# Patient Record
Sex: FEMALE | Race: WHITE | Employment: OTHER | URBAN - METROPOLITAN AREA
[De-identification: names, ages, dates, MRNs, and addresses within clinical notes are randomized per-mention and may not be internally consistent; named-entity substitution may affect disease eponyms.]

---

## 2017-06-08 PROBLEM — I83.891 BLEEDING FROM VARICOSE VEINS OF RIGHT LOWER EXTREMITY: Status: ACTIVE | Noted: 2017-06-08

## 2017-06-08 PROBLEM — I83.892 VARICOSE VEINS OF LEFT LEG WITH EDEMA: Status: ACTIVE | Noted: 2017-06-08

## 2018-11-26 ENCOUNTER — HOSPITAL ENCOUNTER (OUTPATIENT)
Dept: CT IMAGING | Age: 73
Discharge: HOME | End: 2018-11-26
Attending: FAMILY MEDICINE
Payer: MEDICARE

## 2018-11-26 DIAGNOSIS — R10.9 STOMACH ACHE: ICD-10-CM

## 2018-11-26 LAB — CREAT BLD-MCNC: 0.9 MG/DL (ref 0.6–1.3)

## 2018-11-26 PROCEDURE — 74177 CT ABD & PELVIS W/CONTRAST: CPT

## 2018-11-26 PROCEDURE — 82565 ASSAY OF CREATININE: CPT

## 2018-11-26 PROCEDURE — 74011636320 HC RX REV CODE- 636/320: Performed by: FAMILY MEDICINE

## 2018-11-26 PROCEDURE — 74011250636 HC RX REV CODE- 250/636: Performed by: FAMILY MEDICINE

## 2018-11-26 RX ORDER — SODIUM CHLORIDE 9 MG/ML
50 INJECTION, SOLUTION INTRAVENOUS
Status: COMPLETED | OUTPATIENT
Start: 2018-11-26 | End: 2018-11-26

## 2018-11-26 RX ORDER — SODIUM CHLORIDE 0.9 % (FLUSH) 0.9 %
10 SYRINGE (ML) INJECTION
Status: COMPLETED | OUTPATIENT
Start: 2018-11-26 | End: 2018-11-26

## 2018-11-26 RX ADMIN — IOPAMIDOL 100 ML: 755 INJECTION, SOLUTION INTRAVENOUS at 09:50

## 2018-11-26 RX ADMIN — Medication 10 ML: at 09:50

## 2018-11-26 RX ADMIN — IOHEXOL 20 ML: 240 INJECTION, SOLUTION INTRATHECAL; INTRAVASCULAR; INTRAVENOUS; ORAL at 09:51

## 2018-11-26 RX ADMIN — SODIUM CHLORIDE 50 ML/HR: 900 INJECTION, SOLUTION INTRAVENOUS at 09:51

## 2019-01-10 ENCOUNTER — OFFICE VISIT (OUTPATIENT)
Dept: CARDIOLOGY CLINIC | Age: 74
End: 2019-01-10
Payer: MEDICARE

## 2019-01-10 VITALS
RESPIRATION RATE: 16 BRPM | HEART RATE: 51 BPM | DIASTOLIC BLOOD PRESSURE: 78 MMHG | BODY MASS INDEX: 26.36 KG/M2 | HEIGHT: 63 IN | SYSTOLIC BLOOD PRESSURE: 118 MMHG | WEIGHT: 148.8 LBS

## 2019-01-10 DIAGNOSIS — I38 VALVULAR HEART DISEASE: ICD-10-CM

## 2019-01-10 DIAGNOSIS — I48.91 ATRIAL FIBRILLATION, UNSPECIFIED TYPE (HCC): ICD-10-CM

## 2019-01-10 DIAGNOSIS — I10 ESSENTIAL HYPERTENSION: ICD-10-CM

## 2019-01-10 DIAGNOSIS — Z79.01 CHRONIC ANTICOAGULATION: ICD-10-CM

## 2019-01-10 DIAGNOSIS — I48.19 PERSISTENT ATRIAL FIBRILLATION (HCC): Primary | ICD-10-CM

## 2019-01-10 PROCEDURE — G8400 PT W/DXA NO RESULTS DOC: HCPCS | Performed by: INTERNAL MEDICINE

## 2019-01-10 PROCEDURE — 1123F ACP DISCUSS/DSCN MKR DOCD: CPT | Performed by: INTERNAL MEDICINE

## 2019-01-10 PROCEDURE — 93000 ELECTROCARDIOGRAM COMPLETE: CPT | Performed by: INTERNAL MEDICINE

## 2019-01-10 PROCEDURE — 99214 OFFICE O/P EST MOD 30 MIN: CPT | Performed by: INTERNAL MEDICINE

## 2019-01-10 PROCEDURE — 3017F COLORECTAL CA SCREEN DOC REV: CPT | Performed by: INTERNAL MEDICINE

## 2019-01-10 PROCEDURE — 1090F PRES/ABSN URINE INCON ASSESS: CPT | Performed by: INTERNAL MEDICINE

## 2019-01-10 PROCEDURE — G8598 ASA/ANTIPLAT THER USED: HCPCS | Performed by: INTERNAL MEDICINE

## 2019-01-10 PROCEDURE — G8427 DOCREV CUR MEDS BY ELIG CLIN: HCPCS | Performed by: INTERNAL MEDICINE

## 2019-01-10 PROCEDURE — 1101F PT FALLS ASSESS-DOCD LE1/YR: CPT | Performed by: INTERNAL MEDICINE

## 2019-01-10 PROCEDURE — G8484 FLU IMMUNIZE NO ADMIN: HCPCS | Performed by: INTERNAL MEDICINE

## 2019-01-10 PROCEDURE — G8419 CALC BMI OUT NRM PARAM NOF/U: HCPCS | Performed by: INTERNAL MEDICINE

## 2019-01-10 PROCEDURE — 1036F TOBACCO NON-USER: CPT | Performed by: INTERNAL MEDICINE

## 2019-01-10 PROCEDURE — 4040F PNEUMOC VAC/ADMIN/RCVD: CPT | Performed by: INTERNAL MEDICINE

## 2019-01-10 RX ORDER — BLOOD SUGAR DIAGNOSTIC
STRIP MISCELLANEOUS
Refills: 0 | COMMUNITY
Start: 2018-12-18

## 2019-01-10 RX ORDER — PNEUMOCOCCAL 13-VALENT CONJUGATE VACCINE 2.2; 2.2; 2.2; 2.2; 2.2; 4.4; 2.2; 2.2; 2.2; 2.2; 2.2; 2.2; 2.2 UG/.5ML; UG/.5ML; UG/.5ML; UG/.5ML; UG/.5ML; UG/.5ML; UG/.5ML; UG/.5ML; UG/.5ML; UG/.5ML; UG/.5ML; UG/.5ML; UG/.5ML
INJECTION, SUSPENSION INTRAMUSCULAR
Refills: 0 | COMMUNITY
Start: 2018-12-04 | End: 2019-09-13 | Stop reason: ALTCHOICE

## 2019-01-10 RX ORDER — FUROSEMIDE 20 MG/1
TABLET ORAL
Refills: 0 | COMMUNITY
Start: 2019-01-01

## 2019-09-13 ENCOUNTER — HOSPITAL ENCOUNTER (OUTPATIENT)
Dept: WOUND CARE | Age: 74
Discharge: HOME | End: 2019-09-13
Payer: MEDICARE

## 2019-09-13 VITALS
SYSTOLIC BLOOD PRESSURE: 150 MMHG | TEMPERATURE: 98.3 F | HEART RATE: 64 BPM | RESPIRATION RATE: 20 BRPM | BODY MASS INDEX: 24.98 KG/M2 | DIASTOLIC BLOOD PRESSURE: 80 MMHG | HEIGHT: 63 IN | WEIGHT: 141 LBS

## 2019-09-13 DIAGNOSIS — L97.812 NON-PRESSURE CHRONIC ULCER OF OTHER PART OF RIGHT LOWER LEG WITH FAT LAYER EXPOSED (HCC): ICD-10-CM

## 2019-09-13 DIAGNOSIS — L97.919 VENOUS ULCER OF RIGHT LEG (HCC): ICD-10-CM

## 2019-09-13 DIAGNOSIS — S80.11XA HEMATOMA OF LEG, RIGHT, INITIAL ENCOUNTER: ICD-10-CM

## 2019-09-13 DIAGNOSIS — I83.019 VENOUS ULCER OF RIGHT LEG (HCC): ICD-10-CM

## 2019-09-13 PROCEDURE — 99203 OFFICE O/P NEW LOW 30 MIN: CPT | Performed by: PODIATRIST

## 2019-09-13 PROCEDURE — 88304 TISSUE EXAM BY PATHOLOGIST: CPT

## 2019-09-13 PROCEDURE — 11042 DBRDMT SUBQ TIS 1ST 20SQCM/<: CPT

## 2019-09-13 PROCEDURE — 99213 OFFICE O/P EST LOW 20 MIN: CPT

## 2019-09-13 PROCEDURE — 11042 DBRDMT SUBQ TIS 1ST 20SQCM/<: CPT | Performed by: PODIATRIST

## 2019-09-13 RX ORDER — ASPIRIN 81 MG/1
81 TABLET ORAL DAILY
COMMUNITY

## 2019-09-13 RX ORDER — LIDOCAINE HYDROCHLORIDE 20 MG/ML
JELLY TOPICAL ONCE
Status: DISCONTINUED | OUTPATIENT
Start: 2019-09-13 | End: 2019-09-14 | Stop reason: HOSPADM

## 2019-09-13 NOTE — PROGRESS NOTES
[facility]  History and Physical/Consultation  Podiatry    Referring Physician : Sherita Andino MD  [address] [facility] RECORD NUMBER:  [id number]  AGE: 69 y.o. GENDER: female  : 1945  EPISODE DATE:  2019  Subjective:     Chief Complaint   Patient presents with    Wound Check     patient here for treatment of right lower leg ulcer         HISTORY of PRESENT ILLNESS HPI     Maximo Trivedi is a 69 y.o. female who presents today for wound/ulcer evaluation. History of Wound Context:  The patient has had a wound of venous origin which was first noted approximately 4 months ago after having vein injections. This has been treated by herself with padded gauze dressings. On their initial visit to the wound healing center, 19, the patient has noted that the wound has not been improving. The patient has not had wounds similar to this in the past.        Pt is currently not on abx.       Wound/Ulcer Pain Timing/Severity: mild  Quality of pain: dull, aching  Severity:  3 / 10   Modifying Factors: Pain is relieved/improved with rest  Associated Signs/Symptoms: edema and drainage    Ulcer Identification:  Ulcer Type: venous, traumatic and hematoma  Contributing Factors: venous stasis and anticoagulation therapy    Diabetic/Pressure/Non Pressure Ulcers onl y:  Ulcer: Non-Pressure ulcer, fat layer exposed    If patient has diabetic lower extremity wounds  Simon Classification of diabetic lower extremity wounds:    Grade Description   []  0 No open wound   []  1 Superficial ulcer involving the full skin thickness   []  2 Deep ulcer involves ligament, tendon, joint capsule, or fascia  No bone involvement or abscess presence   []  3 Deep Ulcer with abcess formation and/or osteomyelitis   []  4 Localized gangrene   []  5 Extensive gangrene of the foot     Wound: Injection site hematoma right leg        PAST MEDICAL HISTORY      Diagnosis Date    Aortic stenosis, mild 10/1/2015    Arrhythmia     Atrial fibrillation ([address]     Bleeding from varicose veins of right lower extremity 6/8/2017    Cerebrovascular disease 03-21-13    CVA    Diabetes mellitus ([address]     Hearing deficit     Heart disease     Hyperlipidemia     Hypertension     Migraine headache with aura     Mitral regurgitation 10/1/2015    mild    NCGS (non-celiac gluten sensitivity)     TIA (transient ischemic attack)     Unspecified cerebral artery occlusion with cerebral infarction     Varicose veins of left leg with edema 6/8/2017    War[facility]in[facility]induced coagulopathy ([facility] 9/29/2015     Past Surgical History:   Procedure Laterality Date    ABDOMEN SURGERY      APPENDECTOMY      BREAST SURGERY Right     biopsy-benign    COLONOSCOPY  2008    ECHOCARDIOGRAM COMPLETE 2D W DOPPLER W COLOR  8/30/2012         INNER EAR SURGERY Left     Multiple surgeries    OVARY SURGERY      SMALL INTESTINE SURGERY      VEIN SURGERY       Family History   Problem Relation Age of Onset    Heart Attack Mother     Cancer Father     Diabetes Father      Social History     Tobacco Use    Smoking status: Never Smoker    Smokeless tobacco: Never Used   Substance Use Topics    Alcohol use: No    Drug use: No     No Known Allergies  Current Outpatient Medications on File Prior to Encounter   Medication Sig Dispense Refill    aspirin 81 MG EC tablet Take 81 mg by mouth daily      potassium chloride (KLOR-CON) 10 MEQ extended release tablet take 1 tablet by mouth twice a day  0    furosemide (LASIX) 20 MG tablet take 1 tablet by mouth once daily  0    ACCU-CHEK JR PLUS strip   0    warfarin (COUMADIN) 1 MG tablet Take 1 mg by mouth daily Pt alternates 1 mg and 1 1/2 mg      CARTIA  MG extended release capsule Take 240 mg by mouth daily  0    calcium carbonate-vitamin D3 (CALCIUM 600/VITAMIN D) 600-400 MG-UNIT TABS Take by mouth      potassium chloride SA (KLOR-CON M10) 10 MEQ tablet Take 10 mEq by mouth 2 times daily.       pravastatin

## 2019-09-20 ENCOUNTER — HOSPITAL ENCOUNTER (OUTPATIENT)
Dept: WOUND CARE | Age: 74
Discharge: HOME | End: 2019-09-20
Payer: MEDICARE

## 2019-09-20 VITALS
RESPIRATION RATE: 18 BRPM | HEIGHT: 63 IN | SYSTOLIC BLOOD PRESSURE: 134 MMHG | TEMPERATURE: 97.9 F | DIASTOLIC BLOOD PRESSURE: 74 MMHG | HEART RATE: 64 BPM | WEIGHT: 141 LBS | BODY MASS INDEX: 24.98 KG/M2

## 2019-09-20 DIAGNOSIS — I83.019 VENOUS ULCER OF RIGHT LEG (HCC): ICD-10-CM

## 2019-09-20 DIAGNOSIS — L97.812 NON-PRESSURE CHRONIC ULCER OF OTHER PART OF RIGHT LOWER LEG WITH FAT LAYER EXPOSED (HCC): ICD-10-CM

## 2019-09-20 DIAGNOSIS — S80.11XA HEMATOMA OF LEG, RIGHT, INITIAL ENCOUNTER: ICD-10-CM

## 2019-09-20 DIAGNOSIS — L97.919 VENOUS ULCER OF RIGHT LEG (HCC): ICD-10-CM

## 2019-09-20 PROCEDURE — 11042 DBRDMT SUBQ TIS 1ST 20SQCM/<: CPT

## 2019-09-20 PROCEDURE — 11042 DBRDMT SUBQ TIS 1ST 20SQCM/<: CPT | Performed by: PODIATRIST

## 2019-09-20 RX ORDER — LIDOCAINE HYDROCHLORIDE 20 MG/ML
JELLY TOPICAL ONCE
Status: DISCONTINUED | OUTPATIENT
Start: 2019-09-20 | End: 2019-09-21 | Stop reason: HOSPADM

## 2019-09-20 NOTE — PROGRESS NOTES
Surgical History:   Procedure Laterality Date    ABDOMEN SURGERY      APPENDECTOMY      BREAST SURGERY Right     biopsy-benign    COLONOSCOPY  2008    ECHOCARDIOGRAM COMPLETE 2D W DOPPLER W COLOR  8/30/2012         INNER EAR SURGERY Left     Multiple surgeries    OVARY SURGERY      SMALL INTESTINE SURGERY      VEIN SURGERY       Family History   Problem Relation Age of Onset    Heart Attack Mother     Cancer Father     Diabetes Father      Social History     Tobacco Use    Smoking status: Never Smoker    Smokeless tobacco: Never Used   Substance Use Topics    Alcohol use: No    Drug use: No     No Known Allergies  Current Outpatient Medications on File Prior to Encounter   Medication Sig Dispense Refill    aspirin 81 MG EC tablet Take 81 mg by mouth daily      potassium chloride (KLOR-CON) 10 MEQ extended release tablet take 1 tablet by mouth twice a day  0    furosemide (LASIX) 20 MG tablet take 1 tablet by mouth once daily  0    warfarin (COUMADIN) 1 MG tablet Take 1 mg by mouth daily Pt alternates 1 mg and 1 1/2 mg      CARTIA  MG extended release capsule Take 240 mg by mouth daily  0    calcium carbonate-vitamin D3 (CALCIUM 600/VITAMIN D) 600-400 MG-UNIT TABS Take by mouth      pravastatin (PRAVACHOL) 40 MG tablet Take 20 mg by mouth daily.  niacin (SLO-NIACIN) 500 MG tablet Take 500 mg by mouth 2 times daily.  omeprazole (PRILOSEC) 20 MG capsule Take 20 mg by mouth 2 times daily.  therapeutic multivitamin-minerals (THERAGRAN-M) tablet Take 1 tablet by mouth daily.  Vitamin D (CHOLECALCIFEROL) 1000 UNITS CAPS capsule Take 600 Units by mouth daily       metFORMIN (GLUCOPHAGE) 500 MG tablet Take 500 mg by mouth daily (with breakfast).  Chromium-Cinnamon (CINNAMON PLUS CHROMIUM PO) Take 2 tablets by mouth Daily with supper.  metoprolol (LOPRESSOR) 100 MG tablet Take 50 mg by mouth 2 times daily.       digoxin (LANOXIN) 0.125 MG tablet Take 125 mcg by mouth

## 2019-09-27 ENCOUNTER — HOSPITAL ENCOUNTER (OUTPATIENT)
Dept: WOUND CARE | Age: 74
Discharge: HOME | End: 2019-09-27
Payer: MEDICARE

## 2019-09-27 VITALS
RESPIRATION RATE: 16 BRPM | SYSTOLIC BLOOD PRESSURE: 150 MMHG | WEIGHT: 141 LBS | HEART RATE: 60 BPM | HEIGHT: 63 IN | TEMPERATURE: 98.4 F | DIASTOLIC BLOOD PRESSURE: 72 MMHG | BODY MASS INDEX: 24.98 KG/M2

## 2019-09-27 DIAGNOSIS — L97.812 NON-PRESSURE CHRONIC ULCER OF OTHER PART OF RIGHT LOWER LEG WITH FAT LAYER EXPOSED (HCC): ICD-10-CM

## 2019-09-27 DIAGNOSIS — I83.019 VENOUS ULCER OF RIGHT LEG (HCC): ICD-10-CM

## 2019-09-27 DIAGNOSIS — S80.11XA HEMATOMA OF LEG, RIGHT, INITIAL ENCOUNTER: ICD-10-CM

## 2019-09-27 DIAGNOSIS — L97.919 VENOUS ULCER OF RIGHT LEG (HCC): ICD-10-CM

## 2019-09-27 PROCEDURE — 99213 OFFICE O/P EST LOW 20 MIN: CPT

## 2019-09-27 PROCEDURE — 99213 OFFICE O/P EST LOW 20 MIN: CPT | Performed by: PODIATRIST

## 2019-09-27 RX ORDER — LIDOCAINE HYDROCHLORIDE 20 MG/ML
JELLY TOPICAL ONCE
Status: DISCONTINUED | OUTPATIENT
Start: 2019-09-27 | End: 2019-09-28 | Stop reason: HOSPADM

## 2019-09-27 ASSESSMENT — PAIN SCALES - GENERAL: PAINLEVEL_OUTOF10: 0

## 2019-09-27 NOTE — PROGRESS NOTES
9/27/2019  2:23 PM   Wound Assessment Red;Black 9/27/2019  2:17 PM   Drainage Amount Large 9/27/2019  2:17 PM   Drainage Description Serosanguinous 9/27/2019  2:17 PM   Odor None 9/27/2019  2:17 PM   Abby-wound Assessment Intact 9/27/2019  2:17 PM   Number of days: 14          Procedure Note  Indications:  Based on my examination of this patient's wound(s)/ulcer(s) today, debridement is not required to promote healing and evaluate the wound base. - No debridement performed on today's visit. Vascular consultation will be performed for possible vein ligation or other treatments. Plan:   Treatment Note please see attached Discharge Instructions    Written patient dismissal instructions given to patient and signed by patient or POA. Discharge Instructions       Visit Discharge/Physician Orders     Discharge condition: Stable     Assessment of pain at discharge:none     Anesthetic used: 2% lidocaine     Discharge to: Home     Left via:Private automobile     Accompanied by: accompanied by spouse     ECF/HHA:      Dressing Orders:Cleanse ulcer right leg with normal saline apply adaptic alginate and dry dressing and coban 2 leave dressing in place x 1 week. Do not get wet     Treatment Orders:  Eat foods high in protein and vitamin c      Biopsy reviewed     [address] [place] [address],[address] followup visit ______1 week PK______________________  (Please note your next appointment above and if you are unable to keep, kindly give a 24 hour notice.  Thank you.)     Physician signature:__________________________        If you experience any of the following, please call the [facility] during business hours:     * Increase in Pain  * Temperature over 101  * Increase in drainage from your wound  * Drainage with a foul odor  * Bleeding  * Increase in swelling  * Need for compression bandage changes due to slippage, breakthrough drainage.     If you need medical attention outside of the business hours of the [facility] please

## 2019-10-04 ENCOUNTER — HOSPITAL ENCOUNTER (OUTPATIENT)
Dept: WOUND CARE | Age: 74
Discharge: HOME | End: 2019-10-04
Payer: MEDICARE

## 2019-10-04 VITALS
DIASTOLIC BLOOD PRESSURE: 70 MMHG | RESPIRATION RATE: 16 BRPM | HEART RATE: 56 BPM | TEMPERATURE: 98.2 F | SYSTOLIC BLOOD PRESSURE: 130 MMHG

## 2019-10-04 DIAGNOSIS — S80.11XA HEMATOMA OF LEG, RIGHT, INITIAL ENCOUNTER: ICD-10-CM

## 2019-10-04 DIAGNOSIS — I83.019 VENOUS ULCER OF RIGHT LEG (HCC): ICD-10-CM

## 2019-10-04 DIAGNOSIS — L97.919 VENOUS ULCER OF RIGHT LEG (HCC): ICD-10-CM

## 2019-10-04 DIAGNOSIS — L97.812 NON-PRESSURE CHRONIC ULCER OF OTHER PART OF RIGHT LOWER LEG WITH FAT LAYER EXPOSED (HCC): ICD-10-CM

## 2019-10-04 PROCEDURE — 99213 OFFICE O/P EST LOW 20 MIN: CPT

## 2019-10-04 PROCEDURE — 99213 OFFICE O/P EST LOW 20 MIN: CPT | Performed by: PODIATRIST

## 2019-10-04 RX ORDER — LIDOCAINE HYDROCHLORIDE 20 MG/ML
JELLY TOPICAL ONCE
Status: DISCONTINUED | OUTPATIENT
Start: 2019-10-04 | End: 2019-10-05 | Stop reason: HOSPADM

## 2019-10-04 ASSESSMENT — PAIN SCALES - GENERAL: PAINLEVEL_OUTOF10: 0

## 2019-10-09 ENCOUNTER — HOSPITAL ENCOUNTER (OUTPATIENT)
Dept: WOUND CARE | Age: 74
Discharge: HOME | End: 2019-10-09
Payer: MEDICARE

## 2019-10-09 VITALS
RESPIRATION RATE: 16 BRPM | BODY MASS INDEX: 24.98 KG/M2 | TEMPERATURE: 98.4 F | HEIGHT: 63 IN | HEART RATE: 64 BPM | DIASTOLIC BLOOD PRESSURE: 70 MMHG | SYSTOLIC BLOOD PRESSURE: 148 MMHG | WEIGHT: 141 LBS

## 2019-10-09 DIAGNOSIS — L97.812 NON-PRESSURE CHRONIC ULCER OF OTHER PART OF RIGHT LOWER LEG WITH FAT LAYER EXPOSED (HCC): Primary | ICD-10-CM

## 2019-10-09 DIAGNOSIS — I83.012 VENOUS STASIS ULCER OF RIGHT CALF WITH FAT LAYER EXPOSED WITH VARICOSE VEINS (HCC): Chronic | ICD-10-CM

## 2019-10-09 DIAGNOSIS — L97.212 VENOUS STASIS ULCER OF RIGHT CALF WITH FAT LAYER EXPOSED WITH VARICOSE VEINS (HCC): Chronic | ICD-10-CM

## 2019-10-09 PROCEDURE — 99213 OFFICE O/P EST LOW 20 MIN: CPT

## 2019-10-09 PROCEDURE — 99213 OFFICE O/P EST LOW 20 MIN: CPT | Performed by: SURGERY

## 2019-10-09 PROCEDURE — 11042 DBRDMT SUBQ TIS 1ST 20SQCM/<: CPT

## 2019-10-09 PROCEDURE — 11042 DBRDMT SUBQ TIS 1ST 20SQCM/<: CPT | Performed by: SURGERY

## 2019-10-09 ASSESSMENT — PAIN SCALES - GENERAL: PAINLEVEL_OUTOF10: 0

## 2019-10-18 ENCOUNTER — HOSPITAL ENCOUNTER (OUTPATIENT)
Dept: WOUND CARE | Age: 74
Discharge: HOME | End: 2019-10-18
Payer: MEDICARE

## 2019-10-18 VITALS
HEIGHT: 63 IN | TEMPERATURE: 98.3 F | WEIGHT: 141 LBS | DIASTOLIC BLOOD PRESSURE: 80 MMHG | BODY MASS INDEX: 24.98 KG/M2 | SYSTOLIC BLOOD PRESSURE: 140 MMHG | RESPIRATION RATE: 18 BRPM

## 2019-10-18 DIAGNOSIS — L97.212 VENOUS STASIS ULCER OF RIGHT CALF WITH FAT LAYER EXPOSED WITH VARICOSE VEINS (HCC): ICD-10-CM

## 2019-10-18 DIAGNOSIS — L97.812 NON-PRESSURE CHRONIC ULCER OF OTHER PART OF RIGHT LOWER LEG WITH FAT LAYER EXPOSED (HCC): ICD-10-CM

## 2019-10-18 DIAGNOSIS — I83.012 VENOUS STASIS ULCER OF RIGHT CALF WITH FAT LAYER EXPOSED WITH VARICOSE VEINS (HCC): ICD-10-CM

## 2019-10-18 DIAGNOSIS — S80.11XA HEMATOMA OF LEG, RIGHT, INITIAL ENCOUNTER: ICD-10-CM

## 2019-10-18 PROCEDURE — 99213 OFFICE O/P EST LOW 20 MIN: CPT | Performed by: PODIATRIST

## 2019-10-18 PROCEDURE — 99213 OFFICE O/P EST LOW 20 MIN: CPT

## 2019-10-18 RX ORDER — LIDOCAINE HYDROCHLORIDE 20 MG/ML
JELLY TOPICAL ONCE
Status: DISCONTINUED | OUTPATIENT
Start: 2019-10-18 | End: 2019-10-19 | Stop reason: HOSPADM

## 2019-10-18 ASSESSMENT — PAIN SCALES - GENERAL: PAINLEVEL_OUTOF10: 0

## 2019-10-25 ENCOUNTER — HOSPITAL ENCOUNTER (OUTPATIENT)
Dept: WOUND CARE | Age: 74
Discharge: HOME | End: 2019-10-25
Payer: MEDICARE

## 2019-10-25 VITALS
RESPIRATION RATE: 18 BRPM | SYSTOLIC BLOOD PRESSURE: 140 MMHG | TEMPERATURE: 98.1 F | DIASTOLIC BLOOD PRESSURE: 74 MMHG | BODY MASS INDEX: 24.98 KG/M2 | HEART RATE: 64 BPM | WEIGHT: 141 LBS | HEIGHT: 63 IN

## 2019-10-25 DIAGNOSIS — I83.012 VENOUS STASIS ULCER OF RIGHT CALF WITH FAT LAYER EXPOSED WITH VARICOSE VEINS (HCC): ICD-10-CM

## 2019-10-25 DIAGNOSIS — L97.812 NON-PRESSURE CHRONIC ULCER OF OTHER PART OF RIGHT LOWER LEG WITH FAT LAYER EXPOSED (HCC): ICD-10-CM

## 2019-10-25 DIAGNOSIS — I87.2 VENOUS INSUFFICIENCY (CHRONIC) (PERIPHERAL): Chronic | ICD-10-CM

## 2019-10-25 DIAGNOSIS — S80.11XA HEMATOMA OF LEG, RIGHT, INITIAL ENCOUNTER: ICD-10-CM

## 2019-10-25 DIAGNOSIS — L97.212 VENOUS STASIS ULCER OF RIGHT CALF WITH FAT LAYER EXPOSED WITH VARICOSE VEINS (HCC): ICD-10-CM

## 2019-10-25 PROCEDURE — 11042 DBRDMT SUBQ TIS 1ST 20SQCM/<: CPT | Performed by: PODIATRIST

## 2019-10-25 PROCEDURE — 11042 DBRDMT SUBQ TIS 1ST 20SQCM/<: CPT

## 2019-10-25 ASSESSMENT — PAIN SCALES - GENERAL: PAINLEVEL_OUTOF10: 0

## 2019-11-01 ENCOUNTER — HOSPITAL ENCOUNTER (OUTPATIENT)
Dept: WOUND CARE | Age: 74
Discharge: HOME | End: 2019-11-01
Payer: MEDICARE

## 2019-11-01 VITALS
DIASTOLIC BLOOD PRESSURE: 80 MMHG | HEART RATE: 72 BPM | TEMPERATURE: 98.1 F | SYSTOLIC BLOOD PRESSURE: 132 MMHG | RESPIRATION RATE: 18 BRPM

## 2019-11-01 DIAGNOSIS — I83.012 VENOUS STASIS ULCER OF RIGHT CALF WITH FAT LAYER EXPOSED WITH VARICOSE VEINS (HCC): ICD-10-CM

## 2019-11-01 DIAGNOSIS — S80.11XA HEMATOMA OF LEG, RIGHT, INITIAL ENCOUNTER: ICD-10-CM

## 2019-11-01 DIAGNOSIS — L97.212 VENOUS STASIS ULCER OF RIGHT CALF WITH FAT LAYER EXPOSED WITH VARICOSE VEINS (HCC): ICD-10-CM

## 2019-11-01 DIAGNOSIS — I87.2 VENOUS INSUFFICIENCY (CHRONIC) (PERIPHERAL): Primary | Chronic | ICD-10-CM

## 2019-11-01 DIAGNOSIS — L97.812 NON-PRESSURE CHRONIC ULCER OF OTHER PART OF RIGHT LOWER LEG WITH FAT LAYER EXPOSED (HCC): ICD-10-CM

## 2019-11-01 PROCEDURE — 11042 DBRDMT SUBQ TIS 1ST 20SQCM/<: CPT

## 2019-11-01 PROCEDURE — 11042 DBRDMT SUBQ TIS 1ST 20SQCM/<: CPT | Performed by: PODIATRIST

## 2019-11-08 ENCOUNTER — HOSPITAL ENCOUNTER (OUTPATIENT)
Dept: WOUND CARE | Age: 74
Discharge: HOME | End: 2019-11-08
Payer: MEDICARE

## 2019-11-08 VITALS
WEIGHT: 141 LBS | SYSTOLIC BLOOD PRESSURE: 130 MMHG | HEART RATE: 62 BPM | RESPIRATION RATE: 18 BRPM | TEMPERATURE: 98.1 F | BODY MASS INDEX: 24.98 KG/M2 | HEIGHT: 63 IN | DIASTOLIC BLOOD PRESSURE: 70 MMHG

## 2019-11-08 DIAGNOSIS — L97.812 NON-PRESSURE CHRONIC ULCER OF OTHER PART OF RIGHT LOWER LEG WITH FAT LAYER EXPOSED (HCC): Primary | Chronic | ICD-10-CM

## 2019-11-08 DIAGNOSIS — I83.012 VENOUS STASIS ULCER OF RIGHT CALF WITH FAT LAYER EXPOSED WITH VARICOSE VEINS (HCC): Chronic | ICD-10-CM

## 2019-11-08 DIAGNOSIS — I87.2 VENOUS INSUFFICIENCY (CHRONIC) (PERIPHERAL): Chronic | ICD-10-CM

## 2019-11-08 DIAGNOSIS — L97.212 VENOUS STASIS ULCER OF RIGHT CALF WITH FAT LAYER EXPOSED WITH VARICOSE VEINS (HCC): Chronic | ICD-10-CM

## 2019-11-08 PROCEDURE — 99212 OFFICE O/P EST SF 10 MIN: CPT

## 2019-11-08 PROCEDURE — 99213 OFFICE O/P EST LOW 20 MIN: CPT | Performed by: PODIATRIST

## 2019-11-08 RX ORDER — LIDOCAINE HYDROCHLORIDE 20 MG/ML
JELLY TOPICAL ONCE
Status: DISCONTINUED | OUTPATIENT
Start: 2019-11-08 | End: 2019-11-10 | Stop reason: HOSPADM

## 2019-12-10 ENCOUNTER — TELEPHONE (OUTPATIENT)
Dept: CARDIOLOGY CLINIC | Age: 74
End: 2019-12-10

## 2020-01-02 ENCOUNTER — OFFICE VISIT (OUTPATIENT)
Dept: CARDIOLOGY CLINIC | Age: 75
End: 2020-01-02
Payer: MEDICARE

## 2020-01-02 VITALS
SYSTOLIC BLOOD PRESSURE: 128 MMHG | HEIGHT: 63 IN | BODY MASS INDEX: 25.16 KG/M2 | RESPIRATION RATE: 12 BRPM | DIASTOLIC BLOOD PRESSURE: 72 MMHG | WEIGHT: 142 LBS | HEART RATE: 41 BPM

## 2020-01-02 PROCEDURE — G8484 FLU IMMUNIZE NO ADMIN: HCPCS | Performed by: INTERNAL MEDICINE

## 2020-01-02 PROCEDURE — 3017F COLORECTAL CA SCREEN DOC REV: CPT | Performed by: INTERNAL MEDICINE

## 2020-01-02 PROCEDURE — G8400 PT W/DXA NO RESULTS DOC: HCPCS | Performed by: INTERNAL MEDICINE

## 2020-01-02 PROCEDURE — G8427 DOCREV CUR MEDS BY ELIG CLIN: HCPCS | Performed by: INTERNAL MEDICINE

## 2020-01-02 PROCEDURE — 93000 ELECTROCARDIOGRAM COMPLETE: CPT | Performed by: INTERNAL MEDICINE

## 2020-01-02 PROCEDURE — 99214 OFFICE O/P EST MOD 30 MIN: CPT | Performed by: INTERNAL MEDICINE

## 2020-01-02 PROCEDURE — 4040F PNEUMOC VAC/ADMIN/RCVD: CPT | Performed by: INTERNAL MEDICINE

## 2020-01-02 PROCEDURE — 1123F ACP DISCUSS/DSCN MKR DOCD: CPT | Performed by: INTERNAL MEDICINE

## 2020-01-02 PROCEDURE — 1090F PRES/ABSN URINE INCON ASSESS: CPT | Performed by: INTERNAL MEDICINE

## 2020-01-02 PROCEDURE — G8417 CALC BMI ABV UP PARAM F/U: HCPCS | Performed by: INTERNAL MEDICINE

## 2020-01-02 PROCEDURE — 1036F TOBACCO NON-USER: CPT | Performed by: INTERNAL MEDICINE

## 2020-01-02 NOTE — PROGRESS NOTES
Ashley Conte, 1945    Date of Service: 1/2/2020    HPI:  She was previously followed by Dr. Юлия Trinh; she established care with me in 1/2019. She presents today for follow-up of atrial fibrillation and valvular heart disease. She is a 69 y.o. female with a history of chronic atrial fibrillation, hypertension, dyslipidemia, TIA's, and aortic stenosis. Her last TIA episode was in 09/2015 when anticoagulation was held for a procedure. She notes no new cardiac complaints. She denies any chest pain, shortness of breath, or syncope. She denies orthopnea, PND or lower extremity edema. Atrial fibrillation with SVR on EKG. Review of Systems:  Cardiac: As per HPI  General: No fever, chills  Pulmonary: As per HPI  HEENT: No visual disturbances, difficult swallowing  GI: No nausea, vomiting, abdominal pain  Musculoskeletal: LOPEZ x 4, no focal motor deficits  Skin: Intact, no rashes  Neuro/Psych: No headache or seizures    Past medical history:   1. Hearing impairment with ear surgery that temporarily improved her hearing. She wears bilateral hearing aids as of 03/2010.  2. Lower extremity venous stripping, 1981. 3. Hypertension. 4. Hyperlipidemia. Total cholesterol 162, triglycerides 86, HDL 42, LDL 96 - 09/2009. 5. Colonoscopy, 2008 with small polyps that were removed. Diverticular disease also noted. 6. EGD, 09/2009 for hem positive stools. Hiatal hernia noted. Biopsies taken and omeprazole prescribed. 7. AF post EGD, 09/2009. She was admitted to [facility] longterm. She declined cardioversion and has been in chronic AF since that time. 8. CBC, BMP, cardiac enzymes - 09/2009 normal.  9. Echo, 09/2009. Mild aortic stenosis, peak gradient 18 mmHg, LA enlarged at 4.1 cm. LV size and function normal.    10. Rate control and warfarin prescribed 09/2009. 11. Lifetime nonsmoker. No history of alcohol intake. 12. No family history of premature vascular disease.     13. Echo, 08/30/2012 with normal LV size and systolic function, JOHN, mild AS with peak gradient 15 mmHg, MERCEDEZ 1.6 cm². RVSP 37 mmHg. 14. Lexiscan MPS, 08/30/2012. Normal.  EF 70%. 15. [facility] admission, 03/22/2013 with speech difficulty. Cr 1.2, otherwise normal BMP. CBC normal.  TSH normal.  CT of the head with no acute pathology. MRI showed small lacunar infarct in the left posterior parietal lobe. INR on admission was 1.5. Discharged after symptoms resolved within 24 hours. Discharged with adequate anticoagulation. 16. Echo, 09/18/2014. Normal EF, normal LV size, E/E' 19, JOHN (LA 59 mm), mild to moderate AS (mean gradient 16 mmHg, MERCEDEZ 1.5 cm², VTI 0.52). RVSP 48 mmHg, dilated IVC with poor inspiratory collapse. 17. [facility] presentation, 09/30/2015 with transient confusion and right-sided visual field defects. Resolved prior to presentation. Her anticoagulation was interrupted for breast biopsy a few weeks prior. On presentation, INR was 2.2. 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 Brain perfusion scan revealed moderate area of ischemia in the left posterior parietal lobe. She was discharged home in stable condition. 18. Echo, 10/01/2015, Dr. Shi Fernando. Normal EF. Indeterminate diastolic function. Severe biatrial enlargement. Mild MR.  Mild-moderate AS. Mean gradient of 20 mmHg. Mild AR. Mild pulmonary HTN (41 mmHg).    Echocardiogram: 1/9/18 (Emir Bender)   Left ventricle is normal in size .   Mild left ventricular concentric hypertrophy noted.   No regional wall motion abnormalities seen.   Normal left ventricular ejection fraction.   The left atrium is moderately dilated.   Moderately enlarged right atrium size.   Mild thickening of the mitral valve leaflets.   Mild mitral regurgitation is present.   Moderate aortic stenosis is present.   Mild aortic regurgitation is noted.   Mild to moderate tricuspid regurgitation.   Pulmonary hypertension is mild .     /72   Pulse (!) 41   Resp 12   Ht 5' 3\" (1.6 m)   Wt 142 digoxin  - Discussed decreasing CCB or BB dose pending clinical course  - Continue anticoagulation  - Repeat echocardiogram at next office visit  - Marilyn Romero for colonoscopy from a cardiac standpoint (+prior TIA when anticoagulation was held)      Raman Dudley MD  [facility] ([facility]) Cardiology

## 2020-08-25 ENCOUNTER — OFFICE VISIT (OUTPATIENT)
Dept: CARDIOLOGY CLINIC | Age: 75
End: 2020-08-25
Payer: MEDICARE

## 2020-08-25 VITALS
HEIGHT: 63 IN | RESPIRATION RATE: 16 BRPM | HEART RATE: 72 BPM | DIASTOLIC BLOOD PRESSURE: 84 MMHG | WEIGHT: 144 LBS | SYSTOLIC BLOOD PRESSURE: 134 MMHG | BODY MASS INDEX: 25.52 KG/M2

## 2020-08-25 PROCEDURE — 3017F COLORECTAL CA SCREEN DOC REV: CPT | Performed by: INTERNAL MEDICINE

## 2020-08-25 PROCEDURE — G8400 PT W/DXA NO RESULTS DOC: HCPCS | Performed by: INTERNAL MEDICINE

## 2020-08-25 PROCEDURE — 1123F ACP DISCUSS/DSCN MKR DOCD: CPT | Performed by: INTERNAL MEDICINE

## 2020-08-25 PROCEDURE — 1036F TOBACCO NON-USER: CPT | Performed by: INTERNAL MEDICINE

## 2020-08-25 PROCEDURE — 99214 OFFICE O/P EST MOD 30 MIN: CPT | Performed by: INTERNAL MEDICINE

## 2020-08-25 PROCEDURE — 93000 ELECTROCARDIOGRAM COMPLETE: CPT | Performed by: INTERNAL MEDICINE

## 2020-08-25 PROCEDURE — G8427 DOCREV CUR MEDS BY ELIG CLIN: HCPCS | Performed by: INTERNAL MEDICINE

## 2020-08-25 PROCEDURE — G8417 CALC BMI ABV UP PARAM F/U: HCPCS | Performed by: INTERNAL MEDICINE

## 2020-08-25 PROCEDURE — 1090F PRES/ABSN URINE INCON ASSESS: CPT | Performed by: INTERNAL MEDICINE

## 2020-08-25 PROCEDURE — 4040F PNEUMOC VAC/ADMIN/RCVD: CPT | Performed by: INTERNAL MEDICINE

## 2020-08-25 NOTE — PROGRESS NOTES
Douglas Hernandez, 1945    Date of Service: 8/25/2020    HPI:  She was previously followed by Dr. Chary Cervantes; she established care with me in 1/2019. She presents today for follow-up of atrial fibrillation and valvular heart disease. She is a 70 y.o. female with a history of chronic atrial fibrillation, hypertension, dyslipidemia, TIA's, and aortic stenosis. Her last TIA episode was in 09/2015 when anticoagulation was held for a procedure. She notes no new cardiac complaints. She denies any chest pain, shortness of breath, or syncope. She denies orthopnea, PND or lower extremity edema. Atrial fibrillation with CVR on EKG. Review of Systems:  Cardiac: As per HPI  General: No fever, chills  Pulmonary: As per HPI  HEENT: No visual disturbances, difficult swallowing  GI: No nausea, vomiting, abdominal pain  Musculoskeletal: LOPEZ x 4, no focal motor deficits  Skin: Intact, no rashes  Neuro/Psych: No headache or seizures    Past medical history:   1. Hearing impairment with ear surgery that temporarily improved her hearing. She wears bilateral hearing aids as of 03/2010.  2. Lower extremity venous stripping, 1981. 3. Hypertension. 4. Hyperlipidemia. Total cholesterol 162, triglycerides 86, HDL 42, LDL 96 - 09/2009. 5. Colonoscopy, 2008 with small polyps that were removed. Diverticular disease also noted. 6. EGD, 09/2009 for hem positive stools. Hiatal hernia noted. Biopsies taken and omeprazole prescribed. 7. AF post EGD, 09/2009. She was admitted to [facility] sn[facility]. She declined cardioversion and has been in chronic AF since that time. 8. CBC, BMP, cardiac enzymes - 09/2009 normal.  9. Echo, 09/2009. Mild aortic stenosis, peak gradient 18 mmHg, LA enlarged at 4.1 cm. LV size and function normal.    10. Rate control and warfarin prescribed 09/2009. 11. Lifetime nonsmoker. No history of alcohol intake. 12. No family history of premature vascular disease.     13. Echo, 08/30/2012 with normal LV size and systolic function, JOHN, mild AS with peak gradient 15 mmHg, MERCEDEZ 1.6 cm². RVSP 37 mmHg. 14. Lexiscan MPS, 08/30/2012. Normal.  EF 70%. 15. [facility] admission, 03/22/2013 with speech difficulty. Cr 1.2, otherwise normal BMP. CBC normal.  TSH normal.  CT of the head with no acute pathology. MRI showed small lacunar infarct in the left posterior parietal lobe. INR on admission was 1.5. Discharged after symptoms resolved within 24 hours. Discharged with adequate anticoagulation. 16. Echo, 09/18/2014. Normal EF, normal LV size, E/E' 19, JOHN (LA 59 mm), mild to moderate AS (mean gradient 16 mmHg, MERCEDEZ 1.5 cm², VTI 0.52). RVSP 48 mmHg, dilated IVC with poor inspiratory collapse. 17. [facility] presentation, 09/30/2015 with transient confusion and right-sided visual field defects. Resolved prior to presentation. Her anticoagulation was interrupted for breast biopsy a few weeks prior. On presentation, INR was 2.2. 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 Brain perfusion scan revealed moderate area of ischemia in the left posterior parietal lobe. She was discharged home in stable condition. 18. Echo, 10/01/2015, Dr. Teresa Ashley. Normal EF. Indeterminate diastolic function. Severe biatrial enlargement. Mild MR.  Mild-moderate AS. Mean gradient of 20 mmHg. Mild AR. Mild pulmonary HTN (41 mmHg).    Echocardiogram: 1/9/18 (Angela Lies)   Left ventricle is normal in size .   Mild left ventricular concentric hypertrophy noted.   No regional wall motion abnormalities seen.   Normal left ventricular ejection fraction.   The left atrium is moderately dilated.   Moderately enlarged right atrium size.   Mild thickening of the mitral valve leaflets.   Mild mitral regurgitation is present.   Moderate aortic stenosis is present.   Mild aortic regurgitation is noted.   Mild to moderate tricuspid regurgitation.   Pulmonary hypertension is mild .     /84   Pulse 72   Resp 16   Ht 5' 3\" (1.6 m)   Wt 144 lb (65.3 kg)   BMI 25.51 kg/m²    Appearance: Awake, alert, no acute respiratory distress  Skin: Intact, no rash  Head: Normocephalic, atraumatic  Eyes: EOMI, no conjunctival erythema  ENMT: No pharyngeal erythema, MMM, no rhinorrhea  Neck: Supple, no carotid bruits  Lungs: Clear to auscultation bilaterally. No wheezes, rales, or rhonchi. Cardiac: IRRR, 3/6 DELL  Abdomen: Soft, nontender, +bowel sounds  Extremities: Moves all extremities x 4, no lower extremity edema  Neurologic: No focal motor deficits apparent, normal mood and affect, alert and oriented x 3    Current Outpatient Medications   Medication Sig Dispense Refill    aspirin 81 MG EC tablet Take 81 mg by mouth daily      potassium chloride (KLOR-CON) 10 MEQ extended release tablet take 1 tablet by mouth twice a day  0    furosemide (LASIX) 20 MG tablet take 1 tablet by mouth once daily  0    ACCU-CHEK JR PLUS strip   0    warfarin (COUMADIN) 1 MG tablet Take 1 mg by mouth daily Pt alternates 1 mg and 1 1/2 mg      CARTIA  MG extended release capsule Take 240 mg by mouth daily  0    calcium carbonate-vitamin D3 (CALCIUM 600/VITAMIN D) 600-400 MG-UNIT TABS Take by mouth      pravastatin (PRAVACHOL) 40 MG tablet Take 20 mg by mouth daily.  niacin (SLO-NIACIN) 500 MG tablet Take 500 mg by mouth 2 times daily.  omeprazole (PRILOSEC) 20 MG capsule Take 20 mg by mouth 2 times daily.  therapeutic multivitamin-minerals (THERAGRAN-M) tablet Take 1 tablet by mouth daily.  Vitamin D (CHOLECALCIFEROL) 1000 UNITS CAPS capsule Take 600 Units by mouth daily       metFORMIN (GLUCOPHAGE) 500 MG tablet Take 500 mg by mouth daily (with breakfast).  Chromium-Cinnamon (CINNAMON PLUS CHROMIUM PO) Take 2 tablets by mouth Daily with supper.  metoprolol (LOPRESSOR) 100 MG tablet Take 50 mg by mouth 2 times daily. No current facility-administered medications for this visit.         EKG: atrial fibrillation, rate 72, NSSTT changes    - Repeat echocardiogram ordered today  - Stopped digoxin at last office visit (HR 41 at that time) --> HR 72 today  - Discussed options of decreasing CCB or BB dose pending clinical course (continue current medications for now)  - Continue anticoagulation      Eladio Samaniego MD  [facility]) Cardiology

## 2020-09-24 ENCOUNTER — TELEPHONE (OUTPATIENT)
Dept: CARDIOLOGY | Age: 75
End: 2020-09-24

## 2020-09-25 ENCOUNTER — HOSPITAL ENCOUNTER (OUTPATIENT)
Dept: CARDIOLOGY | Age: 75
Discharge: HOME | End: 2020-09-25
Payer: MEDICARE

## 2020-09-25 LAB
LV EF: 60 %
LVEF MODALITY: NORMAL

## 2020-09-25 PROCEDURE — 93306 TTE W/DOPPLER COMPLETE: CPT | Performed by: PSYCHIATRY & NEUROLOGY

## 2020-09-30 ENCOUNTER — TELEPHONE (OUTPATIENT)
Dept: CARDIOLOGY CLINIC | Age: 75
End: 2020-09-30

## 2020-09-30 NOTE — TELEPHONE ENCOUNTER
----- Message from Rachana Acuna MD sent at 9/29/2020  4:04 PM EDT -----  Please let patient know that ventricular function is normal and severity of aortic stenosis is severe on most recent echocardiogram (previously reported as moderate aortic stenosis). Recommend establishing at valve clinic.  Thanks

## 2020-10-15 ENCOUNTER — HOSPITAL ENCOUNTER (OUTPATIENT)
Dept: NON INVASIVE DIAGNOSTICS | Age: 75
Discharge: HOME | End: 2020-10-15
Payer: MEDICARE

## 2020-10-15 VITALS
HEART RATE: 72 BPM | TEMPERATURE: 97.6 F | DIASTOLIC BLOOD PRESSURE: 66 MMHG | RESPIRATION RATE: 20 BRPM | HEIGHT: 63 IN | BODY MASS INDEX: 25.69 KG/M2 | WEIGHT: 145 LBS | SYSTOLIC BLOOD PRESSURE: 135 MMHG

## 2020-10-15 PROCEDURE — 99211 OFF/OP EST MAY X REQ PHY/QHP: CPT

## 2020-10-15 PROCEDURE — 99214 OFFICE O/P EST MOD 30 MIN: CPT | Performed by: INTERNAL MEDICINE

## 2020-10-15 PROCEDURE — 99203 OFFICE O/P NEW LOW 30 MIN: CPT | Performed by: THORACIC SURGERY (CARDIOTHORACIC VASCULAR SURGERY)

## 2020-10-15 NOTE — PROGRESS NOTES
[facility] [address] [facility]  Visit Note      Patient name: Brando Castrejon    Reason for consult:  AS    Primary Care Physician: Chepe Martinez MD    Date of service: 10/15/2020    Chief Complaint:  SOB    HPI:  69 yo female who has been followed for aortic stenosis for several years after her PCP hear a murmur. She was always told it was \"not severe\". She has a hx of TIA in the past as well. She does complain of SOB with exertion. Allergies: No Known Allergies    Home medications:    Current Outpatient Medications   Medication Sig Dispense Refill    aspirin 81 MG EC tablet Take 81 mg by mouth daily      potassium chloride (KLOR-CON) 10 MEQ extended release tablet take 1 tablet by mouth twice a day  0    furosemide (LASIX) 20 MG tablet take 1 tablet by mouth once daily  0    ACCU-CHEK JR PLUS strip   0    warfarin (COUMADIN) 1 MG tablet Take 1 mg by mouth daily Pt alternates 1 mg and 1 1/2 mg      CARTIA  MG extended release capsule Take 240 mg by mouth daily  0    calcium carbonate-vitamin D3 (CALCIUM 600/VITAMIN D) 600-400 MG-UNIT TABS Take by mouth      pravastatin (PRAVACHOL) 40 MG tablet Take 20 mg by mouth daily.  niacin (SLO-NIACIN) 500 MG tablet Take 500 mg by mouth 2 times daily.  omeprazole (PRILOSEC) 20 MG capsule Take 20 mg by mouth 2 times daily.  therapeutic multivitamin-minerals (THERAGRAN-M) tablet Take 1 tablet by mouth daily.  Vitamin D (CHOLECALCIFEROL) 1000 UNITS CAPS capsule Take 600 Units by mouth daily       metFORMIN (GLUCOPHAGE) 500 MG tablet Take 500 mg by mouth daily (with breakfast).  Chromium-Cinnamon (CINNAMON PLUS CHROMIUM PO) Take 2 tablets by mouth Daily with supper.  metoprolol (LOPRESSOR) 100 MG tablet Take 50 mg by mouth 2 times daily. No current facility-administered medications for this encounter.         Past Medical History:  Past Medical History:   Diagnosis Date    Aortic stenosis, mild 10/1/2015    Arrhythmia     Atrial fibrillation ([facility]     Bleeding from varicose veins of right lower extremity 6/8/2017    Cerebrovascular disease 03-21-13    CVA    Diabetes mellitus ([facility]     Hearing deficit     Heart disease     Hyperlipidemia     Hypertension     Migraine headache with aura     Mitral regurgitation 10/1/2015    mild    NCGS (non-celiac gluten sensitivity)     TIA (transient ischemic attack)     Unspecified cerebral artery occlusion with cerebral infarction     Varicose veins of left leg with edema 6/8/2017    Venous stasis ulcer of right calf with fat layer exposed with varicose veins ([facility] 9/13/2019    Warfarin-induced coagulopathy ([facility] 9/29/2015       Past Surgical History:  Past Surgical History:   Procedure Laterality Date    ABDOMEN SURGERY      APPENDECTOMY      BREAST SURGERY Right     biopsy-benign    COLONOSCOPY  2008    ECHOCARDIOGRAM COMPLETE 2D W DOPPLER W COLOR  8/30/2012         INNER EAR SURGERY Left     Multiple surgeries    OVARY SURGERY      SMALL INTESTINE SURGERY      VEIN SURGERY         Social History:  Social History     Socioeconomic History    Marital status:      Spouse name: Not on file    Number of children: Not on file    Years of education: Not on file    Highest education level: Not on file   Occupational History    Not on file   Social Needs    Financial resource strain: Not on file    Food insecurity     Worry: Not on file     Inability: Not on file    Transportation needs     Medical: Not on file     Non-medical: Not on file   Tobacco Use    Smoking status: Never Smoker    Smokeless tobacco: Never Used   Substance and Sexual Activity    Alcohol use: No    Drug use: No    Sexual activity: Never   Lifestyle    Physical activity     Days per week: Not on file     Minutes per session: Not on file    Stress: Not on file   Relationships    Social connections     Talks on phone: Not on file     Gets together: Not on file     Attends Judaism service: Not on file     Active member of club or organization: Not on file     Attends meetings of clubs or organizations: Not on file     Relationship status: Not on file    Intimate partner violence     Fear of current or ex partner: Not on file     Emotionally abused: Not on file     Physically abused: Not on file     Forced sexual activity: Not on file   Other Topics Concern    Not on file   Social History Narrative    Not on file       Family History:  Family History   Problem Relation Age of Onset    Heart Attack Mother     Cancer Father     Diabetes Father        Review of Systems:  Constitutional: Denies fevers, chills, or weight loss. HEENT: Denies visual changes or hearing loss. Heart: As per HPI. Lungs: Denies shortness of breath, cough, or wheezing. Gastrointestinal: Denies nausea, vomiting, constipation, or diarrhea. Genitourinary: dysuria or hematuria. Psychiatric: Patient denies anxiety or depression. Neurologic: Patient denies weakness of the extremities, dizziness, or headaches. All other ROS checked and found to be negative. Objective:  Vitals /66 (Site: Left Upper Arm, Position: Sitting)   Pulse 72   Temp 97.6 °F (36.4 °C) (Temporal)   Resp 20   Ht 5' 3\" (1.6 m)   Wt 145 lb (65.8 kg)   BMI 25.69 kg/m²   General Appearance: Pleasant 70y.o. year old female who appears stated age. Communicates well, no acute distress. HEENT: Head is normocephalic, atraumatic. EOMs intact, PERRL. Trachea midline. Lungs: Normal respiratory rate and normal effort. She is not in respiratory distress. Breath sounds clear to auscultation. No wheezes. Heart: Normal rate. Regular rhythm. S1 normal and S2 normal. Positive for murmur. Chest: Symmetric chest wall expansion. Extremities: Normal range of motion. Neurological: Patient is alert and oriented to person, place and time. Patient has normal reflexes. Skin: Warm and dry.    Abdomen: Abdomen is soft and non-distended. Bowel sounds are normal. There is no abdominal tenderness tenderness. There is no guarding. There is no mass. Pulses: Distal pulses are intact. Skin: Warm and dry without lesions. TTE:     Summary   Normal left ventricular systolic function. Ejection fraction is visually estimated at > 60%. Normal right ventricular size and function (TAPSE 2.0 cm). Indeterminate diastolic function. Severely dilated left atrium by volume index. Severely dilated right atrium. Moderate mitral regurgitation. Mild-moderate aortic regurgitation. Low gradient severe aortic stenosis (AV peak velocity 3.2 m/s, AV mean   gradient 21 mmHg, AV area 0.8 cm2, peak velocity dimensionless index 0.23,   VTI dimensionless index 0.25, stroke volume index 35 mL/m2). Mild tricuspid regurgitation. PASP is estimated at 36 mmHg. Assessment:   69 yo female with severe symptomatic aortic stenosis, moderate MR, moderate AI.       Plan:    C, TAVR CT  Will discuss in heart team meeting for TAVR vs surgery after continued risk stratification         Electronically signed by Aurelio Dumont DO on 10/15/2020 at 2:39 PM

## 2020-10-15 NOTE — PROGRESS NOTES
[facility]  Visit Note      Patient name: Savage Fernandez    Reason for consult: AS    Referring Physician: Dr. Karly Leong    Primary Care Physician: Pratibha Borges MD    Date of service: 10/15/2020    Chief Complaint: Dyspnea on exertion    HPI:   This is a pleasant 61-year-old  female who is accompanied by her . She reports slowly progressing dyspnea on exertion over the past year and mild lower extremity edema with no orthopnea or PND. She has not had any syncope or presyncope or chest pain. She is not in palpitations. She is compliant with her medications. A focused history review includes:  1. Aortic stenosis  2. Hypertension  3. Atrial fibrillation  4. TIAs due to atrial fibrillation    Allergies: No Known Allergies    Home medications:    Current Outpatient Medications   Medication Sig Dispense Refill    aspirin 81 MG EC tablet Take 81 mg by mouth daily      potassium chloride (KLOR-CON) 10 MEQ extended release tablet take 1 tablet by mouth twice a day  0    furosemide (LASIX) 20 MG tablet take 1 tablet by mouth once daily  0    ACCU-CHEK JR PLUS strip   0    warfarin (COUMADIN) 1 MG tablet Take 1 mg by mouth daily Pt alternates 1 mg and 1 1/2 mg      CARTIA  MG extended release capsule Take 240 mg by mouth daily  0    calcium carbonate-vitamin D3 (CALCIUM 600/VITAMIN D) 600-400 MG-UNIT TABS Take by mouth      pravastatin (PRAVACHOL) 40 MG tablet Take 20 mg by mouth daily.  niacin (SLO-NIACIN) 500 MG tablet Take 500 mg by mouth 2 times daily.  omeprazole (PRILOSEC) 20 MG capsule Take 20 mg by mouth 2 times daily.  therapeutic multivitamin-minerals (THERAGRAN-M) tablet Take 1 tablet by mouth daily.  Vitamin D (CHOLECALCIFEROL) 1000 UNITS CAPS capsule Take 600 Units by mouth daily       metFORMIN (GLUCOPHAGE) 500 MG tablet Take 500 mg by mouth daily (with breakfast).       Chromium-Cinnamon (CINNAMON PLUS CHROMIUM PO)  Alcohol use: No    Drug use: No    Sexual activity: Never   Lifestyle    Physical activity     Days per week: Not on file     Minutes per session: Not on file    Stress: Not on file   Relationships    Social connections     Talks on phone: Not on file     Gets together: Not on file     Attends Protestant service: Not on file     Active member of club or organization: Not on file     Attends meetings of clubs or organizations: Not on file     Relationship status: Not on file    Intimate partner violence     Fear of current or ex partner: Not on file     Emotionally abused: Not on file     Physically abused: Not on file     Forced sexual activity: Not on file   Other Topics Concern    Not on file   Social History Narrative    Not on file       Family History:  Family History   Problem Relation Age of Onset    Heart Attack Mother     Cancer Father     Diabetes Father        Review of Systems:  Constitutional: Denies fevers, chills, or weight loss. HEENT: Denies visual changes or hearing loss. Heart: As per HPI. Lungs: Denies shortness of breath, cough, or wheezing. Gastrointestinal: Denies nausea, vomiting, constipation, or diarrhea. Genitourinary: dysuria or hematuria. Psychiatric: Patient denies anxiety or depression. Neurologic: Patient denies weakness of the extremities, dizziness, or headaches. All other ROS checked and found to be negative. Objective:  Vitals /66 (Site: Left Upper Arm, Position: Sitting)   Pulse 72   Temp 97.6 °F (36.4 °C) (Temporal)   Resp 20   Ht 5' 3\" (1.6 m)   Wt 145 lb (65.8 kg)   BMI 25.69 kg/m²   General Appearance: Pleasant 70y.o. year old female who appears stated age. Communicates well, no acute distress. HEENT: Head is normocephalic, atraumatic. EOMs intact, PERRL. Trachea midline. Lungs: Normal respiratory rate and normal effort. She is not in respiratory distress. Breath sounds clear to auscultation. No wheezes. Heart: Normal rate. Regular rhythm. S1 normal.  3/6 systolic ejection murmur over the upper right sternal border that is mid peaking with only mildly diminished S2. Chest: Symmetric chest wall expansion. Extremities: Normal range of motion. Trace pitting edema. Neurological: Patient is alert and oriented to person, place and time. Patient has normal reflexes. Skin: Warm and dry. Abdomen: Abdomen is soft and non-distended. Bowel sounds are normal. There is no abdominal tenderness tenderness. There is no guarding. There is no mass. Pulses: Distal pulses are intact. Skin: Warm and dry without lesions. Lab Results   Component Value Date     09/30/2015    K 4.1 09/30/2015     09/30/2015    CO2 25 09/30/2015    BUN 11 09/30/2015    CREATININE 0.7 09/30/2015    GLUCOSE 113 (H) 09/30/2015    CALCIUM 9.0 09/30/2015    PROT 6.7 09/30/2015    LABALBU 3.9 09/30/2015    BILITOT 0.8 09/30/2015    ALKPHOS 81 09/30/2015    AST 21 09/30/2015    ALT 14 09/30/2015    LABGLOM >60 09/30/2015    GFRAA >60 09/30/2015       Lab Results   Component Value Date    WBC 9.3 09/30/2015    HGB 13.9 09/30/2015    HCT 42.3 09/30/2015    MCV 94.1 09/30/2015     09/30/2015       Lab Results   Component Value Date     (H) 03/21/2013     Personally reviewed the TTE dated 9/25/2020:  Normal LV size and systolic function  Normal RV size and static function  The aortic valve appears severely restricted and potentially bicuspid. mild to moderate AR. Peak velocity 3.4, mean gradient 26, DI 0.24, MERCEDEZ 0.9 with an LVOT dimension of 2.0  Mild to moderate MR  Probably moderate TR that is underestimated  Severe biatrial enlargement    I personally reviewed the EKG dated 8/25/2020:  Atrial fibrillation with controlled ventricular rate. Nonspecific ST-T abnormality. Normal conduction    Assessment:   82-year-old  female with symptomatic aortic stenosis.     Plan:   · Right and left heart cath with coronary angiogram and aortic valve assessment  · TAVR protocol CTAs. · Based on the above we will offer her TAVR versus SAVR based on the presence or absence of significant CAD and annular and access anatomy. Seen and discussed in association with Dr. Jolanta Prabhakar MD of cardiothoracic surgery.     Flor Weinstein MD  Interventional Cardiology/Structural Heart Disease  Cell: [phone]   Electronically signed by Flor Weinstein MD on 10/15/2020 at 2:53 PM

## 2020-10-20 ENCOUNTER — TELEPHONE (OUTPATIENT)
Dept: CARDIOLOGY CLINIC | Age: 75
End: 2020-10-20

## 2020-10-20 NOTE — TELEPHONE ENCOUNTER
Patient called requesting records be forwarded to [facility] for second opinion. She is not yet sure who she is going to see. Advised patient that her paper chart would be available through [facility] but that I would request the films be forwarded electronically for her.

## 2020-11-09 ENCOUNTER — TRANSCRIBE ORDER (OUTPATIENT)
Dept: SCHEDULING | Age: 75
End: 2020-11-09

## 2020-11-09 ENCOUNTER — HOSPITAL ENCOUNTER (OUTPATIENT)
Dept: CT IMAGING | Age: 75
Discharge: HOME | DRG: 392 | End: 2020-11-09
Attending: NURSE PRACTITIONER
Payer: MEDICARE

## 2020-11-09 ENCOUNTER — HOSPITAL ENCOUNTER (INPATIENT)
Age: 75
LOS: 3 days | Discharge: HOME | DRG: 392 | End: 2020-11-12
Attending: STUDENT IN AN ORGANIZED HEALTH CARE EDUCATION/TRAINING PROGRAM | Admitting: SURGERY
Payer: MEDICARE

## 2020-11-09 DIAGNOSIS — R10.31 ABDOMINAL PAIN, RIGHT LOWER QUADRANT: Primary | ICD-10-CM

## 2020-11-09 DIAGNOSIS — K57.20 DIVERTICULITIS OF LARGE INTESTINE WITH ABSCESS WITHOUT BLEEDING: Primary | ICD-10-CM

## 2020-11-09 DIAGNOSIS — R10.31 ABDOMINAL PAIN, RIGHT LOWER QUADRANT: ICD-10-CM

## 2020-11-09 PROBLEM — K57.30 DIVERTICULAR DISEASE OF LARGE INTESTINE WITH COMPLICATION: Status: ACTIVE | Noted: 2020-11-09

## 2020-11-09 LAB
ALBUMIN SERPL-MCNC: 3.6 G/DL (ref 3.5–5)
ALBUMIN/GLOB SERPL: 0.9 {RATIO} (ref 1.1–2.2)
ALP SERPL-CCNC: 76 U/L (ref 45–117)
ALT SERPL-CCNC: 18 U/L (ref 12–78)
ANION GAP SERPL CALC-SCNC: 5 MMOL/L (ref 5–15)
APPEARANCE UR: CLEAR
APTT PPP: 30.4 SEC (ref 22.1–32)
AST SERPL-CCNC: 14 U/L (ref 15–37)
BACTERIA URNS QL MICRO: NEGATIVE /HPF
BASOPHILS # BLD: 0 K/UL (ref 0–0.1)
BASOPHILS NFR BLD: 0 % (ref 0–1)
BILIRUB SERPL-MCNC: 0.8 MG/DL (ref 0.2–1)
BILIRUB UR QL: NEGATIVE
BUN SERPL-MCNC: 12 MG/DL (ref 6–20)
BUN/CREAT SERPL: 12 (ref 12–20)
CALCIUM SERPL-MCNC: 8.8 MG/DL (ref 8.5–10.1)
CHLORIDE SERPL-SCNC: 102 MMOL/L (ref 97–108)
CO2 SERPL-SCNC: 28 MMOL/L (ref 21–32)
COLOR UR: ABNORMAL
CREAT BLD-MCNC: 0.9 MG/DL (ref 0.6–1.3)
CREAT SERPL-MCNC: 1.04 MG/DL (ref 0.55–1.02)
DIFFERENTIAL METHOD BLD: ABNORMAL
EOSINOPHIL # BLD: 0.1 K/UL (ref 0–0.4)
EOSINOPHIL NFR BLD: 1 % (ref 0–7)
EPITH CASTS URNS QL MICRO: ABNORMAL /LPF
ERYTHROCYTE [DISTWIDTH] IN BLOOD BY AUTOMATED COUNT: 14.2 % (ref 11.5–14.5)
GLOBULIN SER CALC-MCNC: 3.8 G/DL (ref 2–4)
GLUCOSE SERPL-MCNC: 185 MG/DL (ref 65–100)
GLUCOSE UR STRIP.AUTO-MCNC: NEGATIVE MG/DL
HCT VFR BLD AUTO: 40.6 % (ref 35–47)
HGB BLD-MCNC: 13.3 G/DL (ref 11.5–16)
HGB UR QL STRIP: ABNORMAL
HYALINE CASTS URNS QL MICRO: ABNORMAL /LPF (ref 0–5)
IMM GRANULOCYTES # BLD AUTO: 0 K/UL (ref 0–0.04)
IMM GRANULOCYTES NFR BLD AUTO: 0 % (ref 0–0.5)
INR PPP: 1.1 (ref 0.9–1.1)
KETONES UR QL STRIP.AUTO: NEGATIVE MG/DL
LEUKOCYTE ESTERASE UR QL STRIP.AUTO: ABNORMAL
LIPASE SERPL-CCNC: 347 U/L (ref 73–393)
LYMPHOCYTES # BLD: 1.4 K/UL (ref 0.8–3.5)
LYMPHOCYTES NFR BLD: 15 % (ref 12–49)
MCH RBC QN AUTO: 28.6 PG (ref 26–34)
MCHC RBC AUTO-ENTMCNC: 32.8 G/DL (ref 30–36.5)
MCV RBC AUTO: 87.3 FL (ref 80–99)
MONOCYTES # BLD: 0.7 K/UL (ref 0–1)
MONOCYTES NFR BLD: 7 % (ref 5–13)
NEUTS SEG # BLD: 7.2 K/UL (ref 1.8–8)
NEUTS SEG NFR BLD: 77 % (ref 32–75)
NITRITE UR QL STRIP.AUTO: NEGATIVE
NRBC # BLD: 0 K/UL (ref 0–0.01)
NRBC BLD-RTO: 0 PER 100 WBC
PH UR STRIP: 6 [PH] (ref 5–8)
PLATELET # BLD AUTO: 218 K/UL (ref 150–400)
PMV BLD AUTO: 10.1 FL (ref 8.9–12.9)
POTASSIUM SERPL-SCNC: 3.6 MMOL/L (ref 3.5–5.1)
PROT SERPL-MCNC: 7.4 G/DL (ref 6.4–8.2)
PROT UR STRIP-MCNC: NEGATIVE MG/DL
PROTHROMBIN TIME: 11.4 SEC (ref 9–11.1)
RBC # BLD AUTO: 4.65 M/UL (ref 3.8–5.2)
RBC #/AREA URNS HPF: ABNORMAL /HPF (ref 0–5)
SODIUM SERPL-SCNC: 135 MMOL/L (ref 136–145)
SP GR UR REFRACTOMETRY: 1 (ref 1–1.03)
THERAPEUTIC RANGE,PTTT: NORMAL SECS (ref 58–77)
UA: UC IF INDICATED,UAUC: ABNORMAL
UROBILINOGEN UR QL STRIP.AUTO: 1 EU/DL (ref 0.2–1)
WBC # BLD AUTO: 9.3 K/UL (ref 3.6–11)
WBC URNS QL MICRO: ABNORMAL /HPF (ref 0–4)

## 2020-11-09 PROCEDURE — 85025 COMPLETE CBC W/AUTO DIFF WBC: CPT

## 2020-11-09 PROCEDURE — 80053 COMPREHEN METABOLIC PANEL: CPT

## 2020-11-09 PROCEDURE — 85610 PROTHROMBIN TIME: CPT

## 2020-11-09 PROCEDURE — 74177 CT ABD & PELVIS W/CONTRAST: CPT

## 2020-11-09 PROCEDURE — 85730 THROMBOPLASTIN TIME PARTIAL: CPT

## 2020-11-09 PROCEDURE — 65270000029 HC RM PRIVATE

## 2020-11-09 PROCEDURE — 99283 EMERGENCY DEPT VISIT LOW MDM: CPT

## 2020-11-09 PROCEDURE — 36415 COLL VENOUS BLD VENIPUNCTURE: CPT

## 2020-11-09 PROCEDURE — 74011000636 HC RX REV CODE- 636

## 2020-11-09 PROCEDURE — 74011000258 HC RX REV CODE- 258: Performed by: SURGERY

## 2020-11-09 PROCEDURE — 82565 ASSAY OF CREATININE: CPT

## 2020-11-09 PROCEDURE — 81001 URINALYSIS AUTO W/SCOPE: CPT

## 2020-11-09 PROCEDURE — 83690 ASSAY OF LIPASE: CPT

## 2020-11-09 PROCEDURE — 74011250636 HC RX REV CODE- 250/636: Performed by: SURGERY

## 2020-11-09 RX ORDER — SODIUM CHLORIDE 0.9 % (FLUSH) 0.9 %
10 SYRINGE (ML) INJECTION
Status: COMPLETED | OUTPATIENT
Start: 2020-11-09 | End: 2020-11-09

## 2020-11-09 RX ORDER — LORAZEPAM 2 MG/ML
1 INJECTION INTRAMUSCULAR
Status: DISCONTINUED | OUTPATIENT
Start: 2020-11-09 | End: 2020-11-12 | Stop reason: HOSPADM

## 2020-11-09 RX ORDER — CHOLECALCIFEROL TAB 125 MCG (5000 UNIT) 125 MCG
5000 TAB ORAL DAILY
COMMUNITY

## 2020-11-09 RX ORDER — ACETAMINOPHEN 325 MG/1
650 TABLET ORAL
Status: DISCONTINUED | OUTPATIENT
Start: 2020-11-09 | End: 2020-11-12 | Stop reason: HOSPADM

## 2020-11-09 RX ORDER — NALOXONE HYDROCHLORIDE 0.4 MG/ML
0.4 INJECTION, SOLUTION INTRAMUSCULAR; INTRAVENOUS; SUBCUTANEOUS AS NEEDED
Status: DISCONTINUED | OUTPATIENT
Start: 2020-11-09 | End: 2020-11-12 | Stop reason: HOSPADM

## 2020-11-09 RX ORDER — DEXTROSE, SODIUM CHLORIDE, AND POTASSIUM CHLORIDE 5; .45; .15 G/100ML; G/100ML; G/100ML
125 INJECTION INTRAVENOUS CONTINUOUS
Status: DISCONTINUED | OUTPATIENT
Start: 2020-11-09 | End: 2020-11-12

## 2020-11-09 RX ORDER — ONDANSETRON 2 MG/ML
4 INJECTION INTRAMUSCULAR; INTRAVENOUS
Status: DISCONTINUED | OUTPATIENT
Start: 2020-11-09 | End: 2020-11-12 | Stop reason: HOSPADM

## 2020-11-09 RX ORDER — DIPHENHYDRAMINE HYDROCHLORIDE 50 MG/ML
25 INJECTION, SOLUTION INTRAMUSCULAR; INTRAVENOUS
Status: DISCONTINUED | OUTPATIENT
Start: 2020-11-09 | End: 2020-11-12 | Stop reason: HOSPADM

## 2020-11-09 RX ORDER — HYDROMORPHONE HYDROCHLORIDE 1 MG/ML
0.5 INJECTION, SOLUTION INTRAMUSCULAR; INTRAVENOUS; SUBCUTANEOUS
Status: DISCONTINUED | OUTPATIENT
Start: 2020-11-09 | End: 2020-11-12 | Stop reason: HOSPADM

## 2020-11-09 RX ORDER — KETOTIFEN FUMARATE 0.35 MG/ML
1 SOLUTION/ DROPS OPHTHALMIC EVERY 12 HOURS
Status: DISCONTINUED | OUTPATIENT
Start: 2020-11-10 | End: 2020-11-12 | Stop reason: HOSPADM

## 2020-11-09 RX ORDER — SODIUM CHLORIDE 0.9 % (FLUSH) 0.9 %
5-40 SYRINGE (ML) INJECTION AS NEEDED
Status: DISCONTINUED | OUTPATIENT
Start: 2020-11-09 | End: 2020-11-12 | Stop reason: HOSPADM

## 2020-11-09 RX ORDER — SODIUM CHLORIDE 0.9 % (FLUSH) 0.9 %
5-40 SYRINGE (ML) INJECTION EVERY 8 HOURS
Status: DISCONTINUED | OUTPATIENT
Start: 2020-11-09 | End: 2020-11-12 | Stop reason: HOSPADM

## 2020-11-09 RX ADMIN — Medication 10 ML: at 16:39

## 2020-11-09 RX ADMIN — DEXTROSE MONOHYDRATE, SODIUM CHLORIDE, AND POTASSIUM CHLORIDE 125 ML/HR: 50; 4.5; 1.49 INJECTION, SOLUTION INTRAVENOUS at 23:50

## 2020-11-09 RX ADMIN — IOHEXOL 50 ML: 240 INJECTION, SOLUTION INTRATHECAL; INTRAVASCULAR; INTRAVENOUS; ORAL at 16:39

## 2020-11-09 RX ADMIN — PIPERACILLIN AND TAZOBACTAM 3.38 G: 3; .375 INJECTION, POWDER, LYOPHILIZED, FOR SOLUTION INTRAVENOUS at 23:22

## 2020-11-09 RX ADMIN — Medication 10 ML: at 23:51

## 2020-11-09 RX ADMIN — IOPAMIDOL 100 ML: 755 INJECTION, SOLUTION INTRAVENOUS at 16:39

## 2020-11-10 LAB
ANION GAP SERPL CALC-SCNC: 5 MMOL/L (ref 5–15)
BUN SERPL-MCNC: 9 MG/DL (ref 6–20)
BUN/CREAT SERPL: 10 (ref 12–20)
CALCIUM SERPL-MCNC: 9.2 MG/DL (ref 8.5–10.1)
CHLORIDE SERPL-SCNC: 106 MMOL/L (ref 97–108)
CO2 SERPL-SCNC: 26 MMOL/L (ref 21–32)
CREAT SERPL-MCNC: 0.93 MG/DL (ref 0.55–1.02)
ERYTHROCYTE [DISTWIDTH] IN BLOOD BY AUTOMATED COUNT: 14.4 % (ref 11.5–14.5)
GLUCOSE SERPL-MCNC: 119 MG/DL (ref 65–100)
HCT VFR BLD AUTO: 39.2 % (ref 35–47)
HGB BLD-MCNC: 12.9 G/DL (ref 11.5–16)
MCH RBC QN AUTO: 29 PG (ref 26–34)
MCHC RBC AUTO-ENTMCNC: 32.9 G/DL (ref 30–36.5)
MCV RBC AUTO: 88.1 FL (ref 80–99)
NRBC # BLD: 0 K/UL (ref 0–0.01)
NRBC BLD-RTO: 0 PER 100 WBC
PLATELET # BLD AUTO: 202 K/UL (ref 150–400)
PMV BLD AUTO: 10.3 FL (ref 8.9–12.9)
POTASSIUM SERPL-SCNC: 4.4 MMOL/L (ref 3.5–5.1)
RBC # BLD AUTO: 4.45 M/UL (ref 3.8–5.2)
SODIUM SERPL-SCNC: 137 MMOL/L (ref 136–145)
WBC # BLD AUTO: 7.6 K/UL (ref 3.6–11)

## 2020-11-10 PROCEDURE — 80048 BASIC METABOLIC PNL TOTAL CA: CPT

## 2020-11-10 PROCEDURE — 85027 COMPLETE CBC AUTOMATED: CPT

## 2020-11-10 PROCEDURE — 65270000029 HC RM PRIVATE

## 2020-11-10 PROCEDURE — 74011000250 HC RX REV CODE- 250: Performed by: SURGERY

## 2020-11-10 PROCEDURE — 74011000258 HC RX REV CODE- 258: Performed by: SURGERY

## 2020-11-10 PROCEDURE — 36415 COLL VENOUS BLD VENIPUNCTURE: CPT

## 2020-11-10 PROCEDURE — 74011250636 HC RX REV CODE- 250/636: Performed by: SURGERY

## 2020-11-10 RX ADMIN — Medication 10 ML: at 13:28

## 2020-11-10 RX ADMIN — PIPERACILLIN AND TAZOBACTAM 3.38 G: 3; .375 INJECTION, POWDER, LYOPHILIZED, FOR SOLUTION INTRAVENOUS at 20:26

## 2020-11-10 RX ADMIN — PIPERACILLIN AND TAZOBACTAM 3.38 G: 3; .375 INJECTION, POWDER, LYOPHILIZED, FOR SOLUTION INTRAVENOUS at 12:15

## 2020-11-10 RX ADMIN — DEXTROSE MONOHYDRATE, SODIUM CHLORIDE, AND POTASSIUM CHLORIDE 125 ML/HR: 50; 4.5; 1.49 INJECTION, SOLUTION INTRAVENOUS at 12:15

## 2020-11-10 RX ADMIN — KETOTIFEN FUMARATE 1 DROP: 0.35 SOLUTION/ DROPS OPHTHALMIC at 09:11

## 2020-11-10 RX ADMIN — Medication 10 ML: at 21:40

## 2020-11-10 RX ADMIN — PIPERACILLIN AND TAZOBACTAM 3.38 G: 3; .375 INJECTION, POWDER, LYOPHILIZED, FOR SOLUTION INTRAVENOUS at 03:55

## 2020-11-10 NOTE — PROGRESS NOTES
Reason for Admission:   Diverticular disease of large intestine                   RUR Score:  6                   Plan for utilizing home health:   Not anticipated       PCP: First and Last name:  Eloisa Butler MD   Name of Practice:    Are you a current patient: Yes/No: Yes   Approximate date of last visit: 11/9/20   Can you participate in a virtual visit with your PCP:                     Current Advanced Directive/Advance Care Plan:                          Transition of Care Plan:  CM met with pt to complete assessment. Prior to admission, pt was independent with ADL/IADL to include driving. Patient denies past HH/Rehab and DME use. Patients support system includes, , son, friends and neighbors. No needs or concerns identified at this time. CM will continue to follow. PCP-Dr William Ngo on [address] in [address] [facility]  PCP Verified by CM: Harika Hamilton MD)  Last Visit to PCP: 11/09/20  Mode of Transport at Discharge:  Other (see comment)()  Transition of Care Consult (CM Consult): Discharge Planning  Discharge Durable Medical Equipment: No(no DME use)  Physical Therapy Consult: No  Occupational Therapy Consult: No  Speech Therapy Consult: No  Current Support Network: Lives with Spouse, Family Lives Nearby(Lives in a one story home with 3 ANDRE)  Confirm Follow Up Transport: Self  Discharge Location  Discharge Placement: (Anticipate home w/family)      Diego Gonzales  Ext [phone]    EMILIANO Plan:     *Home w/family   * to transport at d/c

## 2020-11-10 NOTE — H&P
Surgery History and Physcial    Subjective:      Sachin Clancy is a 70 y.o. female who presents for evaluation of abdominal pain. The pain is located in the RLQ, suprapubic with pelvic pressure. Pain is described as aching, pressure and cramping and measures 2/10 in intensity. Onset of pain was 2 days ago when her pain 8/10. Aggravating factors include sitting position. Alleviating factors include acetaminophen. Associated symptoms include anorexia and fever. Last BM was two days ago. Last BM was on Sunday. Last colonoscopy was 10 years with findings of diverticulosis. Was due to have another one in the next few weeks. Patient Active Problem List    Diagnosis Date Noted    Diverticular disease of large intestine with complication 95/51/7238     No past medical history on file. No past surgical history on file. Social History     Tobacco Use    Smoking status: Not on file   Substance Use Topics    Alcohol use: Not on file      No family history on file. Prior to Admission medications    Medication Sig Start Date End Date Taking? Authorizing Provider   cholecalciferol (VITAMIN D3) (5000 Units/125 mcg) tab tablet Take 5,000 Units by mouth daily. Yes Other, MD Ashley   tetrahydrozoline/polyethyl gly (EYE DROPS OP) Apply  to eye. Pt uses at night for glaucoma. Unsure of medication name   Yes Jazmín, MD Ashley   calcium carbonate/vitamin D3 (CALCIUM CHEW PO) Take  by mouth. Yes Other, MD Ashley     No Known Allergies      Review of Systems   Constitutional: Positive for appetite change and fever. Negative for activity change and chills. Gastrointestinal: Positive for abdominal pain and constipation. Negative for abdominal distention, diarrhea, nausea and vomiting. Genitourinary: Negative for difficulty urinating, dysuria, frequency and urgency.         Objective:     Visit Vitals  BP (!) 149/70   Pulse 82   Temp 97.2 °F (36.2 °C)   Resp 18   Ht 5' 1\" (1.549 m)   Wt 60.5 kg (133 lb 6.1 oz)   SpO2 100%   BMI 25.20 kg/m²       Physical Exam  Vitals signs reviewed. Constitutional:       General: She is not in acute distress. Cardiovascular:      Rate and Rhythm: Normal rate and regular rhythm. Pulmonary:      Effort: Pulmonary effort is normal.      Breath sounds: Normal breath sounds. Abdominal:      General: Abdomen is flat. Bowel sounds are normal. There is no distension. Palpations: Abdomen is soft. Tenderness: There is abdominal tenderness in the right lower quadrant, suprapubic area and left lower quadrant. There is no guarding. Hernia: No hernia is present. Comments: Pressure more than pain with deep LLQ palpation   Neurological:      Mental Status: She is alert. Imaging:  images and reports reviewed    Lab Review:    Recent Results (from the past 24 hour(s))   CREATININE, POC    Collection Time: 11/09/20  4:34 PM   Result Value Ref Range    Creatinine (POC) 0.9 0.6 - 1.3 mg/dL    GFRAA, POC >60 >60 ml/min/1.73m2    GFRNA, POC >60 >60 ml/min/1.73m2   CBC WITH AUTOMATED DIFF    Collection Time: 11/09/20  8:13 PM   Result Value Ref Range    WBC 9.3 3.6 - 11.0 K/uL    RBC 4.65 3.80 - 5.20 M/uL    HGB 13.3 11.5 - 16.0 g/dL    HCT 40.6 35.0 - 47.0 %    MCV 87.3 80.0 - 99.0 FL    MCH 28.6 26.0 - 34.0 PG    MCHC 32.8 30.0 - 36.5 g/dL    RDW 14.2 11.5 - 14.5 %    PLATELET 432 716 - 450 K/uL    MPV 10.1 8.9 - 12.9 FL    NRBC 0.0 0  WBC    ABSOLUTE NRBC 0.00 0.00 - 0.01 K/uL    NEUTROPHILS 77 (H) 32 - 75 %    LYMPHOCYTES 15 12 - 49 %    MONOCYTES 7 5 - 13 %    EOSINOPHILS 1 0 - 7 %    BASOPHILS 0 0 - 1 %    IMMATURE GRANULOCYTES 0 0.0 - 0.5 %    ABS. NEUTROPHILS 7.2 1.8 - 8.0 K/UL    ABS. LYMPHOCYTES 1.4 0.8 - 3.5 K/UL    ABS. MONOCYTES 0.7 0.0 - 1.0 K/UL    ABS. EOSINOPHILS 0.1 0.0 - 0.4 K/UL    ABS. BASOPHILS 0.0 0.0 - 0.1 K/UL    ABS. IMM.  GRANS. 0.0 0.00 - 0.04 K/UL    DF AUTOMATED     METABOLIC PANEL, COMPREHENSIVE    Collection Time: 11/09/20  8:13 PM   Result Value Ref Range    Sodium 135 (L) 136 - 145 mmol/L    Potassium 3.6 3.5 - 5.1 mmol/L    Chloride 102 97 - 108 mmol/L    CO2 28 21 - 32 mmol/L    Anion gap 5 5 - 15 mmol/L    Glucose 185 (H) 65 - 100 mg/dL    BUN 12 6 - 20 MG/DL    Creatinine 1.04 (H) 0.55 - 1.02 MG/DL    BUN/Creatinine ratio 12 12 - 20      GFR est AA >60 >60 ml/min/1.73m2    GFR est non-AA 52 (L) >60 ml/min/1.73m2    Calcium 8.8 8.5 - 10.1 MG/DL    Bilirubin, total 0.8 0.2 - 1.0 MG/DL    ALT (SGPT) 18 12 - 78 U/L    AST (SGOT) 14 (L) 15 - 37 U/L    Alk.  phosphatase 76 45 - 117 U/L    Protein, total 7.4 6.4 - 8.2 g/dL    Albumin 3.6 3.5 - 5.0 g/dL    Globulin 3.8 2.0 - 4.0 g/dL    A-G Ratio 0.9 (L) 1.1 - 2.2     LIPASE    Collection Time: 11/09/20  8:13 PM   Result Value Ref Range    Lipase 347 73 - 393 U/L   PROTHROMBIN TIME + INR    Collection Time: 11/09/20  8:13 PM   Result Value Ref Range    INR 1.1 0.9 - 1.1      Prothrombin time 11.4 (H) 9.0 - 11.1 sec   PTT    Collection Time: 11/09/20  8:13 PM   Result Value Ref Range    aPTT 30.4 22.1 - 32.0 sec    aPTT, therapeutic range     58.0 - 77.0 SECS   URINALYSIS W/ REFLEX CULTURE    Collection Time: 11/09/20 10:05 PM    Specimen: Urine   Result Value Ref Range    Color YELLOW/STRAW      Appearance CLEAR CLEAR      Specific gravity 1.005 1.003 - 1.030      pH (UA) 6.0 5.0 - 8.0      Protein Negative NEG mg/dL    Glucose Negative NEG mg/dL    Ketone Negative NEG mg/dL    Bilirubin Negative NEG      Blood TRACE (A) NEG      Urobilinogen 1.0 0.2 - 1.0 EU/dL    Nitrites Negative NEG      Leukocyte Esterase TRACE (A) NEG      UA:UC IF INDICATED CULTURE NOT INDICATED BY UA RESULT CNI      WBC 5-10 0 - 4 /hpf    RBC 5-10 0 - 5 /hpf    Epithelial cells FEW FEW /lpf    Bacteria Negative NEG /hpf    Hyaline cast 0-2 0 - 5 /lpf   METABOLIC PANEL, BASIC    Collection Time: 11/10/20  4:02 AM   Result Value Ref Range    Sodium 137 136 - 145 mmol/L    Potassium 4.4 3.5 - 5.1 mmol/L    Chloride 106 97 - 108 mmol/L CO2 26 21 - 32 mmol/L    Anion gap 5 5 - 15 mmol/L    Glucose 119 (H) 65 - 100 mg/dL    BUN 9 6 - 20 MG/DL    Creatinine 0.93 0.55 - 1.02 MG/DL    BUN/Creatinine ratio 10 (L) 12 - 20      GFR est AA >60 >60 ml/min/1.73m2    GFR est non-AA 59 (L) >60 ml/min/1.73m2    Calcium 9.2 8.5 - 10.1 MG/DL   CBC W/O DIFF    Collection Time: 11/10/20  4:02 AM   Result Value Ref Range    WBC 7.6 3.6 - 11.0 K/uL    RBC 4.45 3.80 - 5.20 M/uL    HGB 12.9 11.5 - 16.0 g/dL    HCT 39.2 35.0 - 47.0 %    MCV 88.1 80.0 - 99.0 FL    MCH 29.0 26.0 - 34.0 PG    MCHC 32.9 30.0 - 36.5 g/dL    RDW 14.4 11.5 - 14.5 %    PLATELET 600 908 - 807 K/uL    MPV 10.3 8.9 - 12.9 FL    NRBC 0.0 0  WBC    ABSOLUTE NRBC 0.00 0.00 - 0.01 K/uL         Assessment:     Abdominal pain, suspect diverticulitis. Plan:     I recommend proceeding with Conservative therapy:  Observation, Intravenous antibiotics and Bowel rest. Zosyn ordered. Explained pathology and treatment of diverticulitis to the patient. Plan for a few days of IV abx and, if clinically improving and WBC trending down, will advance diet as tolerated. Plan will be for home on PO abx if progresses as expected. Kathryn Webber NP            Patient seen and examined separately from the NP. Agree with above exam, assessment and plan. Patient has a history of recurrent episodes of diverticulitis. Most recent episode started 2 days ago. She has LLQ and suprapubic pain. She is AF and has stable vital signs. She has mild LLQ tenderness on exam.   Her CT shows acute diverticulitis with a very small abscess. Too small to drain percutaneously.   Recommend IV antibiotics and bowel rest.

## 2020-11-10 NOTE — ED PROVIDER NOTES
EMERGENCY DEPARTMENT HISTORY AND PHYSICAL EXAM      Date: 11/9/2020  Patient Name: Nadia George    History of Presenting Illness     Chief Complaint   Patient presents with    Referral / Consult     Pt had Ct scan done today- revealed abscess in colon- was referred here by PCP. History Provided By: Patient    HPI: Nadia George, 70 y.o. female with medical history significant for diverticulosis who presents via private vehicle to the ED with cc of acute on chronic moderate aching lower abdominal pain worsening X 2 days. Patient was evaluated by PCP and had CT of abdomen pelvis today which showed sigmoid diverticulitis with associated diverticular abscess. Patient endorses she did have subjective fever of 100F today. States that her symptoms were worse yesterday but seem to have some somewhat subsided today. Taking Tylenol with minimal relief of symptoms. Denies chest pain, shortness of breath, diarrhea, melena, hematochezia, dysuria, frequency, urgency, back pain, lightheadedness, dizziness. No other medications or modifying factors. Patient is not currently on any anticoagulation. She does endorse that she has follow-up scheduled with [facility] in 1 week. Last colonoscopy was 10 years ago. PCP: Farzana Woodward., MD    There are no other complaints, changes, or physical findings at this time.     Current Facility-Administered Medications on File Prior to Encounter   Medication Dose Route Frequency Provider Last Rate Last Dose    [COMPLETED] sodium chloride (NS) flush 10 mL  10 mL IntraVENous RAD ONCE Other, MD Ashley   10 mL at 11/09/20 1639    [COMPLETED] iopamidoL (ISOVUE-370) 76 % injection 100 mL  100 mL IntraVENous RAD ONCE Other, MD Ashley   100 mL at 11/09/20 1639    [COMPLETED] iohexoL (OMNIPAQUE) 240 mg iodine/mL solution 50 mL  50 mL Oral RAD ONCE Other, MD Ashley   50 mL at 11/09/20 1639     Current Outpatient Medications on File Prior to Encounter Medication Sig Dispense Refill    cholecalciferol (VITAMIN D3) (5000 Units/125 mcg) tab tablet Take 5,000 Units by mouth daily.  tetrahydrozoline/polyethyl gly (EYE DROPS OP) Apply  to eye. Pt uses at night for glaucoma. Unsure of medication name      calcium carbonate/vitamin D3 (CALCIUM CHEW PO) Take  by mouth. Past History     Past Medical History:  No past medical history on file. Past Surgical History:  No past surgical history on file. Family History:  No family history on file. Social History:  Social History     Tobacco Use    Smoking status: Not on file   Substance Use Topics    Alcohol use: Not on file    Drug use: Not on file     Allergies:  No Known Allergies  Review of Systems   Review of Systems   Constitutional: Positive for fever. Negative for activity change, appetite change, chills, fatigue and unexpected weight change. HENT: Negative. Negative for congestion, postnasal drip, rhinorrhea, sore throat, trouble swallowing and voice change. Eyes: Negative for photophobia, pain and visual disturbance. Respiratory: Negative for cough and shortness of breath. Cardiovascular: Negative for chest pain and palpitations. Gastrointestinal: Positive for abdominal pain and constipation. Negative for abdominal distention, anal bleeding, blood in stool, diarrhea, nausea, rectal pain and vomiting. Genitourinary: Negative for decreased urine volume, difficulty urinating, dysuria, flank pain, frequency, pelvic pain and urgency. Musculoskeletal: Negative. Negative for arthralgias, back pain and myalgias. Skin: Negative. Negative for rash. Neurological: Negative for light-headedness and headaches. Psychiatric/Behavioral: Negative. Negative for confusion. The patient is not nervous/anxious. Physical Exam   Physical Exam  Vitals signs and nursing note reviewed. Constitutional:       General: She is not in acute distress. Appearance: Normal appearance.  She is well-developed. She is not ill-appearing, toxic-appearing or diaphoretic. Comments: Well-appearing female lying semisupine in no apparent distress speaking in clear complete sentences. HENT:      Head: Normocephalic and atraumatic. Mouth/Throat:      Mouth: Mucous membranes are moist.   Eyes:      Conjunctiva/sclera: Conjunctivae normal.      Pupils: Pupils are equal, round, and reactive to light. Neck:      Musculoskeletal: Normal range of motion. Cardiovascular:      Rate and Rhythm: Normal rate and regular rhythm. Pulses: Normal pulses. Heart sounds: Normal heart sounds. Pulmonary:      Effort: Pulmonary effort is normal. No respiratory distress. Breath sounds: Normal breath sounds. No wheezing or rales. Abdominal:      General: Bowel sounds are normal. There is no distension. Palpations: Abdomen is soft. Abdomen is not rigid. There is no mass. Tenderness: There is abdominal tenderness (minimal TTP.) in the right lower quadrant, suprapubic area and left lower quadrant. There is no right CVA tenderness, left CVA tenderness, guarding or rebound. Negative signs include Dill's sign and McBurney's sign. Musculoskeletal: Normal range of motion. Skin:     General: Skin is warm and dry. Neurological:      Mental Status: She is alert and oriented to person, place, and time. Psychiatric:         Behavior: Behavior normal.         Thought Content:  Thought content normal.         Judgment: Judgment normal.       Diagnostic Study Results   Labs -     Recent Results (from the past 12 hour(s))   CREATININE, POC    Collection Time: 11/09/20  4:34 PM   Result Value Ref Range    Creatinine (POC) 0.9 0.6 - 1.3 mg/dL    GFRAA, POC >60 >60 ml/min/1.73m2    GFRNA, POC >60 >60 ml/min/1.73m2   CBC WITH AUTOMATED DIFF    Collection Time: 11/09/20  8:13 PM   Result Value Ref Range    WBC 9.3 3.6 - 11.0 K/uL    RBC 4.65 3.80 - 5.20 M/uL    HGB 13.3 11.5 - 16.0 g/dL    HCT 40.6 35.0 - 47.0 %    MCV 87.3 80.0 - 99.0 FL    MCH 28.6 26.0 - 34.0 PG    MCHC 32.8 30.0 - 36.5 g/dL    RDW 14.2 11.5 - 14.5 %    PLATELET 869 191 - 495 K/uL    MPV 10.1 8.9 - 12.9 FL    NRBC 0.0 0  WBC    ABSOLUTE NRBC 0.00 0.00 - 0.01 K/uL    NEUTROPHILS 77 (H) 32 - 75 %    LYMPHOCYTES 15 12 - 49 %    MONOCYTES 7 5 - 13 %    EOSINOPHILS 1 0 - 7 %    BASOPHILS 0 0 - 1 %    IMMATURE GRANULOCYTES 0 0.0 - 0.5 %    ABS. NEUTROPHILS 7.2 1.8 - 8.0 K/UL    ABS. LYMPHOCYTES 1.4 0.8 - 3.5 K/UL    ABS. MONOCYTES 0.7 0.0 - 1.0 K/UL    ABS. EOSINOPHILS 0.1 0.0 - 0.4 K/UL    ABS. BASOPHILS 0.0 0.0 - 0.1 K/UL    ABS. IMM. GRANS. 0.0 0.00 - 0.04 K/UL    DF AUTOMATED     METABOLIC PANEL, COMPREHENSIVE    Collection Time: 11/09/20  8:13 PM   Result Value Ref Range    Sodium 135 (L) 136 - 145 mmol/L    Potassium 3.6 3.5 - 5.1 mmol/L    Chloride 102 97 - 108 mmol/L    CO2 28 21 - 32 mmol/L    Anion gap 5 5 - 15 mmol/L    Glucose 185 (H) 65 - 100 mg/dL    BUN 12 6 - 20 MG/DL    Creatinine 1.04 (H) 0.55 - 1.02 MG/DL    BUN/Creatinine ratio 12 12 - 20      GFR est AA >60 >60 ml/min/1.73m2    GFR est non-AA 52 (L) >60 ml/min/1.73m2    Calcium 8.8 8.5 - 10.1 MG/DL    Bilirubin, total 0.8 0.2 - 1.0 MG/DL    ALT (SGPT) 18 12 - 78 U/L    AST (SGOT) 14 (L) 15 - 37 U/L    Alk.  phosphatase 76 45 - 117 U/L    Protein, total 7.4 6.4 - 8.2 g/dL    Albumin 3.6 3.5 - 5.0 g/dL    Globulin 3.8 2.0 - 4.0 g/dL    A-G Ratio 0.9 (L) 1.1 - 2.2     LIPASE    Collection Time: 11/09/20  8:13 PM   Result Value Ref Range    Lipase 347 73 - 393 U/L   PROTHROMBIN TIME + INR    Collection Time: 11/09/20  8:13 PM   Result Value Ref Range    INR 1.1 0.9 - 1.1      Prothrombin time 11.4 (H) 9.0 - 11.1 sec   PTT    Collection Time: 11/09/20  8:13 PM   Result Value Ref Range    aPTT 30.4 22.1 - 32.0 sec    aPTT, therapeutic range     58.0 - 77.0 SECS       Radiologic Studies -   No orders to display     Ct Abd Pelv W Cont    Result Date: 11/9/2020  IMPRESSION: Acute sigmoid colon diverticulitis, with probable small associated diverticular abscess. No pneumoperitoneum. The findings were called to Glenda Cohn N.P. on 11/9/2020 at 5:45 PM by the CT staff. [address] [facility],[address]   I am the first provider for this patient. I reviewed the vital signs, available nursing notes, past medical history, past surgical history, family history and social history. Vital Signs-Reviewed the patient's vital signs. Patient Vitals for the past 24 hrs:   Temp Pulse Resp BP SpO2   11/09/20 2141 98.8 °F (37.1 °C) 97 19 (!) 141/62 98 %   11/09/20 1920 99.1 °F (37.3 °C) (!) 104 18 (!) 157/81 98 %     Pulse Oximetry Analysis - 98% on RA and normal    Records Reviewed: Nursing Notes, Old Medical Records, Previous Radiology Studies and Previous Laboratory Studies    Provider Notes (Medical Decision Making):   59-year-old female presents with patient presents with acute on chronic abdominal pain X 2 days. Patient was evaluated by PCP and had CT of her abdomen today which showed acute sigmoid colon diverticulitis with probable small associated diverticular abscess. Patient is currently hemodynamically stable and not in any acute distress. Will obtain labs and consult general surgery. Differential includes diverticular abscess, diverticulitis, diverticulosis, Gastroenteritis, SBO, appendicitis, colitis, IBD, mesenteric ischemia, AAA or descending dissection, ACS, ureteral stone. ED Course:   Initial assessment performed. The patients presenting problems have been discussed, and they are in agreement with the care plan formulated and outlined with them. I have encouraged them to ask questions as they arise throughout their visit. ED Course as of Nov 09 2159   [facility] Nov 09, 2020 2148 9:48 PM    I spoke with Dr. Karli Maldonado, Consult for General Surgery. Discussed available diagnostic tests and clinical findings. He is in agreement with care plans as outlined.  He endorses he will admit the pt. Drew Aguillon    []      ED Course User Index  [SM] Zach Carey PA-C       Progress Note:   Updated pt on all returned results and findings. Discussed the importance of proper follow up as referred below along with return precautions. Pt in agreement with the care plan and expresses agreement with and understanding of all items discussed. Disposition:  9:56 PM  Patient is being admitted to the hospital by Dr. Yeimy Rodriguez. The results of their tests and reasons for their admission have been discussed with them and/or available family. They convey agreement and understanding for the need to be admitted and for their admission diagnosis. Consultation has been made with the inpatient physician specialist for hospitalization. PLAN:  1. Current Discharge Medication List        2. Follow-up Information    None       Return to ED if worse     Diagnosis     Clinical Impression:   1. Diverticulitis of large intestine with abscess without bleeding            Please note that this dictation was completed with Dragon, computer voice recognition software. Quite often unanticipated grammatical, syntax, homophones, and other interpretive errors are inadvertently transcribed by the computer software. Please disregard these errors. Additionally, please excuse any errors that have escaped final proofreading.

## 2020-11-10 NOTE — PROGRESS NOTES
Problem: Pain  Goal: *Control of Pain  Outcome: Progressing Towards Goal     Problem: General Infection Care Plan (Adult and Pediatric)  Goal: Improvement in signs and symptoms of infection  Outcome: Progressing Towards Goal  Goal: *Optimize nutritional status  Outcome: Progressing Towards Goal

## 2020-11-10 NOTE — ED NOTES
Pt reports having intermittent abdominal pain for several months that varies in location with lower abdominal pressure. She saw herPCP this morning and was scheduled for CT scan of abdomen that showed abscess in the sigmoid colon. Pt has history of diverticulitis and has appt with GI next week. Pt denies nausea/vomiting/diarrhea but endorses a decreased appetite. Patient reports a fever this morning but is not febrile upon arrival to ED. Pt reports having a \"gas pain\" that was terrible yesterday but tolerable today.

## 2020-11-10 NOTE — PROGRESS NOTES
General Surgery End of Shift Nursing Note    Bedside shift change report given to Nahun Jolley (oncoming nurse) by Giuliana (offgoing nurse). Report included the following information SBAR, Kardex, MAR and Recent Results. Shift worked:   [id number]   Summary of shift:  Pt arrived to unit from ED around 2300. Has tolerable abd discomfort. NPO with ice chips. Pt up ad maame, steady gait. Issues for physician to address:   none     Number times ambulated in hallway past shift: 0    Number of times OOB to chair past shift: 0    Pain Management:  Current medication: see MAR  Patient states pain is manageable on current pain medication: YES    GI:    Current diet:  DIET NPO Except Meds, With Ice Chips    Tolerating current diet: YES  Passing flatus: Yes  Last Bowel Movement: several days ago    Respiratory:    Incentive Spirometer at bedside: YES  Patient instructed on use: no    Patient Safety:    Falls Score: 0  Bed Alarm On? No  Sitter?  No    Leidy Rivas RN

## 2020-11-11 LAB
ANION GAP SERPL CALC-SCNC: 5 MMOL/L (ref 5–15)
BUN SERPL-MCNC: 8 MG/DL (ref 6–20)
BUN/CREAT SERPL: 9 (ref 12–20)
CALCIUM SERPL-MCNC: 9.3 MG/DL (ref 8.5–10.1)
CHLORIDE SERPL-SCNC: 108 MMOL/L (ref 97–108)
CO2 SERPL-SCNC: 25 MMOL/L (ref 21–32)
CREAT SERPL-MCNC: 0.92 MG/DL (ref 0.55–1.02)
ERYTHROCYTE [DISTWIDTH] IN BLOOD BY AUTOMATED COUNT: 14.2 % (ref 11.5–14.5)
GLUCOSE SERPL-MCNC: 112 MG/DL (ref 65–100)
HCT VFR BLD AUTO: 42 % (ref 35–47)
HGB BLD-MCNC: 13.4 G/DL (ref 11.5–16)
MCH RBC QN AUTO: 28.3 PG (ref 26–34)
MCHC RBC AUTO-ENTMCNC: 31.9 G/DL (ref 30–36.5)
MCV RBC AUTO: 88.8 FL (ref 80–99)
NRBC # BLD: 0 K/UL (ref 0–0.01)
NRBC BLD-RTO: 0 PER 100 WBC
PLATELET # BLD AUTO: 220 K/UL (ref 150–400)
PMV BLD AUTO: 10 FL (ref 8.9–12.9)
POTASSIUM SERPL-SCNC: 3.9 MMOL/L (ref 3.5–5.1)
RBC # BLD AUTO: 4.73 M/UL (ref 3.8–5.2)
SODIUM SERPL-SCNC: 138 MMOL/L (ref 136–145)
WBC # BLD AUTO: 6.3 K/UL (ref 3.6–11)

## 2020-11-11 PROCEDURE — 74011250636 HC RX REV CODE- 250/636: Performed by: SURGERY

## 2020-11-11 PROCEDURE — 36415 COLL VENOUS BLD VENIPUNCTURE: CPT

## 2020-11-11 PROCEDURE — 85027 COMPLETE CBC AUTOMATED: CPT

## 2020-11-11 PROCEDURE — 74011000258 HC RX REV CODE- 258: Performed by: SURGERY

## 2020-11-11 PROCEDURE — 80048 BASIC METABOLIC PNL TOTAL CA: CPT

## 2020-11-11 PROCEDURE — 65270000029 HC RM PRIVATE

## 2020-11-11 RX ORDER — HYDRALAZINE HYDROCHLORIDE 20 MG/ML
10 INJECTION INTRAMUSCULAR; INTRAVENOUS
Status: DISCONTINUED | OUTPATIENT
Start: 2020-11-11 | End: 2020-11-12 | Stop reason: HOSPADM

## 2020-11-11 RX ADMIN — Medication 10 ML: at 16:50

## 2020-11-11 RX ADMIN — KETOTIFEN FUMARATE 1 DROP: 0.35 SOLUTION/ DROPS OPHTHALMIC at 09:42

## 2020-11-11 RX ADMIN — PIPERACILLIN AND TAZOBACTAM 3.38 G: 3; .375 INJECTION, POWDER, LYOPHILIZED, FOR SOLUTION INTRAVENOUS at 04:11

## 2020-11-11 RX ADMIN — PIPERACILLIN AND TAZOBACTAM 3.38 G: 3; .375 INJECTION, POWDER, LYOPHILIZED, FOR SOLUTION INTRAVENOUS at 21:27

## 2020-11-11 RX ADMIN — Medication 10 ML: at 21:29

## 2020-11-11 RX ADMIN — PIPERACILLIN AND TAZOBACTAM 3.38 G: 3; .375 INJECTION, POWDER, LYOPHILIZED, FOR SOLUTION INTRAVENOUS at 12:38

## 2020-11-11 RX ADMIN — DEXTROSE MONOHYDRATE, SODIUM CHLORIDE, AND POTASSIUM CHLORIDE 125 ML/HR: 50; 4.5; 1.49 INJECTION, SOLUTION INTRAVENOUS at 21:17

## 2020-11-11 RX ADMIN — HYDRALAZINE HYDROCHLORIDE 10 MG: 20 INJECTION INTRAMUSCULAR; INTRAVENOUS at 21:28

## 2020-11-11 RX ADMIN — Medication 10 ML: at 06:33

## 2020-11-11 RX ADMIN — DEXTROSE MONOHYDRATE, SODIUM CHLORIDE, AND POTASSIUM CHLORIDE 125 ML/HR: 50; 4.5; 1.49 INJECTION, SOLUTION INTRAVENOUS at 04:10

## 2020-11-11 NOTE — PROGRESS NOTES
Report given to JIGAR Giordano (oncoming nurse) by Sharif Welsh RN (offgoing nurse). Report included SBAR, Kardex, and MAR. Pt head to toe assessment and reassessment was performed. Pt rested in bed most of the day and voided approximately 8 times as stated by patient.

## 2020-11-11 NOTE — PROGRESS NOTES
General Surgery End of Shift Nursing Note    Bedside shift change report given to Dakota Pope (oncoming nurse) by Giuliana (offgoing nurse). Report included the following information SBAR, Kardex, Intake/Output and Recent Results. Shift worked:   [id number]   Summary of shift:   No changes overnight. Pt has rested without any complaints. Issues for physician to address:   none     Number times ambulated in hallway past shift: 0  Number of times OOB to chair past shift: 0    GI:    Current diet:  DIET NPO Except Meds, With Ice Chips    Tolerating current diet: YES  Passing flatus: YES  Last Bowel Movement: several days ago    Respiratory:    Incentive Spirometer at bedside: YES  Patient instructed on use: YES    Patient Safety:    Falls Score: 0  Bed Alarm On? No  Sitter?  No    Jess Hinojosa RN

## 2020-11-11 NOTE — PROGRESS NOTES
Surgery NP Progress Note    Gerold Cabot  [id number]  female  70 y.o.  1945    Admitted for Active Problems:    Diverticular disease of large intestine with complication (54/3/9191)      Pt seen with Dr. [facility]      Assessment:   Problem resolving. Plan/Recommendations/Medical Decision Making:     - Mobilize with nursing and OOB to chair for meals  - Advance diet  - Pain management- Continue current pain control methods. Discharge Planning    Plan for patient to discharge to Home- No Needs    Anticipated discharge date 20    Discharge plan discussed with:  Patient and Primary Care Team Provider    Subjective:     Patient has no new complaints. Pain control adequate. Patient reports PO intake adequate. No nausea/vomiting. Positive flatus. Positive stool output. Voiding status: Patient is voiding in adequate amounts. Objective:     Blood pressure (!) 152/78, pulse 88, temperature 97.9 °F (36.6 °C), resp. rate 18, height 5' 1\" (1.549 m), weight 60.5 kg (133 lb 6.1 oz), SpO2 95 %.     Temp (24hrs), Av.9 °F (36.6 °C), Min:97.6 °F (36.4 °C), Max:98.1 °F (36.7 °C)      Recent Results (from the past 48 hour(s))   CREATININE, POC    Collection Time: 20  4:34 PM   Result Value Ref Range    Creatinine (POC) 0.9 0.6 - 1.3 mg/dL    GFRAA, POC >60 >60 ml/min/1.73m2    GFRNA, POC >60 >60 ml/min/1.73m2   CBC WITH AUTOMATED DIFF    Collection Time: 20  8:13 PM   Result Value Ref Range    WBC 9.3 3.6 - 11.0 K/uL    RBC 4.65 3.80 - 5.20 M/uL    HGB 13.3 11.5 - 16.0 g/dL    HCT 40.6 35.0 - 47.0 %    MCV 87.3 80.0 - 99.0 FL    MCH 28.6 26.0 - 34.0 PG    MCHC 32.8 30.0 - 36.5 g/dL    RDW 14.2 11.5 - 14.5 %    PLATELET 891 688 - 728 K/uL    MPV 10.1 8.9 - 12.9 FL    NRBC 0.0 0  WBC    ABSOLUTE NRBC 0.00 0.00 - 0.01 K/uL    NEUTROPHILS 77 (H) 32 - 75 %    LYMPHOCYTES 15 12 - 49 %    MONOCYTES 7 5 - 13 %    EOSINOPHILS 1 0 - 7 %    BASOPHILS 0 0 - 1 %    IMMATURE GRANULOCYTES 0 0.0 - 0.5 %    ABS. NEUTROPHILS 7.2 1.8 - 8.0 K/UL    ABS. LYMPHOCYTES 1.4 0.8 - 3.5 K/UL    ABS. MONOCYTES 0.7 0.0 - 1.0 K/UL    ABS. EOSINOPHILS 0.1 0.0 - 0.4 K/UL    ABS. BASOPHILS 0.0 0.0 - 0.1 K/UL    ABS. IMM. GRANS. 0.0 0.00 - 0.04 K/UL    DF AUTOMATED     METABOLIC PANEL, COMPREHENSIVE    Collection Time: 11/09/20  8:13 PM   Result Value Ref Range    Sodium 135 (L) 136 - 145 mmol/L    Potassium 3.6 3.5 - 5.1 mmol/L    Chloride 102 97 - 108 mmol/L    CO2 28 21 - 32 mmol/L    Anion gap 5 5 - 15 mmol/L    Glucose 185 (H) 65 - 100 mg/dL    BUN 12 6 - 20 MG/DL    Creatinine 1.04 (H) 0.55 - 1.02 MG/DL    BUN/Creatinine ratio 12 12 - 20      GFR est AA >60 >60 ml/min/1.73m2    GFR est non-AA 52 (L) >60 ml/min/1.73m2    Calcium 8.8 8.5 - 10.1 MG/DL    Bilirubin, total 0.8 0.2 - 1.0 MG/DL    ALT (SGPT) 18 12 - 78 U/L    AST (SGOT) 14 (L) 15 - 37 U/L    Alk.  phosphatase 76 45 - 117 U/L    Protein, total 7.4 6.4 - 8.2 g/dL    Albumin 3.6 3.5 - 5.0 g/dL    Globulin 3.8 2.0 - 4.0 g/dL    A-G Ratio 0.9 (L) 1.1 - 2.2     LIPASE    Collection Time: 11/09/20  8:13 PM   Result Value Ref Range    Lipase 347 73 - 393 U/L   PROTHROMBIN TIME + INR    Collection Time: 11/09/20  8:13 PM   Result Value Ref Range    INR 1.1 0.9 - 1.1      Prothrombin time 11.4 (H) 9.0 - 11.1 sec   PTT    Collection Time: 11/09/20  8:13 PM   Result Value Ref Range    aPTT 30.4 22.1 - 32.0 sec    aPTT, therapeutic range     58.0 - 77.0 SECS   URINALYSIS W/ REFLEX CULTURE    Collection Time: 11/09/20 10:05 PM    Specimen: Urine   Result Value Ref Range    Color YELLOW/STRAW      Appearance CLEAR CLEAR      Specific gravity 1.005 1.003 - 1.030      pH (UA) 6.0 5.0 - 8.0      Protein Negative NEG mg/dL    Glucose Negative NEG mg/dL    Ketone Negative NEG mg/dL    Bilirubin Negative NEG      Blood TRACE (A) NEG      Urobilinogen 1.0 0.2 - 1.0 EU/dL    Nitrites Negative NEG      Leukocyte Esterase TRACE (A) NEG      UA:UC IF INDICATED CULTURE NOT INDICATED BY UA RESULT CNI      WBC 5-10 0 - 4 /hpf    RBC 5-10 0 - 5 /hpf    Epithelial cells FEW FEW /lpf    Bacteria Negative NEG /hpf    Hyaline cast 0-2 0 - 5 /lpf   METABOLIC PANEL, BASIC    Collection Time: 11/10/20  4:02 AM   Result Value Ref Range    Sodium 137 136 - 145 mmol/L    Potassium 4.4 3.5 - 5.1 mmol/L    Chloride 106 97 - 108 mmol/L    CO2 26 21 - 32 mmol/L    Anion gap 5 5 - 15 mmol/L    Glucose 119 (H) 65 - 100 mg/dL    BUN 9 6 - 20 MG/DL    Creatinine 0.93 0.55 - 1.02 MG/DL    BUN/Creatinine ratio 10 (L) 12 - 20      GFR est AA >60 >60 ml/min/1.73m2    GFR est non-AA 59 (L) >60 ml/min/1.73m2    Calcium 9.2 8.5 - 10.1 MG/DL   CBC W/O DIFF    Collection Time: 11/10/20  4:02 AM   Result Value Ref Range    WBC 7.6 3.6 - 11.0 K/uL    RBC 4.45 3.80 - 5.20 M/uL    HGB 12.9 11.5 - 16.0 g/dL    HCT 39.2 35.0 - 47.0 %    MCV 88.1 80.0 - 99.0 FL    MCH 29.0 26.0 - 34.0 PG    MCHC 32.9 30.0 - 36.5 g/dL    RDW 14.4 11.5 - 14.5 %    PLATELET 443 859 - 301 K/uL    MPV 10.3 8.9 - 12.9 FL    NRBC 0.0 0  WBC    ABSOLUTE NRBC 0.00 0.00 - 2.14 K/uL   METABOLIC PANEL, BASIC    Collection Time: 11/11/20  4:16 AM   Result Value Ref Range    Sodium 138 136 - 145 mmol/L    Potassium 3.9 3.5 - 5.1 mmol/L    Chloride 108 97 - 108 mmol/L    CO2 25 21 - 32 mmol/L    Anion gap 5 5 - 15 mmol/L    Glucose 112 (H) 65 - 100 mg/dL    BUN 8 6 - 20 MG/DL    Creatinine 0.92 0.55 - 1.02 MG/DL    BUN/Creatinine ratio 9 (L) 12 - 20      GFR est AA >60 >60 ml/min/1.73m2    GFR est non-AA 60 (L) >60 ml/min/1.73m2    Calcium 9.3 8.5 - 10.1 MG/DL   CBC W/O DIFF    Collection Time: 11/11/20  4:16 AM   Result Value Ref Range    WBC 6.3 3.6 - 11.0 K/uL    RBC 4.73 3.80 - 5.20 M/uL    HGB 13.4 11.5 - 16.0 g/dL    HCT 42.0 35.0 - 47.0 %    MCV 88.8 80.0 - 99.0 FL    MCH 28.3 26.0 - 34.0 PG    MCHC 31.9 30.0 - 36.5 g/dL    RDW 14.2 11.5 - 14.5 %    PLATELET 002 445 - 464 K/uL    MPV 10.0 8.9 - 12.9 FL    NRBC 0.0 0  WBC    ABSOLUTE NRBC 0.00 0.00 - 0.01 K/uL       Pt resting in chair. NAD   SCDs for mechanical DVT proph while in bed    Body mass index is 25.2 kg/m². Reference: BMI greater than 30 is classified as obesity and greater than 40 is classified as morbid obesity.      Last 3 Recorded Weights in this Encounter    11/09/20 1920   Weight: 60.5 kg (133 lb 6.1 oz)         Kathryn Webber, [place]   MSN, APRN, FNP-C, CWOCN-AP    11/11/20

## 2020-11-12 VITALS
OXYGEN SATURATION: 98 % | DIASTOLIC BLOOD PRESSURE: 64 MMHG | WEIGHT: 133.38 LBS | BODY MASS INDEX: 25.18 KG/M2 | RESPIRATION RATE: 16 BRPM | TEMPERATURE: 98.2 F | HEART RATE: 83 BPM | SYSTOLIC BLOOD PRESSURE: 143 MMHG | HEIGHT: 61 IN

## 2020-11-12 LAB
ANION GAP SERPL CALC-SCNC: 6 MMOL/L (ref 5–15)
BUN SERPL-MCNC: 6 MG/DL (ref 6–20)
BUN/CREAT SERPL: 6 (ref 12–20)
CALCIUM SERPL-MCNC: 9.3 MG/DL (ref 8.5–10.1)
CHLORIDE SERPL-SCNC: 108 MMOL/L (ref 97–108)
CO2 SERPL-SCNC: 24 MMOL/L (ref 21–32)
CREAT SERPL-MCNC: 0.98 MG/DL (ref 0.55–1.02)
ERYTHROCYTE [DISTWIDTH] IN BLOOD BY AUTOMATED COUNT: 14 % (ref 11.5–14.5)
GLUCOSE SERPL-MCNC: 122 MG/DL (ref 65–100)
HCT VFR BLD AUTO: 41.1 % (ref 35–47)
HGB BLD-MCNC: 13.6 G/DL (ref 11.5–16)
MCH RBC QN AUTO: 28.8 PG (ref 26–34)
MCHC RBC AUTO-ENTMCNC: 33.1 G/DL (ref 30–36.5)
MCV RBC AUTO: 86.9 FL (ref 80–99)
NRBC # BLD: 0 K/UL (ref 0–0.01)
NRBC BLD-RTO: 0 PER 100 WBC
PLATELET # BLD AUTO: 235 K/UL (ref 150–400)
PMV BLD AUTO: 10.4 FL (ref 8.9–12.9)
POTASSIUM SERPL-SCNC: 3.8 MMOL/L (ref 3.5–5.1)
RBC # BLD AUTO: 4.73 M/UL (ref 3.8–5.2)
SODIUM SERPL-SCNC: 138 MMOL/L (ref 136–145)
WBC # BLD AUTO: 6.2 K/UL (ref 3.6–11)

## 2020-11-12 PROCEDURE — 85027 COMPLETE CBC AUTOMATED: CPT

## 2020-11-12 PROCEDURE — 36415 COLL VENOUS BLD VENIPUNCTURE: CPT

## 2020-11-12 PROCEDURE — 74011000258 HC RX REV CODE- 258: Performed by: SURGERY

## 2020-11-12 PROCEDURE — 74011250637 HC RX REV CODE- 250/637: Performed by: SURGERY

## 2020-11-12 PROCEDURE — 74011250636 HC RX REV CODE- 250/636: Performed by: SURGERY

## 2020-11-12 PROCEDURE — 80048 BASIC METABOLIC PNL TOTAL CA: CPT

## 2020-11-12 RX ORDER — CIPROFLOXACIN 500 MG/1
500 TABLET ORAL 2 TIMES DAILY
Qty: 20 TAB | Refills: 0 | Status: SHIPPED | OUTPATIENT
Start: 2020-11-12 | End: 2020-11-22

## 2020-11-12 RX ORDER — METRONIDAZOLE 500 MG/1
500 TABLET ORAL 3 TIMES DAILY
Qty: 30 TAB | Refills: 0 | Status: SHIPPED | OUTPATIENT
Start: 2020-11-12 | End: 2020-11-22

## 2020-11-12 RX ADMIN — Medication 10 ML: at 04:16

## 2020-11-12 RX ADMIN — PIPERACILLIN AND TAZOBACTAM 3.38 G: 3; .375 INJECTION, POWDER, LYOPHILIZED, FOR SOLUTION INTRAVENOUS at 04:16

## 2020-11-12 RX ADMIN — ACETAMINOPHEN 650 MG: 325 TABLET ORAL at 06:29

## 2020-11-12 RX ADMIN — KETOTIFEN FUMARATE 1 DROP: 0.35 SOLUTION/ DROPS OPHTHALMIC at 10:43

## 2020-11-12 NOTE — PROGRESS NOTES
PIV removed. I have reviewed discharge instructions with the patient. The patient verbalized understanding. Allowed time for any questions to be asked.  Patient taken down by a volunteer, Patient left with all of her belongings

## 2020-11-12 NOTE — DISCHARGE INSTRUCTIONS
Patient Education        Diverticulitis: Care Instructions  Overview     Diverticulitis occurs when pouches form in the wall of the colon and become inflamed or infected. It can be very painful. Doctors aren't sure what causes diverticulitis. There is no proof that foods such as nuts, seeds, or berries cause it or make it worse. A low-fiber diet may cause the colon to work harder to push stool forward. Pouches may form because of this extra work. It may be hard to think about healthy eating while you're in pain. But as you recover, you might think about how you can use healthy eating for overall better health. Healthy eating may help you avoid future attacks. Follow-up care is a key part of your treatment and safety. Be sure to make and go to all appointments, and call your doctor if you are having problems. It's also a good idea to know your test results and keep a list of the medicines you take. How can you care for yourself at home? · Drink plenty of fluids, enough so that your urine is light yellow or clear like water. If you have kidney, heart, or liver disease and have to limit fluids, talk with your doctor before you increase the amount of fluids you drink. · Stay with liquids or a bland diet (plain rice, bananas, dry toast or crackers, applesauce) until you are feeling better. Then you can return to regular foods and slowly increase the amount of fiber in your diet. · Use a heating pad set on low on your belly to relieve mild cramps and pain. · Get extra rest until you are feeling better. · Be safe with medicines. Read and follow all instructions on the label. ? If the doctor gave you a prescription medicine for pain, take it as prescribed. ? If you are not taking a prescription pain medicine, ask your doctor if you can take an over-the-counter medicine. · If your doctor prescribed antibiotics, take them as directed. Do not stop taking them just because you feel better.  You need to take the full course of antibiotics. · Do not use laxatives or enemas unless your doctor tells you to use them. When should you call for help? Call your doctor now or seek immediate medical care if:    · You have a fever.     · You are vomiting.     · You have new or worse belly pain.     · You cannot pass stools or gas. Watch closely for changes in your health, and be sure to contact your doctor if you have any problems. Where can you learn more? Go to http://www.gray.com/  Enter H901 in the search box to learn more about \"Diverticulitis: Care Instructions. \"  Current as of: April 15, 2020               Content Version: 12.6  © 5188-2551 [employer]. Care instructions adapted under license by [employer] (which disclaims liability or warranty for this information). If you have questions about a medical condition or this instruction, always ask your healthcare professional. [address] any warranty or liability for your use of this information.

## 2020-11-12 NOTE — PROGRESS NOTES
General Surgery End of Shift Nursing Note    Bedside shift change report given to Luciano Cheng RN (oncoming nurse) by Rakan Foreman RN (offgoing nurse). Report included the following information SBAR, Kardex, Intake/Output and Recent Results. Shift worked:   7am-7pm   Summary of shift:   Pt rested quietly in chair for most of the day. Pt had 2 small bowel movements this AM. Voiding independently. No pain reported. Tolerated diet well. Issues for physician to address:   none     Number times ambulated in hallway past shift: 0  Number of times OOB to chair past shift: 3    GI:    Current diet:  DIET CLEAR LIQUID    Tolerating current diet: YES  Passing flatus: YES  Last Bowel Movement: 11/11/20    Respiratory:    Incentive Spirometer at bedside: YES  Patient instructed on use: YES    Patient Safety:    Falls Score: 0  Bed Alarm On? No  Sitter?  No    [place]

## 2020-11-12 NOTE — DISCHARGE SUMMARY
Discharge Summary    Patient ID:  Trino Betancourt  [id number]  female  70 y.o.  1945    Admit date: 11/9/2020    Discharge date: 11/12/2020    Admitting Physician: Edel Angulo MD     Consulting Physician(s):   Treatment Team: Attending Provider: Feliz Reagan MD; Care Manager: Savanna Flores; Utilization Review: Sherald Apley, RN; Staff Nurse: Silver Best; Primary Nurse: Annmarie Olea                         HPI:  Pt is a 70 y.o. female who presents at this time with diverticulitis requiring acute care monitoring and/or treatment. Problem List:   Problem List as of 11/12/2020 Never Reviewed          Codes Class Noted - Resolved    Diverticular disease of large intestine with complication ENZ-09-QX: K90.57  ICD-9-CM: 562.10  11/9/2020 - Present               Hospital Course:  Patient was managed conservatively with bowel rest and IV antibiotics and improved as expected. On the day of discharge, patient was able to tolerate a diet. WBC was normal.     Activity: Patient mobilized with nursing and was found to be safe and steady with ambulation. Discharged to: Home    Condition on Discharge: Stable     Discharge instructions:    - Take medications as prescribed  - Diet Low Fiber  - Discharge activity:    - Activity as tolerated    - Ambulate several times a day   - Do not drive if taking opioid pain medications    Allergies:  No Known Allergies           -DISCHARGE MEDICATION LIST     Current Discharge Medication List      START taking these medications    Details   ciprofloxacin HCl (CIPRO) 500 mg tablet Take 1 Tab by mouth two (2) times a day for 10 days. Qty: 20 Tab, Refills: 0      metroNIDAZOLE (FLAGYL) 500 mg tablet Take 1 Tab by mouth three (3) times daily for 10 days. Qty: 30 Tab, Refills: 0         CONTINUE these medications which have NOT CHANGED    Details   cholecalciferol (VITAMIN D3) (5000 Units/125 mcg) tab tablet Take 5,000 Units by mouth daily. tetrahydrozoline/polyethyl gly (EYE DROPS OP) Apply  to eye. Pt uses at night for glaucoma. Unsure of medication name      calcium carbonate/vitamin D3 (CALCIUM CHEW PO) Take  by mouth.          per medical continuation form      -Follow up in office not required- f/u with PCP and keep GI appointment as scheduled for routine colonoscopy. Signed:  Curtis Correa.  Taj Estrada  MSN, APRN, FNP-C, [facility]  Surgical Nurse Practitioner    11/12/2020  10:59 AM

## 2021-02-07 ENCOUNTER — HOSPITAL ENCOUNTER (OUTPATIENT)
Dept: PREADMISSION TESTING | Age: 76
Discharge: HOME | End: 2021-02-07
Payer: MEDICARE

## 2021-02-07 LAB — SARS-COV-2, COV2: NORMAL

## 2021-02-07 PROCEDURE — U0003 INFECTIOUS AGENT DETECTION BY NUCLEIC ACID (DNA OR RNA); SEVERE ACUTE RESPIRATORY SYNDROME CORONAVIRUS 2 (SARS-COV-2) (CORONAVIRUS DISEASE [COVID-19]), AMPLIFIED PROBE TECHNIQUE, MAKING USE OF HIGH THROUGHPUT TECHNOLOGIES AS DESCRIBED BY CMS-2020-01-R: HCPCS

## 2021-02-08 LAB — SARS-COV-2, COV2NT: NOT DETECTED

## 2021-02-09 RX ORDER — ASCORBIC ACID 500 MG
1000 TABLET ORAL DAILY
COMMUNITY

## 2021-02-09 RX ORDER — LATANOPROST 50 UG/ML
1 SOLUTION/ DROPS OPHTHALMIC
COMMUNITY

## 2021-02-11 ENCOUNTER — ANESTHESIA EVENT (OUTPATIENT)
Dept: ENDOSCOPY | Age: 76
End: 2021-02-11
Payer: MEDICARE

## 2021-02-11 ENCOUNTER — HOSPITAL ENCOUNTER (OUTPATIENT)
Age: 76
Setting detail: OUTPATIENT SURGERY
Discharge: HOME | End: 2021-02-11
Attending: SPECIALIST | Admitting: SPECIALIST
Payer: MEDICARE

## 2021-02-11 ENCOUNTER — ANESTHESIA (OUTPATIENT)
Dept: ENDOSCOPY | Age: 76
End: 2021-02-11
Payer: MEDICARE

## 2021-02-11 VITALS
HEART RATE: 81 BPM | OXYGEN SATURATION: 95 % | SYSTOLIC BLOOD PRESSURE: 114 MMHG | TEMPERATURE: 98 F | BODY MASS INDEX: 23.96 KG/M2 | HEIGHT: 62 IN | DIASTOLIC BLOOD PRESSURE: 63 MMHG | RESPIRATION RATE: 15 BRPM | WEIGHT: 130.2 LBS

## 2021-02-11 PROCEDURE — 74011000250 HC RX REV CODE- 250: Performed by: ANESTHESIOLOGY

## 2021-02-11 PROCEDURE — 74011250636 HC RX REV CODE- 250/636: Performed by: SPECIALIST

## 2021-02-11 PROCEDURE — 76040000019: Performed by: SPECIALIST

## 2021-02-11 PROCEDURE — 2709999900 HC NON-CHARGEABLE SUPPLY: Performed by: SPECIALIST

## 2021-02-11 PROCEDURE — 74011250637 HC RX REV CODE- 250/637: Performed by: SPECIALIST

## 2021-02-11 PROCEDURE — 76060000031 HC ANESTHESIA FIRST 0.5 HR: Performed by: SPECIALIST

## 2021-02-11 PROCEDURE — 74011250636 HC RX REV CODE- 250/636: Performed by: ANESTHESIOLOGY

## 2021-02-11 RX ORDER — DEXTROMETHORPHAN/PSEUDOEPHED 2.5-7.5/.8
1.2 DROPS ORAL
Status: DISCONTINUED | OUTPATIENT
Start: 2021-02-11 | End: 2021-02-11 | Stop reason: HOSPADM

## 2021-02-11 RX ORDER — FLUMAZENIL 0.1 MG/ML
0.2 INJECTION INTRAVENOUS
Status: DISCONTINUED | OUTPATIENT
Start: 2021-02-11 | End: 2021-02-11 | Stop reason: HOSPADM

## 2021-02-11 RX ORDER — SODIUM CHLORIDE 0.9 % (FLUSH) 0.9 %
5-40 SYRINGE (ML) INJECTION AS NEEDED
Status: DISCONTINUED | OUTPATIENT
Start: 2021-02-11 | End: 2021-02-11 | Stop reason: HOSPADM

## 2021-02-11 RX ORDER — LIDOCAINE HYDROCHLORIDE 20 MG/ML
INJECTION, SOLUTION EPIDURAL; INFILTRATION; INTRACAUDAL; PERINEURAL AS NEEDED
Status: DISCONTINUED | OUTPATIENT
Start: 2021-02-11 | End: 2021-02-11 | Stop reason: HOSPADM

## 2021-02-11 RX ORDER — NALOXONE HYDROCHLORIDE 0.4 MG/ML
0.4 INJECTION, SOLUTION INTRAMUSCULAR; INTRAVENOUS; SUBCUTANEOUS
Status: DISCONTINUED | OUTPATIENT
Start: 2021-02-11 | End: 2021-02-11 | Stop reason: HOSPADM

## 2021-02-11 RX ORDER — SODIUM CHLORIDE 0.9 % (FLUSH) 0.9 %
5-40 SYRINGE (ML) INJECTION EVERY 8 HOURS
Status: DISCONTINUED | OUTPATIENT
Start: 2021-02-11 | End: 2021-02-11 | Stop reason: HOSPADM

## 2021-02-11 RX ORDER — EPINEPHRINE 0.1 MG/ML
1 INJECTION INTRACARDIAC; INTRAVENOUS
Status: DISCONTINUED | OUTPATIENT
Start: 2021-02-11 | End: 2021-02-11 | Stop reason: HOSPADM

## 2021-02-11 RX ORDER — MIDAZOLAM HYDROCHLORIDE 1 MG/ML
.25-5 INJECTION, SOLUTION INTRAMUSCULAR; INTRAVENOUS
Status: DISCONTINUED | OUTPATIENT
Start: 2021-02-11 | End: 2021-02-11 | Stop reason: HOSPADM

## 2021-02-11 RX ORDER — ATROPINE SULFATE 0.1 MG/ML
0.5 INJECTION INTRAVENOUS
Status: DISCONTINUED | OUTPATIENT
Start: 2021-02-11 | End: 2021-02-11 | Stop reason: HOSPADM

## 2021-02-11 RX ORDER — SODIUM CHLORIDE 9 MG/ML
50 INJECTION, SOLUTION INTRAVENOUS CONTINUOUS
Status: DISCONTINUED | OUTPATIENT
Start: 2021-02-11 | End: 2021-02-11 | Stop reason: HOSPADM

## 2021-02-11 RX ORDER — PROPOFOL 10 MG/ML
INJECTION, EMULSION INTRAVENOUS AS NEEDED
Status: DISCONTINUED | OUTPATIENT
Start: 2021-02-11 | End: 2021-02-11 | Stop reason: HOSPADM

## 2021-02-11 RX ADMIN — LIDOCAINE HYDROCHLORIDE 40 MG: 20 INJECTION, SOLUTION EPIDURAL; INFILTRATION; INTRACAUDAL; PERINEURAL at 07:33

## 2021-02-11 RX ADMIN — SODIUM CHLORIDE 50 ML/HR: 9 INJECTION, SOLUTION INTRAVENOUS at 07:18

## 2021-02-11 RX ADMIN — Medication 80 MG: at 07:45

## 2021-02-11 RX ADMIN — PROPOFOL 280 MG: 10 INJECTION, EMULSION INTRAVENOUS at 07:54

## 2021-02-11 NOTE — H&P
Pre-endoscopy H and P    The patient was seen and examined in the endoscopy suite. The airway was assessed and docuemented. The problem list, past medical history, and medications were reviewed. The history is:  Patient is new to our practice, worked in for [address] due to concerns about pain after episode of diverticulitis. She reports she is doing better today. No pain with BM today. She takes a totty for her colon: hot water, apple juice, and a table spoon of vinegar \"with the mother\" and that seems to help when she takes it day. Bm have been normal since she has been home. Color is normal. When she has diverticulitis it gets really dark. She was admitted in November and seen by Dr. Chey Rudd and NP Galina Pond. Given IV abx and then d/rishabh on cipro and flagyl. From phone note by myself on 2.3:Spoke with pt, she reports she feels abdominal pain off and on. Worse with a BM and pressure. Pain is similar to what it was before she went to the hospital. It is located on her right sided. This is same location as pain prior to admission. No further f/c since admission. BM are normal for her. Sometimes what she eats may bother her. It hurt with BM yesterday but today it is not. 11/4/20 CT A/P - sigmoid diverticulosis with small diverticular abscess    She reports it has been getting worse as she gets older, struggling with it for the last 8 years. Maybe has had about one episode of diverticulitis a year. Last colonoscopy was done 10 years ago by Dr. Karen Esteves as well as a sigmoidoscopy done a year later. No polyps, diverticulosis and internal hemorrhoids. Parents both had issues with BM, diarrhea with eating ruffage. Mother had a surgery and had a proton of her colon removed, thought there was cancer but it was benign. No family hx of colon polyps. No tobacco use, no EtOH use.  No AC use, no issues with anesthesia in the past.      Patient Active Problem List   Diagnosis Code    Diverticular disease of large intestine with complication N11.45     Social History     Socioeconomic History    Marital status:      Spouse name: Not on file    Number of children: Not on file    Years of education: Not on file    Highest education level: Not on file   Occupational History    Not on file   Social Needs    Financial resource strain: Not on file    Food insecurity     Worry: Not on file     Inability: Not on file    Transportation needs     Medical: Not on file     Non-medical: Not on file   Tobacco Use    Smoking status: Never Smoker    Smokeless tobacco: Never Used   Substance and Sexual Activity    Alcohol use: Not Currently    Drug use: Never    Sexual activity: Not on file   Lifestyle    Physical activity     Days per week: Not on file     Minutes per session: Not on file    Stress: Not on file   Relationships    Social connections     Talks on phone: Not on file     Gets together: Not on file     Attends Taoism service: Not on file     Active member of club or organization: Not on file     Attends meetings of clubs or organizations: Not on file     Relationship status: Not on file    Intimate partner violence     Fear of current or ex partner: Not on file     Emotionally abused: Not on file     Physically abused: Not on file     Forced sexual activity: Not on file   Other Topics Concern    Not on file   Social History Narrative    Not on file     Past Medical History:   Diagnosis Date    Adverse effect of anesthesia     shaking episodes when coming out of anesthesia    Glaucoma     Hypertension     Ill-defined condition     abcess in colon     The patient has a family history of na    Prior to Admission Medications   Prescriptions Last Dose Informant Patient Reported? Taking? OTHER 2/10/2021 at 0900  Yes Yes   Si mg.  Patient reported starting unknown blood pressure medication 2 days ago    ascorbic acid, vitamin C, (Vitamin C) 500 mg tablet 2021  Yes Yes   Sig: Take 1,000 mg by mouth daily. calcium carbonate/vitamin D3 (CALCIUM CHEW PO) 2/9/2021  Yes Yes   Sig: Take 1 Tab by mouth daily. cholecalciferol (VITAMIN D3) (5000 Units/125 mcg) tab tablet 2/9/2021  Yes Yes   Sig: Take 5,000 Units by mouth daily. latanoprost (XALATAN) 0.005 % ophthalmic solution 2/9/2021  Yes Yes   Sig: Administer 1 Drop to both eyes nightly. multivit-minerals/folic acid (ADULT MULTIVITAMIN GUMMIES PO) 2/9/2021  Yes Yes   Sig: Take 2 Tabs by mouth daily. 2 gummies daily      Facility-Administered Medications: None           The review of systems is:  negative for shortness of breath or chest pain      The heart, lungs, and mental status were satisfactory for the administration of anesthesia sedation and for the procedure. I discussed with the patient the objectives, risks, consequences and alternatives to the procedure. The patient was counseled at length about the risks of ag Covid-19 during their perioperative period and any recovery window from their procedure. The patient was made aware that ag Covid-19  may worsen their prognosis for recovering from their procedure and lend to a higher morbidity and/or mortality risk. All material risks, benefits, and reasonable alternatives including postponing the procedure were discussed. The patient does  wish to proceed with the procedure at this time.         Lefty Song MD  2/11/2021  7:15 AM

## 2021-02-11 NOTE — PROCEDURES
Colonoscopy    Indications: recent acute diverticulitis    Pre-operative Diagnosis: see above    Assistant(s):  see chart JIGAR Rice RN    Medications:  See anesthesia form    Post-operative Diagnosis:  diverticulosis, Erythema of fold            Procedure Details   Prior to the procedure its objectives, risks, consequences and alternatives were discussed with the patient who then elected to proceed. All questions were answered. Digital Rectal Exam:  was normal     The Olympus videocolonoscope was inserted in the rectum and advanced to the cecum. Advancement was difficult due to tortuosity and thick folds. The cecum was identified by typical landmarks. The colonoscope was slowly and carefully withdrawn as the mucosa was inspected. There were diverticula throughout the colon. There were complex diverticula with sacculation in the left colon. There was some erythema of the folds in the sigmoid colon, common in this setting. No other abnormalities were noted. Retroflexion in the rectum was negative. Photos to document the ileocecal valve, appendiceal orifice and retroflexion exam were obtained. The preparation was adequate      Estimated Blood Loss:  none    Specimens:  none    Findings:  Diverticula  Erythema of the folds in the area of diverticula, not worrisome    Complications:  none    Implants:  none    Repeat colonoscopy is recommended in:  Na (pcp to consider in ten years).                Omid Dang MD  7:55 AM  2/11/2021

## 2021-02-11 NOTE — ANESTHESIA PREPROCEDURE EVALUATION
Relevant Problems   No relevant active problems       Anesthetic History   No history of anesthetic complications            Review of Systems / Medical History  Patient summary reviewed, nursing notes reviewed and pertinent labs reviewed    Pulmonary  Within defined limits                 Neuro/Psych   Within defined limits           Cardiovascular    Hypertension              Exercise tolerance: >4 METS  Comments: Denies hrt probs or chest pain   GI/Hepatic/Renal               Comments: Diverticular disease of large intestine with hx of abscess, no surgery needed Endo/Other  Within defined limits           Other Findings   Comments: Glaucoma          Physical Exam    Airway  Mallampati: II  TM Distance: 4 - 6 cm  Neck ROM: normal range of motion   Mouth opening: Normal     Cardiovascular  Regular rate and rhythm,  S1 and S2 normal,  no murmur, click, rub, or gallop             Dental      Comments: Some decay, none loose   Pulmonary  Breath sounds clear to auscultation               Abdominal  GI exam deferred       Other Findings            Anesthetic Plan    ASA: 2  Anesthesia type: total IV anesthesia          Induction: Intravenous  Anesthetic plan and risks discussed with: Patient

## 2021-02-11 NOTE — DISCHARGE INSTRUCTIONS
Oracio Beverly  [id number]  1945              Procedure  Discharge Instructions:      Discomfort:  Redness at IV site- apply warm compress to area; if redness or soreness persist- contact your physician  There may be a slight amount of blood passed from the rectum  Gaseous discomfort- walking, belching will help relieve any discomfort  You may not operate a vehicle for 12 hours  You may not engage in an occupation involving machinery or appliances for rest of today  You may not drink alcoholic beverages for at least 12 hours  Avoid making any critical decisions for at least 24 hour  DIET:   You may resume your normal diet today. You should not overeat or \"feast\" today as your abdomen may become distended or uncomfortable. MEDICATIONS:   I reconciled this list from the list you gave us when you came today for the procedure. Please clarify with me, your primary care physician and the nurse who is discharging you if we have any discrepancies. Aspirin and or non-steroidal medication (Ibuprofen, Motrin, naproxen, etc.) is ok in limited quantities. ACTIVITY:  You may resume your normal daily activities it is recommended that you spend the remainder of the day resting -  avoid any strenuous activity. CALL M.D. ANY SIGN OF:  Increasing pain, nausea, vomiting  Abdominal distension (swelling)  New increased bleeding (oral or rectal)  Fever (chills)  Pain in chest area  Bloody discharge from nose or mouth  Shortness of breath          Follow-up Instructions:   No biopsies  Telephone #  [phone]  Follow up visit as needed or as previously scheduled.     (Ms Gayatri Rodriguez, Dr Kalia Pisano)  I spoke to [address] at [address] [place] [address]  Aurelio Rawls MD  7:58 AM  2/11/2021

## 2021-02-11 NOTE — ANESTHESIA POSTPROCEDURE EVALUATION
Procedure(s):  COLONOSCOPY.    total IV anesthesia    Anesthesia Post Evaluation        Patient location during evaluation: PACU  Note status: Adequate. Level of consciousness: responsive to verbal stimuli and sleepy but conscious  Pain management: satisfactory to patient  Airway patency: patent  Anesthetic complications: no  Cardiovascular status: acceptable  Respiratory status: acceptable  Hydration status: acceptable  Comments: +Post-Anesthesia Evaluation and Assessment    Patient: Krissy Terry MRN: [id number]  SSN: [id number]   YOB: 1945  Age: 70 y.o. Sex: female      Cardiovascular Function/Vital Signs    /70   Pulse 82   Temp 36.8 °C (98.2 °F)   Resp 17   Ht 5' 2\" (1.575 m)   Wt 59.1 kg (130 lb 3.2 oz)   SpO2 96%   Breastfeeding No   BMI 23.81 kg/m²     Patient is status post Procedure(s):  COLONOSCOPY. Nausea/Vomiting: Controlled. Postoperative hydration reviewed and adequate. Pain:  Pain Scale 1: Numeric (0 - 10) (02/11/21 0759)  Pain Intensity 1: 0 (02/11/21 0708)   Managed. Neurological Status: At baseline. Mental Status and Level of Consciousness: Arousable. Pulmonary Status:   O2 Device: Room air (02/11/21 0759)   Adequate oxygenation and airway patent. Complications related to anesthesia: None    Post-anesthesia assessment completed. No concerns. Signed By: Sampson Cat MD    2/11/2021  Post anesthesia nausea and vomiting:  controlled      INITIAL Post-op Vital signs: No vitals data found for the desired time range.

## 2021-02-11 NOTE — PROGRESS NOTES
Abraham Finn  1945  [id number]    Situation:  Verbal report received from: Wen Prasad RN  Procedure: Procedure(s):  COLONOSCOPY    Background:    Preoperative diagnosis: COLON SCREENING  Postoperative diagnosis: diverticulosis, Erythema of fold    :  Dr. Chula Flores  Assistant(s): Endoscopy RN-1: Rosanna Ley; José Calabrese RN    Specimens:   ID Type Source Tests Collected by Time Destination   1 :  Tera Pérez MD 2/11/2021 8183 Pathology     H. Pylori  no    Assessment:  Intra-procedure medications   Anesthesia gave intra-procedure sedation and medications, see anesthesia flow sheet yes    Intravenous fluids: NS@ KVO     Vital signs stable Yes    Abdominal assessment: round and soft Yes    Recommendation:  Discharge patient per MD order Yes  Return to floor no  Family or Friend Miguel Wallace  Permission to share finding with family or friend yes

## 2021-03-02 ENCOUNTER — TRANSCRIBE ORDER (OUTPATIENT)
Dept: SCHEDULING | Age: 76
End: 2021-03-02

## 2021-03-02 DIAGNOSIS — K40.91 UNILATERAL RECURRENT INGUINAL HERNIA WITHOUT OBSTRUCTION OR GANGRENE: ICD-10-CM

## 2021-03-02 DIAGNOSIS — Z78.0 POST-MENOPAUSAL: Primary | ICD-10-CM

## 2021-03-09 ENCOUNTER — HOSPITAL ENCOUNTER (OUTPATIENT)
Dept: CT IMAGING | Age: 76
Discharge: HOME | End: 2021-03-09
Attending: NURSE PRACTITIONER
Payer: MEDICARE

## 2021-03-09 DIAGNOSIS — K40.91 UNILATERAL RECURRENT INGUINAL HERNIA WITHOUT OBSTRUCTION OR GANGRENE: ICD-10-CM

## 2021-03-09 LAB — CREAT BLD-MCNC: 1 MG/DL (ref 0.6–1.3)

## 2021-03-09 PROCEDURE — 82565 ASSAY OF CREATININE: CPT

## 2021-03-09 PROCEDURE — 74011000636 HC RX REV CODE- 636

## 2021-03-09 PROCEDURE — 74011000250 HC RX REV CODE- 250

## 2021-03-09 PROCEDURE — 74177 CT ABD & PELVIS W/CONTRAST: CPT

## 2021-03-09 RX ORDER — BARIUM SULFATE 20 MG/ML
900 SUSPENSION ORAL
Status: COMPLETED | OUTPATIENT
Start: 2021-03-09 | End: 2021-03-09

## 2021-03-09 RX ADMIN — BARIUM SULFATE 900 ML: 21 SUSPENSION ORAL at 13:24

## 2021-03-09 RX ADMIN — IOPAMIDOL 100 ML: 755 INJECTION, SOLUTION INTRAVENOUS at 13:24

## 2021-03-16 ENCOUNTER — HOSPITAL ENCOUNTER (OUTPATIENT)
Dept: BONE DENSITY | Age: 76
Discharge: HOME | End: 2021-03-16
Attending: NURSE PRACTITIONER

## 2021-03-16 DIAGNOSIS — Z78.0 POST-MENOPAUSAL: ICD-10-CM

## 2021-04-05 ENCOUNTER — HOSPITAL ENCOUNTER (OUTPATIENT)
Dept: BONE DENSITY | Age: 76
Discharge: HOME | End: 2021-04-05
Attending: NURSE PRACTITIONER
Payer: MEDICARE

## 2021-04-05 PROCEDURE — 77080 DXA BONE DENSITY AXIAL: CPT

## 2021-04-13 ENCOUNTER — TELEPHONE (OUTPATIENT)
Dept: CARDIOLOGY | Age: 76
End: 2021-04-13

## 2022-03-19 PROBLEM — K57.30 DIVERTICULAR DISEASE OF LARGE INTESTINE WITH COMPLICATION: Status: ACTIVE | Noted: 2020-11-09

## 2022-04-01 ENCOUNTER — TRANSCRIBE ORDER (OUTPATIENT)
Dept: SCHEDULING | Age: 77
End: 2022-04-01

## 2022-04-01 DIAGNOSIS — R10.84 GENERALIZED ABDOMINAL PAIN: Primary | ICD-10-CM

## 2022-04-20 ENCOUNTER — HOSPITAL ENCOUNTER (OUTPATIENT)
Dept: CT IMAGING | Age: 77
Discharge: HOME | End: 2022-04-20
Payer: MEDICARE

## 2022-04-20 DIAGNOSIS — R10.84 GENERALIZED ABDOMINAL PAIN: ICD-10-CM

## 2022-04-20 LAB — CREAT BLD-MCNC: 0.9 MG/DL (ref 0.6–1.3)

## 2022-04-20 PROCEDURE — 82565 ASSAY OF CREATININE: CPT

## 2022-04-20 PROCEDURE — 74177 CT ABD & PELVIS W/CONTRAST: CPT

## 2022-04-20 PROCEDURE — 74011000250 HC RX REV CODE- 250

## 2022-04-20 PROCEDURE — 74011000636 HC RX REV CODE- 636

## 2022-04-20 RX ORDER — BARIUM SULFATE 20 MG/ML
900 SUSPENSION ORAL
Status: COMPLETED | OUTPATIENT
Start: 2022-04-20 | End: 2022-04-20

## 2022-04-20 RX ADMIN — IOPAMIDOL 100 ML: 755 INJECTION, SOLUTION INTRAVENOUS at 12:47

## 2022-04-20 RX ADMIN — BARIUM SULFATE 900 ML: 21 SUSPENSION ORAL at 12:47

## 2022-05-11 ENCOUNTER — PROCEDURE VISIT (OUTPATIENT)
Dept: AUDIOLOGY | Age: 77
End: 2022-05-11
Payer: MEDICARE

## 2022-05-11 DIAGNOSIS — H91.23 SUDDEN HEARING LOSS OF BOTH EARS: Primary | ICD-10-CM

## 2022-05-11 DIAGNOSIS — H91.8X9 ASYMMETRICAL HEARING LOSS: ICD-10-CM

## 2022-05-11 PROCEDURE — 92567 TYMPANOMETRY: CPT | Performed by: AUDIOLOGIST

## 2022-05-11 PROCEDURE — 92557 COMPREHENSIVE HEARING TEST: CPT | Performed by: AUDIOLOGIST

## 2022-05-11 NOTE — PROGRESS NOTES
This patient was referred for audiometric/tympanometric testing by Dr ACUÑA due to sudden  hearing loss with right worse than left. Said she went to bed on a Thursday then woke up on a Friday and hearing was gone. Reported a hospitalization and inner ear infection. Audiometry using pure tone air and bone conduction revealed moderately severe to profound hearing loss in the left ear - type could not be determined due to air bone gaps but inability to mask. Right ear revealed a profound thought to be sensorineural hearing loss. Bone conduction revealed responses at the limits of the audiometer but they were vibro tactile responses. Reliability was good. Speech reception thresholds were in good agreement with the pure tone averages, in the left ear. Speech discrimination scores were excellent, in the left ear. No response at the limits of the audiometer for right ear speech. Tympanometry revealed normal middle ear peak pressure and compliance, in the right ear. Left ear revealed negative pressure ( -373 daPa). The results were reviewed with the patient and sent to physician. Spoke with patient and  reviewed results and stated it would be helpful if they could get her previous audiogram to have a comparison but they stated it has been years so that may not be possible. Told them I would send report to Dr ACUÑA today to review.      Aggie Sky/[facility]-A  [place] Lic # [id number]

## 2022-05-13 ENCOUNTER — FOLLOWUP TELEPHONE ENCOUNTER (OUTPATIENT)
Dept: AUDIOLOGY | Age: 77
End: 2022-05-13

## 2022-05-13 NOTE — TELEPHONE ENCOUNTER
Patient called and Dr. Cueva Meter office never received fax. Called office and they do not have results. Therefore, re-faxed audiogram and report to his office.      Electronically signed by Karel Melgar on 5/13/2022 at 1:31 PM

## 2022-05-27 ENCOUNTER — PROCEDURE VISIT (OUTPATIENT)
Dept: AUDIOLOGY | Age: 77
End: 2022-05-27
Payer: MEDICARE

## 2022-05-27 DIAGNOSIS — H91.23 SUDDEN HEARING LOSS OF BOTH EARS: ICD-10-CM

## 2022-05-27 DIAGNOSIS — H91.8X9 ASYMMETRICAL HEARING LOSS: Primary | ICD-10-CM

## 2022-05-27 PROCEDURE — 92552 PURE TONE AUDIOMETRY AIR: CPT | Performed by: AUDIOLOGIST

## 2022-05-27 NOTE — PROGRESS NOTES
Patient here for recheck of puretones before treatment next week. Audiogram revealed mild to severe hearing loss in left ear. Right ear revealed severe to profound hearing loss. 250 Hz improved in the right ear.        Aggie Monge/[facility]-A  [place] Lic # [id number]

## 2022-06-14 ENCOUNTER — TELEPHONE (OUTPATIENT)
Dept: ENT CLINIC | Age: 77
End: 2022-06-14

## 2022-06-14 NOTE — TELEPHONE ENCOUNTER
Please advise for scheduling with Dr. Chan Bound    Electronically signed by Akil Reddy MA on 6/14/22 at 2:09 PM EDT

## 2022-06-14 NOTE — TELEPHONE ENCOUNTER
Pt called in and left a message requesting an appointment with Dr. Mary Lou Chun. She has seen [facility] but not Dr. Kevin Veras. She was scheduled to see Dr. ACUÑA but it is not clear whether she did or not.

## 2022-07-02 PROCEDURE — 99283 EMERGENCY DEPT VISIT LOW MDM: CPT

## 2022-07-03 ENCOUNTER — HOSPITAL ENCOUNTER (EMERGENCY)
Age: 77
Discharge: HOME | End: 2022-07-03
Attending: EMERGENCY MEDICINE
Payer: MEDICARE

## 2022-07-03 ENCOUNTER — APPOINTMENT (OUTPATIENT)
Dept: GENERAL RADIOLOGY | Age: 77
End: 2022-07-03
Attending: EMERGENCY MEDICINE
Payer: MEDICARE

## 2022-07-03 VITALS
RESPIRATION RATE: 16 BRPM | HEART RATE: 72 BPM | BODY MASS INDEX: 24.42 KG/M2 | OXYGEN SATURATION: 100 % | SYSTOLIC BLOOD PRESSURE: 170 MMHG | DIASTOLIC BLOOD PRESSURE: 84 MMHG | HEIGHT: 62 IN | WEIGHT: 132.72 LBS | TEMPERATURE: 98.3 F

## 2022-07-03 DIAGNOSIS — S61.211A LACERATION OF LEFT INDEX FINGER WITHOUT FOREIGN BODY WITHOUT DAMAGE TO NAIL, INITIAL ENCOUNTER: Primary | ICD-10-CM

## 2022-07-03 DIAGNOSIS — R03.0 ELEVATED BLOOD PRESSURE READING: ICD-10-CM

## 2022-07-03 PROCEDURE — 73140 X-RAY EXAM OF FINGER(S): CPT

## 2022-07-03 PROCEDURE — 74011000250 HC RX REV CODE- 250: Performed by: EMERGENCY MEDICINE

## 2022-07-03 RX ADMIN — BACITRACIN ZINC, POLYMYXIN B SULFATE, NEOMYCIN SULFATE 1 PACKET: 400; 5000; 3.5 OINTMENT TOPICAL at 01:02

## 2022-07-03 NOTE — ED PROVIDER NOTES
EMERGENCY DEPARTMENT HISTORY AND PHYSICAL EXAM      Date: 7/3/2022  Patient Name: Mellissa Stout    History of Presenting Illness     Chief Complaint   Patient presents with    Finger Pain     Patient arrives after getting her finger caught in an umbrella, she took a chunk out of her left index finger. History Provided By: Patient    HPI: Mellissa Stout, 78 y.o. female with PMHx significant for hypertension, glaucoma presents to the ED with chief complaint of laceration to left index finger. Patient was trying to put her umbrella away and  her left index finger was pinched. Last tetanus was about 5 years ago. Patient is declining tetanus shot at this time. She is right-handed. Injury occurred just prior to arrival.. PCP: Adal Baker MD    No current facility-administered medications on file prior to encounter. Current Outpatient Medications on File Prior to Encounter   Medication Sig Dispense Refill    OTHER 5 mg. Patient reported starting unknown blood pressure medication 2 days ago       latanoprost (XALATAN) 0.005 % ophthalmic solution Administer 1 Drop to both eyes nightly.  ascorbic acid, vitamin C, (Vitamin C) 500 mg tablet Take 1,000 mg by mouth daily.  multivit-minerals/folic acid (ADULT MULTIVITAMIN GUMMIES PO) Take 2 Tabs by mouth daily. 2 gummies daily      cholecalciferol (VITAMIN D3) (5000 Units/125 mcg) tab tablet Take 5,000 Units by mouth daily.  calcium carbonate/vitamin D3 (CALCIUM CHEW PO) Take 1 Tab by mouth daily.          Past History     Past Medical History:  Past Medical History:   Diagnosis Date    Adverse effect of anesthesia     shaking episodes when coming out of anesthesia    Glaucoma     Hypertension     Ill-defined condition     abcess in colon       Past Surgical History:  Past Surgical History:   Procedure Laterality Date    COLONOSCOPY N/A 2/11/2021    COLONOSCOPY performed by Morse Najjar, MD at [facility] ENDOSCOPY    COLONOSCOPY,DIAGNOSTIC  2/11/2021         HX HYSTERECTOMY  1990       Family History:  Family History   Problem Relation Age of Onset    Breast Cancer Mother     Lupus Sister     Breast Cancer Maternal Grandmother     Breast Cancer Other        Social History:  Social History     Tobacco Use    Smoking status: Never Smoker    Smokeless tobacco: Never Used   Vaping Use    Vaping Use: Never used   Substance Use Topics    Alcohol use: Not Currently    Drug use: Never       Allergies:  No Known Allergies      Review of Systems   Review of Systems   Constitutional: Negative for chills and fever. HENT: Negative. Respiratory: Negative for shortness of breath. Cardiovascular: Negative for chest pain. Gastrointestinal: Negative for abdominal pain. Genitourinary: Negative for flank pain. Musculoskeletal: Negative for back pain, myalgias and neck pain. Skin: Positive for wound. Neurological: Negative. Psychiatric/Behavioral: The patient is nervous/anxious. All other systems reviewed and are negative. Physical Exam   General appearance - well nourished, well appearing, and in no distress    Chest - clear to auscultation  Heart - normal rate and regular rhythm    Skin - normal coloration and turgor, no rashes, 0.5 cm avulsion type laceration of distal lateral index finger (there is a of tissue avulsed from the pad of left index finger), no nail involvement  Neurological - alert, oriented x3, normal speech, no focal findings or movement disorder noted    Diagnostic Study Results     Labs -   No results found for this or any previous visit (from the past 12 hour(s)). Radiologic Studies -   XR 2ND FINGER LT MIN 2 V   Final Result   No fracture or foreign body. CT Results  (Last 48 hours)    None        CXR Results  (Last 48 hours)    None            Medical Decision Making   I am the first provider for this patient.     I reviewed the vital signs, available nursing notes, past medical history, past surgical history, family history and social history. Vital Signs-Reviewed the patient's vital signs. Patient Vitals for the past 12 hrs:   Temp Pulse Resp BP SpO2   07/03/22 0008 98.3 °F (36.8 °C) 72 16 (!) 192/94 100 %           Records Reviewed: Nursing Notes and Old Medical Records    Provider Notes (Medical Decision Making):   Patient presents with avulsion type laceration of distal left index finger. X-ray obtained to rule out fracture. This is not a laceration that is suturable as there is tissue missing. .  Will apply Neosporin and bandage. Blood pressure elevated on arrival.  On recheck it was 170/84. Patient reports she does not have hypertension, but has whitecoat syndrome. Instructed to follow-up with PCP  Discharge Medication List as of 7/3/2022  1:04 AM          ED Course:   Initial assessment performed. The patients presenting problems have been discussed, and they are in agreement with the care plan formulated and outlined with them. I have encouraged them to ask questions as they arise throughout their visit. Disposition:  Discharge home    PLAN:  1. Discharge Medication List as of 7/3/2022  1:04 AM        2. Follow-up Information     Follow up With Specialties Details Why Contact Info    Adal Baker MD Family Medicine Go to  As needed [address] [facility] [place] [address] [id number] [phone]          Return to ED if worse     Diagnosis     Clinical Impression:   1. Laceration of left index finger without foreign body without damage to nail, initial encounter    2.  Elevated blood pressure reading

## 2022-07-26 NOTE — PROGRESS NOTES
NEW PATIENT VISIT  Chief Complaint   Patient presents with    New Patient     NP CI consult     History of Present Illness:     Jose Rafael Bailey is a 78 y.o. female presenting with asymmetric sensorineural hearing loss with recent worsening on the right; patient of Dr. ACUÑA; history of prior stroke? She reports that she had hearing aids in both ears and then in April 2022 developed a sudden right hearing loss and is unable to get any benefit from her right ear. She has been concerned about losing the hearing in her left ear. She struggles with communication and states she is louder at home. She is unable to go to places that are noisy and  is concerned her hearing loss is affecting her cognitive ability. She is interested in hearing options for her right ear        TOBACCO  Social History     Tobacco Use   Smoking Status Never   Smokeless Tobacco Never        ALCOHOL   Social History     Substance and Sexual Activity   Alcohol Use No        DRUGHX   Social History     Substance and Sexual Activity   Drug Use No        CURRENT OUTPATIENT MEDICATIONS:   Outpatient Medications Marked as Taking for the 7/28/22 encounter (Office Visit) with Shreya Bledsoe MD   Medication Sig Dispense Refill    dilTIAZem (DILACOR XR) 240 MG extended release capsule Take 240 mg by mouth in the morning. aspirin 81 MG EC tablet Take 81 mg by mouth daily      potassium chloride (KLOR-CON) 10 MEQ extended release tablet take 1 tablet by mouth twice a day  0    furosemide (LASIX) 20 MG tablet take 1 tablet by mouth once daily  0    ACCU-CHEK JR PLUS strip   0    warfarin (COUMADIN) 1 MG tablet Take 1 mg by mouth daily Pt alternates 1 mg and 1 1/2 mg      calcium carbonate-vitamin D3 (CALTRATE) 600-400 MG-UNIT TABS per tab Take by mouth      pravastatin (PRAVACHOL) 40 MG tablet Take 20 mg by mouth daily. niacin (SLO-NIACIN) 500 MG tablet Take 500 mg by mouth 2 times daily.       omeprazole (PRILOSEC) 20 MG capsule Take 20 mg by mouth 2 times daily. therapeutic multivitamin-minerals (THERAGRAN-M) tablet Take 1 tablet by mouth daily. Vitamin D (CHOLECALCIFEROL) 1000 UNITS CAPS capsule Take 600 Units by mouth daily       metFORMIN (GLUCOPHAGE) 500 MG tablet Take 500 mg by mouth daily (with breakfast). Chromium-Cinnamon (CINNAMON PLUS CHROMIUM PO) Take 2 tablets by mouth Daily with supper. metoprolol (LOPRESSOR) 100 MG tablet Take 50 mg by mouth 2 times daily. ALLERGIES:   Allergies   Allergen Reactions    Gluten Other (See Comments)       Timing Age of Onset worsened 04/2022   Duration Increasing in Severity Yes   Days of work missed in last year n/a      Modifying Factors Seasonal variation No        Associated Symptoms Mouth breathing No    Communication concerns Yes    Halitosis No     Family History Family members with similar conditions No   Family history of bleeding concerns No   Family history of anesthia concerns No        Review of Symptoms:  I have reviewed the patient's medical history in detail; there are no changes to the history as noted in the electronic medical record. BP (!) 146/73   Pulse 73   Ht 5' 3\" (1.6 m)   Wt 153 lb (69.4 kg)   BMI 27.10 kg/m²     Physical Exam    Allergies Allergies   Allergen Reactions    Gluten Other (See Comments)      Constitutional Retractions/cyanosis {No     Head and Face   Lesions or masses No  facies symmetrical Yes   Eyes Ocular motion with gaze alignment Yes   Ears Inspection: Scars, lesions or masses No   Otoscopy  Left hearing aid; EAC patent bilaterally without occlusion External ears normal. Canals clear.  TM's normal.      Nasal Inspection    No external Scars, lesions or masses    Pyriform apertures widely patent    Nasal musosa healthy   Septum Midline, no Septal Perforation, no septal hematoma   Turbinates Intact, healthy   Oral Cavity Lips no lesions    Teeth healthy without cavities    Gums no lesions    Oral mucosa healthy    Hard and Soft Palate no lesions    Uvula single fid     Tongue no lesions    Tonsils normal size Symmetric without exudate   Neck . Neck supple without tenderness or crepitus   Lymph  Cranial Nerve exam No palpable adenopathy  Grossly intact. CN VII symmetrical   Respiration Symmetric without Increased work of breathing    Cardiovacular No Cyanosis    Skin healthy   Diagnostics    Test ordered Orders Placed This Encounter   Procedures    KRISSY Isaac, Audiology, [place]      Review of existing tests Lab Results   Component Value Date/Time    WBC 9.3 09/30/2015 06:40 AM    HGB 13.9 09/30/2015 06:40 AM    HCT 42.3 09/30/2015 06:40 AM     09/30/2015 06:40 AM    MCV 94.1 09/30/2015 06:40 AM    MCH 30.9 09/30/2015 06:40 AM    MCHC 32.8 09/30/2015 06:40 AM    RDW 13.9 09/30/2015 06:40 AM    NEUTOPHILPCT 72 09/30/2015 06:40 AM    LYMPHOPCT 21 09/30/2015 06:40 AM    MONOPCT 6 09/30/2015 06:40 AM    EOSRELPCT 1 09/30/2015 06:40 AM    BASOPCT 0 09/30/2015 06:40 AM    NEUTROABS 6.70 09/30/2015 06:40 AM    LYMPHSABS 1.93 09/30/2015 06:40 AM    EOSABS 0.06 09/30/2015 06:40 AM        Old records  Reviewed   Discussion with other providers    Done     On this date 7/28/2022 I have spent 10 minutes reviewing previous notes, test results and 50 min face to face with the patient discussing the diagnosis and importance of compliance with the treatment plan as well as documenting on the day of the visit. A/P    Lesley Molina is a 78 y.o. female with Bilateral sensorineural hearing loss, R>>L with communication problems confirmed by Audiogram, who will benefit from Hearing loss evaluation.  The rest of the exam was significant difficulty communicating    Plan  Patient will benefit from Fine cut CT scan of the temporal bones without contrast and MRI with IAC protocol (moving forward with MRI for nor while she thinks about cochlear implants)  Some information provided and spent a long time chatting about the cochlear implant process  Follow up after MRI  We will obtain old records    Yomi Frye MD 7/26/22 1:16 PM EDT

## 2022-07-28 ENCOUNTER — OFFICE VISIT (OUTPATIENT)
Dept: ENT CLINIC | Age: 77
End: 2022-07-28
Payer: MEDICARE

## 2022-07-28 ENCOUNTER — PROCEDURE VISIT (OUTPATIENT)
Dept: AUDIOLOGY | Age: 77
End: 2022-07-28
Payer: MEDICARE

## 2022-07-28 VITALS
SYSTOLIC BLOOD PRESSURE: 146 MMHG | HEART RATE: 73 BPM | BODY MASS INDEX: 27.11 KG/M2 | DIASTOLIC BLOOD PRESSURE: 73 MMHG | HEIGHT: 63 IN | WEIGHT: 153 LBS

## 2022-07-28 DIAGNOSIS — H91.23 SUDDEN HEARING LOSS OF BOTH EARS: ICD-10-CM

## 2022-07-28 DIAGNOSIS — H90.3 ASNHL (ASYMMETRICAL SENSORINEURAL HEARING LOSS): Primary | ICD-10-CM

## 2022-07-28 DIAGNOSIS — H91.8X9 ASYMMETRICAL HEARING LOSS: Primary | ICD-10-CM

## 2022-07-28 DIAGNOSIS — H91.91 PROFOUND HEARING LOSS OF RIGHT EAR: ICD-10-CM

## 2022-07-28 PROCEDURE — 99205 OFFICE O/P NEW HI 60 MIN: CPT | Performed by: OTOLARYNGOLOGY

## 2022-07-28 PROCEDURE — G8400 PT W/DXA NO RESULTS DOC: HCPCS | Performed by: OTOLARYNGOLOGY

## 2022-07-28 PROCEDURE — 92626 EVAL AUD FUNCJ 1ST HOUR: CPT | Performed by: AUDIOLOGIST

## 2022-07-28 PROCEDURE — G8427 DOCREV CUR MEDS BY ELIG CLIN: HCPCS | Performed by: OTOLARYNGOLOGY

## 2022-07-28 PROCEDURE — 1036F TOBACCO NON-USER: CPT | Performed by: OTOLARYNGOLOGY

## 2022-07-28 PROCEDURE — G8417 CALC BMI ABV UP PARAM F/U: HCPCS | Performed by: OTOLARYNGOLOGY

## 2022-07-28 PROCEDURE — 3017F COLORECTAL CA SCREEN DOC REV: CPT | Performed by: OTOLARYNGOLOGY

## 2022-07-28 PROCEDURE — 1090F PRES/ABSN URINE INCON ASSESS: CPT | Performed by: OTOLARYNGOLOGY

## 2022-07-28 PROCEDURE — 1123F ACP DISCUSS/DSCN MKR DOCD: CPT | Performed by: OTOLARYNGOLOGY

## 2022-07-28 RX ORDER — DILTIAZEM HYDROCHLORIDE 240 MG/1
240 CAPSULE, EXTENDED RELEASE ORAL DAILY
COMMUNITY

## 2022-07-28 NOTE — PROGRESS NOTES
Patient was referred by Dr. Ariana Walker for audiometric testing, due to possible CI candidacy. She reported bilateral hearing loss. Patient  reported a history of hearing aid use in the left ear. Previous audiometric testing revealed a profound sensorineural hearing loss in the right ear and moderately-severe to profound sensorineural hearing loss in the left ear. Clinic owned [facility] hearing aids were programmed to using the NAL-NL2 fitting rationale. Hearing aids were verified and found to be appropriate for patient's hearing loss. Aided testing was completed at 60 . Results    RIGHT    CNC Words 0%   AzBio Quiet 0%     LEFT    CNC Words 38%   AzBio Quiet 32%     BILATERAL    CNC Words 50%   AzBio Quiet 51%       The results were reviewed with the patient and Dr. Ariana Walker. Electronically signed by Aggie Sutton on 7/28/2022 at 1:34 PM      Note: At least 35 minutes spent face to face with patient collecting case history, performing tests, and reviewing results. SHITAL Styles.   Audiology Intern (4th Year)     Aggie Sutton/CCC-A  [place] Lic # [id number]

## 2022-08-04 ENCOUNTER — TELEPHONE (OUTPATIENT)
Dept: ENT CLINIC | Age: 77
End: 2022-08-04

## 2022-08-04 NOTE — TELEPHONE ENCOUNTER
called and states that [facility] had reached out that you had requested a new MRI,  states that he was able to have disc in hand of MRI (multiple that were performed at [facility]) and is concerned about doing another until you see that scans they have. States he was unaware that she would need another scan. Please advise if new scan is needed.

## 2022-08-09 NOTE — TELEPHONE ENCOUNTER
Called and left a detailed voicemail in regards to obtaining imaging the scan that patient obtained before will not show what provider needs to further evaluate diagnosis.

## 2022-08-29 NOTE — PROGRESS NOTES
CC:   Chief Complaint   Patient presents with    Follow-up     Returned with disc mri and ct of brain from [facility] Geo is a 78 y.o. female presenting with asymmetric sensorineural hearing loss with recent worsening on the right; she brought her prior imaging which I reviewed and will be loaded in the system; patient of Dr. Jenifer Dsouza; history of prior stroke? She is bringing her discs of prior images to see if any are of the ears  She reports that she had hearing aids in both ears and then in April 2022 developed a sudden right hearing loss and is unable to get any benefit from her right ear. She has been concerned about losing the hearing in her left ear. She struggles with communication and states she is louder at home. She is unable to go to places that are noisy and  is concerned her hearing loss is affecting her cognitive ability.  She is interested in hearing options for her right ear            PAST MEDICAL HISTORY:   Past Medical History:   Diagnosis Date    Aortic stenosis, mild 10/1/2015    Arrhythmia     Atrial fibrillation (HCC)     Bleeding from varicose veins of right lower extremity 6/8/2017    Cerebrovascular disease 03-21-13    CVA    Diabetes mellitus ([address]     Hearing deficit     Heart disease     Hyperlipidemia     Hypertension     Migraine headache with aura     Mitral regurgitation 10/1/2015    mild    NCGS (non-celiac gluten sensitivity)     TIA (transient ischemic attack)     Unspecified cerebral artery occlusion with cerebral infarction     Varicose veins of left leg with edema 6/8/2017    Venous stasis ulcer of right calf with fat layer exposed with varicose veins ([address] 9/13/2019    Warfarin-induced coagulopathy ([address] 9/29/2015        PAST SURGICAL HISTORY:   Past Surgical History:   Procedure Laterality Date    ABDOMEN SURGERY      APPENDECTOMY      BREAST SURGERY Right     biopsy-benign    COLONOSCOPY  2008    ECHOCARDIOGRAM COMPLETE 2D W DOPPLER W COLOR 8/30/2012         INNER EAR SURGERY Left     Multiple surgeries    OVARY SURGERY      SMALL INTESTINE SURGERY      VEIN SURGERY          SOCIAL HISTORY:   Social History     Socioeconomic History    Marital status:      Spouse name: Not on file    Number of children: Not on file    Years of education: Not on file    Highest education level: Not on file   Occupational History    Not on file   Tobacco Use    Smoking status: Never    Smokeless tobacco: Never   Vaping Use    Vaping Use: Never used   Substance and Sexual Activity    Alcohol use: No    Drug use: No    Sexual activity: Never   Other Topics Concern    Not on file   Social History Narrative    Not on file     Social Determinants of Health     Financial Resource Strain: Not on file   Food Insecurity: Not on file   Transportation Needs: Not on file   Physical Activity: Not on file   Stress: Not on file   Social Connections: Not on file   Intimate Partner Violence: Not on file   Housing Stability: Not on file        TOBACCO  Social History     Tobacco Use   Smoking Status Never   Smokeless Tobacco Never        ALCOHOL   Social History     Substance and Sexual Activity   Alcohol Use No        [address]   Social History     Substance and Sexual Activity   Drug Use No         CURRENT OUTPATIENT MEDICATIONS:   No outpatient medications have been marked as taking for the 9/1/22 encounter (Office Visit) with Yuri Trujillo MD.        ALLERGIES:   Allergies   Allergen Reactions    Gluten Other (See Comments)       ROS: I have reviewed the patient's medical history in detail; there are no changes to the history as noted in the electronic medical record. Exam: Ht 5' 3\" (1.6 m)   Wt 153 lb (69.4 kg)   BMI 27.10 kg/m²   Jennifer Bonner is a 78 y.o. female  appears well, in no apparent distress. Alert and oriented times three, pleasant and cooperative. Vital signs are as documented in vital signs section.   Breathing comfortably, without stertor or stridor  Ear exam: left hearing aid; External ears normal. Canals clear. TM's normal.  No nasal lesions, no erythema  No oral lesions. Tonsils are noted to be normal size, there is no trismus  There is no palpable adenopathy or tenderness    Lab Results   Component Value Date/Time    WBC 9.3 09/30/2015 06:40 AM    HGB 13.9 09/30/2015 06:40 AM    HCT 42.3 09/30/2015 06:40 AM     09/30/2015 06:40 AM    MCV 94.1 09/30/2015 06:40 AM    MCH 30.9 09/30/2015 06:40 AM    MCHC 32.8 09/30/2015 06:40 AM    RDW 13.9 09/30/2015 06:40 AM    NEUTOPHILPCT 72 09/30/2015 06:40 AM    LYMPHOPCT 21 09/30/2015 06:40 AM    MONOPCT 6 09/30/2015 06:40 AM    EOSRELPCT 1 09/30/2015 06:40 AM    BASOPCT 0 09/30/2015 06:40 AM    NEUTROABS 6.70 09/30/2015 06:40 AM    LYMPHSABS 1.93 09/30/2015 06:40 AM    EOSABS 0.06 09/30/2015 06:40 AM       On this date 9/1/2022 I have spent 10 minutes reviewing previous notes, test results and 50 min face to face with the patient discussing the diagnosis and importance of compliance with the treatment plan as well as documenting on the day of the visit.     A/P:    Josey Guardado is a 78 y.o. female presenting with asymmetric sensorineural hearing loss with recent worsening on the right  Plan for right CI  Speech therapy evaluation and pneumovax pending  Device selection done (cochlear)  Like speech therapy at [address] [place] [address] and [facility] reviewed  Consent and registry forms given, and to be collected at next visit    Tyron Mejía MD 8/29/22 4:43 PM EDT

## 2022-09-01 ENCOUNTER — NURSE ONLY (OUTPATIENT)
Dept: PRIMARY CARE CLINIC | Age: 77
End: 2022-09-01
Payer: MEDICARE

## 2022-09-01 ENCOUNTER — OFFICE VISIT (OUTPATIENT)
Dept: ENT CLINIC | Age: 77
End: 2022-09-01
Payer: MEDICARE

## 2022-09-01 ENCOUNTER — PROCEDURE VISIT (OUTPATIENT)
Dept: AUDIOLOGY | Age: 77
End: 2022-09-01

## 2022-09-01 VITALS — HEIGHT: 63 IN | BODY MASS INDEX: 27.11 KG/M2 | WEIGHT: 153 LBS

## 2022-09-01 DIAGNOSIS — Z23 NEED FOR PNEUMOCOCCAL VACCINATION: Primary | ICD-10-CM

## 2022-09-01 DIAGNOSIS — H91.91 PROFOUND HEARING LOSS OF RIGHT EAR: ICD-10-CM

## 2022-09-01 DIAGNOSIS — H91.8X9 ASYMMETRICAL HEARING LOSS: Primary | ICD-10-CM

## 2022-09-01 DIAGNOSIS — H90.3 ASNHL (ASYMMETRICAL SENSORINEURAL HEARING LOSS): Primary | ICD-10-CM

## 2022-09-01 PROCEDURE — 1123F ACP DISCUSS/DSCN MKR DOCD: CPT | Performed by: OTOLARYNGOLOGY

## 2022-09-01 PROCEDURE — 90732 PPSV23 VACC 2 YRS+ SUBQ/IM: CPT | Performed by: OTOLARYNGOLOGY

## 2022-09-01 PROCEDURE — G8427 DOCREV CUR MEDS BY ELIG CLIN: HCPCS | Performed by: OTOLARYNGOLOGY

## 2022-09-01 PROCEDURE — 1036F TOBACCO NON-USER: CPT | Performed by: OTOLARYNGOLOGY

## 2022-09-01 PROCEDURE — 99024 POSTOP FOLLOW-UP VISIT: CPT | Performed by: AUDIOLOGIST

## 2022-09-01 PROCEDURE — 99215 OFFICE O/P EST HI 40 MIN: CPT | Performed by: OTOLARYNGOLOGY

## 2022-09-01 PROCEDURE — G0009 ADMIN PNEUMOCOCCAL VACCINE: HCPCS | Performed by: OTOLARYNGOLOGY

## 2022-09-01 PROCEDURE — G8417 CALC BMI ABV UP PARAM F/U: HCPCS | Performed by: OTOLARYNGOLOGY

## 2022-09-01 PROCEDURE — 3017F COLORECTAL CA SCREEN DOC REV: CPT | Performed by: OTOLARYNGOLOGY

## 2022-09-01 PROCEDURE — 1090F PRES/ABSN URINE INCON ASSESS: CPT | Performed by: OTOLARYNGOLOGY

## 2022-09-01 PROCEDURE — G8400 PT W/DXA NO RESULTS DOC: HCPCS | Performed by: OTOLARYNGOLOGY

## 2022-09-01 NOTE — PROGRESS NOTES
Patient was here for device selection. She chose Cochlear, one Nucleus and one Kanso in gray. Will also order mini juan josé, Kanso waterproof, extra battery. Gave patient and patient's  rep information.        Aggie Troncoso [facility]  [address] [facility] [id number]  Electronically signed by Aggie Troncoso on 9/2/2022 at 2:44 PM

## 2022-09-01 NOTE — PATIENT INSTRUCTIONS
implanted ear. The long-term effects of electrical stimulation like that produced by the implant are not known. Those arising from cochlear implant/mastoid surgery:    Numbness and stiffness surrounding the surgical site which may persist for several months. In rare instances these effects may be permanent. Possible neck pain or stiffness resulting from positioning of the head during surgery may be present for several weeks following surgery. There is a possibility that facial paralysis may occur as a result of surgery. Should this occur, the eye on the side of the surgery would not close, and the mouth would pull toward the side opposite of the surgery. This is a very rare occurrence, and in most instances, such paralysis resolves on its own within several weeks. Rarely, further treatment is required. Taste disturbance and mouth dryness care common for a few weeks following surgery. In approximately 5% of patients the taste disturbance may be prolonged or permanent. In rare instances, perilymphatic fluid from the inner ear may leak into the middle ear. This may require surgical repair and if left untreated, may lead to meningitis. Dizziness and some unsteadiness may be experienced after surgery. This is very rarely permanent. Ear infection, with drainage, swelling and pain, may persist following surgery, or on rare occasions, may develop following surgery due to poor healing of the ear tissue. Were this to be the case, additional surgery might be necessary to control the infection. Tinnitus (head noise) may result or if present prior to surgery may be more pronounced. A cerebrospinal fluid leak (leak of fluid surrounding the brain) is a very rare complication. Additional surgery may be necessary to stop the leak. Intracranial (brain) complications such as meningitis or brain abscess, even paralysis, were common in cases of chronic otitis media prior to the antibiotic era.   Fortunately, these now are extremely rare complications. Those arising from surgery in general:    Blood or fluid may collect at this site of the surgery and may require drainage. There is a risk of unforeseen chest, heart, or brain complications from anesthesia. You may discuss these with the anesthesiologist.  Death as a result of this procedure is possible. Those involving post-operative limitations imposed by the device:    Current cochlear implants are all MRI compatible. Older cochlear implant patients receiving a cochlear implant should not undergo Magnetic Resonance Imaging (MRI), or be in the room where an MRI scanner is located. Treatment with electroconvulsive therapy should not be performed. A significant blow to the head can damage the internal device. Activities which present significant risks of falls (i.e. roller bladding, bicycling, and contact sports), should be entered into with adequate precaution, including the use of protective headgear. Stimulation of the facial nerve may occur when certain electrode channels are activated depending on the location of the electrode in relation to nerve fibers. It is generally possible to eliminate this through programming, which could result in reduced performance. To maintain the best quality of sound, it will be necessary to return to the Implant Center to have the device programmed on occasion. We recommend once every three months for young children and every six months to yearly for adults. The audiologist does not make-up your map. He or she only records your responses. Your involvement is critical.  Appropriate fit to the magnet is important. Increasing the magnetic pull by screwing the magnet in closer to the head can put pressure on the implant site that can lead to skin breakdown. If tenderness or pain is noted under the /headpiece, contact the Implant audiologist at once. The speech processor is a man-made computerized device.   It can breakdown just like any thing else. It does have a warranty, but just like your car or television set, once the warranty expires, repair or replacement costs will be the responsibility of the patient. Millions of dollars have been spent on research and development by manufacturers of cochlear implants, and these companies are in business out of the goodness of their hearts. Activities in which static electricity may be generated (i.e. sliding down plastic slides, playing in plastic ball pits, and contact with computer monitors or dry cold weather) have on rare occasions caused damage to the internal portion of certain devices. Any activity that alerts users of medical devices to the presence of electromagnetic devices should be avoided. Internal cochlear implant components do not generally set off metal detectors. Airport security will not damage the internal device. However, it is best to remove the external device and pass it around the security checkpoint. There is a chance that the program in your processor could be corrupted. Carry your CI Identification Card provided to you at the time of your surgery. As a cochlear implant recipient, you should also have a MedicAlert bracelet or necklace identifying yourself as an individual with a cochlear implant in the event you cannot speak for yourself. All multi-channel implant systems are designed so that there is minimal risk to the patient. The internal device has no power source. Removing the external device will immediately result in the shutdown of the internal device activity. If there is any doubt about the device function, remove the external receive/headpiece from the head and contact the CI Team.    Vaccinations    A small percentage of deaf individuals have abnormal inner ears which predispose them to bacterial meningitis with or without a cochlear implant.   Bacterial meningitis is also one of the leading causes of deafness in children and those children are at increased risk of vidal the disease again. Due to these reasons, and others, cochlear implant users (particularly children) are at increased risk for vidal bacterial meningitis. The likelihood of a cochlear implant user vidal bacterial meningitis is still extremely low, far less than 1%, and this risk may be substantially reduced through vaccination. You should talk to your pediatrician, primary care physician, or other health professional about vaccination with Prenar ® (7-valent pneumococcal conjugate vaccine), Pneumovovax ® 23 or Pnulmune ® 23 (23-valent pneumococcal polysaccharide vaccine), and Haemophilus Influenzae Type B (hib) vaccine. In [place] [address] already recommends vaccination against bacterial meningitis as part of the routine vaccination programs for all children. Most cochlear implant surgeons now specifically require candidates be vaccinated against bacterial meningitis prior to implantation. Benefits from the Cochlear Implant    The cochlear implant system, when used in conjunction with speech reading, should                                                            improve your communication abilities. With the cochlear implant you should be able to hear environmental sounds which you were unable to hear with your hearing aid. Many, but not all, implant users can understand great deal of speech with no visual cues, and many, but not all, implant users can understand speech over the telephone   Given the appropriate follow-up children can develop normal speech and language using a cochlear implant. Many factors, including parental involvement, school placement, intelligence, age at implantation, previous experience with sound, and other things we may not understand can affect the child's ability to use the input from the cochlear implant.     REMEMBER:  1.  COCHLEAR IMPLANTS DO NOT RESTORE NORMAL HEARING  2. IT MAY TAKE SEVERAL MONTHS OR LONGER TO ACHIEVE MAXIMUM BENEFITS FROM THE COCHLEAR IMPLAN  3. COCHLEAR IMPLANTS CAN BREAK DOWN  4. YOUR EXPERIENCE WILL NOT BE IDENTICAL TO THAT OF ANY OTHER IMPLANT USER  5.NEITHER THE PHYSICIAN NOR THE IMPLANT AUDIOLOGIST CAN ANTICIPATE   EVERYTHING THAT YOU MIGHT EXPERIENCE  6. COCHLEAR IMPLANTS DO NOT RESTORE HEARING       Alternatives  You may continue the use of power hearing aids. You may use a vibro-tactile device which will alert you to sound using vibrations. You may attend speech reading classes and learn to more effectively communicate visually. You may choose to learn sign language and make use of sign language interpreters. All parents or guardians of deaf children should be aware of Deaf culture. Information about the Deaf Community is best obtained from culturally deaf persons. The Cochlear Implant Team will assist any interested persons in obtaining information on the deaf community. 10. I consent to the presence of medical students, residents, and other health care providers or student health care providers and observers in the operating room in accordance with the usual practices of the facility. I also agree that still pictures and video tapes may be made for diagnostic or educational purposes unless I state otherwise. 11. I understand and agree that any tissues or parts removed from me may be tested for Infectious diseases, retained, preserved, photographed, and used for scientific or teaching purposes, or may be disposed of by the Hospital in accordance with its usual practice. 12. For Patients with Do Not Resuscitate Orders: I understand my treating physician or anesthesiologist or other qualified practitioner will discuss with me the risks and benefits of continuing a Do Not Resuscitate order during this procedure. 13. I have read this Consent and understand it.  I have received all the information I desire concerning the procedure(s), and all of my questions have been answered to my satisfaction.     ____________________________________________________________________________  Signature of Physician                        Date/Time     Signature of Patient                          Date/Time    ____________________________________________________________________________  Witness                                    Date/Time    Signature of Legally Authorized Representative Date/Time                ____________________________________________________________________________   Reason Patient unable to consentRelationship to Patient (If Patient unable to consent)

## 2022-09-14 DIAGNOSIS — H91.91 PROFOUND HEARING LOSS OF RIGHT EAR: Primary | ICD-10-CM

## 2022-09-14 NOTE — PROGRESS NOTES
CC:   Chief Complaint   Patient presents with    Follow-up     Speech eval and paper work      Adelaida Schwab is a 78 y.o. female presenting with asymmetric sensorineural hearing loss with recent worsening on the right; she brought her prior imaging which I reviewed and will be loaded in the system; patient of Dr. ACUÑA; history of prior stroke? She is bringing her discs of prior images to see if any are of the ears  She reports that she had hearing aids in both ears and then in April 2022 developed a sudden right hearing loss and is unable to get any benefit from her right ear. She has been concerned about losing the hearing in her left ear. She struggles with communication and states she is louder at home. She is unable to go to places that are noisy and  is concerned her hearing loss is affecting her cognitive ability.  She is interested in hearing options for her right ear               PAST MEDICAL HISTORY:   Past Medical History:   Diagnosis Date    Aortic stenosis, mild 10/1/2015    Arrhythmia     Atrial fibrillation (HCC)     Bleeding from varicose veins of right lower extremity 6/8/2017    Cerebrovascular disease 03-21-13    CVA    Diabetes mellitus ([address]     Hearing deficit     Heart disease     Hyperlipidemia     Hypertension     Migraine headache with aura     Mitral regurgitation 10/1/2015    mild    NCGS (non-celiac gluten sensitivity)     TIA (transient ischemic attack)     Unspecified cerebral artery occlusion with cerebral infarction     Varicose veins of left leg with edema 6/8/2017    Venous stasis ulcer of right calf with fat layer exposed with varicose veins ([address] 9/13/2019    Warfarin-induced coagulopathy ([address] 9/29/2015        PAST SURGICAL HISTORY:   Past Surgical History:   Procedure Laterality Date    ABDOMEN SURGERY      APPENDECTOMY      BREAST SURGERY Right     biopsy-benign    COLONOSCOPY  2008    ECHOCARDIOGRAM COMPLETE 2D W DOPPLER W COLOR  8/30/2012         INNER EAR SURGERY (CALTRATE) 600-400 MG-UNIT TABS per tab Take by mouth      pravastatin (PRAVACHOL) 40 MG tablet Take 20 mg by mouth daily. niacin (SLO-NIACIN) 500 MG tablet Take 500 mg by mouth 2 times daily. omeprazole (PRILOSEC) 20 MG capsule Take 20 mg by mouth 2 times daily. therapeutic multivitamin-minerals (THERAGRAN-M) tablet Take 1 tablet by mouth daily. Vitamin D (CHOLECALCIFEROL) 1000 UNITS CAPS capsule Take 600 Units by mouth daily       metFORMIN (GLUCOPHAGE) 500 MG tablet Take 500 mg by mouth daily (with breakfast). Chromium-Cinnamon (CINNAMON PLUS CHROMIUM PO) Take 2 tablets by mouth Daily with supper. metoprolol (LOPRESSOR) 100 MG tablet Take 50 mg by mouth 2 times daily. ALLERGIES:   Allergies   Allergen Reactions    Gluten Other (See Comments)       ROS: I have reviewed the patient's medical history in detail; there are no changes to the history as noted in the electronic medical record. Exam: Ht 5' 3\" (1.6 m)   Wt 153 lb (69.4 kg)   BMI 27.10 kg/m²   Justice Canseco is a 78 y.o. female  appears well, in no apparent distress. Alert and oriented times three, pleasant and cooperative. Vital signs are as documented in vital signs section.   Breathing comfortably, without stertor or stridor  Ear exam: left hearing aid, TM clear bilaterally    Lab Results   Component Value Date/Time    WBC 9.3 09/30/2015 06:40 AM    HGB 13.9 09/30/2015 06:40 AM    HCT 42.3 09/30/2015 06:40 AM     09/30/2015 06:40 AM    MCV 94.1 09/30/2015 06:40 AM    MCH 30.9 09/30/2015 06:40 AM    MCHC 32.8 09/30/2015 06:40 AM    RDW 13.9 09/30/2015 06:40 AM    NEUTOPHILPCT 72 09/30/2015 06:40 AM    LYMPHOPCT 21 09/30/2015 06:40 AM    MONOPCT 6 09/30/2015 06:40 AM    EOSRELPCT 1 09/30/2015 06:40 AM    BASOPCT 0 09/30/2015 06:40 AM    NEUTROABS 6.70 09/30/2015 06:40 AM    LYMPHSABS 1.93 09/30/2015 06:40 AM    EOSABS 0.06 09/30/2015 06:40 AM       On this date 9/15/2022 I have spent 10 minutes reviewing previous notes, test results and 30 min face to face with the patient discussing the diagnosis and importance of compliance with the treatment plan as well as documenting on the day of the visit.     A/P:     Analisa Orlando is a 78 y.o. female presenting with asymmetric sensorineural hearing loss with recent worsening on the right  Plan for right CI  Speech therapy evaluation today  Pneumovax was previously given  Device selection done (cochlear)  Like speech therapy at [facility] and benefits reviewed, will need medical clearance and guidance on medications  Consent and registry forms given, reviewed and collected    Bonny Nash MD 9/14/22 1:37 PM EDT

## 2022-09-15 ENCOUNTER — OFFICE VISIT (OUTPATIENT)
Dept: ENT CLINIC | Age: 77
End: 2022-09-15
Payer: MEDICARE

## 2022-09-15 ENCOUNTER — EVALUATION (OUTPATIENT)
Dept: SPEECH THERAPY | Age: 77
End: 2022-09-15
Payer: MEDICARE

## 2022-09-15 VITALS — WEIGHT: 153 LBS | HEIGHT: 63 IN | BODY MASS INDEX: 27.11 KG/M2

## 2022-09-15 DIAGNOSIS — H90.3 ASNHL (ASYMMETRICAL SENSORINEURAL HEARING LOSS): ICD-10-CM

## 2022-09-15 DIAGNOSIS — H91.91 PROFOUND HEARING LOSS OF RIGHT EAR: ICD-10-CM

## 2022-09-15 DIAGNOSIS — R47.89 OTHER SPEECH DISTURBANCE: Primary | ICD-10-CM

## 2022-09-15 DIAGNOSIS — H91.91 PROFOUND HEARING LOSS OF RIGHT EAR: Primary | ICD-10-CM

## 2022-09-15 PROCEDURE — G8400 PT W/DXA NO RESULTS DOC: HCPCS | Performed by: OTOLARYNGOLOGY

## 2022-09-15 PROCEDURE — 99215 OFFICE O/P EST HI 40 MIN: CPT | Performed by: OTOLARYNGOLOGY

## 2022-09-15 PROCEDURE — 3017F COLORECTAL CA SCREEN DOC REV: CPT | Performed by: OTOLARYNGOLOGY

## 2022-09-15 PROCEDURE — 1090F PRES/ABSN URINE INCON ASSESS: CPT | Performed by: OTOLARYNGOLOGY

## 2022-09-15 PROCEDURE — 92523 SPEECH SOUND LANG COMPREHEN: CPT | Performed by: SPEECH-LANGUAGE PATHOLOGIST

## 2022-09-15 PROCEDURE — G8427 DOCREV CUR MEDS BY ELIG CLIN: HCPCS | Performed by: OTOLARYNGOLOGY

## 2022-09-15 PROCEDURE — G8417 CALC BMI ABV UP PARAM F/U: HCPCS | Performed by: OTOLARYNGOLOGY

## 2022-09-15 PROCEDURE — 1036F TOBACCO NON-USER: CPT | Performed by: OTOLARYNGOLOGY

## 2022-09-15 PROCEDURE — 1123F ACP DISCUSS/DSCN MKR DOCD: CPT | Performed by: OTOLARYNGOLOGY

## 2022-09-15 NOTE — PROGRESS NOTES
consonant differ only in voicing (minimal pairs) achieving greater than 90% accuracy. 7. Follow instructions presented auditorily only containing two critical elements achieving greater than 90% accuracy. 8. Identify the correct phrase or sentence from a set of 4-8 achieving greater than 90% accuracy. 9. Read aloud directions changing one word- have patient determine the word that was changed in a common phrase achieving greater than 90% accuracy. 10.  Minimal pairs in sentences- have the patient discriminate between the two words achieving greater than 90% accuracy. 11. Identify words correctly in a set of 5-8 with the same syllable structure/number achieving greater than 90% accuracy. 12.  Correctly imitate a 3-5 word phrase presented auditorily achieving greater than 90% accuracy. 13. Identify the topic of a short paragraph read aloud achieving greater than 90% accuracy. Patient stated goals: Agreed with above,     Rehabilitation Potential/Prognosis: good      PPVT-4 (Peabody Picture Vocabulary Test, fourth edition)    Ages 2-6 through 90+ years. This test was used for an alternate purpose other than understanding vocabulary. Percentages are based on speech perception of the vocabulary terms with a focus of lip reading + hearing vs. just hearing. Results indicate how much the patient is relying on lip reading to understand speech. Hearing Aids/Devices if worn are kept on during this evaluation to assess how well patient is perceiving speech with an aid. This assessment is to show the need for a cochlear implant device as aids are not beneficial enough for adequate perception of speech and language.      % With Lip Reading % Without Lip Reading   Did not look up 25/25   Did not look up  100%         Minimal Pairs Assessment    The Minimal Pairs Assessment assesses the ability to identify words that are differing by 1 sound, which could be in any position of the word (initial or final). In a field of 2 picture cards, it is to be determined which word is being verbalized. Percentages are based on speech perception of the words with a focus of lip reading + hearing vs. just hearing. Results indicate how much the patient is relying on lip reading to understand speech. Hearing Aids/Devices if worn are kept on during this evaluation to assess how well patient is perceiving speech with an aid. This assessment is to show the need for a cochlear implant device as aids are not beneficial enough for adequate perception of speech and language. % With Lip Reading % Without Lip Reading   Did not utilize  42/46    91%         EDUCATION:   The Speech Language Pathologist (SLP) completed education regarding results of evaluation and that intervention is warranted at this time. Learner: Patient  Education: Reviewed results and recommendations of this evaluation  Evaluation of Education:  Jadyn Lobato understanding    This plan may be re-evaluated and revised as warranted. Treatment goals discussed with Patient   The Patient understand(s) the diagnosis, prognosis and plan of care     Evaluation Time includes thorough review of current medical information, gathering information on past medical history/social history and prior level of function, completion of standardized testing/informal observation of tasks, assessment of data and education on plan of care and goals. CPT Codes    Evaluation: 83218 Evaluation of Speech Sound Language Comprehension     61 Minutes       The admitting diagnosis and active problem list, as listed below have been reviewed prior to initiation of this evaluation.         ACTIVE PROBLEM LIST:   Patient Active Problem List   Diagnosis    Hypertension    Cerebral infarction ([address]    DJD (degenerative joint disease)    Atrial fibrillation (HCC)    TIA (transient ischemic attack)    Warfarin-induced coagulopathy (HCC)    Cerebral ischemia    Migraine headache with aura Cerebral infarction [facility])    Aortic stenosis, mild    Mitral regurgitation    Pulmonary hypertension (HCC)    Varicose veins of left leg with edema    Bleeding from varicose veins of right lower extremity    Hematoma of leg, right, initial encounter    Venous insufficiency (chronic) (peripheral)      Dr. Aron Ayers     If you have any questions or concerns, please don't hesitate to call. Thank you for your referral.    By Co-signing above, the therapists plan is approved by the physician/provider. All patients under [employer] must be signed by physician/provider.

## 2022-09-21 ENCOUNTER — TELEPHONE (OUTPATIENT)
Dept: ENT CLINIC | Age: 77
End: 2022-09-21

## 2022-09-21 NOTE — TELEPHONE ENCOUNTER
Prior Authorization Form:      DEMOGRAPHICS:                     Patient Name:  Anthony Byrd  Patient :  1945            Insurance:  Payor: Mykel Da Silva / Plan: Bean Hines / Product Type: *No Product type* /   Insurance ID Number:    Payer/Plan Subscr  Sex Relation Sub. Ins. ID Effective Group Num   1.  [facility] 1945 Female Self [id number] 1/1/15 [id number]                                   [address] [address]         DIAGNOSIS & PROCEDURE:                       Procedure/Operation:Right Cochlear implant           CPT Code: 47535,    Diagnosis:  Bilateral profound sensorineural hearing loss     ICD10 Code: H91.91    Location:  [facility]    Surgeon: Dr Aleja Maddox INFORMATION:                          Date:  10-26-22  Time: N/a              Anesthesia:  General                                                       Status:  in patient    Special Comments:  include all chart notes       Electronically signed by Marina Nettles LPN on  at 84:23 AM

## 2022-09-27 ENCOUNTER — TELEPHONE (OUTPATIENT)
Dept: ENT CLINIC | Age: 77
End: 2022-09-27

## 2022-09-27 NOTE — TELEPHONE ENCOUNTER
Pt called advised Pt she needs to call physician for medical clearance advised Pt she needs to get medical clearance from her family physician and find out from her PCP when to discontinue her warfarin prior to surgery . Pt states she is calling them now and will call us back

## 2022-10-03 ENCOUNTER — TELEPHONE (OUTPATIENT)
Dept: ENT CLINIC | Age: 77
End: 2022-10-03

## 2022-10-03 NOTE — TELEPHONE ENCOUNTER
Dr Ferro Fitting called states he would like to discuss pt up coming surgery and coumadin would like return call Electronically signed by Kimberley Herrera LPN on 05/1/6193 at 9:58 PM

## 2022-10-03 NOTE — TELEPHONE ENCOUNTER
Dr Alisha Fox called and Left voicemail I  returned call Brigid Jacobsen states Pt can not stop taking Warfarin . Brigid Jacobsen states she will be faxing medical clearance today for Pt to be able to have surgery Electronically signed by Diamantina Kocher, LPN on 96/9/3894 at 21:37 AM

## 2022-10-03 NOTE — TELEPHONE ENCOUNTER
Emile barrow spoke with Artem Mireles and prior authorization is pending provided additional clinical information to [facility] wait for approval in 24 hours.

## 2022-10-03 NOTE — TELEPHONE ENCOUNTER
Pre-Admission Testing called informing the office that Pt daughter is going to drop off cardiac clearance today at the [address] [place] [address]. They are also concerned that Pt has not stopped coumadin in time for Sx and are contacting PCP to verify. Pt claims she cannot stop coumadin due to a Hx of stroke. They would like the cardiac clearance faxed over along with a phone call as soon as it arrives.   [address] [phone]  Fax [phone]

## 2022-10-04 ENCOUNTER — TELEPHONE (OUTPATIENT)
Dept: ENT CLINIC | Age: 77
End: 2022-10-04

## 2022-10-04 NOTE — TELEPHONE ENCOUNTER
Callled and advised patient surgery is cancelled 10/5/22 will call once correspond with audiology and rep.

## 2022-10-04 NOTE — TELEPHONE ENCOUNTER
Josefina Busby from pre-admission testing called again notifying the office that the cardiac clearance from [facility] and medical clearance from Dr. Michael Hinson have been received. Dr. Michael Hinosn noted on the clearance that Pt could not come off the coumadin due to stroke risk.  Josefina Busby is requesting a call back for further direction about Sx. [phone]

## 2022-10-10 ENCOUNTER — TELEPHONE (OUTPATIENT)
Dept: ENT CLINIC | Age: 77
End: 2022-10-10

## 2022-10-10 NOTE — TELEPHONE ENCOUNTER
LVM for return call to schedule and discuss upcoming surgery Electronically signed by Shakir Galvin LPN on 60/04/0359 at 2:54 PM

## 2022-10-11 ENCOUNTER — PREP FOR PROCEDURE (OUTPATIENT)
Dept: ENT CLINIC | Age: 77
End: 2022-10-11

## 2022-10-11 PROBLEM — H91.90 PROFOUND HEARING LOSS: Status: ACTIVE | Noted: 2022-10-11

## 2022-10-17 ENCOUNTER — TELEPHONE (OUTPATIENT)
Dept: ENT CLINIC | Age: 77
End: 2022-10-17

## 2022-10-17 NOTE — TELEPHONE ENCOUNTER
Called and spoke with Sarbjit Lazo at [facility] patient will be admitted by hospitalist Friday 10/21/2022 between 7am and 9am Access center will let hospitalist know to admit patient and watch over patient for duration of stay before and after patient has procedure Wednesday 10/26/2022. Hospitalist will report to provider to give updates and if there are any questions. Access Center will reach out to patient to let patient know what time to come to hospital. Patient was called by ENT to let patient know Friday she will be admitted to hospital for surgery on Wednesday 10/26/22.

## 2022-10-20 ENCOUNTER — TELEPHONE (OUTPATIENT)
Dept: ENT CLINIC | Age: 77
End: 2022-10-20

## 2022-10-21 ENCOUNTER — TELEPHONE (OUTPATIENT)
Dept: ENT CLINIC | Age: 77
End: 2022-10-21

## 2022-10-21 ENCOUNTER — HOSPITAL ENCOUNTER (INPATIENT)
Age: 77
LOS: 12 days | Discharge: HOSPICE | DRG: 141 | End: 2022-11-02
Attending: INTERNAL MEDICINE | Admitting: INTERNAL MEDICINE
Payer: MEDICARE

## 2022-10-21 PROBLEM — H91.93 BILATERAL DEAFNESS: Status: ACTIVE | Noted: 2022-10-21

## 2022-10-21 PROBLEM — H91.90 HEARING LOSS: Status: ACTIVE | Noted: 2022-10-21

## 2022-10-21 PROBLEM — I35.0 SEVERE AORTIC STENOSIS: Status: ACTIVE | Noted: 2020-09-25

## 2022-10-21 PROBLEM — I35.0 MODERATE AORTIC STENOSIS BY PRIOR ECHOCARDIOGRAM: Status: ACTIVE | Noted: 2018-01-01

## 2022-10-21 LAB
ANION GAP SERPL CALCULATED.3IONS-SCNC: 12 MMOL/L (ref 7–16)
APTT: 39.9 SEC (ref 24.5–35.1)
BASOPHILS ABSOLUTE: 0.01 E9/L (ref 0–0.2)
BASOPHILS RELATIVE PERCENT: 0.2 % (ref 0–2)
BUN BLDV-MCNC: 15 MG/DL (ref 6–23)
CALCIUM SERPL-MCNC: 9.7 MG/DL (ref 8.6–10.2)
CHLORIDE BLD-SCNC: 105 MMOL/L (ref 98–107)
CO2: 24 MMOL/L (ref 22–29)
CREAT SERPL-MCNC: 0.8 MG/DL (ref 0.5–1)
EOSINOPHILS ABSOLUTE: 0.17 E9/L (ref 0.05–0.5)
EOSINOPHILS RELATIVE PERCENT: 2.7 % (ref 0–6)
GFR SERPL CREATININE-BSD FRML MDRD: >60 ML/MIN/1.73
GLUCOSE BLD-MCNC: 81 MG/DL (ref 74–99)
HCT VFR BLD CALC: 36.2 % (ref 34–48)
HEMOGLOBIN: 11.1 G/DL (ref 11.5–15.5)
IMMATURE GRANULOCYTES #: 0.06 E9/L
IMMATURE GRANULOCYTES %: 0.9 % (ref 0–5)
INR BLD: 2
LYMPHOCYTES ABSOLUTE: 1.62 E9/L (ref 1.5–4)
LYMPHOCYTES RELATIVE PERCENT: 25.4 % (ref 20–42)
MAGNESIUM: 1.8 MG/DL (ref 1.6–2.6)
MCH RBC QN AUTO: 26.9 PG (ref 26–35)
MCHC RBC AUTO-ENTMCNC: 30.7 % (ref 32–34.5)
MCV RBC AUTO: 87.7 FL (ref 80–99.9)
METER GLUCOSE: 113 MG/DL (ref 74–99)
METER GLUCOSE: 89 MG/DL (ref 74–99)
MONOCYTES ABSOLUTE: 0.35 E9/L (ref 0.1–0.95)
MONOCYTES RELATIVE PERCENT: 5.5 % (ref 2–12)
NEUTROPHILS ABSOLUTE: 4.16 E9/L (ref 1.8–7.3)
NEUTROPHILS RELATIVE PERCENT: 65.3 % (ref 43–80)
PDW BLD-RTO: 16.2 FL (ref 11.5–15)
PHOSPHORUS: 3.3 MG/DL (ref 2.5–4.5)
PLATELET # BLD: 337 E9/L (ref 130–450)
PMV BLD AUTO: 9.1 FL (ref 7–12)
POTASSIUM REFLEX MAGNESIUM: 3.9 MMOL/L (ref 3.5–5)
PROTHROMBIN TIME: 22.4 SEC (ref 9.3–12.4)
RBC # BLD: 4.13 E12/L (ref 3.5–5.5)
SODIUM BLD-SCNC: 141 MMOL/L (ref 132–146)
WBC # BLD: 6.4 E9/L (ref 4.5–11.5)

## 2022-10-21 PROCEDURE — 6370000000 HC RX 637 (ALT 250 FOR IP): Performed by: INTERNAL MEDICINE

## 2022-10-21 PROCEDURE — 36415 COLL VENOUS BLD VENIPUNCTURE: CPT

## 2022-10-21 PROCEDURE — 85610 PROTHROMBIN TIME: CPT

## 2022-10-21 PROCEDURE — 80048 BASIC METABOLIC PNL TOTAL CA: CPT

## 2022-10-21 PROCEDURE — 83735 ASSAY OF MAGNESIUM: CPT

## 2022-10-21 PROCEDURE — 85025 COMPLETE CBC W/AUTO DIFF WBC: CPT

## 2022-10-21 PROCEDURE — 6360000002 HC RX W HCPCS: Performed by: INTERNAL MEDICINE

## 2022-10-21 PROCEDURE — 1200000000 HC SEMI PRIVATE

## 2022-10-21 PROCEDURE — 82962 GLUCOSE BLOOD TEST: CPT

## 2022-10-21 PROCEDURE — 85730 THROMBOPLASTIN TIME PARTIAL: CPT

## 2022-10-21 PROCEDURE — 84100 ASSAY OF PHOSPHORUS: CPT

## 2022-10-21 RX ORDER — PANTOPRAZOLE SODIUM 40 MG/1
40 TABLET, DELAYED RELEASE ORAL
Status: DISCONTINUED | OUTPATIENT
Start: 2022-10-21 | End: 2022-11-02 | Stop reason: HOSPADM

## 2022-10-21 RX ORDER — ACETAMINOPHEN 325 MG/1
650 TABLET ORAL EVERY 4 HOURS PRN
Status: DISCONTINUED | OUTPATIENT
Start: 2022-10-21 | End: 2022-11-02 | Stop reason: HOSPADM

## 2022-10-21 RX ORDER — POTASSIUM CHLORIDE 750 MG/1
10 TABLET, EXTENDED RELEASE ORAL 2 TIMES DAILY WITH MEALS
Status: DISCONTINUED | OUTPATIENT
Start: 2022-10-21 | End: 2022-11-02 | Stop reason: HOSPADM

## 2022-10-21 RX ORDER — FUROSEMIDE 20 MG/1
20 TABLET ORAL DAILY
Status: DISCONTINUED | OUTPATIENT
Start: 2022-10-22 | End: 2022-11-02 | Stop reason: HOSPADM

## 2022-10-21 RX ORDER — HEPARIN SODIUM 1000 [USP'U]/ML
4000 INJECTION, SOLUTION INTRAVENOUS; SUBCUTANEOUS PRN
Status: DISCONTINUED | OUTPATIENT
Start: 2022-10-21 | End: 2022-11-02 | Stop reason: HOSPADM

## 2022-10-21 RX ORDER — ASPIRIN 81 MG/1
81 TABLET ORAL DAILY
Status: DISCONTINUED | OUTPATIENT
Start: 2022-10-22 | End: 2022-11-02 | Stop reason: HOSPADM

## 2022-10-21 RX ORDER — HEPARIN SODIUM 1000 [USP'U]/ML
4000 INJECTION, SOLUTION INTRAVENOUS; SUBCUTANEOUS ONCE
Status: COMPLETED | OUTPATIENT
Start: 2022-10-21 | End: 2022-10-21

## 2022-10-21 RX ORDER — POTASSIUM CHLORIDE 7.45 MG/ML
10 INJECTION INTRAVENOUS PRN
Status: DISCONTINUED | OUTPATIENT
Start: 2022-10-21 | End: 2022-11-02 | Stop reason: HOSPADM

## 2022-10-21 RX ORDER — M-VIT,TX,IRON,MINS/CALC/FOLIC 27MG-0.4MG
1 TABLET ORAL DAILY
Status: DISCONTINUED | OUTPATIENT
Start: 2022-10-21 | End: 2022-10-22 | Stop reason: RX

## 2022-10-21 RX ORDER — PRAVASTATIN SODIUM 20 MG
20 TABLET ORAL DAILY
Status: DISCONTINUED | OUTPATIENT
Start: 2022-10-21 | End: 2022-11-02 | Stop reason: HOSPADM

## 2022-10-21 RX ORDER — LANOLIN ALCOHOL/MO/W.PET/CERES
500 CREAM (GRAM) TOPICAL 2 TIMES DAILY
Status: DISCONTINUED | OUTPATIENT
Start: 2022-10-21 | End: 2022-11-02 | Stop reason: HOSPADM

## 2022-10-21 RX ORDER — METOPROLOL TARTRATE 50 MG/1
50 TABLET, FILM COATED ORAL 2 TIMES DAILY
Status: DISCONTINUED | OUTPATIENT
Start: 2022-10-21 | End: 2022-11-02 | Stop reason: HOSPADM

## 2022-10-21 RX ORDER — HEPARIN SODIUM 1000 [USP'U]/ML
2000 INJECTION, SOLUTION INTRAVENOUS; SUBCUTANEOUS PRN
Status: DISCONTINUED | OUTPATIENT
Start: 2022-10-21 | End: 2022-11-02 | Stop reason: HOSPADM

## 2022-10-21 RX ORDER — DEXTROSE MONOHYDRATE 100 MG/ML
INJECTION, SOLUTION INTRAVENOUS CONTINUOUS PRN
Status: DISCONTINUED | OUTPATIENT
Start: 2022-10-21 | End: 2022-11-02 | Stop reason: HOSPADM

## 2022-10-21 RX ORDER — HEPARIN SODIUM 10000 [USP'U]/100ML
5-30 INJECTION, SOLUTION INTRAVENOUS CONTINUOUS
Status: DISCONTINUED | OUTPATIENT
Start: 2022-10-21 | End: 2022-11-02 | Stop reason: HOSPADM

## 2022-10-21 RX ORDER — SODIUM CHLORIDE 9 MG/ML
INJECTION, SOLUTION INTRAVENOUS CONTINUOUS
Status: DISCONTINUED | OUTPATIENT
Start: 2022-10-21 | End: 2022-10-22

## 2022-10-21 RX ORDER — DILTIAZEM HYDROCHLORIDE 240 MG/1
240 CAPSULE, COATED, EXTENDED RELEASE ORAL DAILY
Status: DISCONTINUED | OUTPATIENT
Start: 2022-10-22 | End: 2022-11-02 | Stop reason: HOSPADM

## 2022-10-21 RX ORDER — POTASSIUM CHLORIDE 20 MEQ/1
40 TABLET, EXTENDED RELEASE ORAL PRN
Status: DISCONTINUED | OUTPATIENT
Start: 2022-10-21 | End: 2022-11-02 | Stop reason: HOSPADM

## 2022-10-21 RX ADMIN — METOPROLOL TARTRATE 50 MG: 50 TABLET, FILM COATED ORAL at 20:15

## 2022-10-21 RX ADMIN — PANTOPRAZOLE SODIUM 40 MG: 40 TABLET, DELAYED RELEASE ORAL at 18:59

## 2022-10-21 RX ADMIN — HEPARIN SODIUM 12 UNITS/KG/HR: 10000 INJECTION, SOLUTION INTRAVENOUS at 19:06

## 2022-10-21 RX ADMIN — HEPARIN SODIUM 4000 UNITS: 1000 INJECTION, SOLUTION INTRAVENOUS; SUBCUTANEOUS at 18:58

## 2022-10-21 RX ADMIN — PRAVASTATIN SODIUM 20 MG: 20 TABLET ORAL at 23:47

## 2022-10-21 RX ADMIN — Medication 500 MG: at 20:15

## 2022-10-21 RX ADMIN — POTASSIUM CHLORIDE 10 MEQ: 750 TABLET, EXTENDED RELEASE ORAL at 18:59

## 2022-10-21 NOTE — PROGRESS NOTES
Spoke with patient's  and gave instructions to go to [address] [place], [place] [address]by registration for admission

## 2022-10-21 NOTE — CONSULTS
OTOLARYNGOLOGY  CONSULT NOTE  10/21/2022    Physician Consulted: Dr. Kylah Hannon  Reason for Consult: Hearing loss  Referring Physician: Dr. Chanda Moorey January is a 78 y.o. female who ENT was consulted for evaluation of hearing loss. She is scheduled for cochlear implantation with Dr. Kylah Hannon on 10/26/22 and she presents to the hospital today for admission to medicine services for heparin bridge prior to OR. Please see our previous H&P below:     10/3/22: Spencer Baxter is a 78 y.o. female presenting with asymmetric sensorineural hearing loss with recent worsening on the right; she brought her prior imaging which I reviewed and will be loaded in the system; patient of Dr. Alonso Palomares; history of prior stroke? She is bringing her discs of prior images to see if any are of the ears  She reports that she had hearing aids in both ears and then in April 2022 developed a sudden right hearing loss and is unable to get any benefit from her right ear. She has been concerned about losing the hearing in her left ear. She struggles with communication and states she is louder at home. She is unable to go to places that are noisy and  is concerned her hearing loss is affecting her cognitive ability. She is interested in hearing options for her right ear    Review of Systems   HENT:  Positive for hearing loss. All other systems reviewed and are negative. Past Medical History:   Diagnosis Date    Aortic stenosis, mild 10/01/2015    follows  Dr Luis Lopez at [facility] last visit 9/30/22    Arrhythmia     Atrial fibrillation ([address]     Bleeding from varicose veins of right lower extremity 06/08/2017    Cerebrovascular accident (CVA) due to embolic occlusion of left middle cerebral artery ([address] 10/2015    Confirmed by neurology    Cerebrovascular accident (CVA) due to embolic occlusion of left middle cerebral artery ([address] 2013    Left parietal confirmed by neurology    Cerebrovascular disease 03/21/2013    CVA. no weakness/deficits    Diabetes mellitus ([facility]     Hearing deficit     wears hearing aide left ear only    Heart disease     Hyperlipidemia     Hypertension     Migraine headache with aura     Mitral regurgitation 10/01/2015    mild    Moderate aortic stenosis by prior echocardiogram 2018    NCGS (non-celiac gluten sensitivity)     Severe aortic stenosis 09/25/2020    By 2D echo    TIA (transient ischemic attack)     Unspecified cerebral artery occlusion with cerebral infarction     Varicose veins of left leg with edema 06/08/2017    Venous stasis ulcer of right calf with fat layer exposed with varicose veins ([facility] 09/13/2019    Warfarin-induced coagulopathy ([facility] 09/29/2015       Past Surgical History:   Procedure Laterality Date    ABDOMEN SURGERY      APPENDECTOMY      BREAST SURGERY Right     biopsy-benign    COLONOSCOPY  01/01/2008    ECHOCARDIOGRAM COMPLETE 2D W DOPPLER W COLOR  08/30/2012 9/30/22 at [facility]    INNER EAR SURGERY Left     Multiple surgeries    OVARY SURGERY      SMALL INTESTINE SURGERY      VEIN SURGERY         Medications Prior to Admission:    Prior to Admission medications    Medication Sig Start Date End Date Taking? Authorizing Provider   dilTIAZem (DILACOR XR) 240 MG extended release capsule Take 240 mg by mouth in the morning.     Historical Provider, MD   aspirin 81 MG EC tablet Take 81 mg by mouth daily    Historical Provider, MD   potassium chloride (KLOR-CON) 10 MEQ extended release tablet take 1 tablet by mouth twice a day 6/10/19   Historical Provider, MD   furosemide (LASIX) 20 MG tablet take 1 tablet by mouth once daily 1/1/19   Historical Provider, MD   ACCU-CHEK JR PLUS strip  12/18/18   Historical Provider, MD   warfarin (COUMADIN) 1 MG tablet Take 1 mg by mouth daily Pt alternates 1 mg and 1 1/2 mg    Historical Provider, MD   calcium carbonate-vitamin D3 (CALTRATE) 600-400 MG-UNIT TABS per tab Take by mouth    Historical Provider, MD   pravastatin (PRAVACHOL) 40 MG tablet Take 20 mg by mouth daily. Historical Provider, MD   niacin (SLO-NIACIN) 500 MG tablet Take 500 mg by mouth 2 times daily. Historical Provider, MD   omeprazole (PRILOSEC) 20 MG capsule Take 20 mg by mouth 2 times daily. Historical Provider, MD   therapeutic multivitamin-minerals (THERAGRAN-M) tablet Take 1 tablet by mouth daily. Historical Provider, MD   Vitamin D (CHOLECALCIFEROL) 1000 UNITS CAPS capsule Take 600 Units by mouth daily     Historical Provider, MD   metFORMIN (GLUCOPHAGE) 500 MG tablet Take 500 mg by mouth daily (with breakfast). Historical Provider, MD   Chromium-Cinnamon (CINNAMON PLUS CHROMIUM PO) Take 2 tablets by mouth Daily with supper. Historical Provider, MD   metoprolol (LOPRESSOR) 100 MG tablet Take 50 mg by mouth 2 times daily. Historical Provider, MD       Allergies   Allergen Reactions    Gluten Other (See Comments)       Family History   Problem Relation Age of Onset    Heart Attack Mother     Cancer Father     Diabetes Father        Social History     Tobacco Use    Smoking status: Never    Smokeless tobacco: Never   Vaping Use    Vaping Use: Never used   Substance Use Topics    Alcohol use: No    Drug use: No           PHYSICAL EXAM:    Vitals:    10/21/22 1630   BP: 135/85   Pulse: 85   Resp: 16   Temp: 98 °F (36.7 °C)   SpO2: 97%       General Appearance:  Laying in bed, awake, alert, no apparent distress  Head/face:  NC/AT  Eyes: PERRL, EOMI  ENT: Bilateral external ears WNL, Nares patent, Septum midline,   Neck:Supple, no adenopathy  Lungs:  Non-labored, good respiratory effort, no stridor  Heart:  RR  Neuro: Facial nerve symmetric and intact. House Brackmann 1/6, bilaterally. LABS:  CBC  Recent Labs     10/21/22  1810   WBC 6.4   HGB 11.1*   HCT 36.2          RADIOLOGY  No results found.       ASSESSMENT:  78 y.o. female with asymmetric sensorineural hearing loss    PLAN:  OR 10/26 for right cochlear implantation  Continue medical management per primary team    Patient seen and examined by resident. Will discuss plan with attending.      Electronically signed by Antony Gunn DO on 10/21/22 at 7:13 PM EDT

## 2022-10-21 NOTE — TELEPHONE ENCOUNTER
10- spoke to bed and room to make sure on bed availability they said pt has a bed but may depend on if bed gets filled over night

## 2022-10-21 NOTE — PROGRESS NOTES
Call placed to Dr. Alejandrina Barkley answering service - await response.     Electronically signed by Ronald Benton RN on 10/21/2022 at 5:05 PM

## 2022-10-22 PROBLEM — I35.0 MODERATE AORTIC STENOSIS BY PRIOR ECHOCARDIOGRAM: Status: RESOLVED | Noted: 2018-01-01 | Resolved: 2022-10-22

## 2022-10-22 PROBLEM — E11.9 DIABETES MELLITUS (HCC): Status: ACTIVE | Noted: 2022-10-22

## 2022-10-22 PROBLEM — Z01.818 PRE-OP EVALUATION: Status: ACTIVE | Noted: 2022-10-22

## 2022-10-22 LAB
ALBUMIN SERPL-MCNC: 3.6 G/DL (ref 3.5–5.2)
ALP BLD-CCNC: 183 U/L (ref 35–104)
ALT SERPL-CCNC: 18 U/L (ref 0–32)
ANION GAP SERPL CALCULATED.3IONS-SCNC: 9 MMOL/L (ref 7–16)
APTT: 67.6 SEC (ref 24.5–35.1)
APTT: 72.9 SEC (ref 24.5–35.1)
AST SERPL-CCNC: 16 U/L (ref 0–31)
BASOPHILS ABSOLUTE: 0.02 E9/L (ref 0–0.2)
BASOPHILS RELATIVE PERCENT: 0.3 % (ref 0–2)
BILIRUB SERPL-MCNC: 0.7 MG/DL (ref 0–1.2)
BILIRUBIN DIRECT: 0.3 MG/DL (ref 0–0.3)
BILIRUBIN, INDIRECT: 0.4 MG/DL (ref 0–1)
BUN BLDV-MCNC: 13 MG/DL (ref 6–23)
C-REACTIVE PROTEIN: 0.3 MG/DL (ref 0–0.4)
CALCIUM IONIZED: 1.27 MMOL/L (ref 1.15–1.33)
CALCIUM SERPL-MCNC: 9.2 MG/DL (ref 8.6–10.2)
CHLORIDE BLD-SCNC: 104 MMOL/L (ref 98–107)
CHOLESTEROL, TOTAL: 133 MG/DL (ref 0–199)
CO2: 25 MMOL/L (ref 22–29)
CREAT SERPL-MCNC: 0.7 MG/DL (ref 0.5–1)
EOSINOPHILS ABSOLUTE: 0.15 E9/L (ref 0.05–0.5)
EOSINOPHILS RELATIVE PERCENT: 2.2 % (ref 0–6)
FERRITIN: 15 NG/ML
FOLATE: 15.8 NG/ML (ref 4.8–24.2)
GFR SERPL CREATININE-BSD FRML MDRD: >60 ML/MIN/1.73
GLUCOSE BLD-MCNC: 122 MG/DL (ref 74–99)
HBA1C MFR BLD: 6.1 % (ref 4–5.6)
HCT VFR BLD CALC: 33.3 % (ref 34–48)
HDLC SERPL-MCNC: 42 MG/DL
HEMOGLOBIN: 10.1 G/DL (ref 11.5–15.5)
IMMATURE GRANULOCYTES #: 0.06 E9/L
IMMATURE GRANULOCYTES %: 0.9 % (ref 0–5)
INR BLD: 1.9
IRON SATURATION: 8 % (ref 15–50)
IRON: 28 MCG/DL (ref 37–145)
LDL CHOLESTEROL CALCULATED: 77 MG/DL (ref 0–99)
LYMPHOCYTES ABSOLUTE: 2.01 E9/L (ref 1.5–4)
LYMPHOCYTES RELATIVE PERCENT: 29 % (ref 20–42)
MAGNESIUM: 2 MG/DL (ref 1.6–2.6)
MCH RBC QN AUTO: 27.1 PG (ref 26–35)
MCHC RBC AUTO-ENTMCNC: 30.3 % (ref 32–34.5)
MCV RBC AUTO: 89.3 FL (ref 80–99.9)
METER GLUCOSE: 108 MG/DL (ref 74–99)
METER GLUCOSE: 108 MG/DL (ref 74–99)
METER GLUCOSE: 109 MG/DL (ref 74–99)
METER GLUCOSE: 164 MG/DL (ref 74–99)
MONOCYTES ABSOLUTE: 0.48 E9/L (ref 0.1–0.95)
MONOCYTES RELATIVE PERCENT: 6.9 % (ref 2–12)
NEUTROPHILS ABSOLUTE: 4.2 E9/L (ref 1.8–7.3)
NEUTROPHILS RELATIVE PERCENT: 60.7 % (ref 43–80)
PDW BLD-RTO: 16.2 FL (ref 11.5–15)
PHOSPHORUS: 3 MG/DL (ref 2.5–4.5)
PLATELET # BLD: 293 E9/L (ref 130–450)
PMV BLD AUTO: 9 FL (ref 7–12)
POTASSIUM SERPL-SCNC: 4 MMOL/L (ref 3.5–5)
PRO-BNP: 1470 PG/ML (ref 0–450)
PROCALCITONIN: 0.06 NG/ML (ref 0–0.08)
PROTHROMBIN TIME: 21 SEC (ref 9.3–12.4)
RBC # BLD: 3.73 E12/L (ref 3.5–5.5)
SEDIMENTATION RATE, ERYTHROCYTE: 16 MM/HR (ref 0–20)
SODIUM BLD-SCNC: 138 MMOL/L (ref 132–146)
T4 FREE: 1.31 NG/DL (ref 0.93–1.7)
TOTAL IRON BINDING CAPACITY: 354 MCG/DL (ref 250–450)
TOTAL PROTEIN: 6.5 G/DL (ref 6.4–8.3)
TRIGL SERPL-MCNC: 69 MG/DL (ref 0–149)
TSH SERPL DL<=0.05 MIU/L-ACNC: 1.49 UIU/ML (ref 0.27–4.2)
URIC ACID, SERUM: 5.7 MG/DL (ref 2.4–5.7)
VITAMIN B-12: 376 PG/ML (ref 211–946)
VLDLC SERPL CALC-MCNC: 14 MG/DL
WBC # BLD: 6.9 E9/L (ref 4.5–11.5)

## 2022-10-22 PROCEDURE — 82746 ASSAY OF FOLIC ACID SERUM: CPT

## 2022-10-22 PROCEDURE — 84439 ASSAY OF FREE THYROXINE: CPT

## 2022-10-22 PROCEDURE — 99223 1ST HOSP IP/OBS HIGH 75: CPT | Performed by: INTERNAL MEDICINE

## 2022-10-22 PROCEDURE — 93005 ELECTROCARDIOGRAM TRACING: CPT | Performed by: NURSE PRACTITIONER

## 2022-10-22 PROCEDURE — 85025 COMPLETE CBC W/AUTO DIFF WBC: CPT

## 2022-10-22 PROCEDURE — 85730 THROMBOPLASTIN TIME PARTIAL: CPT

## 2022-10-22 PROCEDURE — 86140 C-REACTIVE PROTEIN: CPT

## 2022-10-22 PROCEDURE — 1200000000 HC SEMI PRIVATE

## 2022-10-22 PROCEDURE — 85610 PROTHROMBIN TIME: CPT

## 2022-10-22 PROCEDURE — 80061 LIPID PANEL: CPT

## 2022-10-22 PROCEDURE — 83036 HEMOGLOBIN GLYCOSYLATED A1C: CPT

## 2022-10-22 PROCEDURE — 82330 ASSAY OF CALCIUM: CPT

## 2022-10-22 PROCEDURE — 6370000000 HC RX 637 (ALT 250 FOR IP): Performed by: INTERNAL MEDICINE

## 2022-10-22 PROCEDURE — 83550 IRON BINDING TEST: CPT

## 2022-10-22 PROCEDURE — 80048 BASIC METABOLIC PNL TOTAL CA: CPT

## 2022-10-22 PROCEDURE — 85651 RBC SED RATE NONAUTOMATED: CPT

## 2022-10-22 PROCEDURE — 80076 HEPATIC FUNCTION PANEL: CPT

## 2022-10-22 PROCEDURE — 82962 GLUCOSE BLOOD TEST: CPT

## 2022-10-22 PROCEDURE — 84145 PROCALCITONIN (PCT): CPT

## 2022-10-22 PROCEDURE — 84100 ASSAY OF PHOSPHORUS: CPT

## 2022-10-22 PROCEDURE — 83735 ASSAY OF MAGNESIUM: CPT

## 2022-10-22 PROCEDURE — 84443 ASSAY THYROID STIM HORMONE: CPT

## 2022-10-22 PROCEDURE — 83880 ASSAY OF NATRIURETIC PEPTIDE: CPT

## 2022-10-22 PROCEDURE — 82607 VITAMIN B-12: CPT

## 2022-10-22 PROCEDURE — 36415 COLL VENOUS BLD VENIPUNCTURE: CPT

## 2022-10-22 PROCEDURE — 84550 ASSAY OF BLOOD/URIC ACID: CPT

## 2022-10-22 PROCEDURE — 83540 ASSAY OF IRON: CPT

## 2022-10-22 PROCEDURE — 82728 ASSAY OF FERRITIN: CPT

## 2022-10-22 RX ORDER — MULTIVITAMIN WITH IRON
1 TABLET ORAL DAILY
Status: DISCONTINUED | OUTPATIENT
Start: 2022-10-22 | End: 2022-11-02 | Stop reason: HOSPADM

## 2022-10-22 RX ADMIN — PANTOPRAZOLE SODIUM 40 MG: 40 TABLET, DELAYED RELEASE ORAL at 15:45

## 2022-10-22 RX ADMIN — Medication 500 MG: at 07:47

## 2022-10-22 RX ADMIN — METOPROLOL TARTRATE 50 MG: 50 TABLET, FILM COATED ORAL at 07:47

## 2022-10-22 RX ADMIN — Medication 500 MG: at 20:51

## 2022-10-22 RX ADMIN — PANTOPRAZOLE SODIUM 40 MG: 40 TABLET, DELAYED RELEASE ORAL at 06:44

## 2022-10-22 RX ADMIN — METOPROLOL TARTRATE 50 MG: 50 TABLET, FILM COATED ORAL at 20:51

## 2022-10-22 RX ADMIN — DILTIAZEM HYDROCHLORIDE 240 MG: 240 CAPSULE, COATED, EXTENDED RELEASE ORAL at 07:47

## 2022-10-22 RX ADMIN — ASPIRIN 81 MG: 81 TABLET, COATED ORAL at 07:47

## 2022-10-22 RX ADMIN — PRAVASTATIN SODIUM 20 MG: 20 TABLET ORAL at 15:45

## 2022-10-22 RX ADMIN — FUROSEMIDE 20 MG: 20 TABLET ORAL at 07:47

## 2022-10-22 RX ADMIN — POTASSIUM CHLORIDE 10 MEQ: 750 TABLET, EXTENDED RELEASE ORAL at 07:47

## 2022-10-22 RX ADMIN — POTASSIUM CHLORIDE 10 MEQ: 750 TABLET, EXTENDED RELEASE ORAL at 15:45

## 2022-10-22 NOTE — PLAN OF CARE
Problem: Discharge Planning  Goal: Discharge to home or other facility with appropriate resources  10/22/2022 0511 by Gi Abbott RN  Outcome: Progressing  10/21/2022 1941 by Rufina Bacon RN  Outcome: Progressing     Problem: ABCDS Injury Assessment  Goal: Absence of physical injury  10/22/2022 0511 by Gi Abbott RN  Outcome: Progressing  10/21/2022 1941 by Rufina Bacon RN  Outcome: Progressing     Problem: Safety - Adult  Goal: Free from fall injury  10/22/2022 0511 by Gi Abbott RN  Outcome: Progressing  10/21/2022 1941 by Rufina Bacon RN  Outcome: Progressing

## 2022-10-22 NOTE — CONSULTS
Inpatient Cardiology Consultation      Reason for Consult: Cardiac risk stratification prior to cochlear implantation with admission for heparin  bridge    Consulting Physician: Dr. Lindle Simmonds    Requesting Physician: Dr. Herring Mealing    Date of Consultation: 10/22/2022    HISTORY OF PRESENT ILLNESS:     This 68-year-old female is known to [address] [place] [address] and is followed by Dr. Darryle Sauers.  She was last evaluated at our office on August 27, 2020. She has a history of atrial fibrillation and valvular heart disease. She was in atrial fibrillation. Due to bradycardia digoxin had been discontinued in the past and at that office visit her heart rate was 72. She is on chronic anticoagulation. She is also followed by the [facility] cardiology services. She was last evaluated on September 30, 2022. She has moderately to severe aortic stenosis but is asymptomatic. She has undergone TAVR work-up but does not require a TAVR yet. She has have hearing loss and a right cochlear implantation is scheduled. Surgery is scheduled for October 26. She has a history of a CVA that occurred when she was off Coumadin, see notes below. Per primary care, she was admitted for heparin bridging. Heparin was started last evening. Coumadin has been discontinued. INR was 2.0 on admission and is now 1.9. She has had no bleeding problems. Blood Pressure on admission was 135/85 but is currently 185/75. She has been afebrile with no hypoxia on room air. Chest x-ray was not done. EKG is pending    Potassium was 3.9 with a BUN of 15 and a creatinine of 0.8, magnesium 1.8, BNP 1470, TSH 1.49, WBC 6.4, H&H 11.1 and 36.2, INR at on admission 2. Past medical history:   Hearing impairment with ear surgery that temporarily improved her hearing. She wears bilateral hearing aids as of 03/2010.,  Now deaf in the right ear and scheduled for a cochlear implantation October 2022  Lower extremity venous stripping, 1981. Hypertension. Hyperlipidemia. Total cholesterol 162, triglycerides 86, HDL 42, LDL 96 - 09/2009. Colonoscopy, 2008 with small polyps that were removed. Diverticular disease also noted. EGD, 09/2009 for hem positive stools. Hiatal hernia noted. Biopsies taken and omeprazole prescribed. AF post EGD, 09/2009. She was admitted to [facility]. She declined cardioversion and has been in chronic AF since that time. Echo, 09/2009. Mild aortic stenosis, peak gradient 18 mmHg, LA enlarged at 4.1 cm. LV size and function normal.    Rate control and warfarin prescribed 09/2009. Lifetime nonsmoker. No history of alcohol intake. No family history of premature vascular disease. Echo, 08/30/2012 with normal LV size and systolic function, JOHN, mild AS with peak gradient 15 mmHg, MERCEDEZ 1.6 cm². RVSP 37 mmHg. Lexiscan MPS, 08/30/2012. Normal.  EF 70%. [facility] admission, 03/22/2013 with speech difficulty. Cr 1.2, otherwise normal BMP. CBC normal.  TSH normal.  CT of the head with no acute pathology. MRI showed small lacunar infarct in the left posterior parietal lobe. INR on admission was 1.5. Discharged after symptoms resolved within 24 hours. Discharged with adequate anticoagulation. Echo, 09/18/2014. Normal EF, normal LV size, E/E' 19, JOHN (LA 59 mm), mild to moderate AS (mean gradient 16 mmHg, MERCEDEZ 1.5 cm², VTI 0.52). RVSP 48 mmHg, dilated IVC with poor inspiratory collapse. [facility] presentation, 09/30/2015 with transient confusion and right-sided visual field defects. Resolved prior to presentation. Her anticoagulation was interrupted for breast biopsy a few weeks prior. On presentation, INR was 2.2. 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 Brain perfusion scan revealed moderate area of ischemia in the left posterior parietal lobe. She was discharged home in stable condition. Echo, 10/01/2015, Dr. Chelo Eddy. Normal EF. Indeterminate diastolic function. Severe biatrial enlargement. Mild MR. Mild-moderate AS. Mean gradient of 20 mmHg. Mild AR. Mild pulmonary HTN (41 mmHg). ReCurrent CVA, first diagnosed in 2013 and later with an MRI September 2015 showing likely acute/subacute infarct on the left. She had been off oral anticoagulation at this time. But INR was 2     2D echocardiogram at the [facility] clinic September 30, 2022    CONCLUSIONS:   - Exam indication: AS   - The left ventricle is small. There is concentric left ventricular hypertrophy. Left ventricular systolic function is normal. EF = 69 ± 5% (2D biplane)   - The right ventricle is normal in size. Right ventricular systolic function is   normal.   - The left atrial cavity is severely dilated. - The right atrial cavity is severely dilated. - The visualized aorta is borderline dilated with a maximal dimension of 3.9 cm.   - Tricuspid aortic valve. There is severe aortic valve stenosis. AV area is 0.74   cm² (0.43 cm²/m²) by continuity, VTI. The peak gradient is 48 mmHg, the mean   gradient is 29 mmHg and the dimensionless valve index is 0.24. Prior peak/mean   gradients of 39/23 mmHg. Today's gradients were averaged d/t afib. - Exam was compared with the prior [facility] echocardiographic exam performed on 3/29/22. There has been an increase in transaortic gradients on today's exam, otherwise   similar findings. COVID-19 infection December 2020    Medications Prior to admit:  Prior to Admission medications    Medication Sig Start Date End Date Taking? Authorizing Provider   dilTIAZem (DILACOR XR) 240 MG extended release capsule Take 240 mg by mouth in the morning.     Historical Provider, MD   aspirin 81 MG EC tablet Take 81 mg by mouth daily    Historical Provider, MD   potassium chloride (KLOR-CON) 10 MEQ extended release tablet take 1 tablet by mouth twice a day 6/10/19   Historical Provider, MD   furosemide (LASIX) 20 MG tablet take 1 tablet by mouth once daily 1/1/19   Historical Provider, MD   ACCU-CHEK JR PLUS strip 12/18/18   Historical Provider, MD   warfarin (COUMADIN) 1 MG tablet Take 1 mg by mouth daily Pt alternates 1 mg and 1 1/2 mg    Historical Provider, MD   calcium carbonate-vitamin D3 (CALTRATE) 600-400 MG-UNIT TABS per tab Take by mouth    Historical Provider, MD   pravastatin (PRAVACHOL) 40 MG tablet Take 20 mg by mouth daily. Historical Provider, MD   niacin (SLO-NIACIN) 500 MG tablet Take 500 mg by mouth 2 times daily. Historical Provider, MD   omeprazole (PRILOSEC) 20 MG capsule Take 20 mg by mouth 2 times daily. Historical Provider, MD   therapeutic multivitamin-minerals (THERAGRAN-M) tablet Take 1 tablet by mouth daily. Historical Provider, MD   Vitamin D (CHOLECALCIFEROL) 1000 UNITS CAPS capsule Take 600 Units by mouth daily     Historical Provider, MD   metFORMIN (GLUCOPHAGE) 500 MG tablet Take 500 mg by mouth daily (with breakfast). Historical Provider, MD   Chromium-Cinnamon (CINNAMON PLUS CHROMIUM PO) Take 2 tablets by mouth Daily with supper. Historical Provider, MD   metoprolol (LOPRESSOR) 100 MG tablet Take 50 mg by mouth 2 times daily.     Historical Provider, MD       Current Medications:    Current Facility-Administered Medications: aspirin EC tablet 81 mg, 81 mg, Oral, Daily  dilTIAZem (CARDIZEM CD) extended release capsule 240 mg, 240 mg, Oral, Daily  metoprolol tartrate (LOPRESSOR) tablet 50 mg, 50 mg, Oral, BID  niacin extended release capsule 500 mg, 500 mg, Oral, BID  pravastatin (PRAVACHOL) tablet 20 mg, 20 mg, Oral, Daily  therapeutic multivitamin-minerals 1 tablet, 1 tablet, Oral, Daily  pantoprazole (PROTONIX) tablet 40 mg, 40 mg, Oral, BID AC  furosemide (LASIX) tablet 20 mg, 20 mg, Oral, Daily  potassium chloride (KLOR-CON M) extended release tablet 10 mEq, 10 mEq, Oral, BID WC  perflutren lipid microspheres (DEFINITY) injection 1.65 mg, 1.5 mL, IntraVENous, ONCE PRN  0.9 % sodium chloride infusion, , IntraVENous, Continuous  glucose chewable tablet 16 g, 4 tablet, Oral, PRN  dextrose bolus 10% 125 mL, 125 mL, IntraVENous, PRN **OR** dextrose bolus 10% 250 mL, 250 mL, IntraVENous, PRN  glucagon (rDNA) injection 1 mg, 1 mg, IntraMUSCular, PRN  dextrose 10 % infusion, , IntraVENous, Continuous PRN  potassium chloride (KLOR-CON M) extended release tablet 40 mEq, 40 mEq, Oral, PRN **OR** potassium bicarb-citric acid (EFFER-K) effervescent tablet 40 mEq, 40 mEq, Oral, PRN **OR** potassium chloride 10 mEq/100 mL IVPB (Peripheral Line), 10 mEq, IntraVENous, PRN  acetaminophen (TYLENOL) tablet 650 mg, 650 mg, Oral, Q4H PRN  heparin (porcine) injection 4,000 Units, 4,000 Units, IntraVENous, PRN  heparin (porcine) injection 2,000 Units, 2,000 Units, IntraVENous, PRN  heparin 25,000 units in dextrose 5% 250 mL (premix) infusion, 5-30 Units/kg/hr, IntraVENous, Continuous    Allergies:  Gluten    Social History: Non-smoker nondrinker and is         Family History:   Family History   Problem Relation Age of Onset    Heart Attack Mother     Cancer Father     Diabetes Father        REVIEW OF SYSTEMS:     Constitutional: Denies fatigue, fevers, chills or night sweats  Eyes: Denies visual changes or drainage  ENT: Denies headaches or hearing loss. No mouth sores or sore throat. No epistaxis   Cardiovascular: Denies chest pain, pressure or palpitations. No lower extremity swelling. Respiratory: Denies DUNCAN, cough, orthopnea or PND. No hemoptysis   Gastrointestinal: Denies hematemesis or anorexia. No hematochezia or melena    Genitourinary: Denies urgency, dysuria or hematuria. Musculoskeletal: Denies gait disturbance, weakness or joint complaints  Integumentary: Denies rash, hives or pruritis   Neurological: Denies dizziness, headaches or seizures. No numbness or tingling  Psychiatric: Denies anxiety or depression. Endocrine: Denies temperature intolerance. No recent weight change. .  Hematologic/Lymphatic: Denies abnormal bruising or bleeding.  No swollen lymph nodes    PHYSICAL EXAM: BP (!) 185/75   Pulse 70   Temp 98.1 °F (36.7 °C) (Oral)   Resp 14   Ht 5' 3\" (1.6 m)   Wt 148 lb 13 oz (67.5 kg)   SpO2 97%   BMI 26.36 kg/m²   CONST:  Well developed, well nourished who appears of stated age. Awake, alert and cooperative. No apparent distress. HEENT:   Head- Normocephalic, atraumatic   Eyes- Conjunctivae pink, anicteric  Throat- Oral mucosa pink and moist  Neck-  No stridor, trachea midline, no jugular venous distention. No carotid bruit. CHEST: Chest symmetrical and non-tender to palpation. No accessory muscle use or intercostal retractions  RESPIRATORY: Lung sounds - clear throughout fields   CARDIOVASCULAR:     Heart Inspection- shows no noted pulsations  Heart Palpation- no heaves or thrills; PMI is non-displaced   Heart Ausculation-irregularly irregular rate and rhythm, 3/6 systolic murmur. No s3, s4 or rub   PV: No lower extremity edema. No varicosities. Pedal pulses palpable, no clubbing or cyanosis   ABDOMEN: Soft, non-tender to light palpation. Bowel sounds present. No palpable masses no organomegaly; no abdominal bruit  MS: Good muscle strength and tone. No atrophy or abnormal movements. : Deferred  SKIN: Warm and dry no statis dermatitis or ulcers   NEURO / PSYCH: Oriented to person, place and time. Speech clear and appropriate. Follows all commands.  Pleasant affect     DATA:    ECG / Tele strips: Atrial fibrillation with controlled ventricular response  Diagnostic:    No intake or output data in the 24 hours ending 10/22/22 0811    Labs:   CBC:   Recent Labs     10/21/22  1810 10/22/22  0119   WBC 6.4 6.9   HGB 11.1* 10.1*   HCT 36.2 33.3*    293     BMP:   Recent Labs     10/21/22  1810 10/22/22  0119    138   K 3.9 4.0   CO2 24 25   BUN 15 13   CREATININE 0.8 0.7   LABGLOM >60 >60   CALCIUM 9.7 9.2     Mag:   Recent Labs     10/21/22  1810 10/22/22  0119   MG 1.8 2.0     Phos:   Recent Labs     10/21/22  1810 10/22/22  0119   PHOS 3.3 3.0     TFT:   Lab Results   Component Value Date    TSH 1.490 10/22/2022    T4FREE 1.31 10/22/2022      HgA1c:   Lab Results   Component Value Date/Time    LABA1C 6.1 10/22/2022 01:19 AM     No results found for: EAG  proBNP:   Lab Results   Component Value Date/Time    PROBNP 1,470 10/22/2022 01:19 AM     PT/INR:   Recent Labs     10/21/22  1810 10/22/22  0119   PROTIME 22.4* 21.0*   INR 2.0 1.9     APTT:  Recent Labs     10/22/22  0119 10/22/22  0650   APTT 72.9* 67.6*     TROPONIN:No results found for: TROPHS  CK:  Lab Results   Component Value Date/Time    CKTOTAL 21 03/21/2013 11:45 PM     FASTING LIPID PANEL:  Lab Results   Component Value Date/Time    CHOL 133 10/22/2022 01:19 AM    HDL 42 10/22/2022 01:19 AM    LDLCALC 77 10/22/2022 01:19 AM    TRIG 69 10/22/2022 01:19 AM     LIVER PROFILE:  Recent Labs     10/22/22  0119   AST 16   ALT 18   LABALBU 3.6       Electronically signed by KASSIE Mg CNP on 10/22/2022 at 8:11 AM

## 2022-10-22 NOTE — H&P
History and Physical      CHIEF COMPLAINT: Bilateral hearing loss, heparin bridge, chronic A. fib, history of multiple episodes of embolic CVA    History of Present Illness: 33-year-old female patient of Dr. Tiffanie Kelly am asked admit and follow. History obtained from patient as well as extensive review of electronic record. Patient has been following with ENT, Dr. Amberly Wilson, for hearing loss. Patient's  was afraid patient might be misdiagnosed as dementia because of her profound hearing loss. She failed with hearing aids, April 2022 developed sudden worsening of right hearing loss. Not concerned about left hearing loss. I spoke with the ENT doctor yesterday regarding all the above. Patient has had previous episodes of stopping her warfarin with resulting embolic emboli at least x2 (2013, 2015). She is now admitted for a heparin bridge with surgery planned 10/26/2022. --She is known to have atrial fibrillation since 9/2009; it began after an EGD procedure. She declined cardioversion has been in chronic A. fib since that time. She follows locally with Dr. Kirt Oconnor.  --2013 first episode of embolic CVA confirmed by neurologist, right cerebral infarct. September 2015 again had confusion and speech difficulties MRI confirmed acute/subacute infarct left parietal.  As mentioned above she had been off anticoagulation for a brief period of time prior to surgical procedures with resulting embolic CVA. INR during those episodes was 2.0.  --She also follows with cardiology at [facility], most recently seen 9/30/2022. Patient follows with cardiologist, Dr. Carolina Jackson. At that visit 2D echo was repeated\" still moderate unchanged aortic stenosis severity. \"I am very happy to say my expectation of disease progression or inaccurate. Still requires follow-up. -- 2D echo done 9/25/2020 with following results--     Summary   Normal left ventricular systolic function.    Ejection fraction is visually estimated at > 60%. Normal right ventricular size and function (TAPSE 2.0 cm). Indeterminate diastolic function. Severely dilated left atrium by volume index. Severely dilated right atrium. Moderate mitral regurgitation. Mild-moderate aortic regurgitation. Low gradient severe aortic stenosis (AV peak velocity 3.2 m/s, AV mean   gradient 21 mmHg, AV area 0.8 cm2, peak velocity dimensionless index 0.23,   VTI dimensionless index 0.25, stroke volume index 35 mL/m2). Mild tricuspid regurgitation. PASP is estimated at 36 mmHg. -- 2D echo done 10/2015 revealed mild to moderate aortic stenosis as well as mild pulmonary hypertension. --She had been followed by wound care clinic but was released 11/8/2019, venous insufficiency ulcer of right medial lower extremity. --Patient is a lifelong non-smoker  --Patient does have hypertension and takes metformin for her diabetes which has been held because of pending surgery. --On admission random glucose was 81. INR 2.0; today INR 1.9 off warfarin. --CRP today 0.3 proBNP 1470. LDL is 77. Alkaline phosphatase elevated 183. A1c 6.1. Hemoglobin 10.1 WBC 6.9. Past Medical History:   Diagnosis Date    Aortic stenosis, mild 10/01/2015    follows  Dr Conchis Merchant at [facility] last visit 9/30/22    Arrhythmia     Atrial fibrillation ([address]     Bleeding from varicose veins of right lower extremity 06/08/2017    Cerebrovascular accident (CVA) due to embolic occlusion of left middle cerebral artery ([address] 10/2015    Confirmed by neurology    Cerebrovascular accident (CVA) due to embolic occlusion of left middle cerebral artery ([address] 2013    Left parietal confirmed by neurology    Cerebrovascular disease 03/21/2013    CVA. no weakness/deficits    Diabetes mellitus ([address]     Hearing deficit     wears hearing aide left ear only    Heart disease     Hyperlipidemia     Hypertension     Migraine headache with aura     Mitral regurgitation 10/01/2015    mild    Moderate aortic stenosis by prior echocardiogram 2018    NCGS (non-celiac gluten sensitivity)     Severe aortic stenosis 09/25/2020    By 2D echo    TIA (transient ischemic attack)     Unspecified cerebral artery occlusion with cerebral infarction     Varicose veins of left leg with edema 06/08/2017    Venous stasis ulcer of right calf with fat layer exposed with varicose veins ([facility] 09/13/2019    Warfarin-induced coagulopathy ([facility] 09/29/2015         Past Surgical History:   Procedure Laterality Date    ABDOMEN SURGERY      APPENDECTOMY      BREAST SURGERY Right     biopsy-benign    COLONOSCOPY  01/01/2008    ECHOCARDIOGRAM COMPLETE 2D W DOPPLER W COLOR  08/30/2012 9/30/22 at [facility]    INNER EAR SURGERY Left     Multiple surgeries    OVARY SURGERY      SMALL INTESTINE SURGERY      VEIN SURGERY         Medications Prior to Admission:    Medications Prior to Admission: dilTIAZem (DILACOR XR) 240 MG extended release capsule, Take 240 mg by mouth in the morning. aspirin 81 MG EC tablet, Take 81 mg by mouth daily  potassium chloride (KLOR-CON) 10 MEQ extended release tablet, take 1 tablet by mouth twice a day  furosemide (LASIX) 20 MG tablet, take 1 tablet by mouth once daily  ACCU-CHEK JR PLUS strip,   warfarin (COUMADIN) 1 MG tablet, Take 1 mg by mouth daily Pt alternates 1 mg and 1 1/2 mg  calcium carbonate-vitamin D3 (CALTRATE) 600-400 MG-UNIT TABS per tab, Take by mouth  pravastatin (PRAVACHOL) 40 MG tablet, Take 20 mg by mouth daily. niacin (SLO-NIACIN) 500 MG tablet, Take 500 mg by mouth 2 times daily. omeprazole (PRILOSEC) 20 MG capsule, Take 20 mg by mouth 2 times daily. therapeutic multivitamin-minerals (THERAGRAN-M) tablet, Take 1 tablet by mouth daily. Vitamin D (CHOLECALCIFEROL) 1000 UNITS CAPS capsule, Take 600 Units by mouth daily   metFORMIN (GLUCOPHAGE) 500 MG tablet, Take 500 mg by mouth daily (with breakfast).   Chromium-Cinnamon (CINNAMON PLUS CHROMIUM PO), Take 2 tablets by mouth Daily with supper. metoprolol (LOPRESSOR) 100 MG tablet, Take 50 mg by mouth 2 times daily. Allergies:    Gluten    Social History:    reports that she has never smoked. She has never used smokeless tobacco. She reports that she does not drink alcohol and does not use drugs. Family History:   family history includes Cancer in her father; Diabetes in her father; Heart Attack in her mother. REVIEW OF SYSTEMS:  As above in the HPI, otherwise negative    PHYSICAL EXAM:    VS: BP (!) 185/75   Pulse 70   Temp 98.1 °F (36.7 °C) (Oral)   Resp 14   Ht 5' 3\" (1.6 m)   Wt 148 lb 13 oz (67.5 kg)   SpO2 97%   BMI 26.36 kg/m²     General appearance: Alert, Awake, Oriented times 3, no distress; significantly hard of hearing  Skin: Warm and dry ; no rashes  Head: Normocephalic. No masses, lesions or tenderness noted  Eyes: Conjunctivae pink, sclera white. PERRL,EOM-I  Ears: External ears normal  Nose/Sinuses: Nares normal. Septum midline. Mucosa normal. No drainage  Oropharynx: Oropharynx clear with no exudate seen  Neck: Supple. No jugular venous distension, lymphadenopathy or thyromegaly Trachea midline  Lungs: Clear to auscultation bilaterally. No rhonchi, crackles or wheezes  Heart: Irregularly irregular at 72/min; grade 1/6 to 2/6 systolic murmur second right intercostal space  Abdomen: Soft, non-tender. BS normal. No masses, organomegaly; no rebound or guarding  Extremities: Trace edema, moderate varicosities both extremities  Musculoskeletal: Muscular strength appears intact. Neuro:  No focal motor defects ; II-XII grossly intact .  LOPEZ equally  Breast: deferred  Rectal: deferred  Genitalia:  deferred    LABS:  CBC:   Lab Results   Component Value Date/Time    WBC 6.9 10/22/2022 01:19 AM    RBC 3.73 10/22/2022 01:19 AM    HGB 10.1 10/22/2022 01:19 AM    HCT 33.3 10/22/2022 01:19 AM    MCV 89.3 10/22/2022 01:19 AM    MCH 27.1 10/22/2022 01:19 AM    MCHC 30.3 10/22/2022 01:19 AM    RDW 16.2 10/22/2022 01:19 AM    PLT 293 10/22/2022 01:19 AM    MPV 9.0 10/22/2022 01:19 AM     CBC with Differential:    Lab Results   Component Value Date/Time    WBC 6.9 10/22/2022 01:19 AM    RBC 3.73 10/22/2022 01:19 AM    HGB 10.1 10/22/2022 01:19 AM    HCT 33.3 10/22/2022 01:19 AM     10/22/2022 01:19 AM    MCV 89.3 10/22/2022 01:19 AM    MCH 27.1 10/22/2022 01:19 AM    MCHC 30.3 10/22/2022 01:19 AM    RDW 16.2 10/22/2022 01:19 AM    SEGSPCT 76 02/27/2013 12:00 PM    LYMPHOPCT 29.0 10/22/2022 01:19 AM    MONOPCT 6.9 10/22/2022 01:19 AM    BASOPCT 0.3 10/22/2022 01:19 AM    MONOSABS 0.48 10/22/2022 01:19 AM    LYMPHSABS 2.01 10/22/2022 01:19 AM    EOSABS 0.15 10/22/2022 01:19 AM    BASOSABS 0.02 10/22/2022 01:19 AM     Hemoglobin/Hematocrit:    Lab Results   Component Value Date/Time    HGB 10.1 10/22/2022 01:19 AM    HCT 33.3 10/22/2022 01:19 AM     CMP:    Lab Results   Component Value Date/Time     10/22/2022 01:19 AM    K 4.0 10/22/2022 01:19 AM    K 3.9 10/21/2022 06:10 PM     10/22/2022 01:19 AM    CO2 25 10/22/2022 01:19 AM    BUN 13 10/22/2022 01:19 AM    CREATININE 0.7 10/22/2022 01:19 AM    GFRAA >60 09/30/2015 06:40 AM    LABGLOM >60 10/22/2022 01:19 AM    GLUCOSE 122 10/22/2022 01:19 AM    PROT 6.5 10/22/2022 01:19 AM    LABALBU 3.6 10/22/2022 01:19 AM    CALCIUM 9.2 10/22/2022 01:19 AM    BILITOT 0.7 10/22/2022 01:19 AM    ALKPHOS 183 10/22/2022 01:19 AM    AST 16 10/22/2022 01:19 AM    ALT 18 10/22/2022 01:19 AM     BMP:    Lab Results   Component Value Date/Time     10/22/2022 01:19 AM    K 4.0 10/22/2022 01:19 AM    K 3.9 10/21/2022 06:10 PM     10/22/2022 01:19 AM    CO2 25 10/22/2022 01:19 AM    BUN 13 10/22/2022 01:19 AM    LABALBU 3.6 10/22/2022 01:19 AM    CREATININE 0.7 10/22/2022 01:19 AM    CALCIUM 9.2 10/22/2022 01:19 AM    GFRAA >60 09/30/2015 06:40 AM    LABGLOM >60 10/22/2022 01:19 AM    GLUCOSE 122 10/22/2022 01:19 AM     Hepatic Function Panel:    Lab Results   Component Value Date/Time ALKPHOS 183 10/22/2022 01:19 AM    ALT 18 10/22/2022 01:19 AM    AST 16 10/22/2022 01:19 AM    PROT 6.5 10/22/2022 01:19 AM    BILITOT 0.7 10/22/2022 01:19 AM    BILIDIR 0.3 10/22/2022 01:19 AM    IBILI 0.4 10/22/2022 01:19 AM    LABALBU 3.6 10/22/2022 01:19 AM     Ionized Calcium:  No results found for: IONCA  Magnesium:    Lab Results   Component Value Date/Time    MG 2.0 10/22/2022 01:19 AM     Phosphorus:    Lab Results   Component Value Date/Time    PHOS 3.0 10/22/2022 01:19 AM     LDH:  No results found for: LDH  Uric Acid:    Lab Results   Component Value Date/Time    LABURIC 5.7 10/22/2022 01:19 AM     PT/INR:    Lab Results   Component Value Date/Time    PROTIME 21.0 10/22/2022 01:19 AM    INR 1.9 10/22/2022 01:19 AM     Warfarin PT/INR:  No components found for: PTPATWAR, PTINRWAR  PTT:    Lab Results   Component Value Date/Time    APTT 67.6 10/22/2022 06:50 AM   [APTT}  Troponin:    Lab Results   Component Value Date/Time    TROPONINI <0.01 09/29/2015 03:25 PM     Last 3 Troponin:    Lab Results   Component Value Date/Time    TROPONINI <0.01 09/29/2015 03:25 PM    TROPONINI 0.05 03/21/2013 11:45 PM     U/A:    Lab Results   Component Value Date/Time    COLORU Yellow 09/29/2015 03:40 PM    PROTEINU Negative 09/29/2015 03:40 PM    PHUR 5.5 09/29/2015 03:40 PM    [address] NONE 09/29/2015 03:40 PM    RBCUA 1-3 09/29/2015 03:40 PM    BACTERIA NONE 09/29/2015 03:40 PM    CLARITYU Clear 09/29/2015 03:40 PM    SPECGRAV 1.010 09/29/2015 03:40 PM    LEUKOCYTESUR Negative 09/29/2015 03:40 PM    UROBILINOGEN 0.2 09/29/2015 03:40 PM    BILIRUBINUR Negative 09/29/2015 03:40 PM    BLOODU TRACE 09/29/2015 03:40 PM    GLUCOSEU Negative 09/29/2015 03:40 PM     HgBA1c:    Lab Results   Component Value Date/Time    LABA1C 6.1 10/22/2022 01:19 AM     FLP:    Lab Results   Component Value Date/Time    TRIG 69 10/22/2022 01:19 AM    HDL 42 10/22/2022 01:19 AM    LDLCALC 77 10/22/2022 01:19 AM    LABVLDL 14 10/22/2022 01:19 AM TSH:    Lab Results   Component Value Date/Time    TSH 1.490 10/22/2022 01:19 AM     VITAMIN B12: No components found for: B12  FOLATE:  No results found for: FOLATE  IRON:  No results found for: IRON  Iron Saturation:  No components found for: PERCENTFE  TIBC:  No results found for: TIBC  FERRITIN:  No results found for: FERRITIN    RADIOLOGY:  No orders to display       ASSESSMENT:      Active Hospital Problems    Diagnosis     Diabetes mellitus ([address] [E11.9]      Priority: Medium    Bilateral deafness [H91.93]      Priority: Medium    Hearing loss [H91.90]      Priority: Medium    Severe aortic stenosis [I35.0]      Priority: Medium    Cerebrovascular accident (CVA) due to embolic occlusion of left middle cerebral artery ([address] [I63.412]      Priority: Medium    Mitral regurgitation [I34.0]     Pulmonary hypertension ([address] [I27.20]     Atrial fibrillation (HCC) [I48.91]     Hypertension [I10]     DJD (degenerative joint disease) [M19.90]        PLAN:  Medications discussed with patient  GI prophylaxis  DVT prophylaxis  Consultants notes reviewed--ENT and cardiology  Warfarin has been discontinued  Metformin has been discontinued  Low-dose sliding scale insulin  Aspirin on hold  Heparin bridge  Diltiazem CD2 140 mg daily  Furosemide 20 mg daily  Metoprolol tartrate 50 mg twice daily  Niacin 500 mg twice daily  Pravastatin 20 mg daily  2D echo  Surgery planned for 10/26/2022, cochlear implant  Monitor labs          Please note that over 50 minutes was spent in evaluating the patient, review of records and results, discussion with staff/family, etc.    Memory Sales, DO    12:59 PM  10/22/2022    Voice recognition software use for dictation

## 2022-10-22 NOTE — PLAN OF CARE
Problem: Discharge Planning  Goal: Discharge to home or other facility with appropriate resources  10/22/2022 1826 by Britney Saavedra RN  Outcome: Progressing  10/22/2022 0511 by Gean Riddle RN  Outcome: Progressing     Problem: ABCDS Injury Assessment  Goal: Absence of physical injury  10/22/2022 1826 by Britney Saavedra RN  Outcome: Progressing  10/22/2022 0511 by Gena Riddle RN  Outcome: Progressing     Problem: Safety - Adult  Goal: Free from fall injury  10/22/2022 1826 by Britney Saavedra RN  Outcome: Progressing  10/22/2022 0511 by Gena Riddle RN  Outcome: Progressing

## 2022-10-22 NOTE — PROGRESS NOTES
Spoke with Dr. Ángel Thibodeaux. Due to recent ECHO done on 9/30/2022 at [facility] another one is not necessary.

## 2022-10-23 ENCOUNTER — APPOINTMENT (OUTPATIENT)
Dept: GENERAL RADIOLOGY | Age: 77
DRG: 141 | End: 2022-10-23
Attending: INTERNAL MEDICINE
Payer: MEDICARE

## 2022-10-23 PROBLEM — D50.9 IRON DEFICIENCY ANEMIA: Status: ACTIVE | Noted: 2022-10-23

## 2022-10-23 PROBLEM — E61.1 IRON DEFICIENCY: Status: ACTIVE | Noted: 2022-10-23

## 2022-10-23 LAB
ALBUMIN SERPL-MCNC: 3.8 G/DL (ref 3.5–5.2)
ALP BLD-CCNC: 168 U/L (ref 35–104)
ALT SERPL-CCNC: 16 U/L (ref 0–32)
ANION GAP SERPL CALCULATED.3IONS-SCNC: 10 MMOL/L (ref 7–16)
APTT: 53.7 SEC (ref 24.5–35.1)
AST SERPL-CCNC: 16 U/L (ref 0–31)
BASOPHILS ABSOLUTE: 0.02 E9/L (ref 0–0.2)
BASOPHILS RELATIVE PERCENT: 0.3 % (ref 0–2)
BILIRUB SERPL-MCNC: 0.9 MG/DL (ref 0–1.2)
BILIRUBIN DIRECT: 0.4 MG/DL (ref 0–0.3)
BILIRUBIN, INDIRECT: 0.5 MG/DL (ref 0–1)
BUN BLDV-MCNC: 13 MG/DL (ref 6–23)
C-REACTIVE PROTEIN: 0.3 MG/DL (ref 0–0.4)
CALCIUM SERPL-MCNC: 9.4 MG/DL (ref 8.6–10.2)
CHLORIDE BLD-SCNC: 105 MMOL/L (ref 98–107)
CO2: 25 MMOL/L (ref 22–29)
CREAT SERPL-MCNC: 0.8 MG/DL (ref 0.5–1)
EOSINOPHILS ABSOLUTE: 0.17 E9/L (ref 0.05–0.5)
EOSINOPHILS RELATIVE PERCENT: 2.5 % (ref 0–6)
GFR SERPL CREATININE-BSD FRML MDRD: >60 ML/MIN/1.73
GLUCOSE BLD-MCNC: 138 MG/DL (ref 74–99)
HCT VFR BLD CALC: 34.2 % (ref 34–48)
HEMOGLOBIN: 10.3 G/DL (ref 11.5–15.5)
IMMATURE GRANULOCYTES #: 0.04 E9/L
IMMATURE GRANULOCYTES %: 0.6 % (ref 0–5)
INR BLD: 1.6
LYMPHOCYTES ABSOLUTE: 2.06 E9/L (ref 1.5–4)
LYMPHOCYTES RELATIVE PERCENT: 30.2 % (ref 20–42)
MAGNESIUM: 2 MG/DL (ref 1.6–2.6)
MCH RBC QN AUTO: 26.8 PG (ref 26–35)
MCHC RBC AUTO-ENTMCNC: 30.1 % (ref 32–34.5)
MCV RBC AUTO: 88.8 FL (ref 80–99.9)
METER GLUCOSE: 122 MG/DL (ref 74–99)
MONOCYTES ABSOLUTE: 0.41 E9/L (ref 0.1–0.95)
MONOCYTES RELATIVE PERCENT: 6 % (ref 2–12)
NEUTROPHILS ABSOLUTE: 4.12 E9/L (ref 1.8–7.3)
NEUTROPHILS RELATIVE PERCENT: 60.4 % (ref 43–80)
PDW BLD-RTO: 16 FL (ref 11.5–15)
PHOSPHORUS: 3.2 MG/DL (ref 2.5–4.5)
PLATELET # BLD: 309 E9/L (ref 130–450)
PMV BLD AUTO: 9 FL (ref 7–12)
POTASSIUM SERPL-SCNC: 4.2 MMOL/L (ref 3.5–5)
PROTHROMBIN TIME: 17.1 SEC (ref 9.3–12.4)
RBC # BLD: 3.85 E12/L (ref 3.5–5.5)
SEDIMENTATION RATE, ERYTHROCYTE: 16 MM/HR (ref 0–20)
SODIUM BLD-SCNC: 140 MMOL/L (ref 132–146)
TOTAL PROTEIN: 6.4 G/DL (ref 6.4–8.3)
WBC # BLD: 6.8 E9/L (ref 4.5–11.5)

## 2022-10-23 PROCEDURE — 80048 BASIC METABOLIC PNL TOTAL CA: CPT

## 2022-10-23 PROCEDURE — 6360000002 HC RX W HCPCS: Performed by: INTERNAL MEDICINE

## 2022-10-23 PROCEDURE — 71046 X-RAY EXAM CHEST 2 VIEWS: CPT

## 2022-10-23 PROCEDURE — 85610 PROTHROMBIN TIME: CPT

## 2022-10-23 PROCEDURE — 85025 COMPLETE CBC W/AUTO DIFF WBC: CPT

## 2022-10-23 PROCEDURE — 84100 ASSAY OF PHOSPHORUS: CPT

## 2022-10-23 PROCEDURE — 36415 COLL VENOUS BLD VENIPUNCTURE: CPT

## 2022-10-23 PROCEDURE — 83735 ASSAY OF MAGNESIUM: CPT

## 2022-10-23 PROCEDURE — 82270 OCCULT BLOOD FECES: CPT

## 2022-10-23 PROCEDURE — 85730 THROMBOPLASTIN TIME PARTIAL: CPT

## 2022-10-23 PROCEDURE — 82962 GLUCOSE BLOOD TEST: CPT

## 2022-10-23 PROCEDURE — 6370000000 HC RX 637 (ALT 250 FOR IP): Performed by: INTERNAL MEDICINE

## 2022-10-23 PROCEDURE — 86140 C-REACTIVE PROTEIN: CPT

## 2022-10-23 PROCEDURE — 1200000000 HC SEMI PRIVATE

## 2022-10-23 PROCEDURE — 80076 HEPATIC FUNCTION PANEL: CPT

## 2022-10-23 PROCEDURE — 85651 RBC SED RATE NONAUTOMATED: CPT

## 2022-10-23 RX ORDER — FERROUS SULFATE 325(65) MG
325 TABLET ORAL 2 TIMES DAILY WITH MEALS
Status: DISCONTINUED | OUTPATIENT
Start: 2022-10-23 | End: 2022-11-02 | Stop reason: HOSPADM

## 2022-10-23 RX ADMIN — FUROSEMIDE 20 MG: 20 TABLET ORAL at 08:03

## 2022-10-23 RX ADMIN — PANTOPRAZOLE SODIUM 40 MG: 40 TABLET, DELAYED RELEASE ORAL at 16:56

## 2022-10-23 RX ADMIN — MULTIVITAMIN TABLET 1 TABLET: TABLET at 08:03

## 2022-10-23 RX ADMIN — Medication 500 MG: at 21:02

## 2022-10-23 RX ADMIN — Medication 500 MG: at 08:03

## 2022-10-23 RX ADMIN — POTASSIUM CHLORIDE 10 MEQ: 750 TABLET, EXTENDED RELEASE ORAL at 16:56

## 2022-10-23 RX ADMIN — HEPARIN SODIUM 12 UNITS/KG/HR: 10000 INJECTION, SOLUTION INTRAVENOUS at 02:39

## 2022-10-23 RX ADMIN — FERROUS SULFATE TAB 325 MG (65 MG ELEMENTAL FE) 325 MG: 325 (65 FE) TAB at 16:56

## 2022-10-23 RX ADMIN — POTASSIUM CHLORIDE 10 MEQ: 750 TABLET, EXTENDED RELEASE ORAL at 08:03

## 2022-10-23 RX ADMIN — METOPROLOL TARTRATE 50 MG: 50 TABLET, FILM COATED ORAL at 21:02

## 2022-10-23 RX ADMIN — METOPROLOL TARTRATE 50 MG: 50 TABLET, FILM COATED ORAL at 08:03

## 2022-10-23 RX ADMIN — PANTOPRAZOLE SODIUM 40 MG: 40 TABLET, DELAYED RELEASE ORAL at 06:36

## 2022-10-23 RX ADMIN — DILTIAZEM HYDROCHLORIDE 240 MG: 240 CAPSULE, COATED, EXTENDED RELEASE ORAL at 08:03

## 2022-10-23 RX ADMIN — PRAVASTATIN SODIUM 20 MG: 20 TABLET ORAL at 16:56

## 2022-10-23 ASSESSMENT — PAIN SCALES - GENERAL: PAINLEVEL_OUTOF10: 0

## 2022-10-23 NOTE — PLAN OF CARE
Problem: ABCDS Injury Assessment  Goal: Absence of physical injury  10/22/2022 2251 by Itz Aguirre RN  Outcome: Progressing  10/22/2022 1826 by Prudence Valdivia RN  Outcome: Progressing     Problem: Safety - Adult  Goal: Free from fall injury  10/22/2022 2251 by Itz Aguirre RN  Outcome: Progressing  10/22/2022 1826 by Prudence Valdivia RN  Outcome: Progressing

## 2022-10-23 NOTE — PROGRESS NOTES
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 female for whom pharmacy has been asked to manage warfarin therapy. Weight:   Wt Readings from Last 1 Encounters:   10/23/22 146 lb 12 oz (66.6 kg)       TSH:    Lab Results   Component Value Date/Time    TSH 1.490 10/22/2022 01:19 AM       Hepatic Function Panel:                            Lab Results   Component Value Date/Time    ALKPHOS 168 10/23/2022 03:12 AM    ALT 16 10/23/2022 03:12 AM    AST 16 10/23/2022 03:12 AM    PROT 6.4 10/23/2022 03:12 AM    BILITOT 0.9 10/23/2022 03:12 AM    BILIDIR 0.4 10/23/2022 03:12 AM    IBILI 0.5 10/23/2022 03:12 AM    LABALBU 3.8 10/23/2022 03:12 AM       Current warfarin drug-drug interactions include: No significant interactions    Recent Labs     10/21/22  1810 10/22/22  0119 10/23/22  0312   HGB 11.1* 10.1* 10.3*    293 309     Date Warfarin Dose INR Heparin or LMWH Comment   10/23 Hold 1.6 Heparin gtt                                  Assessment:  Patient is a 78 y.o. female on warfarin for Atrial Fibrillation. Patient's home warfarin dosing regimen is warfarin 1.5mg daily. Goal INR 2 - 3  INR 1.6 today  Going to OR 10/26 for right cochlear implantation warfarin on hold, currently on heparin drip.     Plan:  Continue heparin drip, hold warfarin  Will resume warfarin post-op  Daily PT/INR until the INR is stable within the therapeutic range  Pharmacist will follow and monitor/adjust dosing as necessary    Thank you for this consult,    Bhargav Keen [facility] 10/23/2022 12:08 PM

## 2022-10-23 NOTE — PROGRESS NOTES
Spoke to Dr Cassy Betancur about changing the pts glucose checks, currently they are 4 times a day, pt glucose checks are changed to daily.

## 2022-10-23 NOTE — PROGRESS NOTES
PROGRESS  NOTE --                                                          INTERNAL  MEDICINE                                                                              I  PERSONALLY SAW , EXAMINED, AND CARED [facility], 10/23/2022     LABS, XRAY ,CHART, AND MEDICATIONS  REVIEWED BY ME       Chief complaint: Bilateral hearing loss, heparin bridge, chronic A. fib, history of multiple episodes of embolic CVA      [id number]: Wilda Lilly is alert awake and cooperative; oriented ×3. Denies any chest pain dyspnea nausea emesis. Tolerating diet. No abdominal pain. Sitting quietly working on her sewing project. No complaints voiced. Denies any prior history of iron deficiency nor iron deficiency anemia. I advised her regarding current iron status as well as anemia status. Afebrile last 24 hours. Blood pressure 140/66. SPO2 95 on room air. Intake and output -255 cc. Fasting glucose 138 potassium 4.2 magnesium 2.0. CRP 0.3. Alkaline phosphatase elevated 168. Direct bilirubin 0.4 slightly elevated. Indirect 0.5. A1c 6.6. Hemoglobin 10.3 WBC 6.8. Platelet count 602. INR 1.6, currently off warfarin. ESR 16. E17 folic acid normal.  TIBC slightly elevated 354 iron and iron saturation both low with ferritin of 15. Cardiology consult from yesterday appreciated. Low gradient severe aortic stenosis nonsymptomatic by 2D echo. Patient's cardiac risk index is low. No further cardiac testing prior to surgery. Patient has been evaluated for possible TAVR procedure, by [facility] cardiology \"down the road\". Continue metoprolol 50 mg by mouth twice daily and diltiazem 240 mg daily. Avoid vasodilators such as nitroglycerin hydralazine due to severe aortic stenosis. Avoid hypotension and surgery.   Continue IV heparin hold 6 hours prior to surgery, restart 24 hours after procedure if no concern for bleeding and bridged to warfarin.         Objective:     PHYSICAL EXAM:    VS: BP (!) 140/66   Pulse 64   Temp 98.1 °F (36.7 °C) (Oral)   Resp 16   Ht 5' 3\" (1.6 m)   Wt 146 lb 12 oz (66.6 kg)   SpO2 95%   BMI 26.00 kg/m²     Labs:   CBC:   Lab Results   Component Value Date/Time    WBC 6.8 10/23/2022 03:12 AM    RBC 3.85 10/23/2022 03:12 AM    HGB 10.3 10/23/2022 03:12 AM    HCT 34.2 10/23/2022 03:12 AM    MCV 88.8 10/23/2022 03:12 AM    MCH 26.8 10/23/2022 03:12 AM    MCHC 30.1 10/23/2022 03:12 AM    RDW 16.0 10/23/2022 03:12 AM     10/23/2022 03:12 AM    MPV 9.0 10/23/2022 03:12 AM     CBC with Differential:    Lab Results   Component Value Date/Time    WBC 6.8 10/23/2022 03:12 AM    RBC 3.85 10/23/2022 03:12 AM    HGB 10.3 10/23/2022 03:12 AM    HCT 34.2 10/23/2022 03:12 AM     10/23/2022 03:12 AM    MCV 88.8 10/23/2022 03:12 AM    MCH 26.8 10/23/2022 03:12 AM    MCHC 30.1 10/23/2022 03:12 AM    RDW 16.0 10/23/2022 03:12 AM    SEGSPCT 76 02/27/2013 12:00 PM    LYMPHOPCT 30.2 10/23/2022 03:12 AM    MONOPCT 6.0 10/23/2022 03:12 AM    BASOPCT 0.3 10/23/2022 03:12 AM    MONOSABS 0.41 10/23/2022 03:12 AM    LYMPHSABS 2.06 10/23/2022 03:12 AM    EOSABS 0.17 10/23/2022 03:12 AM    BASOSABS 0.02 10/23/2022 03:12 AM     Hemoglobin/Hematocrit:    Lab Results   Component Value Date/Time    HGB 10.3 10/23/2022 03:12 AM    HCT 34.2 10/23/2022 03:12 AM     CMP:    Lab Results   Component Value Date/Time     10/23/2022 03:12 AM    K 4.2 10/23/2022 03:12 AM    K 3.9 10/21/2022 06:10 PM     10/23/2022 03:12 AM    CO2 25 10/23/2022 03:12 AM    BUN 13 10/23/2022 03:12 AM    CREATININE 0.8 10/23/2022 03:12 AM    GFRAA >60 09/30/2015 06:40 AM    LABGLOM >60 10/23/2022 03:12 AM    GLUCOSE 138 10/23/2022 03:12 AM    PROT 6.4 10/23/2022 03:12 AM    LABALBU 3.8 10/23/2022 03:12 AM    CALCIUM 9.4 10/23/2022 03:12 AM    BILITOT 0.9 10/23/2022 03:12 AM    ALKPHOS 168 10/23/2022 03:12 AM    AST 16 10/23/2022 03:12 AM ALT 16 10/23/2022 03:12 AM     BMP:    Lab Results   Component Value Date/Time     10/23/2022 03:12 AM    K 4.2 10/23/2022 03:12 AM    K 3.9 10/21/2022 06:10 PM     10/23/2022 03:12 AM    CO2 25 10/23/2022 03:12 AM    BUN 13 10/23/2022 03:12 AM    LABALBU 3.8 10/23/2022 03:12 AM    CREATININE 0.8 10/23/2022 03:12 AM    CALCIUM 9.4 10/23/2022 03:12 AM    GFRAA >60 09/30/2015 06:40 AM    LABGLOM >60 10/23/2022 03:12 AM    GLUCOSE 138 10/23/2022 03:12 AM     Hepatic Function Panel:    Lab Results   Component Value Date/Time    ALKPHOS 168 10/23/2022 03:12 AM    ALT 16 10/23/2022 03:12 AM    AST 16 10/23/2022 03:12 AM    PROT 6.4 10/23/2022 03:12 AM    BILITOT 0.9 10/23/2022 03:12 AM    BILIDIR 0.4 10/23/2022 03:12 AM    IBILI 0.5 10/23/2022 03:12 AM    LABALBU 3.8 10/23/2022 03:12 AM     Ionized Calcium:  No results found for: IONCA  Magnesium:    Lab Results   Component Value Date/Time    MG 2.0 10/23/2022 03:12 AM     Phosphorus:    Lab Results   Component Value Date/Time    PHOS 3.2 10/23/2022 03:12 AM     LDH:  No results found for: LDH  Uric Acid:    Lab Results   Component Value Date/Time    LABURIC 5.7 10/22/2022 01:19 AM     PT/INR:    Lab Results   Component Value Date/Time    PROTIME 17.1 10/23/2022 05:00 AM    INR 1.6 10/23/2022 05:00 AM     Warfarin PT/INR:  No components found for: PTPATWAR, PTINRWAR  PTT:    Lab Results   Component Value Date/Time    APTT 53.7 10/23/2022 03:12 AM   [APTT}  Troponin:    Lab Results   Component Value Date/Time    TROPONINI <0.01 09/29/2015 03:25 PM     Last 3 Troponin:    Lab Results   Component Value Date/Time    TROPONINI <0.01 09/29/2015 03:25 PM    TROPONINI 0.05 03/21/2013 11:45 PM     U/A:    Lab Results   Component Value Date/Time    COLORU Yellow 09/29/2015 03:40 PM    PROTEINU Negative 09/29/2015 03:40 PM    PHUR 5.5 09/29/2015 03:40 PM    WBCUA NONE 09/29/2015 03:40 PM    RBCUA 1-3 09/29/2015 03:40 PM    BACTERIA NONE 09/29/2015 03:40 PM    CLARITYU Clear 09/29/2015 03:40 PM    SPECGRAV 1.010 09/29/2015 03:40 PM    LEUKOCYTESUR Negative 09/29/2015 03:40 PM    UROBILINOGEN 0.2 09/29/2015 03:40 PM    BILIRUBINUR Negative 09/29/2015 03:40 PM    BLOODU TRACE 09/29/2015 03:40 PM    GLUCOSEU Negative 09/29/2015 03:40 PM     HgBA1c:    Lab Results   Component Value Date/Time    LABA1C 6.1 10/22/2022 01:19 AM     FLP:    Lab Results   Component Value Date/Time    TRIG 69 10/22/2022 01:19 AM    HDL 42 10/22/2022 01:19 AM    LDLCALC 77 10/22/2022 01:19 AM    LABVLDL 14 10/22/2022 01:19 AM     TSH:    Lab Results   Component Value Date/Time    TSH 1.490 10/22/2022 01:19 AM     VITAMIN B12: No components found for: B12  FOLATE:    Lab Results   Component Value Date/Time    FOLATE 15.8 10/22/2022 01:19 AM     IRON:    Lab Results   Component Value Date/Time    IRON 28 10/22/2022 01:19 AM     Iron Saturation:  No components found for: PERCENTFE  TIBC:    Lab Results   Component Value Date/Time    TIBC 354 10/22/2022 01:19 AM     FERRITIN:    Lab Results   Component Value Date/Time    FERRITIN 15 10/22/2022 01:19 AM        General appearance: Alert, Awake, Oriented times 3, no distress; significantly hard of hearing  Skin: Warm and dry ; no rashes  Head: Normocephalic. No masses, lesions or tenderness noted  Eyes: Conjunctivae pink, sclera white. PERRL,EOM-I  Ears: External ears normal  Nose/Sinuses: Nares normal. Septum midline. Mucosa normal. No drainage  Oropharynx: Oropharynx clear with no exudate seen  Neck: Supple. No jugular venous distension, lymphadenopathy or thyromegaly Trachea midline  Lungs: Clear to auscultation bilaterally. No rhonchi, crackles or wheezes  Heart: Irregularly irregular at 60/min; grade 1/6 to 2/6 systolic murmur second right intercostal space  Abdomen: Soft, non-tender.  BS normal. No masses, organomegaly; no rebound or guarding  Extremities: Trace edema bilaterally; moderate varicosities both extremities  Musculoskeletal: Muscular strength appears intact. Neuro:  No focal motor defects ; II-XII grossly intact . 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 (HCC)    Severe aortic stenosis    Hearing loss    Diabetes mellitus ([facility] [address]    Pre-op evaluation    Hypertension    DJD (degenerative joint disease)    Atrial fibrillation (HCC)    Mitral regurgitation    Pulmonary hypertension ([facility] [address]  Resolved Problems: Moderate aortic stenosis by prior echocardiogram      PLAN:  SEE ORDERS      RE  CHANGES AND FINDINGS   Medications reviewed with patient  GI prophylaxis  DVT prophylaxis  Consultants notes reviewed    Stool for occult blood  Add ferrous sulfate 325 mg twice daily  Preop PA lateral chest x-ray--I advised patient regarding same  Continue IV heparin drip, hold 6 hours prior to surgery restart 24 hours after procedure if no concern for bleeding and bridged to warfarin; remain off warfarin at this time. I advised patient regarding same  Avoid hypotension, vasodilators such as nitroglycerin and hydralazine due to severe aortic stenosis  Continue metoprolol tartrate 50 mg twice daily continue diltiazem 240 mg CD once daily  Hold aspirin restart after procedure  Continue pravastatin  Since glucose numbers have been so good, change glucose checks to each a.m. Furosemide 20 mg daily  Niacin 500 mg twice daily  Protonix 40 mg 2 times daily  Potassium chloride 10 mEq 2 times daily  Pravastatin 20 mg daily  Pharmacy will consult regarding initiation of warfarin after surgery, patient had been on 1.5 mg warfarin daily prior to prehospitalization    Discussed with patient and nursing.       Lin German DO     11:21 AM     10/23/2022    TIME > 35 MINUTES    >  50 %  OF  TIME  DISCUSSION               ------------  INFORMATION  -----------      DIET:ADULT DIET; Regular; 5 carb choices (75 gm/meal); Low Fat/Low Chol/High Fiber/EDIE; Gluten Free        Allergies   Allergen Reactions    Gluten Other (See Comments)         MEDICATION SIDE EFFECTS:none       SCHEDULED MEDS:                                 Scheduled Meds:   multivitamin  1 tablet Oral Daily    [Held by provider] aspirin  81 mg Oral Daily    dilTIAZem  240 mg Oral Daily    metoprolol  50 mg Oral BID    niacin  500 mg Oral BID    pravastatin  20 mg Oral Daily    pantoprazole  40 mg Oral BID AC    furosemide  20 mg Oral Daily    potassium chloride  10 mEq Oral BID WC       Continuous Infusions:   dextrose      heparin (PORCINE) Infusion 12 Units/kg/hr (10/23/22 0804)         Data:       Intake/Output Summary (Last 24 hours) at 10/23/2022 1121  Last data filed at 10/23/2022 0939  Gross per 24 hour   Intake 565.54 ml   Output 1000 ml   Net -434.46 ml       Wt Readings from Last 3 Encounters:   10/23/22 146 lb 12 oz (66.6 kg)   09/15/22 153 lb (69.4 kg)   09/01/22 153 lb (69.4 kg)       Labs: Additional    GLUCOSE:No results for input(s): POCGLU in the last 72 hours.     BNP:  Lab Results   Component Value Date     (H) 03/21/2013       CRP:   Recent Labs     10/22/22  0119 10/23/22  0312   CRP 0.3 0.3       ESR:  Recent Labs     10/22/22  0119 10/23/22  0312   SEDRATE 16 16       RADIOLOGY: REVIEWED AVAILABLE REPORT  No orders to display             Dereje Walker DO    11:21 AM     10/23/2022      Voice recognition software used for dictation

## 2022-10-23 NOTE — PLAN OF CARE
Problem: Discharge Planning  Goal: Discharge to home or other facility with appropriate resources  Outcome: Progressing     Problem: ABCDS Injury Assessment  Goal: Absence of physical injury  Outcome: Progressing     Problem: Safety - Adult  Goal: Free from fall injury  Outcome: Progressing     Problem: Chronic Conditions and Co-morbidities  Goal: Patient's chronic conditions and co-morbidity symptoms are monitored and maintained or improved  Outcome: Progressing

## 2022-10-24 LAB
ALBUMIN SERPL-MCNC: 3.8 G/DL (ref 3.5–5.2)
ALP BLD-CCNC: 158 U/L (ref 35–104)
ALT SERPL-CCNC: 14 U/L (ref 0–32)
ANION GAP SERPL CALCULATED.3IONS-SCNC: 9 MMOL/L (ref 7–16)
APTT: 54.9 SEC (ref 24.5–35.1)
AST SERPL-CCNC: 14 U/L (ref 0–31)
BASOPHILS ABSOLUTE: 0.02 E9/L (ref 0–0.2)
BASOPHILS RELATIVE PERCENT: 0.3 % (ref 0–2)
BILIRUB SERPL-MCNC: 0.8 MG/DL (ref 0–1.2)
BILIRUBIN DIRECT: 0.3 MG/DL (ref 0–0.3)
BILIRUBIN, INDIRECT: 0.5 MG/DL (ref 0–1)
BUN BLDV-MCNC: 17 MG/DL (ref 6–23)
CALCIUM SERPL-MCNC: 9.6 MG/DL (ref 8.6–10.2)
CHLORIDE BLD-SCNC: 105 MMOL/L (ref 98–107)
CO2: 25 MMOL/L (ref 22–29)
CREAT SERPL-MCNC: 0.9 MG/DL (ref 0.5–1)
EKG ATRIAL RATE: 258 BPM
EKG Q-T INTERVAL: 416 MS
EKG QRS DURATION: 90 MS
EKG QTC CALCULATION (BAZETT): 432 MS
EKG R AXIS: 40 DEGREES
EKG T AXIS: 5 DEGREES
EKG VENTRICULAR RATE: 65 BPM
EOSINOPHILS ABSOLUTE: 0.16 E9/L (ref 0.05–0.5)
EOSINOPHILS RELATIVE PERCENT: 2.2 % (ref 0–6)
GFR SERPL CREATININE-BSD FRML MDRD: >60 ML/MIN/1.73
GLUCOSE BLD-MCNC: 144 MG/DL (ref 74–99)
HCT VFR BLD CALC: 35 % (ref 34–48)
HEMOGLOBIN: 10.6 G/DL (ref 11.5–15.5)
IMMATURE GRANULOCYTES #: 0.04 E9/L
IMMATURE GRANULOCYTES %: 0.5 % (ref 0–5)
LYMPHOCYTES ABSOLUTE: 2.06 E9/L (ref 1.5–4)
LYMPHOCYTES RELATIVE PERCENT: 27.9 % (ref 20–42)
MAGNESIUM: 2 MG/DL (ref 1.6–2.6)
MCH RBC QN AUTO: 26.9 PG (ref 26–35)
MCHC RBC AUTO-ENTMCNC: 30.3 % (ref 32–34.5)
MCV RBC AUTO: 88.8 FL (ref 80–99.9)
METER GLUCOSE: 132 MG/DL (ref 74–99)
MONOCYTES ABSOLUTE: 0.43 E9/L (ref 0.1–0.95)
MONOCYTES RELATIVE PERCENT: 5.8 % (ref 2–12)
NEUTROPHILS ABSOLUTE: 4.67 E9/L (ref 1.8–7.3)
NEUTROPHILS RELATIVE PERCENT: 63.3 % (ref 43–80)
OCCULT BLOOD SCREENING: NORMAL
PDW BLD-RTO: 15.8 FL (ref 11.5–15)
PHOSPHORUS: 3.4 MG/DL (ref 2.5–4.5)
PLATELET # BLD: 315 E9/L (ref 130–450)
PMV BLD AUTO: 8.8 FL (ref 7–12)
POTASSIUM SERPL-SCNC: 4.5 MMOL/L (ref 3.5–5)
RBC # BLD: 3.94 E12/L (ref 3.5–5.5)
SODIUM BLD-SCNC: 139 MMOL/L (ref 132–146)
TOTAL PROTEIN: 6.4 G/DL (ref 6.4–8.3)
WBC # BLD: 7.4 E9/L (ref 4.5–11.5)

## 2022-10-24 PROCEDURE — 99233 SBSQ HOSP IP/OBS HIGH 50: CPT | Performed by: INTERNAL MEDICINE

## 2022-10-24 PROCEDURE — 80076 HEPATIC FUNCTION PANEL: CPT

## 2022-10-24 PROCEDURE — 80048 BASIC METABOLIC PNL TOTAL CA: CPT

## 2022-10-24 PROCEDURE — 84100 ASSAY OF PHOSPHORUS: CPT

## 2022-10-24 PROCEDURE — 82962 GLUCOSE BLOOD TEST: CPT

## 2022-10-24 PROCEDURE — 83735 ASSAY OF MAGNESIUM: CPT

## 2022-10-24 PROCEDURE — 85730 THROMBOPLASTIN TIME PARTIAL: CPT

## 2022-10-24 PROCEDURE — 85025 COMPLETE CBC W/AUTO DIFF WBC: CPT

## 2022-10-24 PROCEDURE — 6360000002 HC RX W HCPCS: Performed by: INTERNAL MEDICINE

## 2022-10-24 PROCEDURE — 97165 OT EVAL LOW COMPLEX 30 MIN: CPT

## 2022-10-24 PROCEDURE — 36415 COLL VENOUS BLD VENIPUNCTURE: CPT

## 2022-10-24 PROCEDURE — 1200000000 HC SEMI PRIVATE

## 2022-10-24 PROCEDURE — 6370000000 HC RX 637 (ALT 250 FOR IP): Performed by: INTERNAL MEDICINE

## 2022-10-24 RX ADMIN — PRAVASTATIN SODIUM 20 MG: 20 TABLET ORAL at 17:56

## 2022-10-24 RX ADMIN — FERROUS SULFATE TAB 325 MG (65 MG ELEMENTAL FE) 325 MG: 325 (65 FE) TAB at 08:42

## 2022-10-24 RX ADMIN — METOPROLOL TARTRATE 50 MG: 50 TABLET, FILM COATED ORAL at 20:40

## 2022-10-24 RX ADMIN — HEPARIN SODIUM 12.07 UNITS/KG/HR: 10000 INJECTION, SOLUTION INTRAVENOUS at 09:52

## 2022-10-24 RX ADMIN — Medication 500 MG: at 08:41

## 2022-10-24 RX ADMIN — POTASSIUM CHLORIDE 10 MEQ: 750 TABLET, EXTENDED RELEASE ORAL at 08:42

## 2022-10-24 RX ADMIN — MULTIVITAMIN TABLET 1 TABLET: TABLET at 08:42

## 2022-10-24 RX ADMIN — PANTOPRAZOLE SODIUM 40 MG: 40 TABLET, DELAYED RELEASE ORAL at 17:56

## 2022-10-24 RX ADMIN — POTASSIUM CHLORIDE 10 MEQ: 750 TABLET, EXTENDED RELEASE ORAL at 17:55

## 2022-10-24 RX ADMIN — FERROUS SULFATE TAB 325 MG (65 MG ELEMENTAL FE) 325 MG: 325 (65 FE) TAB at 17:56

## 2022-10-24 RX ADMIN — FUROSEMIDE 20 MG: 20 TABLET ORAL at 08:42

## 2022-10-24 RX ADMIN — METOPROLOL TARTRATE 50 MG: 50 TABLET, FILM COATED ORAL at 08:42

## 2022-10-24 RX ADMIN — DILTIAZEM HYDROCHLORIDE 240 MG: 240 CAPSULE, COATED, EXTENDED RELEASE ORAL at 08:42

## 2022-10-24 RX ADMIN — Medication 500 MG: at 20:40

## 2022-10-24 RX ADMIN — PANTOPRAZOLE SODIUM 40 MG: 40 TABLET, DELAYED RELEASE ORAL at 05:53

## 2022-10-24 ASSESSMENT — PAIN SCALES - GENERAL
PAINLEVEL_OUTOF10: 0
PAINLEVEL_OUTOF10: 0

## 2022-10-24 NOTE — PLAN OF CARE
Problem: Discharge Planning  Goal: Discharge to home or other facility with appropriate resources  10/23/2022 2249 by Glen Bagley RN  Outcome: Progressing  10/23/2022 1606 by Jorge Lafleur RN  Outcome: Progressing

## 2022-10-24 NOTE — PROGRESS NOTES
PROGRESS  NOTE --                                                          INTERNAL  MEDICINE                                                                              I  PERSONALLY SAW , EXAMINED, AND CARED [facility], 10/24/2022     LABS, XRAY ,CHART, AND MEDICATIONS  REVIEWED BY ME       Chief complaint: Bilateral hearing loss, heparin bridge, chronic A. fib, history of multiple episodes of embolic CVA      54/50/[id number]: Reina Yoder is alert awake and cooperative; oriented ×3. Denies any chest pain dyspnea nausea emesis. Tolerating diet. No abdominal pain. Sitting quietly working on her sewing project. No complaints voiced. Denies any prior history of iron deficiency nor iron deficiency anemia. I advised her regarding current iron status as well as anemia status. Afebrile last 24 hours. Blood pressure 140/66. SPO2 95 on room air. Intake and output -255 cc. Fasting glucose 138 potassium 4.2 magnesium 2.0. CRP 0.3. Alkaline phosphatase elevated 168. Direct bilirubin 0.4 slightly elevated. Indirect 0.5. A1c 6.6. Hemoglobin 10.3 WBC 6.8. Platelet count 738. INR 1.6, currently off warfarin. ESR 16. G23 folic acid normal.  TIBC slightly elevated 354 iron and iron saturation both low with ferritin of 15. Cardiology consult from yesterday appreciated. Low gradient severe aortic stenosis nonsymptomatic by 2D echo. Patient's cardiac risk index is low. No further cardiac testing prior to surgery. Patient has been evaluated for possible TAVR procedure, by [facility] cardiology \"down the road\". Continue metoprolol 50 mg by mouth twice daily and diltiazem 240 mg daily. Avoid vasodilators such as nitroglycerin hydralazine due to severe aortic stenosis. Avoid hypotension and surgery.   Continue IV heparin hold 6 hours prior to surgery, restart 24 hours after procedure if no concern for bleeding and bridged to warfarin. 10/24/2022-patient sitting up in bed; no chest pain no dyspnea. She has been up walking the halls frequently without difficulty. Appetite remains good. Afebrile last 24 hours. Blood pressure 144/85. SPO2 96 on room air. Intake and output -705 cc. Fasting glucose 144. Potassium 4.5. Hemoglobin 10.6 WBC 7.4. Stool for occult blood negative. Pharmacy consult yesterday appreciated, they will follow postop regarding initiation of warfarin. Cardiology note from today appreciated. Continue heparin drip continue to hold warfarin. Occupational therapy AMPAC score from today 23/24. Pharmacy note from today reviewed, continue heparin drip warfarin on hold. Patient had preop chest x-ray done yesterday with following result--      FINDINGS:   The heart has upper borderline size. The CTR: 17.8/31.7 cm. There is mild   to moderate ectasia of the thoracic aorta. Lungs are normally expanded. No infiltrates, consolidations or pleural   effusions are seen. Discrete plate atelectasis are seen in the costophrenic sulcus bilaterally. There is no perihilar vascular congestion. Degenerative changes relate with spondylosis seen in the mid lower thoracic   spine. Impression:       No acute cardiopulmonary process.             Objective:     PHYSICAL EXAM:    VS: BP (!) 144/85   Pulse 70   Temp 97.5 °F (36.4 °C) (Oral)   Resp 16   Ht 5' 3\" (1.6 m)   Wt 149 lb 11.1 oz (67.9 kg)   SpO2 96%   BMI 26.52 kg/m²     Labs:   CBC:   Lab Results   Component Value Date/Time    WBC 7.4 10/24/2022 03:00 AM    RBC 3.94 10/24/2022 03:00 AM    HGB 10.6 10/24/2022 03:00 AM    HCT 35.0 10/24/2022 03:00 AM    MCV 88.8 10/24/2022 03:00 AM    MCH 26.9 10/24/2022 03:00 AM    MCHC 30.3 10/24/2022 03:00 AM    RDW 15.8 10/24/2022 03:00 AM     10/24/2022 03:00 AM    MPV 8.8 10/24/2022 03:00 AM     CBC with Differential:    Lab Results   Component Value Date/Time    WBC 7.4 10/24/2022 03:00 AM    RBC 3.94 10/24/2022 03:00 AM    HGB 10.6 10/24/2022 03:00 AM    HCT 35.0 10/24/2022 03:00 AM     10/24/2022 03:00 AM    MCV 88.8 10/24/2022 03:00 AM    MCH 26.9 10/24/2022 03:00 AM    MCHC 30.3 10/24/2022 03:00 AM    RDW 15.8 10/24/2022 03:00 AM    SEGSPCT 76 02/27/2013 12:00 PM    LYMPHOPCT 27.9 10/24/2022 03:00 AM    MONOPCT 5.8 10/24/2022 03:00 AM    BASOPCT 0.3 10/24/2022 03:00 AM    MONOSABS 0.43 10/24/2022 03:00 AM    LYMPHSABS 2.06 10/24/2022 03:00 AM    EOSABS 0.16 10/24/2022 03:00 AM    BASOSABS 0.02 10/24/2022 03:00 AM     Hemoglobin/Hematocrit:    Lab Results   Component Value Date/Time    HGB 10.6 10/24/2022 03:00 AM    HCT 35.0 10/24/2022 03:00 AM     CMP:    Lab Results   Component Value Date/Time     10/24/2022 03:00 AM    K 4.5 10/24/2022 03:00 AM    K 3.9 10/21/2022 06:10 PM     10/24/2022 03:00 AM    CO2 25 10/24/2022 03:00 AM    BUN 17 10/24/2022 03:00 AM    CREATININE 0.9 10/24/2022 03:00 AM    GFRAA >60 09/30/2015 06:40 AM    LABGLOM >60 10/24/2022 03:00 AM    GLUCOSE 144 10/24/2022 03:00 AM    PROT 6.4 10/24/2022 03:00 AM    LABALBU 3.8 10/24/2022 03:00 AM    CALCIUM 9.6 10/24/2022 03:00 AM    BILITOT 0.8 10/24/2022 03:00 AM    ALKPHOS 158 10/24/2022 03:00 AM    AST 14 10/24/2022 03:00 AM    ALT 14 10/24/2022 03:00 AM     BMP:    Lab Results   Component Value Date/Time     10/24/2022 03:00 AM    K 4.5 10/24/2022 03:00 AM    K 3.9 10/21/2022 06:10 PM     10/24/2022 03:00 AM    CO2 25 10/24/2022 03:00 AM    BUN 17 10/24/2022 03:00 AM    LABALBU 3.8 10/24/2022 03:00 AM    CREATININE 0.9 10/24/2022 03:00 AM    CALCIUM 9.6 10/24/2022 03:00 AM    GFRAA >60 09/30/2015 06:40 AM    LABGLOM >60 10/24/2022 03:00 AM    GLUCOSE 144 10/24/2022 03:00 AM     Hepatic Function Panel:    Lab Results   Component Value Date/Time    ALKPHOS 158 10/24/2022 03:00 AM    ALT 14 10/24/2022 03:00 AM    AST 14 10/24/2022 03:00 AM    PROT 6.4 10/24/2022 03:00 AM    BILITOT 0.8 10/24/2022 03:00 AM    BILIDIR 0.3 10/24/2022 03:00 AM    IBILI 0.5 10/24/2022 03:00 AM    LABALBU 3.8 10/24/2022 03:00 AM     Ionized Calcium:  No results found for: IONCA  Magnesium:    Lab Results   Component Value Date/Time    MG 2.0 10/24/2022 03:00 AM     Phosphorus:    Lab Results   Component Value Date/Time    PHOS 3.4 10/24/2022 03:00 AM     LDH:  No results found for: LDH  Uric Acid:    Lab Results   Component Value Date/Time    LABURIC 5.7 10/22/2022 01:19 AM     PT/INR:    Lab Results   Component Value Date/Time    PROTIME 17.1 10/23/2022 05:00 AM    INR 1.6 10/23/2022 05:00 AM     Warfarin PT/INR:  No components found for: PTPATWAR, PTINRWAR  PTT:    Lab Results   Component Value Date/Time    APTT 54.9 10/24/2022 05:05 AM   [APTT}  Troponin:    Lab Results   Component Value Date/Time    TROPONINI <0.01 09/29/2015 03:25 PM     Last 3 Troponin:    Lab Results   Component Value Date/Time    TROPONINI <0.01 09/29/2015 03:25 PM    TROPONINI 0.05 03/21/2013 11:45 PM     U/A:    Lab Results   Component Value Date/Time    COLORU Yellow 09/29/2015 03:40 PM    PROTEINU Negative 09/29/2015 03:40 PM    PHUR 5.5 09/29/2015 03:40 PM    [address] NONE 09/29/2015 03:40 PM    RBCUA 1-3 09/29/2015 03:40 PM    BACTERIA NONE 09/29/2015 03:40 PM    CLARITYU Clear 09/29/2015 03:40 PM    SPECGRAV 1.010 09/29/2015 03:40 PM    LEUKOCYTESUR Negative 09/29/2015 03:40 PM    UROBILINOGEN 0.2 09/29/2015 03:40 PM    BILIRUBINUR Negative 09/29/2015 03:40 PM    BLOODU TRACE 09/29/2015 03:40 PM    GLUCOSEU Negative 09/29/2015 03:40 PM     HgBA1c:    Lab Results   Component Value Date/Time    LABA1C 6.1 10/22/2022 01:19 AM     FLP:    Lab Results   Component Value Date/Time    TRIG 69 10/22/2022 01:19 AM    HDL 42 10/22/2022 01:19 AM    LDLCALC 77 10/22/2022 01:19 AM    LABVLDL 14 10/22/2022 01:19 AM     TSH:    Lab Results   Component Value Date/Time    TSH 1.490 10/22/2022 01:19 AM     VITAMIN B12: No components found for: B12  FOLATE:    Lab Results Component Value Date/Time    FOLATE 15.8 10/22/2022 01:19 AM     IRON:    Lab Results   Component Value Date/Time    IRON 28 10/22/2022 01:19 AM     Iron Saturation:  No components found for: PERCENTFE  TIBC:    Lab Results   Component Value Date/Time    TIBC 354 10/22/2022 01:19 AM     FERRITIN:    Lab Results   Component Value Date/Time    FERRITIN 15 10/22/2022 01:19 AM        General appearance: Alert, Awake, Oriented times 3, no distress; significantly hard of hearing  Skin: Warm and dry ; no rashes  Head: Normocephalic. No masses, lesions or tenderness noted  Eyes: Conjunctivae pink, sclera white. PERRL,EOM-I  Ears: External ears normal  Nose/Sinuses: Nares normal. Septum midline. Mucosa normal. No drainage  Oropharynx: Oropharynx clear with no exudate seen  Neck: Supple. No jugular venous distension, lymphadenopathy or thyromegaly Trachea midline  Lungs: Clear to auscultation bilaterally. No rhonchi, crackles or wheezes  Heart: Irregularly irregular at 70/min; grade 1/6 to 2/6 systolic murmur second right intercostal space  Abdomen: Soft, non-tender. BS normal. No masses, organomegaly; no rebound or guarding  Extremities: Trace edema bilaterally; moderate varicosities both extremities  Musculoskeletal: Muscular strength appears intact. Neuro:  No focal motor defects ; II-XII grossly intact . 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 (HCC)    Severe aortic stenosis    Hearing loss    Diabetes mellitus (HCC)    Pre-op evaluation    Iron deficiency anemia    Hypertension    DJD (degenerative joint disease)    Atrial fibrillation (HCC)    Mitral regurgitation    Pulmonary hypertension ([address]  Resolved Problems:     Moderate aortic stenosis by prior echocardiogram      PLAN:  SEE ORDERS      RE  CHANGES AND FINDINGS Medications reviewed with patient  GI prophylaxis  DVT prophylaxis  Consultants notes reviewed    Stool for occult blood  Add ferrous sulfate 325 mg twice daily  Preop PA lateral chest x-ray--I advised patient regarding same  Continue IV heparin drip, hold 6 hours prior to surgery restart 24 hours after procedure if no concern for bleeding and bridged to warfarin; remain off warfarin at this time. I advised patient regarding same  Avoid hypotension, vasodilators such as nitroglycerin and hydralazine due to severe aortic stenosis  Continue metoprolol tartrate 50 mg twice daily continue diltiazem 240 mg CD once daily  Hold aspirin restart after procedure  Continue pravastatin  Since glucose numbers have been so good, change glucose checks to each a.m. Furosemide 20 mg daily  Niacin 500 mg twice daily  Protonix 40 mg 2 times daily  Potassium chloride 10 mEq 2 times daily  Pravastatin 20 mg daily  Pharmacy will consult regarding initiation of warfarin after surgery, patient had been on 1.5 mg warfarin daily prior to prehospitalization  10/24/2022  Heparin drip continues  Warfarin and aspirin on hold  Diltiazem 240 mg daily  Ferrous sulfate 325 mg twice daily  Furosemide 20 mg daily  Metoprolol tartrate 50 mg twice daily  Potassium chloride 10 mEq twice daily  Protonix 40 mg twice daily      Discussed with patient and nursing. Emelie Olszewski Mihok, DO     1:37 PM     10/24/2022    TIME > 35 MINUTES    >  50 %  OF  TIME  DISCUSSION               ------------  INFORMATION  -----------      DIET:ADULT DIET; Regular; 5 carb choices (75 gm/meal);  Low Fat/Low Chol/High Fiber/EDIE; Gluten Free        Allergies   Allergen Reactions    Gluten Other (See Comments)         MEDICATION SIDE EFFECTS:none       SCHEDULED MEDS:                                 Scheduled Meds:   ferrous sulfate  325 mg Oral BID WC    multivitamin  1 tablet Oral Daily    [Held by provider] aspirin  81 mg Oral Daily    dilTIAZem  240 mg Oral Daily metoprolol  50 mg Oral BID    niacin  500 mg Oral BID    pravastatin  20 mg Oral Daily    pantoprazole  40 mg Oral BID AC    furosemide  20 mg Oral Daily    potassium chloride  10 mEq Oral BID WC       Continuous Infusions:   dextrose      heparin (PORCINE) Infusion 12.072 Units/kg/hr (10/24/22 0952)         Data:       Intake/Output Summary (Last 24 hours) at 10/24/2022 1337  Last data filed at 10/24/2022 0843  Gross per 24 hour   Intake 240 ml   Output 1050 ml   Net -810 ml       Wt Readings from Last 3 Encounters:   10/24/22 149 lb 11.1 oz (67.9 kg)   09/15/22 153 lb (69.4 kg)   09/01/22 153 lb (69.4 kg)       Labs: Additional    GLUCOSE:No results for input(s): POCGLU in the last 72 hours. BNP:  Lab Results   Component Value Date     (H) 03/21/2013       CRP:   Recent Labs     10/22/22  0119 10/23/22  0312   CRP 0.3 0.3       ESR:  Recent Labs     10/22/22  0119 10/23/22  0312   SEDRATE 16 16       RADIOLOGY: REVIEWED AVAILABLE REPORT  XR CHEST (2 VW)   Final Result   No acute cardiopulmonary process.                    Tricia Mansfield DO    1:37 PM     10/24/2022      Voice recognition software used for dictation

## 2022-10-24 NOTE — PROGRESS NOTES
OCCUPATIONAL THERAPY INITIAL EVALUATION    [facility] Damian Mar[facility], [place]       TDJF:                                                  Patient Name: Jessika Worley  MRN: [id number]  : 1945  Room: [address] [place] [address]    Evaluating OT: Bean Cannon, MOT, OTR/L [id number]  Referring Provider:Shakir German DO  Specific Provider Orders: OT eval and treat 10/21  Recommended Adaptive Equipment: none     Diagnosis: hearing loss   Surgery: scheduled cochlear implant 10/26   Pertinent Medical History: CVA, DM, HTN, HLD, a-fib, anemia, TIA   Precautions:  Fall Risk, [place]    Assessment of current deficits   [x] Functional mobility  [x]ADLs  [x] Strength               [x]Cognition   [x] Functional transfers   [x] IADLs         [x] Safety Awareness   [x]Endurance   [] Fine Coordination              [x] Balance      [] Vision/perception   [x]Sensation    []Gross Motor Coordination  [] ROM  [] Delirium                   [] Motor Control     OT PLAN OF CARE   OT POC based on physician orders, patient diagnosis and results of clinical assessment    Home Living: Pt lives with  in a 1 story home; bed/bath on main level   Bathroom setup: walk in shower   Equipment owned: BSC, cane, luz  Prior Level of Function: IND with ADLs/IADLs; using no AD for functional mobility     Pain Level: 0/10  Cognition: A&O: 4/4; Follows multi step directions    Memory:  good    Sequencing:  good    Problem solving:  good    Judgement/safety:  good     Functional Assessment:  AM-PAC Daily Activity Raw Score: 23/24   Initial Eval Status  Date: 10/24/22   Feeding IND (pt able to open packages and self feed)    Grooming IND (standing at sink)    UB Dressing IND    LB Dressing IND (pt crossed BLEs to doff/jose B socks)   Bathing SUP (simulated)   Toileting IND   Bed Mobility  Log roll: IND  Supine to sit: IND   Sit to supine: IND    Functional Transfers Sit to stand:IND   Stand to sit:IND  Commode: IND   Functional Mobility IND (using no AD, to/from bathroom)   Balance Sitting: IND  Standing: IND   Activity Tolerance good   Visual/  Perceptual Glasses: yes          UE ROM: RUE:  WFL  LUE:  WFL  Strength: RUE: grossly 4/5 LUE: grossly 4/5   Strength: B WFL  Fine Motor Coordination:  WFL     Hearing: fair  Sensation:  No c/o numbness/tingling   Tone:  WFL  Edema: none noted                            Comments:Cleared by RN to see pt. Upon arrival, patient supine in bed and agreeable to OT session. Pt completes ADLs/functional mobility/transfers Mod I, demo'ing good balance/safety awareness. At end of session, patient supine in bed with call light and phone within reach, all lines and tubes intact. Pt demonstrates no OT needs at this time. Eval Complexity: Low    Patient  instructed on diagnosis, prognosis/goals and plan of care. Demonstrated good understanding. [] Malnutrition indicators have been identified and nursing has been notified to ensure a dietitian consult is ordered. Evaluation time includes thorough review of current medical information, gathering information on past medical & social history & PLOF, completion of standardized testing, informal observation of tasks, consultation with other medical professions/disciplines, assessment of data & development of POC/goals.      Time In: 0915       Time Out: 8077     Total treatment time: 0       Treatment Charges: Mins Units   OT Eval Low 96116 X    OT Eval Medium 43785     OT Eval High [id number]     OT Re-Eval [id number]     Ther Ex  [id number]       Manual Therapy 58634       Thera Activities 43020       ADL/Home Mgt 85158     Neuro Re-ed 77798       Group Therapy        Orthotic manage/training  83661       Non-Billable Time           Jennifer Wilder OTR/L [id number]

## 2022-10-24 NOTE — PROGRESS NOTES
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 Weight:   Wt Readings from Last 1 Encounters:   10/24/22 149 lb 11.1 oz (67.9 kg)       TSH:    Lab Results   Component Value Date/Time    TSH 1.490 10/22/2022 01:19 AM       Hepatic Function Panel:                            Lab Results   Component Value Date/Time    ALKPHOS 158 10/24/2022 03:00 AM    ALT 14 10/24/2022 03:00 AM    AST 14 10/24/2022 03:00 AM    PROT 6.4 10/24/2022 03:00 AM    BILITOT 0.8 10/24/2022 03:00 AM    BILIDIR 0.3 10/24/2022 03:00 AM    IBILI 0.5 10/24/2022 03:00 AM    LABALBU 3.8 10/24/2022 03:00 AM       Current warfarin drug-drug interactions include: No significant interactions    Recent Labs     10/22/22  0119 10/23/22  0312 10/24/22  0300   HGB 10.1* 10.3* 10.6*    309 315       Date Warfarin Dose INR Heparin or LMWH Comment   10/23 Hold 1.6 Heparin gtt    10/24 HOLD -- Heparin gtt                           Assessment:  Patient is a 78 y.o. female on warfarin for Atrial Fibrillation. Patient's home warfarin dosing regimen is warfarin 1.5mg daily. Goal INR 2 - 3  INR 1.6 yesterday  Going to OR 10/26 for right cochlear implantation warfarin on hold, currently on heparin drip.     Plan:  Continue heparin drip, hold warfarin  Will resume warfarin post-op  Daily PT/INR until the INR is stable within the therapeutic range  Pharmacist will follow and monitor/adjust dosing as necessary    Thank you for this consult,    Daniel Rodriguez, [address] [facility] 10/24/2022 1:15 PM

## 2022-10-24 NOTE — PROGRESS NOTES
[address], 1945    Date of Service: 10/24/2022    Chief Complaint: Follow-up for preoperative cardiac evaluation, aortic stenosis, atrial fibrillation    HPI:  No new overnight cardiac complaints. She denies chest pain, shortness of breath, or syncope. She denies orthopnea, PND or lower extremity edema. Atrial fibrillation with CVR on EKG. Cochlear implantation scheduled for 10/26/22. Currently on a heparin drip. She was previously followed by Dr. Shai Castro; she established care with me in 1/2019. She presents today for follow-up of atrial fibrillation and valvular heart disease. She is a 78 y.o. female with a history of chronic atrial fibrillation, hypertension, dyslipidemia, TIA's, and aortic stenosis. Her last TIA episode was in 09/2015 when anticoagulation was held for a procedure. Review of Systems:  Cardiac: As per HPI  General: No fever, chills  Pulmonary: As per HPI  HEENT: No visual disturbances, difficult swallowing, +hearing impairment  GI: No nausea, vomiting  : No dysuria, hematuria  Endocrine: No thyroid disease, +DM  Musculoskeletal: LOPEZ x 4, no focal motor deficits  Skin: Intact, no rashes  Neuro: No headache, seizures  Psych: Currently with no depression, anxiety    Past medical history:   Hearing impairment with ear surgery that temporarily improved her hearing. She wears bilateral hearing aids as of 03/2010. Lower extremity venous stripping, 1981. Hypertension. Hyperlipidemia. Total cholesterol 162, triglycerides 86, HDL 42, LDL 96 - 09/2009. Colonoscopy, 2008 with small polyps that were removed. Diverticular disease also noted. EGD, 09/2009 for hem positive stools. Hiatal hernia noted. Biopsies taken and omeprazole prescribed. AF post EGD, 09/2009. She was admitted to [facility]. She declined cardioversion and has been in chronic AF since that time. CBC, BMP, cardiac enzymes - 09/2009 normal.  Echo, 09/2009.   Mild aortic stenosis, peak gradient 18 mmHg, LA enlarged at 4.1 cm. LV size and function normal.    Rate control and warfarin prescribed 09/2009. Lifetime nonsmoker. No history of alcohol intake. No family history of premature vascular disease. Echo, 08/30/2012 with normal LV size and systolic function, JOHN, mild AS with peak gradient 15 mmHg, MERCEDEZ 1.6 cm². RVSP 37 mmHg. Lexiscan MPS, 08/30/2012. Normal.  EF 70%. [facility] admission, 03/22/2013 with speech difficulty. Cr 1.2, otherwise normal BMP. CBC normal.  TSH normal.  CT of the head with no acute pathology. MRI showed small lacunar infarct in the left posterior parietal lobe. INR on admission was 1.5. Discharged after symptoms resolved within 24 hours. Discharged with adequate anticoagulation. Echo, 09/18/2014. Normal EF, normal LV size, E/E' 19, JOHN (LA 59 mm), mild to moderate AS (mean gradient 16 mmHg, MERCEDEZ 1.5 cm², VTI 0.52). RVSP 48 mmHg, dilated IVC with poor inspiratory collapse. [facility] presentation, 09/30/2015 with transient confusion and right-sided visual field defects. Resolved prior to presentation. Her anticoagulation was interrupted for breast biopsy a few weeks prior. On presentation, INR was 2.2. 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 Brain perfusion scan revealed moderate area of ischemia in the left posterior parietal lobe. She was discharged home in stable condition. Echo, 10/01/2015, Dr. Benita Francis. Normal EF. Indeterminate diastolic function. Severe biatrial enlargement. Mild MR.  Mild-moderate AS. Mean gradient of 20 mmHg. Mild AR. Mild pulmonary HTN (41 mmHg).      BP (!) 144/85   Pulse 70   Temp 97.5 °F (36.4 °C) (Oral)   Resp 16   Ht 5' 3\" (1.6 m)   Wt 149 lb 11.1 oz (67.9 kg)   SpO2 96%   BMI 26.52 kg/m²    Appearance: Awake, alert, no acute respiratory distress  Skin: Intact, no rash  Head: Normocephalic, atraumatic  Eyes: EOMI, no conjunctival erythema  ENMT: No pharyngeal erythema, MMM, no rhinorrhea  Neck: Supple, no carotid bruits  Lungs: Clear to auscultation bilaterally. No wheezes, rales, or rhonchi.   Cardiac: IRRR, 3/6 DELL  Abdomen: Soft, nontender, +bowel sounds  Extremities: Moves all extremities x 4, no lower extremity edema  Neurologic: No focal motor deficits apparent, normal mood and affect, alert and oriented x 3    Current Facility-Administered Medications   Medication Dose Route Frequency Provider Last Rate Last Admin    ferrous sulfate (IRON 325) tablet 325 mg  325 mg Oral BID  Shakir Yadavhok, DO   325 mg at 10/24/22 0842    multivitamin 1 tablet  1 tablet Oral Daily Selene Keen Mihok, DO   1 tablet at 10/24/22 5714    [Held by provider] aspirin EC tablet 81 mg  81 mg Oral Daily Shakir A Mihok, DO   81 mg at 10/22/22 0747    dilTIAZem (CARDIZEM CD) extended release capsule 240 mg  240 mg Oral Daily Shakir A Mihok, DO   240 mg at 10/24/22 0842    metoprolol tartrate (LOPRESSOR) tablet 50 mg  50 mg Oral BID Selene Keen Mihok, DO   50 mg at 10/24/22 3414    niacin extended release capsule 500 mg  500 mg Oral BID Selene Keen Mihok, DO   500 mg at 10/24/22 0841    pravastatin (PRAVACHOL) tablet 20 mg  20 mg Oral Daily Shakir A Mihok, DO   20 mg at 10/23/22 1656    pantoprazole (PROTONIX) tablet 40 mg  40 mg Oral BID  Shakir A Mihok, DO   40 mg at 10/24/22 0553    furosemide (LASIX) tablet 20 mg  20 mg Oral Daily Shakir A Mihok, DO   20 mg at 10/24/22 0842    potassium chloride (KLOR-CON M) extended release tablet 10 mEq  10 mEq Oral BID  Shakir A Mihok, DO   10 mEq at 10/24/22 4520    perflutren lipid microspheres (DEFINITY) injection 1.65 mg  1.5 mL IntraVENous ONCE PRN Lamoni Jakes A Mihok, DO        glucose chewable tablet 16 g  4 tablet Oral PRN Mark Jakes A Mihok, DO        dextrose bolus 10% 125 mL  125 mL IntraVENous PRN Selene Keen Mihok, DO        Or    dextrose bolus 10% 250 mL  250 mL IntraVENous PRN Shakir A Mihok, DO        glucagon (rDNA) injection 1 mg  1 mg IntraMUSCular PRN Selene German DO dextrose 10 % infusion   IntraVENous Continuous PRN Tan Lapping Mihok, DO        potassium chloride (KLOR-CON M) extended release tablet 40 mEq  40 mEq Oral PRN Tan Lapping Mihok, DO        Or    potassium bicarb-citric acid (EFFER-K) effervescent tablet 40 mEq  40 mEq Oral PRN Tan Lapping Mihok, DO        Or    potassium chloride 10 mEq/100 mL IVPB (Peripheral Line)  10 mEq IntraVENous PRN Tan Lapping Mihok, DO        acetaminophen (TYLENOL) tablet 650 mg  650 mg Oral Q4H PRN Shakir A Mihok, DO        heparin (porcine) injection 4,000 Units  4,000 Units IntraVENous PRN Shakir A Mihok, DO        heparin (porcine) injection 2,000 Units  2,000 Units IntraVENous PRN Shakir A Mihok, DO        heparin 25,000 units in dextrose 5% 250 mL (premix) infusion  5-30 Units/kg/hr IntraVENous Continuous Shakir A Mihok, DO 8.1 mL/hr at 10/24/22 0952 12.072 Units/kg/hr at 10/24/22 0952     Lab Results   Component Value Date    WBC 7.4 10/24/2022    HGB 10.6 (L) 10/24/2022    HCT 35.0 10/24/2022    MCV 88.8 10/24/2022     10/24/2022     Lab Results   Component Value Date    CREATININE 0.9 10/24/2022    BUN 17 10/24/2022     10/24/2022    K 4.5 10/24/2022     10/24/2022    CO2 25 10/24/2022     Lab Results   Component Value Date    TSH 1.490 10/22/2022     Lab Results   Component Value Date    DIGOXIN 1.4 03/23/2013     Lab Results   Component Value Date    ALT 14 10/24/2022    AST 14 10/24/2022    ALKPHOS 158 (H) 10/24/2022    BILITOT 0.8 10/24/2022     Lab Results   Component Value Date    INR 1.6 10/23/2022    INR 1.9 10/22/2022    INR 2.0 10/21/2022    PROTIME 17.1 (H) 10/23/2022    PROTIME 21.0 (H) 10/22/2022    PROTIME 22.4 (H) 10/21/2022       Telemetry reviewed (10/24/2022): atrial fibrillation, rate 70's    Echo, 10/01/2015, Dr. Akilah Price. Normal EF. Indeterminate diastolic function. Severe biatrial enlargement. Mild MR.  Mild-moderate AS. Mean gradient of 20 mmHg. Mild AR.   Mild pulmonary HTN (41 mmHg).    Echocardiogram: 1/9/18 ([place])   Left ventricle is normal in size . Mild left ventricular concentric hypertrophy noted. No regional wall motion abnormalities seen. Normal left ventricular ejection fraction. The left atrium is moderately dilated. Moderately enlarged right atrium size. Mild thickening of the mitral valve leaflets. Mild mitral regurgitation is present. Moderate aortic stenosis is present. Mild aortic regurgitation is noted. Mild to moderate tricuspid regurgitation. Pulmonary hypertension is mild . TTE-9/25/2020:   Normal left ventricular systolic function. Ejection fraction is visually estimated at > 60%. Normal right ventricular size and function (TAPSE 2.0 cm). Indeterminate diastolic function. Severely dilated left atrium by volume index. Severely dilated right atrium. Moderate mitral regurgitation. Mild-moderate aortic regurgitation. Low gradient severe aortic stenosis (AV peak velocity 3.2 m/s, AV mean gradient 21 mmHg, AV area 0.8 cm2, peak velocity dimensionless index 0.23, VTI dimensionless index 0.25, stroke volume index 35 mL/m2). Mild tricuspid regurgitation. PASP is estimated at 36 mmHg. TTE-9/30/2022:  - Exam indication: AS   - The left ventricle is small. There is concentric left ventricular hypertrophy. Left ventricular systolic function is normal. EF = 69 ± 5% (2D biplane)   - The right ventricle is normal in size. Right ventricular systolic function is   normal.   - The left atrial cavity is severely dilated. - The right atrial cavity is severely dilated. - The visualized aorta is borderline dilated with a maximal dimension of 3.9 cm.   - Tricuspid aortic valve. There is severe aortic valve stenosis. AV area is 0.74   cm² (0.43 cm²/m²) by continuity, VTI. The peak gradient is 48 mmHg, the mean   gradient is 29 mmHg and the dimensionless valve index is 0.24. Prior peak/mean   gradients of 39/23 mmHg.  Today's gradients were averaged d/t afib. - There is mild mitral annular calcification observed anterior and posterior. There   is mild (1+) mitral valve regurgitation. There is mild thickening.   - Exam was compared with the prior  echocardiographic exam performed on 3/29/22. There has been an increase in transaortic gradients on today's exam, otherwise   similar findings. Assessment:  Preoperative cardiac evaluation (asymmetric sensorineural hearing loss: right cochlear implantation scheduled for 10/26/22)  Permanent atrial fibrillation with controlled rate  Elevated [id number] score -- on warfarin for anticoagulation (INR 2.0 --> 1.9 --> 1.6)  VHD (including severe aortic stenosis) -- follows at [facility]  Hypertension -- most recent 's-140's  Hyperlipidemia -- on statin  Type 2 diabetes -- Hgb A1c 6.1  Severe bi-atrial enlargement  Prior TIA's (including TIA x 2 when anticoagulation was held for procedures in the past)  Anemia -- most recent Hgb 10.6     Plan:   Continue heparin drip / coumadin is currently on hold / plan is for bridging anticoagulation post-operatively / most recent INR 1.6  Monitor hemodynamics closely  Continue current rate controlling agents  Records from [facility] reviewed today (including 9/2022 echocardiogram)  Telemetry reviewed (rate controlled AF)  Monitor labs  Care per ENT    Greater than 35 minutes was spent counseling the patient, reviewing the rationale for the above recommendations and reviewing the patient's current medication list, problem list and results of all previously ordered testing.     Nathan Patel MD, MD  [facility]) Cardiology

## 2022-10-24 NOTE — CARE COORDINATION
Social Work / Discharge Planning : Patient admitted with hearing Loss. Patient  is scheduled for cochlear implantation with Dr. Kirit Dutta on 10/26/22 . Patient independent from HOME with spouse. Plan at discharge to return HOME. Patient has PCP and insurance. No hx of HHC/SNF. Currently no needs for SW. SW to follow.  Electronically signed by REILLY Barth on 10/24/22 at 11:07 AM EDT

## 2022-10-24 NOTE — PROGRESS NOTES
Physical Therapy  Facility/Department: [facility] MED SURG/TELE    Name: Isamar Schmid  : 1945  MRN: [id number]  Date of Service: 10/24/2022    PT consult was received and eval was attempted. After reviewing chart (OT notes state PT is Independent with functional mobility) and speaking with pt she has no skilled PT needs at this time and will be discharged from our service. Blandon Damien Rea., P.T.   License Number: [id number]

## 2022-10-25 LAB
ALBUMIN SERPL-MCNC: 3.7 G/DL (ref 3.5–5.2)
ALP BLD-CCNC: 142 U/L (ref 35–104)
ALT SERPL-CCNC: 13 U/L (ref 0–32)
ANION GAP SERPL CALCULATED.3IONS-SCNC: 11 MMOL/L (ref 7–16)
APTT: 49 SEC (ref 24.5–35.1)
APTT: 67.8 SEC (ref 24.5–35.1)
APTT: 72.6 SEC (ref 24.5–35.1)
AST SERPL-CCNC: 17 U/L (ref 0–31)
BASOPHILS ABSOLUTE: 0.02 E9/L (ref 0–0.2)
BASOPHILS RELATIVE PERCENT: 0.3 % (ref 0–2)
BILIRUB SERPL-MCNC: 0.7 MG/DL (ref 0–1.2)
BILIRUBIN DIRECT: 0.3 MG/DL (ref 0–0.3)
BILIRUBIN, INDIRECT: 0.4 MG/DL (ref 0–1)
BUN BLDV-MCNC: 14 MG/DL (ref 6–23)
CALCIUM SERPL-MCNC: 9.7 MG/DL (ref 8.6–10.2)
CHLORIDE BLD-SCNC: 104 MMOL/L (ref 98–107)
CO2: 25 MMOL/L (ref 22–29)
CREAT SERPL-MCNC: 0.8 MG/DL (ref 0.5–1)
EOSINOPHILS ABSOLUTE: 0.13 E9/L (ref 0.05–0.5)
EOSINOPHILS RELATIVE PERCENT: 2.1 % (ref 0–6)
GFR SERPL CREATININE-BSD FRML MDRD: >60 ML/MIN/1.73
GLUCOSE BLD-MCNC: 137 MG/DL (ref 74–99)
HCT VFR BLD CALC: 35.7 % (ref 34–48)
HEMOGLOBIN: 10.7 G/DL (ref 11.5–15.5)
IMMATURE GRANULOCYTES #: 0.03 E9/L
IMMATURE GRANULOCYTES %: 0.5 % (ref 0–5)
LYMPHOCYTES ABSOLUTE: 1.94 E9/L (ref 1.5–4)
LYMPHOCYTES RELATIVE PERCENT: 31.5 % (ref 20–42)
MAGNESIUM: 1.9 MG/DL (ref 1.6–2.6)
MCH RBC QN AUTO: 26.6 PG (ref 26–35)
MCHC RBC AUTO-ENTMCNC: 30 % (ref 32–34.5)
MCV RBC AUTO: 88.8 FL (ref 80–99.9)
METER GLUCOSE: 121 MG/DL (ref 74–99)
MONOCYTES ABSOLUTE: 0.43 E9/L (ref 0.1–0.95)
MONOCYTES RELATIVE PERCENT: 7 % (ref 2–12)
NEUTROPHILS ABSOLUTE: 3.61 E9/L (ref 1.8–7.3)
NEUTROPHILS RELATIVE PERCENT: 58.6 % (ref 43–80)
PDW BLD-RTO: 15.9 FL (ref 11.5–15)
PHOSPHORUS: 3.6 MG/DL (ref 2.5–4.5)
PLATELET # BLD: 306 E9/L (ref 130–450)
PMV BLD AUTO: 9 FL (ref 7–12)
POTASSIUM SERPL-SCNC: 4.1 MMOL/L (ref 3.5–5)
RBC # BLD: 4.02 E12/L (ref 3.5–5.5)
SODIUM BLD-SCNC: 140 MMOL/L (ref 132–146)
TOTAL PROTEIN: 6.4 G/DL (ref 6.4–8.3)
WBC # BLD: 6.2 E9/L (ref 4.5–11.5)

## 2022-10-25 PROCEDURE — 85730 THROMBOPLASTIN TIME PARTIAL: CPT

## 2022-10-25 PROCEDURE — 83735 ASSAY OF MAGNESIUM: CPT

## 2022-10-25 PROCEDURE — 82962 GLUCOSE BLOOD TEST: CPT

## 2022-10-25 PROCEDURE — 6370000000 HC RX 637 (ALT 250 FOR IP): Performed by: INTERNAL MEDICINE

## 2022-10-25 PROCEDURE — 80076 HEPATIC FUNCTION PANEL: CPT

## 2022-10-25 PROCEDURE — 85025 COMPLETE CBC W/AUTO DIFF WBC: CPT

## 2022-10-25 PROCEDURE — 6360000002 HC RX W HCPCS: Performed by: INTERNAL MEDICINE

## 2022-10-25 PROCEDURE — 80048 BASIC METABOLIC PNL TOTAL CA: CPT

## 2022-10-25 PROCEDURE — 99233 SBSQ HOSP IP/OBS HIGH 50: CPT | Performed by: INTERNAL MEDICINE

## 2022-10-25 PROCEDURE — 1200000000 HC SEMI PRIVATE

## 2022-10-25 PROCEDURE — 36415 COLL VENOUS BLD VENIPUNCTURE: CPT

## 2022-10-25 PROCEDURE — 84100 ASSAY OF PHOSPHORUS: CPT

## 2022-10-25 RX ADMIN — HEPARIN SODIUM 2000 UNITS: 1000 INJECTION INTRAVENOUS; SUBCUTANEOUS at 07:19

## 2022-10-25 RX ADMIN — DILTIAZEM HYDROCHLORIDE 240 MG: 240 CAPSULE, COATED, EXTENDED RELEASE ORAL at 08:37

## 2022-10-25 RX ADMIN — FERROUS SULFATE TAB 325 MG (65 MG ELEMENTAL FE) 325 MG: 325 (65 FE) TAB at 16:24

## 2022-10-25 RX ADMIN — POTASSIUM CHLORIDE 10 MEQ: 750 TABLET, EXTENDED RELEASE ORAL at 16:24

## 2022-10-25 RX ADMIN — Medication 500 MG: at 08:37

## 2022-10-25 RX ADMIN — METOPROLOL TARTRATE 50 MG: 50 TABLET, FILM COATED ORAL at 20:00

## 2022-10-25 RX ADMIN — MULTIVITAMIN TABLET 1 TABLET: TABLET at 08:37

## 2022-10-25 RX ADMIN — PANTOPRAZOLE SODIUM 40 MG: 40 TABLET, DELAYED RELEASE ORAL at 05:43

## 2022-10-25 RX ADMIN — PANTOPRAZOLE SODIUM 40 MG: 40 TABLET, DELAYED RELEASE ORAL at 16:24

## 2022-10-25 RX ADMIN — PRAVASTATIN SODIUM 20 MG: 20 TABLET ORAL at 16:24

## 2022-10-25 RX ADMIN — Medication 500 MG: at 20:00

## 2022-10-25 RX ADMIN — METOPROLOL TARTRATE 50 MG: 50 TABLET, FILM COATED ORAL at 08:37

## 2022-10-25 RX ADMIN — FERROUS SULFATE TAB 325 MG (65 MG ELEMENTAL FE) 325 MG: 325 (65 FE) TAB at 08:37

## 2022-10-25 RX ADMIN — POTASSIUM CHLORIDE 10 MEQ: 750 TABLET, EXTENDED RELEASE ORAL at 08:37

## 2022-10-25 RX ADMIN — FUROSEMIDE 20 MG: 20 TABLET ORAL at 08:37

## 2022-10-25 RX ADMIN — HEPARIN SODIUM 14.07 UNITS/KG/HR: 10000 INJECTION, SOLUTION INTRAVENOUS at 19:11

## 2022-10-25 ASSESSMENT — PAIN SCALES - GENERAL
PAINLEVEL_OUTOF10: 0

## 2022-10-25 NOTE — PLAN OF CARE
Problem: Discharge Planning  Goal: Discharge to home or other facility with appropriate resources  Outcome: Progressing  Flowsheets (Taken 10/25/2022 0900)  Discharge to home or other facility with appropriate resources: Identify barriers to discharge with patient and caregiver     Problem: ABCDS Injury Assessment  Goal: Absence of physical injury  Outcome: Progressing     Problem: Safety - Adult  Goal: Free from fall injury  Outcome: Progressing  Flowsheets (Taken 10/25/2022 1101)  Free From Fall Injury: Instruct family/caregiver on patient safety     Problem: Chronic Conditions and Co-morbidities  Goal: Patient's chronic conditions and co-morbidity symptoms are monitored and maintained or improved  Outcome: Progressing  Flowsheets (Taken 10/25/2022 0900)  Care Plan - Patient's Chronic Conditions and Co-Morbidity Symptoms are Monitored and Maintained or Improved: Monitor and assess patient's chronic conditions and comorbid symptoms for stability, deterioration, or improvement

## 2022-10-25 NOTE — PLAN OF CARE
Problem: Discharge Planning  Goal: Discharge to home or other facility with appropriate resources  Recent Flowsheet Documentation  Taken 10/24/2022 2030 by Flavia Johnson  Discharge to home or other facility with appropriate resources: Identify barriers to discharge with patient and caregiver  10/24/2022 1310 by Sabrina Marr RN  Outcome: Progressing     Problem: ABCDS Injury Assessment  Goal: Absence of physical injury  10/24/2022 1310 by Sabrina Marr RN  Outcome: Progressing     Problem: Safety - Adult  Goal: Free from fall injury  10/24/2022 2252 by Flavia Johnson  Outcome: Progressing  10/24/2022 1310 by Sabrina Marr RN  Outcome: Progressing     Problem: Chronic Conditions and Co-morbidities  Goal: Patient's chronic conditions and co-morbidity symptoms are monitored and maintained or improved  Recent Flowsheet Documentation  Taken 10/24/2022 2030 by [address] [place] [address] - Patient's Chronic Conditions and Co-Morbidity Symptoms are Monitored and Maintained or Improved: Monitor and assess patient's chronic conditions and comorbid symptoms for stability, deterioration, or improvement     Problem: Pain  Goal: Verbalizes/displays adequate comfort level or baseline comfort level  Recent Flowsheet Documentation  Taken 10/24/2022 2030 by Flavia Johnson  Verbalizes/displays adequate comfort level or baseline comfort level: Encourage patient to monitor pain and request assistance

## 2022-10-25 NOTE — CARE COORDINATION
For cochlear implant in am. Cardiac/stroke hx; on coumadin; iv heparin pre-op PCP Dr.Richard Beauchamp. Plan for now is home, no needs. Will follow. Suzi Hester.

## 2022-10-25 NOTE — PROGRESS NOTES
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 female for whom pharmacy has been asked to manage warfarin therapy. Weight:   Wt Readings from Last 1 Encounters:   10/25/22 151 lb 14.4 oz (68.9 kg)       TSH:    Lab Results   Component Value Date/Time    TSH 1.490 10/22/2022 01:19 AM       Hepatic Function Panel:                            Lab Results   Component Value Date/Time    ALKPHOS 142 10/25/2022 05:45 AM    ALT 13 10/25/2022 05:45 AM    AST 17 10/25/2022 05:45 AM    PROT 6.4 10/25/2022 05:45 AM    BILITOT 0.7 10/25/2022 05:45 AM    BILIDIR 0.3 10/25/2022 05:45 AM    IBILI 0.4 10/25/2022 05:45 AM    LABALBU 3.7 10/25/2022 05:45 AM       Current warfarin drug-drug interactions include: No significant interactions    Recent Labs     10/23/22  0312 10/24/22  0300 10/25/22  0545   HGB 10.3* 10.6* 10.7*    315 306       Date Warfarin Dose INR Heparin or LMWH Comment   10/23 Hold 1.6 Heparin gtt    10/24 HOLD -- Heparin gtt    10/25 HOLD -- Heparin gtt                    Assessment:  Patient is a 78 y.o. female on warfarin for Atrial Fibrillation. Patient's home warfarin dosing regimen is warfarin 1.5mg daily. Goal INR 2 - 3  INR 1.6 on 10/23  Going to OR 10/26 for right cochlear implantation warfarin on hold, currently on heparin drip.     Plan:  Continue heparin drip, hold warfarin  Will resume warfarin post-op  Daily PT/INR until the INR is stable within the therapeutic range  Pharmacist will follow and monitor/adjust dosing as necessary    Thank you for this consult,    Yunier Rodarte [facility] 10/25/2022 1:17 PM

## 2022-10-25 NOTE — PROGRESS NOTES
PROGRESS  NOTE --                                                          INTERNAL  MEDICINE                                                                              I  PERSONALLY SAW , EXAMINED, AND CARED [facility], 10/25/2022     LABS, XRAY ,CHART, AND MEDICATIONS  REVIEWED BY ME       Chief complaint: Bilateral hearing loss, heparin bridge, chronic A. fib, history of multiple episodes of embolic CVA      [id number]: Maddie Weeks is alert awake and cooperative; oriented ×3. Denies any chest pain dyspnea nausea emesis. Tolerating diet. No abdominal pain. Sitting quietly working on her sewing project. No complaints voiced. Denies any prior history of iron deficiency nor iron deficiency anemia. I advised her regarding current iron status as well as anemia status. Afebrile last 24 hours. Blood pressure 140/66. SPO2 95 on room air. Intake and output -255 cc. Fasting glucose 138 potassium 4.2 magnesium 2.0. CRP 0.3. Alkaline phosphatase elevated 168. Direct bilirubin 0.4 slightly elevated. Indirect 0.5. A1c 6.6. Hemoglobin 10.3 WBC 6.8. Platelet count 355. INR 1.6, currently off warfarin. ESR 16. G27 folic acid normal.  TIBC slightly elevated 354 iron and iron saturation both low with ferritin of 15. Cardiology consult from yesterday appreciated. Low gradient severe aortic stenosis nonsymptomatic by 2D echo. Patient's cardiac risk index is low. No further cardiac testing prior to surgery. Patient has been evaluated for possible TAVR procedure, by [facility] cardiology \"down the road\". Continue metoprolol 50 mg by mouth twice daily and diltiazem 240 mg daily. Avoid vasodilators such as nitroglycerin hydralazine due to severe aortic stenosis. Avoid hypotension and surgery.   Continue IV heparin hold 6 hours prior to surgery, restart 24 hours after procedure if no concern for bleeding and bridged to warfarin. 10/24/2022-patient sitting up in bed; no chest pain no dyspnea. She has been up walking the halls frequently without difficulty. Appetite remains good. Afebrile last 24 hours. Blood pressure 144/85. SPO2 96 on room air. Intake and output -705 cc. Fasting glucose 144. Potassium 4.5. Hemoglobin 10.6 WBC 7.4. Stool for occult blood negative. Pharmacy consult yesterday appreciated, they will follow postop regarding initiation of warfarin. Cardiology note from today appreciated. Continue heparin drip continue to hold warfarin. Occupational therapy AMPAC score from today 23/24. Pharmacy note from today reviewed, continue heparin drip warfarin on hold. Patient had preop chest x-ray done yesterday with following result--      FINDINGS:   The heart has upper borderline size. The CTR: 17.8/31.7 cm. There is mild   to moderate ectasia of the thoracic aorta. Lungs are normally expanded. No infiltrates, consolidations or pleural   effusions are seen. Discrete plate atelectasis are seen in the costophrenic sulcus bilaterally. There is no perihilar vascular congestion. Degenerative changes relate with spondylosis seen in the mid lower thoracic   spine. Impression:       No acute cardiopulmonary process. 10/25/2022-patient sitting up in bed; just returned from bathroom and washing. No chest pain no dyspnea appetite good feels fine. I discussed with her upcoming surgery tomorrow afternoon and cessation of heparin tomorrow morning. Afebrile last 24 hours. Blood pressure 132/74. SPO2 96 on room air. Intake and output -1405 cc. Glucose 137 fasting. Hemoglobin 10.7 WBC 6.2. Cardiology note from today unchanged, continue heparin drip warfarin on hold. INR 1.6. Pharmacy note from today unchanged.       Objective:     PHYSICAL EXAM:    VS: /74   Pulse 81   Temp 98 °F (36.7 °C) (Oral)   Resp 16   Ht 5' 3\" (1.6 m)   Wt 151 lb 14.4 oz (68.9 kg) SpO2 96%   BMI 26.91 kg/m²     Labs:   CBC:   Lab Results   Component Value Date/Time    WBC 6.2 10/25/2022 05:45 AM    RBC 4.02 10/25/2022 05:45 AM    HGB 10.7 10/25/2022 05:45 AM    HCT 35.7 10/25/2022 05:45 AM    MCV 88.8 10/25/2022 05:45 AM    MCH 26.6 10/25/2022 05:45 AM    MCHC 30.0 10/25/2022 05:45 AM    RDW 15.9 10/25/2022 05:45 AM     10/25/2022 05:45 AM    MPV 9.0 10/25/2022 05:45 AM     CBC with Differential:    Lab Results   Component Value Date/Time    WBC 6.2 10/25/2022 05:45 AM    RBC 4.02 10/25/2022 05:45 AM    HGB 10.7 10/25/2022 05:45 AM    HCT 35.7 10/25/2022 05:45 AM     10/25/2022 05:45 AM    MCV 88.8 10/25/2022 05:45 AM    MCH 26.6 10/25/2022 05:45 AM    MCHC 30.0 10/25/2022 05:45 AM    RDW 15.9 10/25/2022 05:45 AM    SEGSPCT 76 02/27/2013 12:00 PM    LYMPHOPCT 31.5 10/25/2022 05:45 AM    MONOPCT 7.0 10/25/2022 05:45 AM    BASOPCT 0.3 10/25/2022 05:45 AM    MONOSABS 0.43 10/25/2022 05:45 AM    LYMPHSABS 1.94 10/25/2022 05:45 AM    EOSABS 0.13 10/25/2022 05:45 AM    BASOSABS 0.02 10/25/2022 05:45 AM     Hemoglobin/Hematocrit:    Lab Results   Component Value Date/Time    HGB 10.7 10/25/2022 05:45 AM    HCT 35.7 10/25/2022 05:45 AM     CMP:    Lab Results   Component Value Date/Time     10/25/2022 05:45 AM    K 4.1 10/25/2022 05:45 AM    K 3.9 10/21/2022 06:10 PM     10/25/2022 05:45 AM    CO2 25 10/25/2022 05:45 AM    BUN 14 10/25/2022 05:45 AM    CREATININE 0.8 10/25/2022 05:45 AM    GFRAA >60 09/30/2015 06:40 AM    LABGLOM >60 10/25/2022 05:45 AM    GLUCOSE 137 10/25/2022 05:45 AM    PROT 6.4 10/25/2022 05:45 AM    LABALBU 3.7 10/25/2022 05:45 AM    CALCIUM 9.7 10/25/2022 05:45 AM    BILITOT 0.7 10/25/2022 05:45 AM    ALKPHOS 142 10/25/2022 05:45 AM    AST 17 10/25/2022 05:45 AM    ALT 13 10/25/2022 05:45 AM     BMP:    Lab Results   Component Value Date/Time     10/25/2022 05:45 AM    K 4.1 10/25/2022 05:45 AM    K 3.9 10/21/2022 06:10 PM     10/25/2022 05:45 AM    CO2 25 10/25/2022 05:45 AM    BUN 14 10/25/2022 05:45 AM    LABALBU 3.7 10/25/2022 05:45 AM    CREATININE 0.8 10/25/2022 05:45 AM    CALCIUM 9.7 10/25/2022 05:45 AM    GFRAA >60 09/30/2015 06:40 AM    LABGLOM >60 10/25/2022 05:45 AM    GLUCOSE 137 10/25/2022 05:45 AM     Hepatic Function Panel:    Lab Results   Component Value Date/Time    ALKPHOS 142 10/25/2022 05:45 AM    ALT 13 10/25/2022 05:45 AM    AST 17 10/25/2022 05:45 AM    PROT 6.4 10/25/2022 05:45 AM    BILITOT 0.7 10/25/2022 05:45 AM    BILIDIR 0.3 10/25/2022 05:45 AM    IBILI 0.4 10/25/2022 05:45 AM    LABALBU 3.7 10/25/2022 05:45 AM     Ionized Calcium:  No results found for: IONCA  Magnesium:    Lab Results   Component Value Date/Time    MG 1.9 10/25/2022 05:45 AM     Phosphorus:    Lab Results   Component Value Date/Time    PHOS 3.6 10/25/2022 05:45 AM     LDH:  No results found for: LDH  Uric Acid:    Lab Results   Component Value Date/Time    LABURIC 5.7 10/22/2022 01:19 AM     PT/INR:    Lab Results   Component Value Date/Time    PROTIME 17.1 10/23/2022 05:00 AM    INR 1.6 10/23/2022 05:00 AM     Warfarin PT/INR:  No components found for: PTPATWAR, PTINRWAR  PTT:    Lab Results   Component Value Date/Time    APTT 49.0 10/25/2022 05:45 AM   [APTT}  Troponin:    Lab Results   Component Value Date/Time    TROPONINI <0.01 09/29/2015 03:25 PM     Last 3 Troponin:    Lab Results   Component Value Date/Time    TROPONINI <0.01 09/29/2015 03:25 PM    TROPONINI 0.05 03/21/2013 11:45 PM     U/A:    Lab Results   Component Value Date/Time    COLORU Yellow 09/29/2015 03:40 PM    PROTEINU Negative 09/29/2015 03:40 PM    PHUR 5.5 09/29/2015 03:40 PM    [address] NONE 09/29/2015 03:40 PM    RBCUA 1-3 09/29/2015 03:40 PM    BACTERIA NONE 09/29/2015 03:40 PM    CLARITYU Clear 09/29/2015 03:40 PM    SPECGRAV 1.010 09/29/2015 03:40 PM    LEUKOCYTESUR Negative 09/29/2015 03:40 PM    UROBILINOGEN 0.2 09/29/2015 03:40 PM    BILIRUBINUR Negative 09/29/2015 03:40 PM    BLOODU TRACE 09/29/2015 03:40 PM    GLUCOSEU Negative 09/29/2015 03:40 PM     HgBA1c:    Lab Results   Component Value Date/Time    LABA1C 6.1 10/22/2022 01:19 AM     FLP:    Lab Results   Component Value Date/Time    TRIG 69 10/22/2022 01:19 AM    HDL 42 10/22/2022 01:19 AM    LDLCALC 77 10/22/2022 01:19 AM    LABVLDL 14 10/22/2022 01:19 AM     TSH:    Lab Results   Component Value Date/Time    TSH 1.490 10/22/2022 01:19 AM     VITAMIN B12: No components found for: B12  FOLATE:    Lab Results   Component Value Date/Time    FOLATE 15.8 10/22/2022 01:19 AM     IRON:    Lab Results   Component Value Date/Time    IRON 28 10/22/2022 01:19 AM     Iron Saturation:  No components found for: PERCENTFE  TIBC:    Lab Results   Component Value Date/Time    TIBC 354 10/22/2022 01:19 AM     FERRITIN:    Lab Results   Component Value Date/Time    FERRITIN 15 10/22/2022 01:19 AM        General appearance: Alert, Awake, Oriented times 3, no distress; significantly hard of hearing  Skin: Warm and dry ; no rashes  Head: Normocephalic. No masses, lesions or tenderness noted  Eyes: Conjunctivae pink, sclera white. PERRL,EOM-I  Ears: External ears normal  Nose/Sinuses: Nares normal. Septum midline. Mucosa normal. No drainage  Oropharynx: Oropharynx clear with no exudate seen  Neck: Supple. No jugular venous distension, lymphadenopathy or thyromegaly Trachea midline  Lungs: Clear to auscultation bilaterally. No rhonchi, crackles or wheezes  Heart: Irregularly irregular at 80/min; grade 1/6 to 2/6 systolic murmur second right intercostal space  Abdomen: Soft, non-tender. BS normal. No masses, organomegaly; no rebound or guarding  Extremities: Trace edema bilaterally; moderate varicosities both extremities  Musculoskeletal: Muscular strength appears intact. Neuro:  No focal motor defects ; II-XII grossly intact .  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 (HCC)    Severe aortic stenosis    Hearing loss    Diabetes mellitus ([address]    Pre-op evaluation    Iron deficiency anemia    Hypertension    DJD (degenerative joint disease)    Atrial fibrillation (HCC)    Mitral regurgitation    Pulmonary hypertension ([address]  Resolved Problems: Moderate aortic stenosis by prior echocardiogram      PLAN:  SEE ORDERS      RE  CHANGES AND FINDINGS   Medications reviewed with patient  GI prophylaxis  DVT prophylaxis  Consultants notes reviewed    Stool for occult blood  Add ferrous sulfate 325 mg twice daily  Preop PA lateral chest x-ray--I advised patient regarding same  Continue IV heparin drip, hold 6 hours prior to surgery restart 24 hours after procedure if no concern for bleeding and bridged to warfarin; remain off warfarin at this time. I advised patient regarding same  Avoid hypotension, vasodilators such as nitroglycerin and hydralazine due to severe aortic stenosis  Continue metoprolol tartrate 50 mg twice daily continue diltiazem 240 mg CD once daily  Hold aspirin restart after procedure  Continue pravastatin  Since glucose numbers have been so good, change glucose checks to each a.m.   Furosemide 20 mg daily  Niacin 500 mg twice daily  Protonix 40 mg 2 times daily  Potassium chloride 10 mEq 2 times daily  Pravastatin 20 mg daily  Pharmacy will consult regarding initiation of warfarin after surgery, patient had been on 1.5 mg warfarin daily prior to prehospitalization  10/24/2022  Heparin drip continues  Warfarin and aspirin on hold  Diltiazem 240 mg daily  Ferrous sulfate 325 mg twice daily  Furosemide 20 mg daily  Metoprolol tartrate 50 mg twice daily  Potassium chloride 10 mEq twice daily  Protonix 40 mg twice daily  10/24/2022  Heparin drip continues  Heparin will stop 6 hours prior to surgical procedure  Heparin will restart 24 hours after surgery if okay with ENT and no bleeding  Warfarin will be started by pharmacy after surgery  Ferrous sulfate 325 mg twice daily  Furosemide 20 mg daily  Potassium chloride 10 mEq twice daily by mouth      Discussed with patient and nursing. Carlin German DO     12:56 PM     10/25/2022    TIME > 35 MINUTES    >  50 %  OF  TIME  DISCUSSION               ------------  INFORMATION  -----------      DIET:ADULT DIET; Regular; 5 carb choices (75 gm/meal); Low Fat/Low Chol/High Fiber/EDIE; Gluten Free  Diet NPO Exceptions are: Sips of Water with Meds        Allergies   Allergen Reactions    Gluten Other (See Comments)         MEDICATION SIDE EFFECTS:none       SCHEDULED MEDS:                                 Scheduled Meds:   ferrous sulfate  325 mg Oral BID WC    multivitamin  1 tablet Oral Daily    [Held by provider] aspirin  81 mg Oral Daily    dilTIAZem  240 mg Oral Daily    metoprolol  50 mg Oral BID    niacin  500 mg Oral BID    pravastatin  20 mg Oral Daily    pantoprazole  40 mg Oral BID AC    furosemide  20 mg Oral Daily    potassium chloride  10 mEq Oral BID WC       Continuous Infusions:   dextrose      heparin (PORCINE) Infusion 14.07 Units/kg/hr (10/25/22 0707)         Data:       Intake/Output Summary (Last 24 hours) at 10/25/2022 1256  Last data filed at 10/25/2022 0836  Gross per 24 hour   Intake 600 ml   Output 1300 ml   Net -700 ml       Wt Readings from Last 3 Encounters:   10/25/22 151 lb 14.4 oz (68.9 kg)   09/15/22 153 lb (69.4 kg)   09/01/22 153 lb (69.4 kg)       Labs: Additional    GLUCOSE:No results for input(s): POCGLU in the last 72 hours. BNP:  Lab Results   Component Value Date     (H) 03/21/2013       CRP:   Recent Labs     10/23/22  0312   CRP 0.3       ESR:  Recent Labs     10/23/22  0312   SEDRATE 16       RADIOLOGY: REVIEWED AVAILABLE REPORT  XR CHEST (2 VW)   Final Result   No acute cardiopulmonary process.                    Susanne Mishra DO    12:56 PM 10/25/2022      Voice recognition software used for dictation

## 2022-10-25 NOTE — PROGRESS NOTES
Spoke with Dr. Rodrigo Linton with ENT regarding heparin drip - will contact Dr. Jolie Huggins on plan to hold/stop drip for procedure. Page sent to Dr. Jolie Huggins - plan to hold 6 hours prior to scheduled procedure. Dr. Rodrigo Linton updated of such.      Electronically signed by Yessi Carrasquillo RN on 10/25/2022 at 9:31 AM

## 2022-10-25 NOTE — PROGRESS NOTES
[address], 1945    Date of Service: 10/25/2022    Chief Complaint: Follow-up for preoperative cardiac evaluation, aortic stenosis, atrial fibrillation    HPI:  No new overnight cardiac complaints. She denies chest pain, shortness of breath, or syncope. She denies orthopnea, PND or lower extremity edema. Atrial fibrillation with CVR on EKG. Cochlear implantation scheduled for 10/26/22. Currently on a heparin drip. She was previously followed by Dr. Rayna Roger; she established care with me in 1/2019. She presents today for follow-up of atrial fibrillation and valvular heart disease. She is a 78 y.o. female with a history of chronic atrial fibrillation, hypertension, dyslipidemia, TIA's, and aortic stenosis. Her last TIA episode was in 09/2015 when anticoagulation was held for a procedure. Review of Systems:  Cardiac: As per HPI  General: No fever, chills  Pulmonary: As per HPI  HEENT: No visual disturbances, difficult swallowing, +hearing impairment  GI: No nausea, vomiting  : No dysuria, hematuria  Endocrine: No thyroid disease, +DM  Musculoskeletal: LOPEZ x 4, no focal motor deficits  Skin: Intact, no rashes  Neuro: No headache, seizures  Psych: Currently with no depression, anxiety    Past medical history:   Hearing impairment with ear surgery that temporarily improved her hearing. She wears bilateral hearing aids as of 03/2010. Lower extremity venous stripping, 1981. Hypertension. Hyperlipidemia. Total cholesterol 162, triglycerides 86, HDL 42, LDL 96 - 09/2009. Colonoscopy, 2008 with small polyps that were removed. Diverticular disease also noted. EGD, 09/2009 for hem positive stools. Hiatal hernia noted. Biopsies taken and omeprazole prescribed. AF post EGD, 09/2009. She was admitted to [facility] & longterm. She declined cardioversion and has been in chronic AF since that time. CBC, BMP, cardiac enzymes - 09/2009 normal.  Echo, 09/2009.   Mild aortic stenosis, peak gradient 18 mmHg, LA enlarged at 4.1 cm. LV size and function normal.    Rate control and warfarin prescribed 09/2009. Lifetime nonsmoker. No history of alcohol intake. No family history of premature vascular disease. Echo, 08/30/2012 with normal LV size and systolic function, JOHN, mild AS with peak gradient 15 mmHg, MERCEDEZ 1.6 cm². RVSP 37 mmHg. Lexiscan MPS, 08/30/2012. Normal.  EF 70%. [facility] admission, 03/22/2013 with speech difficulty. Cr 1.2, otherwise normal BMP. CBC normal.  TSH normal.  CT of the head with no acute pathology. MRI showed small lacunar infarct in the left posterior parietal lobe. INR on admission was 1.5. Discharged after symptoms resolved within 24 hours. Discharged with adequate anticoagulation. Echo, 09/18/2014. Normal EF, normal LV size, E/E' 19, JOHN (LA 59 mm), mild to moderate AS (mean gradient 16 mmHg, MERCEDEZ 1.5 cm², VTI 0.52). RVSP 48 mmHg, dilated IVC with poor inspiratory collapse. [facility] presentation, 09/30/2015 with transient confusion and right-sided visual field defects. Resolved prior to presentation. Her anticoagulation was interrupted for breast biopsy a few weeks prior. On presentation, INR was 2.2. 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 Brain perfusion scan revealed moderate area of ischemia in the left posterior parietal lobe. She was discharged home in stable condition. Echo, 10/01/2015, Dr. Madyson Winn. Normal EF. Indeterminate diastolic function. Severe biatrial enlargement. Mild MR.  Mild-moderate AS. Mean gradient of 20 mmHg. Mild AR. Mild pulmonary HTN (41 mmHg).      /66   Pulse 75   Temp 97.6 °F (36.4 °C) (Oral)   Resp 16   Ht 5' 3\" (1.6 m)   Wt 151 lb 14.4 oz (68.9 kg)   SpO2 96%   BMI 26.91 kg/m²    Appearance: Awake, alert, no acute respiratory distress  Skin: Intact, no rash  Head: Normocephalic, atraumatic  Eyes: EOMI, no conjunctival erythema  ENMT: No pharyngeal erythema, MMM, no rhinorrhea  Neck: Supple, no carotid bruits  Lungs: Clear to auscultation bilaterally. No wheezes, rales, or rhonchi.   Cardiac: IRRR, 3/6 DELL  Abdomen: Soft, nontender, +bowel sounds  Extremities: Moves all extremities x 4, no lower extremity edema  Neurologic: No focal motor deficits apparent, normal mood and affect, alert and oriented x 3    Current Facility-Administered Medications   Medication Dose Route Frequency Provider Last Rate Last Admin    ferrous sulfate (IRON 325) tablet 325 mg  325 mg Oral BID  Shakircristobal German, DO   325 mg at 10/24/22 1756    multivitamin 1 tablet  1 tablet Oral Daily Divya German, DO   1 tablet at 10/24/22 0252    [Held by provider] aspirin EC tablet 81 mg  81 mg Oral Daily Shakir HERBERT Maxi, DO   81 mg at 10/22/22 0747    dilTIAZem (CARDIZEM CD) extended release capsule 240 mg  240 mg Oral Daily Shakir A Maxi, DO   240 mg at 10/24/22 0842    metoprolol tartrate (LOPRESSOR) tablet 50 mg  50 mg Oral BID Meadeparis German, DO   50 mg at 10/24/22 2040    niacin extended release capsule 500 mg  500 mg Oral BID [facility] Nida German, DO   500 mg at 10/24/22 2040    pravastatin (PRAVACHOL) tablet 20 mg  20 mg Oral Daily Shakir Yadavedgar, DO   20 mg at 10/24/22 1756    pantoprazole (PROTONIX) tablet 40 mg  40 mg Oral BID  Shakir KEON German, DO   40 mg at 10/25/22 0543    furosemide (LASIX) tablet 20 mg  20 mg Oral Daily Shakircristobal Yadavedgar, DO   20 mg at 10/24/22 0842    potassium chloride (KLOR-CON M) extended release tablet 10 mEq  10 mEq Oral BID  Shakir HERBERT Maxi, DO   10 mEq at 10/24/22 1755    perflutren lipid microspheres (DEFINITY) injection 1.65 mg  1.5 mL IntraVENous ONCE PRN [facility] A Mihok, DO        glucose chewable tablet 16 g  4 tablet Oral PRN [facility] A Mihok, DO        dextrose bolus 10% 125 mL  125 mL IntraVENous PRN Divya German DO        Or    dextrose bolus 10% 250 mL  250 mL IntraVENous PRN Shakir German DO        glucagon (rDNA) injection 1 mg  1 mg IntraMUSCular PRN Divya German DO dextrose 10 % infusion   IntraVENous Continuous PRN [facility] Mihok, DO        potassium chloride (KLOR-CON M) extended release tablet 40 mEq  40 mEq [facility]N [facility], [facility]        Or    potassium bicarb-citric acid (EFFER-K) effervescent tablet 40 mEq  40 mEq Oral PRN [facility] Mihok, DO        Or    potassium chloride 10 mEq/100 mL IVPB (Peripheral Line)  10 mEq IntraVENous PRN [facility] Mihok, DO        acetaminophen (TYLENOL) tablet 650 mg  650 mg Oral Q4H PRN Shakir A Mihok, DO        heparin (porcine) injection 4,000 Units  4,000 Units IntraVENous PRN Shakri A Mihok, DO        heparin (porcine) injection 2,000 Units  2,000 Units IntraVENous PRN [facility] [place], DO   2,000 Units at 10/25/22 0719    heparin 25,000 units in dextrose 5% 250 mL (premix) infusion  5-30 Units/kg/hr IntraVENous Continuous Shakir A Carlak, DO 9.4 mL/hr at 10/25/22 0707 14.07 Units/kg/hr at 10/25/22 0707     Lab Results   Component Value Date    WBC 6.2 10/25/2022    HGB 10.7 (L) 10/25/2022    HCT 35.7 10/25/2022    MCV 88.8 10/25/2022     10/25/2022     Lab Results   Component Value Date    CREATININE 0.8 10/25/2022    BUN 14 10/25/2022     10/25/2022    K 4.1 10/25/2022     10/25/2022    CO2 25 10/25/2022     Lab Results   Component Value Date    TSH 1.490 10/22/2022     Lab Results   Component Value Date    DIGOXIN 1.4 03/23/2013     Lab Results   Component Value Date    ALT 13 10/25/2022    AST 17 10/25/2022    ALKPHOS 142 (H) 10/25/2022    BILITOT 0.7 10/25/2022     Lab Results   Component Value Date    INR 1.6 10/23/2022    INR 1.9 10/22/2022    INR 2.0 10/21/2022    PROTIME 17.1 (H) 10/23/2022    PROTIME 21.0 (H) 10/22/2022    PROTIME 22.4 (H) 10/21/2022       Telemetry reviewed (10/25/2022): atrial fibrillation, rate 70's    Echo, 10/01/2015, Dr. Leann Flores. Normal EF. Indeterminate diastolic function. Severe biatrial enlargement. Mild MR.  Mild-moderate AS. Mean gradient of 20 mmHg. Mild AR.   Mild pulmonary HTN (41 mmHg). Echocardiogram: 1/9/18 ([place])   Left ventricle is normal in size . Mild left ventricular concentric hypertrophy noted. No regional wall motion abnormalities seen. Normal left ventricular ejection fraction. The left atrium is moderately dilated. Moderately enlarged right atrium size. Mild thickening of the mitral valve leaflets. Mild mitral regurgitation is present. Moderate aortic stenosis is present. Mild aortic regurgitation is noted. Mild to moderate tricuspid regurgitation. Pulmonary hypertension is mild . TTE-9/25/2020:   Normal left ventricular systolic function. Ejection fraction is visually estimated at > 60%. Normal right ventricular size and function (TAPSE 2.0 cm). Indeterminate diastolic function. Severely dilated left atrium by volume index. Severely dilated right atrium. Moderate mitral regurgitation. Mild-moderate aortic regurgitation. Low gradient severe aortic stenosis (AV peak velocity 3.2 m/s, AV mean gradient 21 mmHg, AV area 0.8 cm2, peak velocity dimensionless index 0.23, VTI dimensionless index 0.25, stroke volume index 35 mL/m2). Mild tricuspid regurgitation. PASP is estimated at 36 mmHg. TTE-9/30/2022:  - Exam indication: AS   - The left ventricle is small. There is concentric left ventricular hypertrophy. Left ventricular systolic function is normal. EF = 69 ± 5% (2D biplane)   - The right ventricle is normal in size. Right ventricular systolic function is   normal.   - The left atrial cavity is severely dilated. - The right atrial cavity is severely dilated. - The visualized aorta is borderline dilated with a maximal dimension of 3.9 cm.   - Tricuspid aortic valve. There is severe aortic valve stenosis. AV area is 0.74   cm² (0.43 cm²/m²) by continuity, VTI. The peak gradient is 48 mmHg, the mean   gradient is 29 mmHg and the dimensionless valve index is 0.24. Prior peak/mean   gradients of 39/23 mmHg.  Today's gradients were averaged d/t afib. - There is mild mitral annular calcification observed anterior and posterior. There   is mild (1+) mitral valve regurgitation. There is mild thickening.   - Exam was compared with the prior  echocardiographic exam performed on 3/29/22. There has been an increase in transaortic gradients on today's exam, otherwise   similar findings.           Assessment:  Preoperative cardiac evaluation (asymmetric sensorineural hearing loss: right cochlear implantation scheduled for 10/26/22)  Permanent atrial fibrillation with controlled rate  Elevated [id number] score -- on warfarin for anticoagulation (INR 2.0 --> 1.9 --> 1.6)  VHD (including severe aortic stenosis) -- follows at [facility]  Hypertension -- most recent 's-140's  Hyperlipidemia -- on statin  Type 2 diabetes -- Hgb A1c 6.1  Severe bi-atrial enlargement  Prior TIA's (including TIA x 2 when anticoagulation was held for procedures in the past)  Anemia -- most recent Hgb 10.6 --> 10.7     Plan:   Continue heparin drip / coumadin is currently on hold / plan is for bridging anticoagulation post-operatively / most recent INR 1.6  Monitor hemodynamics closely  Continue current rate controlling agents  Records from [facility] reviewed (including 9/2022 echocardiogram)  Telemetry reviewed (rate controlled AF)  Monitor labs  Care per ENT    Jyothi Marinelli MD, MD  [facility]) Cardiology

## 2022-10-26 ENCOUNTER — ANESTHESIA (OUTPATIENT)
Dept: OPERATING ROOM | Age: 77
DRG: 141 | End: 2022-10-26
Payer: MEDICARE

## 2022-10-26 ENCOUNTER — ANESTHESIA EVENT (OUTPATIENT)
Dept: OPERATING ROOM | Age: 77
DRG: 141 | End: 2022-10-26
Payer: MEDICARE

## 2022-10-26 LAB
ALBUMIN SERPL-MCNC: 3.8 G/DL (ref 3.5–5.2)
ALP BLD-CCNC: 131 U/L (ref 35–104)
ALT SERPL-CCNC: 12 U/L (ref 0–32)
ANION GAP SERPL CALCULATED.3IONS-SCNC: 6 MMOL/L (ref 7–16)
APTT: 73.9 SEC (ref 24.5–35.1)
AST SERPL-CCNC: 15 U/L (ref 0–31)
BASOPHILS ABSOLUTE: 0.02 E9/L (ref 0–0.2)
BASOPHILS RELATIVE PERCENT: 0.3 % (ref 0–2)
BILIRUB SERPL-MCNC: 0.6 MG/DL (ref 0–1.2)
BILIRUBIN DIRECT: 0.3 MG/DL (ref 0–0.3)
BILIRUBIN, INDIRECT: 0.3 MG/DL (ref 0–1)
BUN BLDV-MCNC: 17 MG/DL (ref 6–23)
CALCIUM SERPL-MCNC: 9.6 MG/DL (ref 8.6–10.2)
CHLORIDE BLD-SCNC: 104 MMOL/L (ref 98–107)
CO2: 27 MMOL/L (ref 22–29)
CREAT SERPL-MCNC: 0.9 MG/DL (ref 0.5–1)
EOSINOPHILS ABSOLUTE: 0.14 E9/L (ref 0.05–0.5)
EOSINOPHILS RELATIVE PERCENT: 2.1 % (ref 0–6)
GFR SERPL CREATININE-BSD FRML MDRD: >60 ML/MIN/1.73
GLUCOSE BLD-MCNC: 153 MG/DL (ref 74–99)
HCT VFR BLD CALC: 35.7 % (ref 34–48)
HEMOGLOBIN: 11 G/DL (ref 11.5–15.5)
IMMATURE GRANULOCYTES #: 0.02 E9/L
IMMATURE GRANULOCYTES %: 0.3 % (ref 0–5)
LYMPHOCYTES ABSOLUTE: 2.09 E9/L (ref 1.5–4)
LYMPHOCYTES RELATIVE PERCENT: 31.4 % (ref 20–42)
MAGNESIUM: 1.9 MG/DL (ref 1.6–2.6)
MCH RBC QN AUTO: 27.3 PG (ref 26–35)
MCHC RBC AUTO-ENTMCNC: 30.8 % (ref 32–34.5)
MCV RBC AUTO: 88.6 FL (ref 80–99.9)
METER GLUCOSE: 125 MG/DL (ref 74–99)
MONOCYTES ABSOLUTE: 0.5 E9/L (ref 0.1–0.95)
MONOCYTES RELATIVE PERCENT: 7.5 % (ref 2–12)
NEUTROPHILS ABSOLUTE: 3.88 E9/L (ref 1.8–7.3)
NEUTROPHILS RELATIVE PERCENT: 58.4 % (ref 43–80)
PDW BLD-RTO: 16 FL (ref 11.5–15)
PHOSPHORUS: 3.8 MG/DL (ref 2.5–4.5)
PLATELET # BLD: 285 E9/L (ref 130–450)
PMV BLD AUTO: 8.8 FL (ref 7–12)
POTASSIUM SERPL-SCNC: 4.2 MMOL/L (ref 3.5–5)
RBC # BLD: 4.03 E12/L (ref 3.5–5.5)
SODIUM BLD-SCNC: 137 MMOL/L (ref 132–146)
TOTAL PROTEIN: 6.2 G/DL (ref 6.4–8.3)
WBC # BLD: 6.7 E9/L (ref 4.5–11.5)

## 2022-10-26 PROCEDURE — 20922 REMOVAL OF FASCIA FOR GRAFT: CPT | Performed by: OTOLARYNGOLOGY

## 2022-10-26 PROCEDURE — 2720000010 HC SURG SUPPLY STERILE: Performed by: OTOLARYNGOLOGY

## 2022-10-26 PROCEDURE — 84100 ASSAY OF PHOSPHORUS: CPT

## 2022-10-26 PROCEDURE — 1200000000 HC SEMI PRIVATE

## 2022-10-26 PROCEDURE — 80048 BASIC METABOLIC PNL TOTAL CA: CPT

## 2022-10-26 PROCEDURE — 6360000002 HC RX W HCPCS

## 2022-10-26 PROCEDURE — 2580000003 HC RX 258: Performed by: ANESTHESIOLOGY

## 2022-10-26 PROCEDURE — 3600000014 HC SURGERY LEVEL 4 ADDTL 15MIN: Performed by: OTOLARYNGOLOGY

## 2022-10-26 PROCEDURE — 85730 THROMBOPLASTIN TIME PARTIAL: CPT

## 2022-10-26 PROCEDURE — 6370000000 HC RX 637 (ALT 250 FOR IP): Performed by: INTERNAL MEDICINE

## 2022-10-26 PROCEDURE — 0JB10ZZ EXCISION OF FACE SUBCUTANEOUS TISSUE AND FASCIA, OPEN APPROACH: ICD-10-PCS | Performed by: OTOLARYNGOLOGY

## 2022-10-26 PROCEDURE — 3700000000 HC ANESTHESIA ATTENDED CARE: Performed by: OTOLARYNGOLOGY

## 2022-10-26 PROCEDURE — 7100000000 HC PACU RECOVERY - FIRST 15 MIN: Performed by: OTOLARYNGOLOGY

## 2022-10-26 PROCEDURE — 36415 COLL VENOUS BLD VENIPUNCTURE: CPT

## 2022-10-26 PROCEDURE — 92651 AEP HEARING STATUS DETER I&R: CPT | Performed by: AUDIOLOGIST

## 2022-10-26 PROCEDURE — 6360000002 HC RX W HCPCS: Performed by: STUDENT IN AN ORGANIZED HEALTH CARE EDUCATION/TRAINING PROGRAM

## 2022-10-26 PROCEDURE — 6360000002 HC RX W HCPCS: Performed by: NURSE ANESTHETIST, CERTIFIED REGISTERED

## 2022-10-26 PROCEDURE — 85025 COMPLETE CBC W/AUTO DIFF WBC: CPT

## 2022-10-26 PROCEDURE — 83735 ASSAY OF MAGNESIUM: CPT

## 2022-10-26 PROCEDURE — 2580000003 HC RX 258: Performed by: STUDENT IN AN ORGANIZED HEALTH CARE EDUCATION/TRAINING PROGRAM

## 2022-10-26 PROCEDURE — 3700000001 HC ADD 15 MINUTES (ANESTHESIA): Performed by: OTOLARYNGOLOGY

## 2022-10-26 PROCEDURE — 6370000000 HC RX 637 (ALT 250 FOR IP): Performed by: STUDENT IN AN ORGANIZED HEALTH CARE EDUCATION/TRAINING PROGRAM

## 2022-10-26 PROCEDURE — 69930 IMPLANT COCHLEAR DEVICE: CPT | Performed by: OTOLARYNGOLOGY

## 2022-10-26 PROCEDURE — 99232 SBSQ HOSP IP/OBS MODERATE 35: CPT | Performed by: INTERNAL MEDICINE

## 2022-10-26 PROCEDURE — 7100000001 HC PACU RECOVERY - ADDTL 15 MIN: Performed by: OTOLARYNGOLOGY

## 2022-10-26 PROCEDURE — 09HD05Z INSERTION OF SINGLE CHANNEL COCHLEAR PROSTHESIS INTO RIGHT INNER EAR, OPEN APPROACH: ICD-10-PCS | Performed by: OTOLARYNGOLOGY

## 2022-10-26 PROCEDURE — 2500000003 HC RX 250 WO HCPCS: Performed by: OTOLARYNGOLOGY

## 2022-10-26 PROCEDURE — 2500000003 HC RX 250 WO HCPCS

## 2022-10-26 PROCEDURE — 3600000004 HC SURGERY LEVEL 4 BASE: Performed by: OTOLARYNGOLOGY

## 2022-10-26 PROCEDURE — 2709999900 HC NON-CHARGEABLE SUPPLY: Performed by: OTOLARYNGOLOGY

## 2022-10-26 PROCEDURE — 6370000000 HC RX 637 (ALT 250 FOR IP): Performed by: OTOLARYNGOLOGY

## 2022-10-26 PROCEDURE — 6360000002 HC RX W HCPCS: Performed by: OTOLARYNGOLOGY

## 2022-10-26 PROCEDURE — 80076 HEPATIC FUNCTION PANEL: CPT

## 2022-10-26 PROCEDURE — L8614 COCHLEAR DEVICE: HCPCS | Performed by: OTOLARYNGOLOGY

## 2022-10-26 PROCEDURE — 82962 GLUCOSE BLOOD TEST: CPT

## 2022-10-26 DEVICE — IMPLANTABLE DEVICE
Type: IMPLANTABLE DEVICE | Site: COCHLEA | Status: FUNCTIONAL
Brand: COCHLEAR™ NUCLEUS® CI624 COCHLEAR IMPLANT WITH SLIM 20 ELECTRODE

## 2022-10-26 RX ORDER — MEPERIDINE HYDROCHLORIDE 25 MG/ML
12.5 INJECTION INTRAMUSCULAR; INTRAVENOUS; SUBCUTANEOUS EVERY 5 MIN PRN
Status: DISCONTINUED | OUTPATIENT
Start: 2022-10-26 | End: 2022-10-27

## 2022-10-26 RX ORDER — PHENYLEPHRINE HYDROCHLORIDE 10 MG/ML
INJECTION INTRAVENOUS PRN
Status: DISCONTINUED | OUTPATIENT
Start: 2022-10-26 | End: 2022-10-26 | Stop reason: SDUPTHER

## 2022-10-26 RX ORDER — SODIUM CHLORIDE 0.9 % (FLUSH) 0.9 %
5-40 SYRINGE (ML) INJECTION EVERY 12 HOURS SCHEDULED
Status: DISCONTINUED | OUTPATIENT
Start: 2022-10-26 | End: 2022-10-26 | Stop reason: HOSPADM

## 2022-10-26 RX ORDER — DEXAMETHASONE SODIUM PHOSPHATE 4 MG/ML
INJECTION, SOLUTION INTRA-ARTICULAR; INTRALESIONAL; INTRAMUSCULAR; INTRAVENOUS; SOFT TISSUE PRN
Status: DISCONTINUED | OUTPATIENT
Start: 2022-10-26 | End: 2022-10-26 | Stop reason: SDUPTHER

## 2022-10-26 RX ORDER — MORPHINE SULFATE 2 MG/ML
2 INJECTION, SOLUTION INTRAMUSCULAR; INTRAVENOUS EVERY 5 MIN PRN
Status: DISCONTINUED | OUTPATIENT
Start: 2022-10-26 | End: 2022-10-27

## 2022-10-26 RX ORDER — WARFARIN SODIUM 3 MG/1
1.5 TABLET ORAL
Status: DISPENSED | OUTPATIENT
Start: 2022-10-26 | End: 2022-10-27

## 2022-10-26 RX ORDER — MORPHINE SULFATE 2 MG/ML
1 INJECTION, SOLUTION INTRAMUSCULAR; INTRAVENOUS EVERY 5 MIN PRN
Status: DISCONTINUED | OUTPATIENT
Start: 2022-10-26 | End: 2022-10-27

## 2022-10-26 RX ORDER — PROPOFOL 10 MG/ML
INJECTION, EMULSION INTRAVENOUS CONTINUOUS PRN
Status: DISCONTINUED | OUTPATIENT
Start: 2022-10-26 | End: 2022-10-26 | Stop reason: SDUPTHER

## 2022-10-26 RX ORDER — AZITHROMYCIN 250 MG/1
250 TABLET, FILM COATED ORAL DAILY
Status: COMPLETED | OUTPATIENT
Start: 2022-10-26 | End: 2022-10-30

## 2022-10-26 RX ORDER — ONDANSETRON 2 MG/ML
INJECTION INTRAMUSCULAR; INTRAVENOUS PRN
Status: DISCONTINUED | OUTPATIENT
Start: 2022-10-26 | End: 2022-10-26 | Stop reason: SDUPTHER

## 2022-10-26 RX ORDER — LIDOCAINE HYDROCHLORIDE AND EPINEPHRINE 10; 10 MG/ML; UG/ML
INJECTION, SOLUTION INFILTRATION; PERINEURAL PRN
Status: DISCONTINUED | OUTPATIENT
Start: 2022-10-26 | End: 2022-10-26 | Stop reason: ALTCHOICE

## 2022-10-26 RX ORDER — EPINEPHRINE 1 MG/ML
INJECTION, SOLUTION, CONCENTRATE INTRAVENOUS PRN
Status: DISCONTINUED | OUTPATIENT
Start: 2022-10-26 | End: 2022-10-26 | Stop reason: ALTCHOICE

## 2022-10-26 RX ORDER — SODIUM CHLORIDE 9 MG/ML
INJECTION, SOLUTION INTRAVENOUS PRN
Status: DISCONTINUED | OUTPATIENT
Start: 2022-10-26 | End: 2022-10-26 | Stop reason: HOSPADM

## 2022-10-26 RX ORDER — PHENYLEPHRINE HCL IN 0.9% NACL 1 MG/10 ML
SYRINGE (ML) INTRAVENOUS PRN
Status: DISCONTINUED | OUTPATIENT
Start: 2022-10-26 | End: 2022-10-26 | Stop reason: SDUPTHER

## 2022-10-26 RX ORDER — SODIUM CHLORIDE 0.9 % (FLUSH) 0.9 %
5-40 SYRINGE (ML) INJECTION EVERY 12 HOURS SCHEDULED
Status: DISCONTINUED | OUTPATIENT
Start: 2022-10-26 | End: 2022-11-02 | Stop reason: HOSPADM

## 2022-10-26 RX ORDER — PROPOFOL 10 MG/ML
INJECTION, EMULSION INTRAVENOUS PRN
Status: DISCONTINUED | OUTPATIENT
Start: 2022-10-26 | End: 2022-10-26 | Stop reason: SDUPTHER

## 2022-10-26 RX ORDER — SODIUM CHLORIDE 9 MG/ML
INJECTION, SOLUTION INTRAVENOUS PRN
Status: DISCONTINUED | OUTPATIENT
Start: 2022-10-26 | End: 2022-11-02 | Stop reason: HOSPADM

## 2022-10-26 RX ORDER — IBUPROFEN 600 MG/1
600 TABLET ORAL EVERY 6 HOURS PRN
Status: DISCONTINUED | OUTPATIENT
Start: 2022-10-26 | End: 2022-11-02 | Stop reason: HOSPADM

## 2022-10-26 RX ORDER — FENTANYL CITRATE 50 UG/ML
INJECTION, SOLUTION INTRAMUSCULAR; INTRAVENOUS PRN
Status: DISCONTINUED | OUTPATIENT
Start: 2022-10-26 | End: 2022-10-26 | Stop reason: SDUPTHER

## 2022-10-26 RX ORDER — SODIUM CHLORIDE 0.9 % (FLUSH) 0.9 %
5-40 SYRINGE (ML) INJECTION PRN
Status: DISCONTINUED | OUTPATIENT
Start: 2022-10-26 | End: 2022-10-26 | Stop reason: HOSPADM

## 2022-10-26 RX ORDER — SODIUM CHLORIDE, SODIUM LACTATE, POTASSIUM CHLORIDE, CALCIUM CHLORIDE 600; 310; 30; 20 MG/100ML; MG/100ML; MG/100ML; MG/100ML
INJECTION, SOLUTION INTRAVENOUS CONTINUOUS
Status: DISCONTINUED | OUTPATIENT
Start: 2022-10-26 | End: 2022-10-26

## 2022-10-26 RX ORDER — LIDOCAINE HYDROCHLORIDE 20 MG/ML
INJECTION, SOLUTION EPIDURAL; INFILTRATION; INTRACAUDAL; PERINEURAL PRN
Status: DISCONTINUED | OUTPATIENT
Start: 2022-10-26 | End: 2022-10-26 | Stop reason: SDUPTHER

## 2022-10-26 RX ORDER — SUCCINYLCHOLINE/SOD CL,ISO/PF 200MG/10ML
SYRINGE (ML) INTRAVENOUS PRN
Status: DISCONTINUED | OUTPATIENT
Start: 2022-10-26 | End: 2022-10-26 | Stop reason: SDUPTHER

## 2022-10-26 RX ORDER — SODIUM CHLORIDE 0.9 % (FLUSH) 0.9 %
5-40 SYRINGE (ML) INJECTION PRN
Status: DISCONTINUED | OUTPATIENT
Start: 2022-10-26 | End: 2022-11-02 | Stop reason: HOSPADM

## 2022-10-26 RX ORDER — BACITRACIN ZINC 500 [USP'U]/G
OINTMENT TOPICAL PRN
Status: DISCONTINUED | OUTPATIENT
Start: 2022-10-26 | End: 2022-10-26 | Stop reason: ALTCHOICE

## 2022-10-26 RX ADMIN — FERROUS SULFATE TAB 325 MG (65 MG ELEMENTAL FE) 325 MG: 325 (65 FE) TAB at 08:41

## 2022-10-26 RX ADMIN — WATER 2000 MG: 1 INJECTION INTRAMUSCULAR; INTRAVENOUS; SUBCUTANEOUS at 14:18

## 2022-10-26 RX ADMIN — LIDOCAINE HYDROCHLORIDE 80 MG: 20 INJECTION, SOLUTION EPIDURAL; INFILTRATION; INTRACAUDAL; PERINEURAL at 14:05

## 2022-10-26 RX ADMIN — SODIUM CHLORIDE, PRESERVATIVE FREE 10 ML: 5 INJECTION INTRAVENOUS at 08:44

## 2022-10-26 RX ADMIN — ONDANSETRON 4 MG: 2 INJECTION INTRAMUSCULAR; INTRAVENOUS at 15:53

## 2022-10-26 RX ADMIN — PRAVASTATIN SODIUM 20 MG: 20 TABLET ORAL at 18:25

## 2022-10-26 RX ADMIN — Medication 10 ML: at 20:24

## 2022-10-26 RX ADMIN — DEXAMETHASONE SODIUM PHOSPHATE 8 MG: 4 INJECTION, SOLUTION INTRAMUSCULAR; INTRAVENOUS at 14:18

## 2022-10-26 RX ADMIN — PHENYLEPHRINE HYDROCHLORIDE 200 MCG: 10 INJECTION INTRAVENOUS at 14:54

## 2022-10-26 RX ADMIN — Medication 500 MG: at 20:24

## 2022-10-26 RX ADMIN — FUROSEMIDE 20 MG: 20 TABLET ORAL at 18:50

## 2022-10-26 RX ADMIN — FERROUS SULFATE TAB 325 MG (65 MG ELEMENTAL FE) 325 MG: 325 (65 FE) TAB at 18:25

## 2022-10-26 RX ADMIN — MULTIVITAMIN TABLET 1 TABLET: TABLET at 08:41

## 2022-10-26 RX ADMIN — Medication 500 MG: at 08:42

## 2022-10-26 RX ADMIN — AZITHROMYCIN 250 MG: 250 TABLET, FILM COATED ORAL at 18:42

## 2022-10-26 RX ADMIN — FENTANYL CITRATE 100 MCG: 50 INJECTION, SOLUTION INTRAMUSCULAR; INTRAVENOUS at 14:05

## 2022-10-26 RX ADMIN — POTASSIUM CHLORIDE 10 MEQ: 750 TABLET, EXTENDED RELEASE ORAL at 08:42

## 2022-10-26 RX ADMIN — METOPROLOL TARTRATE 50 MG: 50 TABLET, FILM COATED ORAL at 08:42

## 2022-10-26 RX ADMIN — Medication 200 MCG: at 14:05

## 2022-10-26 RX ADMIN — PROPOFOL 110 MG: 10 INJECTION, EMULSION INTRAVENOUS at 14:05

## 2022-10-26 RX ADMIN — SODIUM CHLORIDE, POTASSIUM CHLORIDE, SODIUM LACTATE AND CALCIUM CHLORIDE: 600; 310; 30; 20 INJECTION, SOLUTION INTRAVENOUS at 07:49

## 2022-10-26 RX ADMIN — FENTANYL CITRATE 100 MCG: 50 INJECTION, SOLUTION INTRAMUSCULAR; INTRAVENOUS at 15:14

## 2022-10-26 RX ADMIN — POTASSIUM CHLORIDE 10 MEQ: 750 TABLET, EXTENDED RELEASE ORAL at 18:25

## 2022-10-26 RX ADMIN — PROPOFOL 150 MCG/KG/MIN: 10 INJECTION, EMULSION INTRAVENOUS at 14:19

## 2022-10-26 RX ADMIN — SODIUM CHLORIDE: 9 INJECTION, SOLUTION INTRAVENOUS at 13:53

## 2022-10-26 RX ADMIN — METOPROLOL TARTRATE 50 MG: 50 TABLET, FILM COATED ORAL at 20:23

## 2022-10-26 RX ADMIN — PANTOPRAZOLE SODIUM 40 MG: 40 TABLET, DELAYED RELEASE ORAL at 18:24

## 2022-10-26 RX ADMIN — DILTIAZEM HYDROCHLORIDE 240 MG: 240 CAPSULE, COATED, EXTENDED RELEASE ORAL at 08:41

## 2022-10-26 RX ADMIN — Medication 140 MG: at 14:05

## 2022-10-26 ASSESSMENT — PAIN DESCRIPTION - LOCATION: LOCATION: EAR

## 2022-10-26 ASSESSMENT — PAIN SCALES - GENERAL
PAINLEVEL_OUTOF10: 0

## 2022-10-26 ASSESSMENT — PAIN DESCRIPTION - ORIENTATION: ORIENTATION: RIGHT

## 2022-10-26 ASSESSMENT — LIFESTYLE VARIABLES: SMOKING_STATUS: 0

## 2022-10-26 NOTE — PROGRESS NOTES
Per Dr. Rubin Wiggins, if he is making the decision regarding coumadin, would hold tonight. Spoke with Dr. Francesco Alves regarding coumadin, will hold tonight.

## 2022-10-26 NOTE — PROGRESS NOTES
Patient was here for cochlear implantation on the right, with  array (YF:[id number]). No extracochlear electrodes. Impedance values were within normal limits across the electrode array. NRT was completed and responses were present on all electrodes. Results discussed with Dr. Kylah Hannon. The initial activation is scheduled for 11/10.     Aggie Ervin CCC-A  [id number] [facility] [id number]  Electronically signed by Aggie Ervin on 10/26/2022 at 2:45 PM

## 2022-10-26 NOTE — ANESTHESIA POSTPROCEDURE EVALUATION
Department of Anesthesiology  Postprocedure Note    Patient: Jessika Worley  MRN: [id number]  YOB: 1945  Date of evaluation: 10/26/2022      Procedure Summary     Date: 10/26/22 Room / Location: [place] [address] / [address] [place] [address]    Anesthesia Start: 1353 Anesthesia Stop: 5484    Procedure: RIGHT COCHLEAR IMPLANT INSERTION WITH NERVE INTEGRITY MONITOR (Right: Ear) Diagnosis:       Profound hearing loss      (Profound hearing loss [H91.90])    Surgeons: Greg Martínez MD Responsible Provider: Lyn Beltrán MD    Anesthesia Type: General ASA Status: 3          Anesthesia Type: General    Denis Phase I:      Denis Phase II:        Anesthesia Post Evaluation    Patient location during evaluation: PACU  Patient participation: complete - patient cannot participate  Level of consciousness: awake and alert  Airway patency: patent  Nausea & Vomiting: no nausea and no vomiting  Complications: no  Cardiovascular status: blood pressure returned to baseline and hemodynamically stable  Respiratory status: acceptable  Hydration status: euvolemic

## 2022-10-26 NOTE — OP NOTE
preserved. The incudostapedial joint was noted, and the basal turn of the cochlea was identified, as was the round window. The overhang of the round window was taken down and the round window could be seen in its entirety. A round window opening was created with the Thapa needle. Healon was used to fill the round window site and prevent entry of bone dust and blood within the cochlea. The cochlear device () was verified and loaded onto the field. The Device was secured in the well under the periosteum in a tight pocket a 3.0 monocryl suture. The electrode array was placed into the round window site, without resistance, achieving a complete insertion. The round window site was packed with the temporalis facial graft. The incision was irrigated, and then closed in layers. The periosteum was closed with 3.0 monocryl. 4.0 monocryl was used for deep dermal, and 5.0 fast absorbing gut was used for skin. The incision was cleaned. Intraoperative SmartNav was used with good impedences and nerve responses. A mastoid dressing was applied. The patient was handed to anesthesia for wake up without complication.     Complications: none    EBL: minimal    Findings: Complete insertion of electrodes, preserved chorda tympani and facial nerves    Device used:     Follow up: 2 weeks

## 2022-10-26 NOTE — PROGRESS NOTES
PROGRESS  NOTE --                                                          INTERNAL  MEDICINE                                                                              I  PERSONALLY SAW , EXAMINED, AND CARED [facility], 10/26/2022     LABS, XRAY ,CHART, AND MEDICATIONS  REVIEWED BY ME       Chief complaint: Bilateral hearing loss, heparin bridge, chronic A. fib, history of multiple episodes of embolic CVA      13/01/6508-[id number]: Conner January is alert awake and cooperative; oriented ×3. Denies any chest pain dyspnea nausea emesis. Tolerating diet. No abdominal pain. Sitting quietly working on her sewing project. No complaints voiced. Denies any prior history of iron deficiency nor iron deficiency anemia. I advised her regarding current iron status as well as anemia status. Afebrile last 24 hours. Blood pressure 140/66. SPO2 95 on room air. Intake and output -255 cc. Fasting glucose 138 potassium 4.2 magnesium 2.0. CRP 0.3. Alkaline phosphatase elevated 168. Direct bilirubin 0.4 slightly elevated. Indirect 0.5. A1c 6.6. Hemoglobin 10.3 WBC 6.8. Platelet count 322. INR 1.6, currently off warfarin. ESR 16. I72 folic acid normal.  TIBC slightly elevated 354 iron and iron saturation both low with ferritin of 15. Cardiology consult from yesterday appreciated. Low gradient severe aortic stenosis nonsymptomatic by 2D echo. Patient's cardiac risk index is low. No further cardiac testing prior to surgery. Patient has been evaluated for possible TAVR procedure, by [facility] cardiology \"down the road\". Continue metoprolol 50 mg by mouth twice daily and diltiazem 240 mg daily. Avoid vasodilators such as nitroglycerin hydralazine due to severe aortic stenosis. Avoid hypotension and surgery.   Continue IV heparin hold 6 hours prior to surgery, restart 24 hours after procedure if no concern for bleeding and bridged to warfarin. 10/24/2022-patient sitting up in bed; no chest pain no dyspnea. She has been up walking the halls frequently without difficulty. Appetite remains good. Afebrile last 24 hours. Blood pressure 144/85. SPO2 96 on room air. Intake and output -705 cc. Fasting glucose 144. Potassium 4.5. Hemoglobin 10.6 WBC 7.4. Stool for occult blood negative. Pharmacy consult yesterday appreciated, they will follow postop regarding initiation of warfarin. Cardiology note from today appreciated. Continue heparin drip continue to hold warfarin. Occupational therapy AMPAC score from today 23/24. Pharmacy note from today reviewed, continue heparin drip warfarin on hold. Patient had preop chest x-ray done yesterday with following result--      FINDINGS:   The heart has upper borderline size. The CTR: 17.8/31.7 cm. There is mild   to moderate ectasia of the thoracic aorta. Lungs are normally expanded. No infiltrates, consolidations or pleural   effusions are seen. Discrete plate atelectasis are seen in the costophrenic sulcus bilaterally. There is no perihilar vascular congestion. Degenerative changes relate with spondylosis seen in the mid lower thoracic   spine. Impression:       No acute cardiopulmonary process. 10/25/2022-patient sitting up in bed; just returned from bathroom and washing. No chest pain no dyspnea appetite good feels fine. I discussed with her upcoming surgery tomorrow afternoon and cessation of heparin tomorrow morning. Afebrile last 24 hours. Blood pressure 132/74. SPO2 96 on room air. Intake and output -1405 cc. Glucose 137 fasting. Hemoglobin 10.7 WBC 6.2. Cardiology note from today unchanged, continue heparin drip warfarin on hold. INR 1.6. Pharmacy note from today unchanged. 10/26/2022-no reported chest pain or dyspnea, patient just leaving for surgery. Appetite good. Afebrile last 24 hours. Blood pressure 128/85.   SPO2 96 on room air. Intake and output +54 cc. Fasting glucose 153 protein 6.2. Hemoglobin 11.0 WBC 6.7. Cardiology note from today appreciated. Heparin drip on hold due to surgery today.     Objective:     PHYSICAL EXAM:    VS: /85   Pulse 75   Temp 98.6 °F (37 °C) (Oral)   Resp 16   Ht 5' 3\" (1.6 m)   Wt 154 lb 5.2 oz (70 kg)   SpO2 96%   BMI 27.34 kg/m²     Labs:   CBC:   Lab Results   Component Value Date/Time    WBC 6.7 10/26/2022 05:31 AM    RBC 4.03 10/26/2022 05:31 AM    HGB 11.0 10/26/2022 05:31 AM    HCT 35.7 10/26/2022 05:31 AM    MCV 88.6 10/26/2022 05:31 AM    MCH 27.3 10/26/2022 05:31 AM    MCHC 30.8 10/26/2022 05:31 AM    RDW 16.0 10/26/2022 05:31 AM     10/26/2022 05:31 AM    MPV 8.8 10/26/2022 05:31 AM     CBC with Differential:    Lab Results   Component Value Date/Time    WBC 6.7 10/26/2022 05:31 AM    RBC 4.03 10/26/2022 05:31 AM    HGB 11.0 10/26/2022 05:31 AM    HCT 35.7 10/26/2022 05:31 AM     10/26/2022 05:31 AM    MCV 88.6 10/26/2022 05:31 AM    MCH 27.3 10/26/2022 05:31 AM    MCHC 30.8 10/26/2022 05:31 AM    RDW 16.0 10/26/2022 05:31 AM    SEGSPCT 76 02/27/2013 12:00 PM    LYMPHOPCT 31.4 10/26/2022 05:31 AM    MONOPCT 7.5 10/26/2022 05:31 AM    BASOPCT 0.3 10/26/2022 05:31 AM    MONOSABS 0.50 10/26/2022 05:31 AM    LYMPHSABS 2.09 10/26/2022 05:31 AM    EOSABS 0.14 10/26/2022 05:31 AM    BASOSABS 0.02 10/26/2022 05:31 AM     Hemoglobin/Hematocrit:    Lab Results   Component Value Date/Time    HGB 11.0 10/26/2022 05:31 AM    HCT 35.7 10/26/2022 05:31 AM     CMP:    Lab Results   Component Value Date/Time     10/26/2022 05:31 AM    K 4.2 10/26/2022 05:31 AM    K 3.9 10/21/2022 06:10 PM     10/26/2022 05:31 AM    CO2 27 10/26/2022 05:31 AM    BUN 17 10/26/2022 05:31 AM    CREATININE 0.9 10/26/2022 05:31 AM    GFRAA >60 09/30/2015 06:40 AM    LABGLOM >60 10/26/2022 05:31 AM    GLUCOSE 153 10/26/2022 05:31 AM    PROT 6.2 10/26/2022 05:31 AM    LABALBU 3.8 10/26/2022 05:31 AM    CALCIUM 9.6 10/26/2022 05:31 AM    BILITOT 0.6 10/26/2022 05:31 AM    ALKPHOS 131 10/26/2022 05:31 AM    AST 15 10/26/2022 05:31 AM    ALT 12 10/26/2022 05:31 AM     BMP:    Lab Results   Component Value Date/Time     10/26/2022 05:31 AM    K 4.2 10/26/2022 05:31 AM    K 3.9 10/21/2022 06:10 PM     10/26/2022 05:31 AM    CO2 27 10/26/2022 05:31 AM    BUN 17 10/26/2022 05:31 AM    LABALBU 3.8 10/26/2022 05:31 AM    CREATININE 0.9 10/26/2022 05:31 AM    CALCIUM 9.6 10/26/2022 05:31 AM    GFRAA >60 09/30/2015 06:40 AM    LABGLOM >60 10/26/2022 05:31 AM    GLUCOSE 153 10/26/2022 05:31 AM     Hepatic Function Panel:    Lab Results   Component Value Date/Time    ALKPHOS 131 10/26/2022 05:31 AM    ALT 12 10/26/2022 05:31 AM    AST 15 10/26/2022 05:31 AM    PROT 6.2 10/26/2022 05:31 AM    BILITOT 0.6 10/26/2022 05:31 AM    BILIDIR 0.3 10/26/2022 05:31 AM    IBILI 0.3 10/26/2022 05:31 AM    LABALBU 3.8 10/26/2022 05:31 AM     Ionized Calcium:  No results found for: IONCA  Magnesium:    Lab Results   Component Value Date/Time    MG 1.9 10/26/2022 05:31 AM     Phosphorus:    Lab Results   Component Value Date/Time    PHOS 3.8 10/26/2022 05:31 AM     LDH:  No results found for: LDH  Uric Acid:    Lab Results   Component Value Date/Time    LABURIC 5.7 10/22/2022 01:19 AM     PT/INR:    Lab Results   Component Value Date/Time    PROTIME 17.1 10/23/2022 05:00 AM    INR 1.6 10/23/2022 05:00 AM     Warfarin PT/INR:  No components found for: PTPATWAR, PTINRWAR  PTT:    Lab Results   Component Value Date/Time    APTT 73.9 10/26/2022 05:31 AM   [APTT}  Troponin:    Lab Results   Component Value Date/Time    TROPONINI <0.01 09/29/2015 03:25 PM     Last 3 Troponin:    Lab Results   Component Value Date/Time    TROPONINI <0.01 09/29/2015 03:25 PM    TROPONINI 0.05 03/21/2013 11:45 PM     U/A:    Lab Results   Component Value Date/Time    COLORU Yellow 09/29/2015 03:40 PM    PROTEINU Negative 09/29/2015 03:40 PM    PHUR 5.5 09/29/2015 03:40 PM    [address] Sharron Jimenez NONE 09/29/2015 03:40 PM    RBCUA 1-3 09/29/2015 03:40 PM    BACTERIA NONE 09/29/2015 03:40 PM    CLARITYU Clear 09/29/2015 03:40 PM    SPECGRAV 1.010 09/29/2015 03:40 PM    LEUKOCYTESUR Negative 09/29/2015 03:40 PM    UROBILINOGEN 0.2 09/29/2015 03:40 PM    BILIRUBINUR Negative 09/29/2015 03:40 PM    BLOODU TRACE 09/29/2015 03:40 PM    GLUCOSEU Negative 09/29/2015 03:40 PM     HgBA1c:    Lab Results   Component Value Date/Time    LABA1C 6.1 10/22/2022 01:19 AM     FLP:    Lab Results   Component Value Date/Time    TRIG 69 10/22/2022 01:19 AM    HDL 42 10/22/2022 01:19 AM    LDLCALC 77 10/22/2022 01:19 AM    LABVLDL 14 10/22/2022 01:19 AM     TSH:    Lab Results   Component Value Date/Time    TSH 1.490 10/22/2022 01:19 AM     VITAMIN B12: No components found for: B12  FOLATE:    Lab Results   Component Value Date/Time    FOLATE 15.8 10/22/2022 01:19 AM     IRON:    Lab Results   Component Value Date/Time    IRON 28 10/22/2022 01:19 AM     Iron Saturation:  No components found for: PERCENTFE  TIBC:    Lab Results   Component Value Date/Time    TIBC 354 10/22/2022 01:19 AM     FERRITIN:    Lab Results   Component Value Date/Time    FERRITIN 15 10/22/2022 01:19 AM        General appearance: Alert, Awake, Oriented times 3, no distress; significantly hard of hearing  Skin: Warm and dry ; no rashes  Head: Normocephalic. No masses, lesions or tenderness noted  Eyes: Conjunctivae pink, sclera white. PERRL,EOM-I  Ears: External ears normal  Nose/Sinuses: Nares normal. Septum midline. Mucosa normal. No drainage  Oropharynx: Oropharynx clear with no exudate seen  Neck: Supple. No jugular venous distension, lymphadenopathy or thyromegaly Trachea midline  Lungs: Clear to auscultation bilaterally. No rhonchi, crackles or wheezes  Heart: Irregularly irregular at 80/min; grade 1/6 to 2/6 systolic murmur second right intercostal space  Abdomen: Soft, non-tender.  BS normal. No masses, organomegaly; no rebound or guarding  Extremities: Trace edema bilaterally; moderate varicosities both extremities  Musculoskeletal: Muscular strength appears intact. Neuro:  No focal motor defects ; II-XII grossly intact . LOPEZMARJORIE vang    TELEMETRY: REVIEWED--Telemetry: Atrial fibrillation    ASSESSMENT:   Principal Problem:    Bilateral deafness  Active Problems:    Cerebrovascular accident (CVA) due to embolic occlusion of left middle cerebral artery (HCC)    Severe aortic stenosis    Hearing loss    Diabetes mellitus (HCC)    Pre-op evaluation    Iron deficiency anemia    Hypertension    DJD (degenerative joint disease)    Atrial fibrillation (HCC)    Mitral regurgitation    Pulmonary hypertension (HCC)    Profound hearing loss  Resolved Problems: Moderate aortic stenosis by prior echocardiogram      PLAN:  SEE ORDERS      RE  CHANGES AND FINDINGS   Medications reviewed with patient  GI prophylaxis  DVT prophylaxis  Consultants notes reviewed    Stool for occult blood  Add ferrous sulfate 325 mg twice daily  Preop PA lateral chest x-ray--I advised patient regarding same  Continue IV heparin drip, hold 6 hours prior to surgery restart 24 hours after procedure if no concern for bleeding and bridged to warfarin; remain off warfarin at this time. I advised patient regarding same  Avoid hypotension, vasodilators such as nitroglycerin and hydralazine due to severe aortic stenosis  Continue metoprolol tartrate 50 mg twice daily continue diltiazem 240 mg CD once daily  Hold aspirin restart after procedure  Continue pravastatin  Since glucose numbers have been so good, change glucose checks to each a.m.   Furosemide 20 mg daily  Niacin 500 mg twice daily  Protonix 40 mg 2 times daily  Potassium chloride 10 mEq 2 times daily  Pravastatin 20 mg daily  Pharmacy will consult regarding initiation of warfarin after surgery, patient had been on 1.5 mg warfarin daily prior to prehospitalization  10/24/2022  Heparin drip continues  Warfarin and aspirin on hold  Diltiazem 240 mg daily  Ferrous sulfate 325 mg twice daily  Furosemide 20 mg daily  Metoprolol tartrate 50 mg twice daily  Potassium chloride 10 mEq twice daily  Protonix 40 mg twice daily  10/25/2022  Heparin drip continues  Heparin will stop 6 hours prior to surgical procedure  Heparin will restart 24 hours after surgery if okay with ENT and no bleeding  Warfarin will be started by pharmacy after surgery  Ferrous sulfate 325 mg twice daily  Furosemide 20 mg daily  Potassium chloride 10 mEq twice daily by mouth  10/26/2022  Diltiazem  mg daily  Aspirin Heparin on hold  Metroprolol tartrate 50 mg twice daily  Furosemide 20 mg daily  Potassium chloride 10 mEq by mouth twice daily  Ferrous sulfate 3 and 25 mg twice daily  Patient for surgery today    Discussed with patient and nursing.       Reufgio German DO     10:55 AM     10/26/2022    TIME > 35 MINUTES    >  50 %  OF  TIME  DISCUSSION               ------------  INFORMATION  -----------      DIET:Diet NPO Exceptions are: Sips of Water with Meds        Allergies   Allergen Reactions    Gluten Other (See Comments)         MEDICATION SIDE EFFECTS:none       SCHEDULED MEDS:                                 Scheduled Meds:   sodium chloride flush  5-40 mL IntraVENous 2 times per day    ceFAZolin (ANCEF) IVPB  2,000 mg IntraVENous On Call to OR    ferrous sulfate  325 mg Oral BID WC    multivitamin  1 tablet Oral Daily    [Held by provider] aspirin  81 mg Oral Daily    dilTIAZem  240 mg Oral Daily    metoprolol  50 mg Oral BID    niacin  500 mg Oral BID    pravastatin  20 mg Oral Daily    pantoprazole  40 mg Oral BID AC    furosemide  20 mg Oral Daily    potassium chloride  10 mEq Oral BID WC       Continuous Infusions:   lactated ringers 125 mL/hr at 10/26/22 0749    sodium chloride      dextrose      heparin (PORCINE) Infusion Stopped (10/26/22 1885)         Data: Intake/Output Summary (Last 24 hours) at 10/26/2022 1055  Last data filed at 10/26/2022 0844  Gross per 24 hour   Intake 1458.78 ml   Output --   Net 1458.78 ml       Wt Readings from Last 3 Encounters:   10/25/22 154 lb 5.2 oz (70 kg)   09/15/22 153 lb (69.4 kg)   09/01/22 153 lb (69.4 kg)       Labs: Additional    GLUCOSE:No results for input(s): POCGLU in the last 72 hours. BNP:  Lab Results   Component Value Date     (H) 03/21/2013       CRP:   No results for input(s): CRP in the last 72 hours. ESR:  No results for input(s): SEDRATE in the last 72 hours. RADIOLOGY: REVIEWED AVAILABLE REPORT  XR CHEST (2 VW)   Final Result   No acute cardiopulmonary process.                    [address],     10:55 AM     10/26/2022      Voice recognition software used for dictation

## 2022-10-26 NOTE — PROGRESS NOTES
[address], 1945    Date of Service: 10/26/2022    Chief Complaint: Follow-up for preoperative cardiac evaluation, aortic stenosis, atrial fibrillation    HPI:  No new overnight cardiac complaints. She denies chest pain, shortness of breath, or syncope. She denies orthopnea, PND or lower extremity edema. Atrial fibrillation with CVR on EKG. Cochlear implantation scheduled for 10/26/22. She remains on a heparin drip. She was previously followed by Dr. Shai Castro; she established care with me in 1/2019. She presents today for follow-up of atrial fibrillation and valvular heart disease. She is a 78 y.o. female with a history of chronic atrial fibrillation, hypertension, dyslipidemia, TIA's, and aortic stenosis. Her last TIA episode was in 09/2015 when anticoagulation was held for a procedure. Review of Systems:  Cardiac: As per HPI  General: No fever, chills  Pulmonary: As per HPI  HEENT: No visual disturbances, difficult swallowing, +hearing impairment  GI: No nausea, vomiting  : No dysuria, hematuria  Endocrine: No thyroid disease, +DM  Musculoskeletal: LOPEZ x 4, no focal motor deficits  Skin: Intact, no rashes  Neuro: No headache, seizures  Psych: Currently with no depression, anxiety    Past medical history:   Hearing impairment with ear surgery that temporarily improved her hearing. She wears bilateral hearing aids as of 03/2010. Lower extremity venous stripping, 1981. Hypertension. Hyperlipidemia. Total cholesterol 162, triglycerides 86, HDL 42, LDL 96 - 09/2009. Colonoscopy, 2008 with small polyps that were removed. Diverticular disease also noted. EGD, 09/2009 for hem positive stools. Hiatal hernia noted. Biopsies taken and omeprazole prescribed. AF post EGD, 09/2009. She was admitted to [facility]. She declined cardioversion and has been in chronic AF since that time. CBC, BMP, cardiac enzymes - 09/2009 normal.  Echo, 09/2009.   Mild aortic stenosis, peak gradient 18 mmHg, LA enlarged at 4.1 cm. LV size and function normal.    Rate control and warfarin prescribed 09/2009. Lifetime nonsmoker. No history of alcohol intake. No family history of premature vascular disease. Echo, 08/30/2012 with normal LV size and systolic function, JOHN, mild AS with peak gradient 15 mmHg, MERCEDEZ 1.6 cm². RVSP 37 mmHg. Lexiscan MPS, 08/30/2012. Normal.  EF 70%. [facility] admission, 03/22/2013 with speech difficulty. Cr 1.2, otherwise normal BMP. CBC normal.  TSH normal.  CT of the head with no acute pathology. MRI showed small lacunar infarct in the left posterior parietal lobe. INR on admission was 1.5. Discharged after symptoms resolved within 24 hours. Discharged with adequate anticoagulation. Echo, 09/18/2014. Normal EF, normal LV size, E/E' 19, JOHN (LA 59 mm), mild to moderate AS (mean gradient 16 mmHg, MERCEDEZ 1.5 cm², VTI 0.52). RVSP 48 mmHg, dilated IVC with poor inspiratory collapse. [facility] presentation, 09/30/2015 with transient confusion and right-sided visual field defects. Resolved prior to presentation. Her anticoagulation was interrupted for breast biopsy a few weeks prior. On presentation, INR was 2.2. 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 Brain perfusion scan revealed moderate area of ischemia in the left posterior parietal lobe. She was discharged home in stable condition. Echo, 10/01/2015, Dr. Phi Rodriguez. Normal EF. Indeterminate diastolic function. Severe biatrial enlargement. Mild MR.  Mild-moderate AS. Mean gradient of 20 mmHg. Mild AR. Mild pulmonary HTN (41 mmHg).      /85   Pulse 75   Temp 98.6 °F (37 °C) (Oral)   Resp 16   Ht 5' 3\" (1.6 m)   Wt 154 lb 5.2 oz (70 kg)   SpO2 96%   BMI 27.34 kg/m²    Appearance: Awake, alert, no acute respiratory distress  Skin: Intact, no rash  Head: Normocephalic, atraumatic  Eyes: EOMI, no conjunctival erythema  ENMT: No pharyngeal erythema, MMM, no rhinorrhea  Neck: Supple, no carotid bruits  Lungs: Clear to auscultation bilaterally. No wheezes, rales, or rhonchi.   Cardiac: IRRR, 3/6 DELL  Abdomen: Soft, nontender, +bowel sounds  Extremities: Moves all extremities x 4, no lower extremity edema  Neurologic: No focal motor deficits apparent, normal mood and affect, alert and oriented x 3    Current Facility-Administered Medications   Medication Dose Route Frequency Provider Last Rate Last Admin    lactated ringers infusion   IntraVENous Continuous Ashley Joanna Galena,  mL/hr at 10/26/22 0749 New Bag at 10/26/22 0749    sodium chloride flush 0.9 % injection 5-40 mL  5-40 mL IntraVENous 2 times per day Gearold Frednanette Galena, DO   10 mL at 10/26/22 0844    sodium chloride flush 0.9 % injection 5-40 mL  5-40 mL IntraVENous PRN Teresadhruv Marshall Galena, DO        0.9 % sodium chloride infusion   IntraVENous PRN Ashley Ibarrananette Galena, DO        ceFAZolin (ANCEF) 2,000 mg in sterile water 20 mL IV syringe  2,000 mg IntraVENous On Call to Lamar 53, DO        ferrous sulfate (IRON 325) tablet 325 mg  325 mg Oral BID  Shakir German, DO   325 mg at 10/26/22 0841    multivitamin 1 tablet  1 tablet Oral Daily Vance German, DO   1 tablet at 10/26/22 0841    [Held by provider] aspirin EC tablet 81 mg  81 mg Oral Daily Shakir German, DO   81 mg at 10/22/22 0747    dilTIAZem (CARDIZEM CD) extended release capsule 240 mg  240 mg Oral Daily Vance German, DO   240 mg at 10/26/22 0841    metoprolol tartrate (LOPRESSOR) tablet 50 mg  50 mg Oral BID Vance German, DO   50 mg at 10/26/22 7338    niacin extended release capsule 500 mg  500 mg Oral BID Vance German, DO   500 mg at 10/26/22 0842    pravastatin (PRAVACHOL) tablet 20 mg  20 mg Oral Daily Shakir German, DO   20 mg at 10/25/22 1624    pantoprazole (PROTONIX) tablet 40 mg  40 mg Oral BID  Shakir German, DO   40 mg at 10/25/22 1624    furosemide (LASIX) tablet 20 mg  20 mg Oral Daily Shakir German DO   20 mg at 10/25/22 0837    potassium chloride (KLOR-CON M) extended release tablet 10 mEq  10 mEq Oral BID WC Rolin [place] Mihok, DO   10 mEq at 10/26/22 4909    perflutren lipid microspheres (DEFINITY) injection 1.65 mg  1.5 mL IntraVENous ONCE PRN Rolin [place] Mihok, DO        glucose chewable tablet 16 g  4 tablet Oral PRN Rolin [place] Mihok, DO        dextrose bolus 10% 125 mL  125 mL IntraVENous PRN Rolin [place] Mihok, DO        Or    dextrose bolus 10% 250 mL  250 mL IntraVENous PRN Deronda Sis A Mihok, DO        glucagon (rDNA) injection 1 mg  1 mg IntraMUSCular PRN Deronda Sis A Mihok, DO        dextrose 10 % infusion   IntraVENous Continuous PRN Shakir A Mihok, DO        potassium chloride (KLOR-CON M) extended release tablet 40 mEq  40 mEq Oral PRN Rolin [place] Mihok, DO        Or    potassium bicarb-citric acid (EFFER-K) effervescent tablet 40 mEq  40 mEq Oral PRN Rolin [place] Mihok, DO        Or    potassium chloride 10 mEq/100 mL IVPB (Peripheral Line)  10 mEq IntraVENous PRN R[place] Mihok, DO        acetaminophen (TYLENOL) tablet 650 mg  650 mg Oral Q4H PRN Shakir A Mihok, DO        heparin (porcine) injection 4,000 Units  4,000 Units IntraVENous PRN Shakir A Mihok, DO        heparin (porcine) injection 2,000 Units  2,000 Units IntraVENous PRN Rolin [place] Mihok, DO   2,000 Units at 10/25/22 0719    heparin 25,000 units in dextrose 5% 250 mL (premix) infusion  5-30 Units/kg/hr IntraVENous Continuous [place]olin [place] Mihok, DO   Stopped at 10/26/22 0616     Lab Results   Component Value Date    WBC 6.7 10/26/2022    HGB 11.0 (L) 10/26/2022    HCT 35.7 10/26/2022    MCV 88.6 10/26/2022     10/26/2022     Lab Results   Component Value Date    CREATININE 0.9 10/26/2022    BUN 17 10/26/2022     10/26/2022    K 4.2 10/26/2022     10/26/2022    CO2 27 10/26/2022     Lab Results   Component Value Date    TSH 1.490 10/22/2022     Lab Results   Component Value Date    DIGOXIN 1.4 03/23/2013     Lab Results   Component Value Date    ALT 12 10/26/2022    AST 15 10/26/2022 ALKPHOS 131 (H) 10/26/2022    BILITOT 0.6 10/26/2022     Lab Results   Component Value Date    INR 1.6 10/23/2022    INR 1.9 10/22/2022    INR 2.0 10/21/2022    PROTIME 17.1 (H) 10/23/2022    PROTIME 21.0 (H) 10/22/2022    PROTIME 22.4 (H) 10/21/2022       Telemetry reviewed (10/26/2022): atrial fibrillation, rate 70's    Echo, 10/01/2015, Dr. Megan Velasquez. Normal EF. Indeterminate diastolic function. Severe biatrial enlargement. Mild MR.  Mild-moderate AS. Mean gradient of 20 mmHg. Mild AR. Mild pulmonary HTN (41 mmHg). Ech[facility]ogram: 1/9/18 ([facility])   Left ventricle is normal in size . Mild left ventricular concentric hypertrophy noted. No regional wall motion abnormalities seen. Normal left ventricular ejection fraction. The left atrium is moderately dilated. Moderately enlarged right atrium size. Mild thickening of the mitral valve leaflets. Mild mitral regurgitation is present. Moderate aortic stenosis is present. Mild aortic regurgitation is noted. Mild to moderate tricuspid regurgitation. Pulmonary hypertension is mild . TTE-9/25/2020:   Normal left ventricular systolic function. Ejection fraction is visually estimated at > 60%. Normal right ventricular size and function (TAPSE 2.0 cm). Indeterminate diastolic function. Severely dilated left atrium by volume index. Severely dilated right atrium. Moderate mitral regurgitation. Mild-moderate aortic regurgitation. Low gradient severe aortic stenosis (AV peak velocity 3.2 m/s, AV mean gradient 21 mmHg, AV area 0.8 cm2, peak velocity dimensionless index 0.23, VTI dimensionless index 0.25, stroke volume index 35 mL/m2). Mild tricuspid regurgitation. PASP is estimated at 36 mmHg. TTE-9/30/2022:  - Exam indication: AS   - The left ventricle is small. There is concentric left ventricular hypertrophy. Left ventricular systolic function is normal. EF = 69 ± 5% (2D biplane)   - The right ventricle is normal in size.

## 2022-10-26 NOTE — ANESTHESIA POSTPROCEDURE EVALUATION
Department of Anesthesiology  Postprocedure Note    Patient: Jessika Worley  MRN: [id number]  YOB: 1945  Date of evaluation: 10/27/2022      Procedure Summary     Date: 10/26/22 Room / Location: [facility] OR 07 / [facility]    Anesthesia Start: 3296 Anesthesia Stop:     Procedure: RIGHT COCHLEAR IMPLANT INSERTION WITH NERVE INTEGRITY MONITOR (Right: Ear) Diagnosis:       Profound hearing loss      (Profound hearing loss [H91.90])    Surgeons: Greg Martínez MD Responsible Provider: Lyn Beltrán MD    Anesthesia Type: general ASA Status: 3          Anesthesia Type: General    Denis Phase I: Denis Score: 8    Denis Phase II:        Anesthesia Post Evaluation    Patient location during evaluation: PACU  Patient participation: complete - patient participated  Level of consciousness: awake  Pain score: 3  Airway patency: patent  Nausea & Vomiting: no nausea and no vomiting  Complications: no  Cardiovascular status: blood pressure returned to baseline  Respiratory status: acceptable  Hydration status: euvolemic

## 2022-10-26 NOTE — CARE COORDINATION
Iv heparin gtt pre-op for cochlear implant;today. plan is home , so far no needs. will follow. Maria Esther Munoz.

## 2022-10-26 NOTE — PROGRESS NOTES
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 female for whom pharmacy has been asked to manage warfarin therapy. Weight:   Wt Readings from Last 1 Encounters:   10/25/22 154 lb 5.2 oz (70 kg)       TSH:    Lab Results   Component Value Date/Time    TSH 1.490 10/22/2022 01:19 AM       Hepatic Function Panel:                            Lab Results   Component Value Date/Time    ALKPHOS 131 10/26/2022 05:31 AM    ALT 12 10/26/2022 05:31 AM    AST 15 10/26/2022 05:31 AM    PROT 6.2 10/26/2022 05:31 AM    BILITOT 0.6 10/26/2022 05:31 AM    BILIDIR 0.3 10/26/2022 05:31 AM    IBILI 0.3 10/26/2022 05:31 AM    LABALBU 3.8 10/26/2022 05:31 AM       Current warfarin drug-drug interactions include: No significant interactions    Recent Labs     10/24/22  0300 10/25/22  0545 10/26/22  0531   HGB 10.6* 10.7* 11.0*    306 285       Date Warfarin Dose INR Heparin or LMWH Comment   10/23 Hold 1.6 Heparin gtt    10/24 HOLD -- Heparin gtt    10/25 HOLD -- Heparin gtt    10/26 1.5mg -- Heparin gtt             Assessment:  Patient is a 78 y.o. female on warfarin for Atrial Fibrillation. Patient's home warfarin dosing regimen is warfarin 1.5mg daily. Goal INR 2 - 3  INR 1.6 on 10/23  Going to OR 10/26 for right cochlear implantation warfarin on hold, currently on heparin drip.     Plan:  Continue heparin drip post-op with bridge warfarin  Warfarin 1.5mg tonight post-op if ok with surgery  Will resume warfarin post-op  Daily PT/INR until the INR is stable within the therapeutic range  Pharmacist will follow and monitor/adjust dosing as necessary    Thank you for this consult,    Asmita Perry, [address] [facility] 10/26/2022 2:19 PM

## 2022-10-26 NOTE — PLAN OF CARE
Problem: Discharge Planning  Goal: Discharge to home or other facility with appropriate resources  10/26/2022 0204 by Kailash Duran  Outcome: Progressing  Flowsheets (Taken 10/25/2022 1945)  Discharge to home or other facility with appropriate resources: Identify barriers to discharge with patient and caregiver  10/25/2022 1853 by Hernan Sands RN  Outcome: Progressing  Flowsheets (Taken 10/25/2022 0900)  Discharge to home or other facility with appropriate resources: Identify barriers to discharge with patient and caregiver     Problem: ABCDS Injury Assessment  Goal: Absence of physical injury  10/25/2022 1853 by Hernan Sands RN  Outcome: Progressing     Problem: Safety - Adult  Goal: Free from fall injury  10/25/2022 1853 by Hernan Sands RN  Outcome: Progressing  Flowsheets (Taken 10/25/2022 1101)  Free From Fall Injury: Instruct family/caregiver on patient safety     Problem: Chronic Conditions and Co-morbidities  Goal: Patient's chronic conditions and co-morbidity symptoms are monitored and maintained or improved  Recent Flowsheet Documentation  Taken 10/25/2022 1945 by [address] [place] [address] - Patient's Chronic Conditions and Co-Morbidity Symptoms are Monitored and Maintained or Improved: Monitor and assess patient's chronic conditions and comorbid symptoms for stability, deterioration, or improvement  10/25/2022 1853 by Hernan Sands RN  Outcome: Progressing  Flowsheets (Taken 10/25/2022 0900)  Care Plan - Patient's Chronic Conditions and Co-Morbidity Symptoms are Monitored and Maintained or Improved: Monitor and assess patient's chronic conditions and comorbid symptoms for stability, deterioration, or improvement     Problem: Pain  Goal: Verbalizes/displays adequate comfort level or baseline comfort level  Recent Flowsheet Documentation  Taken 10/25/2022 1945 by Kailash Duran  Verbalizes/displays adequate comfort level or baseline comfort level: Encourage patient to monitor pain and request assistance  10/25/2022 1853 by Dea Joseph RN  Outcome: Progressing  Flowsheets (Taken 10/25/2022 0830)  Verbalizes/displays adequate comfort level or baseline comfort level: Encourage patient to monitor pain and request assistance

## 2022-10-26 NOTE — ANESTHESIA PRE PROCEDURE
Department of Anesthesiology  Preprocedure Note       Name:  Estephanie Ny   Age:  78 y.o.  :  1945                                          MRN:  [id number]         Date:  10/26/2022      Surgeon: Josefa Gordon):  Kiran Carrington MD    Procedure: Procedure(s):  RIGHT COCHLEAR IMPLANT INSERTION WITH NERVE INTEGRITY MONITOR    Medications prior to admission:   Prior to Admission medications    Medication Sig Start Date End Date Taking? Authorizing Provider   dilTIAZem (DILACOR XR) 240 MG extended release capsule Take 240 mg by mouth in the morning. Historical Provider, MD   aspirin 81 MG EC tablet Take 81 mg by mouth daily    Historical Provider, MD   potassium chloride (KLOR-CON) 10 MEQ extended release tablet take 1 tablet by mouth twice a day 6/10/19   Historical Provider, MD   furosemide (LASIX) 20 MG tablet take 1 tablet by mouth once daily 19   Historical Provider, MD   ACCU-CHEK JR PLUS strip  18   Historical Provider, MD   warfarin (COUMADIN) 1 MG tablet Take 1 mg by mouth daily Pt alternates 1 mg and 1 1/2 mg    Historical Provider, MD   calcium carbonate-vitamin D3 (CALTRATE) 600-400 MG-UNIT TABS per tab Take by mouth    Historical Provider, MD   pravastatin (PRAVACHOL) 40 MG tablet Take 20 mg by mouth daily. Historical Provider, MD   niacin (SLO-NIACIN) 500 MG tablet Take 500 mg by mouth 2 times daily. Historical Provider, MD   omeprazole (PRILOSEC) 20 MG capsule Take 20 mg by mouth 2 times daily. Historical Provider, MD   therapeutic multivitamin-minerals (THERAGRAN-M) tablet Take 1 tablet by mouth daily. Historical Provider, MD   Vitamin D (CHOLECALCIFEROL) 1000 UNITS CAPS capsule Take 600 Units by mouth daily     Historical Provider, MD   metFORMIN (GLUCOPHAGE) 500 MG tablet Take 500 mg by mouth daily (with breakfast). Historical Provider, MD   Chromium-Cinnamon (CINNAMON PLUS CHROMIUM PO) Take 2 tablets by mouth Daily with supper.     Historical Provider, MD metoprolol (LOPRESSOR) 100 MG tablet Take 50 mg by mouth 2 times daily.     Historical Provider, MD       Current medications:    Current Facility-Administered Medications   Medication Dose Route Frequency Provider Last Rate Last Admin    lactated ringers infusion   IntraVENous Continuous Melvia Most [place],  mL/hr at 10/26/22 0749 New Bag at 10/26/22 0749    sodium chloride flush 0.9 % injection 5-40 mL  5-40 mL IntraVENous 2 times per day Mel[facility], DO   10 mL at 10/26/22 6941    sodium chloride flush 0.9 % injection 5-40 mL  5-40 mL IntraVENous PRN Melvia Most [place], DO        0.9 % sodium chloride infusion   IntraVENous PRN Melvia Most [place], DO        ceFAZolin (ANCEF) 2,000 mg in sterile water 20 mL IV syringe  2,000 mg IntraVENous On Call to [facility], DO        ferrous sulfate (IRON 325) tablet 325 mg  325 mg Oral BID [facility], DO   325 mg at 10/26/22 0841    multivitamin 1 tablet  1 tablet Oral Daily [facility], DO   1 tablet at 10/26/22 0841    [Held by provider] aspirin EC tablet 81 mg  81 mg Oral Daily [facility], DO   81 mg at 10/22/22 0747    dilTIAZem (CARDIZEM CD) extended release capsule 240 mg  240 mg Oral Daily [facility], DO   240 mg at 10/26/22 0841    metoprolol tartrate (LOPRESSOR) tablet 50 mg  50 mg Oral BID [facility], DO   50 mg at 10/26/22 9257    niacin extended release capsule 500 mg  500 mg Oral BID [facility], DO   500 mg at 10/26/22 4095    pravastatin (PRAVACHOL) tablet 20 mg  20 mg Oral Daily [facility], DO   20 mg at 10/25/22 1624    pantoprazole (PROTONIX) tablet 40 mg  40 mg Oral BID [facility], DO   40 mg at 10/25/22 1624    furosemide (LASIX) tablet 20 mg  20 mg Oral Daily [facility], DO   20 mg at 10/25/22 0837    potassium chloride (KLOR-CON M) extended release tablet 10 mEq  10 mEq Oral BID ISAIAH German, DO   10 mEq at 10/26/22 0842    perflutren lipid microspheres (DEFINITY) injection 1.65 mg  1.5 mL IntraVENous ONCE PRN Ruffus Schlichter Mihok, DO        glucose chewable tablet 16 g  4 tablet Oral PRN Ruffus Schlichter Mihok, DO        dextrose bolus 10% 125 mL  125 mL IntraVENous PRN Ruffus Schlichter Mihok, DO        Or    dextrose bolus 10% 250 mL  250 mL IntraVENous PRN Ruffus Schlichter Mihok, DO        glucagon (rDNA) injection 1 mg  1 mg IntraMUSCular PRN Ruffus Schlichter Mihok, DO        dextrose 10 % infusion   IntraVENous Continuous PRN Ruffus Schlichter Mihok, DO        potassium chloride (KLOR-CON M) extended release tablet 40 mEq  40 mEq Oral PRN Ruffus Schlichter Mihok, DO        Or    potassium bicarb-citric acid (EFFER-K) effervescent tablet 40 mEq  40 mEq Oral PRN Ruffus Schlichter Mihok, DO        Or    potassium chloride 10 mEq/100 mL IVPB (Peripheral Line)  10 mEq IntraVENous PRN Ruffus Schlichter Mihok, DO        acetaminophen (TYLENOL) tablet 650 mg  650 mg Oral Q4H PRN Ruffus Schlichter Mihok, DO        heparin (porcine) injection 4,000 Units  4,000 Units IntraVENous PRN Ruffus Schlichter Mihok, DO        heparin (porcine) injection 2,000 Units  2,000 Units IntraVENous PRN Ruffus Schlichter Mihok, DO   2,000 Units at 10/25/22 0719    heparin 25,000 units in dextrose 5% 250 mL (premix) infusion  5-30 Units/kg/hr IntraVENous Continuous Ruffus Schlichter Mihok, DO   Stopped at 10/26/22 4372       Allergies: Allergies   Allergen Reactions    Gluten Other (See Comments)       Problem List:    Patient Active Problem List   Diagnosis Code    Hypertension I10    Cerebral infarction ([address] I63.9    DJD (degenerative joint disease) M19.90    Atrial fibrillation ([address] I48.91    TIA (transient ischemic attack) G45.9    Warfarin-induced coagulopathy (HCC) D68.32, T45.515A    Cerebral ischemia I67.82    Migraine headache with aura G43. 109    Cerebral infarction ([address] I63.9    Aortic stenosis, mild I35.0    Mitral regurgitation I34.0    Pulmonary hypertension (HCC) I27.20    Varicose veins of left leg with edema I83.892    Bleeding from varicose veins of right lower extremity S08.447    Hematoma of leg, right, initial encounter S80.11XA    Venous insufficiency (chronic) (peripheral) I87.2    Profound hearing loss H91.90    Cerebrovascular accident (CVA) due to embolic occlusion of left middle cerebral artery (HCC) I63.412    Severe aortic stenosis I35.0    Bilateral deafness H91.93    Hearing loss H91.90    Diabetes mellitus ([address] E11.9    Pre-op evaluation Z01.818    Iron deficiency anemia D50.9       Past Medical History:        Diagnosis Date    Aortic stenosis, mild 10/01/2015    follows  Dr Gerry Lazcano [facility] last visit 9/30/22    Arrhythmia     Atrial fibrillation ([address]     Bleeding from varicose veins of right lower extremity 06/08/2017    Cerebrovascular accident (CVA) due to embolic occlusion of left middle cerebral artery ([address] 10/2015    Confirmed by neurology    Cerebrovascular accident (CVA) due to embolic occlusion of left middle cerebral artery ([address] 2013    Left parietal confirmed by neurology    Cerebrovascular disease 03/21/2013    CVA. no weakness/deficits    Diabetes mellitus ([address]     Hearing deficit     wears hearing aide left ear only    Heart disease     Hyperlipidemia     Hypertension     Iron deficiency anemia 10/23/2022    Migraine headache with aura     Mitral regurgitation 10/01/2015    mild    Moderate aortic stenosis by prior echocardiogram 2018    NCGS (non-celiac gluten sensitivity)     Severe aortic stenosis 09/25/2020    By 2D echo    TIA (transient ischemic attack)     Unspecified cerebral artery occlusion with cerebral infarction     Varicose veins of left leg with edema 06/08/2017    Venous stasis ulcer of right calf with fat layer exposed with varicose veins ([address] 09/13/2019    Warfarin-induced coagulopathy ([address] 09/29/2015       Past Surgical History:        Procedure Laterality Date    ABDOMEN SURGERY      APPENDECTOMY      BREAST SURGERY Right     biopsy-benign    COLONOSCOPY  01/01/2008    ECHOCARDIOGRAM COMPLETE 2D W DOPPLER W COLOR  08/30/2012 9/30/22 at [facility]    INNER EAR SURGERY Left     Multiple surgeries    OVARY SURGERY      SMALL INTESTINE SURGERY      VEIN SURGERY         Social History:    Social History     Tobacco Use    Smoking status: Never    Smokeless tobacco: Never   Substance Use Topics    Alcohol use: No                                Counseling given: Not Answered      Vital Signs (Current):   Vitals:    10/25/22 1845 10/25/22 1945 10/26/22 0715 10/26/22 0842   BP:  133/64 128/85 128/85   Pulse:  78 75 75   Resp:  16 16    Temp:  36.8 °C (98.2 °F) 37 °C (98.6 °F)    TempSrc:  Oral Oral    SpO2:  95% 96%    Weight: 154 lb 5.2 oz (70 kg)      Height:                                                  BP Readings from Last 3 Encounters:   10/26/22 128/85   07/28/22 (!) 146/73   10/15/20 135/66       NPO Status: Time of last liquid consumption: 0800                        Time of last solid consumption: 1700                        Date of last liquid consumption: 10/26/22                        Date of last solid food consumption: 10/25/22    BMI:   Wt Readings from Last 3 Encounters:   10/25/22 154 lb 5.2 oz (70 kg)   09/15/22 153 lb (69.4 kg)   09/01/22 153 lb (69.4 kg)     Body mass index is 27.34 kg/m².     CBC:   Lab Results   Component Value Date/Time    WBC 6.7 10/26/2022 05:31 AM    RBC 4.03 10/26/2022 05:31 AM    HGB 11.0 10/26/2022 05:31 AM    HCT 35.7 10/26/2022 05:31 AM    MCV 88.6 10/26/2022 05:31 AM    RDW 16.0 10/26/2022 05:31 AM     10/26/2022 05:31 AM       CMP:   Lab Results   Component Value Date/Time     10/26/2022 05:31 AM    K 4.2 10/26/2022 05:31 AM    K 3.9 10/21/2022 06:10 PM     10/26/2022 05:31 AM    CO2 27 10/26/2022 05:31 AM    BUN 17 10/26/2022 05:31 AM    CREATININE 0.9 10/26/2022 05:31 AM    GFRAA >60 09/30/2015 06:40 AM    LABGLOM >60 10/26/2022 05:31 AM    GLUCOSE 153 10/26/2022 05:31 AM    PROT 6.2 10/26/2022 05:31 AM    CALCIUM 9.6 10/26/2022 05:31 AM    BILITOT 0.6 10/26/2022 05:31 AM    ALKPHOS 131 10/26/2022 05:31 AM    AST 15 10/26/2022 05:31 AM    ALT 12 10/26/2022 05:31 AM       POC Tests: No results for input(s): POCGLU, POCNA, POCK, POCCL, POCBUN, POCHEMO, POCHCT in the last 72 hours. Coags:   Lab Results   Component Value Date/Time    PROTIME 17.1 10/23/2022 05:00 AM    INR 1.6 10/23/2022 05:00 AM    APTT 73.9 10/26/2022 05:31 AM       HCG (If Applicable): No results found for: PREGTESTUR, PREGSERUM, HCG, HCGQUANT     ABGs: No results found for: PHART, PO2ART, QBS3SVL, ZMP7XYF, BEART, X9GEGYML     Type & Screen (If Applicable):  No results found for: LABABO, LABRH    Drug/Infectious Status (If Applicable):  No results found for: HIV, HEPCAB    COVID-19 Screening (If Applicable): No results found for: COVID19        Anesthesia Evaluation  Patient summary reviewed and Nursing notes reviewed no history of anesthetic complications:   Airway: Mallampati: I  TM distance: >3 FB   Neck ROM: full  Mouth opening: > = 3 FB   Dental: normal exam         Pulmonary:normal exam  breath sounds clear to auscultation      (-) not a current smoker                           Cardiovascular:    (+) hypertension:, dysrhythmias: atrial fibrillation, hyperlipidemia      ECG reviewed  Rhythm: irregular  Rate: normal  Echocardiogram reviewed                  Neuro/Psych:   (+) CVA:, TIA,             GI/Hepatic/Renal:   (+) GERD:,           Endo/Other:    (+) DiabetesType II DM, , hypothyroidism, blood dyscrasia::., .                 Abdominal:             Vascular: negative vascular ROS. Other Findings:           Anesthesia Plan      general     ASA 3       Induction: intravenous. Anesthetic plan and risks discussed with patient. Plan discussed with attending. KASSIE Hyde - CRNA   10/26/2022    Pt seen, examined, chart reviewed, plan discussed.   Angela Brooks MD  10/26/2022  2:35 PM

## 2022-10-27 ENCOUNTER — TELEPHONE (OUTPATIENT)
Dept: ENT CLINIC | Age: 77
End: 2022-10-27

## 2022-10-27 PROBLEM — Z98.890 S/P EAR SURGERY: Status: ACTIVE | Noted: 2022-10-27

## 2022-10-27 LAB
ALBUMIN SERPL-MCNC: 3.9 G/DL (ref 3.5–5.2)
ALP BLD-CCNC: 131 U/L (ref 35–104)
ALT SERPL-CCNC: 13 U/L (ref 0–32)
ANION GAP SERPL CALCULATED.3IONS-SCNC: 12 MMOL/L (ref 7–16)
APTT: 27.1 SEC (ref 24.5–35.1)
APTT: 35.7 SEC (ref 24.5–35.1)
AST SERPL-CCNC: 25 U/L (ref 0–31)
BASOPHILS ABSOLUTE: 0 E9/L (ref 0–0.2)
BASOPHILS RELATIVE PERCENT: 0 % (ref 0–2)
BILIRUB SERPL-MCNC: 0.7 MG/DL (ref 0–1.2)
BILIRUBIN DIRECT: 0.3 MG/DL (ref 0–0.3)
BILIRUBIN, INDIRECT: 0.4 MG/DL (ref 0–1)
BUN BLDV-MCNC: 16 MG/DL (ref 6–23)
CALCIUM SERPL-MCNC: 9.4 MG/DL (ref 8.6–10.2)
CHLORIDE BLD-SCNC: 105 MMOL/L (ref 98–107)
CO2: 22 MMOL/L (ref 22–29)
CREAT SERPL-MCNC: 0.8 MG/DL (ref 0.5–1)
EOSINOPHILS ABSOLUTE: 0 E9/L (ref 0.05–0.5)
EOSINOPHILS RELATIVE PERCENT: 0 % (ref 0–6)
GFR SERPL CREATININE-BSD FRML MDRD: >60 ML/MIN/1.73
GLUCOSE BLD-MCNC: 208 MG/DL (ref 74–99)
HCT VFR BLD CALC: 36.2 % (ref 34–48)
HEMOGLOBIN: 10.9 G/DL (ref 11.5–15.5)
IMMATURE GRANULOCYTES #: 0.02 E9/L
IMMATURE GRANULOCYTES %: 0.3 % (ref 0–5)
INR BLD: 1.1
LYMPHOCYTES ABSOLUTE: 0.59 E9/L (ref 1.5–4)
LYMPHOCYTES RELATIVE PERCENT: 10.1 % (ref 20–42)
MAGNESIUM: 1.7 MG/DL (ref 1.6–2.6)
MCH RBC QN AUTO: 27.3 PG (ref 26–35)
MCHC RBC AUTO-ENTMCNC: 30.1 % (ref 32–34.5)
MCV RBC AUTO: 90.5 FL (ref 80–99.9)
METER GLUCOSE: 143 MG/DL (ref 74–99)
METER GLUCOSE: 172 MG/DL (ref 74–99)
METER GLUCOSE: 276 MG/DL (ref 74–99)
MONOCYTES ABSOLUTE: 0.03 E9/L (ref 0.1–0.95)
MONOCYTES RELATIVE PERCENT: 0.5 % (ref 2–12)
NEUTROPHILS ABSOLUTE: 5.22 E9/L (ref 1.8–7.3)
NEUTROPHILS RELATIVE PERCENT: 89.1 % (ref 43–80)
OVALOCYTES: ABNORMAL
PDW BLD-RTO: 16.1 FL (ref 11.5–15)
PHOSPHORUS: 3.1 MG/DL (ref 2.5–4.5)
PLATELET # BLD: 301 E9/L (ref 130–450)
PMV BLD AUTO: 9.1 FL (ref 7–12)
POIKILOCYTES: ABNORMAL
POTASSIUM SERPL-SCNC: 4.4 MMOL/L (ref 3.5–5)
PROTHROMBIN TIME: 12 SEC (ref 9.3–12.4)
RBC # BLD: 4 E12/L (ref 3.5–5.5)
SODIUM BLD-SCNC: 139 MMOL/L (ref 132–146)
TOTAL PROTEIN: 6.4 G/DL (ref 6.4–8.3)
WBC # BLD: 5.9 E9/L (ref 4.5–11.5)

## 2022-10-27 PROCEDURE — 6360000002 HC RX W HCPCS: Performed by: STUDENT IN AN ORGANIZED HEALTH CARE EDUCATION/TRAINING PROGRAM

## 2022-10-27 PROCEDURE — 36415 COLL VENOUS BLD VENIPUNCTURE: CPT

## 2022-10-27 PROCEDURE — 85025 COMPLETE CBC W/AUTO DIFF WBC: CPT

## 2022-10-27 PROCEDURE — 1200000000 HC SEMI PRIVATE

## 2022-10-27 PROCEDURE — 2580000003 HC RX 258: Performed by: ANESTHESIOLOGY

## 2022-10-27 PROCEDURE — 85730 THROMBOPLASTIN TIME PARTIAL: CPT

## 2022-10-27 PROCEDURE — 6370000000 HC RX 637 (ALT 250 FOR IP): Performed by: INTERNAL MEDICINE

## 2022-10-27 PROCEDURE — 83735 ASSAY OF MAGNESIUM: CPT

## 2022-10-27 PROCEDURE — 84100 ASSAY OF PHOSPHORUS: CPT

## 2022-10-27 PROCEDURE — 80048 BASIC METABOLIC PNL TOTAL CA: CPT

## 2022-10-27 PROCEDURE — 99232 SBSQ HOSP IP/OBS MODERATE 35: CPT | Performed by: INTERNAL MEDICINE

## 2022-10-27 PROCEDURE — 99232 SBSQ HOSP IP/OBS MODERATE 35: CPT | Performed by: STUDENT IN AN ORGANIZED HEALTH CARE EDUCATION/TRAINING PROGRAM

## 2022-10-27 PROCEDURE — 6370000000 HC RX 637 (ALT 250 FOR IP): Performed by: STUDENT IN AN ORGANIZED HEALTH CARE EDUCATION/TRAINING PROGRAM

## 2022-10-27 PROCEDURE — 85610 PROTHROMBIN TIME: CPT

## 2022-10-27 PROCEDURE — 80076 HEPATIC FUNCTION PANEL: CPT

## 2022-10-27 PROCEDURE — 82962 GLUCOSE BLOOD TEST: CPT

## 2022-10-27 RX ORDER — WARFARIN SODIUM 3 MG/1
1.5 TABLET ORAL
Status: COMPLETED | OUTPATIENT
Start: 2022-10-27 | End: 2022-10-27

## 2022-10-27 RX ADMIN — FUROSEMIDE 20 MG: 20 TABLET ORAL at 08:00

## 2022-10-27 RX ADMIN — Medication 500 MG: at 08:00

## 2022-10-27 RX ADMIN — PANTOPRAZOLE SODIUM 40 MG: 40 TABLET, DELAYED RELEASE ORAL at 06:06

## 2022-10-27 RX ADMIN — FERROUS SULFATE TAB 325 MG (65 MG ELEMENTAL FE) 325 MG: 325 (65 FE) TAB at 08:00

## 2022-10-27 RX ADMIN — Medication 500 MG: at 20:44

## 2022-10-27 RX ADMIN — HEPARIN SODIUM 4000 UNITS: 1000 INJECTION INTRAVENOUS; SUBCUTANEOUS at 20:44

## 2022-10-27 RX ADMIN — PANTOPRAZOLE SODIUM 40 MG: 40 TABLET, DELAYED RELEASE ORAL at 16:45

## 2022-10-27 RX ADMIN — PRAVASTATIN SODIUM 20 MG: 20 TABLET ORAL at 16:45

## 2022-10-27 RX ADMIN — MULTIVITAMIN TABLET 1 TABLET: TABLET at 08:00

## 2022-10-27 RX ADMIN — POTASSIUM CHLORIDE 10 MEQ: 750 TABLET, EXTENDED RELEASE ORAL at 08:00

## 2022-10-27 RX ADMIN — POTASSIUM CHLORIDE 10 MEQ: 750 TABLET, EXTENDED RELEASE ORAL at 16:45

## 2022-10-27 RX ADMIN — METOPROLOL TARTRATE 50 MG: 50 TABLET, FILM COATED ORAL at 08:00

## 2022-10-27 RX ADMIN — AZITHROMYCIN 250 MG: 250 TABLET, FILM COATED ORAL at 08:01

## 2022-10-27 RX ADMIN — DILTIAZEM HYDROCHLORIDE 240 MG: 240 CAPSULE, COATED, EXTENDED RELEASE ORAL at 08:01

## 2022-10-27 RX ADMIN — Medication 10 ML: at 10:37

## 2022-10-27 RX ADMIN — FERROUS SULFATE TAB 325 MG (65 MG ELEMENTAL FE) 325 MG: 325 (65 FE) TAB at 16:45

## 2022-10-27 RX ADMIN — WARFARIN SODIUM 1.5 MG: 3 TABLET ORAL at 18:09

## 2022-10-27 RX ADMIN — METOPROLOL TARTRATE 50 MG: 50 TABLET, FILM COATED ORAL at 20:44

## 2022-10-27 RX ADMIN — HEPARIN SODIUM 12 UNITS/KG/HR: 10000 INJECTION, SOLUTION INTRAVENOUS at 14:00

## 2022-10-27 ASSESSMENT — PAIN SCALES - GENERAL: PAINLEVEL_OUTOF10: 0

## 2022-10-27 NOTE — CARE COORDINATION
POD #1 rt cochlear implant; iv heparin post op await resumption of coumadin; inr 1.1 plan at discharge is home, no needs. [facility].

## 2022-10-27 NOTE — PROGRESS NOTES
OTOLARYNGOLOGY  DAILY PROGRESS NOTE  10/27/2022    Subjective:     No acute events overnight. AFVSS. Heparin and coumadin held overnight. Pain controlled. Tolerating diet. No facial nerve weakness. Objective:     /64   Pulse 79   Temp 98.1 °F (36.7 °C) (Oral)   Resp 16   Ht 5' 3\" (1.6 m)   Wt 149 lb 4 oz (67.7 kg)   SpO2 93%   BMI 26.44 kg/m²   PULSE OXIMETRY RANGE: SpO2  Av.9 %  Min: 91 %  Max: 100 %  I/O last 3 completed shifts: In: 2223.1 [P.O.:720; I.V.:1503.1]  Out: 910 [Urine:900; Blood:10]    GENERAL:  Awake, alert, cooperative, no apparent distress  HENT: Normocephalic, atraumatic, right ear with ivan dressing in place, post-auricular incision c/d/i  EYES: No sclera icterus, pupils equal  LUNGS:  No increased work of breathing, no stridor  CARDIOVASCULAR:  RR  NEURO: Facial nerve [facility] 1/6 bilaterally      Assessment/Plan:     78 y.o. female POD#1 s/p right cochlear implantation    Ivan dressing removed this morning  Keep right ear dry  Continue Azithromycin x 5 days  Bacitracin to post-auricular incision TID  Pain/nausea control per primary team  Okay for restarting heparin with bridge to coumadin 24 hours after surgery  From ENT standpoint, patient is okay for discharge to home whenever okay with medicine    Patient seen and examined by resident. Will discuss plan with attending.      Electronically signed by Renata Jung DO on 10/27/2022 at 7:28 AM

## 2022-10-27 NOTE — TELEPHONE ENCOUNTER
Called [facility] spoke to Pt Nurse regarding pt post op states pt is doing well incisions look good removed guard from ear states Pt  is transitioning back to coumadin well Electronically signed by Yandy Downing LPN on 18/09/3621 at 3:24 PM

## 2022-10-27 NOTE — PROGRESS NOTES
[address], 1945    Date of Service: 10/27/2022    Chief Complaint: Follow-up for perioperative cardiac evaluation, aortic stenosis, atrial fibrillation    HPI:  No new overnight cardiac complaints. She denies chest pain, shortness of breath, or syncope. She denies orthopnea, PND or lower extremity edema. Atrial fibrillation with CVR on EKG. S/p cochlear implantation on 10/26/22. Heparin drip currently on hold post-op. She was previously followed by Dr. Rayna Roger; she established care with me in 1/2019. She presents today for follow-up of atrial fibrillation and valvular heart disease. She is a 78 y.o. female with a history of chronic atrial fibrillation, hypertension, dyslipidemia, TIA's, and aortic stenosis. Her last TIA episode was in 09/2015 when anticoagulation was held for a procedure. Review of Systems:  Cardiac: As per HPI  General: No fever, chills  Pulmonary: As per HPI  HEENT: No visual disturbances, difficult swallowing, +hearing impairment  GI: No nausea, vomiting  : No dysuria, hematuria  Endocrine: No thyroid disease, +DM  Musculoskeletal: LOPEZ x 4, no focal motor deficits  Skin: Intact, no rashes  Neuro: No headache, seizures  Psych: Currently with no depression, anxiety    Past medical history:   Hearing impairment with ear surgery that temporarily improved her hearing. She wears bilateral hearing aids as of 03/2010. Lower extremity venous stripping, 1981. Hypertension. Hyperlipidemia. Total cholesterol 162, triglycerides 86, HDL 42, LDL 96 - 09/2009. Colonoscopy, 2008 with small polyps that were removed. Diverticular disease also noted. EGD, 09/2009 for hem positive stools. Hiatal hernia noted. Biopsies taken and omeprazole prescribed. AF post EGD, 09/2009. She was admitted to [facility]. She declined cardioversion and has been in chronic AF since that time. CBC, BMP, cardiac enzymes - 09/2009 normal.  Echo, 09/2009.   Mild aortic stenosis, peak gradient 18 mmHg, LA enlarged at 4.1 cm. LV size and function normal.    Rate control and warfarin prescribed 09/2009. Lifetime nonsmoker. No history of alcohol intake. No family history of premature vascular disease. Echo, 08/30/2012 with normal LV size and systolic function, JOHN, mild AS with peak gradient 15 mmHg, MERCEDEZ 1.6 cm². RVSP 37 mmHg. Lexiscan MPS, 08/30/2012. Normal.  EF 70%. [facility] admission, 03/22/2013 with speech difficulty. Cr 1.2, otherwise normal BMP. CBC normal.  TSH normal.  CT of the head with no acute pathology. MRI showed small lacunar infarct in the left posterior parietal lobe. INR on admission was 1.5. Discharged after symptoms resolved within 24 hours. Discharged with adequate anticoagulation. Echo, 09/18/2014. Normal EF, normal LV size, E/E' 19, JOHN (LA 59 mm), mild to moderate AS (mean gradient 16 mmHg, MERCEDEZ 1.5 cm², VTI 0.52). RVSP 48 mmHg, dilated IVC with poor inspiratory collapse. [facility] presentation, 09/30/2015 with transient confusion and right-sided visual field defects. Resolved prior to presentation. Her anticoagulation was interrupted for breast biopsy a few weeks prior. On presentation, INR was 2.2. 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 Brain perfusion scan revealed moderate area of ischemia in the left posterior parietal lobe. She was discharged home in stable condition. Echo, 10/01/2015, Dr. Kylah Murcia. Normal EF. Indeterminate diastolic function. Severe biatrial enlargement. Mild MR.  Mild-moderate AS. Mean gradient of 20 mmHg. Mild AR. Mild pulmonary HTN (41 mmHg).      BP (!) 121/58   Pulse 88   Temp 97.5 °F (36.4 °C) (Oral)   Resp 16   Ht 5' 3\" (1.6 m)   Wt 149 lb 4 oz (67.7 kg)   SpO2 94%   BMI 26.44 kg/m²    Appearance: Awake, alert, no acute respiratory distress  Skin: Intact, no rash  Head: Normocephalic, atraumatic  Eyes: EOMI, no conjunctival erythema  ENMT: No pharyngeal erythema, MMM, no rhinorrhea  Neck: Supple, no carotid bruits  Lungs: Clear to auscultation bilaterally. No wheezes, rales, or rhonchi.   Cardiac: IRRR, 3/6 DELL  Abdomen: Soft, nontender, +bowel sounds  Extremities: Moves all extremities x 4, no lower extremity edema  Neurologic: No focal motor deficits apparent, normal mood and affect, alert and oriented x 3    Current Facility-Administered Medications   Medication Dose Route Frequency Provider Last Rate Last Admin    warfarin placeholder: dosing by pharmacy   Other [facility],         sodium chloride flush 0.9 % injection 5-40 mL  5-40 mL IntraVENous 2 times per day Pam Rodríguez MD   10 mL at 10/26/22 2024    sodium chloride flush 0.9 % injection 5-40 mL  5-40 mL IntraVENous PRN Pam Rodríguez MD        0.9 % sodium chloride infusion   IntraVENous PRN Pam Rodríguez MD        meperidine (DEMEROL) injection 12.5 mg  12.5 mg IntraVENous Q5 Min PRN Pam Rodríguez MD        morphine (PF) injection 1 mg  1 mg IntraVENous Q5 Min PRN Pam Rodríguez MD        morphine (PF) injection 2 mg  2 mg IntraVENous Q5 Min PRN Pam Rodríguez MD        azithromycin [facility]) tablet 250 mg  250 mg Oral Daily Vernel Dinorah Nelson, DO   250 mg at 10/26/22 1842    ibuprofen (ADVIL;MOTRIN) tablet 600 mg  600 mg Oral Q6H PRN KASSIE Hadley - TOMMIE        ferrous sulfate (IRON 325) tablet 325 mg  325 mg Oral BID  Verlencho Rodríguezn Nelson, DO   325 mg at 10/26/22 1825    multivitamin 1 tablet  1 tablet Oral Daily Vernel Dinorah Nelson, DO   1 tablet at 10/26/22 0841    [Held by provider] aspirin EC tablet 81 mg  81 mg Oral Daily Vernel Dinorah Nelson, DO   81 mg at 10/22/22 0747    dilTIAZem (CARDIZEM CD) extended release capsule 240 mg  240 mg Oral Daily Vernel Dinorah Nelson, DO   240 mg at 10/26/22 0841    metoprolol tartrate (LOPRESSOR) tablet 50 mg  50 mg Oral BID Vernel Dinorah Nelson, DO   50 mg at 10/26/22 2023    niacin extended release capsule 500 mg  500 mg Oral BID Vernel Dinorah Nelson, DO   500 mg at 10/26/22 2024    pravastatin (PRAVACHOL) tablet 20 mg  20 mg Oral Daily Pepper Grisel Cairo, DO   20 mg at 10/26/22 1825    pantoprazole (PROTONIX) tablet 40 mg  40 mg Oral BID AC Arnoldo J Cairo, DO   40 mg at 10/27/22 9555    furosemide (LASIX) tablet 20 mg  20 mg Oral Daily Pepper Grisel Cairo, DO   20 mg at 10/26/22 1850    potassium chloride (KLOR-CON M) extended release tablet 10 mEq  10 mEq Oral BID WC Pepper Grisel Cairo, DO   10 mEq at 10/26/22 1825    perflutren lipid microspheres (DEFINITY) injection 1.65 mg  1.5 mL IntraVENous ONCE PRN Pepper Grisel Cairo, DO        glucose chewable tablet 16 g  4 tablet Oral PRN Pepper Grisel Cairo, DO        dextrose bolus 10% 125 mL  125 mL IntraVENous PRN Pepper Grisel Cairo, DO        Or    dextrose bolus 10% 250 mL  250 mL IntraVENous PRN Pepper Grisel Cairo, DO        glucagon (rDNA) injection 1 mg  1 mg IntraMUSCular PRN Pepper Grisel Cairo, DO        dextrose 10 % infusion   IntraVENous Continuous PRN Pepper Grisel Cairo, DO        potassium chloride (KLOR-CON M) extended release tablet 40 mEq  40 mEq Oral PRN Pepper Grisel Cairo, DO        Or    potassium bicarb-citric acid (EFFER-K) effervescent tablet 40 mEq  40 mEq Oral PRN Pepper Grisel Cairo, DO        Or    potassium chloride 10 mEq/100 mL IVPB (Peripheral Line)  10 mEq IntraVENous PRN Pepper Grisel Cairo, DO        acetaminophen (TYLENOL) tablet 650 mg  650 mg Oral Q4H PRN Pepper Grisel Cairo, DO        heparin (porcine) injection 4,000 Units  4,000 Units IntraVENous PRN Pepper Grisel Cairo, DO        heparin (porcine) injection 2,000 Units  2,000 Units IntraVENous PRN Pepper Grisel Cairo, DO   2,000 Units at 10/25/22 0719    heparin 25,000 units in dextrose 5% 250 mL (premix) infusion  5-30 Units/kg/hr IntraVENous Continuous Pepper Grisel Cairo, DO   Stopped at 10/26/22 0616     Lab Results   Component Value Date    WBC 5.9 10/27/2022    HGB 10.9 (L) 10/27/2022    HCT 36.2 10/27/2022    MCV 90.5 10/27/2022     10/27/2022 Lab Results   Component Value Date    CREATININE 0.8 10/27/2022    BUN 16 10/27/2022     10/27/2022    K 4.4 10/27/2022     10/27/2022    CO2 22 10/27/2022     Lab Results   Component Value Date    TSH 1.490 10/22/2022     Lab Results   Component Value Date    DIGOXIN 1.4 03/23/2013     Lab Results   Component Value Date    ALT 13 10/27/2022    AST 25 10/27/2022    ALKPHOS 131 (H) 10/27/2022    BILITOT 0.7 10/27/2022     Lab Results   Component Value Date    INR 1.1 10/27/2022    INR 1.6 10/23/2022    INR 1.9 10/22/2022    PROTIME 12.0 10/27/2022    PROTIME 17.1 (H) 10/23/2022    PROTIME 21.0 (H) 10/22/2022       Telemetry reviewed (10/27/2022): atrial fibrillation, rate 70's-80's    Echo, 10/01/2015, Dr. Kylah Murcia. Normal EF. Indeterminate diastolic function. Severe biatrial enlargement. Mild MR.  Mild-moderate AS. Mean gradient of 20 mmHg. Mild AR. Mild pulmonary HTN (41 mmHg). Echocardiogram: 1/9/18 ([facility])   Left ventricle is normal in size . Mild left ventricular concentric hypertrophy noted. No regional wall motion abnormalities seen. Normal left ventricular ejection fraction. The left atrium is moderately dilated. Moderately enlarged right atrium size. Mild thickening of the mitral valve leaflets. Mild mitral regurgitation is present. Moderate aortic stenosis is present. Mild aortic regurgitation is noted. Mild to moderate tricuspid regurgitation. Pulmonary hypertension is mild . TTE-9/25/2020:   Normal left ventricular systolic function. Ejection fraction is visually estimated at > 60%. Normal right ventricular size and function (TAPSE 2.0 cm). Indeterminate diastolic function. Severely dilated left atrium by volume index. Severely dilated right atrium. Moderate mitral regurgitation. Mild-moderate aortic regurgitation.    Low gradient severe aortic stenosis (AV peak velocity 3.2 m/s, AV mean gradient 21 mmHg, AV area 0.8 cm2, peak velocity dimensionless index 0.23, VTI dimensionless index 0.25, stroke volume index 35 mL/m2). Mild tricuspid regurgitation. PASP is estimated at 36 mmHg. TTE-9/30/2022:  - Exam indication: AS   - The left ventricle is small. There is concentric left ventricular hypertrophy. Left ventricular systolic function is normal. EF = 69 ± 5% (2D biplane)   - The right ventricle is normal in size. Right ventricular systolic function is   normal.   - The left atrial cavity is severely dilated. - The right atrial cavity is severely dilated. - The visualized aorta is borderline dilated with a maximal dimension of 3.9 cm.   - Tricuspid aortic valve. There is severe aortic valve stenosis. AV area is 0.74   cm² (0.43 cm²/m²) by continuity, VTI. The peak gradient is 48 mmHg, the mean   gradient is 29 mmHg and the dimensionless valve index is 0.24. Prior peak/mean   gradients of 39/23 mmHg. Today's gradients were averaged d/t afib. - There is mild mitral annular calcification observed anterior and posterior. There   is mild (1+) mitral valve regurgitation. There is mild thickening.   - Exam was compared with the prior  echocardiographic exam performed on 3/29/22. There has been an increase in transaortic gradients on today's exam, otherwise   similar findings.           Assessment:  Perioperative cardiac evaluation (asymmetric sensorineural hearing loss; s/p right cochlear implantation on 10/26/22)  Permanent atrial fibrillation with controlled rate  Elevated [id number] score -- on warfarin for anticoagulation (INR 2.0 --> 1.9 --> 1.6 --> 1.1)  VHD (including severe aortic stenosis) -- follows at [facility]  Hypertension -- most recent -130's  Hyperlipidemia -- on statin  Type 2 diabetes -- Hgb A1c 6.1  Severe bi-atrial enlargement  Prior TIA's (including TIA x 2 when anticoagulation was held for procedures in the past)  Anemia -- most recent Hgb 10.6 --> 10.7 --> 11.0 --> 10.9     Plan:   Heparin drip scheduled to resume post-op later today / coumadin is currently on hold -- plan is to resume today / bridging anticoagulation post-operatively / most recent INR 1.1 (pharmacy team following)  Monitor hemodynamics closely  Continue current rate controlling agents  Records from [facility] reviewed (including 9/2022 echocardiogram)  Telemetry reviewed (rate controlled AF)  Monitor labs  Care per ENT    Leesa Kanner, MD, MD  [facility] ([facility]) Cardiology

## 2022-10-27 NOTE — PROGRESS NOTES
Heparin drip not restarted at this time note from ENT communication on 10-25-22 to restart 24 hours after surgery if ok with ENT will be clarified if ok to restart this morning note left on [employer] communication board

## 2022-10-27 NOTE — PLAN OF CARE
Problem: Discharge Planning  Goal: Discharge to home or other facility with appropriate resources  Outcome: Progressing  Flowsheets  Taken 10/26/2022 2015 by Khai Cobos  Discharge to home or other facility with appropriate resources: Identify barriers to discharge with patient and caregiver  Taken 10/26/2022 0900 by Karen Mahmood RN  Discharge to home or other facility with appropriate resources: Identify barriers to discharge with patient and caregiver     Problem: ABCDS Injury Assessment  Goal: Absence of physical injury  Recent Flowsheet Documentation  Taken 10/26/2022 0950 by Karen Mahmood RN  Absence of Physical Injury: Implement safety measures based on patient assessment     Problem: Safety - Adult  Goal: Free from fall injury  Recent Flowsheet Documentation  Taken 10/26/2022 0950 by Karen Mahmood RN  Free From Fall Injury: Instruct family/caregiver on patient safety     Problem: Chronic Conditions and Co-morbidities  Goal: Patient's chronic conditions and co-morbidity symptoms are monitored and maintained or improved  Recent Flowsheet Documentation  Taken 10/26/2022 2015 by [address] [place] [address] - Patient's Chronic Conditions and Co-Morbidity Symptoms are Monitored and Maintained or Improved: Monitor and assess patient's chronic conditions and comorbid symptoms for stability, deterioration, or improvement  Taken 10/26/2022 0900 by Karen Mahmood RN  Care Plan - Patient's Chronic Conditions and Co-Morbidity Symptoms are Monitored and Maintained or Improved: Monitor and assess patient's chronic conditions and comorbid symptoms for stability, deterioration, or improvement     Problem: Pain  Goal: Verbalizes/displays adequate comfort level or baseline comfort level  Recent Flowsheet Documentation  Taken 10/26/2022 2125 by Khai Cobos  Verbalizes/displays adequate comfort level or baseline comfort level: Encourage patient to monitor pain and request assistance

## 2022-10-27 NOTE — PROGRESS NOTES
Pharmacy Consultation Note  (Warfarin Dosing and Monitoring)    Initial consult date: 10/23/22  Consulting Provider: Dr. Noris Dejesus is a 78 y.o. female for whom pharmacy has been asked to manage warfarin therapy. Weight:   Wt Readings from Last 1 Encounters:   10/27/22 147 lb 4.8 oz (66.8 kg)       TSH:    Lab Results   Component Value Date/Time    TSH 1.490 10/22/2022 01:19 AM       Hepatic Function Panel:                            Lab Results   Component Value Date/Time    ALKPHOS 131 10/27/2022 03:19 AM    ALT 13 10/27/2022 03:19 AM    AST 25 10/27/2022 03:19 AM    PROT 6.4 10/27/2022 03:19 AM    BILITOT 0.7 10/27/2022 03:19 AM    BILIDIR 0.3 10/27/2022 03:19 AM    IBILI 0.4 10/27/2022 03:19 AM    LABALBU 3.9 10/27/2022 03:19 AM       Current warfarin drug-drug interactions include:  azithromycin (incr INR)    Recent Labs     10/25/22  0545 10/26/22  0531 10/27/22  0319   HGB 10.7* 11.0* 10.9*    285 301       Date Warfarin Dose INR Heparin or LMWH Comment   10/23 Hold 1.6 Heparin gtt    10/24 HOLD -- Heparin gtt    10/25 HOLD -- Heparin gtt    10/26 -- Heparin gtt    10/27 1.5mg -- Heparin gtt      Assessment:  Patient is a 78 y.o. female on warfarin for Atrial Fibrillation. Patient's home warfarin dosing regimen is warfarin 1.5mg daily.    Goal INR 2 - 3  INR 1.1    Plan:  Continue heparin drip post-op to bridge warfarin  Warfarin 1.5mg tonight   Patient started on azithromycin which may interact with warfarin; will monitor  Daily PT/INR until the INR is stable within the therapeutic range  Pharmacist will follow and monitor/adjust dosing as necessary    Thank you for this consult,    April Damon [facility] 10/27/2022 2:48 PM

## 2022-10-27 NOTE — ADDENDUM NOTE
Addendum  created 10/27/22 6223 by Savanah Prince MD    Clinical Note Signed Patient will send in the form when he can; maybe next week. Closing encounter and waiting to hear back.

## 2022-10-27 NOTE — PROGRESS NOTES
[facility] Progress Note    Admitting Date and Time: 10/21/2022  4:24 PM  Admit Dx: Bilateral deafness [H91.93]  Hearing loss [H91.90]    Subjective:  Patient is being followed for Bilateral deafness [H91.93]  Hearing loss [H91.90]     Patient is a 79-year-old female with past medical history significant for asymmetric sensorineural hearing loss now s/p right cochlear implantation on 10/26/2022. No acute events overnight. Heparin infusion and warfarin have been held for 24 hours postsurgery. Plans to restart heparin infusion and warfarin once 24-hour point has been reached. She denies chest pain, shortness of breath, nausea, vomiting, headache.       ROS: denies fever, chills, cp, sob, n/v, HA unless stated above.      warfarin placeholder: dosing by pharmacy   Other RX Placeholder    sodium chloride flush  5-40 mL IntraVENous 2 times per day    azithromycin  250 mg Oral Daily    ferrous sulfate  325 mg Oral BID WC    multivitamin  1 tablet Oral Daily    [Held by provider] aspirin  81 mg Oral Daily    dilTIAZem  240 mg Oral Daily    metoprolol  50 mg Oral BID    niacin  500 mg Oral BID    pravastatin  20 mg Oral Daily    pantoprazole  40 mg Oral BID AC    furosemide  20 mg Oral Daily    potassium chloride  10 mEq Oral BID WC     sodium chloride flush, 5-40 mL, PRN  sodium chloride, , PRN  ibuprofen, 600 mg, Q6H PRN  perflutren lipid microspheres, 1.5 mL, ONCE PRN  glucose, 4 tablet, PRN  dextrose bolus, 125 mL, PRN   Or  dextrose bolus, 250 mL, PRN  glucagon (rDNA), 1 mg, PRN  dextrose, , Continuous PRN  potassium chloride, 40 mEq, PRN   Or  potassium alternative oral replacement, 40 mEq, PRN   Or  potassium chloride, 10 mEq, PRN  acetaminophen, 650 mg, Q4H PRN  heparin (porcine), 4,000 Units, PRN  heparin (porcine), 2,000 Units, PRN         Objective:    BP (!) 121/58   Pulse 88   Temp 97.5 °F (36.4 °C) (Oral)   Resp 16   Ht 5' 3\" (1.6 m)   Wt 149 lb 4 oz (67.7 kg)   SpO2 94%   BMI 26.44 kg/m²     General Appearance: alert and oriented to person, place and time and in no acute distress  Skin: warm and dry  Head: normocephalic and atraumatic  Eyes: pupils equal, round, and reactive to light, extraocular eye movements intact, conjunctivae normal  Neck: neck supple and non tender without mass   Pulmonary/Chest: clear to auscultation bilaterally- no wheezes, rales or rhonchi, normal air movement, no respiratory distress  Cardiovascular: normal rate, normal S1 and S2 and no carotid bruits  Abdomen: soft, non-tender, non-distended, normal bowel sounds, no masses or organomegaly  Extremities: no cyanosis, no clubbing and no edema  Neurologic: no cranial nerve deficit and speech normal        Recent Labs     10/25/22  0545 10/26/22  0531 10/27/22  0319    137 139   K 4.1 4.2 4.4    104 105   CO2 25 27 22   BUN 14 17 16   CREATININE 0.8 0.9 0.8   GLUCOSE 137* 153* 208*   CALCIUM 9.7 9.6 9.4       Recent Labs     10/25/22  0545 10/26/22  0531 10/27/22  0319   WBC 6.2 6.7 5.9   RBC 4.02 4.03 4.00   HGB 10.7* 11.0* 10.9*   HCT 35.7 35.7 36.2   MCV 88.8 88.6 90.5   MCH 26.6 27.3 27.3   MCHC 30.0* 30.8* 30.1*   RDW 15.9* 16.0* 16.1*    285 301   MPV 9.0 8.8 9.1       Radiology: No new imaging past 24 hours    Assessment:    Principal Problem:    Bilateral deafness  Active Problems:    Cerebrovascular accident (CVA) due to 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 Asymmetric sensorineural hearing loss s/p right cochlear implantation on 10/26/2022-continue azithromycin 250 mg p.o. daily for 5 doses per ENT  2. Permanent atrial fibrillation with controlled rate-okay to resume heparin infusion and warfarin 24 hours postsurgery. We will continue with heparin infusion until INR is therapeutic between 2.0 and 3.0. At that point patient will be stable for discharge. Continue home diltiazem. 3.  Severe aortic stenosis-follows with [facility]  4. Hypertension-continue home Lopressor and diltiazem  5. Hyperlipidemia plan continue home pravastatin  6. Type II DM-controlled  7. Prior TIAs-associated with surgery in which anticoagulation was held in the past  8. Anemia-monitor with daily CBC, transfuse for hemoglobin less than 7      NOTE: This report was transcribed using voice recognition software. Every effort was made to ensure accuracy; however, inadvertent computerized transcription errors may be present.   Electronically signed by Chase Evangelista MD on 10/27/2022 at 1:15 PM

## 2022-10-28 PROBLEM — Z86.73 HISTORY OF TRANSIENT ISCHEMIC ATTACK (TIA): Status: ACTIVE | Noted: 2022-10-28

## 2022-10-28 PROBLEM — Z01.810 PREOPERATIVE CARDIOVASCULAR EXAMINATION: Status: ACTIVE | Noted: 2022-10-28

## 2022-10-28 LAB
ALBUMIN SERPL-MCNC: 3.7 G/DL (ref 3.5–5.2)
ALP BLD-CCNC: 112 U/L (ref 35–104)
ALT SERPL-CCNC: 11 U/L (ref 0–32)
ANION GAP SERPL CALCULATED.3IONS-SCNC: 8 MMOL/L (ref 7–16)
APTT: 121.8 SEC (ref 24.5–35.1)
APTT: 45.1 SEC (ref 24.5–35.1)
APTT: 50.4 SEC (ref 24.5–35.1)
AST SERPL-CCNC: 19 U/L (ref 0–31)
BASOPHILS ABSOLUTE: 0.01 E9/L (ref 0–0.2)
BASOPHILS RELATIVE PERCENT: 0.1 % (ref 0–2)
BILIRUB SERPL-MCNC: 0.6 MG/DL (ref 0–1.2)
BILIRUBIN DIRECT: 0.3 MG/DL (ref 0–0.3)
BILIRUBIN, INDIRECT: 0.3 MG/DL (ref 0–1)
BUN BLDV-MCNC: 17 MG/DL (ref 6–23)
CALCIUM SERPL-MCNC: 9.2 MG/DL (ref 8.6–10.2)
CHLORIDE BLD-SCNC: 104 MMOL/L (ref 98–107)
CO2: 25 MMOL/L (ref 22–29)
CREAT SERPL-MCNC: 0.9 MG/DL (ref 0.5–1)
EOSINOPHILS ABSOLUTE: 0.01 E9/L (ref 0.05–0.5)
EOSINOPHILS RELATIVE PERCENT: 0.1 % (ref 0–6)
GFR SERPL CREATININE-BSD FRML MDRD: >60 ML/MIN/1.73
GLUCOSE BLD-MCNC: 195 MG/DL (ref 74–99)
HCT VFR BLD CALC: 32.5 % (ref 34–48)
HEMOGLOBIN: 10.1 G/DL (ref 11.5–15.5)
IMMATURE GRANULOCYTES #: 0.05 E9/L
IMMATURE GRANULOCYTES %: 0.4 % (ref 0–5)
INR BLD: 1.1
LYMPHOCYTES ABSOLUTE: 1.67 E9/L (ref 1.5–4)
LYMPHOCYTES RELATIVE PERCENT: 13.4 % (ref 20–42)
MAGNESIUM: 1.8 MG/DL (ref 1.6–2.6)
MCH RBC QN AUTO: 27.5 PG (ref 26–35)
MCHC RBC AUTO-ENTMCNC: 31.1 % (ref 32–34.5)
MCV RBC AUTO: 88.6 FL (ref 80–99.9)
METER GLUCOSE: 155 MG/DL (ref 74–99)
METER GLUCOSE: 177 MG/DL (ref 74–99)
MONOCYTES ABSOLUTE: 0.66 E9/L (ref 0.1–0.95)
MONOCYTES RELATIVE PERCENT: 5.3 % (ref 2–12)
NEUTROPHILS ABSOLUTE: 10.02 E9/L (ref 1.8–7.3)
NEUTROPHILS RELATIVE PERCENT: 80.7 % (ref 43–80)
PDW BLD-RTO: 16.6 FL (ref 11.5–15)
PHOSPHORUS: 3 MG/DL (ref 2.5–4.5)
PLATELET # BLD: 282 E9/L (ref 130–450)
PMV BLD AUTO: 9.3 FL (ref 7–12)
POTASSIUM SERPL-SCNC: 4 MMOL/L (ref 3.5–5)
PROTHROMBIN TIME: 12.9 SEC (ref 9.3–12.4)
RBC # BLD: 3.67 E12/L (ref 3.5–5.5)
SODIUM BLD-SCNC: 137 MMOL/L (ref 132–146)
TOTAL PROTEIN: 5.7 G/DL (ref 6.4–8.3)
WBC # BLD: 12.4 E9/L (ref 4.5–11.5)

## 2022-10-28 PROCEDURE — 99232 SBSQ HOSP IP/OBS MODERATE 35: CPT | Performed by: INTERNAL MEDICINE

## 2022-10-28 PROCEDURE — 83735 ASSAY OF MAGNESIUM: CPT

## 2022-10-28 PROCEDURE — 36415 COLL VENOUS BLD VENIPUNCTURE: CPT

## 2022-10-28 PROCEDURE — 6370000000 HC RX 637 (ALT 250 FOR IP): Performed by: STUDENT IN AN ORGANIZED HEALTH CARE EDUCATION/TRAINING PROGRAM

## 2022-10-28 PROCEDURE — 1200000000 HC SEMI PRIVATE

## 2022-10-28 PROCEDURE — 82962 GLUCOSE BLOOD TEST: CPT

## 2022-10-28 PROCEDURE — 99232 SBSQ HOSP IP/OBS MODERATE 35: CPT | Performed by: STUDENT IN AN ORGANIZED HEALTH CARE EDUCATION/TRAINING PROGRAM

## 2022-10-28 PROCEDURE — 6370000000 HC RX 637 (ALT 250 FOR IP): Performed by: INTERNAL MEDICINE

## 2022-10-28 PROCEDURE — 85610 PROTHROMBIN TIME: CPT

## 2022-10-28 PROCEDURE — 80076 HEPATIC FUNCTION PANEL: CPT

## 2022-10-28 PROCEDURE — 85025 COMPLETE CBC W/AUTO DIFF WBC: CPT

## 2022-10-28 PROCEDURE — 80048 BASIC METABOLIC PNL TOTAL CA: CPT

## 2022-10-28 PROCEDURE — 84100 ASSAY OF PHOSPHORUS: CPT

## 2022-10-28 PROCEDURE — 6360000002 HC RX W HCPCS: Performed by: STUDENT IN AN ORGANIZED HEALTH CARE EDUCATION/TRAINING PROGRAM

## 2022-10-28 PROCEDURE — 2580000003 HC RX 258: Performed by: ANESTHESIOLOGY

## 2022-10-28 PROCEDURE — 85730 THROMBOPLASTIN TIME PARTIAL: CPT

## 2022-10-28 RX ORDER — WARFARIN SODIUM 3 MG/1
1.5 TABLET ORAL
Status: COMPLETED | OUTPATIENT
Start: 2022-10-28 | End: 2022-10-28

## 2022-10-28 RX ADMIN — POTASSIUM CHLORIDE 10 MEQ: 750 TABLET, EXTENDED RELEASE ORAL at 16:31

## 2022-10-28 RX ADMIN — HEPARIN SODIUM 2000 UNITS: 1000 INJECTION INTRAVENOUS; SUBCUTANEOUS at 17:47

## 2022-10-28 RX ADMIN — POTASSIUM CHLORIDE 10 MEQ: 750 TABLET, EXTENDED RELEASE ORAL at 08:43

## 2022-10-28 RX ADMIN — FERROUS SULFATE TAB 325 MG (65 MG ELEMENTAL FE) 325 MG: 325 (65 FE) TAB at 16:31

## 2022-10-28 RX ADMIN — Medication 10 ML: at 08:44

## 2022-10-28 RX ADMIN — FERROUS SULFATE TAB 325 MG (65 MG ELEMENTAL FE) 325 MG: 325 (65 FE) TAB at 08:44

## 2022-10-28 RX ADMIN — Medication 500 MG: at 20:33

## 2022-10-28 RX ADMIN — Medication 500 MG: at 08:43

## 2022-10-28 RX ADMIN — HEPARIN SODIUM 12 UNITS/KG/HR: 10000 INJECTION, SOLUTION INTRAVENOUS at 10:15

## 2022-10-28 RX ADMIN — PANTOPRAZOLE SODIUM 40 MG: 40 TABLET, DELAYED RELEASE ORAL at 06:31

## 2022-10-28 RX ADMIN — FUROSEMIDE 20 MG: 20 TABLET ORAL at 08:44

## 2022-10-28 RX ADMIN — PRAVASTATIN SODIUM 20 MG: 20 TABLET ORAL at 16:31

## 2022-10-28 RX ADMIN — HEPARIN SODIUM 15.03 UNITS/KG/HR: 10000 INJECTION, SOLUTION INTRAVENOUS at 05:26

## 2022-10-28 RX ADMIN — DILTIAZEM HYDROCHLORIDE 240 MG: 240 CAPSULE, COATED, EXTENDED RELEASE ORAL at 08:43

## 2022-10-28 RX ADMIN — METOPROLOL TARTRATE 50 MG: 50 TABLET, FILM COATED ORAL at 20:33

## 2022-10-28 RX ADMIN — HEPARIN SODIUM 15.03 UNITS/KG/HR: 10000 INJECTION, SOLUTION INTRAVENOUS at 10:09

## 2022-10-28 RX ADMIN — WARFARIN SODIUM 1.5 MG: 3 TABLET ORAL at 18:00

## 2022-10-28 RX ADMIN — METOPROLOL TARTRATE 50 MG: 50 TABLET, FILM COATED ORAL at 08:43

## 2022-10-28 RX ADMIN — MULTIVITAMIN TABLET 1 TABLET: TABLET at 08:43

## 2022-10-28 RX ADMIN — PANTOPRAZOLE SODIUM 40 MG: 40 TABLET, DELAYED RELEASE ORAL at 16:31

## 2022-10-28 RX ADMIN — HEPARIN SODIUM 14 UNITS/KG/HR: 10000 INJECTION, SOLUTION INTRAVENOUS at 17:43

## 2022-10-28 RX ADMIN — AZITHROMYCIN 250 MG: 250 TABLET, FILM COATED ORAL at 08:42

## 2022-10-28 ASSESSMENT — PAIN SCALES - GENERAL: PAINLEVEL_OUTOF10: 0

## 2022-10-28 NOTE — PROGRESS NOTES
[facility] Progress Note    Admitting Date and Time: 10/21/2022  4:24 PM  Admit Dx: Bilateral deafness [H91.93]  Hearing loss [H91.90]    Subjective:  Patient is being followed for Bilateral deafness [H91.93]  Hearing loss [H91.90]     Patient is a 82-year-old female with past medical history significant for asymmetric sensorineural hearing loss now s/p right cochlear implantation on 10/26/2022. No acute events overnight. Yesterday afternoon heparin infusion and warfarin were restarted. Overnight there were some inconsistencies in the dosage/rate of infusion. PTT noted to be prolonged overnight and heparin infusion has been held. She denies chest pain, shortness of breath, nausea, vomiting, headache. Discussed with nursing, rechecking PTT.       ROS: denies fever, chills, cp, sob, n/v, HA unless stated above.      warfarin placeholder: dosing by pharmacy   Other RX Placeholder    sodium chloride flush  5-40 mL IntraVENous 2 times per day    azithromycin  250 mg Oral Daily    ferrous sulfate  325 mg Oral BID WC    multivitamin  1 tablet Oral Daily    [Held by provider] aspirin  81 mg Oral Daily    dilTIAZem  240 mg Oral Daily    metoprolol  50 mg Oral BID    niacin  500 mg Oral BID    pravastatin  20 mg Oral Daily    pantoprazole  40 mg Oral BID AC    furosemide  20 mg Oral Daily    potassium chloride  10 mEq Oral BID WC     sodium chloride flush, 5-40 mL, PRN  sodium chloride, , PRN  ibuprofen, 600 mg, Q6H PRN  perflutren lipid microspheres, 1.5 mL, ONCE PRN  glucose, 4 tablet, PRN  dextrose bolus, 125 mL, PRN   Or  dextrose bolus, 250 mL, PRN  glucagon (rDNA), 1 mg, PRN  dextrose, , Continuous PRN  potassium chloride, 40 mEq, PRN   Or  potassium alternative oral replacement, 40 mEq, PRN   Or  potassium chloride, 10 mEq, PRN  acetaminophen, 650 mg, Q4H PRN  heparin (porcine), 4,000 Units, PRN  heparin (porcine), 2,000 Units, PRN       Objective:    BP (!) 141/73   Pulse 83   Temp 98 °F (36.7 °C) (Oral)   Resp 16   Ht 5' 3\" (1.6 m)   Wt 147 lb 5.2 oz (66.8 kg)   SpO2 96%   BMI 26.10 kg/m²     General Appearance: alert and oriented to person, place and time and in no acute distress  Skin: warm and dry  Head: normocephalic and atraumatic  Eyes: pupils equal, round, and reactive to light, extraocular eye movements intact, conjunctivae normal  Neck: neck supple and non tender without mass   Pulmonary/Chest: clear to auscultation bilaterally- no wheezes, rales or rhonchi, normal air movement, no respiratory distress  Cardiovascular: normal rate, normal S1 and S2 and no carotid bruits  Abdomen: soft, non-tender, non-distended, normal bowel sounds, no masses or organomegaly  Extremities: no cyanosis, no clubbing and no edema  Neurologic: no cranial nerve deficit and speech normal        Recent Labs     10/26/22  0531 10/27/22  0319 10/28/22  0241    139 137   K 4.2 4.4 4.0    105 104   CO2 27 22 25   BUN 17 16 17   CREATININE 0.9 0.8 0.9   GLUCOSE 153* 208* 195*   CALCIUM 9.6 9.4 9.2         Recent Labs     10/26/22  0531 10/27/22  0319 10/28/22  0241   WBC 6.7 5.9 12.4*   RBC 4.03 4.00 3.67   HGB 11.0* 10.9* 10.1*   HCT 35.7 36.2 32.5*   MCV 88.6 90.5 88.6   MCH 27.3 27.3 27.5   MCHC 30.8* 30.1* 31.1*   RDW 16.0* 16.1* 16.6*    301 282   MPV 8.8 9.1 9.3         Radiology: No new imaging past 24 hours    Assessment:    Principal Problem:    Bilateral deafness  Active Problems:    Cerebrovascular accident (CVA) due to embolic occlusion of left middle cerebral artery (HCC)    Severe aortic stenosis    Hearing loss    Diabetes mellitus (HCC)    Pre-op evaluation    Iron deficiency anemia    S/P ear surgery    Hypertension    DJD (degenerative joint disease)    Atrial fibrillation (HCC)    Mitral regurgitation    Pulmonary hypertension (HCC)    Profound hearing loss  Resolved Problems: Moderate aortic stenosis by prior echocardiogram      Plan:  1.   Asymmetric sensorineural hearing loss s/p right cochlear implantation on 10/26/2022-continue azithromycin 250 mg p.o. daily for 5 doses per ENT  2. Permanent atrial fibrillation with controlled rate-resumed heparin infusion and warfarin 24 hours postsurgery in the afternoon on 10/27. We will continue with heparin infusion until INR is therapeutic between 2.0 and 3.0. At that point patient will be stable for discharge. Continue home diltiazem. 3.  Severe aortic stenosis-[facility]  4. Hypertension-continue home Lopressor and diltiazem  5. Hyperlipidemia plan continue home pravastatin  6. Type II DM-controlled  7. Prior TIAs-associated with surgery in which anticoagulation was held in the past  8. Anemia-monitor with daily CBC, transfuse for hemoglobin less than 7      NOTE: This report was transcribed using voice recognition software. Every effort was made to ensure accuracy; however, inadvertent computerized transcription errors may be present.   Electronically signed by Lachelle Guajardo MD on 10/28/2022 at 10:25 AM

## 2022-10-28 NOTE — PROGRESS NOTES
[address], 1945    Date of Service: 10/28/2022    Chief Complaint: Follow-up for perioperative cardiac evaluation, aortic stenosis, atrial fibrillation    HPI:  No new overnight cardiac complaints. She denies chest pain, shortness of breath, or syncope. She denies orthopnea, PND or lower extremity edema. Atrial fibrillation with CVR on EKG. S/p cochlear implantation on 10/26/22. She was previously followed by Dr. Tamara Wilkinson; she established care with me in 1/2019. She presents today for follow-up of atrial fibrillation and valvular heart disease. She is a 78 y.o. female with a history of chronic atrial fibrillation, hypertension, dyslipidemia, TIA's, and aortic stenosis. Her last TIA episode was in 09/2015 when anticoagulation was held for a procedure. Review of Systems:  Cardiac: As per HPI  General: No fever, chills  Pulmonary: As per HPI  HEENT: No visual disturbances, difficult swallowing, +hearing impairment  GI: No nausea, vomiting  : No dysuria, hematuria  Endocrine: No thyroid disease, +DM  Musculoskeletal: LOPEZ x 4, no focal motor deficits  Skin: Intact, no rashes  Neuro: No headache, seizures  Psych: Currently with no depression, anxiety    Past medical history:   Hearing impairment with ear surgery that temporarily improved her hearing. She wears bilateral hearing aids as of 03/2010. Lower extremity venous stripping, 1981. Hypertension. Hyperlipidemia. Total cholesterol 162, triglycerides 86, HDL 42, LDL 96 - 09/2009. Colonoscopy, 2008 with small polyps that were removed. Diverticular disease also noted. EGD, 09/2009 for hem positive stools. Hiatal hernia noted. Biopsies taken and omeprazole prescribed. AF post EGD, 09/2009. She was admitted to [facility]. She declined cardioversion and has been in chronic AF since that time. CBC, BMP, cardiac enzymes - 09/2009 normal.  Echo, 09/2009. Mild aortic stenosis, peak gradient 18 mmHg, LA enlarged at 4.1 cm. LV size and function normal.    Rate control and warfarin prescribed 09/2009. Lifetime nonsmoker. No history of alcohol intake. No family history of premature vascular disease. Echo, 08/30/2012 with normal LV size and systolic function, JOHN, mild AS with peak gradient 15 mmHg, MERCEDEZ 1.6 cm². RVSP 37 mmHg. Lexiscan MPS, 08/30/2012. Normal.  EF 70%. [facility] admission, 03/22/2013 with speech difficulty. Cr 1.2, otherwise normal BMP. CBC normal.  TSH normal.  CT of the head with no acute pathology. MRI showed small lacunar infarct in the left posterior parietal lobe. INR on admission was 1.5. Discharged after symptoms resolved within 24 hours. Discharged with adequate anticoagulation. Echo, 09/18/2014. Normal EF, normal LV size, E/E' 19, JOHN (LA 59 mm), mild to moderate AS (mean gradient 16 mmHg, MERCEDEZ 1.5 cm², VTI 0.52). RVSP 48 mmHg, dilated IVC with poor inspiratory collapse. [facility] presentation, 09/30/2015 with transient confusion and right-sided visual field defects. Resolved prior to presentation. Her anticoagulation was interrupted for breast biopsy a few weeks prior. On presentation, INR was 2.2. 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 Brain perfusion scan revealed moderate area of ischemia in the left posterior parietal lobe. She was discharged home in stable condition. Echo, 10/01/2015, Dr. Cedeno Mail. Normal EF. Indeterminate diastolic function. Severe biatrial enlargement. Mild MR.  Mild-moderate AS. Mean gradient of 20 mmHg. Mild AR. Mild pulmonary HTN (41 mmHg).      BP (!) 141/73   Pulse 68   Temp 98 °F (36.7 °C) (Oral)   Resp 16   Ht 5' 3\" (1.6 m)   Wt 147 lb 5 oz (66.8 kg)   SpO2 96%   BMI 26.10 kg/m²    Appearance: Awake, alert, no acute respiratory distress  Skin: Intact, no rash  Head: Normocephalic, atraumatic  Eyes: EOMI, no conjunctival erythema  ENMT: No pharyngeal erythema, MMM, no rhinorrhea  Neck: Supple, no carotid bruits  Lungs: Clear to auscultation bilaterally. No wheezes, rales, or rhonchi.   Cardiac: IRRR, 3/6 DELL  Abdomen: Soft, nontender, +bowel sounds  Extremities: Moves all extremities x 4, no lower extremity edema  Neurologic: No focal motor deficits apparent, normal mood and affect, alert and oriented x 3    Current Facility-Administered Medications   Medication Dose Route Frequency Provider Last Rate Last Admin    warfarin (COUMADIN) tablet 1.5 mg  1.5 mg Oral Once Cristian Aguirrek,         warfarin placeholder: dosing by pharmacy   Other RX Placeholder [facility], DO        sodium chloride flush 0.9 % injection 5-40 mL  5-40 mL IntraVENous 2 times per day Krystle Fairchild MD   10 mL at 10/28/22 0844    sodium chloride flush 0.9 % injection 5-40 mL  5-40 mL IntraVENous PRN Krystle Fairchild MD        0.9 % sodium chloride infusion   IntraVENous PRN Krystle Fairchild MD        [facility]) tablet 250 mg  250 mg Oral Daily [facility], DO   250 mg at 10/28/22 6541    ibuprofen (ADVIL;MOTRIN) tablet 600 mg  600 mg Oral Q6H PRN Gerry Aschoff, APRN - TOMMIE        ferrous sulfate (IRON 325) tablet 325 mg  325 mg Oral BID WC [facility], DO   325 mg at 10/28/22 0844    multivitamin 1 tablet  1 tablet Oral Daily [facility], DO   1 tablet at 10/28/22 8075    [Held by provider] aspirin EC tablet 81 mg  81 mg Oral Daily [facility], DO   81 mg at 10/22/22 0747    dilTIAZem (CARDIZEM CD) extended release capsule 240 mg  240 mg Oral Daily [facility], DO   240 mg at 10/28/22 0843    metoprolol tartrate (LOPRESSOR) tablet 50 mg  50 mg Oral BID [facility], DO   50 mg at 10/28/22 9705    niacin extended release capsule 500 mg  500 mg Oral BID [facility], DO   500 mg at 10/28/22 0843    pravastatin (PRAVACHOL) tablet 20 mg  20 mg Oral Daily [facility], DO   20 mg at 10/27/22 1645    pantoprazole (PROTONIX) tablet 40 mg  40 mg Oral BID AC Arnoldo Kay DO   40 mg at 10/28/22 0951 furosemide (LASIX) tablet 20 mg  20 mg Oral Daily Christine Richmond Dodge, DO   20 mg at 10/28/22 0844    potassium chloride (KLOR-CON M) extended release tablet 10 mEq  10 mEq Oral BID WC Christine Richmond Dodge, DO   10 mEq at 10/28/22 2814    perflutren lipid microspheres (DEFINITY) injection 1.65 mg  1.5 mL IntraVENous ONCE PRN Christine Richmond Dodge, DO        glucose chewable tablet 16 g  4 tablet Oral PRN Christine Richmond Dodge, DO        dextrose bolus 10% 125 mL  125 mL IntraVENous PRN Christine Richmond Dodge, DO        Or    dextrose bolus 10% 250 mL  250 mL IntraVENous PRN Christine Richmond Dodge, DO        glucagon (rDNA) injection 1 mg  1 mg IntraMUSCular PRN Christine Richmond Dodge, DO        dextrose 10 % infusion   IntraVENous Continuous PRN Christine Richmond Dodge, DO        potassium chloride (KLOR-CON M) extended release tablet 40 mEq  40 mEq Oral PRN Christine Richmond Dodge, DO        Or    potassium bicarb-citric acid (EFFER-K) effervescent tablet 40 mEq  40 mEq Oral PRN Christine Richmond Dodge, DO        Or    potassium chloride 10 mEq/100 mL IVPB (Peripheral Line)  10 mEq IntraVENous PRN Christine Richmond Dodge, DO        acetaminophen (TYLENOL) tablet 650 mg  650 mg Oral Q4H PRN Christine Richmond Dodge, DO        heparin (porcine) injection 4,000 Units  4,000 Units IntraVENous PRN Christine Richmond Dodge, DO   4,000 Units at 10/27/22 2044    heparin (porcine) injection 2,000 Units  2,000 Units IntraVENous PRN Christine Richmond Dodge, DO   2,000 Units at 10/25/22 0719    heparin 25,000 units in dextrose 5% 250 mL (premix) infusion  5-30 Units/kg/hr IntraVENous Continuous Christine Richmond Dodge, DO 8.1 mL/hr at 10/28/22 1015 12 Units/kg/hr at 10/28/22 1015     Lab Results   Component Value Date    WBC 12.4 (H) 10/28/2022    HGB 10.1 (L) 10/28/2022    HCT 32.5 (L) 10/28/2022    MCV 88.6 10/28/2022     10/28/2022     Lab Results   Component Value Date    CREATININE 0.9 10/28/2022    BUN 17 10/28/2022     10/28/2022    K 4.0 10/28/2022     10/28/2022    CO2 25 10/28/2022     Lab Results   Component Value Date    TSH 1.490 10/22/2022     Lab Results   Component Value Date    DIGOXIN 1.4 03/23/2013     Lab Results   Component Value Date    ALT 11 10/28/2022    AST 19 10/28/2022    ALKPHOS 112 (H) 10/28/2022    BILITOT 0.6 10/28/2022     Lab Results   Component Value Date    INR 1.1 10/28/2022    INR 1.1 10/27/2022    INR 1.6 10/23/2022    PROTIME 12.9 (H) 10/28/2022    PROTIME 12.0 10/27/2022    PROTIME 17.1 (H) 10/23/2022       Telemetry reviewed (10/28/2022): atrial fibrillation, rate 70's-80's    Echo, 10/01/2015, Dr. Kylah Murcia. Normal EF. Indeterminate diastolic function. Severe biatrial enlargement. Mild MR.  Mild-moderate AS. Mean gradient of 20 mmHg. Mild AR. Mild pulmonary HTN (41 mmHg). Echocardiogram: 1/9/18 ([facility])   Left ventricle is normal in size . Mild left ventricular concentric hypertrophy noted. No regional wall motion abnormalities seen. Normal left ventricular ejection fraction. The left atrium is moderately dilated. Moderately enlarged right atrium size. Mild thickening of the mitral valve leaflets. Mild mitral regurgitation is present. Moderate aortic stenosis is present. Mild aortic regurgitation is noted. Mild to moderate tricuspid regurgitation. Pulmonary hypertension is mild . TTE-9/25/2020:   Normal left ventricular systolic function. Ejection fraction is visually estimated at > 60%. Normal right ventricular size and function (TAPSE 2.0 cm). Indeterminate diastolic function. Severely dilated left atrium by volume index. Severely dilated right atrium. Moderate mitral regurgitation. Mild-moderate aortic regurgitation. Low gradient severe aortic stenosis (AV peak velocity 3.2 m/s, AV mean gradient 21 mmHg, AV area 0.8 cm2, peak velocity dimensionless index 0.23, VTI dimensionless index 0.25, stroke volume index 35 mL/m2). Mild tricuspid regurgitation. PASP is estimated at 36 mmHg.      TTE-9/30/2022:  - Exam indication: AS   - The left ventricle is small. There is concentric left ventricular hypertrophy. Left ventricular systolic function is normal. EF = 69 ± 5% (2D biplane)   - The right ventricle is normal in size. Right ventricular systolic function is   normal.   - The left atrial cavity is severely dilated. - The right atrial cavity is severely dilated. - The visualized aorta is borderline dilated with a maximal dimension of 3.9 cm.   - Tricuspid aortic valve. There is severe aortic valve stenosis. AV area is 0.74   cm² (0.43 cm²/m²) by continuity, VTI. The peak gradient is 48 mmHg, the mean   gradient is 29 mmHg and the dimensionless valve index is 0.24. Prior peak/mean   gradients of 39/23 mmHg. Today's gradients were averaged d/t afib. - There is mild mitral annular calcification observed anterior and posterior. There   is mild (1+) mitral valve regurgitation. There is mild thickening.   - Exam was compared with the prior  echocardiographic exam performed on 3/29/22. There has been an increase in transaortic gradients on today's exam, otherwise   similar findings.           Assessment:  Perioperative cardiac evaluation (asymmetric sensorineural hearing loss; s/p right cochlear implantation on 10/26/22)  Permanent atrial fibrillation with controlled rate  Elevated [id number] score -- on warfarin for anticoagulation prior to admission (INR 2.0 --> 1.9 --> 1.6 --> 1.1 --> 1.1)  VHD (including severe aortic stenosis) -- follows at [facility]  Hypertension -- most recent -130's  Hyperlipidemia -- on statin  Type 2 diabetes -- Hgb A1c 6.1  Severe bi-atrial enlargement  Prior TIA's (including TIA x 2 when anticoagulation was held for procedures in the past)  Anemia -- most recent Hgb 10.6 --> 10.7 --> 11.0 --> 10.9 --> 10.1     Plan:   Bridging anticoagulation post-operatively (heparin/coumadin) / most recent INR 1.1 (pharmacy team following)  Monitor hemodynamics  Continue current rate controlling agents  Records from [facility] reviewed (including 9/2022 echocardiogram)  Telemetry reviewed (rate controlled AF)  Monitor labs  Care per ENT  Will follow KISHORE Pepe MD, MD  [facility] ([facility]) Cardiology

## 2022-10-28 NOTE — PROGRESS NOTES
Pharmacy Consultation Note  (Warfarin Dosing and Monitoring)    Initial consult date: 10/23/22  Consulting Provider: Dr. Cee Moffett is a 78 y.o. female for whom pharmacy has been asked to manage warfarin therapy. Weight:   Wt Readings from Last 1 Encounters:   10/28/22 147 lb 5 oz (66.8 kg)       TSH:    Lab Results   Component Value Date/Time    TSH 1.490 10/22/2022 01:19 AM       Hepatic Function Panel:                            Lab Results   Component Value Date/Time    ALKPHOS 112 10/28/2022 02:41 AM    ALT 11 10/28/2022 02:41 AM    AST 19 10/28/2022 02:41 AM    PROT 5.7 10/28/2022 02:41 AM    BILITOT 0.6 10/28/2022 02:41 AM    BILIDIR 0.3 10/28/2022 02:41 AM    IBILI 0.3 10/28/2022 02:41 AM    LABALBU 3.7 10/28/2022 02:41 AM       Current warfarin drug-drug interactions include:  azithromycin (incr INR)    Recent Labs     10/26/22  0531 10/27/22  0319 10/28/22  0241   HGB 11.0* 10.9* 10.1*    301 282       Date Warfarin Dose INR Heparin or LMWH Comment   10/23 Hold 1.6 Heparin gtt    10/24 HOLD -- Heparin gtt    10/25 HOLD -- Heparin gtt    10/26 -- Heparin gtt    10/27 1.5mg -- Heparin gtt    10/28 1.5mg 1.1 Heparin gtt      Assessment:  Patient is a 78 y.o. female on warfarin for Atrial Fibrillation. Patient's home warfarin dosing regimen is warfarin 1.5mg daily.    Goal INR 2 - 3  INR 1.1    Plan:  Continue heparin drip post-op to bridge warfarin  Warfarin 1.5mg tonight   Patient started on azithromycin which may interact with warfarin; will monitor  Daily PT/INR until the INR is stable within the therapeutic range  Pharmacist will follow and monitor/adjust dosing as necessary    Thank you for this consult,    Joaquin Berg, [id number] [facility] 10/28/2022 12:46 PM

## 2022-10-28 NOTE — PROGRESS NOTES
Comprehensive Nutrition Assessment    Type and Reason for Visit:  Initial, RD Nutrition Re-Screen/LOS    Nutrition Recommendations/Plan:   Continue current diet & monitor     Nutrition Assessment:    Pt w/ asymmetric sensorineural hearing loss s/p R chochlear implantation 10/26. Hx DM, CVA, chronic a-fib. Pt consuming >75% of meals. Continue current diet & monitor. Nutrition Related Findings:    A&O, I&Os WDL, abd soft, +BS, b/l deafness Wound Type: Surgical Incision       Current Nutrition Intake & Therapies:    Average Meal Intake: %  Average Supplements Intake: None Ordered  ADULT DIET; Regular; 5 carb choices (75 gm/meal); Low Fat/Low Chol/High Fiber/EDIE; Gluten Free    Anthropometric Measures:  Height: 5' 3\" (160 cm)  Ideal Body Weight (IBW): 115 lbs (52 kg)    Admission Body Weight: 148 lb (67.1 kg) (10/28 bed)  Current Body Weight: 147 lb (66.7 kg), 127.8 % IBW. Weight Source: Bed Scale (10/28)  Current BMI (kg/m2): 26  Usual Body Weight:  (no actual wt hx per EMR, note 3/2022 150# OV no method)     Weight Adjustment For: No Adjustment                 BMI Categories: Overweight (BMI 25.0-29. 9)    Nutrition Diagnosis:   No nutrition diagnosis at this time     Nutrition Interventions:   Food and/or Nutrient Delivery: Continue Current Diet  Nutrition Education/Counseling: Education not indicated  Coordination of Nutrition Care: Continue to monitor while inpatient       Goals:     Goals: PO intake 75% or greater       Nutrition Monitoring and Evaluation:      Food/Nutrient Intake Outcomes: Food and Nutrient Intake  Physical Signs/Symptoms Outcomes: Biochemical Data, GI Status, Fluid Status or Edema, Nutrition Focused Physical Findings, Skin, Weight    Discharge Planning:     Too soon to determine     Conor Weinstein RD, LD  Contact: [phone]

## 2022-10-28 NOTE — DISCHARGE INSTRUCTIONS
POSTOP EAR SURGERY INSTRUCTIONS  Regular diet  No strenuous activity for 2 weeks  Alternate OTC tylenol with ibuprofen for pain control  Prescription if provided for breakthrough pain control   She can gently wash Her hair but strict dry ear precautions for the inside of the ear  (Can use ear plugs or cotton ball with vaseline to keep moisture out of the ear)  Topical antibiotic ointment to any visible incision 2X/day for 2 weeks  Keep follow up appointment      Warfarin 2 mg tonight, 11/2/2022; then begin warfarin 1.5 mg daily starting 11/3/2022

## 2022-10-28 NOTE — PROGRESS NOTES
Dr. Gordon Ray notified that heparin drip restarted at initial dose rate due to inconsistencies in rate changes in raman shift.

## 2022-10-28 NOTE — CARE COORDINATION
S/p cochlear implant; iv heparin /coumadin bridge; INR 1.1 can discharge when INR 2 or greater; no needs. Joseph Cardoza.

## 2022-10-28 NOTE — PROGRESS NOTES
OTOLARYNGOLOGY  DAILY PROGRESS NOTE  10/28/2022    Subjective:     No acute events overnight. AFVSS. Pain controlled. Tolerating diet. No facial nerve weakness. Objective:     BP (!) 145/68   Pulse 75   Temp 98.6 °F (37 °C) (Oral)   Resp 16   Ht 5' 3\" (1.6 m)   Wt 147 lb 5.2 oz (66.8 kg)   SpO2 98%   BMI 26.10 kg/m²   PULSE OXIMETRY RANGE: SpO2  Av.3 %  Min: 96 %  Max: 98 %  I/O last 3 completed shifts: In: 900 [P.O.:900]  Out: 1900 [Urine:1900]    GENERAL:  Awake, alert, cooperative, no apparent distress  HENT: Normocephalic, atraumatic, post-auricular incision c/d/i  EYES: No sclera icterus, pupils equal  LUNGS:  No increased work of breathing, no stridor  CARDIOVASCULAR:  RR  NEURO: Facial nerve [place] 1/6 bilaterally      Assessment/Plan:     78 y.o. female POD#1 s/p right cochlear implantation    [place] dressing removed 10/27  Keep right ear dry  Continue Azithromycin x 5 days  Bacitracin to post-auricular incision TID  Pain/nausea control per primary team  Okay for restarting heparin with bridge to coumadin 24 hours after surgery  From ENT standpoint, patient is okay for discharge to home whenever okay with medicine    Patient seen and examined by resident. Will discuss plan with attending.      Electronically signed by Jesika Hollins DO on 10/28/2022 at 7:48 AM

## 2022-10-29 LAB
ALBUMIN SERPL-MCNC: 3.7 G/DL (ref 3.5–5.2)
ALP BLD-CCNC: 113 U/L (ref 35–104)
ALT SERPL-CCNC: 11 U/L (ref 0–32)
ANION GAP SERPL CALCULATED.3IONS-SCNC: 11 MMOL/L (ref 7–16)
APTT: 72.4 SEC (ref 24.5–35.1)
APTT: 76.1 SEC (ref 24.5–35.1)
AST SERPL-CCNC: 18 U/L (ref 0–31)
BILIRUB SERPL-MCNC: 0.6 MG/DL (ref 0–1.2)
BILIRUBIN DIRECT: 0.3 MG/DL (ref 0–0.3)
BILIRUBIN, INDIRECT: 0.3 MG/DL (ref 0–1)
BUN BLDV-MCNC: 15 MG/DL (ref 6–23)
CALCIUM SERPL-MCNC: 9.3 MG/DL (ref 8.6–10.2)
CHLORIDE BLD-SCNC: 102 MMOL/L (ref 98–107)
CO2: 26 MMOL/L (ref 22–29)
CREAT SERPL-MCNC: 0.8 MG/DL (ref 0.5–1)
GFR SERPL CREATININE-BSD FRML MDRD: >60 ML/MIN/1.73
GLUCOSE BLD-MCNC: 159 MG/DL (ref 74–99)
INR BLD: 1.2
MAGNESIUM: 1.8 MG/DL (ref 1.6–2.6)
METER GLUCOSE: 130 MG/DL (ref 74–99)
PHOSPHORUS: 3.4 MG/DL (ref 2.5–4.5)
POTASSIUM SERPL-SCNC: 4 MMOL/L (ref 3.5–5)
PROTHROMBIN TIME: 13.8 SEC (ref 9.3–12.4)
SODIUM BLD-SCNC: 139 MMOL/L (ref 132–146)
TOTAL PROTEIN: 6 G/DL (ref 6.4–8.3)

## 2022-10-29 PROCEDURE — 6370000000 HC RX 637 (ALT 250 FOR IP): Performed by: STUDENT IN AN ORGANIZED HEALTH CARE EDUCATION/TRAINING PROGRAM

## 2022-10-29 PROCEDURE — 80048 BASIC METABOLIC PNL TOTAL CA: CPT

## 2022-10-29 PROCEDURE — 82962 GLUCOSE BLOOD TEST: CPT

## 2022-10-29 PROCEDURE — 80076 HEPATIC FUNCTION PANEL: CPT

## 2022-10-29 PROCEDURE — 6370000000 HC RX 637 (ALT 250 FOR IP)

## 2022-10-29 PROCEDURE — 85730 THROMBOPLASTIN TIME PARTIAL: CPT

## 2022-10-29 PROCEDURE — 84100 ASSAY OF PHOSPHORUS: CPT

## 2022-10-29 PROCEDURE — 99232 SBSQ HOSP IP/OBS MODERATE 35: CPT | Performed by: STUDENT IN AN ORGANIZED HEALTH CARE EDUCATION/TRAINING PROGRAM

## 2022-10-29 PROCEDURE — 1200000000 HC SEMI PRIVATE

## 2022-10-29 PROCEDURE — 6360000002 HC RX W HCPCS: Performed by: STUDENT IN AN ORGANIZED HEALTH CARE EDUCATION/TRAINING PROGRAM

## 2022-10-29 PROCEDURE — 36415 COLL VENOUS BLD VENIPUNCTURE: CPT

## 2022-10-29 PROCEDURE — 85610 PROTHROMBIN TIME: CPT

## 2022-10-29 PROCEDURE — 83735 ASSAY OF MAGNESIUM: CPT

## 2022-10-29 PROCEDURE — 6370000000 HC RX 637 (ALT 250 FOR IP): Performed by: INTERNAL MEDICINE

## 2022-10-29 RX ORDER — WARFARIN SODIUM 3 MG/1
1.5 TABLET ORAL
Status: COMPLETED | OUTPATIENT
Start: 2022-10-29 | End: 2022-10-29

## 2022-10-29 RX ORDER — BACITRACIN ZINC 500 [USP'U]/G
OINTMENT TOPICAL 2 TIMES DAILY
Status: DISCONTINUED | OUTPATIENT
Start: 2022-10-29 | End: 2022-11-02 | Stop reason: HOSPADM

## 2022-10-29 RX ADMIN — Medication: at 14:28

## 2022-10-29 RX ADMIN — PANTOPRAZOLE SODIUM 40 MG: 40 TABLET, DELAYED RELEASE ORAL at 18:07

## 2022-10-29 RX ADMIN — METOPROLOL TARTRATE 50 MG: 50 TABLET, FILM COATED ORAL at 09:45

## 2022-10-29 RX ADMIN — FUROSEMIDE 20 MG: 20 TABLET ORAL at 09:45

## 2022-10-29 RX ADMIN — Medication 500 MG: at 09:45

## 2022-10-29 RX ADMIN — POTASSIUM CHLORIDE 10 MEQ: 750 TABLET, EXTENDED RELEASE ORAL at 18:06

## 2022-10-29 RX ADMIN — PRAVASTATIN SODIUM 20 MG: 20 TABLET ORAL at 18:07

## 2022-10-29 RX ADMIN — DILTIAZEM HYDROCHLORIDE 240 MG: 240 CAPSULE, COATED, EXTENDED RELEASE ORAL at 09:45

## 2022-10-29 RX ADMIN — WARFARIN SODIUM 1.5 MG: 3 TABLET ORAL at 18:06

## 2022-10-29 RX ADMIN — PANTOPRAZOLE SODIUM 40 MG: 40 TABLET, DELAYED RELEASE ORAL at 06:04

## 2022-10-29 RX ADMIN — Medication 500 MG: at 20:17

## 2022-10-29 RX ADMIN — Medication: at 19:45

## 2022-10-29 RX ADMIN — FERROUS SULFATE TAB 325 MG (65 MG ELEMENTAL FE) 325 MG: 325 (65 FE) TAB at 09:45

## 2022-10-29 RX ADMIN — MULTIVITAMIN TABLET 1 TABLET: TABLET at 09:45

## 2022-10-29 RX ADMIN — AZITHROMYCIN 250 MG: 250 TABLET, FILM COATED ORAL at 09:45

## 2022-10-29 RX ADMIN — METOPROLOL TARTRATE 50 MG: 50 TABLET, FILM COATED ORAL at 20:17

## 2022-10-29 RX ADMIN — FERROUS SULFATE TAB 325 MG (65 MG ELEMENTAL FE) 325 MG: 325 (65 FE) TAB at 18:07

## 2022-10-29 RX ADMIN — POTASSIUM CHLORIDE 10 MEQ: 750 TABLET, EXTENDED RELEASE ORAL at 09:45

## 2022-10-29 RX ADMIN — HEPARIN SODIUM 14.01 UNITS/KG/HR: 10000 INJECTION, SOLUTION INTRAVENOUS at 20:24

## 2022-10-29 ASSESSMENT — PAIN SCALES - GENERAL: PAINLEVEL_OUTOF10: 0

## 2022-10-29 NOTE — PLAN OF CARE
Problem: Discharge Planning  Goal: Discharge to home or other facility with appropriate resources  10/28/2022 2209 by Nora Burnette RN  Outcome: Progressing     Problem: ABCDS Injury Assessment  Goal: Absence of physical injury  10/28/2022 2209 by Nora Burnette RN  Outcome: Progressing     Problem: Safety - Adult  Goal: Free from fall injury  10/28/2022 2209 by Nora Burnette RN  Outcome: Progressing     Problem: Chronic Conditions and Co-morbidities  Goal: Patient's chronic conditions and co-morbidity symptoms are monitored and maintained or improved  Outcome: Progressing

## 2022-10-29 NOTE — PROGRESS NOTES
Ptt 72.4, prior 76. 1. two consecutive results with no rate change. Next ptt to be drawn in the next am with morning labs.

## 2022-10-29 NOTE — PROGRESS NOTES
Heparin drip dose remains the same, currently two therapeutic aptt. Per order, next aptt to be drawn in AM with next morning labs.

## 2022-10-29 NOTE — PROGRESS NOTES
[facility] Progress Note    Admitting Date and Time: 10/21/2022  4:24 PM  Admit Dx: Bilateral deafness [H91.93]  Hearing loss [H91.90]    Subjective:  Patient is being followed for Bilateral deafness [H91.93]  Hearing loss [H91.90]     Patient is a 57-year-old female with past medical history significant for asymmetric sensorineural hearing loss now s/p right cochlear implantation on 10/26/2022. No acute events overnight. She continues to have PTT checked while on heparin infusion. Counseled patient that INR this morning is 1.2, still not within her goal of 2.0-3.0 to be able to discontinue the heparin infusion and discharge her home. She denies any evidence of bleeding. She denies chest pain, shortness of breath, nausea, vomiting, headache.         ROS: denies fever, chills, cp, sob, n/v, HA unless stated above.      warfarin placeholder: dosing by pharmacy   Other RX Placeholder    sodium chloride flush  5-40 mL IntraVENous 2 times per day    azithromycin  250 mg Oral Daily    ferrous sulfate  325 mg Oral BID WC    multivitamin  1 tablet Oral Daily    [Held by provider] aspirin  81 mg Oral Daily    dilTIAZem  240 mg Oral Daily    metoprolol  50 mg Oral BID    niacin  500 mg Oral BID    pravastatin  20 mg Oral Daily    pantoprazole  40 mg Oral BID AC    furosemide  20 mg Oral Daily    potassium chloride  10 mEq Oral BID WC     sodium chloride flush, 5-40 mL, PRN  sodium chloride, , PRN  ibuprofen, 600 mg, Q6H PRN  perflutren lipid microspheres, 1.5 mL, ONCE PRN  glucose, 4 tablet, PRN  dextrose bolus, 125 mL, PRN   Or  dextrose bolus, 250 mL, PRN  glucagon (rDNA), 1 mg, PRN  dextrose, , Continuous PRN  potassium chloride, 40 mEq, PRN   Or  potassium alternative oral replacement, 40 mEq, PRN   Or  potassium chloride, 10 mEq, PRN  acetaminophen, 650 mg, Q4H PRN  heparin (porcine), 4,000 Units, PRN  heparin (porcine), 2,000 Units, PRN       Objective:    BP (!) 150/75   Pulse 91   Temp 98.1 °F (36.7 °C) (Oral)   Resp 16   Ht 5' 3\" (1.6 m)   Wt 146 lb 6.2 oz (66.4 kg)   SpO2 98%   BMI 25.93 kg/m²     General Appearance: alert and oriented to person, place and time and in no acute distress  Skin: warm and dry  Head: normocephalic and atraumatic  Eyes: pupils equal, round, and reactive to light, extraocular eye movements intact, conjunctivae normal  Neck: neck supple and non tender without mass   Pulmonary/Chest: clear to auscultation bilaterally- no wheezes, rales or rhonchi, normal air movement, no respiratory distress  Cardiovascular: normal rate, normal S1 and S2 and no carotid bruits  Abdomen: soft, non-tender, non-distended, normal bowel sounds, no masses or organomegaly  Extremities: no cyanosis, no clubbing and no edema  Neurologic: no cranial nerve deficit and speech normal        Recent Labs     10/27/22  0319 10/28/22  0241 10/29/22  0117    137 139   K 4.4 4.0 4.0    104 102   CO2 22 25 26   BUN 16 17 15   CREATININE 0.8 0.9 0.8   GLUCOSE 208* 195* 159*   CALCIUM 9.4 9.2 9.3         Recent Labs     10/27/22  0319 10/28/22  0241   WBC 5.9 12.4*   RBC 4.00 3.67   HGB 10.9* 10.1*   HCT 36.2 32.5*   MCV 90.5 88.6   MCH 27.3 27.5   MCHC 30.1* 31.1*   RDW 16.1* 16.6*    282   MPV 9.1 9.3         Radiology: No new imaging past 24 hours    Assessment:    Principal Problem:    Bilateral deafness  Active Problems:    Cerebrovascular accident (CVA) due to embolic occlusion of left middle cerebral artery (HCC)    Severe aortic stenosis    Hearing loss    Diabetes mellitus (HCC)    Pre-op evaluation    Iron deficiency anemia    S/P ear surgery    History of transient ischemic attack (TIA)    Preoperative cardiovascular examination    Hypertension    DJD (degenerative joint disease)    Atrial fibrillation (HCC)    Mitral regurgitation    Pulmonary hypertension (HCC)    Profound hearing loss  Resolved Problems: Moderate aortic stenosis by prior echocardiogram      Plan:  1. Asymmetric sensorineural hearing loss s/p right cochlear implantation on 10/26/2022-continue azithromycin 250 mg p.o. daily for 5 doses per ENT  2. Permanent atrial fibrillation with controlled rate-resumed heparin infusion and warfarin 24 hours postsurgery in the afternoon on 10/27. We will continue with heparin infusion until INR is therapeutic between 2.0 and 3.0. INR 1.2 on 10/29. At that point patient will be stable for discharge. Continue home diltiazem. 3.  Severe aortic stenosis-follows with [facility]  4. Hypertension-continue home Lopressor and diltiazem  5. Hyperlipidemia plan continue home pravastatin  6. Type II DM-controlled  7. Prior TIAs-associated with surgery in which anticoagulation was held in the past  8. Anemia-monitor with daily CBC, transfuse for hemoglobin less than 7      NOTE: This report was transcribed using voice recognition software. Every effort was made to ensure accuracy; however, inadvertent computerized transcription errors may be present.   Electronically signed by Melyssa Bacon MD on 10/29/2022 at 9:45 AM

## 2022-10-29 NOTE — PROGRESS NOTES
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 female for whom pharmacy has been asked to manage warfarin therapy. Weight:   Wt Readings from Last 1 Encounters:   10/29/22 146 lb 6.2 oz (66.4 kg)       TSH:    Lab Results   Component Value Date/Time    TSH 1.490 10/22/2022 01:19 AM       Hepatic Function Panel:                            Lab Results   Component Value Date/Time    ALKPHOS 113 10/29/2022 01:17 AM    ALT 11 10/29/2022 01:17 AM    AST 18 10/29/2022 01:17 AM    PROT 6.0 10/29/2022 01:17 AM    BILITOT 0.6 10/29/2022 01:17 AM    BILIDIR 0.3 10/29/2022 01:17 AM    IBILI 0.3 10/29/2022 01:17 AM    LABALBU 3.7 10/29/2022 01:17 AM       Current warfarin drug-drug interactions include:  azithromycin (incr INR)    Recent Labs     10/27/22  0319 10/28/22  0241   HGB 10.9* 10.1*    282       Date Warfarin Dose INR Heparin or LMWH Comment   10/23 Hold 1.6 Heparin gtt    10/24 HOLD -- Heparin gtt    10/25 HOLD -- Heparin gtt    10/26 -- Heparin gtt    10/27 1.5mg -- Heparin gtt    10/28 1.5mg 1.1 Heparin gtt    10/29 1.5mg 1.2 Heparin gtt      Assessment:  Patient is a 78 y.o. female on warfarin for Atrial Fibrillation. Patient's home warfarin dosing regimen is warfarin 1.5mg daily.    Goal INR 2 - 3  INR 1.2    Plan:  Continue heparin drip post-op to bridge warfarin  Warfarin 1.5mg tonight   Patient started on azithromycin which may interact with warfarin; will monitor  Daily PT/INR until the INR is stable within the therapeutic range  Pharmacist will follow and monitor/adjust dosing as necessary    Thank you for this consult,    LELA Ortiz [facility] 10/29/2022 9:21 AM

## 2022-10-29 NOTE — PLAN OF CARE
Problem: Discharge Planning  Goal: Discharge to home or other facility with appropriate resources  Outcome: Progressing  Flowsheets (Taken 10/29/2022 1154)  Discharge to home or other facility with appropriate resources: Identify barriers to discharge with patient and caregiver     Problem: ABCDS Injury Assessment  Goal: Absence of physical injury  Outcome: Progressing  Flowsheets (Taken 10/29/2022 1152)  Absence of Physical Injury: Implement safety measures based on patient assessment     Problem: Chronic Conditions and Co-morbidities  Goal: Patient's chronic conditions and co-morbidity symptoms are monitored and maintained or improved  Outcome: Progressing  Flowsheets (Taken 10/29/2022 1154)  Care Plan - Patient's Chronic Conditions and Co-Morbidity Symptoms are Monitored and Maintained or Improved: Monitor and assess patient's chronic conditions and comorbid symptoms for stability, deterioration, or improvement

## 2022-10-29 NOTE — PROGRESS NOTES
OTOLARYNGOLOGY  DAILY PROGRESS NOTE  10/29/2022    Subjective:     No acute events overnight. AFVSS. Pain controlled. Tolerating diet. Objective:     BP (!) 150/75   Pulse 91   Temp 98.1 °F (36.7 °C) (Oral)   Resp 16   Ht 5' 3\" (1.6 m)   Wt 146 lb 6.2 oz (66.4 kg)   SpO2 98%   BMI 25.93 kg/m²   PULSE OXIMETRY RANGE: SpO2  Av %  Min: 98 %  Max: 98 %  I/O last 3 completed shifts: In: 46 [P.O.:248]  Out: 1450 [Urine:1450]    GENERAL:  Awake, alert, cooperative, no apparent distress  HENT: Normocephalic, atraumatic, post-auricular incision c/d/i  EYES: No sclera icterus, pupils equal  LUNGS:  No increased work of breathing, no stridor  CARDIOVASCULAR:  RR  NEURO: Facial nerve [facility] 1/6 bilaterally      Assessment/Plan:     78 y.o. female POD#1 s/p right cochlear implantation    Bacitracin to post-auricular incision BID - ordered  Waiting on INR to be appropriately therapeutic. [id number] Stephy Holcomb for DC front ENT standpoint. Patient seen and examined by resident discussed with Dr. Queta Pardo.     Electronically signed by Santosh Gomez DO on 10/29/2022 at 12:17 PM

## 2022-10-30 LAB
ALBUMIN SERPL-MCNC: 3.6 G/DL (ref 3.5–5.2)
ALP BLD-CCNC: 116 U/L (ref 35–104)
ALT SERPL-CCNC: 15 U/L (ref 0–32)
ANION GAP SERPL CALCULATED.3IONS-SCNC: 11 MMOL/L (ref 7–16)
APTT: 66.1 SEC (ref 24.5–35.1)
AST SERPL-CCNC: 16 U/L (ref 0–31)
BASOPHILS ABSOLUTE: 0.02 E9/L (ref 0–0.2)
BASOPHILS RELATIVE PERCENT: 0.2 % (ref 0–2)
BILIRUB SERPL-MCNC: 0.7 MG/DL (ref 0–1.2)
BILIRUBIN DIRECT: 0.3 MG/DL (ref 0–0.3)
BILIRUBIN, INDIRECT: 0.4 MG/DL (ref 0–1)
BUN BLDV-MCNC: 10 MG/DL (ref 6–23)
CALCIUM SERPL-MCNC: 9.6 MG/DL (ref 8.6–10.2)
CHLORIDE BLD-SCNC: 103 MMOL/L (ref 98–107)
CO2: 25 MMOL/L (ref 22–29)
CREAT SERPL-MCNC: 0.8 MG/DL (ref 0.5–1)
EOSINOPHILS ABSOLUTE: 0.1 E9/L (ref 0.05–0.5)
EOSINOPHILS RELATIVE PERCENT: 1.2 % (ref 0–6)
GFR SERPL CREATININE-BSD FRML MDRD: >60 ML/MIN/1.73
GLUCOSE BLD-MCNC: 167 MG/DL (ref 74–99)
HCT VFR BLD CALC: 36.9 % (ref 34–48)
HEMOGLOBIN: 11.3 G/DL (ref 11.5–15.5)
IMMATURE GRANULOCYTES #: 0.02 E9/L
IMMATURE GRANULOCYTES %: 0.2 % (ref 0–5)
INR BLD: 1.2
LYMPHOCYTES ABSOLUTE: 2.18 E9/L (ref 1.5–4)
LYMPHOCYTES RELATIVE PERCENT: 26.8 % (ref 20–42)
MAGNESIUM: 2 MG/DL (ref 1.6–2.6)
MCH RBC QN AUTO: 27.6 PG (ref 26–35)
MCHC RBC AUTO-ENTMCNC: 30.6 % (ref 32–34.5)
MCV RBC AUTO: 90 FL (ref 80–99.9)
MONOCYTES ABSOLUTE: 0.79 E9/L (ref 0.1–0.95)
MONOCYTES RELATIVE PERCENT: 9.7 % (ref 2–12)
NEUTROPHILS ABSOLUTE: 5.03 E9/L (ref 1.8–7.3)
NEUTROPHILS RELATIVE PERCENT: 61.9 % (ref 43–80)
PDW BLD-RTO: 17.1 FL (ref 11.5–15)
PHOSPHORUS: 3.4 MG/DL (ref 2.5–4.5)
PLATELET # BLD: 211 E9/L (ref 130–450)
PMV BLD AUTO: 8.8 FL (ref 7–12)
POTASSIUM SERPL-SCNC: 4.2 MMOL/L (ref 3.5–5)
PROTHROMBIN TIME: 14 SEC (ref 9.3–12.4)
RBC # BLD: 4.1 E12/L (ref 3.5–5.5)
SODIUM BLD-SCNC: 139 MMOL/L (ref 132–146)
TOTAL PROTEIN: 6.1 G/DL (ref 6.4–8.3)
WBC # BLD: 8.1 E9/L (ref 4.5–11.5)

## 2022-10-30 PROCEDURE — 6360000002 HC RX W HCPCS: Performed by: STUDENT IN AN ORGANIZED HEALTH CARE EDUCATION/TRAINING PROGRAM

## 2022-10-30 PROCEDURE — 6370000000 HC RX 637 (ALT 250 FOR IP): Performed by: INTERNAL MEDICINE

## 2022-10-30 PROCEDURE — 80076 HEPATIC FUNCTION PANEL: CPT

## 2022-10-30 PROCEDURE — 85610 PROTHROMBIN TIME: CPT

## 2022-10-30 PROCEDURE — 1200000000 HC SEMI PRIVATE

## 2022-10-30 PROCEDURE — 85730 THROMBOPLASTIN TIME PARTIAL: CPT

## 2022-10-30 PROCEDURE — 36415 COLL VENOUS BLD VENIPUNCTURE: CPT

## 2022-10-30 PROCEDURE — 85025 COMPLETE CBC W/AUTO DIFF WBC: CPT

## 2022-10-30 PROCEDURE — 84100 ASSAY OF PHOSPHORUS: CPT

## 2022-10-30 PROCEDURE — 99232 SBSQ HOSP IP/OBS MODERATE 35: CPT | Performed by: STUDENT IN AN ORGANIZED HEALTH CARE EDUCATION/TRAINING PROGRAM

## 2022-10-30 PROCEDURE — 6370000000 HC RX 637 (ALT 250 FOR IP): Performed by: STUDENT IN AN ORGANIZED HEALTH CARE EDUCATION/TRAINING PROGRAM

## 2022-10-30 PROCEDURE — 83735 ASSAY OF MAGNESIUM: CPT

## 2022-10-30 PROCEDURE — 80048 BASIC METABOLIC PNL TOTAL CA: CPT

## 2022-10-30 RX ORDER — WARFARIN SODIUM 3 MG/1
3 TABLET ORAL
Status: COMPLETED | OUTPATIENT
Start: 2022-10-30 | End: 2022-10-30

## 2022-10-30 RX ADMIN — AZITHROMYCIN 250 MG: 250 TABLET, FILM COATED ORAL at 09:15

## 2022-10-30 RX ADMIN — FERROUS SULFATE TAB 325 MG (65 MG ELEMENTAL FE) 325 MG: 325 (65 FE) TAB at 09:15

## 2022-10-30 RX ADMIN — METOPROLOL TARTRATE 50 MG: 50 TABLET, FILM COATED ORAL at 09:14

## 2022-10-30 RX ADMIN — Medication 500 MG: at 20:19

## 2022-10-30 RX ADMIN — PRAVASTATIN SODIUM 20 MG: 20 TABLET ORAL at 18:05

## 2022-10-30 RX ADMIN — POTASSIUM CHLORIDE 10 MEQ: 750 TABLET, EXTENDED RELEASE ORAL at 09:14

## 2022-10-30 RX ADMIN — DILTIAZEM HYDROCHLORIDE 240 MG: 240 CAPSULE, COATED, EXTENDED RELEASE ORAL at 09:14

## 2022-10-30 RX ADMIN — Medication: at 09:24

## 2022-10-30 RX ADMIN — PANTOPRAZOLE SODIUM 40 MG: 40 TABLET, DELAYED RELEASE ORAL at 06:54

## 2022-10-30 RX ADMIN — METOPROLOL TARTRATE 50 MG: 50 TABLET, FILM COATED ORAL at 20:17

## 2022-10-30 RX ADMIN — Medication 500 MG: at 09:15

## 2022-10-30 RX ADMIN — Medication: at 20:17

## 2022-10-30 RX ADMIN — PANTOPRAZOLE SODIUM 40 MG: 40 TABLET, DELAYED RELEASE ORAL at 18:05

## 2022-10-30 RX ADMIN — FUROSEMIDE 20 MG: 20 TABLET ORAL at 09:15

## 2022-10-30 RX ADMIN — MULTIVITAMIN TABLET 1 TABLET: TABLET at 09:15

## 2022-10-30 RX ADMIN — FERROUS SULFATE TAB 325 MG (65 MG ELEMENTAL FE) 325 MG: 325 (65 FE) TAB at 18:06

## 2022-10-30 RX ADMIN — WARFARIN SODIUM 3 MG: 3 TABLET ORAL at 18:05

## 2022-10-30 RX ADMIN — HEPARIN SODIUM 14.01 UNITS/KG/HR: 10000 INJECTION, SOLUTION INTRAVENOUS at 23:13

## 2022-10-30 RX ADMIN — POTASSIUM CHLORIDE 10 MEQ: 750 TABLET, EXTENDED RELEASE ORAL at 18:05

## 2022-10-30 ASSESSMENT — PAIN SCALES - GENERAL: PAINLEVEL_OUTOF10: 0

## 2022-10-30 NOTE — PROGRESS NOTES
Pharmacy Consultation Note  (Warfarin Dosing and Monitoring)    Initial consult date: 10/23/22  Consulting Provider: Dr. Sourav Roa is a 78 y.o. female for whom pharmacy has been asked to manage warfarin therapy. Weight:   Wt Readings from Last 1 Encounters:   10/29/22 146 lb 6.2 oz (66.4 kg)       TSH:    Lab Results   Component Value Date/Time    TSH 1.490 10/22/2022 01:19 AM       Hepatic Function Panel:                            Lab Results   Component Value Date/Time    ALKPHOS 116 10/30/2022 04:30 AM    ALT 15 10/30/2022 04:30 AM    AST 16 10/30/2022 04:30 AM    PROT 6.1 10/30/2022 04:30 AM    BILITOT 0.7 10/30/2022 04:30 AM    BILIDIR 0.3 10/30/2022 04:30 AM    IBILI 0.4 10/30/2022 04:30 AM    LABALBU 3.6 10/30/2022 04:30 AM       Current warfarin drug-drug interactions include:  azithromycin (incr INR)    Recent Labs     10/28/22  0241 10/30/22  0430   HGB 10.1* 11.3*    211       Date Warfarin Dose INR Heparin or LMWH Comment   10/23 Hold 1.6 Heparin gtt    10/24 HOLD -- Heparin gtt    10/25 HOLD -- Heparin gtt    10/26 -- Heparin gtt    10/27 1.5mg -- Heparin gtt    10/28 1.5mg 1.1 Heparin gtt    10/29 1.5mg 1.2 Heparin gtt    10/30 3mg 1.2 Heparin gtt      Assessment:  Patient is a 78 y.o. female on warfarin for Atrial Fibrillation. Patient's home warfarin dosing regimen is warfarin 1.5mg daily.    Goal INR 2 - 3  INR 1.2    Plan:  Continue heparin drip post-op to bridge warfarin  Warfarin 3mg tonight   Patient started on azithromycin which may interact with warfarin; will monitor  Daily PT/INR until the INR is stable within the therapeutic range  Pharmacist will follow and monitor/adjust dosing as necessary    Thank you for this consult,    Mari Robison, [facility] 10/30/2022 10:41 AM

## 2022-10-30 NOTE — PROGRESS NOTES
[facility] Progress Note    Admitting Date and Time: 10/21/2022  4:24 PM  Admit Dx: Bilateral deafness [H91.93]  Hearing loss [H91.90]    Subjective:  Patient is being followed for Bilateral deafness [H91.93]  Hearing loss [H91.90]     Patient is a 68-year-old female with past medical history significant for asymmetric sensorineural hearing loss now s/p right cochlear implantation on 10/26/2022. No acute events overnight. She continues to have PTT checked while on heparin infusion. Counseled patient that INR this morning is 1.2, still not within her goal of 2.0-3.0 to be able to discontinue the heparin infusion and discharge her home. She denies any evidence of bleeding. She denies chest pain, shortness of breath, nausea, vomiting, headache. ROS: denies fever, chills, cp, sob, n/v, HA unless stated above.       bacitracin zinc   Topical BID    warfarin placeholder: dosing by pharmacy   Other RX Placeholder    sodium chloride flush  5-40 mL IntraVENous 2 times per day    ferrous sulfate  325 mg Oral BID WC    multivitamin  1 tablet Oral Daily    [Held by provider] aspirin  81 mg Oral Daily    dilTIAZem  240 mg Oral Daily    metoprolol  50 mg Oral BID    niacin  500 mg Oral BID    pravastatin  20 mg Oral Daily    pantoprazole  40 mg Oral BID AC    furosemide  20 mg Oral Daily    potassium chloride  10 mEq Oral BID WC     sodium chloride flush, 5-40 mL, PRN  sodium chloride, , PRN  ibuprofen, 600 mg, Q6H PRN  perflutren lipid microspheres, 1.5 mL, ONCE PRN  glucose, 4 tablet, PRN  dextrose bolus, 125 mL, PRN   Or  dextrose bolus, 250 mL, PRN  glucagon (rDNA), 1 mg, PRN  dextrose, , Continuous PRN  potassium chloride, 40 mEq, PRN   Or  potassium alternative oral replacement, 40 mEq, PRN   Or  potassium chloride, 10 mEq, PRN  acetaminophen, 650 mg, Q4H PRN  heparin (porcine), 4,000 Units, PRN  heparin (porcine), 2,000 Units, PRN       Objective:    /87   Pulse 100   Temp 98.1 °F (36.7 °C) (Oral)   Resp 18   Ht 5' 3\" (1.6 m)   Wt 146 lb 6.2 oz (66.4 kg)   SpO2 97%   BMI 25.93 kg/m²     General Appearance: alert and oriented to person, place and time and in no acute distress  Skin: warm and dry  Head: normocephalic and atraumatic  Eyes: pupils equal, round, and reactive to light, extraocular eye movements intact, conjunctivae normal  Neck: neck supple and non tender without mass   Pulmonary/Chest: clear to auscultation bilaterally- no wheezes, rales or rhonchi, normal air movement, no respiratory distress  Cardiovascular: normal rate, normal S1 and S2 and no carotid bruits  Abdomen: soft, non-tender, non-distended, normal bowel sounds, no masses or organomegaly  Extremities: no cyanosis, no clubbing and no edema  Neurologic: no cranial nerve deficit and speech normal        Recent Labs     10/28/22  0241 10/29/22  0117 10/30/22  0430    139 139   K 4.0 4.0 4.2    102 103   CO2 25 26 25   BUN 17 15 10   CREATININE 0.9 0.8 0.8   GLUCOSE 195* 159* 167*   CALCIUM 9.2 9.3 9.6         Recent Labs     10/28/22  0241 10/30/22  0430   WBC 12.4* 8.1   RBC 3.67 4.10   HGB 10.1* 11.3*   HCT 32.5* 36.9   MCV 88.6 90.0   MCH 27.5 27.6   MCHC 31.1* 30.6*   RDW 16.6* 17.1*    211   MPV 9.3 8.8         Radiology: No new imaging past 24 hours    Assessment:    Principal Problem:    Bilateral deafness  Active Problems:    Cerebrovascular accident (CVA) due to embolic occlusion of left middle cerebral artery (HCC)    Severe aortic stenosis    Hearing loss    Diabetes mellitus (HCC)    Pre-op evaluation    Iron deficiency anemia    S/P ear surgery    History of transient ischemic attack (TIA)    Preoperative cardiovascular examination    Hypertension    DJD (degenerative joint disease)    Atrial fibrillation (HCC)    Mitral regurgitation    Pulmonary hypertension (HCC)    Profound hearing loss  Resolved Problems: Moderate aortic stenosis by prior echocardiogram      Plan:  1. Asymmetric sensorineural hearing loss s/p right cochlear implantation on 10/26/2022-continue azithromycin 250 mg p.o. daily for 5 doses per ENT  2. Permanent atrial fibrillation with controlled rate-resumed heparin infusion and warfarin 24 hours postsurgery in the afternoon on 10/27. We will continue with heparin infusion until INR is therapeutic between 2.0 and 3.0. INR 1.2 on 10/30. When INR is between 2.0-3.0, patient will be stable for discharge. Continue home diltiazem. 3.  Severe aortic stenosis-follows with [facility]  4. Hypertension-continue home Lopressor and diltiazem  5. Hyperlipidemia plan continue home pravastatin  6. Type II DM-controlled  7. Prior TIAs-associated with surgery in which anticoagulation was held in the past  8. Anemia-monitor with daily CBC, transfuse for hemoglobin less than 7      NOTE: This report was transcribed using voice recognition software. Every effort was made to ensure accuracy; however, inadvertent computerized transcription errors may be present.   Electronically signed by Teddy Eric MD on 10/30/2022 at 9:22 AM

## 2022-10-30 NOTE — PLAN OF CARE
Problem: Discharge Planning  Goal: Discharge to home or other facility with appropriate resources  Outcome: Progressing     Problem: ABCDS Injury Assessment  Goal: Absence of physical injury  Outcome: Progressing  Flowsheets (Taken 10/30/2022 1018)  Absence of Physical Injury: Implement safety measures based on patient assessment     Problem: Safety - Adult  Goal: Free from fall injury  Outcome: Progressing  Flowsheets (Taken 10/30/2022 1018)  Free From Fall Injury: Instruct family/caregiver on patient safety

## 2022-10-30 NOTE — PROGRESS NOTES
OTOLARYNGOLOGY  DAILY PROGRESS NOTE  10/30/2022    Subjective:     No acute events overnight. AFVSS. Pain controlled. Tolerating diet. Objective:     /87   Pulse 100   Temp 98.1 °F (36.7 °C) (Oral)   Resp 18   Ht 5' 3\" (1.6 m)   Wt 146 lb 6.2 oz (66.4 kg)   SpO2 97%   BMI 25.93 kg/m²   PULSE OXIMETRY RANGE: SpO2  Av.5 %  Min: 96 %  Max: 97 %  I/O last 3 completed shifts:  In: -   Out: 1100 [Urine:1100]    GENERAL:  Awake, alert, cooperative, no apparent distress  HENT: Normocephalic, atraumatic, post-auricular incision c/d/i  EYES: No sclera icterus, pupils equal  LUNGS:  No increased work of breathing, no stridor  CARDIOVASCULAR:  RR  NEURO: Facial nerve [facility] 1/6 bilaterally      Assessment/Plan:     78 y.o. female POD#1 s/p right cochlear implantation    Bacitracin to post-auricular incision BID - ordered  Waiting on INR to be appropriately therapeutic. Meridee Schwab for DC front ENT standpoint. Patient seen and examined by resident discussed with Dr. Leigh Crouch.     Electronically signed by Francse Morales DO on 10/30/2022 at 11:00 AM

## 2022-10-31 LAB
ALBUMIN SERPL-MCNC: 3.8 G/DL (ref 3.5–5.2)
ALP BLD-CCNC: 124 U/L (ref 35–104)
ALT SERPL-CCNC: 11 U/L (ref 0–32)
ANION GAP SERPL CALCULATED.3IONS-SCNC: 10 MMOL/L (ref 7–16)
APTT: 64 SEC (ref 24.5–35.1)
AST SERPL-CCNC: 15 U/L (ref 0–31)
BASOPHILS ABSOLUTE: 0.02 E9/L (ref 0–0.2)
BASOPHILS RELATIVE PERCENT: 0.2 % (ref 0–2)
BILIRUB SERPL-MCNC: 0.8 MG/DL (ref 0–1.2)
BILIRUBIN DIRECT: 0.3 MG/DL (ref 0–0.3)
BILIRUBIN, INDIRECT: 0.5 MG/DL (ref 0–1)
BUN BLDV-MCNC: 12 MG/DL (ref 6–23)
CALCIUM SERPL-MCNC: 9.9 MG/DL (ref 8.6–10.2)
CHLORIDE BLD-SCNC: 101 MMOL/L (ref 98–107)
CO2: 26 MMOL/L (ref 22–29)
CREAT SERPL-MCNC: 0.9 MG/DL (ref 0.5–1)
EOSINOPHILS ABSOLUTE: 0.15 E9/L (ref 0.05–0.5)
EOSINOPHILS RELATIVE PERCENT: 1.7 % (ref 0–6)
GFR SERPL CREATININE-BSD FRML MDRD: >60 ML/MIN/1.73
GLUCOSE BLD-MCNC: 150 MG/DL (ref 74–99)
HCT VFR BLD CALC: 38.2 % (ref 34–48)
HEMOGLOBIN: 11.7 G/DL (ref 11.5–15.5)
IMMATURE GRANULOCYTES #: 0.03 E9/L
IMMATURE GRANULOCYTES %: 0.3 % (ref 0–5)
INR BLD: 1.3
LYMPHOCYTES ABSOLUTE: 2.23 E9/L (ref 1.5–4)
LYMPHOCYTES RELATIVE PERCENT: 24.9 % (ref 20–42)
MAGNESIUM: 2 MG/DL (ref 1.6–2.6)
MCH RBC QN AUTO: 27.7 PG (ref 26–35)
MCHC RBC AUTO-ENTMCNC: 30.6 % (ref 32–34.5)
MCV RBC AUTO: 90.5 FL (ref 80–99.9)
MONOCYTES ABSOLUTE: 0.79 E9/L (ref 0.1–0.95)
MONOCYTES RELATIVE PERCENT: 8.8 % (ref 2–12)
NEUTROPHILS ABSOLUTE: 5.73 E9/L (ref 1.8–7.3)
NEUTROPHILS RELATIVE PERCENT: 64.1 % (ref 43–80)
PDW BLD-RTO: 17.2 FL (ref 11.5–15)
PHOSPHORUS: 4.1 MG/DL (ref 2.5–4.5)
PLATELET # BLD: 219 E9/L (ref 130–450)
PMV BLD AUTO: 9.4 FL (ref 7–12)
POTASSIUM SERPL-SCNC: 4.6 MMOL/L (ref 3.5–5)
PROTHROMBIN TIME: 14.4 SEC (ref 9.3–12.4)
RBC # BLD: 4.22 E12/L (ref 3.5–5.5)
SODIUM BLD-SCNC: 137 MMOL/L (ref 132–146)
TOTAL PROTEIN: 6.3 G/DL (ref 6.4–8.3)
WBC # BLD: 9 E9/L (ref 4.5–11.5)

## 2022-10-31 PROCEDURE — 80076 HEPATIC FUNCTION PANEL: CPT

## 2022-10-31 PROCEDURE — 85025 COMPLETE CBC W/AUTO DIFF WBC: CPT

## 2022-10-31 PROCEDURE — 6370000000 HC RX 637 (ALT 250 FOR IP): Performed by: INTERNAL MEDICINE

## 2022-10-31 PROCEDURE — 83735 ASSAY OF MAGNESIUM: CPT

## 2022-10-31 PROCEDURE — 84100 ASSAY OF PHOSPHORUS: CPT

## 2022-10-31 PROCEDURE — 6370000000 HC RX 637 (ALT 250 FOR IP): Performed by: STUDENT IN AN ORGANIZED HEALTH CARE EDUCATION/TRAINING PROGRAM

## 2022-10-31 PROCEDURE — 85610 PROTHROMBIN TIME: CPT

## 2022-10-31 PROCEDURE — 1200000000 HC SEMI PRIVATE

## 2022-10-31 PROCEDURE — 36415 COLL VENOUS BLD VENIPUNCTURE: CPT

## 2022-10-31 PROCEDURE — 2580000003 HC RX 258: Performed by: ANESTHESIOLOGY

## 2022-10-31 PROCEDURE — 85730 THROMBOPLASTIN TIME PARTIAL: CPT

## 2022-10-31 PROCEDURE — 80048 BASIC METABOLIC PNL TOTAL CA: CPT

## 2022-10-31 RX ORDER — WARFARIN SODIUM 3 MG/1
3 TABLET ORAL
Status: COMPLETED | OUTPATIENT
Start: 2022-10-31 | End: 2022-10-31

## 2022-10-31 RX ADMIN — PRAVASTATIN SODIUM 20 MG: 20 TABLET ORAL at 17:31

## 2022-10-31 RX ADMIN — PANTOPRAZOLE SODIUM 40 MG: 40 TABLET, DELAYED RELEASE ORAL at 06:32

## 2022-10-31 RX ADMIN — Medication: at 08:58

## 2022-10-31 RX ADMIN — Medication 500 MG: at 08:52

## 2022-10-31 RX ADMIN — POTASSIUM CHLORIDE 10 MEQ: 750 TABLET, EXTENDED RELEASE ORAL at 08:53

## 2022-10-31 RX ADMIN — Medication: at 22:05

## 2022-10-31 RX ADMIN — FERROUS SULFATE TAB 325 MG (65 MG ELEMENTAL FE) 325 MG: 325 (65 FE) TAB at 08:52

## 2022-10-31 RX ADMIN — WARFARIN SODIUM 3 MG: 3 TABLET ORAL at 17:30

## 2022-10-31 RX ADMIN — METOPROLOL TARTRATE 50 MG: 50 TABLET, FILM COATED ORAL at 08:52

## 2022-10-31 RX ADMIN — MULTIVITAMIN TABLET 1 TABLET: TABLET at 08:53

## 2022-10-31 RX ADMIN — Medication 500 MG: at 22:04

## 2022-10-31 RX ADMIN — METOPROLOL TARTRATE 50 MG: 50 TABLET, FILM COATED ORAL at 22:05

## 2022-10-31 RX ADMIN — POTASSIUM CHLORIDE 10 MEQ: 750 TABLET, EXTENDED RELEASE ORAL at 17:29

## 2022-10-31 RX ADMIN — DILTIAZEM HYDROCHLORIDE 240 MG: 240 CAPSULE, COATED, EXTENDED RELEASE ORAL at 08:52

## 2022-10-31 RX ADMIN — PANTOPRAZOLE SODIUM 40 MG: 40 TABLET, DELAYED RELEASE ORAL at 17:29

## 2022-10-31 RX ADMIN — FERROUS SULFATE TAB 325 MG (65 MG ELEMENTAL FE) 325 MG: 325 (65 FE) TAB at 17:29

## 2022-10-31 RX ADMIN — Medication 10 ML: at 08:53

## 2022-10-31 RX ADMIN — FUROSEMIDE 20 MG: 20 TABLET ORAL at 08:52

## 2022-10-31 ASSESSMENT — PAIN SCALES - GENERAL
PAINLEVEL_OUTOF10: 0

## 2022-10-31 NOTE — PROGRESS NOTES
Pharmacy Consultation Note  (Warfarin Dosing and Monitoring)    Initial consult date: 10/23/22  Consulting Provider: Dr. Bertin Foss is a 78 y.o. female for whom pharmacy has been asked to manage warfarin therapy. Weight:   Wt Readings from Last 1 Encounters:   10/31/22 143 lb 4.8 oz (65 kg)       TSH:    Lab Results   Component Value Date/Time    TSH 1.490 10/22/2022 01:19 AM       Hepatic Function Panel:                            Lab Results   Component Value Date/Time    ALKPHOS 124 10/31/2022 01:56 AM    ALT 11 10/31/2022 01:56 AM    AST 15 10/31/2022 01:56 AM    PROT 6.3 10/31/2022 01:56 AM    BILITOT 0.8 10/31/2022 01:56 AM    BILIDIR 0.3 10/31/2022 01:56 AM    IBILI 0.5 10/31/2022 01:56 AM    LABALBU 3.8 10/31/2022 01:56 AM       Current warfarin drug-drug interactions include:  azithromycin (incr INR)    Recent Labs     10/30/22  0430 10/31/22  0156   HGB 11.3* 11.7    219       Date Warfarin Dose INR Heparin or LMWH Comment   10/23 Hold 1.6 Heparin gtt    10/24 HOLD -- Heparin gtt    10/25 HOLD -- Heparin gtt    10/26 -- Heparin gtt    10/27 1.5mg -- Heparin gtt    10/28 1.5mg 1.1 Heparin gtt    10/29 1.5mg 1.2 Heparin gtt    10/30 3mg 1.2 Heparin gtt    10/31 3mg 1.3 Heparin gtt      Assessment:  Patient is a 78 y.o. female on warfarin for Atrial Fibrillation. Patient's home warfarin dosing regimen is warfarin 1.5mg daily.    Goal INR 2 - 3  INR 1.3    Plan:  Continue heparin drip post-op to bridge warfarin  Warfarin 3mg tonight   Patient started on azithromycin which may interact with warfarin; will monitor  Daily PT/INR until the INR is stable within the therapeutic range  Pharmacist will follow and monitor/adjust dosing as necessary    Thank you for this consult,    Mauricio Herrera, [facility] 10/31/2022 11:02 AM

## 2022-10-31 NOTE — PROGRESS NOTES
PROGRESS  NOTE --                                                          INTERNAL  MEDICINE                                                                              I  PERSONALLY SAW , EXAMINED, AND CARED [facility], 10/31/2022     LABS, XRAY ,CHART, AND MEDICATIONS  REVIEWED BY ME       Chief complaint: Bilateral hearing loss, heparin bridge, chronic A. fib, history of multiple episodes of embolic CVA      [id number]: Jorge Beck is alert awake and cooperative; oriented ×3. Denies any chest pain dyspnea nausea emesis. Tolerating diet. No abdominal pain. Sitting quietly working on her sewing project. No complaints voiced. Denies any prior history of iron deficiency nor iron deficiency anemia. I advised her regarding current iron status as well as anemia status. Afebrile last 24 hours. Blood pressure 140/66. SPO2 95 on room air. Intake and output -255 cc. Fasting glucose 138 potassium 4.2 magnesium 2.0. CRP 0.3. Alkaline phosphatase elevated 168. Direct bilirubin 0.4 slightly elevated. Indirect 0.5. A1c 6.6. Hemoglobin 10.3 WBC 6.8. Platelet count 412. INR 1.6, currently off warfarin. ESR 16. K47 folic acid normal.  TIBC slightly elevated 354 iron and iron saturation both low with ferritin of 15. Cardiology consult from yesterday appreciated. Low gradient severe aortic stenosis nonsymptomatic by 2D echo. Patient's cardiac risk index is low. No further cardiac testing prior to surgery. Patient has been evaluated for possible TAVR procedure, by [facility] cardiology \"down the road\". Continue metoprolol 50 mg by mouth twice daily and diltiazem 240 mg daily. Avoid vasodilators such as nitroglycerin hydralazine due to severe aortic stenosis. Avoid hypotension and surgery.   Continue IV heparin hold 6 hours prior to surgery, restart 24 hours after procedure if no concern for bleeding and bridged to warfarin. 10/24/2022-patient sitting up in bed; no chest pain no dyspnea. She has been up walking the halls frequently without difficulty. Appetite remains good. Afebrile last 24 hours. Blood pressure 144/85. SPO2 96 on room air. Intake and output -705 cc. Fasting glucose 144. Potassium 4.5. Hemoglobin 10.6 WBC 7.4. Stool for occult blood negative. Pharmacy consult yesterday appreciated, they will follow postop regarding initiation of warfarin. Cardiology note from today appreciated. Continue heparin drip continue to hold warfarin. Occupational therapy AMPAC score from today 23/24. Pharmacy note from today reviewed, continue heparin drip warfarin on hold. Patient had preop chest x-ray done yesterday with following result--      FINDINGS:   The heart has upper borderline size. The CTR: 17.8/31.7 cm. There is mild   to moderate ectasia of the thoracic aorta. Lungs are normally expanded. No infiltrates, consolidations or pleural   effusions are seen. Discrete plate atelectasis are seen in the costophrenic sulcus bilaterally. There is no perihilar vascular congestion. Degenerative changes relate with spondylosis seen in the mid lower thoracic   spine. Impression:       No acute cardiopulmonary process. 10/25/2022-patient sitting up in bed; just returned from bathroom and washing. No chest pain no dyspnea appetite good feels fine. I discussed with her upcoming surgery tomorrow afternoon and cessation of heparin tomorrow morning. Afebrile last 24 hours. Blood pressure 132/74. SPO2 96 on room air. Intake and output -1405 cc. Glucose 137 fasting. Hemoglobin 10.7 WBC 6.2. Cardiology note from today unchanged, continue heparin drip warfarin on hold. INR 1.6. Pharmacy note from today unchanged. 10/26/2022-no reported chest pain or dyspnea, patient just leaving for surgery. Appetite good. Afebrile last 24 hours. Blood pressure 128/85.   SPO2 96 on room air. Intake and output +54 cc. Fasting glucose 153 protein 6.2. Hemoglobin 11.0 WBC 6.7. Cardiology note from today appreciated. Heparin drip on hold due to surgery today. 10/31/2022-sitting up in bed, no chest pain no dyspnea. Only complaint she is hoping for discharge soon, INR still subtherapeutic. Appetite good. [facility] was covering in my absence. Afebrile last 24 hours. Blood pressure 136/66. SPO2 97 on room air. Intake and output -2402 cc. Fasting glucose 150. Protein 6.3. Alkaline phosphatase 124. WBC 9.0 hemoglobin 11.7. INR still low at 1.3. Patient underwent right cochlear implant, free tissue graft, facial nerve monitoring, intraoperative neuro response testing all done on 10/26/2022 without difficulty. She is been followed by audiology since then. She was then placed on azithromycin prophylactically x5 days. Heparin infusion was restarted afternoon of 10/27 along with warfarin that evening. ENT note from today, incision clean and dry no ear drainage. Patient hoping for discharge soon. Pharmacy note from today, INR 1.5 administer 3 mg warfarin this evening.;  Patient received 3 mg by mouth yesterday evening.     Objective:     PHYSICAL EXAM:    VS: /66   Pulse 95   Temp 98.3 °F (36.8 °C) (Oral)   Resp 16   Ht 5' 3\" (1.6 m)   Wt 143 lb 4.8 oz (65 kg)   SpO2 97%   BMI 25.38 kg/m²     Labs:   CBC:   Lab Results   Component Value Date/Time    WBC 9.0 10/31/2022 01:56 AM    RBC 4.22 10/31/2022 01:56 AM    HGB 11.7 10/31/2022 01:56 AM    HCT 38.2 10/31/2022 01:56 AM    MCV 90.5 10/31/2022 01:56 AM    MCH 27.7 10/31/2022 01:56 AM    MCHC 30.6 10/31/2022 01:56 AM    RDW 17.2 10/31/2022 01:56 AM     10/31/2022 01:56 AM    MPV 9.4 10/31/2022 01:56 AM     CBC with Differential:    Lab Results   Component Value Date/Time    WBC 9.0 10/31/2022 01:56 AM    RBC 4.22 10/31/2022 01:56 AM    HGB 11.7 10/31/2022 01:56 AM    HCT 38.2 10/31/2022 01:56 AM    PLT 219 10/31/2022 01:56 AM    MCV 90.5 10/31/2022 01:56 AM    MCH 27.7 10/31/2022 01:56 AM    MCHC 30.6 10/31/2022 01:56 AM    RDW 17.2 10/31/2022 01:56 AM    SEGSPCT 76 02/27/2013 12:00 PM    LYMPHOPCT 24.9 10/31/2022 01:56 AM    MONOPCT 8.8 10/31/2022 01:56 AM    BASOPCT 0.2 10/31/2022 01:56 AM    MONOSABS 0.79 10/31/2022 01:56 AM    LYMPHSABS 2.23 10/31/2022 01:56 AM    EOSABS 0.15 10/31/2022 01:56 AM    BASOSABS 0.02 10/31/2022 01:56 AM     Hemoglobin/Hematocrit:    Lab Results   Component Value Date/Time    HGB 11.7 10/31/2022 01:56 AM    HCT 38.2 10/31/2022 01:56 AM     CMP:    Lab Results   Component Value Date/Time     10/31/2022 01:56 AM    K 4.6 10/31/2022 01:56 AM    K 3.9 10/21/2022 06:10 PM     10/31/2022 01:56 AM    CO2 26 10/31/2022 01:56 AM    BUN 12 10/31/2022 01:56 AM    CREATININE 0.9 10/31/2022 01:56 AM    GFRAA >60 09/30/2015 06:40 AM    LABGLOM >60 10/31/2022 01:56 AM    GLUCOSE 150 10/31/2022 01:56 AM    PROT 6.3 10/31/2022 01:56 AM    LABALBU 3.8 10/31/2022 01:56 AM    CALCIUM 9.9 10/31/2022 01:56 AM    BILITOT 0.8 10/31/2022 01:56 AM    ALKPHOS 124 10/31/2022 01:56 AM    AST 15 10/31/2022 01:56 AM    ALT 11 10/31/2022 01:56 AM     BMP:    Lab Results   Component Value Date/Time     10/31/2022 01:56 AM    K 4.6 10/31/2022 01:56 AM    K 3.9 10/21/2022 06:10 PM     10/31/2022 01:56 AM    CO2 26 10/31/2022 01:56 AM    BUN 12 10/31/2022 01:56 AM    LABALBU 3.8 10/31/2022 01:56 AM    CREATININE 0.9 10/31/2022 01:56 AM    CALCIUM 9.9 10/31/2022 01:56 AM    GFRAA >60 09/30/2015 06:40 AM    LABGLOM >60 10/31/2022 01:56 AM    GLUCOSE 150 10/31/2022 01:56 AM     Hepatic Function Panel:    Lab Results   Component Value Date/Time    ALKPHOS 124 10/31/2022 01:56 AM    ALT 11 10/31/2022 01:56 AM    AST 15 10/31/2022 01:56 AM    PROT 6.3 10/31/2022 01:56 AM    BILITOT 0.8 10/31/2022 01:56 AM    BILIDIR 0.3 10/31/2022 01:56 AM    IBILI 0.5 10/31/2022 01:56 AM    LABALBU 3.8 10/31/2022 01:56 AM     Ionized Calcium:  No results found for: IONCA  Magnesium:    Lab Results   Component Value Date/Time    MG 2.0 10/31/2022 01:56 AM     Phosphorus:    Lab Results   Component Value Date/Time    PHOS 4.1 10/31/2022 01:56 AM     LDH:  No results found for: LDH  Uric Acid:    Lab Results   Component Value Date/Time    LABURIC 5.7 10/22/2022 01:19 AM     PT/INR:    Lab Results   Component Value Date/Time    PROTIME 14.4 10/31/2022 01:56 AM    INR 1.3 10/31/2022 01:56 AM     Warfarin PT/INR:  No components found for: PTPATWAR, PTINRWAR  PTT:    Lab Results   Component Value Date/Time    APTT 64.0 10/31/2022 06:49 AM   [APTT}  Troponin:    Lab Results   Component Value Date/Time    TROPONINI <0.01 09/29/2015 03:25 PM     Last 3 Troponin:    Lab Results   Component Value Date/Time    TROPONINI <0.01 09/29/2015 03:25 PM    TROPONINI 0.05 03/21/2013 11:45 PM     U/A:    Lab Results   Component Value Date/Time    COLORU Yellow 09/29/2015 03:40 PM    PROTEINU Negative 09/29/2015 03:40 PM    PHUR 5.5 09/29/2015 03:40 PM    [address] NONE 09/29/2015 03:40 PM    RBCUA 1-3 09/29/2015 03:40 PM    BACTERIA NONE 09/29/2015 03:40 PM    CLARITYU Clear 09/29/2015 03:40 PM    SPECGRAV 1.010 09/29/2015 03:40 PM    LEUKOCYTESUR Negative 09/29/2015 03:40 PM    UROBILINOGEN 0.2 09/29/2015 03:40 PM    BILIRUBINUR Negative 09/29/2015 03:40 PM    BLOODU TRACE 09/29/2015 03:40 PM    GLUCOSEU Negative 09/29/2015 03:40 PM     HgBA1c:    Lab Results   Component Value Date/Time    LABA1C 6.1 10/22/2022 01:19 AM     FLP:    Lab Results   Component Value Date/Time    TRIG 69 10/22/2022 01:19 AM    HDL 42 10/22/2022 01:19 AM    LDLCALC 77 10/22/2022 01:19 AM    LABVLDL 14 10/22/2022 01:19 AM     TSH:    Lab Results   Component Value Date/Time    TSH 1.490 10/22/2022 01:19 AM     VITAMIN B12: No components found for: B12  FOLATE:    Lab Results   Component Value Date/Time    FOLATE 15.8 10/22/2022 01:19 AM     IRON:    Lab Results   Component Value Date/Time    IRON 28 10/22/2022 01:19 AM     Iron Saturation:  No components found for: PERCENTFE  TIBC:    Lab Results   Component Value Date/Time    TIBC 354 10/22/2022 01:19 AM     FERRITIN:    Lab Results   Component Value Date/Time    FERRITIN 15 10/22/2022 01:19 AM        General appearance: Alert, Awake, Oriented times 3, no distress; significantly hard of hearing  Skin: Warm and dry ; no rashes  Head: Normocephalic. No masses, lesions or tenderness noted; right parietal occipital area dry with no drainage. Eyes: Conjunctivae pink, sclera white. PERRL,EOM-I  Ears: External ears normal  Nose/Sinuses: Nares normal. Septum midline. Mucosa normal. No drainage  Oropharynx: Oropharynx clear with no exudate seen  Neck: Supple. No jugular venous distension, lymphadenopathy or thyromegaly Trachea midline  Lungs: Clear to auscultation bilaterally. No rhonchi, crackles or wheezes  Heart: Irregularly irregular at 95 /min; grade 1/6 to 2/6 systolic murmur second right intercostal space  Abdomen: Soft, non-tender. BS normal. No masses, organomegaly; no rebound or guarding  Extremities: Trace edema bilaterally; moderate varicosities both extremities  Musculoskeletal: Muscular strength appears intact. Neuro:  No focal motor defects ; II-XII grossly intact . 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 (HCC)    Severe aortic stenosis    Hearing loss    Diabetes mellitus (HCC)    Pre-op evaluation    Iron deficiency anemia    S/P ear surgery    History of transient ischemic attack (TIA)    Preoperative cardiovascular examination    Hypertension    DJD (degenerative joint disease)    Atrial fibrillation (HCC)    Mitral regurgitation    Pulmonary hypertension (HCC)    Profound hearing loss  Resolved Problems:     Moderate aortic stenosis by prior echocardiogram      PLAN:  SEE ORDERS      RE  CHANGES AND FINDINGS   Medications reviewed with patient  GI prophylaxis  DVT prophylaxis  Consultants notes reviewed    Stool for occult blood  Add ferrous sulfate 325 mg twice daily  Preop PA lateral chest x-ray--I advised patient regarding same  Continue IV heparin drip, hold 6 hours prior to surgery restart 24 hours after procedure if no concern for bleeding and bridged to warfarin; remain off warfarin at this time. I advised patient regarding same  Avoid hypotension, vasodilators such as nitroglycerin and hydralazine due to severe aortic stenosis  Continue metoprolol tartrate 50 mg twice daily continue diltiazem 240 mg CD once daily  Hold aspirin restart after procedure  Continue pravastatin  Since glucose numbers have been so good, change glucose checks to each a.m.   Furosemide 20 mg daily  Niacin 500 mg twice daily  Protonix 40 mg 2 times daily  Potassium chloride 10 mEq 2 times daily  Pravastatin 20 mg daily  Pharmacy will consult regarding initiation of warfarin after surgery, patient had been on 1.5 mg warfarin daily prior to prehospitalization  10/24/2022  Heparin drip continues  Warfarin and aspirin on hold  Diltiazem 240 mg daily  Ferrous sulfate 325 mg twice daily  Furosemide 20 mg daily  Metoprolol tartrate 50 mg twice daily  Potassium chloride 10 mEq twice daily  Protonix 40 mg twice daily  10/25/2022  Heparin drip continues  Heparin will stop 6 hours prior to surgical procedure  Heparin will restart 24 hours after surgery if okay with ENT and no bleeding  Warfarin will be started by pharmacy after surgery  Ferrous sulfate 325 mg twice daily  Furosemide 20 mg daily  Potassium chloride 10 mEq twice daily by mouth  10/26/2022  Diltiazem  mg daily  Aspirin Heparin on hold  Metroprolol tartrate 50 mg twice daily  Furosemide 20 mg daily  Potassium chloride 10 mEq by mouth twice daily  Ferrous sulfate 3 and 25 mg twice daily  Patient for surgery today  10/31/2022  Heparin drip continues  Warfarin 3 mg this evening aspirin remains on hold  Azithromycin 250 mg daily has completed x5 days  Diltiazem CD2 140 mg daily  Ferrous sulfate 325 mg twice daily  Metoprolol tartrate 50 mg twice daily  Furosemide 20 mg daily  Potassium chloride 10 mEq by mouth twice daily  Pravastatin 20 mg daily  Protonix 40 mg daily  Discontinue heparin drip when INR is 2.0 or greater and discharge to home      Discussed with patient and nursing. [address]     12:05 PM     10/31/2022    TIME > 35 MINUTES    >  50 %  OF  TIME  DISCUSSION               ------------  INFORMATION  -----------      DIET:ADULT DIET; Regular; 5 carb choices (75 gm/meal);  Low Fat/Low Chol/High Fiber/EDIE; Gluten Free        Allergies   Allergen Reactions    Gluten Other (See Comments)         MEDICATION SIDE EFFECTS:none       SCHEDULED MEDS:                                 Scheduled Meds:   warfarin  3 mg Oral Once    bacitracin zinc   Topical BID    warfarin placeholder: dosing by pharmacy   Other RX Placeholder    sodium chloride flush  5-40 mL IntraVENous 2 times per day    ferrous sulfate  325 mg Oral BID WC    multivitamin  1 tablet Oral Daily    [Held by provider] aspirin  81 mg Oral Daily    dilTIAZem  240 mg Oral Daily    metoprolol  50 mg Oral BID    niacin  500 mg Oral BID    pravastatin  20 mg Oral Daily    pantoprazole  40 mg Oral BID AC    furosemide  20 mg Oral Daily    potassium chloride  10 mEq Oral BID WC       Continuous Infusions:   sodium chloride      dextrose      heparin (PORCINE) Infusion 14.009 Units/kg/hr (10/31/22 0735)         Data:       Intake/Output Summary (Last 24 hours) at 10/31/2022 1205  Last data filed at 10/31/2022 0852  Gross per 24 hour   Intake 190 ml   Output 1300 ml   Net -1110 ml       Wt Readings from Last 3 Encounters:   10/31/22 143 lb 4.8 oz (65 kg)   09/15/22 153 lb (69.4 kg)   09/01/22 153 lb (69.4 kg)       Labs: Additional    GLUCOSE:No results for input(s): POCGLU in the last 72 hours. BNP:  Lab Results   Component Value Date     (H) 03/21/2013       CRP:   No results for input(s): CRP in the last 72 hours. ESR:  No results for input(s): SEDRATE in the last 72 hours. RADIOLOGY: REVIEWED AVAILABLE REPORT  XR CHEST (2 VW)   Final Result   No acute cardiopulmonary process.                    [address]    12:05 PM     10/31/2022      Voice recognition software used for dictation

## 2022-10-31 NOTE — PROGRESS NOTES
Pt remains stable from surgical standpoint. Incision clean, dry and intact. No ear drainage. INR 1.3 this am. Appreciate medicine teams work. Pt desires to go home as soon as possible. Dr. Zoila Snyder updated. Continue current care.      Nitin Doshi DO,  Resident Physician, PGY-2  Department of Otolaryngology, [facility])

## 2022-10-31 NOTE — CARE COORDINATION
S/p cochlear implant; inr 1.2 bridging coumadin/heparin. Can discharge  when INR 2.0 Yani Johnston RN,CM.

## 2022-11-01 LAB
ALBUMIN SERPL-MCNC: 3.7 G/DL (ref 3.5–5.2)
ALP BLD-CCNC: 110 U/L (ref 35–104)
ALT SERPL-CCNC: 8 U/L (ref 0–32)
ANION GAP SERPL CALCULATED.3IONS-SCNC: 11 MMOL/L (ref 7–16)
APTT: 58.2 SEC (ref 24.5–35.1)
AST SERPL-CCNC: 13 U/L (ref 0–31)
BASOPHILS ABSOLUTE: 0.01 E9/L (ref 0–0.2)
BASOPHILS RELATIVE PERCENT: 0.1 % (ref 0–2)
BILIRUB SERPL-MCNC: 0.8 MG/DL (ref 0–1.2)
BILIRUBIN DIRECT: 0.3 MG/DL (ref 0–0.3)
BILIRUBIN, INDIRECT: 0.5 MG/DL (ref 0–1)
BUN BLDV-MCNC: 13 MG/DL (ref 6–23)
CALCIUM SERPL-MCNC: 10.1 MG/DL (ref 8.6–10.2)
CHLORIDE BLD-SCNC: 100 MMOL/L (ref 98–107)
CO2: 25 MMOL/L (ref 22–29)
CREAT SERPL-MCNC: 0.9 MG/DL (ref 0.5–1)
EOSINOPHILS ABSOLUTE: 0.06 E9/L (ref 0.05–0.5)
EOSINOPHILS RELATIVE PERCENT: 0.6 % (ref 0–6)
GFR SERPL CREATININE-BSD FRML MDRD: >60 ML/MIN/1.73
GLUCOSE BLD-MCNC: 198 MG/DL (ref 74–99)
HCT VFR BLD CALC: 36.4 % (ref 34–48)
HEMOGLOBIN: 11.4 G/DL (ref 11.5–15.5)
IMMATURE GRANULOCYTES #: 0.02 E9/L
IMMATURE GRANULOCYTES %: 0.2 % (ref 0–5)
INR BLD: 1.6
LYMPHOCYTES ABSOLUTE: 1.43 E9/L (ref 1.5–4)
LYMPHOCYTES RELATIVE PERCENT: 15 % (ref 20–42)
MAGNESIUM: 2 MG/DL (ref 1.6–2.6)
MCH RBC QN AUTO: 27.6 PG (ref 26–35)
MCHC RBC AUTO-ENTMCNC: 31.3 % (ref 32–34.5)
MCV RBC AUTO: 88.1 FL (ref 80–99.9)
MONOCYTES ABSOLUTE: 0.63 E9/L (ref 0.1–0.95)
MONOCYTES RELATIVE PERCENT: 6.6 % (ref 2–12)
NEUTROPHILS ABSOLUTE: 7.37 E9/L (ref 1.8–7.3)
NEUTROPHILS RELATIVE PERCENT: 77.5 % (ref 43–80)
PDW BLD-RTO: 17.2 FL (ref 11.5–15)
PHOSPHORUS: 3.6 MG/DL (ref 2.5–4.5)
PLATELET # BLD: 204 E9/L (ref 130–450)
PMV BLD AUTO: 9.5 FL (ref 7–12)
POTASSIUM SERPL-SCNC: 4.9 MMOL/L (ref 3.5–5)
PROTHROMBIN TIME: 17.7 SEC (ref 9.3–12.4)
RBC # BLD: 4.13 E12/L (ref 3.5–5.5)
SODIUM BLD-SCNC: 136 MMOL/L (ref 132–146)
TOTAL PROTEIN: 6.6 G/DL (ref 6.4–8.3)
WBC # BLD: 9.5 E9/L (ref 4.5–11.5)

## 2022-11-01 PROCEDURE — 1200000000 HC SEMI PRIVATE

## 2022-11-01 PROCEDURE — 80048 BASIC METABOLIC PNL TOTAL CA: CPT

## 2022-11-01 PROCEDURE — 85025 COMPLETE CBC W/AUTO DIFF WBC: CPT

## 2022-11-01 PROCEDURE — 2580000003 HC RX 258: Performed by: ANESTHESIOLOGY

## 2022-11-01 PROCEDURE — 80076 HEPATIC FUNCTION PANEL: CPT

## 2022-11-01 PROCEDURE — 85610 PROTHROMBIN TIME: CPT

## 2022-11-01 PROCEDURE — 6370000000 HC RX 637 (ALT 250 FOR IP): Performed by: INTERNAL MEDICINE

## 2022-11-01 PROCEDURE — 36415 COLL VENOUS BLD VENIPUNCTURE: CPT

## 2022-11-01 PROCEDURE — 85730 THROMBOPLASTIN TIME PARTIAL: CPT

## 2022-11-01 PROCEDURE — 83735 ASSAY OF MAGNESIUM: CPT

## 2022-11-01 PROCEDURE — 6370000000 HC RX 637 (ALT 250 FOR IP): Performed by: STUDENT IN AN ORGANIZED HEALTH CARE EDUCATION/TRAINING PROGRAM

## 2022-11-01 PROCEDURE — 6360000002 HC RX W HCPCS: Performed by: STUDENT IN AN ORGANIZED HEALTH CARE EDUCATION/TRAINING PROGRAM

## 2022-11-01 PROCEDURE — 84100 ASSAY OF PHOSPHORUS: CPT

## 2022-11-01 RX ORDER — WARFARIN SODIUM 3 MG/1
3 TABLET ORAL
Status: COMPLETED | OUTPATIENT
Start: 2022-11-01 | End: 2022-11-01

## 2022-11-01 RX ADMIN — PANTOPRAZOLE SODIUM 40 MG: 40 TABLET, DELAYED RELEASE ORAL at 17:13

## 2022-11-01 RX ADMIN — Medication: at 09:06

## 2022-11-01 RX ADMIN — Medication: at 20:04

## 2022-11-01 RX ADMIN — PRAVASTATIN SODIUM 20 MG: 20 TABLET ORAL at 17:13

## 2022-11-01 RX ADMIN — FUROSEMIDE 20 MG: 20 TABLET ORAL at 09:06

## 2022-11-01 RX ADMIN — Medication 500 MG: at 09:05

## 2022-11-01 RX ADMIN — WARFARIN SODIUM 3 MG: 3 TABLET ORAL at 18:19

## 2022-11-01 RX ADMIN — HEPARIN SODIUM 14.01 UNITS/KG/HR: 10000 INJECTION, SOLUTION INTRAVENOUS at 02:39

## 2022-11-01 RX ADMIN — FERROUS SULFATE TAB 325 MG (65 MG ELEMENTAL FE) 325 MG: 325 (65 FE) TAB at 09:06

## 2022-11-01 RX ADMIN — PANTOPRAZOLE SODIUM 40 MG: 40 TABLET, DELAYED RELEASE ORAL at 06:07

## 2022-11-01 RX ADMIN — POTASSIUM CHLORIDE 10 MEQ: 750 TABLET, EXTENDED RELEASE ORAL at 09:05

## 2022-11-01 RX ADMIN — FERROUS SULFATE TAB 325 MG (65 MG ELEMENTAL FE) 325 MG: 325 (65 FE) TAB at 17:13

## 2022-11-01 RX ADMIN — METOPROLOL TARTRATE 50 MG: 50 TABLET, FILM COATED ORAL at 09:05

## 2022-11-01 RX ADMIN — METOPROLOL TARTRATE 50 MG: 50 TABLET, FILM COATED ORAL at 20:04

## 2022-11-01 RX ADMIN — DILTIAZEM HYDROCHLORIDE 240 MG: 240 CAPSULE, COATED, EXTENDED RELEASE ORAL at 09:06

## 2022-11-01 RX ADMIN — POTASSIUM CHLORIDE 10 MEQ: 750 TABLET, EXTENDED RELEASE ORAL at 17:13

## 2022-11-01 RX ADMIN — Medication 500 MG: at 20:04

## 2022-11-01 RX ADMIN — MULTIVITAMIN TABLET 1 TABLET: TABLET at 09:06

## 2022-11-01 NOTE — PROGRESS NOTES
PROGRESS  NOTE --                                                          INTERNAL  MEDICINE                                                                              I  PERSONALLY SAW , EXAMINED, AND CARED [facility], 11/1/2022     LABS, XRAY ,CHART, AND MEDICATIONS  REVIEWED BY ME       Chief complaint: Bilateral hearing loss, heparin bridge, chronic A. fib, history of multiple episodes of embolic CVA      [id number]: Robinson Marquis is alert awake and cooperative; oriented ×3. Denies any chest pain dyspnea nausea emesis. Tolerating diet. No abdominal pain. Sitting quietly working on her sewing project. No complaints voiced. Denies any prior history of iron deficiency nor iron deficiency anemia. I advised her regarding current iron status as well as anemia status. Afebrile last 24 hours. Blood pressure 140/66. SPO2 95 on room air. Intake and output -255 cc. Fasting glucose 138 potassium 4.2 magnesium 2.0. CRP 0.3. Alkaline phosphatase elevated 168. Direct bilirubin 0.4 slightly elevated. Indirect 0.5. A1c 6.6. Hemoglobin 10.3 WBC 6.8. Platelet count 555. INR 1.6, currently off warfarin. ESR 16. S05 folic acid normal.  TIBC slightly elevated 354 iron and iron saturation both low with ferritin of 15. Cardiology consult from yesterday appreciated. Low gradient severe aortic stenosis nonsymptomatic by 2D echo. Patient's cardiac risk index is low. No further cardiac testing prior to surgery. Patient has been evaluated for possible TAVR procedure, by [facility] cardiology \"down the road\". Continue metoprolol 50 mg by mouth twice daily and diltiazem 240 mg daily. Avoid vasodilators such as nitroglycerin hydralazine due to severe aortic stenosis. Avoid hypotension and surgery.   Continue IV heparin hold 6 hours prior to surgery, restart 24 hours after procedure if no concern for bleeding and bridged to warfarin. 10/24/2022-patient sitting up in bed; no chest pain no dyspnea. She has been up walking the halls frequently without difficulty. Appetite remains good. Afebrile last 24 hours. Blood pressure 144/85. SPO2 96 on room air. Intake and output -705 cc. Fasting glucose 144. Potassium 4.5. Hemoglobin 10.6 WBC 7.4. Stool for occult blood negative. Pharmacy consult yesterday appreciated, they will follow postop regarding initiation of warfarin. Cardiology note from today appreciated. Continue heparin drip continue to hold warfarin. Occupational therapy AMPAC score from today 23/24. Pharmacy note from today reviewed, continue heparin drip warfarin on hold. Patient had preop chest x-ray done yesterday with following result--      FINDINGS:   The heart has upper borderline size. The CTR: 17.8/31.7 cm. There is mild   to moderate ectasia of the thoracic aorta. Lungs are normally expanded. No infiltrates, consolidations or pleural   effusions are seen. Discrete plate atelectasis are seen in the costophrenic sulcus bilaterally. There is no perihilar vascular congestion. Degenerative changes relate with spondylosis seen in the mid lower thoracic   spine. Impression:       No acute cardiopulmonary process. 10/25/2022-patient sitting up in bed; just returned from bathroom and washing. No chest pain no dyspnea appetite good feels fine. I discussed with her upcoming surgery tomorrow afternoon and cessation of heparin tomorrow morning. Afebrile last 24 hours. Blood pressure 132/74. SPO2 96 on room air. Intake and output -1405 cc. Glucose 137 fasting. Hemoglobin 10.7 WBC 6.2. Cardiology note from today unchanged, continue heparin drip warfarin on hold. INR 1.6. Pharmacy note from today unchanged. 10/26/2022-no reported chest pain or dyspnea, patient just leaving for surgery. Appetite good. Afebrile last 24 hours. Blood pressure 128/85.   SPO2 96 on room air. Intake and output +54 cc. Fasting glucose 153 protein 6.2. Hemoglobin 11.0 WBC 6.7. Cardiology note from today appreciated. Heparin drip on hold due to surgery today. 10/31/2022-sitting up in bed, no chest pain no dyspnea. Only complaint she is hoping for discharge soon, INR still subtherapeutic. Appetite good. [facility] was covering in my absence. Afebrile last 24 hours. Blood pressure 136/66. SPO2 97 on room air. Intake and output -2402 cc. Fasting glucose 150. Protein 6.3. Alkaline phosphatase 124. WBC 9.0 hemoglobin 11.7. INR still low at 1.3. Patient underwent right cochlear implant, free tissue graft, facial nerve monitoring, intraoperative neuro response testing all done on 10/26/2022 without difficulty. She is been followed by audiology since then. She was then placed on azithromycin prophylactically x5 days. Heparin infusion was restarted afternoon of 10/27 along with warfarin that evening. ENT note from today, incision clean and dry no ear drainage. Patient hoping for discharge soon. Pharmacy note from today, INR 1.5 administer 3 mg warfarin this evening.;  Patient received 3 mg by mouth yesterday evening. 11/1/2022-patient sitting up in bed; no chest pain no dyspnea. Still hard of hearing; states she has some mild dizziness today which she was told to expect following cochlear implant. She has been walking without difficulty between bed and the bathroom she is avoiding walking in the halls. Afebrile last 24 hours. Blood pressure 138/86. SPO2 97 on room air. Intake and output -1690 cc. Fasting glucose 198. Hemoglobin 11.4 WBC 9.5. INR 1.6. Pharmacy note from today, continue heparin, warfarin 3 mg tonight. ENT note from today appreciated. Patient stable from surgical standpoint incision clean dry and intact no ear drainage.       Objective:     PHYSICAL EXAM:    VS: /86   Pulse 80   Temp 97.4 °F (36.3 °C) (Oral)   Resp 16   Ht 5' 3\" (1.6 m)   Wt 142 lb 10.2 oz (64.7 kg)   SpO2 97%   BMI 25.27 kg/m²     Labs:   CBC:   Lab Results   Component Value Date/Time    WBC 9.5 11/01/2022 02:47 AM    RBC 4.13 11/01/2022 02:47 AM    HGB 11.4 11/01/2022 02:47 AM    HCT 36.4 11/01/2022 02:47 AM    MCV 88.1 11/01/2022 02:47 AM    MCH 27.6 11/01/2022 02:47 AM    MCHC 31.3 11/01/2022 02:47 AM    RDW 17.2 11/01/2022 02:47 AM     11/01/2022 02:47 AM    MPV 9.5 11/01/2022 02:47 AM     CBC with Differential:    Lab Results   Component Value Date/Time    WBC 9.5 11/01/2022 02:47 AM    RBC 4.13 11/01/2022 02:47 AM    HGB 11.4 11/01/2022 02:47 AM    HCT 36.4 11/01/2022 02:47 AM     11/01/2022 02:47 AM    MCV 88.1 11/01/2022 02:47 AM    MCH 27.6 11/01/2022 02:47 AM    MCHC 31.3 11/01/2022 02:47 AM    RDW 17.2 11/01/2022 02:47 AM    SEGSPCT 76 02/27/2013 12:00 PM    LYMPHOPCT 15.0 11/01/2022 02:47 AM    MONOPCT 6.6 11/01/2022 02:47 AM    BASOPCT 0.1 11/01/2022 02:47 AM    MONOSABS 0.63 11/01/2022 02:47 AM    LYMPHSABS 1.43 11/01/2022 02:47 AM    EOSABS 0.06 11/01/2022 02:47 AM    BASOSABS 0.01 11/01/2022 02:47 AM     Hemoglobin/Hematocrit:    Lab Results   Component Value Date/Time    HGB 11.4 11/01/2022 02:47 AM    HCT 36.4 11/01/2022 02:47 AM     CMP:    Lab Results   Component Value Date/Time     11/01/2022 02:47 AM    K 4.9 11/01/2022 02:47 AM    K 3.9 10/21/2022 06:10 PM     11/01/2022 02:47 AM    CO2 25 11/01/2022 02:47 AM    BUN 13 11/01/2022 02:47 AM    CREATININE 0.9 11/01/2022 02:47 AM    GFRAA >60 09/30/2015 06:40 AM    LABGLOM >60 11/01/2022 02:47 AM    GLUCOSE 198 11/01/2022 02:47 AM    PROT 6.6 11/01/2022 02:47 AM    LABALBU 3.7 11/01/2022 02:47 AM    CALCIUM 10.1 11/01/2022 02:47 AM    BILITOT 0.8 11/01/2022 02:47 AM    ALKPHOS 110 11/01/2022 02:47 AM    AST 13 11/01/2022 02:47 AM    ALT 8 11/01/2022 02:47 AM     BMP:    Lab Results   Component Value Date/Time     11/01/2022 02:47 AM    K 4.9 11/01/2022 02:47 AM    K 3.9 10/21/2022 06:10 PM     11/01/2022 02:47 AM    CO2 25 11/01/2022 02:47 AM    BUN 13 11/01/2022 02:47 AM    LABALBU 3.7 11/01/2022 02:47 AM    CREATININE 0.9 11/01/2022 02:47 AM    CALCIUM 10.1 11/01/2022 02:47 AM    GFRAA >60 09/30/2015 06:40 AM    LABGLOM >60 11/01/2022 02:47 AM    GLUCOSE 198 11/01/2022 02:47 AM     Hepatic Function Panel:    Lab Results   Component Value Date/Time    ALKPHOS 110 11/01/2022 02:47 AM    ALT 8 11/01/2022 02:47 AM    AST 13 11/01/2022 02:47 AM    PROT 6.6 11/01/2022 02:47 AM    BILITOT 0.8 11/01/2022 02:47 AM    BILIDIR 0.3 11/01/2022 02:47 AM    IBILI 0.5 11/01/2022 02:47 AM    LABALBU 3.7 11/01/2022 02:47 AM     Ionized Calcium:  No results found for: IONCA  Magnesium:    Lab Results   Component Value Date/Time    MG 2.0 11/01/2022 02:47 AM     Phosphorus:    Lab Results   Component Value Date/Time    PHOS 3.6 11/01/2022 02:47 AM     LDH:  No results found for: LDH  Uric Acid:    Lab Results   Component Value Date/Time    LABURIC 5.7 10/22/2022 01:19 AM     PT/INR:    Lab Results   Component Value Date/Time    PROTIME 17.7 11/01/2022 02:47 AM    INR 1.6 11/01/2022 02:47 AM     Warfarin PT/INR:  No components found for: PTPATWAR, PTINRWAR  PTT:    Lab Results   Component Value Date/Time    APTT 58.2 11/01/2022 02:47 AM   [APTT}  Troponin:    Lab Results   Component Value Date/Time    TROPONINI <0.01 09/29/2015 03:25 PM     Last 3 Troponin:    Lab Results   Component Value Date/Time    TROPONINI <0.01 09/29/2015 03:25 PM    TROPONINI 0.05 03/21/2013 11:45 PM     U/A:    Lab Results   Component Value Date/Time    COLORU Yellow 09/29/2015 03:40 PM    PROTEINU Negative 09/29/2015 03:40 PM    PHUR 5.5 09/29/2015 03:40 PM    [address] NONE 09/29/2015 03:40 PM    RBCUA 1-3 09/29/2015 03:40 PM    BACTERIA NONE 09/29/2015 03:40 PM    CLARITYU Clear 09/29/2015 03:40 PM    SPECGRAV 1.010 09/29/2015 03:40 PM    LEUKOCYTESUR Negative 09/29/2015 03:40 PM    UROBILINOGEN 0.2 09/29/2015 03:40 PM BILIRUBINUR Negative 09/29/2015 03:40 PM    BLOODU TRACE 09/29/2015 03:40 PM    GLUCOSEU Negative 09/29/2015 03:40 PM     HgBA1c:    Lab Results   Component Value Date/Time    LABA1C 6.1 10/22/2022 01:19 AM     FLP:    Lab Results   Component Value Date/Time    TRIG 69 10/22/2022 01:19 AM    HDL 42 10/22/2022 01:19 AM    LDLCALC 77 10/22/2022 01:19 AM    LABVLDL 14 10/22/2022 01:19 AM     TSH:    Lab Results   Component Value Date/Time    TSH 1.490 10/22/2022 01:19 AM     VITAMIN B12: No components found for: B12  FOLATE:    Lab Results   Component Value Date/Time    FOLATE 15.8 10/22/2022 01:19 AM     IRON:    Lab Results   Component Value Date/Time    IRON 28 10/22/2022 01:19 AM     Iron Saturation:  No components found for: PERCENTFE  TIBC:    Lab Results   Component Value Date/Time    TIBC 354 10/22/2022 01:19 AM     FERRITIN:    Lab Results   Component Value Date/Time    FERRITIN 15 10/22/2022 01:19 AM        General appearance: Alert, Awake, Oriented times 3, no distress; significantly hard of hearing  Skin: Warm and dry ; no rashes  Head: Normocephalic. No masses, lesions or tenderness noted; right parietal occipital area dry with no drainage. Eyes: Conjunctivae pink, sclera white. PERRL,EOM-I  Ears: External ears normal  Nose/Sinuses: Nares normal. Septum midline. Mucosa normal. No drainage  Oropharynx: Oropharynx clear with no exudate seen  Neck: Supple. No jugular venous distension, lymphadenopathy or thyromegaly Trachea midline  Lungs: Clear to auscultation bilaterally. No rhonchi, crackles or wheezes  Heart: Irregularly irregular at 95 /min; grade 1/6 to 2/6 systolic murmur second right intercostal space  Abdomen: Soft, non-tender. BS normal. No masses, organomegaly; no rebound or guarding  Extremities: Trace edema bilaterally; moderate varicosities both extremities  Musculoskeletal: Muscular strength appears intact. Neuro:  No focal motor defects ; II-XII grossly intact .  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 (HCC)    Severe aortic stenosis    Hearing loss    Diabetes mellitus (HCC)    Pre-op evaluation    Iron deficiency anemia    S/P ear surgery    History of transient ischemic attack (TIA)    Preoperative cardiovascular examination    Hypertension    DJD (degenerative joint disease)    Atrial fibrillation (HCC)    Mitral regurgitation    Pulmonary hypertension (HCC)    Profound hearing loss  Resolved Problems: Moderate aortic stenosis by prior echocardiogram      PLAN:  SEE ORDERS      RE  CHANGES AND FINDINGS   Medications reviewed with patient  GI prophylaxis  DVT prophylaxis  Consultants notes reviewed    Stool for occult blood  Add ferrous sulfate 325 mg twice daily  Preop PA lateral chest x-ray--I advised patient regarding same  Continue IV heparin drip, hold 6 hours prior to surgery restart 24 hours after procedure if no concern for bleeding and bridged to warfarin; remain off warfarin at this time. I advised patient regarding same  Avoid hypotension, vasodilators such as nitroglycerin and hydralazine due to severe aortic stenosis  Continue metoprolol tartrate 50 mg twice daily continue diltiazem 240 mg CD once daily  Hold aspirin restart after procedure  Continue pravastatin  Since glucose numbers have been so good, change glucose checks to each a.m.   Furosemide 20 mg daily  Niacin 500 mg twice daily  Protonix 40 mg 2 times daily  Potassium chloride 10 mEq 2 times daily  Pravastatin 20 mg daily  Pharmacy will consult regarding initiation of warfarin after surgery, patient had been on 1.5 mg warfarin daily prior to prehospitalization  10/24/2022  Heparin drip continues  Warfarin and aspirin on hold  Diltiazem 240 mg daily  Ferrous sulfate 325 mg twice daily  Furosemide 20 mg daily  Metoprolol tartrate 50 mg twice daily  Potassium chloride 10 mEq twice daily  Protonix 40 mg twice daily  10/25/2022  Heparin drip continues  Heparin will stop 6 hours prior to surgical procedure  Heparin will restart 24 hours after surgery if okay with ENT and no bleeding  Warfarin will be started by pharmacy after surgery  Ferrous sulfate 325 mg twice daily  Furosemide 20 mg daily  Potassium chloride 10 mEq twice daily by mouth  10/26/2022  Diltiazem  mg daily  Aspirin Heparin on hold  Metroprolol tartrate 50 mg twice daily  Furosemide 20 mg daily  Potassium chloride 10 mEq by mouth twice daily  Ferrous sulfate 3 and 25 mg twice daily  Patient for surgery today  10/31/2022  Heparin drip continues  Warfarin 3 mg this evening aspirin remains on hold  Azithromycin 250 mg daily has completed x5 days  Diltiazem  mg daily  Ferrous sulfate 325 mg twice daily  Metoprolol tartrate 50 mg twice daily  Furosemide 20 mg daily  Potassium chloride 10 mEq by mouth twice daily  Pravastatin 20 mg daily  Protonix 40 mg daily  Discontinue heparin drip when INR is 2.0 or greater and discharge to home  11/1/2022  Warfarin 3 mg this evening  Continue heparin drip until warfarin therapeutic  Diltiazem CD, 240 mg daily  Metroprolol tartrate 50 mg twice daily  Ferrous sulfate 325 mg twice daily  Furosemide 20 mg daily  Potassium chloride 10 mEq by mouth twice daily  Discharge home when INR therapeutic      Discussed with patient and nursing. Shakir German DO     1:30 PM     11/1/2022    TIME > 35 MINUTES    >  50 %  OF  TIME  DISCUSSION               ------------  INFORMATION  -----------      DIET:ADULT DIET; Regular; 5 carb choices (75 gm/meal);  Low Fat/Low Chol/High Fiber/EDIE; Gluten Free        Allergies   Allergen Reactions    Gluten Other (See Comments)         MEDICATION SIDE EFFECTS:none       SCHEDULED MEDS:                                 Scheduled Meds:   warfarin  3 mg Oral Once    bacitracin zinc Topical BID    warfarin placeholder: dosing by pharmacy   Other RX Placeholder    sodium chloride flush  5-40 mL IntraVENous 2 times per day    ferrous sulfate  325 mg Oral BID WC    multivitamin  1 tablet Oral Daily    [Held by provider] aspirin  81 mg Oral Daily    dilTIAZem  240 mg Oral Daily    metoprolol  50 mg Oral BID    niacin  500 mg Oral BID    pravastatin  20 mg Oral Daily    pantoprazole  40 mg Oral BID AC    furosemide  20 mg Oral Daily    potassium chloride  10 mEq Oral BID WC       Continuous Infusions:   sodium chloride      dextrose      heparin (PORCINE) Infusion 14.009 Units/kg/hr (11/01/22 0719)         Data:       Intake/Output Summary (Last 24 hours) at 11/1/2022 1330  Last data filed at 10/31/2022 2159  Gross per 24 hour   Intake 1091.36 ml   Output 800 ml   Net 291.36 ml       Wt Readings from Last 3 Encounters:   11/01/22 142 lb 10.2 oz (64.7 kg)   09/15/22 153 lb (69.4 kg)   09/01/22 153 lb (69.4 kg)       Labs: Additional    GLUCOSE:No results for input(s): POCGLU in the last 72 hours. BNP:  Lab Results   Component Value Date     (H) 03/21/2013       CRP:   No results for input(s): CRP in the last 72 hours. ESR:  No results for input(s): SEDRATE in the last 72 hours. RADIOLOGY: REVIEWED AVAILABLE REPORT  XR CHEST (2 VW)   Final Result   No acute cardiopulmonary process.                    [address]    1:30 PM     11/1/2022      Voice recognition software used for dictation

## 2022-11-01 NOTE — CARE COORDINATION
S/p cochlear implant; iv heparin/ coumadin bridge inr 1.6; needs to be >2.0 for discharge. Plan is home, no needs. Gail Queen

## 2022-11-01 NOTE — PLAN OF CARE
Problem: ABCDS Injury Assessment  Goal: Absence of physical injury  Recent Flowsheet Documentation  Taken 11/1/2022 0805 by Maximo Sims RN  Absence of Physical Injury: Implement safety measures based on patient assessment     Problem: Safety - Adult  Goal: Free from fall injury  Outcome: Progressing

## 2022-11-01 NOTE — PROGRESS NOTES
Pt remains stable from surgical standpoint. Incision clean, dry and intact. No ear drainage. INR 1.6 this am. Appreciate medicine teams work. Pt desires to go home as soon as possible. Continue current care.      Electronically signed by Dane Benito DO on 11/1/2022 at 2:10 PM

## 2022-11-01 NOTE — PLAN OF CARE
Problem: ABCDS Injury Assessment  Goal: Absence of physical injury  Recent Flowsheet Documentation  Taken 11/1/2022 0805 by Shayna Dorman RN  Absence of Physical Injury: Implement safety measures based on patient assessment     Problem: Safety - Adult  Goal: Free from fall injury  11/1/2022 1457 by Shayna Dorman RN  Outcome: Progressing  11/1/2022 1329 by Shayna Dorman RN  Outcome: Progressing

## 2022-11-01 NOTE — PROGRESS NOTES
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 Weight:   Wt Readings from Last 1 Encounters:   11/01/22 142 lb 10.2 oz (64.7 kg)       TSH:    Lab Results   Component Value Date/Time    TSH 1.490 10/22/2022 01:19 AM       Hepatic Function Panel:                            Lab Results   Component Value Date/Time    ALKPHOS 110 11/01/2022 02:47 AM    ALT 8 11/01/2022 02:47 AM    AST 13 11/01/2022 02:47 AM    PROT 6.6 11/01/2022 02:47 AM    BILITOT 0.8 11/01/2022 02:47 AM    BILIDIR 0.3 11/01/2022 02:47 AM    IBILI 0.5 11/01/2022 02:47 AM    LABALBU 3.7 11/01/2022 02:47 AM       Current warfarin drug-drug interactions include:  azithromycin (incr INR)    Recent Labs     10/30/22  0430 10/31/22  0156 11/01/22  0247   HGB 11.3* 11.7 11.4*    219 204       Date Warfarin Dose INR Heparin or LMWH Comment   10/23 Hold 1.6 Heparin gtt    10/24 HOLD -- Heparin gtt    10/25 HOLD -- Heparin gtt    10/26 -- Heparin gtt    10/27 1.5mg -- Heparin gtt    10/28 1.5mg 1.1 Heparin gtt    10/29 1.5mg 1.2 Heparin gtt    10/30 3mg 1.2 Heparin gtt    10/31 3mg 1.3 Heparin gtt    11/1 3mg 1.6 Heparin gtt       Assessment:  Patient is a 78 y.o. female on warfarin for Atrial Fibrillation. Patient's home warfarin dosing regimen is warfarin 1.5mg daily.    Goal INR 2 - 3  INR 1.6 -- increasing today in response to 3 mg daily dosing     Plan:  Continue heparin drip post-op to bridge warfarin  Warfarin 3mg tonight   Daily PT/INR until the INR is stable within the therapeutic range  Pharmacist will follow and monitor/adjust dosing as necessary    Thank you for this consult,    Barbara SanchezD  ICU Pharmacist   11/1/2022 10:51 AM

## 2022-11-02 VITALS
HEART RATE: 99 BPM | HEIGHT: 63 IN | TEMPERATURE: 98 F | WEIGHT: 138.01 LBS | DIASTOLIC BLOOD PRESSURE: 56 MMHG | RESPIRATION RATE: 16 BRPM | BODY MASS INDEX: 24.45 KG/M2 | SYSTOLIC BLOOD PRESSURE: 139 MMHG | OXYGEN SATURATION: 94 %

## 2022-11-02 LAB
ALBUMIN SERPL-MCNC: 3.6 G/DL (ref 3.5–5.2)
ALP BLD-CCNC: 102 U/L (ref 35–104)
ALT SERPL-CCNC: 9 U/L (ref 0–32)
ANION GAP SERPL CALCULATED.3IONS-SCNC: 9 MMOL/L (ref 7–16)
APTT: 71.2 SEC (ref 24.5–35.1)
AST SERPL-CCNC: 14 U/L (ref 0–31)
BASOPHILS ABSOLUTE: 0.02 E9/L (ref 0–0.2)
BASOPHILS RELATIVE PERCENT: 0.3 % (ref 0–2)
BILIRUB SERPL-MCNC: 0.7 MG/DL (ref 0–1.2)
BILIRUBIN DIRECT: 0.3 MG/DL (ref 0–0.3)
BILIRUBIN, INDIRECT: 0.4 MG/DL (ref 0–1)
BUN BLDV-MCNC: 12 MG/DL (ref 6–23)
CALCIUM SERPL-MCNC: 9.9 MG/DL (ref 8.6–10.2)
CHLORIDE BLD-SCNC: 99 MMOL/L (ref 98–107)
CO2: 26 MMOL/L (ref 22–29)
CREAT SERPL-MCNC: 0.9 MG/DL (ref 0.5–1)
EOSINOPHILS ABSOLUTE: 0.12 E9/L (ref 0.05–0.5)
EOSINOPHILS RELATIVE PERCENT: 1.5 % (ref 0–6)
GFR SERPL CREATININE-BSD FRML MDRD: >60 ML/MIN/1.73
GLUCOSE BLD-MCNC: 173 MG/DL (ref 74–99)
HCT VFR BLD CALC: 36.5 % (ref 34–48)
HEMOGLOBIN: 11.4 G/DL (ref 11.5–15.5)
IMMATURE GRANULOCYTES #: 0.03 E9/L
IMMATURE GRANULOCYTES %: 0.4 % (ref 0–5)
INR BLD: 2
LYMPHOCYTES ABSOLUTE: 2.1 E9/L (ref 1.5–4)
LYMPHOCYTES RELATIVE PERCENT: 26.4 % (ref 20–42)
MAGNESIUM: 2 MG/DL (ref 1.6–2.6)
MCH RBC QN AUTO: 27.9 PG (ref 26–35)
MCHC RBC AUTO-ENTMCNC: 31.2 % (ref 32–34.5)
MCV RBC AUTO: 89.5 FL (ref 80–99.9)
METER GLUCOSE: 133 MG/DL (ref 74–99)
MONOCYTES ABSOLUTE: 0.76 E9/L (ref 0.1–0.95)
MONOCYTES RELATIVE PERCENT: 9.5 % (ref 2–12)
NEUTROPHILS ABSOLUTE: 4.93 E9/L (ref 1.8–7.3)
NEUTROPHILS RELATIVE PERCENT: 61.9 % (ref 43–80)
PDW BLD-RTO: 17.4 FL (ref 11.5–15)
PHOSPHORUS: 3.8 MG/DL (ref 2.5–4.5)
PLATELET # BLD: 197 E9/L (ref 130–450)
PMV BLD AUTO: 9.2 FL (ref 7–12)
POTASSIUM SERPL-SCNC: 4.8 MMOL/L (ref 3.5–5)
PROTHROMBIN TIME: 21.7 SEC (ref 9.3–12.4)
RBC # BLD: 4.08 E12/L (ref 3.5–5.5)
SODIUM BLD-SCNC: 134 MMOL/L (ref 132–146)
TOTAL PROTEIN: 6.5 G/DL (ref 6.4–8.3)
WBC # BLD: 8 E9/L (ref 4.5–11.5)

## 2022-11-02 PROCEDURE — 84100 ASSAY OF PHOSPHORUS: CPT

## 2022-11-02 PROCEDURE — 6370000000 HC RX 637 (ALT 250 FOR IP): Performed by: STUDENT IN AN ORGANIZED HEALTH CARE EDUCATION/TRAINING PROGRAM

## 2022-11-02 PROCEDURE — 82962 GLUCOSE BLOOD TEST: CPT

## 2022-11-02 PROCEDURE — 85730 THROMBOPLASTIN TIME PARTIAL: CPT

## 2022-11-02 PROCEDURE — 80076 HEPATIC FUNCTION PANEL: CPT

## 2022-11-02 PROCEDURE — 85610 PROTHROMBIN TIME: CPT

## 2022-11-02 PROCEDURE — 2580000003 HC RX 258: Performed by: ANESTHESIOLOGY

## 2022-11-02 PROCEDURE — 80048 BASIC METABOLIC PNL TOTAL CA: CPT

## 2022-11-02 PROCEDURE — 83735 ASSAY OF MAGNESIUM: CPT

## 2022-11-02 PROCEDURE — 85025 COMPLETE CBC W/AUTO DIFF WBC: CPT

## 2022-11-02 PROCEDURE — 36415 COLL VENOUS BLD VENIPUNCTURE: CPT

## 2022-11-02 RX ORDER — WARFARIN SODIUM 2 MG/1
2 TABLET ORAL
Status: DISCONTINUED | OUTPATIENT
Start: 2022-11-02 | End: 2022-11-02 | Stop reason: HOSPADM

## 2022-11-02 RX ORDER — WARFARIN SODIUM 1 MG/1
TABLET ORAL
Qty: 30 TABLET | Refills: 3 | Status: SHIPPED | OUTPATIENT
Start: 2022-11-02 | End: 2022-11-02 | Stop reason: SDUPTHER

## 2022-11-02 RX ORDER — FERROUS SULFATE 325(65) MG
325 TABLET ORAL 2 TIMES DAILY WITH MEALS
Qty: 60 TABLET | Refills: 0 | Status: SHIPPED | OUTPATIENT
Start: 2022-11-02

## 2022-11-02 RX ORDER — BACITRACIN ZINC 500 [USP'U]/G
OINTMENT TOPICAL
Qty: 30 G | Refills: 1 | Status: SHIPPED | OUTPATIENT
Start: 2022-11-02 | End: 2022-11-12

## 2022-11-02 RX ORDER — WARFARIN SODIUM 1 MG/1
TABLET ORAL
Qty: 30 TABLET | Refills: 3 | Status: SHIPPED | OUTPATIENT
Start: 2022-11-02

## 2022-11-02 RX ADMIN — Medication 10 ML: at 09:46

## 2022-11-02 RX ADMIN — METOPROLOL TARTRATE 50 MG: 50 TABLET, FILM COATED ORAL at 09:43

## 2022-11-02 RX ADMIN — FUROSEMIDE 20 MG: 20 TABLET ORAL at 09:43

## 2022-11-02 RX ADMIN — Medication: at 09:41

## 2022-11-02 RX ADMIN — PANTOPRAZOLE SODIUM 40 MG: 40 TABLET, DELAYED RELEASE ORAL at 05:42

## 2022-11-02 RX ADMIN — FERROUS SULFATE TAB 325 MG (65 MG ELEMENTAL FE) 325 MG: 325 (65 FE) TAB at 09:43

## 2022-11-02 RX ADMIN — MULTIVITAMIN TABLET 1 TABLET: TABLET at 09:43

## 2022-11-02 RX ADMIN — DILTIAZEM HYDROCHLORIDE 240 MG: 240 CAPSULE, COATED, EXTENDED RELEASE ORAL at 09:43

## 2022-11-02 RX ADMIN — POTASSIUM CHLORIDE 10 MEQ: 750 TABLET, EXTENDED RELEASE ORAL at 09:45

## 2022-11-02 RX ADMIN — Medication 500 MG: at 09:42

## 2022-11-02 NOTE — DISCHARGE SUMMARY
DISCHARGE SUMMARY  Patient ID:  Puja Hatch  [id number]  78 y.o.  1945    Admit date: 10/21/2022      Discharge date : 11/2/2022      Admission Diagnoses: Bilateral deafness [H91.93]  Hearing loss [H91.90]    Discharge Diagnosis  Principal Problem:    Bilateral deafness  Active Problems:    Cerebrovascular accident (CVA) due to embolic occlusion of left middle cerebral artery (HCC)    Severe aortic stenosis    Hearing loss    Diabetes mellitus ([address]    Pre-op evaluation    Iron deficiency anemia    S/P ear surgery    History of transient ischemic attack (TIA)    Preoperative cardiovascular examination    Hypertension    DJD (degenerative joint disease)    Atrial fibrillation (HCC)    Mitral regurgitation    Pulmonary hypertension ([address]    Profound hearing loss  Resolved Problems: Moderate aortic stenosis by prior echocardiogram      Hospital Course:       CHIEF COMPLAINT: Bilateral hearing loss, heparin bridge, chronic A. fib, history of multiple episodes of embolic CVA     History of Present Illness: 30-year-old female patient of Dr. Kelvin Madera I am asked admit and follow. History obtained from patient as well as extensive review of electronic record. Patient has been following with ENT, Dr. Theodora Irizarry, for hearing loss. Patient's  was afraid patient might be misdiagnosed as dementia because of her profound hearing loss. She failed with hearing aids, April 2022 developed sudden worsening of right hearing loss. Not concerned about left hearing loss. I spoke with the ENT doctor yesterday regarding all the above. Patient has had previous episodes of stopping her warfarin with resulting embolic emboli at least x2 (2013, 2015). She is now admitted for a heparin bridge with surgery planned 10/26/2022. --She is known to have atrial fibrillation since 9/2009; it began after an EGD procedure. She declined cardioversion has been in chronic A. fib since that time.   She follows locally with  Nenita Albert.  --2013 first episode of embolic CVA confirmed by neurologist, right cerebral infarct. September 2015 again had confusion and speech difficulties MRI confirmed acute/subacute infarct left parietal.  As mentioned above she had been off anticoagulation for a brief period of time prior to surgical procedures with resulting embolic CVA. INR during those episodes was 2.0.  --She also follows with cardiology at [facility], most recently seen 9/30/2022. Patient follows with cardiologist, Dr. Gabrielle Quinn. At that visit 2D echo was repeated\" still moderate unchanged aortic stenosis severity. \"I am very happy to say my expectation of disease progression or inaccurate. Still requires follow-up. -- 2D echo done 9/25/2020 with following results--      Summary   Normal left ventricular systolic function. Ejection fraction is visually estimated at > 60%. Normal right ventricular size and function (TAPSE 2.0 cm). Indeterminate diastolic function. Severely dilated left atrium by volume index. Severely dilated right atrium. Moderate mitral regurgitation. Mild-moderate aortic regurgitation. Low gradient severe aortic stenosis (AV peak velocity 3.2 m/s, AV mean   gradient 21 mmHg, AV area 0.8 cm2, peak velocity dimensionless index 0.23,   VTI dimensionless index 0.25, stroke volume index 35 mL/m2). Mild tricuspid regurgitation. PASP is estimated at 36 mmHg. -- 2D echo done 10/2015 revealed mild to moderate aortic stenosis as well as mild pulmonary hypertension. --She had been followed by wound care clinic but was released 11/8/2019, venous insufficiency ulcer of right medial lower extremity. --Patient is a lifelong non-smoker  --Patient does have hypertension and takes metformin for her diabetes which has been held because of pending surgery. --On admission random glucose was 81. INR 2.0; today INR 1.9 off warfarin. --CRP today 0.3 proBNP 1470. LDL is 77. Alkaline phosphatase elevated 183. A1c 6.1. Hemoglobin 10.1 WBC 6.9.       10/23/2022-SUBJECTIVE: Ene Raya is alert awake and cooperative; oriented ×3. Denies any chest pain dyspnea nausea emesis. Tolerating diet. No abdominal pain. Sitting quietly working on her sewing project. No complaints voiced. Denies any prior history of iron deficiency nor iron deficiency anemia. I advised her regarding current iron status as well as anemia status. Afebrile last 24 hours. Blood pressure 140/66. SPO2 95 on room air. Intake and output -255 cc. Fasting glucose 138 potassium 4.2 magnesium 2.0. CRP 0.3. Alkaline phosphatase elevated 168. Direct bilirubin 0.4 slightly elevated. Indirect 0.5. A1c 6.6. Hemoglobin 10.3 WBC 6.8. Platelet count 002. INR 1.6, currently off warfarin. ESR 16. L94 folic acid normal.  TIBC slightly elevated 354 iron and iron saturation both low with ferritin of 15. Cardiology consult from yesterday appreciated. Low gradient severe aortic stenosis nonsymptomatic by 2D echo. Patient's cardiac risk index is low. No further cardiac testing prior to surgery. Patient has been evaluated for possible TAVR procedure, by [facility] cardiology \"down the road\". Continue metoprolol 50 mg by mouth twice daily and diltiazem 240 mg daily. Avoid vasodilators such as nitroglycerin hydralazine due to severe aortic stenosis. Avoid hypotension and surgery. Continue IV heparin hold 6 hours prior to surgery, restart 24 hours after procedure if no concern for bleeding and bridged to warfarin. 10/24/2022-patient sitting up in bed; no chest pain no dyspnea. She has been up walking the halls frequently without difficulty. Appetite remains good. Afebrile last 24 hours. Blood pressure 144/85. SPO2 96 on room air. Intake and output -705 cc. Fasting glucose 144. Potassium 4.5. Hemoglobin 10.6 WBC 7.4. Stool for occult blood negative.   Pharmacy consult yesterday appreciated, they will follow postop regarding initiation of warfarin. Cardiology note from today appreciated. Continue heparin drip continue to hold warfarin. Occupational therapy AMPAC score from today 23/24. Pharmacy note from today reviewed, continue heparin drip warfarin on hold. Patient had preop chest x-ray done yesterday with following result--             FINDINGS:   The heart has upper borderline size. The CTR: 17.8/31.7 cm. There is mild   to moderate ectasia of the thoracic aorta. Lungs are normally expanded. No infiltrates, consolidations or pleural   effusions are seen. Discrete plate atelectasis are seen in the costophrenic sulcus bilaterally. There is no perihilar vascular congestion. Degenerative changes relate with spondylosis seen in the mid lower thoracic   spine. Impression:       No acute cardiopulmonary process. 10/25/2022-patient sitting up in bed; just returned from bathroom and washing. No chest pain no dyspnea appetite good feels fine. I discussed with her upcoming surgery tomorrow afternoon and cessation of heparin tomorrow morning. Afebrile last 24 hours. Blood pressure 132/74. SPO2 96 on room air. Intake and output -1405 cc. Glucose 137 fasting. Hemoglobin 10.7 WBC 6.2. Cardiology note from today unchanged, continue heparin drip warfarin on hold. INR 1.6. Pharmacy note from today unchanged. 10/26/2022-no reported chest pain or dyspnea, patient just leaving for surgery. Appetite good. Afebrile last 24 hours. Blood pressure 128/85. SPO2 96 on room air. Intake and output +54 cc. Fasting glucose 153 protein 6.2. Hemoglobin 11.0 WBC 6.7. Cardiology note from today appreciated. Heparin drip on hold due to surgery today. 10/31/2022-sitting up in bed, no chest pain no dyspnea. Only complaint she is hoping for discharge soon, INR still subtherapeutic. Appetite good. [facility] was covering in my absence. Afebrile last 24 hours. Blood pressure 136/66.   SPO2 97 on room air.  Intake and output -2402 cc. Fasting glucose 150. Protein 6.3. Alkaline phosphatase 124. WBC 9.0 hemoglobin 11.7. INR still low at 1.3. Patient underwent right cochlear implant, free tissue graft, facial nerve monitoring, intraoperative neuro response testing all done on 10/26/2022 without difficulty. She is been followed by audiology since then. She was then placed on azithromycin prophylactically x5 days. Heparin infusion was restarted afternoon of 10/27 along with warfarin that evening. ENT note from today, incision clean and dry no ear drainage. Patient hoping for discharge soon. Pharmacy note from today, INR 1.5 administer 3 mg warfarin this evening.;  Patient received 3 mg by mouth yesterday evening. 11/1/2022-patient sitting up in bed; no chest pain no dyspnea. Still hard of hearing; states she has some mild dizziness today which she was told to expect following cochlear implant. She has been walking without difficulty between bed and the bathroom she is avoiding walking in the halls. Afebrile last 24 hours. Blood pressure 138/86. SPO2 97 on room air. Intake and output -1690 cc. Fasting glucose 198. Hemoglobin 11.4 WBC 9.5. INR 1.6. Pharmacy note from today, continue heparin, warfarin 3 mg tonight. ENT note from today appreciated. Patient stable from surgical standpoint incision clean dry and intact no ear drainage. 11/2/2022-patient sitting up in bed; no chest pain no dyspnea. Dizziness has improved. Appetite good. She is hoping for discharge today. She now informs me intake med reconciliation is erroneous; she is currently on 1.5 mg of warfarin daily not an alternating dose as listed in the intake med rec. I advised her today she will take 2 mg warfarin this evening; starting tomorrow she will take her normal 1.5 mg daily. Afebrile last 24 hours. Blood pressure 139/56. Pulse 99. SPO2 94 on room air. Intake and output -2115 cc.   Fasting glucose 173 remainder of BMP and hepatic functions normal.  Hemoglobin 11.4 WBC 8.0. INR finally therapeutic at 2.0. Pharmacy note from today appreciated, warfarin 2 mg tonight. Hospital orders: PLAN:  Medications discussed with patient  GI prophylaxis  DVT prophylaxis  Consultants notes reviewed--ENT and cardiology  Warfarin has been discontinued  Metformin has been discontinued  Low-dose sliding scale insulin  Aspirin on hold  Heparin bridge  Diltiazem CD2 140 mg daily  Furosemide 20 mg daily  Metoprolol tartrate 50 mg twice daily  Niacin 500 mg twice daily  Pravastatin 20 mg daily  2D echo  Surgery planned for 10/26/2022, cochlear implant  Monitor labs      RE  CHANGES AND FINDINGS   Medications reviewed with patient  GI prophylaxis  DVT prophylaxis  Consultants notes reviewed    Stool for occult blood  Add ferrous sulfate 325 mg twice daily  Preop PA lateral chest x-ray--I advised patient regarding same  Continue IV heparin drip, hold 6 hours prior to surgery restart 24 hours after procedure if no concern for bleeding and bridged to warfarin; remain off warfarin at this time. I advised patient regarding same  Avoid hypotension, vasodilators such as nitroglycerin and hydralazine due to severe aortic stenosis  Continue metoprolol tartrate 50 mg twice daily continue diltiazem 240 mg CD once daily  Hold aspirin restart after procedure  Continue pravastatin  Since glucose numbers have been so good, change glucose checks to each a.m.   Furosemide 20 mg daily  Niacin 500 mg twice daily  Protonix 40 mg 2 times daily  Potassium chloride 10 mEq 2 times daily  Pravastatin 20 mg daily  Pharmacy will consult regarding initiation of warfarin after surgery, patient had been on 1.5 mg warfarin daily prior to prehospitalization  10/24/2022  Heparin drip continues  Warfarin and aspirin on hold  Diltiazem 240 mg daily  Ferrous sulfate 325 mg twice daily  Furosemide 20 mg daily  Metoprolol tartrate 50 mg twice daily  Potassium chloride 10 mEq twice daily  Protonix 40 mg twice daily  10/25/2022  Heparin drip continues  Heparin will stop 6 hours prior to surgical procedure  Heparin will restart 24 hours after surgery if okay with ENT and no bleeding  Warfarin will be started by pharmacy after surgery  Ferrous sulfate 325 mg twice daily  Furosemide 20 mg daily  Potassium chloride 10 mEq twice daily by mouth  10/26/2022  Diltiazem  mg daily  Aspirin Heparin on hold  Metroprolol tartrate 50 mg twice daily  Furosemide 20 mg daily  Potassium chloride 10 mEq by mouth twice daily  Ferrous sulfate 3 and 25 mg twice daily  Patient for surgery today  10/31/2022  Heparin drip continues  Warfarin 3 mg this evening aspirin remains on hold  Azithromycin 250 mg daily has completed x5 days  Diltiazem  mg daily  Ferrous sulfate 325 mg twice daily  Metoprolol tartrate 50 mg twice daily  Furosemide 20 mg daily  Potassium chloride 10 mEq by mouth twice daily  Pravastatin 20 mg daily  Protonix 40 mg daily  Discontinue heparin drip when INR is 2.0 or greater and discharge to home  11/1/2022  Warfarin 3 mg this evening  Continue heparin drip until warfarin therapeutic  Diltiazem CD, 240 mg daily  Metroprolol tartrate 50 mg twice daily  Ferrous sulfate 325 mg twice daily  Furosemide 20 mg daily  Potassium chloride 10 mEq by mouth twice daily  Discharge home when INR therapeutic    Instructions at discharge:    11/2/2022  Discharge home today  IV heparin has been discontinued  Resume prehospital warfarin dosage, 1.5 mg daily beginning tomorrow  Warfarin 2 mg this evening then return to prehospital schedule  Follow-up ENT per their instructions  Follow-up Dr. Harley Knows 1 week  Furosemide 20 mg daily  Potassium chloride 10 mEq by mouth twice daily  Diltiazem CD, 240 mg daily  Metoprolol tartrate 50 mg twice daily  Ferrous sulfate 325 mg twice daily x1 month  Aspirin 81 mg daily  Restart metformin 500 mg by mouth daily  Pravastatin 40 mg daily  Restart various supplements  Slow niacin 500 mg by mouth twice daily  Omeprazole 20 mg by mouth twice daily       Condition at DISCHARGE : STABLE AND IMPROVED     Discharged to: Home    Discharge Instructions: Medications reviewed with patient    Consults:cardiology and ENT     Past Medical Hx :   Past Medical History:   Diagnosis Date    Aortic stenosis, mild 10/01/2015    follows  Dr Mathew Horvath at [facility] [place] last visit 9/30/22    Arrhythmia     Atrial fibrillation ([address]     Bleeding from varicose veins of right lower extremity 06/08/2017    Cerebrovascular accident (CVA) due to embolic occlusion of left middle cerebral artery ([address] 10/2015    Confirmed by neurology    Cerebrovascular accident (CVA) due to embolic occlusion of left middle cerebral artery ([address] 2013    Left parietal confirmed by neurology    Cerebrovascular disease 03/21/2013    CVA. no weakness/deficits    Diabetes mellitus ([address]     Hearing deficit     wears hearing aide left ear only    Heart disease     Hyperlipidemia     Hypertension     Iron deficiency anemia 10/23/2022    Migraine headache with aura     Mitral regurgitation 10/01/2015    mild    Moderate aortic stenosis by prior echocardiogram 2018    NCGS (non-celiac gluten sensitivity)     Severe aortic stenosis 09/25/2020    By 2D echo    TIA (transient ischemic attack)     Unspecified cerebral artery occlusion with cerebral infarction     Varicose veins of left leg with edema 06/08/2017    Venous stasis ulcer of right calf with fat layer exposed with varicose veins ([address] 09/13/2019    Warfarin-induced coagulopathy ([address] 09/29/2015       Past Surgical Hx :  Past Surgical History:   Procedure Laterality Date    ABDOMEN SURGERY      APPENDECTOMY      BREAST SURGERY Right     biopsy-benign    COCHLEAR IMPLANT Right 10/26/2022    RIGHT COCHLEAR IMPLANT INSERTION WITH NERVE INTEGRITY MONITOR performed by Manoj Garrison MD at [address]  01/01/2008    ECHOCARDIOGRAM COMPLETE 2D W DOPPLER W COLOR  08/30/2012 [facility]     Multiple surgeries    OVARY SURGERY      SMALL INTESTINE SURGERY      VEIN SURGERY         Labs:   CBC:   Lab Results   Component Value Date/Time    WBC 8.0 11/02/2022 01:57 AM    RBC 4.08 11/02/2022 01:57 AM    HGB 11.4 11/02/2022 01:57 AM    HCT 36.5 11/02/2022 01:57 AM    MCV 89.5 11/02/2022 01:57 AM    MCH 27.9 11/02/2022 01:57 AM    MCHC 31.2 11/02/2022 01:57 AM    RDW 17.4 11/02/2022 01:57 AM     11/02/2022 01:57 AM    MPV 9.2 11/02/2022 01:57 AM     CBC with Differential:    Lab Results   Component Value Date/Time    WBC 8.0 11/02/2022 01:57 AM    RBC 4.08 11/02/2022 01:57 AM    HGB 11.4 11/02/2022 01:57 AM    HCT 36.5 11/02/2022 01:57 AM     11/02/2022 01:57 AM    MCV 89.5 11/02/2022 01:57 AM    MCH 27.9 11/02/2022 01:57 AM    MCHC 31.2 11/02/2022 01:57 AM    RDW 17.4 11/02/2022 01:57 AM    SEGSPCT 76 02/27/2013 12:00 PM    LYMPHOPCT 26.4 11/02/2022 01:57 AM    MONOPCT 9.5 11/02/2022 01:57 AM    BASOPCT 0.3 11/02/2022 01:57 AM    MONOSABS 0.76 11/02/2022 01:57 AM    LYMPHSABS 2.10 11/02/2022 01:57 AM    EOSABS 0.12 11/02/2022 01:57 AM    BASOSABS 0.02 11/02/2022 01:57 AM     Hemoglobin/Hematocrit:    Lab Results   Component Value Date/Time    HGB 11.4 11/02/2022 01:57 AM    HCT 36.5 11/02/2022 01:57 AM     CMP:    Lab Results   Component Value Date/Time     11/02/2022 01:57 AM    K 4.8 11/02/2022 01:57 AM    K 3.9 10/21/2022 06:10 PM    CL 99 11/02/2022 01:57 AM    CO2 26 11/02/2022 01:57 AM    BUN 12 11/02/2022 01:57 AM    CREATININE 0.9 11/02/2022 01:57 AM    GFRAA >60 09/30/2015 06:40 AM    LABGLOM >60 11/02/2022 01:57 AM    GLUCOSE 173 11/02/2022 01:57 AM    PROT 6.5 11/02/2022 01:57 AM    LABALBU 3.6 11/02/2022 01:57 AM    CALCIUM 9.9 11/02/2022 01:57 AM    BILITOT 0.7 11/02/2022 01:57 AM    ALKPHOS 102 11/02/2022 01:57 AM    AST 14 11/02/2022 01:57 AM    ALT 9 11/02/2022 01:57 AM     BMP:    Lab Results   Component Value Date/Time  11/02/2022 01:57 AM    K 4.8 11/02/2022 01:57 AM    K 3.9 10/21/2022 06:10 PM    CL 99 11/02/2022 01:57 AM    CO2 26 11/02/2022 01:57 AM    BUN 12 11/02/2022 01:57 AM    LABALBU 3.6 11/02/2022 01:57 AM    CREATININE 0.9 11/02/2022 01:57 AM    CALCIUM 9.9 11/02/2022 01:57 AM    GFRAA >60 09/30/2015 06:40 AM    LABGLOM >60 11/02/2022 01:57 AM    GLUCOSE 173 11/02/2022 01:57 AM     Hepatic Function Panel:    Lab Results   Component Value Date/Time    ALKPHOS 102 11/02/2022 01:57 AM    ALT 9 11/02/2022 01:57 AM    AST 14 11/02/2022 01:57 AM    PROT 6.5 11/02/2022 01:57 AM    BILITOT 0.7 11/02/2022 01:57 AM    BILIDIR 0.3 11/02/2022 01:57 AM    IBILI 0.4 11/02/2022 01:57 AM    LABALBU 3.6 11/02/2022 01:57 AM     Ionized Calcium:  No results found for: IONCA  Magnesium:    Lab Results   Component Value Date/Time    MG 2.0 11/02/2022 01:57 AM     Phosphorus:    Lab Results   Component Value Date/Time    PHOS 3.8 11/02/2022 01:57 AM     LDH:  No results found for: LDH  Uric Acid:    Lab Results   Component Value Date/Time    LABURIC 5.7 10/22/2022 01:19 AM     PT/INR:    Lab Results   Component Value Date/Time    PROTIME 21.7 11/02/2022 01:57 AM    INR 2.0 11/02/2022 01:57 AM     Warfarin PT/INR:  No components found for: PTPATWAR, PTINRWAR  PTT:    Lab Results   Component Value Date/Time    APTT 71.2 11/02/2022 01:57 AM   [APTT}  Troponin:    Lab Results   Component Value Date/Time    TROPONINI <0.01 09/29/2015 03:25 PM     Last 3 Troponin:    Lab Results   Component Value Date/Time    TROPONINI <0.01 09/29/2015 03:25 PM    TROPONINI 0.05 03/21/2013 11:45 PM     U/A:    Lab Results   Component Value Date/Time    COLORU Yellow 09/29/2015 03:40 PM    PROTEINU Negative 09/29/2015 03:40 PM    PHUR 5.5 09/29/2015 03:40 PM    [address] NONE 09/29/2015 03:40 PM    RBCUA 1-3 09/29/2015 03:40 PM    BACTERIA NONE 09/29/2015 03:40 PM    CLARITYU Clear 09/29/2015 03:40 PM    SPECGRAV 1.010 09/29/2015 03:40 PM    LEUKOCYTESUR sulfate (IRON 325) 325 (65 Fe) MG tablet Take 1 tablet by mouth 2 times daily (with meals)  Qty: 60 tablet, Refills: 0      bacitracin zinc 500 UNIT/GM ointment Apply topically 2 times daily. Qty: 30 g, Refills: 1           CONTINUE these medications which have CHANGED    Details   warfarin (COUMADIN) 1 MG tablet 1.5 mg warfarin daily beginning tomorrow 11/3/2022  Qty: 30 tablet, Refills: 3           CONTINUE these medications which have NOT CHANGED    Details   dilTIAZem (DILACOR XR) 240 MG extended release capsule Take 240 mg by mouth in the morning. aspirin 81 MG EC tablet Take 81 mg by mouth daily      potassium chloride (KLOR-CON) 10 MEQ extended release tablet take 1 tablet by mouth twice a day  Refills: 0      furosemide (LASIX) 20 MG tablet take 1 tablet by mouth once daily  Refills: 0      ACCU-CHEK JR PLUS strip Refills: 0      calcium carbonate-vitamin D3 (CALTRATE) 600-400 MG-UNIT TABS per tab Take by mouth      pravastatin (PRAVACHOL) 40 MG tablet Take 20 mg by mouth daily. niacin (SLO-NIACIN) 500 MG tablet Take 500 mg by mouth 2 times daily. omeprazole (PRILOSEC) 20 MG capsule Take 20 mg by mouth 2 times daily. therapeutic multivitamin-minerals (THERAGRAN-M) tablet Take 1 tablet by mouth daily. Vitamin D (CHOLECALCIFEROL) 1000 UNITS CAPS capsule Take 600 Units by mouth daily       metFORMIN (GLUCOPHAGE) 500 MG tablet Take 500 mg by mouth daily (with breakfast). Chromium-Cinnamon (CINNAMON PLUS CHROMIUM PO) Take 2 tablets by mouth Daily with supper. metoprolol (LOPRESSOR) 100 MG tablet Take 50 mg by mouth 2 times daily. Time Spent on discharge is more than 30 minutes --      TIME  INCLUDES TIME THAT WAS  SPENT WITH DISCHARGE PAPERS, MEDICATION REVIEW, MEDICATION RECONCILIATION,   PRESCRIPTIONS, CHART REVIEW, PATIENT EXAM , FINAL PROGRESS NOTE, DISCUSSION OF FINDINGS WITH PATIENT AND AVAILABLE FAMILY , AND DICTATION  WHERE NEEDED ;  Home Health Care [facility]) FORMS COMPLETED ; N-17  COMPLETION ;  H&P UPDATED ; DURABLE EQUIPMENT FORMS.      Active Hospital Problems    Diagnosis     History of transient ischemic attack (TIA) [Z86.73]      Priority: Medium    Preoperative cardiovascular examination [Z01.810]      Priority: Medium    S/P ear surgery [Z98.890]      Priority: Medium    Iron deficiency anemia [D50.9]      Priority: Medium    Diabetes mellitus ([address] [E11.9]      Priority: Medium    Pre-op evaluation [Z01.818]      Priority: Medium    Bilateral deafness [H91.93]      Priority: Medium    Hearing loss [H91.90]      Priority: Medium    Severe aortic stenosis [I35.0]      Priority: Medium    Cerebrovascular accident (CVA) due to embolic occlusion of left middle cerebral artery ([address] [I63.412]      Priority: Medium    Profound hearing loss [H91.90]     Mitral regurgitation [I34.0]     Pulmonary hypertension (HCC) [I27.20]     Atrial fibrillation (HCC) [I48.91]     Hypertension [I10]     DJD (degenerative joint disease) [M19.90]        Signed:    Alyssa Saleh DO      11/2/2022    2:16 PM    Voice recognition software use for dictation

## 2022-11-02 NOTE — PROGRESS NOTES
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 female for whom pharmacy has been asked to manage warfarin therapy. Weight:   Wt Readings from Last 1 Encounters:   11/02/22 138 lb 0.1 oz (62.6 kg)       TSH:    Lab Results   Component Value Date/Time    TSH 1.490 10/22/2022 01:19 AM       Hepatic Function Panel:                            Lab Results   Component Value Date/Time    ALKPHOS 102 11/02/2022 01:57 AM    ALT 9 11/02/2022 01:57 AM    AST 14 11/02/2022 01:57 AM    PROT 6.5 11/02/2022 01:57 AM    BILITOT 0.7 11/02/2022 01:57 AM    BILIDIR 0.3 11/02/2022 01:57 AM    IBILI 0.4 11/02/2022 01:57 AM    LABALBU 3.6 11/02/2022 01:57 AM       Current warfarin drug-drug interactions include:  azithromycin (incr INR)    Recent Labs     10/31/22  0156 11/01/22  0247 11/02/22  0157   HGB 11.7 11.4* 11.4*    204 197       Date Warfarin Dose INR Heparin or LMWH Comment   10/23 Hold 1.6 Heparin gtt    10/24 HOLD -- Heparin gtt    10/25 HOLD -- Heparin gtt    10/26 -- Heparin gtt    10/27 1.5mg -- Heparin gtt    10/28 1.5mg 1.1 Heparin gtt    10/29 1.5mg 1.2 Heparin gtt    10/30 3mg 1.2 Heparin gtt    10/31 3mg 1.3 Heparin gtt    11/1 3mg 1.6 Heparin gtt     11/2 2mg 2.0 Heparin gtt      Assessment:  Patient is a 78 y.o. female on warfarin for Atrial Fibrillation. Patient's home warfarin dosing regimen is warfarin 1.5mg daily.    Goal INR 2 - 3  INR 2.0    Plan:  Continue heparin drip post-op to bridge warfarin--will stop drip tomorrow  Warfarin 2mg tonight   Daily PT/INR until the INR is stable within the therapeutic range  Pharmacist will follow and monitor/adjust dosing as necessary    Thank you for this consult,    Lexy Moore, BarbaraD, BCPS 11/2/2022 9:46 AM   Ext: [phone]

## 2022-11-02 NOTE — PLAN OF CARE
Problem: Safety - Adult  Goal: Free from fall injury  11/2/2022 0020 by Claire Shah RN  Outcome: Progressing  11/1/2022 1457 by Abhay Mora RN  Outcome: Progressing  11/1/2022 1329 by Abhay Mora RN  Outcome: Progressing

## 2022-11-02 NOTE — PROGRESS NOTES
PROGRESS  NOTE --                                                          INTERNAL  MEDICINE                                                                              I  PERSONALLY SAW , EXAMINED, AND CARED [facility], 11/2/2022     LABS, XRAY ,CHART, AND MEDICATIONS  REVIEWED BY ME       Chief complaint: Bilateral hearing loss, heparin bridge, chronic A. fib, history of multiple episodes of embolic CVA      [id number]: Phyllistine Copper is alert awake and cooperative; oriented ×3. Denies any chest pain dyspnea nausea emesis. Tolerating diet. No abdominal pain. Sitting quietly working on her sewing project. No complaints voiced. Denies any prior history of iron deficiency nor iron deficiency anemia. I advised her regarding current iron status as well as anemia status. Afebrile last 24 hours. Blood pressure 140/66. SPO2 95 on room air. Intake and output -255 cc. Fasting glucose 138 potassium 4.2 magnesium 2.0. CRP 0.3. Alkaline phosphatase elevated 168. Direct bilirubin 0.4 slightly elevated. Indirect 0.5. A1c 6.6. Hemoglobin 10.3 WBC 6.8. Platelet count 196. INR 1.6, currently off warfarin. ESR 16. A42 folic acid normal.  TIBC slightly elevated 354 iron and iron saturation both low with ferritin of 15. Cardiology consult from yesterday appreciated. Low gradient severe aortic stenosis nonsymptomatic by 2D echo. Patient's cardiac risk index is low. No further cardiac testing prior to surgery. Patient has been evaluated for possible TAVR procedure, by [facility] cardiology \"down the road\". Continue metoprolol 50 mg by mouth twice daily and diltiazem 240 mg daily. Avoid vasodilators such as nitroglycerin hydralazine due to severe aortic stenosis. Avoid hypotension and surgery.   Continue IV heparin hold 6 hours prior to surgery, restart 24 hours after procedure if no concern for bleeding and bridged to warfarin. 10/24/2022-patient sitting up in bed; no chest pain no dyspnea. She has been up walking the halls frequently without difficulty. Appetite remains good. Afebrile last 24 hours. Blood pressure 144/85. SPO2 96 on room air. Intake and output -705 cc. Fasting glucose 144. Potassium 4.5. Hemoglobin 10.6 WBC 7.4. Stool for occult blood negative. Pharmacy consult yesterday appreciated, they will follow postop regarding initiation of warfarin. Cardiology note from today appreciated. Continue heparin drip continue to hold warfarin. Occupational therapy AMPAC score from today 23/24. Pharmacy note from today reviewed, continue heparin drip warfarin on hold. Patient had preop chest x-ray done yesterday with following result--      FINDINGS:   The heart has upper borderline size. The CTR: 17.8/31.7 cm. There is mild   to moderate ectasia of the thoracic aorta. Lungs are normally expanded. No infiltrates, consolidations or pleural   effusions are seen. Discrete plate atelectasis are seen in the costophrenic sulcus bilaterally. There is no perihilar vascular congestion. Degenerative changes relate with spondylosis seen in the mid lower thoracic   spine. Impression:       No acute cardiopulmonary process. 10/25/2022-patient sitting up in bed; just returned from bathroom and washing. No chest pain no dyspnea appetite good feels fine. I discussed with her upcoming surgery tomorrow afternoon and cessation of heparin tomorrow morning. Afebrile last 24 hours. Blood pressure 132/74. SPO2 96 on room air. Intake and output -1405 cc. Glucose 137 fasting. Hemoglobin 10.7 WBC 6.2. Cardiology note from today unchanged, continue heparin drip warfarin on hold. INR 1.6. Pharmacy note from today unchanged. 10/26/2022-no reported chest pain or dyspnea, patient just leaving for surgery. Appetite good. Afebrile last 24 hours. Blood pressure 128/85.   SPO2 96 on room air. Intake and output +54 cc. Fasting glucose 153 protein 6.2. Hemoglobin 11.0 WBC 6.7. Cardiology note from today appreciated. Heparin drip on hold due to surgery today. 10/31/2022-sitting up in bed, no chest pain no dyspnea. Only complaint she is hoping for discharge soon, INR still subtherapeutic. Appetite good. [facility] was covering in my absence. Afebrile last 24 hours. Blood pressure 136/66. SPO2 97 on room air. Intake and output -2402 cc. Fasting glucose 150. Protein 6.3. Alkaline phosphatase 124. WBC 9.0 hemoglobin 11.7. INR still low at 1.3. Patient underwent right cochlear implant, free tissue graft, facial nerve monitoring, intraoperative neuro response testing all done on 10/26/2022 without difficulty. She is been followed by audiology since then. She was then placed on azithromycin prophylactically x5 days. Heparin infusion was restarted afternoon of 10/27 along with warfarin that evening. ENT note from today, incision clean and dry no ear drainage. Patient hoping for discharge soon. Pharmacy note from today, INR 1.5 administer 3 mg warfarin this evening.;  Patient received 3 mg by mouth yesterday evening. 11/1/2022-patient sitting up in bed; no chest pain no dyspnea. Still hard of hearing; states she has some mild dizziness today which she was told to expect following cochlear implant. She has been walking without difficulty between bed and the bathroom she is avoiding walking in the halls. Afebrile last 24 hours. Blood pressure 138/86. SPO2 97 on room air. Intake and output -1690 cc. Fasting glucose 198. Hemoglobin 11.4 WBC 9.5. INR 1.6. Pharmacy note from today, continue heparin, warfarin 3 mg tonight. ENT note from today appreciated. Patient stable from surgical standpoint incision clean dry and intact no ear drainage. 11/2/2022-patient sitting up in bed; no chest pain no dyspnea. Dizziness has improved. Appetite good.   She is hoping for discharge today. She now informs me intake med reconciliation is erroneous; she is currently on 1.5 mg of warfarin daily not an alternating dose as listed in the intake med rec. I advised her today she will take 2 mg warfarin this evening; starting tomorrow she will take her normal 1.5 mg daily. Afebrile last 24 hours. Blood pressure 139/56. Pulse 99. SPO2 94 on room air. Intake and output -2115 cc. Fasting glucose 173 remainder of BMP and hepatic functions normal.  Hemoglobin 11.4 WBC 8.0. INR finally therapeutic at 2.0. Pharmacy note from today appreciated, warfarin 2 mg tonight.     Objective:     PHYSICAL EXAM:    VS: BP (!) 139/56   Pulse 99   Temp 98 °F (36.7 °C) (Oral)   Resp 16   Ht 5' 3\" (1.6 m)   Wt 138 lb 0.1 oz (62.6 kg)   SpO2 94%   BMI 24.45 kg/m²     Labs:   CBC:   Lab Results   Component Value Date/Time    WBC 8.0 11/02/2022 01:57 AM    RBC 4.08 11/02/2022 01:57 AM    HGB 11.4 11/02/2022 01:57 AM    HCT 36.5 11/02/2022 01:57 AM    MCV 89.5 11/02/2022 01:57 AM    MCH 27.9 11/02/2022 01:57 AM    MCHC 31.2 11/02/2022 01:57 AM    RDW 17.4 11/02/2022 01:57 AM     11/02/2022 01:57 AM    MPV 9.2 11/02/2022 01:57 AM     CBC with Differential:    Lab Results   Component Value Date/Time    WBC 8.0 11/02/2022 01:57 AM    RBC 4.08 11/02/2022 01:57 AM    HGB 11.4 11/02/2022 01:57 AM    HCT 36.5 11/02/2022 01:57 AM     11/02/2022 01:57 AM    MCV 89.5 11/02/2022 01:57 AM    MCH 27.9 11/02/2022 01:57 AM    MCHC 31.2 11/02/2022 01:57 AM    RDW 17.4 11/02/2022 01:57 AM    SEGSPCT 76 02/27/2013 12:00 PM    LYMPHOPCT 26.4 11/02/2022 01:57 AM    MONOPCT 9.5 11/02/2022 01:57 AM    BASOPCT 0.3 11/02/2022 01:57 AM    MONOSABS 0.76 11/02/2022 01:57 AM    LYMPHSABS 2.10 11/02/2022 01:57 AM    EOSABS 0.12 11/02/2022 01:57 AM    BASOSABS 0.02 11/02/2022 01:57 AM     Hemoglobin/Hematocrit:    Lab Results   Component Value Date/Time    HGB 11.4 11/02/2022 01:57 AM    HCT 36.5 11/02/2022 01:57 AM CMP:    Lab Results   Component Value Date/Time     11/02/2022 01:57 AM    K 4.8 11/02/2022 01:57 AM    K 3.9 10/21/2022 06:10 PM    CL 99 11/02/2022 01:57 AM    CO2 26 11/02/2022 01:57 AM    BUN 12 11/02/2022 01:57 AM    CREATININE 0.9 11/02/2022 01:57 AM    GFRAA >60 09/30/2015 06:40 AM    LABGLOM >60 11/02/2022 01:57 AM    GLUCOSE 173 11/02/2022 01:57 AM    PROT 6.5 11/02/2022 01:57 AM    LABALBU 3.6 11/02/2022 01:57 AM    CALCIUM 9.9 11/02/2022 01:57 AM    BILITOT 0.7 11/02/2022 01:57 AM    ALKPHOS 102 11/02/2022 01:57 AM    AST 14 11/02/2022 01:57 AM    ALT 9 11/02/2022 01:57 AM     BMP:    Lab Results   Component Value Date/Time     11/02/2022 01:57 AM    K 4.8 11/02/2022 01:57 AM    K 3.9 10/21/2022 06:10 PM    CL 99 11/02/2022 01:57 AM    CO2 26 11/02/2022 01:57 AM    BUN 12 11/02/2022 01:57 AM    LABALBU 3.6 11/02/2022 01:57 AM    CREATININE 0.9 11/02/2022 01:57 AM    CALCIUM 9.9 11/02/2022 01:57 AM    GFRAA >60 09/30/2015 06:40 AM    LABGLOM >60 11/02/2022 01:57 AM    GLUCOSE 173 11/02/2022 01:57 AM     Hepatic Function Panel:    Lab Results   Component Value Date/Time    ALKPHOS 102 11/02/2022 01:57 AM    ALT 9 11/02/2022 01:57 AM    AST 14 11/02/2022 01:57 AM    PROT 6.5 11/02/2022 01:57 AM    BILITOT 0.7 11/02/2022 01:57 AM    BILIDIR 0.3 11/02/2022 01:57 AM    IBILI 0.4 11/02/2022 01:57 AM    LABALBU 3.6 11/02/2022 01:57 AM     Ionized Calcium:  No results found for: IONCA  Magnesium:    Lab Results   Component Value Date/Time    MG 2.0 11/02/2022 01:57 AM     Phosphorus:    Lab Results   Component Value Date/Time    PHOS 3.8 11/02/2022 01:57 AM     LDH:  No results found for: LDH  Uric Acid:    Lab Results   Component Value Date/Time    LABURIC 5.7 10/22/2022 01:19 AM     PT/INR:    Lab Results   Component Value Date/Time    PROTIME 21.7 11/02/2022 01:57 AM    INR 2.0 11/02/2022 01:57 AM     Warfarin PT/INR:  No components found for: PTPATWAR, PTINRWAR  PTT:    Lab Results   Component Value Date/Time    APTT 71.2 11/02/2022 01:57 AM   [APTT}  Troponin:    Lab Results   Component Value Date/Time    TROPONINI <0.01 09/29/2015 03:25 PM     Last 3 Troponin:    Lab Results   Component Value Date/Time    TROPONINI <0.01 09/29/2015 03:25 PM    TROPONINI 0.05 03/21/2013 11:45 PM     U/A:    Lab Results   Component Value Date/Time    COLORU Yellow 09/29/2015 03:40 PM    PROTEINU Negative 09/29/2015 03:40 PM    PHUR 5.5 09/29/2015 03:40 PM    [address] NONE 09/29/2015 03:40 PM    RBCUA 1-3 09/29/2015 03:40 PM    BACTERIA NONE 09/29/2015 03:40 PM    CLARITYU Clear 09/29/2015 03:40 PM    SPECGRAV 1.010 09/29/2015 03:40 PM    LEUKOCYTESUR Negative 09/29/2015 03:40 PM    UROBILINOGEN 0.2 09/29/2015 03:40 PM    BILIRUBINUR Negative 09/29/2015 03:40 PM    BLOODU TRACE 09/29/2015 03:40 PM    GLUCOSEU Negative 09/29/2015 03:40 PM     HgBA1c:    Lab Results   Component Value Date/Time    LABA1C 6.1 10/22/2022 01:19 AM     FLP:    Lab Results   Component Value Date/Time    TRIG 69 10/22/2022 01:19 AM    HDL 42 10/22/2022 01:19 AM    LDLCALC 77 10/22/2022 01:19 AM    LABVLDL 14 10/22/2022 01:19 AM     TSH:    Lab Results   Component Value Date/Time    TSH 1.490 10/22/2022 01:19 AM     VITAMIN B12: No components found for: B12  FOLATE:    Lab Results   Component Value Date/Time    FOLATE 15.8 10/22/2022 01:19 AM     IRON:    Lab Results   Component Value Date/Time    IRON 28 10/22/2022 01:19 AM     Iron Saturation:  No components found for: PERCENTFE  TIBC:    Lab Results   Component Value Date/Time    TIBC 354 10/22/2022 01:19 AM     FERRITIN:    Lab Results   Component Value Date/Time    FERRITIN 15 10/22/2022 01:19 AM        General appearance: Alert, Awake, Oriented times 3, no distress; significantly hard of hearing  Skin: Warm and dry ; no rashes  Head: Normocephalic. No masses, lesions or tenderness noted; right parietal occipital area dry with no drainage. Eyes: Conjunctivae pink, sclera white.  PERRL,EOM-I  Ears: External ears normal  Nose/Sinuses: Nares normal. Septum midline. Mucosa normal. No drainage  Oropharynx: Oropharynx clear with no exudate seen  Neck: Supple. No jugular venous distension, lymphadenopathy or thyromegaly Trachea midline  Lungs: Clear to auscultation bilaterally. No rhonchi, crackles or wheezes  Heart: Irregularly irregular at 95 /min; grade 1/6 to 2/6 systolic murmur second right intercostal space  Abdomen: Soft, non-tender. BS normal. No masses, organomegaly; no rebound or guarding  Extremities: Trace edema bilaterally; moderate varicosities both extremities  Musculoskeletal: Muscular strength appears intact. Neuro:  No focal motor defects ; II-XII grossly intact . 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 (HCC)    Severe aortic stenosis    Hearing loss    Diabetes mellitus (HCC)    Pre-op evaluation    Iron deficiency anemia    S/P ear surgery    History of transient ischemic attack (TIA)    Preoperative cardiovascular examination    Hypertension    DJD (degenerative joint disease)    Atrial fibrillation (HCC)    Mitral regurgitation    Pulmonary hypertension (HCC)    Profound hearing loss  Resolved Problems: Moderate aortic stenosis by prior echocardiogram      PLAN:  SEE ORDERS      RE  CHANGES AND FINDINGS   Medications reviewed with patient  GI prophylaxis  DVT prophylaxis  Consultants notes reviewed    Stool for occult blood  Add ferrous sulfate 325 mg twice daily  Preop PA lateral chest x-ray--I advised patient regarding same  Continue IV heparin drip, hold 6 hours prior to surgery restart 24 hours after procedure if no concern for bleeding and bridged to warfarin; remain off warfarin at this time.   I advised patient regarding same  Avoid hypotension, vasodilators such as nitroglycerin and hydralazine due to severe aortic stenosis  Continue metoprolol tartrate 50 mg twice daily continue diltiazem 240 mg CD once daily  Hold aspirin restart after procedure  Continue pravastatin  Since glucose numbers have been so good, change glucose checks to each a.m.   Furosemide 20 mg daily  Niacin 500 mg twice daily  Protonix 40 mg 2 times daily  Potassium chloride 10 mEq 2 times daily  Pravastatin 20 mg daily  Pharmacy will consult regarding initiation of warfarin after surgery, patient had been on 1.5 mg warfarin daily prior to prehospitalization  10/24/2022  Heparin drip continues  Warfarin and aspirin on hold  Diltiazem 240 mg daily  Ferrous sulfate 325 mg twice daily  Furosemide 20 mg daily  Metoprolol tartrate 50 mg twice daily  Potassium chloride 10 mEq twice daily  Protonix 40 mg twice daily  10/25/2022  Heparin drip continues  Heparin will stop 6 hours prior to surgical procedure  Heparin will restart 24 hours after surgery if okay with ENT and no bleeding  Warfarin will be started by pharmacy after surgery  Ferrous sulfate 325 mg twice daily  Furosemide 20 mg daily  Potassium chloride 10 mEq twice daily by mouth  10/26/2022  Diltiazem  mg daily  Aspirin Heparin on hold  Metroprolol tartrate 50 mg twice daily  Furosemide 20 mg daily  Potassium chloride 10 mEq by mouth twice daily  Ferrous sulfate 3 and 25 mg twice daily  Patient for surgery today  10/31/2022  Heparin drip continues  Warfarin 3 mg this evening aspirin remains on hold  Azithromycin 250 mg daily has completed x5 days  Diltiazem  mg daily  Ferrous sulfate 325 mg twice daily  Metoprolol tartrate 50 mg twice daily  Furosemide 20 mg daily  Potassium chloride 10 mEq by mouth twice daily  Pravastatin 20 mg daily  Protonix 40 mg daily  Discontinue heparin drip when INR is 2.0 or greater and discharge to home  11/1/2022  Warfarin 3 mg this evening  Continue heparin drip until warfarin therapeutic  Diltiazem CD, 240 mg daily  Metroprolol tartrate 50 mg twice daily  Ferrous sulfate 325 mg twice daily  Furosemide 20 mg daily  Potassium chloride 10 mEq by mouth twice daily  Discharge home when INR therapeutic  11/2/2022  Discharge home today  IV heparin has been discontinued  Resume prehospital warfarin dosage, 1.5 mg daily beginning tomorrow  Warfarin 2 mg this evening then return to prehospital schedule  Follow-up ENT per their instructions  Follow-up Dr. Marino Peter 1 week  Furosemide 20 mg daily  Potassium chloride 10 mEq by mouth twice daily  Diltiazem CD, 240 mg daily  Metoprolol tartrate 50 mg twice daily  Ferrous sulfate 325 mg twice daily x1 month  Aspirin 81 mg daily  Restart metformin 500 mg by mouth daily  Pravastatin 40 mg daily  Restart various supplements  Slow niacin 500 mg by mouth twice daily  Omeprazole 20 mg by mouth twice daily      Discussed with patient and nursing. Radha Pelaez DO     12:33 PM     11/2/2022    TIME > 35 MINUTES    >  50 %  OF  TIME  DISCUSSION               ------------  INFORMATION  -----------      DIET:ADULT DIET; Regular; 5 carb choices (75 gm/meal);  Low Fat/Low Chol/High Fiber/EDIE; Gluten Free        Allergies   Allergen Reactions    Gluten Other (See Comments)         MEDICATION SIDE EFFECTS:none       SCHEDULED MEDS:                                 Scheduled Meds:   warfarin  2 mg Oral Once    bacitracin zinc   Topical BID    warfarin placeholder: dosing by pharmacy   Other RX Placeholder    sodium chloride flush  5-40 mL IntraVENous 2 times per day    ferrous sulfate  325 mg Oral BID WC    multivitamin  1 tablet Oral Daily    [Held by provider] aspirin  81 mg Oral Daily    dilTIAZem  240 mg Oral Daily    metoprolol  50 mg Oral BID    niacin  500 mg Oral BID    pravastatin  20 mg Oral Daily    pantoprazole  40 mg Oral BID AC    furosemide  20 mg Oral Daily    potassium chloride  10 mEq Oral BID WC       Continuous Infusions:   sodium chloride      dextrose      heparin (PORCINE) Infusion 14.009 Units/kg/hr (11/01/22 1516)         Data:       Intake/Output Summary (Last 24 hours) at 11/2/2022 1233  Last data filed at 11/2/2022 1101  Gross per 24 hour   Intake 375.05 ml   Output 800 ml   Net -424.95 ml       Wt Readings from Last 3 Encounters:   11/02/22 138 lb 0.1 oz (62.6 kg)   09/15/22 153 lb (69.4 kg)   09/01/22 153 lb (69.4 kg)       Labs: Additional    GLUCOSE:No results for input(s): POCGLU in the last 72 hours. BNP:  Lab Results   Component Value Date     (H) 03/21/2013       CRP:   No results for input(s): CRP in the last 72 hours. ESR:  No results for input(s): SEDRATE in the last 72 hours. RADIOLOGY: REVIEWED AVAILABLE REPORT  XR CHEST (2 VW)   Final Result   No acute cardiopulmonary process.                    Luis Eduardo Lofton DO    12:33 PM     11/2/2022      Voice recognition software used for dictation

## 2022-11-02 NOTE — PROGRESS NOTES
CLINICAL PHARMACY NOTE: MEDS TO BEDS    Total # of Prescriptions Filled: 1   The following medications were delivered to the patient:  [facility] [place] [address]    Additional Documentation:

## 2022-11-06 NOTE — PROGRESS NOTES
CC:   Chief Complaint   Patient presents with    Follow-up     Ci activation Pt states her CI causes some dizziness and sometimes Pt doesn't see rt      Estephanie Ny is a 78 y.o. female is s/p right CI 10/26/2022 with  with an uneventful postsurgical course; she did have a planned preadmission and postadmission due to her need to remain on anticoagulation to prevent strokes; she has had some unsteadiness since surgery but is moving around carefully; states she does not have any spinning;  presented with asymmetric sensorineural hearing loss with recent worsening on the right; she brought her prior imaging which I reviewed and will be loaded in the system; She reports that she had hearing aids in both ears and then in April 2022 developed a sudden right hearing loss and is unable to get any benefit from her right ear. She has been concerned about losing the hearing in her left ear. She struggles with communication and states she is louder at home. She is unable to go to places that are noisy and  is concerned her hearing loss is affecting her cognitive ability. PAST MEDICAL HISTORY:   Past Medical History:   Diagnosis Date    Aortic stenosis, mild 10/01/2015    follows  Dr Saucedo  at [facility] last visit 9/30/22    Arrhythmia     Atrial fibrillation ([address]     Bleeding from varicose veins of right lower extremity 06/08/2017    Cerebrovascular accident (CVA) due to embolic occlusion of left middle cerebral artery ([address] 10/2015    Confirmed by neurology    Cerebrovascular accident (CVA) due to embolic occlusion of left middle cerebral artery ([address] 2013    Left parietal confirmed by neurology    Cerebrovascular disease 03/21/2013    CVA. no weakness/deficits    Diabetes mellitus ([address]     Hearing deficit     wears hearing aide left ear only    Heart disease     Hyperlipidemia     Hypertension     Iron deficiency anemia 10/23/2022    Migraine headache with aura     Mitral regurgitation 10/01/2015    mild Moderate aortic stenosis by prior echocardiogram 2018    NCGS (non-celiac gluten sensitivity)     Severe aortic stenosis 09/25/2020    By 2D echo    TIA (transient ischemic attack)     Unspecified cerebral artery occlusion with cerebral infarction     Varicose veins of left leg with edema 06/08/2017    Venous stasis ulcer of right calf with fat layer exposed with varicose veins (HCC) 09/13/2019    Warfarin-induced coagulopathy ([address] 09/29/2015        PAST SURGICAL HISTORY:   Past Surgical History:   Procedure Laterality Date    ABDOMEN SURGERY      APPENDECTOMY      BREAST SURGERY Right     biopsy-benign    COCHLEAR IMPLANT Right 10/26/2022    RIGHT COCHLEAR IMPLANT INSERTION WITH NERVE INTEGRITY MONITOR performed by Diaz Camacho MD at [address] [place] [address]  01/01/2008    ECHOCARDIOGRAM COMPLETE 2D W DOPPLER W COLOR  08/30/2012 9/30/22 at [facility]    INNER EAR SURGERY Left     Multiple surgeries    OVARY SURGERY      SMALL INTESTINE SURGERY      VEIN SURGERY          SOCIAL HISTORY:   Social History     Socioeconomic History    Marital status:      Spouse name: Not on file    Number of children: Not on file    Years of education: Not on file    Highest education level: Not on file   Occupational History    Not on file   Tobacco Use    Smoking status: Never    Smokeless tobacco: Never   Vaping Use    Vaping Use: Never used   Substance and Sexual Activity    Alcohol use: No    Drug use: No    Sexual activity: Not on file   Other Topics Concern    Not on file   Social History Narrative    Not on file     Social Determinants of Health     Financial Resource Strain: Not on file   Food Insecurity: Not on file   Transportation Needs: Not on file   Physical Activity: Not on file   Stress: Not on file   Social Connections: Not on file   Intimate Partner Violence: Not on file   Housing Stability: Not on file        TOBACCO  Social History     Tobacco Use   Smoking Status Never   Smokeless Tobacco Never ALCOHOL   Social History     Substance and Sexual Activity   Alcohol Use No        [address]   Social History     Substance and Sexual Activity   Drug Use No         CURRENT OUTPATIENT MEDICATIONS:   Outpatient Medications Marked as Taking for the 11/10/22 encounter (Office Visit) with Ruben Strong MD   Medication Sig Dispense Refill    dilTIAZem (CARDIZEM CD) 240 MG extended release capsule take 1 capsule by mouth once daily      meclizine (ANTIVERT) 12.5 MG tablet take 1 tablet by mouth three times a day if needed for dizziness      ferrous sulfate (IRON 325) 325 (65 Fe) MG tablet Take 1 tablet by mouth 2 times daily (with meals) 60 tablet 0    warfarin (COUMADIN) 1 MG tablet 1.5 mg warfarin daily beginning tomorrow 11/3/2022 30 tablet 3    dilTIAZem (DILACOR XR) 240 MG extended release capsule Take 240 mg by mouth in the morning. aspirin 81 MG EC tablet Take 81 mg by mouth daily      potassium chloride (KLOR-CON) 10 MEQ extended release tablet take 1 tablet by mouth twice a day  0    furosemide (LASIX) 20 MG tablet take 1 tablet by mouth once daily  0    ACCU-CHEK JR PLUS strip   0    calcium carbonate-vitamin D3 (CALTRATE) 600-400 MG-UNIT TABS per tab Take by mouth      pravastatin (PRAVACHOL) 40 MG tablet Take 20 mg by mouth daily. niacin (SLO-NIACIN) 500 MG tablet Take 500 mg by mouth 2 times daily. omeprazole (PRILOSEC) 20 MG capsule Take 20 mg by mouth 2 times daily. therapeutic multivitamin-minerals (THERAGRAN-M) tablet Take 1 tablet by mouth daily. Vitamin D (CHOLECALCIFEROL) 1000 UNITS CAPS capsule Take 600 Units by mouth daily       metFORMIN (GLUCOPHAGE) 500 MG tablet Take 500 mg by mouth daily (with breakfast). Chromium-Cinnamon (CINNAMON PLUS CHROMIUM PO) Take 2 tablets by mouth Daily with supper. metoprolol (LOPRESSOR) 100 MG tablet Take 50 mg by mouth 2 times daily.           ALLERGIES:   Allergies   Allergen Reactions    Gluten Other (See Comments) ROS: I have reviewed the patient's medical history in detail; there are no changes to the history as noted in the electronic medical record. Exam: /71   Resp 12   Ht 5' 3\" (1.6 m)   Wt 145 lb (65.8 kg)   BMI 25.69 kg/m²   Larisa Guerrero is a 78 y.o. female  appears well, in no apparent distress. Alert and oriented times three, pleasant and cooperative. Vital signs are as documented in vital signs section. Breathing comfortably, without stertor or stridor  Ear exam: right well healed postauricular incision External ears normal. Canals clear. TM left normal. Right TM with serosanguinous effusion, not infected  No nasal lesions, no erythema  No oral lesions.    There is no palpable adenopathy or tenderness    Lab Results   Component Value Date/Time    WBC 8.0 11/02/2022 01:57 AM    HGB 11.4 (L) 11/02/2022 01:57 AM    HCT 36.5 11/02/2022 01:57 AM     11/02/2022 01:57 AM    MCV 89.5 11/02/2022 01:57 AM    MCH 27.9 11/02/2022 01:57 AM    MCHC 31.2 (L) 11/02/2022 01:57 AM    RDW 17.4 (H) 11/02/2022 01:57 AM    NEUTOPHILPCT 61.9 11/02/2022 01:57 AM    LYMPHOPCT 26.4 11/02/2022 01:57 AM    MONOPCT 9.5 11/02/2022 01:57 AM    EOSRELPCT 1.5 11/02/2022 01:57 AM    BASOPCT 0.3 11/02/2022 01:57 AM    NEUTROABS 4.93 11/02/2022 01:57 AM    LYMPHSABS 2.10 11/02/2022 01:57 AM    EOSABS 0.12 11/02/2022 01:57 AM     A/P:     Larisa Guerrero is a 78 y.o. female is s/p right CI 10/26/2022 with   Activation today went well  Continue with careful activity, unsteadiness should improve  Serosanguinous effusion will take longer to resolve due to anticoagulation  Wear the CI and hearing aid all waking hours, except for 1 hour a day to work with just the CI  Speech to start in 2 weeks  Follow up in 3 months  Can otherwise resume all normal activity    Noelle James MD 11/6/22 9:35 AM EST  Director Otology and Cochlear Implant Programs

## 2022-11-10 ENCOUNTER — TELEPHONE (OUTPATIENT)
Dept: ENT CLINIC | Age: 77
End: 2022-11-10

## 2022-11-10 ENCOUNTER — PROCEDURE VISIT (OUTPATIENT)
Dept: AUDIOLOGY | Age: 77
End: 2022-11-10
Payer: MEDICARE

## 2022-11-10 ENCOUNTER — OFFICE VISIT (OUTPATIENT)
Dept: ENT CLINIC | Age: 77
End: 2022-11-10

## 2022-11-10 VITALS
BODY MASS INDEX: 25.69 KG/M2 | DIASTOLIC BLOOD PRESSURE: 71 MMHG | HEIGHT: 63 IN | RESPIRATION RATE: 12 BRPM | WEIGHT: 145 LBS | SYSTOLIC BLOOD PRESSURE: 112 MMHG

## 2022-11-10 DIAGNOSIS — H91.8X9 ASYMMETRICAL HEARING LOSS: Primary | ICD-10-CM

## 2022-11-10 DIAGNOSIS — H90.3 ASNHL (ASYMMETRICAL SENSORINEURAL HEARING LOSS): ICD-10-CM

## 2022-11-10 DIAGNOSIS — F80.9 COMMUNICATION PROBLEM: ICD-10-CM

## 2022-11-10 DIAGNOSIS — H91.91 PROFOUND HEARING LOSS OF RIGHT EAR: ICD-10-CM

## 2022-11-10 DIAGNOSIS — Z96.21 COCHLEAR IMPLANT IN PLACE: Primary | ICD-10-CM

## 2022-11-10 PROCEDURE — 92603 COCHLEAR IMPLT F/UP EXAM 7/>: CPT | Performed by: AUDIOLOGIST

## 2022-11-10 PROCEDURE — 99024 POSTOP FOLLOW-UP VISIT: CPT | Performed by: OTOLARYNGOLOGY

## 2022-11-10 RX ORDER — MECLIZINE HCL 12.5 MG/1
TABLET ORAL
COMMUNITY
Start: 2022-08-12

## 2022-11-10 RX ORDER — DILTIAZEM HYDROCHLORIDE 240 MG/1
CAPSULE, COATED, EXTENDED RELEASE ORAL
COMMUNITY
Start: 2022-11-02

## 2022-11-10 NOTE — PROGRESS NOTES
Patient was seen for CI activation, after medical clearance. SN: [id number], magnet strength: 3. Impedances were good. Created new MAP with population based means. Increased until audible and comfortable. Patient reported bubbling sounds, but stated that she could hear unclear voices. Patient was satisfied with loudness and comfort. Reviewed progressive levels/MAPs. Reviewed battery usage and charging. Practiced putting processor on/taking processor off. Battery estimation was 14 hours. Gave patient Cochlear recipient handouts, expectations sheet, and aural rehab information.     Patient will follow up 11/28/22 at 1 PM.    Deon Patel   Electronically signed by Aggie Patel on 11/10/2022 at 2:49 PM

## 2022-11-10 NOTE — TELEPHONE ENCOUNTER
Called and spoke with Norm Chance at [facility] a prior authorization is not required outpatient setting Ref#[id number]. Patient is good for speech therapy 30+ visits.

## 2022-11-14 ENCOUNTER — TELEPHONE (OUTPATIENT)
Dept: AUDIOLOGY | Age: 77
End: 2022-11-14

## 2022-11-14 NOTE — TELEPHONE ENCOUNTER
Patient called and left voicemail. Called back, patient reported that she figured out the issue. She reported that she put the processor on and heard no sound, but she and  \"messed around with it\" until they saw the light flash on. She also asked if she should keep extra battery on  all the time. Told her she can switch back and forth with batteries. She also reported doing homework every day. Patient reported she is still dizzy. She said it is not getting better or worse. She reported feeling okay until she moves, then feeling very dizzy. Told her I will pass message on to Dr Lucy Gross and she will follow up if needed. Patient did not request a call back, if not necessary. She reported being prescribed a new medication, Ferosul. She also reported that she will see her PCP 11/21. She will follow up 11/28.

## 2022-11-15 NOTE — PROGRESS NOTES
CC:   Chief Complaint   Patient presents with    Follow-up     F/U dizziness/light headed since in-patient     Jenny Connors is a 78 y.o. female is s/p right CI 10/26/2022 with  with an uneventful postsurgical course but has been having some prolonged unsteady/dizziness and was noted to have a serosanguinous effusion at the last visit; she did have a planned preadmission and postadmission due to her need to remain on anticoagulation to prevent strokes; she has had some unsteadiness since surgery but is moving around carefully; states she does not have any spinning;  presented with asymmetric sensorineural hearing loss with recent worsening on the right; she brought her prior imaging which I reviewed and will be loaded in the system; She reports that she had hearing aids in both ears and then in April 2022 developed a sudden right hearing loss and is unable to get any benefit from her right ear. She has been concerned about losing the hearing in her left ear. She struggles with communication and states she is louder at home. She is unable to go to places that are noisy and  is concerned her hearing loss is affecting her cognitive ability     PAST MEDICAL HISTORY:   Past Medical History:   Diagnosis Date    Aortic stenosis, mild 10/01/2015    follows  Dr Nia Springer at [facility] last visit 9/30/22    Arrhythmia     Atrial fibrillation ([address]     Bleeding from varicose veins of right lower extremity 06/08/2017    Cerebrovascular accident (CVA) due to embolic occlusion of left middle cerebral artery ([address] 10/2015    Confirmed by neurology    Cerebrovascular accident (CVA) due to embolic occlusion of left middle cerebral artery ([address] 2013    Left parietal confirmed by neurology    Cerebrovascular disease 03/21/2013    CVA. no weakness/deficits    Diabetes mellitus ([address]     Hearing deficit     wears hearing aide left ear only    Heart disease     Hyperlipidemia     Hypertension     Iron deficiency anemia 10/23/2022 Migraine headache with aura     Mitral regurgitation 10/01/2015    mild    Moderate aortic stenosis by prior echocardiogram 2018    NCGS (non-celiac gluten sensitivity)     Severe aortic stenosis 09/25/2020    By 2D echo    TIA (transient ischemic attack)     Unspecified cerebral artery occlusion with cerebral infarction     Varicose veins of left leg with edema 06/08/2017    Venous stasis ulcer of right calf with fat layer exposed with varicose veins (HCC) 09/13/2019    Warfarin-induced coagulopathy ([address] 09/29/2015        PAST SURGICAL HISTORY:   Past Surgical History:   Procedure Laterality Date    ABDOMEN SURGERY      APPENDECTOMY      BREAST SURGERY Right     biopsy-benign    COCHLEAR IMPLANT Right 10/26/2022    RIGHT COCHLEAR IMPLANT INSERTION WITH NERVE INTEGRITY MONITOR performed by Veronica Sierra MD at [address] [facility]  01/01/2008    ECHOCARDIOGRAM COMPLETE 2D W DOPPLER W COLOR  08/30/2012 9/30/22 at [facility]    INNER EAR SURGERY Left     Multiple surgeries    OVARY SURGERY      SMALL INTESTINE SURGERY      VEIN SURGERY          SOCIAL HISTORY:   Social History     Socioeconomic History    Marital status:      Spouse name: Not on file    Number of children: Not on file    Years of education: Not on file    Highest education level: Not on file   Occupational History    Not on file   Tobacco Use    Smoking status: Never    Smokeless tobacco: Never   Vaping Use    Vaping Use: Never used   Substance and Sexual Activity    Alcohol use: No    Drug use: No    Sexual activity: Not on file   Other Topics Concern    Not on file   Social History Narrative    Not on file     Social Determinants of Health     Financial Resource Strain: Not on file   Food Insecurity: Not on file   Transportation Needs: Not on file   Physical Activity: Not on file   Stress: Not on file   Social Connections: Not on file   Intimate Partner Violence: Not on file   Housing Stability: Not on file        TOBACCO  Social History Tobacco Use   Smoking Status Never   Smokeless Tobacco Never        ALCOHOL   Social History     Substance and Sexual Activity   Alcohol Use No        [address]   Social History     Substance and Sexual Activity   Drug Use No         CURRENT OUTPATIENT MEDICATIONS:   Outpatient Medications Marked as Taking for the 11/17/22 encounter (Office Visit) with Bean Elaine MD   Medication Sig Dispense Refill    dilTIAZem (CARDIZEM CD) 240 MG extended release capsule take 1 capsule by mouth once daily      meclizine (ANTIVERT) 12.5 MG tablet take 1 tablet by mouth three times a day if needed for dizziness      ferrous sulfate (IRON 325) 325 (65 Fe) MG tablet Take 1 tablet by mouth 2 times daily (with meals) 60 tablet 0    warfarin (COUMADIN) 1 MG tablet 1.5 mg warfarin daily beginning tomorrow 11/3/2022 30 tablet 3    dilTIAZem (DILACOR XR) 240 MG extended release capsule Take 240 mg by mouth in the morning. aspirin 81 MG EC tablet Take 81 mg by mouth daily      potassium chloride (KLOR-CON) 10 MEQ extended release tablet take 1 tablet by mouth twice a day  0    furosemide (LASIX) 20 MG tablet take 1 tablet by mouth once daily  0    ACCU-CHEK JR PLUS strip   0    calcium carbonate-vitamin D3 (CALTRATE) 600-400 MG-UNIT TABS per tab Take by mouth      pravastatin (PRAVACHOL) 40 MG tablet Take 20 mg by mouth daily. niacin (SLO-NIACIN) 500 MG tablet Take 500 mg by mouth 2 times daily. omeprazole (PRILOSEC) 20 MG capsule Take 20 mg by mouth 2 times daily. therapeutic multivitamin-minerals (THERAGRAN-M) tablet Take 1 tablet by mouth daily. Vitamin D (CHOLECALCIFEROL) 1000 UNITS CAPS capsule Take 600 Units by mouth daily       metFORMIN (GLUCOPHAGE) 500 MG tablet Take 500 mg by mouth daily (with breakfast). Chromium-Cinnamon (CINNAMON PLUS CHROMIUM PO) Take 2 tablets by mouth Daily with supper. metoprolol (LOPRESSOR) 100 MG tablet Take 50 mg by mouth 2 times daily.           ALLERGIES: Allergies   Allergen Reactions    Gluten Other (See Comments)       ROS: I have reviewed the patient's medical history in detail; there are no changes to the history as noted in the electronic medical record. Exam: /70   Pulse 59   Ht 5' 3\" (1.6 m)   Wt 145 lb (65.8 kg)   BMI 25.69 kg/m²   Jean-Claude La is a 78 y.o. female  appears well, in no apparent distress. Alert and oriented times three, pleasant and cooperative. Vital signs are as documented in vital signs section.   Breathing comfortably, without stertor or stridor  Ear exam: well healed right postauricular incision, right CI and left hearing aid  Noted to have near resolution of the serosanguinous effusion    Lab Results   Component Value Date/Time    WBC 8.0 11/02/2022 01:57 AM    HGB 11.4 (L) 11/02/2022 01:57 AM    HCT 36.5 11/02/2022 01:57 AM     11/02/2022 01:57 AM    MCV 89.5 11/02/2022 01:57 AM    MCH 27.9 11/02/2022 01:57 AM    MCHC 31.2 (L) 11/02/2022 01:57 AM    RDW 17.4 (H) 11/02/2022 01:57 AM    NEUTOPHILPCT 61.9 11/02/2022 01:57 AM    LYMPHOPCT 26.4 11/02/2022 01:57 AM    MONOPCT 9.5 11/02/2022 01:57 AM    EOSRELPCT 1.5 11/02/2022 01:57 AM    BASOPCT 0.3 11/02/2022 01:57 AM    NEUTROABS 4.93 11/02/2022 01:57 AM    LYMPHSABS 2.10 11/02/2022 01:57 AM    EOSABS 0.12 11/02/2022 01:57 AM       A/P:    Jean-Claude La is a 78 y.o. female is s/p right CI 10/26/2022 with  with an uneventful postsurgical course but has been having some prolonged unsteady/dizziness and was noted to have a serosanguinous effusion at the last visit which is almost completely resolved  Recommended observation for now  Continue with activity and speech therapy as scheduled  Keep follow up appointment    Leno Pink MD 11/15/22 4:54 PM EST  Director Otology and Cochlear Implant Programs

## 2022-11-17 ENCOUNTER — OFFICE VISIT (OUTPATIENT)
Dept: ENT CLINIC | Age: 77
End: 2022-11-17

## 2022-11-17 VITALS
HEART RATE: 59 BPM | BODY MASS INDEX: 25.69 KG/M2 | DIASTOLIC BLOOD PRESSURE: 70 MMHG | HEIGHT: 63 IN | WEIGHT: 145 LBS | SYSTOLIC BLOOD PRESSURE: 128 MMHG

## 2022-11-17 DIAGNOSIS — Z96.21 COCHLEAR IMPLANT IN PLACE: Primary | ICD-10-CM

## 2022-11-17 DIAGNOSIS — H90.3 ASNHL (ASYMMETRICAL SENSORINEURAL HEARING LOSS): ICD-10-CM

## 2022-11-17 DIAGNOSIS — H91.91 PROFOUND HEARING LOSS OF RIGHT EAR: ICD-10-CM

## 2022-11-17 DIAGNOSIS — F80.9 COMMUNICATION PROBLEM: ICD-10-CM

## 2022-11-17 PROCEDURE — 99024 POSTOP FOLLOW-UP VISIT: CPT | Performed by: OTOLARYNGOLOGY

## 2022-11-21 PROBLEM — Z01.818 PRE-OP EVALUATION: Status: RESOLVED | Noted: 2022-10-22 | Resolved: 2022-11-21

## 2022-11-22 ENCOUNTER — HOSPITAL ENCOUNTER (OUTPATIENT)
Dept: SPEECH THERAPY | Age: 77
Setting detail: THERAPIES SERIES
Discharge: HOME | End: 2022-11-22
Payer: MEDICARE

## 2022-11-22 PROCEDURE — 92507 TX SP LANG VOICE COMM INDIV: CPT | Performed by: SPEECH-LANGUAGE PATHOLOGIST

## 2022-11-22 NOTE — PROGRESS NOTES
SPEECH LANGUAGE PATHOLOGY  DAILY PROGRESS NOTE        PATIENT NAME:  Dana Hargrove      :  1945          TODAY'S DATE:  2022 ROOM:  Room/bed info not found    SUBJECTIVE:  Patient seen this date for 60 minutes speech therapy session targeting speech perception. She was accompanied by her , Javon Bauer, who remained in the treatment area. Patient was pleasant and cooperative. OBJECTIVE:       Patient was implanted on 10/606212 and activated on 11/10/2022. She stated he wears his cochlear implant consistently; reported wearing CI all waking hours. Patient also wears a hearing aid in her left ear throughout the day. She reported she is  able to discriminate environmental sounds such as the furnace turning on/off, wind blowing, , and washer/dryer. She also reported she is discriminating speech as well. Patient wears just her CI for 60 minutes per day and will listening to the TV and/or read out loud to herself. Ling 6 was introduced and practiced this session. Patient detected ling-sounds at 3 feet, 6 feet, and 9 feet with the following accuracy:    3 feet: 6/6   6 feet: 6/6   9 feet: 6/6     Patient discriminated between two words that differ in syllable length achieving 100% accuracy. Patient identified the correct word given a set of 5-10 familiar words achieving 100% accuracy. Patient followed 1-step directions presented auditorily regarding a known topic achieving 100% accuracy. Patient identified the correct monosyllabic word given a set of 4-6 familiar words achieving 95% accuracy. Vowel Discrimination: in a field of 2 words, patient was to determine which word was spoken aloud. Words contained similar consonants but differed by vowels. Patient identified 100% accuracy. No repetition of stimuli.      Consonant  Discrimination:in a field of 2 words, patient was to choose the initial or final consonant that was being verbalized in a word given 2 sound options. Patient achieved 100% accuracy. Minimal Pairs (Voicing): in a field of 2 picture cards, patient was to identify which word was read aloud. Words were minimal pairs only differing by initial or final consonant. Patient achieved 100% accuracy independently. Minimal pairs (voicing) in sentences-patient discriminated between the two words within a sentence achieving 93% accuracy. Home Program: Patient was encouraged to spend 60 minutes everyday listening with just her cochlear implant. Patient was instructed to remove the hearing aid during this time. Encouraged patient to engage in activities that address tasks similar to this session (SLP discussed this with patient and patient's ) as well as that pair auditory with visual stimuli for example TV with closed captions, audio books paired with hard copy books, etc.         ASSESSMENT:  Making progress toward meeting therapy goals. PLAN:  Will continue speech pathology intervention as per established Corwin Goodpasture.  Yeny DELVALLE, CCC-SLP  SP. [id number]      CPT code(s) 06883  speech/language tx  Total minutes :  60 minutes

## 2022-11-23 ENCOUNTER — TELEPHONE (OUTPATIENT)
Dept: ENT CLINIC | Age: 77
End: 2022-11-23

## 2022-11-23 NOTE — TELEPHONE ENCOUNTER
Spoke with patient and spouse. Both stated understanding of Physical therapy as an option to help with dizzy symptoms.  states they will wait one more week to see if symptoms improve, and contact office if they feel moving forward with therapy is best for them.  requesting PT be coordinated with speech therapy  appointments.

## 2022-11-27 PROBLEM — Z01.810 PREOPERATIVE CARDIOVASCULAR EXAMINATION: Status: RESOLVED | Noted: 2022-10-28 | Resolved: 2022-11-27

## 2022-11-28 ENCOUNTER — PROCEDURE VISIT (OUTPATIENT)
Dept: AUDIOLOGY | Age: 77
End: 2022-11-28
Payer: MEDICARE

## 2022-11-28 DIAGNOSIS — H91.8X9 ASYMMETRICAL HEARING LOSS: Primary | ICD-10-CM

## 2022-11-28 DIAGNOSIS — R26.81 UNSTEADINESS: ICD-10-CM

## 2022-11-28 DIAGNOSIS — Z96.21 COCHLEAR IMPLANT IN PLACE: Primary | ICD-10-CM

## 2022-11-28 PROCEDURE — 92604 REPROGRAM COCHLEAR IMPLT 7/>: CPT | Performed by: AUDIOLOGIST

## 2022-11-28 NOTE — PROGRESS NOTES
Patient was here for CI follow up, after activation 11/10/22. Impedances were good. Magnet site looked good, changed to strength 2. Will order 1 strength (gray). Patient did report continued dizziness. She reported non-room spinning disequilibrium. She reported seeing her PCP last week, but did not discuss the dizziness. Patient also reported trying her old dizziness PT exercises once or twice. She reported one exercise did make her dizziness worse for a day, so she was hesitant to try again. Told her that Dr Andres Hsu recommended going back to formal PT    Patient reported wearing processor all waking hours. Datalogging confirmed. Patient is not using progressive levels. Patient reported watching the news for one hour a day without her hearing aid and reading aloud to herself without hearing aid. She reported men's voices are easier to understand. Increased M levels in frequency bands, loudness balanced. Patient was satisfied with loudness and comfort. Reviewed progressive levels/MAPs. Battery estimation was 18 hours. Will call patient to schedule correct follow up.     Aggie Joe [facility]  [address] [facility] [phone]   Electronically signed by Aggie Joe on 11/28/2022 at 4:18 PM

## 2022-11-30 ENCOUNTER — TELEPHONE (OUTPATIENT)
Dept: AUDIOLOGY | Age: 77
End: 2022-11-30

## 2022-11-30 DIAGNOSIS — H91.8X9 ASYMMETRICAL HEARING LOSS: Primary | ICD-10-CM

## 2022-11-30 NOTE — TELEPHONE ENCOUNTER
Patient called with questions about the volume of her processor. She reported that the amplification was fine at her programming appointment 11/28, fine on the way home, but then too loud when she got home. Discussed purpose of amplification, patient reported she would like to keep these settings. She reported that she keeps her volume at a level 1. Patient is planning to increase to program 2 on 12/5. Told her that if she goes up and it's too loud, she can stay at program 1 for a little longer. Told her that the goal is ultimately to keep increasing by her next appointment, but we can be flexible. Patient reported she will call to check in 12/5. Patient also reported she is still dizzy and is going to PT 12/6.

## 2022-12-06 ENCOUNTER — PROCEDURE VISIT (OUTPATIENT)
Dept: AUDIOLOGY | Age: 77
End: 2022-12-06
Payer: MEDICARE

## 2022-12-06 ENCOUNTER — HOSPITAL ENCOUNTER (OUTPATIENT)
Dept: SPEECH THERAPY | Age: 77
Setting detail: THERAPIES SERIES
Discharge: HOME | End: 2022-12-06
Payer: MEDICARE

## 2022-12-06 ENCOUNTER — HOSPITAL ENCOUNTER (OUTPATIENT)
Dept: PHYSICAL THERAPY | Age: 77
Setting detail: THERAPIES SERIES
Discharge: HOME | End: 2022-12-06
Payer: MEDICARE

## 2022-12-06 DIAGNOSIS — H91.8X9 ASYMMETRICAL HEARING LOSS: Primary | ICD-10-CM

## 2022-12-06 PROCEDURE — 97161 PT EVAL LOW COMPLEX 20 MIN: CPT

## 2022-12-06 PROCEDURE — 92604 REPROGRAM COCHLEAR IMPLT 7/>: CPT | Performed by: AUDIOLOGIST

## 2022-12-06 PROCEDURE — 95992 CANALITH REPOSITIONING PROC: CPT

## 2022-12-06 PROCEDURE — 92507 TX SP LANG VOICE COMM INDIV: CPT | Performed by: SPEECH-LANGUAGE PATHOLOGIST

## 2022-12-06 NOTE — PROGRESS NOTES
Physical Therapy    Physical Therapy: Initial Evaluation    Patient: Larisa Guerrero (34 y.o. female)   Examination Date:   Plan of Care Certification Period: 2022 to  3/6/2023      :  1945 ;    Confirmed: Yes MRN: [id number]  CSN: [id number]   Insurance: Payor: Kim Ellison / Plan: Weston Inadcolillyman / Product Type: *No Product type* /   Insurance ID: [id number] - (Medicare Managed) Secondary Insurance (if applicable):    Referring Physician: Caryn Carmichael MD     PCP: Kavon Ram MD Visits to Date/Visits Approved:   /      No Show/Cancelled Appts:   /       Medical Diagnosis: Cochlear implant in place [Z96.21]  Unsteadiness [R26.81]    Treatment Diagnosis:       PERTINENT MEDICAL HISTORY           Medical History:     Past Medical History:   Diagnosis Date    Aortic stenosis, mild 10/01/2015    follows  Dr Tai Mancia at [facility] [place] last visit 22    Arrhythmia     Atrial fibrillation ([address]     Bleeding from varicose veins of right lower extremity 2017    Cerebrovascular accident (CVA) due to embolic occlusion of left middle cerebral artery ([address] 10/2015    Confirmed by neurology    Cerebrovascular accident (CVA) due to embolic occlusion of left middle cerebral artery ([address]     Left parietal confirmed by neurology    Cerebrovascular disease 2013    CVA. no weakness/deficits    Diabetes mellitus ([address]     Hearing deficit     wears hearing aide left ear only    Heart disease     Hyperlipidemia     Hypertension     Iron deficiency anemia 10/23/2022    Migraine headache with aura     Mitral regurgitation 10/01/2015    mild    Moderate aortic stenosis by prior echocardiogram     NCGS (non-celiac gluten sensitivity)     Severe aortic stenosis 2020    By 2D echo    TIA (transient ischemic attack)     Unspecified cerebral artery occlusion with cerebral infarction     Varicose veins of left leg with edema 2017    Venous stasis ulcer of right calf with fat layer exposed with varicose veins (HCC) 09/13/2019    Warfarin-induced coagulopathy ([facility] 09/29/2015     Surgical History:   Past Surgical History:   Procedure Laterality Date    ABDOMEN SURGERY      APPENDECTOMY      BREAST SURGERY Right     biopsy-benign    COCHLEAR IMPLANT Right 10/26/2022    RIGHT COCHLEAR IMPLANT INSERTION WITH NERVE INTEGRITY MONITOR performed by Bree Belcher MD at [address] [place] [address]  01/01/2008    ECHOCARDIOGRAM COMPLETE 2D W DOPPLER W COLOR  08/30/2012 9/30/22 at [facility]    INNER EAR SURGERY Left     Multiple surgeries    OVARY SURGERY      SMALL INTESTINE SURGERY      VEIN SURGERY         Medications:   Current Outpatient Medications:     dilTIAZem (CARDIZEM CD) 240 MG extended release capsule, take 1 capsule by mouth once daily, Disp: , Rfl:     meclizine (ANTIVERT) 12.5 MG tablet, take 1 tablet by mouth three times a day if needed for dizziness, Disp: , Rfl:     ferrous sulfate (IRON 325) 325 (65 Fe) MG tablet, Take 1 tablet by mouth 2 times daily (with meals), Disp: 60 tablet, Rfl: 0    warfarin (COUMADIN) 1 MG tablet, 1.5 mg warfarin daily beginning tomorrow 11/3/2022, Disp: 30 tablet, Rfl: 3    dilTIAZem (DILACOR XR) 240 MG extended release capsule, Take 240 mg by mouth in the morning., Disp: , Rfl:     aspirin 81 MG EC tablet, Take 81 mg by mouth daily, Disp: , Rfl:     potassium chloride (KLOR-CON) 10 MEQ extended release tablet, take 1 tablet by mouth twice a day, Disp: , Rfl: 0    furosemide (LASIX) 20 MG tablet, take 1 tablet by mouth once daily, Disp: , Rfl: 0    ACCU-CHEK JR PLUS strip, , Disp: , Rfl: 0    calcium carbonate-vitamin D3 (CALTRATE) 600-400 MG-UNIT TABS per tab, Take by mouth, Disp: , Rfl:     pravastatin (PRAVACHOL) 40 MG tablet, Take 20 mg by mouth daily. , Disp: , Rfl:     niacin (SLO-NIACIN) 500 MG tablet, Take 500 mg by mouth 2 times daily. , Disp: , Rfl:     omeprazole (PRILOSEC) 20 MG capsule, Take 20 mg by mouth 2 times daily. , Disp: , Rfl: therapeutic multivitamin-minerals (THERAGRAN-M) tablet, Take 1 tablet by mouth daily. , Disp: , Rfl:     Vitamin D (CHOLECALCIFEROL) 1000 UNITS CAPS capsule, Take 600 Units by mouth daily , Disp: , Rfl:     metFORMIN (GLUCOPHAGE) 500 MG tablet, Take 500 mg by mouth daily (with breakfast). , Disp: , Rfl:     Chromium-Cinnamon (CINNAMON PLUS CHROMIUM PO), Take 2 tablets by mouth Daily with supper., Disp: , Rfl:     metoprolol (LOPRESSOR) 100 MG tablet, Take 50 mg by mouth 2 times daily. , Disp: , Rfl:   Allergies: Gluten      SUBJECTIVE EXAMINATION      ,      Family/Caregiver Present: Yes    Subjective History:    Pt reports having cochlear implant surgery on 10/26 and the dizziness started 11/1. Pt was in the hospital for 11 days due to being on blood thinners and needed tapered off and then back on prior to discharge. Pt has a history of two strokes in 2013 and reports no physical or cognitive deficits. Pt describes her dizziness as feeling as if I'm on a ship, rocking back and forth. Pt reports my eyes it's hard to explain, but I can see clearly but sometimes my eyes aren't right. Pt reports having complete hearing loss on the right side in April. Pt went into the hospital to rule out stroke due to history of stroke and negative for stroke. Pt saw an ENT and got 2 shots in my ear, and he recommended (vestibular) therapy.  reports pt went to outpatient therapy in July for this same thing. Pt reports therapy worked at the time, but results did not last.    Auditory History:    Pt reports complete hearing loss in the right ear in April with subsequent Cochlear implant surgery in October.         Previous treatments prior to current episode?: Outpatient PT       Learning/Language: Learning  Does the patient/guardian have any barriers to learning?: Hearing  What is the preferred language of the patient/guardian?: English     Pain Screening    Pain Screening  Patient Currently in Pain: Denies    Functional Status Social History:    Social History  Lives With: Spouse  Home Layout: Two level, Bed/Bath upstairs (Bathroom on the 1st floor, bedroom on the 2nd floor, laundry on the 1st floor)  Home Access: Stairs to enter with rails  Entrance Stairs - Rails: None  Entrance Stairs - Number of Steps: 2  Bathroom Shower/Tub: Walk-in shower  Bathroom Toilet: Standard  Bathroom Equipment: Grab bars in shower, Grab bars around toilet  [address]: Kenny Gresham, Yogi Abraham, Abdirashid Mary (Kenny Gresham in the community, does not use the [employer]. 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     Prior Level of Function:     Independent     Current Level of Function:   ADL Assistance: Independent  Homemaking Assistance: Independent  Homemaking Responsibilities: Yes  Ambulation Assistance: Independent  Transfer Assistance: Independent  Active : No    OBJECTIVE EXAMINATION   Restrictions:   None    Review of Systems:  Vision: Within Functional Limits (wears glasses)  Hearing: Exceptions to [facility]  Hearing Exceptions: Hard of hearing/hearing concerns, Left hearing aid, Other (comment) (Right cochlear implant)    Vision/Vestibular Assessment   Visual/Occulomotor:   Visual/Occulomotor Assessed: Yes  Visual / Occulomotor Assessed: Depth Perception, Peripheral Vision, Eye Movement Range, Smooth Pursuits, Saccades, Convergence  Corrective Lenses: Glasses  Depth Perception: Intact  Peripheral Vision: Intact  Spontaneous Nystagmus: Intact  Gaze Holding Nystagmus: No  Eye Movement Range: Normal  Smooth pursuits horizontal: Able to track stimulus in all quadrants without difficulty  Smooth pursuits vertical: Able to track stimulus in all quadrants without difficulty  Convergence: Normal (within 6\" from nose)  Saccades horizontal:  Within Defined Limits  Saccades vertical: Within Defined Limits    Positional Tests:    Right Deepa-Hallpike Test: Nystagmus  Right Livingston-Hallpike Direction: Right upbeating torsional  Right Deepa-Hallpike Duration: Greater than 60 seconds  Left Livingston-Hallpike Test: Negative    Balance/Gait Assessment(s) Performed:   Dynamic Gait Index   Gait Level Surface: Mild Impairment: Walks 20', uses AD, slower speed, mild gait deviations  Change in Gait Speed: Mild Impairment: Is able to change speed, but demonstrates mild gait deviations or no gait deviations, but is unable to achieve a significant change in velocity, or uses an assistive device  Gait With Horizontal Head Turns: Mild Impairment:  Performs head turns smoothly with a slight change in gait velocity (i.e. minor disruption to smooth gait path or uses AD  Gait With Vertical Head Turns: Moderate Impairment: Performs task with moderate change in gait velocity, slows down, staggers but recovers, can continue to walk  Gait and Pivot Turn: Mild Impairment:  Pivot turns safely in > 3 seconds and stops with no loss of balance  Step Over Obstacle: Normal:  Is able to step over the box without changing gait speed, no evidence of imbalance. Step Around Obstacles: Normal:  Is able to walk around cones safely without changing gait speed, no evidence of imbalance. Steps: Mild Impairment:  Alternating feet, must use rail  Dynamic Gait Total Score: 17  Dynamic Disability Index: 20-39%  Dynamic CMS Modifier: MAR    Current Score: 17 / 24 (Date: 12/6/2022)    Interpretation of Score: Dynamic Gait Index is score on a 0-3 scale, 0 representing severe impairment with task and 3 representing ability to perform task without difficulty. A score of 21-24/24 indicates minimal to no fall risk. Scores below 21 indicates risk for falls and the lower the score, the higher the risk for falling. Common score for moderate stage Parkinson's Disease 9-11/24.     Outcome Measure(s) Completed:   Dizziness Handicap Inventory [facility])  Does looking up increase your problem?: No  Because of your problem,Do you feel frustrated?: Yes  Because of your problem, Do you restrict your travel for business or recreation?: No  Does walking down the aisle of a supermarket increase your problem?: No  Because of your problem, Do you have difficulty getting into or out of bed?: No  Does your problem significantly restrict your participation in social activities such as going out to dinner, going to movies, dancing, or to parties?: SomeTimes  Because of your problem, do you have difficulty reading?: No  Does performing more ambitious activities like sports, dancing, household chores such as sweeping of putting dishes away increase your problem?: No  Because of your problem, are you afraid to leave your home without having someone accompany you?: No  Because of your problem, have you been embarrassed in front of others?: No  Do quick movements of your head increase your problem?: SomeTimes  Because of your problem, do you avoid heights?: No  Does turning over in bed increase your problem?: No  Because of your problem, is it difficult for you to do strenuous housework or yardwork?: No  Because of your problem, are you afraid people may think you are intoxicated?: No  Because of your problem, is it difficult for you to go for a walk by yourself?: No  Does walking down a sidewalk/incline increase your problem?: No  Because of your problem, is it difficult for you to concentrate?: SomeTimes  Because of your problem, is it difficult for you to walk around your house in the dark?: SomeTimes  Because of your problem, are you afraid to stay home alone?: No  Because of your problem, do you feel handicapped?: SomeTimes  Has your problem placed stress on your relationship with members of your family or friends?: Yes  Because of your problem, are you depressed?: Yes  Does your problem interfere with your job or household responsibilities?: SomeTimes  Does bending over increase your problem?: SomeTimes  Physical Subscale Score: 4  Emotional Subscale Score : 16  Functional Subscale Score : 6  Total DHI Score: 26  DHI Disability Index: 20-39%  DHI CMS Modifier: MAR     Current Score: 26 / 100 (Date: 2022)    Interpretation of Score: To each item, the following scores may be assigned: No = 0, Sometimes = 2, Yes = 4. Scores greater than 10 points should be referred to balance specialists for further evaluation. Ratin-34 points (Mild Handicap), 36 to 52 points (moderate handicap), 54+ (severe handicap). MCID = 18 points. Loretta DUBOSE, [employer]. The development of the Dizziness Handicap Inventory. Arch Otolaryngol Head Neck Surg 1147;[id number])    Additional Finding(s) (if applicable):       ASSESSMENT     Impression: Assessment: Pt presents post cochlear implant surgery with balance deficits. Pt is currently receiving speech therapy services as well. Pt with a history of balance issues in  and received VRT with some improvement at the time, but improvements did not last. Pt did report having a home program, but is not currently performing the program. Pt's  reports pt with residual blood pooling in the inner ear from the surgery that may be adding to the imbalance. Pt responded to the THE [facility] to the right and Epley performed with some reported improvement. Pt's DHI  is at 32 which is a mild perceived handicap, but  states the score should be higher than what was reported by the patient. Pt also scored a 17 on the DGI which is predictive of falls in the elderly community dweller. Pt voiced much frustration over the prolonged dizziness and is willing to try \"anything\" to get back to normal. Pt will benefit from an individualized VRT program to improve balance and quality of life.     Body Structures, Functions, Activity Limitations Requiring Skilled Therapeutic Intervention: Decreased body mechanics, Decreased balance, Vestibular Impairment    Statement of Medical Necessity: Physical Therapy is both indicated and medically necessary as outlined in the POC to increase the likelihood of meeting the functionally related goals stated below. Patient's Activity Tolerance: Patient tolerated treatment well      Patient's rehabilitation potential/prognosis is considered to be: Good    Factors which may impact rehabilitation potential include: Medical co-morbidities, Past PT / Medical Experience        GOALS     Patient Goal(s): To not be dizzy anymore. Short Term Goals Completed by 2 weeks Goal Status   Improve DHI to <13 New   Improve DGI to >20 New   Pt to have 50% less dizziness with positional changes New   Pt to demonstrate steady gait with horizontal/vertical head turns with ambulation New             Long Term Goals Completed by 4-6 weeks Goal Status   Improve DHI to <5 New   Improve DGI to 24 New   Pt to have no reported dizziness with positional changes New   Pt to tolerate progressive VRT exercises to improve balance New   Pt independent with HEP New        TREATMENT PLAN       Requires PT Follow-Up: Yes  Treatment Initiated : yes    Pt. actively involved in establishing Plan of Care and Goals: Yes  Patient/ Caregiver education and instruction: Goals, PT Role, Plan of Care, Evaluative findings, Functional Mobility Training, Fall prevention strategies             Treatment may include any combination of the following: Balance training, Functional mobility training, Endurance training, Gait training, Stair training, Vestibular rehab, Therapeutic activities     Frequency / Duration:  Patient to be seen 1-2x/week for 4-6 weeks weeks      Eval Complexity: Overall Evaluation : Low       PT Treatment Completed:   Therapeutic Activities: (CPT 43064) Treatment Reasoning    Ther Act Exercise 1: Ambulation with horizontal/vertical head turns 100 feet x 2 Limitations addressed: Balance                    Other Treatments  Canalith Repositioning Procedure(s)  (CPT 57552) Treatment Reasoning      Negative for left Deepa Clarke    Positive for right THE [facility] Epley Maneuver performed to the right due to positive response with THE [facility]                      Therapy Time  Individual Time In: 1115        Individual Time Out: 5814  Minutes: 61           Therapist Signature: Eleno Gan PT, DPT License [id number]    Date: 41/8/2683     I certify that the above Therapy Services are being furnished while the patient is under my care. I agree with the treatment plan and certify that this therapy is necessary. Physician's Signature:  ___________________________   Date:_______                                                                   Rikki Wiley MD        Physician Comments: _______________________________________________    Please sign and return to [facility] PHYSICAL THERAPY  Please fax to the location listed below.  Xavi Mott for this referral!    [address] [facility] PHYSICAL THERAPY  [facility] [address]  Dept: [phone]  Fax: [phone]         POC NOTE

## 2022-12-06 NOTE — PLAN OF CARE
[address] [facility] PHYSICAL THERAPY  [place] [address]  Dept: [phone]  FAX: [phone]    PHYSICAL THERAPY PLAN OF CARE: INITIAL EVALUATION    Patient: Mary Alice Rosenthal (19 y.o. female)   Examination Date:   Plan of Care Certification Period: 2022 to 3/6/2023       :  1945  MRN: [id number]  CSN: [id number]   Insurance: Payor: Tete Thompson / Plan: Liana Schaumann / Product Type: *No Product type* /   Insurance ID: [id number] - (Medicare Managed) Secondary Insurance (if applicable):    Referring Physician: Zacarias Recinos MD     PCP: Jose Andersen MD Visits to Date/Visits Approved:   /      No Show/Cancelled Appts:   /       Medical Diagnosis: Cochlear implant in place [Z96.21]  Unsteadiness [R26.81]    No data recorded     ASSESSMENT     Impression: Assessment: Pt presents post cochlear implant surgery with balance deficits. Pt is currently receiving speech therapy services as well. Pt with a history of balance issues in  and received VRT with some improvement at the time, but improvements did not last. Pt did report having a home program, but is not currently performing the program. Pt's  reports pt with residual blood pooling in the inner ear from the surgery that may be adding to the imbalance. Pt responded to the THE [facility] to the right and Epley performed with some reported improvement. Pt's DHI  is at 32 which is a mild perceived handicap, but  states the score should be higher than what was reported by the patient. Pt also scored a 17 on the DGI which is predictive of falls in the elderly community dweller. Pt voiced much frustration over the prolonged dizziness and is willing to try \"anything\" to get back to normal. Pt will benefit from an individualized VRT program to improve balance and quality of life.     Body Structures, Functions, Activity Limitations Requiring Skilled Therapeutic Intervention: Decreased body mechanics, Decreased balance, Vestibular Impairment    Statement of Medical Necessity: Physical Therapy is both indicated and medically necessary as outlined in the POC to increase the likelihood of meeting the functionally related goals stated below. Patient's Activity Tolerance: Patient tolerated treatment well      Patient's rehabilitation potential/prognosis is considered to be: Good    Factors which may impact rehabilitation potential include: Medical co-morbidities, Past PT / Medical Experience        GOALS     Patient Goal(s): To not be dizzy anymore. Short Term Goals Completed by 2 weeks Goal Status   Improve DHI to <13 New   Improve DGI to >20 New   Pt to have 50% less dizziness with positional changes New   Pt to demonstrate steady gait with horizontal/vertical head turns with ambulation New             Long Term Goals Completed by 4-6 weeks Goal Status   Improve DHI to <5 New   Improve DGI to 24 New   Pt to have no reported dizziness with positional changes New   Pt to tolerate progressive VRT exercises to improve balance New   Pt independent with HEP New        TREATMENT PLAN       Requires PT Follow-Up: Yes  Treatment Initiated : yes    Pt. actively involved in establishing Plan of Care and Goals: Yes  Patient/ Caregiver education and instruction: Goals, PT Role, Plan of Care, Evaluative findings, Functional Mobility Training, Fall prevention strategies             Treatment may include any combination of the following: Balance training, Functional mobility training, Endurance training, Gait training, Stair training, Vestibular rehab, Therapeutic activities     Frequency / Duration:  Patient to be seen 1-2x/week for 4-6 weeks weeks       Patient Status: [x] Continue / Initiate Plan of Care      I certify that the above Therapy Services are being furnished while the patient is under my care. I agree with the treatment plan and certify that this therapy is necessary.        [address] [facility] Signature:  ___________________________   Date:_______                                                                   Dustin Corado MD          Physician Comments: _______________________________________________     Please sign and return to [facility] PHYSICAL THERAPY  Please fax to the location listed below. Xavi Mott for this referral!     [address] [facility] PHYSICAL [facility]  [address] [facility] [address]  Dept: [phone]  Fax: [phone]              Signature: Electronically signed by León So PT, DPT License [id number] on 28/6/3159 at 4:03 PM.     If you have any questions or concerns, please don't hesitate to call.   Thank you for your referral!

## 2022-12-06 NOTE — PROGRESS NOTES
SPEECH LANGUAGE PATHOLOGY  DAILY PROGRESS NOTE        PATIENT NAME:  Kentrell Aguayo      :  1945          TODAY'S DATE:  2022       SUBJECTIVE:  Patient seen this date for 60 minutes speech therapy session targeting speech perception. She was accompanied by her , Lei Mcdaniel, who remained in the treatment area. Patient was pleasant and cooperative. OBJECTIVE:       Patient continues to report dizziness. She reported her CI is too loud when she is at home/in a big space using MAP 1 and volume 1. Patient used MAP 1 and volume 1 this date in SLP office and reported it sounded \"fine\" and it \"wasn't too loud. \"    Minimal Pairs with Descriptor: Using the [employer] sean, patient listened to a description of a word , then patient listened to 2 words (minimal pairs) and was to determine which word fit the definition. Patient achieved 70% accuracy. Minimal Pairs with Descriptor: patient listened to a description of a word from the SLP, then patient listened to 2 words (minimal pairs - voicing) and was to determine which word fit the definition. Patient achieved 100% accuracy. Following Directions: using the [employer] Sean, patient was read aloud 2 step directions where she had to interpret vocabulary and spatial cues to drag the appropriate picture (in a field of 3-6) and position it correctly next to another picture (ex: place the feather above the pencil). Patient achieved 100% accuracy with no repetition. Sentences with Similar Features: Patient was to choose which sentence was read aloud in silent. b These sentence choices contained similar sounding words. Patient achieved 100% (10/10) accuracy with a female speaker and 100% (10/10) accuracy with a male speaker given no repetition of stimuli. Sentences with Similar Features: Patient was to choose which sentence was read aloud with background noise present (e.g., soft talking).  These sentence choices contained similar words. Patient achieved 100% (10/10) accuracy with a female speaker and 90% (9/10) accuracy with a male speaker given no repetition of stimuli. SLP read aloud directions changing one word- patient determined the word that was changed in a common phrase achieving 70% accuracy. Patient displayed more difficulty when the word changed was similar sounding to the other. Home Program: Patient was encouraged to spend 60 minutes everyday listening with just her cochlear implant. Patient was instructed to remove the hearing aid during this time. Encouraged patient to engage in activities that address tasks similar to this session (SLP discussed this with patient and patient's ) as well as that pair auditory with visual stimuli for example TV with closed captions, audio books paired with hard copy books, etc.         ASSESSMENT:  Making progress toward meeting therapy goals. PLAN:  Will continue speech pathology intervention as per established Corwin Goodpasture.  Yeny DELVALLE, CCC-SLP  SP. [id number]      CPT code(s) 06304  speech/language tx  Total minutes :  60 minutes

## 2022-12-07 NOTE — PROGRESS NOTES
Patient was here for CI check after last appointment 11/28. Impedances were good. Magnet site looked good, magnet slightly tight. Ordered 1(I) and 1/2(I). Patient reported she went to physical therapy for evaluation that day. Patient reported wearing processor all waking hours. Datalogging confirmed. Patient reported current MAP 1 sounded too loud when she got home from last appointment. She also reported  still loud even on MAP 1 and volume 1. Reduced stimulation overall significantly, counseled on MAPs again. Counseled on expectations, goals, and timeline. Patient was satisfied with loudness and comfort. Patient will follow up 12/21.     Aggie Benitez [facility]  [id number]   Electronically signed by Aggie Benitez on 12/7/2022 at 10:23 AM

## 2022-12-13 ENCOUNTER — HOSPITAL ENCOUNTER (OUTPATIENT)
Dept: PHYSICAL THERAPY | Age: 77
Setting detail: THERAPIES SERIES
Discharge: HOME | End: 2022-12-13
Payer: MEDICARE

## 2022-12-13 PROCEDURE — 95992 CANALITH REPOSITIONING PROC: CPT

## 2022-12-13 PROCEDURE — 97530 THERAPEUTIC ACTIVITIES: CPT

## 2022-12-13 NOTE — PROGRESS NOTES
[address] [facility]          Phone: [phone] Fax: [phone]    Physical Therapy Daily Treatment Note  Date:  2022    Patient Name:  Kentrell Aguayo    :  1945  MRN: [id number]           Physical Therapy: Initial Evaluation    Patient: Kentrell Aguayo (17 y.o. female)   Examination Date:   Plan of Care Certification Period: 2022 to  3/6/2023     :  1945 ;    Confirmed: Yes MRN: [id number]  CSN: [id number]   Insurance: Payor: India Purcellre / Plan: [facility] / Product Type: *No Product type* /   Insurance ID: [id number] - (Medicare Managed) Secondary Insurance (if applicable):    Referring Physician: Yvette Mcfadden MD     PCP: Godwin Winter MD Visits to Date/Visits Approved:   /      No Show/Cancelled Appts:   /       Medical Diagnosis: Cochlear implant in place [Z96.21]  Unsteadiness [R26.81]    Treatment Diagnosis:         Visit# / total visits:  2/4  Pain level: 0/10   Time In:  1000  Time Out:  1100    Subjective:  Pt reports still feeling \"as if I'm on a boat rocking\". Pt denies any falls from the dizziness. OBJECTIVE:     Vertebral Artery Testing in sitting: NT    Posture/Alignment: Mild forward flexed posture. Oculomotor and Vestibulo-Ocular Examination:   Spontaneous Nystagmus:  Negative  Gaze Induced Nystagmus: Negative  Smooth Pursuits: Intact  Saccades: NT   VOR (Slow): NT  Head Thrust: NT    Positional Testing:   Roll Test: Performed left/right 5x ea, Negative for nystagmus/dizziness  [place] Hallpike: Right: Positive for mild dizziness, mild nystagmus RUB                       Left: NT  Epley Maneuver: Performed to the right following positive DH, resolved within 60 sec; Performed to the left following positive Stepping Fakuda. Positive for  mild dizziness, no nystagmus observed. Gufani Maneuver: Demonstrated and performed to the left and right. No reported dizziness or nystagmus noted.     Christa Guaman (N=Normal, S=Sway, F=Fall):   Condition #1 (Romberg eyes open): N  Condition #2 (Romberg eyes closed): S  Condition #3 (Tandem Romberg eyes open):  S  Condition #4 (Tandem Romberg eyes closed): F  Condition #5 (CTSIB eyes open):  S  Condition #6 (CTSIB eyes closed): F  Condition #7 (Stepping [place]): F with forward stepping to the left    Cervical Testing:  NT    Functional Activities:   Transfers (sit to stand ):  5x    Gait Testing:   Dynamic Gait Index Score: NT    Gait Deviations (firm surface/linoleum): Slower gait speed, short stride length, mild unsteadiness  Assistive Device Used: SPC and no AD  Steps: NT    Strength Testing: NT    Exercises:  Exercise/Equipment Resistance/Repetitions Other comments                                                                                  Other Therapeutic Activities:  NA    Home Exercise Program:  Gufani Maneuver, Rolling Habituation    Manual Treatments:  NA    Modalities:  NA    Comments:  Pt continues to have imbalance and reports it being as if she is on a boat, but denies having any falls. Pt with mild response to [facility] to the right and Epley Maneuver performed. Pt was then positive with Stepping Fakuda to the left and  and Epley Maneuver performed to the left with mild response as noted. Pt and  asked for something to do at home and given handout for [facility] and Rolling Habituation after demonstration and return demo done.      Time-in Time-out Total Time   73778  Ther Ex      [address]  Neuro Re-ed        87690  Ther Activities   [id number] [id number]   11461  Manual Therapy       47112  E-stim       99245  Ultrasound      52787  Canalith Repositioning [id number]   Session   60       Treatment/Activity Tolerance:  [x] Patient tolerated treatment well [] Patient limited by fatigue  [] Patient limited by pain  [] Patient limited by other medical complications  [] Other:     Prognosis: [x] Good [] Fair  [] Poor    Patient Requires Follow-up: [x] Yes  [] No    Plan:   [x] Continue per plan of care [] Alter current plan (see comments)  [] Plan of care initiated [] Hold pending MD visit [] Discharge  Plan for Next Session:        Electronically signed by:    Chelsea Bradley PT, DPT  License [id number]

## 2022-12-20 ENCOUNTER — HOSPITAL ENCOUNTER (OUTPATIENT)
Dept: PHYSICAL THERAPY | Age: 77
Setting detail: THERAPIES SERIES
Discharge: HOME | End: 2022-12-20
Payer: MEDICARE

## 2022-12-20 ENCOUNTER — HOSPITAL ENCOUNTER (OUTPATIENT)
Dept: SPEECH THERAPY | Age: 77
Setting detail: THERAPIES SERIES
Discharge: HOME | End: 2022-12-20
Payer: MEDICARE

## 2022-12-20 PROCEDURE — 92507 TX SP LANG VOICE COMM INDIV: CPT | Performed by: SPEECH-LANGUAGE PATHOLOGIST

## 2022-12-20 PROCEDURE — 97530 THERAPEUTIC ACTIVITIES: CPT

## 2022-12-20 PROCEDURE — 95992 CANALITH REPOSITIONING PROC: CPT

## 2022-12-20 NOTE — PROGRESS NOTES
SPEECH LANGUAGE PATHOLOGY  DAILY PROGRESS NOTE        PATIENT NAME:  Jorge Beck      :  1945          TODAY'S DATE:  2022       SUBJECTIVE:  Patient seen this date for 60 minutes speech therapy session targeting speech perception. She was accompanied by her , Jasmine Camilo, who remained in the treatment area. Patient was pleasant and cooperative. OBJECTIVE:        Minimal Pairs with Descriptor: Using the Constant Therapy jennifer, patient listened to a description of a word , then patient listened to 2 words (minimal pairs) and was to determine which word fit the definition. Patient achieved 50% accuracy. Sentences with Similar Features: Patient was to choose which sentence was read aloud with background noise present (e.g., soft talking). These sentence choices contained similar words. Patient achieved 100% (10/10) accuracy with a female speaker and 70% (7/10) accuracy with a male speaker given no repetition of stimuli. Sentences with Similar Features: Patient was to choose which sentence was read aloud with background noise present (e.g., medium talking). These sentence choices contained similar words. Patient achieved 100% (10/10) accuracy with a female speaker  given no repetition of stimuli. Patient reported only hearing parts of the sentence at times. Sentences with Similar Features: Patient was to choose which sentence was read aloud with background noise present (e.g., soft traffic). These sentence choices contained similar words. Patient achieved 90% (9/10) accuracy with a female speaker and 90% (9/10) accuracy with a male speaker given no repetition of stimuli. Minimal pairs in sentences- patient discriminated between the two words achieving 93% accuracy. Home Program: Patient was encouraged to spend 60 minutes everyday listening with just her cochlear implant. Patient was instructed to remove the hearing aid during this time.  Encouraged patient to engage in activities that address tasks similar to this session (SLP discussed this with patient and patient's ) as well as that pair auditory with visual stimuli for example TV with closed captions, audio books paired with hard copy books, etc.         ASSESSMENT:  Making progress toward meeting therapy goals. PLAN:  Will continue speech pathology intervention as per established Bhavya Saini M.A., CCC-SLP  SP. [id number]      CPT code(s) 44915  speech/language tx  Total minutes :  60 minutes

## 2022-12-20 NOTE — PROGRESS NOTES
[address] [facility]          Phone: [phone] Fax: [phone]    Physical Therapy Daily Treatment Note  Date:  2022    Patient Name:  Robinson Marquis    :  1945  MRN: [id number]           Physical Therapy: Initial Evaluation    Patient: Robinson Marquis (14 y.o. female)   Examination Date:   Plan of Care Certification Period: 2022 to  3/6/2023     :  1945 ;    Confirmed: Yes MRN: [id number]  CSN: [id number]   Insurance: Payor: Deonna Yates / Plan: Amina Antoine / Product Type: *No Product type* /   Insurance ID: [id number] - (Medicare Managed) Secondary Insurance (if applicable):    Referring Physician: Rikki Wiley MD     PCP: Mykel Guaman MD Visits to Date/Visits Approved:   /      No Show/Cancelled Appts:   /       Medical Diagnosis: Cochlear implant in place [Z96.21]  Unsteadiness [R26.81]    Treatment Diagnosis:         Visit# / total visits:  3/4  Pain level: 0/10   Time In:  1100  Time Out:  1200    Subjective:  Pt reports still having the rocking sensation, but not as bad as before. Pt's  reports looking up things on the Internet and had many questions regarding vertigo and vestibular problems. OBJECTIVE:     Vertebral Artery Testing in sitting: NT    Posture/Alignment: Mild forward flexed posture. Oculomotor and Vestibulo-Ocular Examination:   Spontaneous Nystagmus:  Negative  Gaze Induced Nystagmus: Negative  Smooth Pursuits: Intact  Saccades: NT   VOR (Slow): NT  Head Thrust: NT    Positional Testing:   Roll Test: NT  Deepa Hallpike: Right: Negative                        Left: Positive for mild dizziness, no nystagmus  Epley Maneuver: Performed to the left. Gufani Maneuver: Modified: to sidelying and sitting up. Mild reported dizziness to the left. No nystagmus noted.     Evelin Gonzalez SOP (N=Normal, S=Sway, F=Fall): NT  Condition #1 (Romberg eyes open): -  Condition #2 (Romberg eyes closed): -  Condition #3 (Tandem Romberg eyes open):  -  Condition #4 (Tandem Romberg eyes closed): -  Condition #5 (CTSIB eyes open):  -  Condition #6 (CTSIB eyes closed):  -  Condition #7 (Stepping Fukuda): -    Cervical Testing:  NT    Functional Activities:   Transfers (sit to stand ):  10x    Gait Testing:   Dynamic Gait Index Score: NT    Gait Deviations (firm surface/linoleum): Slower gait speed, short stride length, mild unsteadiness, wide stance with lateral sway  Assistive Device Used: SPC and no AD  Steps: NT    Strength Testing: NT    Exercises:  Exercise/Equipment Resistance/Repetitions Other comments     Ambulation with horizontal head turns 150 feet x 2      Ambulation with vertical head turns 150 feet x 2      Ambulation with horizontal head turns with word card metronome at 60 bpm 150 feet x 2 Verbal cues to slow to beat of metronome     Ambulation with vertical head turns with word card metronome at 60 bpm 150 feet x 2 Verbal cues to slow to beat of metronome. Increased dizziness with imbalance. Side lying/push up 5x R/L   Reported mild dizziness with sitting up from the left                                               Other Therapeutic Activities:  NA    Home Exercise Program:  Sherrell Elizabeth    Manual Treatments:  NA    Modalities:  NA    Comments:  Pt with mild response to side lying positional changes to the left, and with looking up during ambulation and reading word card. Pt with mild to moderate imbalance and reported increased dizziness but denied \"room spinning\" dizziness. Pt and  report having VRT prior to the surgery and it was more effective than it is now. Extensive education and discussion had with pt and  regarding pt's imbalance and how it is directly related to the CI surgery, but today's treatment has narrowed the problem to looking up vs other positional changes.  Pt willing to perform ambulation task with vertical head movements between appointments and encouraged to have someone close for safety due to mild to moderate imbalance.      Time-in Time-out Total Time   11392  Ther Ex      [address]  Neuro Re-ed        09304  Ther Activities   7386 [id number] 38   65674  Manual Therapy       67108  E-stim       64578  Ultrasound      34574  Canalith Repositioning [id number]   Session   60       Treatment/Activity Tolerance:  [x] Patient tolerated treatment well [] Patient limited by fatigue  [] Patient limited by pain  [] Patient limited by other medical complications  [] Other:     Prognosis: [x] Good [] Fair  [] Poor    Patient Requires Follow-up: [x] Yes  [] No    Plan:   [x] Continue per plan of care [] Alter current plan (see comments)  [] Plan of care initiated [] Hold pending MD visit [] Discharge  Plan for Next Session:        Electronically signed by:    Megan Delgado PT, DPT  License [id number]

## 2022-12-21 ENCOUNTER — PROCEDURE VISIT (OUTPATIENT)
Dept: AUDIOLOGY | Age: 77
End: 2022-12-21

## 2022-12-21 DIAGNOSIS — H91.8X9 ASYMMETRICAL HEARING LOSS: Primary | ICD-10-CM

## 2022-12-22 NOTE — PROGRESS NOTES
Patient was here for CI follow up, after last appointment 12/7/22. Impedances were good. Magnet site looked food, changed to strength 1/2. Briefly discussed patient's current balance symptoms. She reported disequilibrium, not room-spinning vertigo at this time. She reported going to PT, and reported she feels it is not helping yet. Patient also reported that she has been unable to focus her eyes on anything. Patient reported wearing processor all waking hours. Datalogging confirmed. She also reported wearing processor only for one hour a day. She reported she keeps the volume and the MAP on 4. Patient reported speech sounds clearer, and reported noticing environmental sounds like the furnace and fridge. Patient's  also reported that they are able to have conversations much more easily. Patient reported she notices the volume of the TV is too loud at times. Measured and increased C levels. Patient reported increased clarity. Patient was satisfied with loudness and comfort. Aided soundfield pure tone testing was completed. Results were essentially in the normal range. Reviewed progressive levels/MAPs. Patient will follow up 1/25/23.     Aggie Templeton [facility]  [address] [facility] [id number]   Electronically signed by Aggie Templeton on 12/22/2022 at 2:27 PM

## 2023-01-03 ENCOUNTER — HOSPITAL ENCOUNTER (OUTPATIENT)
Dept: PHYSICAL THERAPY | Age: 78
Setting detail: THERAPIES SERIES
Discharge: HOME | End: 2023-01-03
Payer: MEDICARE

## 2023-01-03 ENCOUNTER — HOSPITAL ENCOUNTER (OUTPATIENT)
Dept: SPEECH THERAPY | Age: 78
Setting detail: THERAPIES SERIES
Discharge: HOME | End: 2023-01-03
Payer: MEDICARE

## 2023-01-03 PROCEDURE — 97530 THERAPEUTIC ACTIVITIES: CPT

## 2023-01-03 PROCEDURE — 95992 CANALITH REPOSITIONING PROC: CPT

## 2023-01-03 PROCEDURE — 92507 TX SP LANG VOICE COMM INDIV: CPT | Performed by: SPEECH-LANGUAGE PATHOLOGIST

## 2023-01-03 NOTE — PROGRESS NOTES
[address] [facility]          Phone: [phone] Fax: [phone]    Physical Therapy Daily Treatment Note  Date:  1/3/2023    Patient Name:  Augustine Morrell    :  1945  MRN: [id number]           Physical Therapy: Initial Evaluation    Patient: Augustine Morrell (29 y.o. female)   Examination Date:   Plan of Care Certification Period: 2022 to  3/6/2023     :  1945 ;    Confirmed: Yes MRN: [id number]  CSN: [id number]   Insurance: Payor: Pedro Diez / Plan: Skylar Francois / Product Type: *No Product type* /   Insurance ID: [id number] - (Medicare Managed) Secondary Insurance (if applicable):    Referring Physician: Shantal Torres MD     PCP: Jeferson Anderson MD Visits to Date/Visits Approved:   /      No Show/Cancelled Appts:   /       Medical Diagnosis: Cochlear implant in place [Z96.21]  Unsteadiness [R26.81]    Treatment Diagnosis:         Visit# / total visits:    Pain level: 0/10   Time In:  1115  Time Out:  1200    Subjective:  Pt reports not having the rocking sensation as bad, but continues to report \"not feeling right\". Pt unable to state exactly what is not right, stating \"my eyes feel like they are being pulled forward\". Pt also reported not feeling well after riding in a car on a very hilly road, but unable to pinpoint the exact feeling. OBJECTIVE:     Vertebral Artery Testing in sitting: NT    Posture/Alignment: Mild forward flexed posture. Oculomotor and Vestibulo-Ocular Examination:   Spontaneous Nystagmus:  Negative  Gaze Induced Nystagmus: Negative  Smooth Pursuits: Intact  Saccades: Intact R/L, Diagonal vertical/horizontal  VOR (Slow):  Intact  Head Thrust: NT    Positional Testing:   Roll Test: NT  [place] Hallpike: Right: Negative for nystagmus, but did report \"some\" dizziness                       Left: NT  Epley Maneuver: Performed to the right  Gufani Maneuver: NT    Gali SOP (N=Normal, S=Sway, F=Fall): NT  Condition #1 (Romberg eyes open): -  Condition #2 (Romberg eyes closed): -  Condition #3 (Tandem Romberg eyes open):  -  Condition #4 (Tandem Romberg eyes closed): -  Condition #5 (CTSIB eyes open):  -  Condition #6 (CTSIB eyes closed):  -  Condition #7 (Stepping [employer]): -    Cervical Testing:  NT    Functional Activities:   Transfers (sit to stand ):  5x    Gait Testing:   Dynamic Gait Index Score: NT    Gait Deviations (firm surface/linoleum): Slower gait speed, short stride length, mild unsteadiness, wide stance with lateral sway  Assistive Device Used: No AD  Steps: NT    Strength Testing: NT    Exercises:  Exercise/Equipment Resistance/Repetitions Other comments     Ambulation with horizontal head turns 150 feet x 2      Ambulation with vertical head turns 150 feet x 2      Ambulation with horizontal head turns with word card 150 feet x 2             Saccades R/L 3 sets of 10      Saccades Corner/Floor/Corner 3 sets of 10                                  Other Therapeutic Activities:  NA    Home Exercise Program:  Gufani Maneuver, Rolling Habituation    Manual Treatments:  NA    Modalities:  NA    Comments:  Pt arrived and reported feeling better, however, continues to report feeling \"not normal\". Pt has difficulty describing her response to movement and states it is not dizziness, but then becomes frustrated as she is unable to put into words how she is feeling. Pt did not respond with true vertiginous symptoms with the [facility] and Epley performed as follow through.      Time-in Time-out Total Time   70805  Ther Ex      [id number]  Neuro Re-ed        54477  Ther Activities   0182 [id number] 50   40068  Manual Therapy       43773  E-stim       02769  Ultrasound      09091  Canalith Repositioning [id number]   Session   45       Treatment/Activity Tolerance:  [x] Patient tolerated treatment well [] Patient limited by fatigue  [] Patient limited by pain  [] Patient limited by other medical complications  [] Other: Prognosis: [x] Good [] Fair  [] Poor    Patient Requires Follow-up: [x] Yes  [] No    Plan:   [x] Continue per plan of care [] Alter current plan (see comments)  [] Plan of care initiated [] Hold pending MD visit [] Discharge  Plan for Next Session:        Electronically signed by:    Chele Joe PT, DPT  License [id number]

## 2023-01-03 NOTE — PROGRESS NOTES
SPEECH LANGUAGE PATHOLOGY  DAILY PROGRESS NOTE        PATIENT NAME:  Pepe Taylor      :  1945          TODAY'S DATE:  1/3/2023       SUBJECTIVE:  Patient seen this date for 60 minutes speech therapy session targeting speech perception. She was accompanied by her , Kenn Ramirez, who remained in the treatment area. Patient was pleasant and cooperative. OBJECTIVE:        Minimal Pairs with Descriptor: Using the Constant Therapy jennifer, patient listened to a description of a word , then patient listened to 2 words (minimal pairs) and was to determine which word fit the definition. Patient achieved 50% accuracy. Sentences with Similar Features: Patient was to choose which sentence was read aloud with background noise present (e.g., soft talking). These sentence choices contained similar words. Patient achieved 100% (10/10) accuracy with a female speaker and 100% (10/10) accuracy with a male speaker given no repetition of stimuli. Sentences with Similar Features: Patient was to choose which sentence was read aloud with background noise present (e.g., medium talking). These sentence choices contained similar words. Patient achieved 90% (9/10) accuracy with a female speaker  given no repetition of stimuli. Patient reported only hearing parts of the sentence at times. Sentences with Similar Features: Patient was to choose which sentence was read aloud with background noise present (e.g., soft traffic). These sentence choices contained similar words. Patient achieved 90% (9/10) accuracy with a female speaker and 80% (8/10) accuracy with a male speaker given no repetition of stimuli. Minimal pairs in sentences- patient discriminated between the two words achieving 93% accuracy. SLP read aloud directions changing one word- patient determined the word that was changed in a common phrase achieving 100% accuracy with no repetition. Short Stories: Patient listened to a short story.  Once listing to the story, patient was to use their auditory memory and listening skills to remember the information in order to answer the questions. Patient identified main ideas with ease. Patient produced 95% accuracy with no repetition on story stimuli. SLP voice was used. No background noise used. Home Program: Patient was encouraged to spend 60 minutes everyday listening with just her cochlear implant. Patient was instructed to remove the hearing aid during this time. Encouraged patient to engage in activities that address tasks similar to this session (SLP discussed this with patient and patient's ) as well as that pair auditory with visual stimuli for example TV with closed captions, audio books paired with hard copy books, etc.         ASSESSMENT:  Making progress toward meeting therapy goals. PLAN:  Will continue speech pathology intervention as per established Sherryle Hodgkin.  Yeny DELVALLE, CCC-SLP  SP. [id number]      CPT code(s) 45896  speech/language tx  Total minutes :  60 minutes

## 2023-01-10 ENCOUNTER — HOSPITAL ENCOUNTER (OUTPATIENT)
Dept: SPEECH THERAPY | Age: 78
Setting detail: THERAPIES SERIES
Discharge: HOME | End: 2023-01-10
Payer: MEDICARE

## 2023-01-10 PROCEDURE — 92507 TX SP LANG VOICE COMM INDIV: CPT | Performed by: SPEECH-LANGUAGE PATHOLOGIST

## 2023-01-10 NOTE — PROGRESS NOTES
SPEECH LANGUAGE PATHOLOGY  DAILY PROGRESS NOTE        PATIENT NAME:  Estephanie Ny      :  1945          TODAY'S DATE:  1/10/2023       SUBJECTIVE:  Patient seen this date for 60 minutes speech therapy session targeting speech perception. She was accompanied by her , Aubree Martinez, who remained in the treatment area. Patient was pleasant and cooperative. OBJECTIVE:        Minimal Pairs with Descriptor: Using the [employer] jennifer, patient listened to a description of a word , then patient listened to 2 words (minimal pairs) and was to determine which word fit the definition. Patient achieved 100% accuracy. Everyday sentence identification:  In a field of 10 sentences with a known topic, patient was to choose which was read aloud. Patient achieved 70% accuracy. No repetition of stimuli. Male voice. Medium talking background noise. Everyday sentence identification:  In a field of 10 sentences with a known topic, patient was to choose which was read aloud. Patient achieved 90% accuracy. No repetition of stimuli. Female voice. Medium talking background noise. Everyday sentence identification:  In a field of 10 sentences with a known topic, patient was to choose which was read aloud. Patient achieved 70% accuracy. No repetition of stimuli. Male voice. Soft traffic background noise. Everyday sentence identification:  In a field of 10 sentences with a known topic, patient was to choose which was read aloud. Patient achieved 90% accuracy. No repetition of stimuli. Female voice. Soft traffic background noise. Minimal pairs in sentences- patient discriminated between the two words achieving 93% accuracy. SLP read aloud directions changing one word- patient determined the word that was changed in a common phrase achieving 100% accuracy with no repetition.       Patient identifed words correctly in a set of 8 with the same syllable structure/number achieving greater than 100% accuracy. Home Program: Patient was encouraged to spend 60 minutes everyday listening with just her cochlear implant. Patient was instructed to remove the hearing aid during this time. Encouraged patient to engage in activities that address tasks similar to this session (SLP discussed this with patient and patient's ) as well as that pair auditory with visual stimuli for example TV with closed captions, audio books paired with hard copy books, etc.         ASSESSMENT:  Making progress toward meeting therapy goals. PLAN:  Will continue speech pathology intervention as per established Gurinder Nova.  Yeny DELVALLE, CCC-SLP  SP. [id number]      CPT code(s) 72775  speech/language tx  Total minutes :  60 minutes

## 2023-01-17 ENCOUNTER — HOSPITAL ENCOUNTER (OUTPATIENT)
Dept: PHYSICAL THERAPY | Age: 78
Setting detail: THERAPIES SERIES
Discharge: HOME | End: 2023-01-17
Payer: MEDICARE

## 2023-01-17 ENCOUNTER — HOSPITAL ENCOUNTER (OUTPATIENT)
Dept: SPEECH THERAPY | Age: 78
Setting detail: THERAPIES SERIES
Discharge: HOME | End: 2023-01-17
Payer: MEDICARE

## 2023-01-17 PROCEDURE — 97530 THERAPEUTIC ACTIVITIES: CPT

## 2023-01-17 PROCEDURE — 95992 CANALITH REPOSITIONING PROC: CPT

## 2023-01-17 PROCEDURE — 92507 TX SP LANG VOICE COMM INDIV: CPT | Performed by: SPEECH-LANGUAGE PATHOLOGIST

## 2023-01-17 NOTE — PROGRESS NOTES
SPEECH LANGUAGE PATHOLOGY  DAILY PROGRESS NOTE        PATIENT NAME:  Hilaria Bernardo      :  1945          TODAY'S DATE:  2023       SUBJECTIVE:  Patient seen this date for 60 minutes speech therapy session targeting speech perception. She was accompanied by her , Angy Griggs, who remained in the treatment area. Patient was pleasant and cooperative. OBJECTIVE:        Everyday sentence identification:  In a field of 10 sentences with a known topic, patient was to choose which was read aloud. Patient achieved 50% accuracy. No repetition of stimuli. Male voice. Medium talking background noise. Everyday sentence identification:  In a field of 10 sentences with a known topic, patient was to choose which was read aloud. Patient achieved 90% accuracy. No repetition of stimuli. Female voice. Medium talking background noise. Everyday sentence identification:  In a field of 10 sentences with a known topic, patient was to choose which was read aloud. Patient achieved 40% accuracy. No repetition of stimuli. Male voice. Soft traffic background noise. Everyday sentence identification:  In a field of 10 sentences with a known topic, patient was to choose which was read aloud. Patient achieved 70% accuracy. No repetition of stimuli. Female voice. Soft traffic background noise. SLP read aloud directions changing one word- patient determined the word that was changed in a common phrase achieving 100% accuracy with no repetition. Understanding Paragraphs: patient listened to a brief paragraph and was instructed to answer questions about details in the conversation. Patient identified the main idea 2/2 times, but reported she was only able to understand/hear approximately 60% of the details . No background noise was used to complete this task. Home Program: Patient was encouraged to spend 60 minutes everyday listening with just her cochlear implant.  Patient was instructed to remove the hearing aid during this time. Encouraged patient to engage in activities that address tasks similar to this session (SLP discussed this with patient and patient's ) as well as that pair auditory with visual stimuli for example TV with closed captions, audio books paired with hard copy books, etc.         ASSESSMENT:  Making progress toward meeting therapy goals. PLAN:  Will continue speech pathology intervention as per established Eri Dasilva.  Yeny DELVALLE, CCC-SLP  SP. [id number]      CPT code(s) 11819  speech/language tx  Total minutes :  60 minutes

## 2023-01-17 NOTE — PROGRESS NOTES
[address] [facility]          Phone: [phone] Fax: [phone]    Physical Therapy Daily Treatment Note  Date:  2023    Patient Name:  Adams Brown    :  1945  MRN: [id number]           Physical Therapy: Initial Evaluation    Patient: Adams Brown (41 y.o. female)   Examination Date:   Plan of Care Certification Period: 2022 to  3/6/2023     :  1945 ;    Confirmed: Yes MRN: [id number]  CSN: [id number]   Insurance: Payor: Sharona Gee / Plan: Keeley Bear / Product Type: *No Product type* /   Insurance ID: [id number] - (Medicare Managed) Secondary Insurance (if applicable):    Referring Physician: Rissa Sanchez MD     PCP: Sharmila Barba MD Visits to Date/Visits Approved:   /      No Show/Cancelled Appts:   /       Medical Diagnosis: Cochlear implant in place [Z96.21]  Unsteadiness [R26.81]    Treatment Diagnosis:         Visit# / total visits:    Pain level: 0/10   Time In:  1100  Time Out:  1145    Subjective:  Pt reports doing her \"homework\" and does not have the sensation of rocking as much lately, \"but I still feel off\". Pt's  present and states he feels she is doing much better. OBJECTIVE:     Vertebral Artery Testing in sitting: NT    Posture/Alignment: Mild forward flexed posture. Oculomotor and Vestibulo-Ocular Examination:   Spontaneous Nystagmus:  Negative  Gaze Induced Nystagmus: Negative  Smooth Pursuits: Intact  Saccades: Intact R/L, Diagonal vertical/horizontal  VOR (Slow): Intact  Head Thrust: NT    Positional Testing:   Roll Test: NT  Deepa Hallpike: Right: NT                       Left: NT  Epley Maneuver: Performed to the left 2x, no observable nystagmus noted. Pt reported slight movement of the environment but did not describe dizziness or have any other vertiginous symptoms.   Gufani Maneuver: NT    [place]ali SOP (N=Normal, S=Sway, F=Fall):   Condition #1 (Romberg eyes open): N  Condition #2 (Romberg eyes closed): S  Condition #3 (Tandem Romberg eyes open):  S  Condition #4 (Tandem Romberg eyes closed): F  Condition #5 (CTSIB eyes open):  S  Condition #6 (CTSIB eyes closed): F  Condition #7 (Stepping [place]): anterior advancement with 45° turn to the left. Functional Activities:   Transfers (sit to stand ):  5x    Gait Testing:   Dynamic Gait Index Score: NT    Gait Deviations (firm surface/linoleum): Improved gait speed, short stride length, with improved balance and safety  Assistive Device Used: No AD  Steps: NT    Strength Testing: NT    Exercises:  Exercise/Equipment Resistance/Repetitions Other comments     Ambulation with horizontal head turns 150 feet x 2      Ambulation with vertical head turns 150 feet x 2      Ambulation with horizontal head turns with word card 150 feet x 2 Mild imbalance noted                   Ambulation with L to R diagonal head turns with word card  150 feet x 2 Steady     Ambulation with R to L diagonal head turns with word card 150 feet x 2 Unsteady and unable to maintain pace                    Other Therapeutic Activities:  NA    Home Exercise Program:  Gufani Maneuver, Rolling Habituation, Ambulation with head turns and word cards    Manual Treatments:  NA    Modalities:  NA    Comments:  Pt demonstrated habituation with diagonal head turns with word cards looking up left to down to the right as she has been practicing this at home. Pt with increased imbalance and difficulty reading the cards with the opposite diagonal and true horizontal left/right movement. Pt is demonstrating less incidences of imbalance at home, but still voices frustration with \"wanting to be normal\". Pt encouraged to continue with HEP.      Time-in Time-out Total Time   89222  Ther Ex      [phone]  Neuro Re-ed        81213  Ther Activities   [id number] 18   08142  Manual Therapy       32660  E-stim       16841  Ultrasound      65490  Canalith Repositioning 5611 1353 85 Session   45       Treatment/Activity Tolerance:  [x] Patient tolerated treatment well [] Patient limited by fatigue  [] Patient limited by pain  [] Patient limited by other medical complications  [] Other:     Prognosis: [x] Good [] Fair  [] Poor    Patient Requires Follow-up: [x] Yes  [] No    Plan:   [x] Continue per plan of care [] Alter current plan (see comments)  [] Plan of care initiated [] Hold pending MD visit [] Discharge  Plan for Next Session:        Electronically signed by:    Lonnie Grewal PT, DPT  License [id number]

## 2023-01-24 ENCOUNTER — HOSPITAL ENCOUNTER (OUTPATIENT)
Dept: SPEECH THERAPY | Age: 78
Setting detail: THERAPIES SERIES
Discharge: HOME | End: 2023-01-24
Payer: MEDICARE

## 2023-01-24 ENCOUNTER — HOSPITAL ENCOUNTER (OUTPATIENT)
Dept: PHYSICAL THERAPY | Age: 78
Setting detail: THERAPIES SERIES
Discharge: HOME | End: 2023-01-24
Payer: MEDICARE

## 2023-01-24 PROCEDURE — 92507 TX SP LANG VOICE COMM INDIV: CPT | Performed by: SPEECH-LANGUAGE PATHOLOGIST

## 2023-01-24 PROCEDURE — 97530 THERAPEUTIC ACTIVITIES: CPT

## 2023-01-24 NOTE — PROGRESS NOTES
[address] [facility]          Phone: [phone] Fax: [phone]    Physical Therapy Daily Treatment Note  Date:  2023    Patient Name:  Ann Freeman    :  1945  MRN: [id number]           Physical Therapy: Initial Evaluation    Patient: Ann Freeman (71 y.o. female)   Examination Date:   Plan of Care Certification Period: 2022 to  3/6/2023     :  1945 ;    Confirmed: Yes MRN: [id number]  CSN: [id number]   Insurance: Payor: Teo Adebayo / Plan: Duy Peresau / Product Type: *No Product type* /   Insurance ID: [id number] - (Medicare Managed) Secondary Insurance (if applicable):    Referring Physician: Dot Anthony MD     PCP: Mayelin Solis MD Visits to Date/Visits Approved:   /      No Show/Cancelled Appts:   /       Medical Diagnosis: Cochlear implant in place [Z96.21]  Unsteadiness [R26.81]    Treatment Diagnosis:         Visit# / total visits:    Pain level: 0/10   Time In:  1100  Time Out:  1145    Subjective:  Pt reports having a bout of dizziness when she quickly bent down to pick something up off the floor. Pt states she had to sit for several minutes due to the dizziness. Pt continues to report mild imbalance \"as if rocking on a boat\". Pt's  states pt has an eye appointment on Monday due to pt feeling as if her eyes are pulling forward. OBJECTIVE:     Vertebral Artery Testing in sitting: NT    Posture/Alignment: Mild forward flexed posture. Oculomotor and Vestibulo-Ocular Examination:   Spontaneous Nystagmus:  Negative  Gaze Induced Nystagmus: Negative  Smooth Pursuits: Intact  Saccades: Intact R/L, Diagonal vertical/horizontal  VOR (Slow):  Intact  Head Thrust: NT    Positional Testing:   Roll Test: NT  Lithonia Hallpike: Right: NT                       Left: NT  Epley Maneuver: NT  Gufani Maneuver: NT    [place] SOP (N=Normal, S=Sway, F=Fall): NT  Condition #1 (Romberg eyes open): -  Condition #2 (Romberg eyes closed): -  Condition #3 (Tandem Romberg eyes open):  -  Condition #4 (Tandem Romberg eyes closed): -  Condition #5 (CTSIB eyes open):  -  Condition #6 (CTSIB eyes closed):  -  Condition #7 (Stepping [place] Armor): -    Functional Activities:   Transfers (sit to stand ):  5x    Gait Testing:   Dynamic Gait Index Score: NT    Gait Deviations (firm surface/linoleum): Improved gait speed, short stride length, with improved balance and safety  Assistive Device Used: No AD  Steps: NT    Strength Testing: NT    Exercises:  Exercise/Equipment Resistance/Repetitions Other comments     Ambulation with horizontal head turns 150 feet x 2      Ambulation with vertical head turns 150 feet x 2      Ambulation with horizontal head turns with word card 150 feet x 2 Mild imbalance noted, slower gait speed, lateral sway to the right                   Ambulation with L to R diagonal head turns with word card  150 feet x 2 Steady     Ambulation with R to L diagonal head turns with word card 150 feet x 2 Unsteady and unable to maintain pace      Ambulating around 6 cones placed 2 feet apart 4x      Box step around 6 cones placed 2 feet apart 4x    Step over 6 cones placed 2 feet apart 2x R foot  2x L foot     6 cones placed 2 feet apart  No LOB     Other Therapeutic Activities:  NA    Home Exercise Program:  Gufani Maneuver, Rolling Habituation, Ambulation with head turns and word cards    Manual Treatments:  NA    Modalities:  NA    Comments:  Pt continues to have difficulty pinpointing activities that lead up to imbalance or dizziness, but continues to state feeling as if she is on a rocking boat. Pt with mild to moderate lateral sway with vertical head movements while ambulating and reading the word cards and did have increased reported dizziness. Await outcome of eye appointment to determine if eye issues may contribute to imbalance.      Time-in Time-out Total Time   40874  Ther Ex      [id number]  Neuro Re-ed        86643 Ther Activities   1100 1145 45   51074  Manual Therapy       43967  E-stim       22501  Ultrasound      56953  Canalith Repositioning      Session   45       Treatment/Activity Tolerance:  [x] Patient tolerated treatment well [] Patient limited by fatigue  [] Patient limited by pain  [] Patient limited by other medical complications  [] Other:     Prognosis: [x] Good [] Fair  [] Poor    Patient Requires Follow-up: [x] Yes  [] No    Plan:   [x] Continue per plan of care [] Alter current plan (see comments)  [] Plan of care initiated [] Hold pending MD visit [] Discharge  Plan for Next Session:        Electronically signed by:    Sharmila Perdomo PT, DPT  License [id number]

## 2023-01-24 NOTE — PROGRESS NOTES
SPEECH LANGUAGE PATHOLOGY  DAILY PROGRESS NOTE        PATIENT NAME:  Laurita Quiros      :  1945          TODAY'S DATE:  2023       SUBJECTIVE:  Patient seen this date for 60 minutes speech therapy session targeting speech perception. She was accompanied by her , Sosa Peña, who remained in the treatment area. Patient was pleasant and cooperative. OBJECTIVE:        Everyday sentence identification:  In a field of 10 sentences with a known topic, patient was to choose which was read aloud. Patient achieved 80% accuracy. No repetition of stimuli. Male voice. Medium talking background noise. Everyday sentence identification:  In a field of 10 sentences with a known topic, patient was to choose which was read aloud. Patient achieved 90% accuracy. No repetition of stimuli. Female voice. Medium talking background noise. Everyday sentence identification:  In a field of 10 sentences with a known topic, patient was to choose which was read aloud. Patient achieved 70% accuracy. No repetition of stimuli. Male voice. Soft traffic background noise. Everyday sentence identification:  In a field of 10 sentences with a known topic, patient was to choose which was read aloud. Patient achieved 80% accuracy. No repetition of stimuli. Female voice. Soft traffic background noise. SLP read aloud directions changing one word- patient determined the word that was changed in a common phrase achieving 100% accuracy with no repetition. Patient correctly imitated a 3-5 word phrase presented auditorily achieving 87% (13/15) accuracy. No repetition of stimuli and no background noise used. Understanding Paragraphs: patient listened to a brief paragraph and was instructed to answer questions about details in the conversation. Patient identified the main idea 5/5 times. Good outcome with identifying detail this date. No background noise was used to complete this task.       Home Program: Patient was encouraged to spend 60 minutes everyday listening with just her cochlear implant. Patient was instructed to remove the hearing aid during this time. Encouraged patient to engage in activities that address tasks similar to this session (SLP discussed this with patient and patient's ) as well as that pair auditory with visual stimuli for example TV with closed captions, audio books paired with hard copy books, etc.         ASSESSMENT:  Making progress toward meeting therapy goals. PLAN:  Will continue speech pathology intervention as per established Rakan Gregorio.  Yeny DELVALLE, CCC-SLP  SP. [id number]      CPT code(s) 53389  speech/language tx  Total minutes :  60 minutes

## 2023-01-25 ENCOUNTER — PROCEDURE VISIT (OUTPATIENT)
Dept: AUDIOLOGY | Age: 78
End: 2023-01-25
Payer: MEDICARE

## 2023-01-25 DIAGNOSIS — H90.3 SENSORINEURAL HEARING LOSS (SNHL) OF BOTH EARS: Primary | ICD-10-CM

## 2023-01-25 PROCEDURE — 92604 REPROGRAM COCHLEAR IMPLT 7/>: CPT | Performed by: AUDIOLOGIST

## 2023-01-25 PROCEDURE — 92626 EVAL AUD FUNCJ 1ST HOUR: CPT | Performed by: AUDIOLOGIST

## 2023-01-25 NOTE — PROGRESS NOTES
Patient was here for CI follow up, after last appointment 12/22. Impedances were good. Magnet site looked good. Patient reported wearing processor all waking hours. Datalogging confirmed 13.6 hours. Patient reported she still practices with CI only daily. Patient reported she still has difficulty understanding speech in very complex situations. She reported difficulty understanding when multiple people are talking at once and when the TV is on as background noise. Patient reported [facility] is still dizzy [facility]     [facility] pure tone testing with CI only was performed and revealed hearing in the normal-mild range. RIGHT CI ONLY    AzBio Quiet 72%   AzBio +10 45%     BILATERAL    AzBio +10 64%     Significant improvement noticed from pre-CI scores. Counseld patient on progress, especially progress hearing with background noise. Slight increase in CUs overall, slight increase bass. Patient reported increased speech understanding. Patient was satisfied with loudness and comfort. No progressive MAPs given. After settings were saved and processor re-started, patient turned volume down, but counseled on continued adjustment. Programmed Kanso at this appointment. Patient would like to keep using N7 as main processor, as it is connected to the remote. Patient will follow up 3/29/23. Olvin Hartman [address] [facility] [id number]   Electronically signed by Aggie Jansen on 1/25/2023 at 5:00 PM        Note: At least 35 minutes spent face to face with patient collecting case history, performing tests, and reviewing results.

## 2023-01-31 ENCOUNTER — APPOINTMENT (OUTPATIENT)
Dept: PHYSICAL THERAPY | Age: 78
End: 2023-01-31
Payer: MEDICARE

## 2023-01-31 ENCOUNTER — HOSPITAL ENCOUNTER (OUTPATIENT)
Dept: SPEECH THERAPY | Age: 78
Setting detail: THERAPIES SERIES
Discharge: HOME | End: 2023-01-31
Payer: MEDICARE

## 2023-01-31 PROCEDURE — 92507 TX SP LANG VOICE COMM INDIV: CPT | Performed by: SPEECH-LANGUAGE PATHOLOGIST

## 2023-01-31 NOTE — PROGRESS NOTES
SPEECH LANGUAGE PATHOLOGY  DAILY PROGRESS NOTE        PATIENT NAME:  Russell Mora      :  1945          TODAY'S DATE:  2023       SUBJECTIVE:  Patient seen this date for 60 minutes speech therapy session targeting speech perception. She was accompanied by her , Joy Snell, who remained in the treatment area. Patient was pleasant and cooperative. OBJECTIVE:      Hearing aid was worn in L ear this date with CI. Everyday sentence identification:  In a field of 10 sentences with a known topic, patient was to choose which was read aloud. Patient achieved 100% accuracy. No repetition of stimuli. Male voice. Medium talking background noise. Everyday sentence identification:  In a field of 10 sentences with a known topic, patient was to choose which was read aloud. Patient achieved 80% accuracy. No repetition of stimuli. Male voice. Soft traffic background noise. Everyday sentence identification:  In a field of 10 sentences with a known topic, patient was to choose which was read aloud. Patient achieved 70% accuracy. No repetition of stimuli. Female voice. Soft traffic background noise. Patient correctly imitated a 3-5 word phrase presented auditorily achieving 80% accuracy. No repetition of stimuli and no background noise used. Understanding Paragraphs: patient listened to a brief paragraph and was instructed to answer questions about details in the conversation. Patient identified the main idea 5/5 times. Good outcome with identifying detail this date. No background noise was used to complete this task. Home Program: Patient was encouraged to spend 60 minutes everyday listening with just her cochlear implant. Patient was instructed to remove the hearing aid during this time.  Encouraged patient to engage in activities that address tasks similar to this session (SLP discussed this with patient and patient's ) as well as that pair auditory with visual stimuli for example TV with closed captions, audio books paired with hard copy books, etc.         ASSESSMENT:  Making progress toward meeting therapy goals. PLAN:  Will continue speech pathology intervention as per established Jaspal Barboza.  Yeny DELVALLE, CCC-SLP  SP. [id number]      CPT code(s) 18845  speech/language tx  Total minutes :  60 minutes

## 2023-02-04 NOTE — PROGRESS NOTES
CC:   Chief Complaint   Patient presents with    Follow-up     Pt states she is hearing better with CI. Pt states CI is echoing and she gets light headed. Noe Urrutia is a 78 y.o. female continuing with speech and PT, had an eye examination due to persistent \"unsteady\" symptoms, eye exam essentially unchanged; s/p right CI 10/26/2022 with  with an uneventful postsurgical course but has been having some prolonged unsteady/dizziness which is intermittent but does not appear to affect her daily function;  states its not noticeable when she is baking in particular  presented with asymmetric sensorineural hearing loss with recent worsening on the right; she brought her prior imaging which I reviewed and will be loaded in the system; She reports that she had hearing aids in both ears and then in April 2022 developed a sudden right hearing loss and is unable to get any benefit from her right ear. She has been concerned about losing the hearing in her left ear. She struggles with communication and states she is louder at home. She is unable to go to places that are noisy and  is concerned her hearing loss is affecting her cognitive ability      PAST MEDICAL HISTORY:   Past Medical History:   Diagnosis Date    Aortic stenosis, mild 10/01/2015    follows  Dr Juan Diego Rodríguez at [facility] last visit 9/30/22    Arrhythmia     Atrial fibrillation ([address]     Bleeding from varicose veins of right lower extremity 06/08/2017    Cerebrovascular accident (CVA) due to embolic occlusion of left middle cerebral artery ([address] 10/2015    Confirmed by neurology    Cerebrovascular accident (CVA) due to embolic occlusion of left middle cerebral artery ([address] 2013    Left parietal confirmed by neurology    Cerebrovascular disease 03/21/2013    CVA. no weakness/deficits    Diabetes mellitus ([address]     Hearing deficit     wears hearing aide left ear only    Heart disease     Hyperlipidemia     Hypertension     Iron deficiency anemia 10/23/2022    Migraine headache with aura     Mitral regurgitation 10/01/2015    mild    Moderate aortic stenosis by prior echocardiogram 2018    NCGS (non-celiac gluten sensitivity)     Severe aortic stenosis 09/25/2020    By 2D echo    TIA (transient ischemic attack)     Unspecified cerebral artery occlusion with cerebral infarction     Varicose veins of left leg with edema 06/08/2017    Venous stasis ulcer of right calf with fat layer exposed with varicose veins (HCC) 09/13/2019    Warfarin-induced coagulopathy ([address] 09/29/2015        PAST SURGICAL HISTORY:   Past Surgical History:   Procedure Laterality Date    ABDOMEN SURGERY      APPENDECTOMY      BREAST SURGERY Right     biopsy-benign    COCHLEAR IMPLANT Right 10/26/2022    RIGHT COCHLEAR IMPLANT INSERTION WITH NERVE INTEGRITY MONITOR performed by Ayaan Gonzalez MD at [address] [place] [address]  01/01/2008    ECHOCARDIOGRAM COMPLETE 2D W DOPPLER W COLOR  08/30/2012 9/30/22 at [facility]    INNER EAR SURGERY Left     Multiple surgeries    OVARY SURGERY      SMALL INTESTINE SURGERY      VEIN SURGERY          SOCIAL HISTORY:   Social History     Socioeconomic History    Marital status:      Spouse name: Not on file    Number of children: Not on file    Years of education: Not on file    Highest education level: Not on file   Occupational History    Not on file   Tobacco Use    Smoking status: Never    Smokeless tobacco: Never   Vaping Use    Vaping Use: Never used   Substance and Sexual Activity    Alcohol use: No    Drug use: No    Sexual activity: Not on file   Other Topics Concern    Not on file   Social History Narrative    Not on file     Social Determinants of Health     Financial Resource Strain: Not on file   Food Insecurity: Not on file   Transportation Needs: Not on file   Physical Activity: Not on file   Stress: Not on file   Social Connections: Not on file   Intimate Partner Violence: Not on file   Housing Stability: Not on file TOBACCO  Social History     Tobacco Use   Smoking Status Never   Smokeless Tobacco Never        ALCOHOL   Social History     Substance and Sexual Activity   Alcohol Use No        [address]   Social History     Substance and Sexual Activity   Drug Use No         CURRENT OUTPATIENT MEDICATIONS:   Outpatient Medications Marked as Taking for the 2/9/23 encounter (Office Visit) with Jorge Li MD   Medication Sig Dispense Refill    dilTIAZem (CARDIZEM CD) 240 MG extended release capsule take 1 capsule by mouth once daily      warfarin (COUMADIN) 1 MG tablet 1.5 mg warfarin daily beginning tomorrow 11/3/2022 30 tablet 3    aspirin 81 MG EC tablet Take 81 mg by mouth daily      potassium chloride (KLOR-CON) 10 MEQ extended release tablet take 1 tablet by mouth twice a day  0    furosemide (LASIX) 20 MG tablet take 1 tablet by mouth once daily  0    calcium carbonate-vitamin D3 (CALTRATE) 600-400 MG-UNIT TABS per tab Take by mouth      pravastatin (PRAVACHOL) 40 MG tablet Take 20 mg by mouth daily. niacin (SLO-NIACIN) 500 MG tablet Take 500 mg by mouth 2 times daily. omeprazole (PRILOSEC) 20 MG capsule Take 20 mg by mouth 2 times daily. therapeutic multivitamin-minerals (THERAGRAN-M) tablet Take 1 tablet by mouth daily. Vitamin D (CHOLECALCIFEROL) 1000 UNITS CAPS capsule Take 600 Units by mouth daily       metFORMIN (GLUCOPHAGE) 500 MG tablet Take 500 mg by mouth daily (with breakfast). Chromium-Cinnamon (CINNAMON PLUS CHROMIUM PO) Take 2 tablets by mouth Daily with supper. metoprolol (LOPRESSOR) 100 MG tablet Take 50 mg by mouth 2 times daily. ALLERGIES:   Allergies   Allergen Reactions    Gluten Other (See Comments)       ROS: I have reviewed the patient's medical history in detail; there are no changes to the history as noted in the electronic medical record.     Exam: /75 (Site: Right Upper Arm, Position: Sitting, Cuff Size: Large Adult)   Pulse 56   Ht 5' 3\" (1.6 m) Wt 152 lb (68.9 kg)   BMI 26.93 kg/m²   Rhoda Webster is a 78 y.o. female  appears well, in no apparent distress. Alert and oriented times three, pleasant and cooperative. Vital signs are as documented in vital signs section. Breathing comfortably, without stertor or stridor  Ear exam: Well healed right CI incision, magnet site healthy; left hearing aid; External ears normal. Canal right clear, left with cerumen, cleaned. TM's normal.  No nasal lesions, no erythema  No oral lesions. There is no palpable adenopathy or tenderness    Procedure: EUA and cleaning of the left ear    Risks and benefits including bleeding, persistent hearing loss, persistent drainage    Procedure: The left ear was visualized with the ear microscope and excessive cerumen was removed with a curette. The drum was intact, pneumatized and healthy. Complications: none    EBL: minimal      Lab Results   Component Value Date/Time    WBC 8.0 11/02/2022 01:57 AM    HGB 11.4 (L) 11/02/2022 01:57 AM    HCT 36.5 11/02/2022 01:57 AM     11/02/2022 01:57 AM    MCV 89.5 11/02/2022 01:57 AM    MCH 27.9 11/02/2022 01:57 AM    MCHC 31.2 (L) 11/02/2022 01:57 AM    RDW 17.4 (H) 11/02/2022 01:57 AM    NEUTOPHILPCT 61.9 11/02/2022 01:57 AM    LYMPHOPCT 26.4 11/02/2022 01:57 AM    MONOPCT 9.5 11/02/2022 01:57 AM    EOSRELPCT 1.5 11/02/2022 01:57 AM    BASOPCT 0.3 11/02/2022 01:57 AM    NEUTROABS 4.93 11/02/2022 01:57 AM    LYMPHSABS 2.10 11/02/2022 01:57 AM    EOSABS 0.12 11/02/2022 01:57 AM       On this date 2/9/2023 I have spent 10 minutes reviewing previous notes, test results and 30 min face to face with the patient discussing the diagnosis and importance of compliance with the treatment plan as well as documenting on the day of the visit.     A/P:    Rhoda Webster is a 78 y.o. female s/p right CI 10/26/2022 with  with an uneventful postsurgical course but has been having some prolonged unsteady/dizziness which is intermittent but does not appear to affect her daily function;  states its not noticeable when she is baking in particular  PPSV 9/1/22  Continue therapy, both speech and PT, and home therapy  No acute intervention  Follow up in 6 months    Wilian Nick MD 2/4/23 3:53 PM EST  Director Otology and Cochlear Implant Programs

## 2023-02-07 ENCOUNTER — HOSPITAL ENCOUNTER (OUTPATIENT)
Dept: PHYSICAL THERAPY | Age: 78
Setting detail: THERAPIES SERIES
Discharge: HOME | End: 2023-02-07
Payer: MEDICARE

## 2023-02-07 ENCOUNTER — HOSPITAL ENCOUNTER (OUTPATIENT)
Dept: SPEECH THERAPY | Age: 78
Setting detail: THERAPIES SERIES
Discharge: HOME | End: 2023-02-07
Payer: MEDICARE

## 2023-02-07 ENCOUNTER — TELEPHONE (OUTPATIENT)
Dept: ADMINISTRATIVE | Age: 78
End: 2023-02-07

## 2023-02-07 PROCEDURE — 95992 CANALITH REPOSITIONING PROC: CPT

## 2023-02-07 PROCEDURE — 92507 TX SP LANG VOICE COMM INDIV: CPT | Performed by: SPEECH-LANGUAGE PATHOLOGIST

## 2023-02-07 PROCEDURE — 97530 THERAPEUTIC ACTIVITIES: CPT

## 2023-02-07 NOTE — PROGRESS NOTES
SPEECH LANGUAGE PATHOLOGY  DAILY PROGRESS NOTE        PATIENT NAME:  Valere Schlatter      :  1945          TODAY'S DATE:  2023       SUBJECTIVE:  Patient seen this date for 60 minutes speech therapy session targeting speech perception. She was accompanied by her , Sabrina Kraus, who remained in the treatment area. Patient was pleasant and cooperative. OBJECTIVE:      Ear plug was worn in L ear this date with CI. Everyday sentence identification:  In a field of 10 sentences with a known topic, patient was to choose which was read aloud. Patient achieved 60% accuracy. No repetition of stimuli. Male voice. Medium talking background noise. Everyday sentence identification:  In a field of 10 sentences with a known topic, patient was to choose which was read aloud. Patient achieved 60% accuracy. No repetition of stimuli. Male voice. Soft traffic background noise. Everyday sentence identification:  In a field of 10 sentences with a known topic, patient was to choose which was read aloud. Patient achieved 70% accuracy. No repetition of stimuli. Female voice. Soft traffic background noise. Patient correctly imitated a 3-5 word phrase presented auditorily achieving 100% accuracy. No repetition of stimuli and no background noise used. Understanding Paragraphs: patient listened to a brief paragraph and was instructed to answer questions about details in the conversation. Patient identified the main idea 5/5 times. Good outcome with identifying detail this date. No background noise was used to complete this task. Home Program: Patient was encouraged to spend 60 minutes everyday listening with just her cochlear implant. Patient was instructed to remove the hearing aid during this time.  Encouraged patient to engage in activities that address tasks similar to this session (SLP discussed this with patient and patient's ) as well as that pair auditory with visual stimuli for example TV with closed captions, audio books paired with hard copy books, etc.         ASSESSMENT:  Making progress toward meeting therapy goals. PLAN:  Will continue speech pathology intervention as per established Iraida Reese.  Yeny DELVALLE, CCC-SLP  SP. [id number]      CPT code(s) 71974  speech/language tx  Total minutes :  60 minutes

## 2023-02-07 NOTE — PROGRESS NOTES
[address] [facility]          Phone: [phone] Fax: [phone]    Physical Therapy Daily Treatment Note  Date:  2023    Patient Name:  Colene Meckel    :  1945  MRN: [id number]           Physical Therapy: Initial Evaluation    Patient: Colene Meckel (7 y.o. female)   Examination Date:   Plan of Care Certification Period: 2022 to  3/6/2023     :  1945 ;    Confirmed: Yes MRN: [id number]  CSN: [id number]   Insurance: Payor: Wygia Gutierrez / Plan: [employer] / Product Type: *No Product type* /   Insurance ID: [id number] - (Medicare Managed) Secondary Insurance (if applicable):    Referring Physician: Shawnee De Dios MD     PCP: Imani Jesus MD Visits to Date/Visits Approved:   /      No Show/Cancelled Appts:   /       Medical Diagnosis: Cochlear implant in place [Z96.21]  Unsteadiness [R26.81]    Treatment Diagnosis:         Visit# / total visits:    Pain level: 0/10   Time In:  1100  Time Out:  1145    Subjective:  Pt reports having dizziness with standing up from the bed first thing in the morning the other day. Pt states she is able to ambulate and go downstairs with her cane after this happens, but feels woozy for a little while. OBJECTIVE:     Vertebral Artery Testing in sitting: NT    Posture/Alignment: Mild forward flexed posture.     Oculomotor and Vestibulo-Ocular Examination:   Spontaneous Nystagmus:  Negative  Gaze Induced Nystagmus: NT  Smooth Pursuits: NT  Saccades: NT  VOR (Slow): NT  Head Thrust: NT    Positional Testing:   Roll Test: NT°   Deepa Hallpike: Right: NT                       Left: NT  Epley Maneuver: Performed to the left, negative for nystagmus, mild dizziness with rolling to the right (switch on the wall was moving slowly)  Gufani Maneuver: NT    Tova Lazier SOP (N=Normal, S=Sway, F=Fall):   Condition #1 (Romberg eyes open): N  Condition #2 (Romberg eyes closed): S  Condition #3 (Tandem Romberg eyes open):  S  Condition #4 (Tandem Romberg eyes closed): F  Condition #5 (CTSIB eyes open):  S  Condition #6 (CTSIB eyes closed): F  Condition #7 (Stepping [place]): Advance anterior and deviation 45 to the left    Functional Activities:   Transfers (sit to stand ):  NT    Gait Testing:   Dynamic Gait Index Score: 2/7/2023  Score 21/24 which indicates minimal to no fall risk. Gait Deviations (firm surface/linoleum): Improved gait speed, short stride length, with improved balance and safety  Assistive Device Used: No AD  Steps: 8 steps with 1 rail reciprocal manner    Strength Testing: NT    Exercises:  Exercise/Equipment Resistance/Repetitions Other comments     Ambulation with horizontal head turns 150 feet x 2      Ambulation with vertical head turns 150 feet x 2      Ambulation with horizontal head turns with word card NT      Ambulation with vertical head turns with word card metronome at 60 bpm            Ambulation with L to R diagonal head turns with word card       Ambulation with R to L diagonal head turns with word card      Ambulating around 6 cones placed 2 feet apart 4x      Box step around 6 cones placed 2 feet apart 4x    Step over 6 cones placed 2 feet apart 2x R foot  2x L foot     6 cones placed 2 feet apart  No LOB     Other Therapeutic Activities:  NA    Home Exercise Program:  Gufani Maneuver, Rolling Habituation, Ambulation with head turns and word cards    Manual Treatments:  NA    Modalities:  NA    Comments:  Had lengthy discussion with patient and  regarding her reported dizziness and probable relation to CI, and not true vertigo. Pt with intermittent reports of being dizzy, but then states it is more like wooziness and rocking sensation. Pt's  states if she reports being dizzy it is typically in the morning and as she becomes occupied during the day, no c/o dizziness occur.  Pt responded to the Stepping [place] and Epley Maneuver done to the left based on that response, however, pt with very little vertiginous response. Pt improved on the Tinetti Balance with a score of 21/24 as compared to 17/24 on initial session.     Time-in Time-out Total Time   29967  Ther Ex      40985  Neuro Re-ed        05177  Ther Activities   1100 1130 30   71971  Manual Therapy       62093  E-stim       23340  Ultrasound      50646  Canalith Repositioning 1130 1145 15   Session   45       Treatment/Activity Tolerance:  [x] Patient tolerated treatment well [] Patient limited by fatigue  [] Patient limited by pain  [] Patient limited by other medical complications  [] Other:     Prognosis: [x] Good [] Fair  [] Poor    Patient Requires Follow-up: [x] Yes  [] No    Plan:   [x] Continue per plan of care [] Alter current plan (see comments)  [] Plan of care initiated [] Hold pending MD visit [] Discharge  Plan for Next Session:        Electronically signed by:    Cate Collier PT, DPT  License [id number]

## 2023-02-09 ENCOUNTER — OFFICE VISIT (OUTPATIENT)
Dept: ENT CLINIC | Age: 78
End: 2023-02-09
Payer: MEDICARE

## 2023-02-09 ENCOUNTER — PROCEDURE VISIT (OUTPATIENT)
Dept: AUDIOLOGY | Age: 78
End: 2023-02-09
Payer: MEDICARE

## 2023-02-09 VITALS
DIASTOLIC BLOOD PRESSURE: 75 MMHG | BODY MASS INDEX: 26.93 KG/M2 | HEART RATE: 56 BPM | SYSTOLIC BLOOD PRESSURE: 124 MMHG | WEIGHT: 152 LBS | HEIGHT: 63 IN

## 2023-02-09 DIAGNOSIS — H90.3 SENSORINEURAL HEARING LOSS (SNHL) OF BOTH EARS: Primary | ICD-10-CM

## 2023-02-09 DIAGNOSIS — H90.3 ASNHL (ASYMMETRICAL SENSORINEURAL HEARING LOSS): ICD-10-CM

## 2023-02-09 DIAGNOSIS — H91.91 PROFOUND HEARING LOSS OF RIGHT EAR: ICD-10-CM

## 2023-02-09 DIAGNOSIS — Z96.21 COCHLEAR IMPLANT IN PLACE: Primary | ICD-10-CM

## 2023-02-09 PROCEDURE — 99215 OFFICE O/P EST HI 40 MIN: CPT | Performed by: OTOLARYNGOLOGY

## 2023-02-09 PROCEDURE — 69210 REMOVE IMPACTED EAR WAX UNI: CPT | Performed by: OTOLARYNGOLOGY

## 2023-02-09 PROCEDURE — 3074F SYST BP LT 130 MM HG: CPT | Performed by: OTOLARYNGOLOGY

## 2023-02-09 PROCEDURE — 1090F PRES/ABSN URINE INCON ASSESS: CPT | Performed by: OTOLARYNGOLOGY

## 2023-02-09 PROCEDURE — 92604 REPROGRAM COCHLEAR IMPLT 7/>: CPT | Performed by: AUDIOLOGIST

## 2023-02-09 PROCEDURE — G8484 FLU IMMUNIZE NO ADMIN: HCPCS | Performed by: OTOLARYNGOLOGY

## 2023-02-09 PROCEDURE — 3017F COLORECTAL CA SCREEN DOC REV: CPT | Performed by: OTOLARYNGOLOGY

## 2023-02-09 PROCEDURE — G8400 PT W/DXA NO RESULTS DOC: HCPCS | Performed by: OTOLARYNGOLOGY

## 2023-02-09 PROCEDURE — 1123F ACP DISCUSS/DSCN MKR DOCD: CPT | Performed by: OTOLARYNGOLOGY

## 2023-02-09 PROCEDURE — G8417 CALC BMI ABV UP PARAM F/U: HCPCS | Performed by: OTOLARYNGOLOGY

## 2023-02-09 PROCEDURE — G8427 DOCREV CUR MEDS BY ELIG CLIN: HCPCS | Performed by: OTOLARYNGOLOGY

## 2023-02-09 PROCEDURE — 1036F TOBACCO NON-USER: CPT | Performed by: OTOLARYNGOLOGY

## 2023-02-09 PROCEDURE — 3078F DIAST BP <80 MM HG: CPT | Performed by: OTOLARYNGOLOGY

## 2023-02-09 NOTE — PROGRESS NOTES
Patient was here for CI follow up, after last appointment 1/25/23. Impedances were good. Magnet site looked good. Patient reported wearing processor all waking hours. Patient reported her own voice sounded like an echo. Patient stated that echo sometimes happened when others spoke, but mainly her voice. Connected patient to software and tilted down bass stimulation slightly. Patient read sentences aloud and was satisfied with loudness and comfort. Patient will follow up 3/29. SHITAL Brewster.   Audiology Intern (4th Year)     Aggie Lr [facility]  [address] [facility] [id number]   Electronically signed by Aggie Lr on 2/9/2023 at 4:29 PM

## 2023-02-14 ENCOUNTER — HOSPITAL ENCOUNTER (OUTPATIENT)
Dept: PHYSICAL THERAPY | Age: 78
Setting detail: THERAPIES SERIES
Discharge: HOME | End: 2023-02-14
Payer: MEDICARE

## 2023-02-14 ENCOUNTER — HOSPITAL ENCOUNTER (OUTPATIENT)
Dept: SPEECH THERAPY | Age: 78
Setting detail: THERAPIES SERIES
Discharge: HOME | End: 2023-02-14
Payer: MEDICARE

## 2023-02-14 PROCEDURE — 97530 THERAPEUTIC ACTIVITIES: CPT

## 2023-02-14 PROCEDURE — 92507 TX SP LANG VOICE COMM INDIV: CPT | Performed by: SPEECH-LANGUAGE PATHOLOGIST

## 2023-02-14 NOTE — PROGRESS NOTES
[address] [facility]          Phone: [phone] Fax: [phone]    Physical Therapy Daily Treatment Note  Date:  2023    Patient Name:  Stefan Granados    :  1945  MRN: [id number]           Physical Therapy: Initial Evaluation    Patient: Stefan Granados (89 y.o. female)   Examination Date: 86/15/1935  Plan of Care Certification Period: 2022 to  3/6/2023     :  1945 ;    Confirmed: Yes MRN: [id number]  CSN: [id number]   Insurance: Payor: Alonzo Rodríguez / Plan: Jarrod Guillaume / Product Type: *No Product type* /   Insurance ID: [id number] - (Medicare Managed) Secondary Insurance (if applicable):    Referring Physician: Jayesh Manuel MD     PCP: Lyubov Verduzco MD Visits to Date/Visits Approved:   /      No Show/Cancelled Appts:   /       Medical Diagnosis: Cochlear implant in place [Z96.21]  Unsteadiness [R26.81]    Treatment Diagnosis:         Visit# / total visits:    Pain level: 0/10   Time In:  1100  Time Out:  1115    Subjective:  Pt reports continued feeling as if on a boat, but it is not as bad as before, but just not going away. Pt voices frustration with continued feeling of imbalance, however, does admit it is more off and on and not constant.  present and reports physician for CI reports it could take up to a year for pt to fully heal from the implant and that this imbalance may take some time to go away. INITIAL STATUS: 2022  DGI 17/24  <21 indicates risk for falls and the lower the score, the higher the risk for falling  DHI  26/100  Mild handicap  Ruddy Rash - positive to the Right    CURRENT STATUS: 2023  DGI 21/24   A score of 21-24/24 indicates minimal to no fall risk.   [address]  12 Mild handicap  Ruddy Rash - very little response to positional changes left or right       Long Term Goals Completed by 4-6 weeks Goal Status   Improve DHI to <5 Improved but goal not met   Improve DGI to 24 Improved but goal not met   Pt to have no reported dizziness with positional changes No dizziness with positional changes, reports intermittent imbalance   Pt to tolerate progressive VRT exercises to improve balance Goal met, pt able to perform exercises >45 min   Pt independent with HEP Goal met       COMMENTS AND RECOMMENDATIONS: Pt seen for VRT 1 time a week for 8 sessions in the past 10 weeks. Initially pt with vertiginous response to positional changes, and responded well to the Epley Maneuver when performed. But did not have vertiginous response with subsequent Epley Maneuvers. Pt worked on higher level balance tasks, as well as VRT targets in seated, standing, and while ambulating. Pt able to perform the ambulation targets with little to no reports of dizziness, but reports it being imbalance and waves as if on a boat. Pt reports performing HEP every day and is independent with exercises. Pt has demonstrated improved gait quality and safety while in the clinic and has denied any falls at home. Pt has improved with objective measures as noted above, but do not believe pt will meet set goals due to etiology of balance deficits being related to the CI and healing. Pt to continue with HEP and reach out to PT if symptoms worsen. Will pause treatment at this time to determine if HEP is sufficient to maintain and/or improve current symptoms of imbalance while healing process continues. Thank you for the opportunity to work with your patient. If you have questions or comments, please feel free to contact me by phone or fax.       Electronically Signed by: Candi Pastrana PT, DPT License [id number]  6/77/6602            ___________________________________  2/14/2023    Physician     Date

## 2023-02-14 NOTE — PROGRESS NOTES
SPEECH LANGUAGE PATHOLOGY  DAILY PROGRESS NOTE        PATIENT NAME:  Thea Wadsworth      :  1945          TODAY'S DATE:  2023       SUBJECTIVE:  Patient seen this date for 60 minutes speech therapy session targeting speech perception. She was accompanied by her , Anjum Alexander, who remained in the treatment area. Patient was pleasant and cooperative. OBJECTIVE:      Ear plug was worn in L ear this date with CI. Everyday sentence identification:  In a field of 10 sentences with a known topic, patient was to choose which was read aloud. Patient achieved 50% accuracy. No repetition of stimuli. Male voice. Medium talking background noise. Everyday sentence identification:  In a field of 10 sentences with a known topic, patient was to choose which was read aloud. Patient achieved 90% accuracy. No repetition of stimuli. Male voice. Soft traffic background noise. Everyday sentence identification:  In a field of 10 sentences with a known topic, patient was to choose which was read aloud. Patient achieved 90% accuracy. No repetition of stimuli. Female voice. Soft traffic background noise. Patient correctly imitated a 3-5 word phrase presented auditorily achieving 80% (12/15) accuracy. No repetition of stimuli and soft-medium background noise used. Understanding Paragraphs: patient listened to a brief paragraph and was instructed to answer questions about details in the conversation. Patient identified the main idea 2/2 times. 80% accuracy identifying detail this date. Soft-medium background noise was used to complete this task. Home Program: Patient was encouraged to spend 60 minutes everyday listening with just her cochlear implant. Patient was instructed to remove the hearing aid during this time.  Encouraged patient to engage in activities that address tasks similar to this session (SLP discussed this with patient and patient's ) as well as that pair auditory with visual stimuli for example TV with closed captions, audio books paired with hard copy books, etc.         ASSESSMENT:  Making progress toward meeting therapy goals. PLAN:  Will continue speech pathology intervention as per established Viet Saini M.A., CCC-SLP  SP. [id number]      CPT code(s) 25858  speech/language tx  Total minutes :  60 minutes

## 2023-02-21 ENCOUNTER — HOSPITAL ENCOUNTER (OUTPATIENT)
Dept: SPEECH THERAPY | Age: 78
Setting detail: THERAPIES SERIES
Discharge: HOME | End: 2023-02-21
Payer: MEDICARE

## 2023-02-21 PROCEDURE — 92507 TX SP LANG VOICE COMM INDIV: CPT | Performed by: SPEECH-LANGUAGE PATHOLOGIST

## 2023-02-21 NOTE — PROGRESS NOTES
[address]  Outpatient Speech Therapy  Phone: [phone] Fax: [phone] 36[address] [facility] PLAN OF CARE        PATIENT NAME:  Terresa Collet  (female)     MRN:  [id number]    :  1945  (78 y.o.)  STATUS:  Outpatient clinic   TODAY'S DATE:  2023  REFERRING PROVIDER:    Diaz Camacho MD  SPECIFIC PROVIDER ORDER: [facility]-Speech Therapy  Date of order:  2022  EVALUATING THERAPIST: PRATIK Estes    CERTIFICATION/RECERTIFICATION PERIOD: 2023 to 23  INSURANCE PROVIDER:  Payor: Slama Shin / Plan: Natasha English / Product Type: *No Product type* /    INSURANCE ID:  [id number] - (Medicare Managed)         CPT Codes  EVALUATION: 21342 Evaluation of Speech Sound Language Comprehension     60 Minutes     TREATMENT:  Requesting treatment authorization for  12 visits over 12 weeks focusing on the following CPT codes:      94792 Speech/Language Therapy     45-60 Minutes    REFERRING/TREATMENT  DIAGNOSIS: Unspecified hearing loss, right ear [H91.91]         SUBJECTIVE  Patient attended 11 out of 12 speech therapy sessions from 2022 to 2023 , with 1 cancellations and 0 refusals. Focus of current treatment sessions has been on speech perception and auditory discrimination. OBJECTIVE / GOAL STATUS   (Status Key: GM = Goal Met, MP = Making Progress, BP = Beginning Progress, NI = Not Introduced, D/C = Discontinue Goal, NM = Not Met)  SHORT/LONG TERM GOALS     Detect all Ling-6 sounds at 3 ft., 6 ft., and 9 ft. GM     2. Discriminate between two words that differ in syllable length achieving greater than 90% accuracy. GM     3. Identify the correct word given a set of 5-10 familiar words achieving greater than 90% accuracy. GM     4. Answer questions or follow directions presented auditorily regarding a known topic achieving greater than 90% accuracy. GM     5.   Identify the correct monosyllabic word given a set of 4-6 familiar words achieving greater than 90% accuracy. GM     6. Discriminate between words in which the initial consonant differ only in voicing (minimal pairs) achieving greater than 90% accuracy. GM     7. Follow instructions presented auditorily only containing two critical elements achieving greater than 90% accuracy. GM     8. Identify the correct phrase or sentence from a set of 4-8 achieving greater than 90% accuracy. GM     9. Read aloud directions changing one word- have patient determine the word that was changed in a common phrase achieving greater than 90% accuracy. GM     10.  Minimal pairs in sentences- have the patient discriminate between the two words achieving greater than 90% accuracy. GM     11. Identify words correctly in a set of 5-8 with the same syllable structure/number achieving greater than 90% accuracy. GM     12.  Correctly imitate a 3-5 word phrase presented auditorily achieving greater than 90% accuracy. GM     13. Identify the topic of a short paragraph read aloud achieving greater than 90% accuracy. GM    ASSESSMENT / STANDARDIZED TEST RESULTS  Patient has made good progress over the past three months. Patient has made good progress hearing in silent. She continues to work on hearing in background noise. Rehab Potential: [] Excellent [x] Good [] Fair  [] Poor    PLAN OF CARE:   Continue to follow goals above utilizing the following interventions:     [] Teletherapy  [x] Direct Therapy  [x] Family Education                     [] Home Exercise Program (HEP)          NEW TREATMENT GOALS:       1. Correctly imitate a 3-5 word phrase presented auditorily with background noise (up to a medium level) achieving greater than 90% accuracy. 2. Identify sentences with background noise (up to a medium level) achieving greater than 90% accuracy.      3. Identify topic of short paragraph with background noise (up to a medium level) achieving greater than 90% accuracy. Patient and/or caregiver aware of diagnosis? Yes   Patient and/or caregiver agree with POC? Yes       Thank you for this referral. Please send a new script for continued therapy services including a diagnosis and code. Lyubov Garner. Mp Turner M.A., [facility] [phone]    2/21/2023        By Shama Arroyo MD has read the completed speech therapy updated POC and deem the plan appropriate and medically necessary for this patient.

## 2023-02-21 NOTE — PROGRESS NOTES
SPEECH LANGUAGE PATHOLOGY  DAILY PROGRESS NOTE        PATIENT NAME:  Dale Harris      :  1945          TODAY'S DATE:  2023       SUBJECTIVE:  Patient seen this date for 60 minutes speech therapy session targeting speech perception. She was accompanied by her , Michael Cosby, who remained in the treatment area. Patient was pleasant and cooperative. OBJECTIVE:      Ear plug was worn in L ear this date with CI. Everyday sentence identification:  In a field of 10 sentences with a known topic, patient was to choose which was read aloud. Patient achieved 40% accuracy. No repetition of stimuli. Male voice. Medium talking background noise. Everyday sentence identification:  In a field of 10 sentences with a known topic, patient was to choose which was read aloud. Patient achieved 90% accuracy. No repetition of stimuli. Female voice. Medium talking background noise. Patient correctly imitated a 3-5 word phrase presented auditorily achieving 71% (12/17) accuracy. No repetition of stimuli and medium background noise used. Understanding Paragraphs: patient listened to a brief paragraph and was instructed to answer questions about details in the conversation. Patient identified the main idea 3/5 times. 85% accuracy identifying detail this date. Soft-medium background noise was used to complete this task. Home Program: Patient was encouraged to spend 60 minutes everyday listening with just her cochlear implant. Patient was instructed to remove the hearing aid during this time. Encouraged patient to engage in activities that address tasks similar to this session (SLP discussed this with patient and patient's ) as well as that pair auditory with visual stimuli for example TV with closed captions, audio books paired with hard copy books, etc.         ASSESSMENT:  Making progress toward meeting therapy goals.         PLAN:  Will continue speech pathology intervention as per bre Saini M.A., CCC-SLP  SP. [id number]      CPT code(s) 14273  speech/language tx  Total minutes :  60 minutes

## 2023-02-27 ENCOUNTER — TELEPHONE (OUTPATIENT)
Dept: ENT CLINIC | Age: 78
End: 2023-02-27

## 2023-02-28 ENCOUNTER — HOSPITAL ENCOUNTER (OUTPATIENT)
Dept: SPEECH THERAPY | Age: 78
Setting detail: THERAPIES SERIES
Discharge: HOME | End: 2023-02-28
Payer: MEDICARE

## 2023-02-28 ENCOUNTER — TELEPHONE (OUTPATIENT)
Dept: AUDIOLOGY | Age: 78
End: 2023-02-28

## 2023-02-28 PROCEDURE — 92507 TX SP LANG VOICE COMM INDIV: CPT | Performed by: SPEECH-LANGUAGE PATHOLOGIST

## 2023-02-28 NOTE — TELEPHONE ENCOUNTER
Spoke with patient regarding remote not working. She reported she and  got a new battery, replaced it, and remote is now working. She reported processor and remote are still paired, she is able to change the volume with remote.

## 2023-02-28 NOTE — PROGRESS NOTES
SPEECH LANGUAGE PATHOLOGY  DAILY PROGRESS NOTE        PATIENT NAME:  Josey Room      :  1945          TODAY'S DATE:  2023       SUBJECTIVE:  Patient seen this date for 60 minutes speech therapy session targeting speech perception. She was accompanied by her , Deion Tong, who remained in the treatment area. Patient was pleasant and cooperative. OBJECTIVE:      Ear plug was worn in L ear this date with CI. Everyday sentence identification:  In a field of 10 sentences with a known topic, patient was to choose which was read aloud. Patient achieved 70% (7/10) accuracy. No repetition of stimuli. Male voice. Medium talking background noise. Patient correctly imitated a 3-5 word phrase presented auditorily achieving 76% (13/17) accuracy. No repetition of stimuli and medium background noise used. Understanding Paragraphs: patient listened to a brief paragraph and was instructed to answer questions about details in the conversation. Patient identified the main idea 4/5 times. 90% accuracy identifying detail this date. Medium background noise was used to complete this task. Home Program: Patient was encouraged to spend 60 minutes everyday listening with just her cochlear implant. Patient was instructed to remove the hearing aid during this time. Encouraged patient to engage in activities that address tasks similar to this session (SLP discussed this with patient and patient's ) as well as that pair auditory with visual stimuli for example TV with closed captions, audio books paired with hard copy books, etc.         ASSESSMENT:  Making progress toward meeting therapy goals. PLAN:  Will continue speech pathology intervention as per bre Herrera.  Yeny DELVALLE, CCC-SLP  SP. [id number]      CPT code(s) 30919  speech/language tx  Total minutes :  60 minutes

## 2023-03-07 ENCOUNTER — HOSPITAL ENCOUNTER (OUTPATIENT)
Dept: SPEECH THERAPY | Age: 78
Setting detail: THERAPIES SERIES
Discharge: HOME | End: 2023-03-07
Payer: MEDICARE

## 2023-03-07 PROCEDURE — 92507 TX SP LANG VOICE COMM INDIV: CPT | Performed by: SPEECH-LANGUAGE PATHOLOGIST

## 2023-03-07 NOTE — PROGRESS NOTES
SPEECH LANGUAGE PATHOLOGY  DAILY PROGRESS NOTE        PATIENT NAME:  Rivka Ge      :  1945          TODAY'S DATE:  3/7/2023       SUBJECTIVE:  Patient seen this date for 60 minutes speech therapy session targeting speech perception. She was accompanied by her , Mary Jane Mcintosh, who remained in the treatment area. Patient was pleasant and cooperative. OBJECTIVE:      Ear plug was worn in L ear this date with CI. Everyday sentence identification:  In a field of 10 sentences with a known topic, patient was to choose which was read aloud. Patient achieved 100% (10/10) accuracy. No repetition of stimuli. Male voice. Medium talking background noise. Patient correctly imitated a 3-5 word phrase presented auditorily achieving 90% (18/20) accuracy. No repetition of stimuli and medium background noise used. Understanding Paragraphs: patient listened to a brief paragraph and was instructed to answer questions about details in the conversation. Patient identified the main idea 5/5 times. 95% accuracy identifying detail this date. Medium background noise was used to complete this task. What Are They Talking About: Using Game[employer] platform, patient listened to a sentence about an unknown topic then answered the questions by selecting one of the multiple choice answers. Patient achieved 19/20 with no repetition of sentence stimuli. Male and female voice was used and soft talking background noise used. Home Program: Patient was encouraged to spend 60 minutes everyday listening with just her cochlear implant. Patient was instructed to remove the hearing aid during this time.  Encouraged patient to engage in activities that address tasks similar to this session (SLP discussed this with patient and patient's ) as well as that pair auditory with visual stimuli for example TV with closed captions, audio books paired with hard copy books, etc.         ASSESSMENT:  Making progress toward meeting therapy goals. PLAN:  Will continue speech pathology intervention as per established Jarocho Mcpherson.  Yeny DELVALLE, CCC-SLP  SP. [id number]      CPT code(s) 24107  speech/language tx  Total minutes :  60 minutes

## 2023-03-14 ENCOUNTER — HOSPITAL ENCOUNTER (OUTPATIENT)
Dept: SPEECH THERAPY | Age: 78
Setting detail: THERAPIES SERIES
Discharge: HOME | End: 2023-03-14
Payer: MEDICARE

## 2023-03-14 PROCEDURE — 92507 TX SP LANG VOICE COMM INDIV: CPT | Performed by: SPEECH-LANGUAGE PATHOLOGIST

## 2023-03-14 NOTE — PROGRESS NOTES
[address]  Outpatient Speech Therapy  Phone: [phone] Fax: [phone]    [facility] SUMMARY    Date of Report: 3/14/2023    Patient Name:Grecia Baird  : 1945  MRN: [id number]    Diagnosis: Unspecified hearing loss, right ear [H91.91]   Referring Physician: Angeles Best MD    SUBJECTIVE  Patient attended 4 out of 4 speech therapy sessions from 2023 to 3/14/2023 , with 0 cancellations and 0 refusals. Focus of current treatment sessions has been on speech perception and auditory discrimination. OBJECTIVE / GOAL STATUS   (Status Key: GM = Goal Met, MP = Making Progress, BP = Beginning Progress, NI = Not Introduced, D/C = Discontinue Goal, NM = Not Met)    1. Correctly imitate a 3-5 word phrase presented auditorily with background noise (up to a medium level) achieving greater than 90% accuracy. GM     2. Identify sentences with background noise (up to a medium level) achieving greater than 90% accuracy. GM     3. Identify topic of short paragraph with background noise (up to a medium level) achieving greater than 90% accuracy. GM       ASSESSMENT / STANDARDIZED TEST RESULTS  Patient has made excellent progress over the past month since her updated POC. RECOMMENDATIONS  Patient is discharged at this time as patient has met all therapy goals. Patient was encouraged to continue spending 60 minutes everyday listening with just her cochlear implant for the next 6 months. Patient was instructed to remove the hearing aid during this time. Encouraged patient to engage in activities that address tasks similar therapy sessions as well as that pair auditory with visual stimuli for example TV with closed captions, audio books paired with hard copy books, etc.     Patient and/or caregiver aware of diagnosis? Yes   Patient and/or caregiver agree with POC? Yes       Thank you for this referral.     Leobardo Strange.  Yeny DELVALLE, [address]  3/14/2023

## 2023-03-14 NOTE — PROGRESS NOTES
SPEECH LANGUAGE PATHOLOGY  DAILY PROGRESS NOTE        PATIENT NAME:  Jonah Bernard      :  1945          TODAY'S DATE:  3/14/2023       SUBJECTIVE:  Patient seen this date for 60 minutes speech therapy session targeting speech perception. She was accompanied by her , Shefali Cox, who remained in the treatment area. Patient was pleasant and cooperative. OBJECTIVE:      Ear plug was worn in L ear this date with CI. Understanding Paragraphs: patient listened to a brief paragraph and was instructed to answer questions about details in the conversation. Patient identified the main idea 5/5 times. 100% accuracy identifying detail this date. Medium background noise was used to complete this task. What Are They Talking About: Using [employer] platform, patient listened to a sentence about an unknown topic then answered the questions by selecting one of the multiple choice answers. Patient achieved 27/30 (90% accuracy) with no repetition of sentence stimuli. Male and female voice was used and soft talking background noise used. Home Program: Patient was encouraged to spend 60 minutes everyday listening with just her cochlear implant. Patient was instructed to remove the hearing aid during this time. Encouraged patient to engage in activities that address tasks similar to this session (SLP discussed this with patient and patient's ) as well as that pair auditory with visual stimuli for example TV with closed captions, audio books paired with hard copy books, etc.         ASSESSMENT:  Met therapy goals. PLAN:  Discharge from speech pathology intervention at this time. Joyce Zelaya.  Yeny DELVALLE CCC-SLP  SP. [id number]      CPT code(s) 39716  speech/language tx  Total minutes :  60 minutes

## 2023-04-24 ENCOUNTER — PROCEDURE VISIT (OUTPATIENT)
Dept: AUDIOLOGY | Age: 78
End: 2023-04-24
Payer: MEDICARE

## 2023-04-24 DIAGNOSIS — H90.3 SENSORINEURAL HEARING LOSS (SNHL) OF BOTH EARS: Primary | ICD-10-CM

## 2023-04-24 PROCEDURE — 92604 REPROGRAM COCHLEAR IMPLT 7/>: CPT | Performed by: AUDIOLOGIST

## 2023-04-24 NOTE — PROGRESS NOTES
Patient was here for CI follow up, after last appointment 2/9/23. Impedances were good. Magnet site looked good. Patient reported wearing processor all waking hours. Datalogging confirmed. Discussed patient's dizziness in depth. Patient reported she was discharged from vestibular PT, due to meeting PT goals. She reported that her objective symptoms continued. She also reported that when she gets dizzy, both ears sometimes \"plug up\". Patient reported she does not currently experience room spinning vertigo, more disequilibrium. She reported that her head is \"heavy\", that her eyes are \"not working with the rest of this\". Patient reported her dizziness does not interfere with her walking, reading, doing yardwork, or cleaning houses. She reported it is worst when she looks up. Patient reported she has not driven since the dizziness began. She reported that she had dizziness before the CI surgery, but this is different and more frequent, and that she wonders if she shouldn't have had the CI surgery. Told patient that I will discuss her current symptoms with Otologist to evaluate if patient would benefit from Neurology consult. Also recommended that patient discuss symptoms with her PCP. Patient reported improved hearing in most situations, but reported that hearing in background noise and understanding speech when multiple people are talking continues to be difficult. She reported she often lowers the volume to level 1. She reported sound quality is good. Loudness balanced, measured some Ts, slightly reduced overall volume for comfort. Patient reported improved comfort. Patient was satisfied with loudness and comfort. Fit patient with N8. Reviewed changes. Paired remote. Gave patient two programs. Program 1: Everyday/Scan, program 2: Forward Focus. Patient will follow up 8/10/23.       Aggie Coyne [facility]  [address] [facility] [id number]   Electronically signed by Aggie Coyne on 4/25/2023 at 10:09 AM

## 2023-05-03 ENCOUNTER — HOSPITAL ENCOUNTER (EMERGENCY)
Age: 78
Discharge: HOME | End: 2023-05-03
Payer: MEDICARE

## 2023-05-03 VITALS
HEIGHT: 63 IN | WEIGHT: 153 LBS | TEMPERATURE: 98.1 F | BODY MASS INDEX: 27.11 KG/M2 | HEART RATE: 78 BPM | RESPIRATION RATE: 14 BRPM | OXYGEN SATURATION: 100 % | DIASTOLIC BLOOD PRESSURE: 86 MMHG | SYSTOLIC BLOOD PRESSURE: 153 MMHG

## 2023-05-03 DIAGNOSIS — I83.899 BLEEDING FROM VARICOSE VEIN: Primary | ICD-10-CM

## 2023-05-03 DIAGNOSIS — Z79.01 ANTICOAGULATED ON COUMADIN: ICD-10-CM

## 2023-05-03 PROCEDURE — 99282 EMERGENCY DEPT VISIT SF MDM: CPT

## 2023-05-03 ASSESSMENT — LIFESTYLE VARIABLES
HOW MANY STANDARD DRINKS CONTAINING ALCOHOL DO YOU HAVE ON A TYPICAL DAY: PATIENT DOES NOT DRINK
HOW OFTEN DO YOU HAVE A DRINK CONTAINING ALCOHOL: NEVER

## 2023-05-03 NOTE — ED PROVIDER NOTES
Vita Ordaz   DD: 5/3/23  **This report was transcribed using voice recognition software. Every effort was made to ensure accuracy; however, inadvertent computerized transcription errors may be present.   END OF ED PROVIDER NOTE       Ginger Swanson PA-C  05/03/23 [id number]       Ginger Swanson PA-C  05/03/23 1306

## 2023-05-03 NOTE — ED NOTES
Varicose vein bleeding, sutures intact, bleeding stopped, wound cleansed, telfa and ace wrap applied.       Dora Gonzalez RN  05/03/23 [id number]

## 2023-05-03 NOTE — ED NOTES
Discharge instructions given. Patient verbalizes understanding. No other noted or stated problems at this time. Patient will follow up with primary care.       Radha Hinds RN  05/03/23 [id number]

## 2023-05-24 RX ORDER — ASCORBIC ACID 500 MG
1000 TABLET ORAL DAILY
COMMUNITY

## 2023-05-24 RX ORDER — LATANOPROST 50 UG/ML
1 SOLUTION/ DROPS OPHTHALMIC
COMMUNITY

## 2023-07-12 ENCOUNTER — PROCEDURE VISIT (OUTPATIENT)
Dept: AUDIOLOGY | Age: 78
End: 2023-07-12
Payer: MEDICARE

## 2023-07-12 DIAGNOSIS — H90.3 SENSORINEURAL HEARING LOSS (SNHL) OF BOTH EARS: Primary | ICD-10-CM

## 2023-07-12 PROCEDURE — 92604 REPROGRAM COCHLEAR IMPLT 7/>: CPT | Performed by: AUDIOLOGIST

## 2023-07-12 NOTE — PROGRESS NOTES
Patient was here for CI follow up, after last appointment 4/24/23. Impedances were good. Magnet site looked good. Patient reported wearing processor all waking hours. Datalogging confirmed 11.5 hours. Noted that 6 hours of this is in quiet. Patient reported her dizziness occurring mostly during bumpy car rides. Did not follow up regarding eye movements/concerns. Patient reported feeling things are too loud, even on volume 1, and C levels in the 130-150 CU range. Discussed loudness perception, especially with quiet listening environments. Patient also reported feeling that sounds are booming, like in a barrel. Measured C levels, auto NRT, maxima change. Patient reported no change in perception. Decrease low frequency C levels, and overall C levels. Patient reported significant improvement with sound quality. Patient was satisfied with loudness and comfort. Counseled on listening environemnt. Patient will follow up 8/10/23.       Aggei Art [facility]  [address] [place], [place] [id number]   Electronically signed by Aggie Art on 7/12/2023 at 4:40 PM

## 2023-08-08 NOTE — PROGRESS NOTES
CC:   Chief Complaint   Patient presents with    Follow-up     6 mo follow up CI     Juni Ansari is a 70 y.o. female s/p right CI 10/26/2022 with  with an uneventful postsurgical course but had some prolonged unsteady/dizziness which has resolved; PPSV 9/2/2022;  states its not noticeable when she is baking in particular; presented with asymmetric sensorineural hearing loss with recent worsening on the right; she brought her prior imaging which I reviewed and will be loaded in the system; She reports that she had hearing aids in both ears and then in April 2022 developed a sudden right hearing loss and is unable to get any benefit from her right ear. She has been concerned about losing the hearing in her left ear. She struggles with communication and states she is louder at home. She is unable to go to places that are noisy and  is concerned her hearing loss is affecting her cognitive ability       PAST MEDICAL HISTORY:   Past Medical History:   Diagnosis Date    Aortic stenosis, mild 10/01/2015    follows  Dr Yasmeen Matthews at [facility] last visit 9/30/22    Arrhythmia     Atrial fibrillation ([address])     Bleeding from varicose veins of right lower extremity 06/08/2017    Cerebrovascular accident (CVA) due to embolic occlusion of left middle cerebral artery ([address]) 10/2015    Confirmed by neurology    Cerebrovascular accident (CVA) due to embolic occlusion of left middle cerebral artery ([address]) 2013    Left parietal confirmed by neurology    Cerebrovascular disease 03/21/2013    CVA. no weakness/deficits    Diabetes mellitus ([address])     Hearing deficit     wears hearing aide left ear only    Heart disease     Hyperlipidemia     Hypertension     Iron deficiency anemia 10/23/2022    Migraine headache with aura     Mitral regurgitation 10/01/2015    mild    Moderate aortic stenosis by prior echocardiogram 2018    NCGS (non-celiac gluten sensitivity)     Severe aortic stenosis 09/25/2020    By 2D echo    TIA

## 2023-08-10 ENCOUNTER — OFFICE VISIT (OUTPATIENT)
Dept: ENT CLINIC | Age: 78
End: 2023-08-10
Payer: MEDICARE

## 2023-08-10 ENCOUNTER — PROCEDURE VISIT (OUTPATIENT)
Dept: AUDIOLOGY | Age: 78
End: 2023-08-10
Payer: MEDICARE

## 2023-08-10 VITALS — HEIGHT: 63 IN | WEIGHT: 153 LBS | BODY MASS INDEX: 27.11 KG/M2

## 2023-08-10 DIAGNOSIS — H57.9 VISUAL COMPLAINT: ICD-10-CM

## 2023-08-10 DIAGNOSIS — H90.3 SENSORINEURAL HEARING LOSS (SNHL) OF BOTH EARS: Primary | ICD-10-CM

## 2023-08-10 DIAGNOSIS — H91.91 PROFOUND HEARING LOSS OF RIGHT EAR: ICD-10-CM

## 2023-08-10 DIAGNOSIS — Z96.21 COCHLEAR IMPLANT IN PLACE: Primary | ICD-10-CM

## 2023-08-10 DIAGNOSIS — H90.3 ASNHL (ASYMMETRICAL SENSORINEURAL HEARING LOSS): ICD-10-CM

## 2023-08-10 PROCEDURE — 1090F PRES/ABSN URINE INCON ASSESS: CPT | Performed by: OTOLARYNGOLOGY

## 2023-08-10 PROCEDURE — G8427 DOCREV CUR MEDS BY ELIG CLIN: HCPCS | Performed by: OTOLARYNGOLOGY

## 2023-08-10 PROCEDURE — G8417 CALC BMI ABV UP PARAM F/U: HCPCS | Performed by: OTOLARYNGOLOGY

## 2023-08-10 PROCEDURE — 1036F TOBACCO NON-USER: CPT | Performed by: OTOLARYNGOLOGY

## 2023-08-10 PROCEDURE — G8400 PT W/DXA NO RESULTS DOC: HCPCS | Performed by: OTOLARYNGOLOGY

## 2023-08-10 PROCEDURE — 99215 OFFICE O/P EST HI 40 MIN: CPT | Performed by: OTOLARYNGOLOGY

## 2023-08-10 PROCEDURE — 92604 REPROGRAM COCHLEAR IMPLT 7/>: CPT | Performed by: AUDIOLOGIST

## 2023-08-10 PROCEDURE — 1123F ACP DISCUSS/DSCN MKR DOCD: CPT | Performed by: OTOLARYNGOLOGY

## 2023-08-10 NOTE — PROGRESS NOTES
Patient was here for CI follow up, after last appointment 7/12/23. Impedances were good. Magnet site looked good. Patient reported wearing processor all waking hours. Datalogging confirmed 13.4 hours. Patient reported she feels she is not hearing as well lately. She reported feeling that her own voice was too echoey. Measured M levels, slight increase overall. Slight increase to high frequencies. Patient reported improved sound quality of her own voice. Patient was satisfied with loudness and comfort. Soundfield testing was performed and revealed hearing in the normal-mild range. Pure tone testing was performed in the left ear, results indicate a moderate sloping to profound hearing loss. RIGHT    [employer] Quiet 79%   [employer] +10 36%      Counseled on test results. Improvement noted in quiet testing, decrease noted in noise testing. Counseled on practicing speech exercises at home to improve hearing in speech. Patient will follow up with [facility] in six months. Will call to schedule.       Aggie Brock [facility]  [address] [place], [place] [id number]   Electronically signed by Aggie Brock on 8/10/2023 at 1:55 PM

## 2023-10-24 ENCOUNTER — TELEPHONE (OUTPATIENT)
Dept: AUDIOLOGY | Age: 78
End: 2023-10-24

## 2023-10-24 NOTE — TELEPHONE ENCOUNTER
Patient called regarding upcoming heart surgery. She asked if she was able to have CT scan and Xray. Confirmed yes. Told her that she is able to have MRI as well, but the MRI staff usually requests information about internal. Patient reported she does not know where her MRI card is. Ordered new MRI card to her house.

## 2024-02-08 ENCOUNTER — TELEPHONE (OUTPATIENT)
Dept: ENT CLINIC | Age: 79
End: 2024-02-08

## 2024-03-06 NOTE — PROGRESS NOTES
CC:   Chief Complaint   Patient presents with    Follow-up     OV dizziness/ears feel plugged     Grecia Wilhelm is a 78 y.o. female c/o some memory loss and has recently had a heart valve replaced s/p right CI 10/26/2022 with  with an uneventful postsurgical course but had some prolonged unsteady/dizziness which has resolved; PPSV 9/2/2022;  states its not noticeable when she is baking in particular; presented with asymmetric sensorineural hearing loss with recent worsening on the right; she brought her prior imaging which I reviewed and will be loaded in the system; She reports that she had hearing aids in both ears and then in April 2022 developed a sudden right hearing loss and is unable to get any benefit from her right ear. She has been concerned about losing the hearing in her left ear. She struggles with communication and states she is louder at home. She is unable to go to places that are noisy and  is concerned her hearing loss is affecting her cognitive ability        PAST MEDICAL HISTORY:   Past Medical History:   Diagnosis Date    Aortic stenosis, mild 10/01/2015    follows  Dr Gallardo at [facility] last visit 9/30/22    Arrhythmia     Atrial fibrillation (HCC)     Bleeding from varicose veins of right lower extremity 06/08/2017    Cerebrovascular accident (CVA) due to embolic occlusion of left middle cerebral artery (HCC) 10/2015    Confirmed by neurology    Cerebrovascular accident (CVA) due to embolic occlusion of left middle cerebral artery (HCC) 2013    Left parietal confirmed by neurology    Cerebrovascular disease 03/21/2013    CVA.no weakness/deficits    Diabetes mellitus (HCC)     Hearing deficit     wears hearing aide left ear only    Heart disease     Hyperlipidemia     Hypertension     Iron deficiency anemia 10/23/2022    Migraine headache with aura     Mitral regurgitation 10/01/2015    mild    Moderate aortic stenosis by prior echocardiogram 2018    NCGS (non-celiac gluten

## 2024-03-07 ENCOUNTER — OFFICE VISIT (OUTPATIENT)
Dept: ENT CLINIC | Age: 79
End: 2024-03-07

## 2024-03-07 VITALS — BODY MASS INDEX: 27.11 KG/M2 | WEIGHT: 153 LBS | HEIGHT: 63 IN

## 2024-03-07 DIAGNOSIS — H90.3 ASNHL (ASYMMETRICAL SENSORINEURAL HEARING LOSS): ICD-10-CM

## 2024-03-07 DIAGNOSIS — Z96.21 COCHLEAR IMPLANT IN PLACE: Primary | ICD-10-CM

## 2024-03-07 DIAGNOSIS — H91.91 PROFOUND HEARING LOSS OF RIGHT EAR: ICD-10-CM

## 2024-03-07 RX ORDER — FLUTICASONE PROPIONATE 50 MCG
1 SPRAY, SUSPENSION (ML) NASAL 2 TIMES DAILY
Qty: 16 G | Refills: 5 | Status: SHIPPED | OUTPATIENT
Start: 2024-03-07

## 2024-03-18 ENCOUNTER — TRANSCRIBE ORDERS (OUTPATIENT)
Dept: ADMINISTRATIVE | Age: 79
End: 2024-03-18

## 2024-03-18 DIAGNOSIS — R41.89 COGNITIVE AND BEHAVIORAL CHANGES: ICD-10-CM

## 2024-03-18 DIAGNOSIS — R46.89 COGNITIVE AND BEHAVIORAL CHANGES: ICD-10-CM

## 2024-03-18 DIAGNOSIS — R42 DIZZINESS AND GIDDINESS: Primary | ICD-10-CM

## 2024-03-20 ENCOUNTER — HOSPITAL ENCOUNTER (OUTPATIENT)
Dept: CARDIAC REHAB | Age: 79
Setting detail: THERAPIES SERIES
Discharge: HOME | End: 2024-03-20
Payer: MEDICARE

## 2024-03-20 VITALS
SYSTOLIC BLOOD PRESSURE: 136 MMHG | HEART RATE: 75 BPM | HEIGHT: 63 IN | RESPIRATION RATE: 20 BRPM | OXYGEN SATURATION: 95 % | WEIGHT: 156.6 LBS | BODY MASS INDEX: 27.75 KG/M2 | DIASTOLIC BLOOD PRESSURE: 70 MMHG

## 2024-03-20 PROCEDURE — 93797 PHYS/QHP OP CAR RHAB WO ECG: CPT

## 2024-03-20 PROCEDURE — 93798 PHYS/QHP OP CAR RHAB W/ECG: CPT

## 2024-03-20 ASSESSMENT — PATIENT HEALTH QUESTIONNAIRE - PHQ9
9. THOUGHTS THAT YOU WOULD BE BETTER OFF DEAD, OR OF HURTING YOURSELF: NOT AT ALL
1. LITTLE INTEREST OR PLEASURE IN DOING THINGS: NOT AT ALL
8. MOVING OR SPEAKING SO SLOWLY THAT OTHER PEOPLE COULD HAVE NOTICED. OR THE OPPOSITE, BEING SO FIGETY OR RESTLESS THAT YOU HAVE BEEN MOVING AROUND A LOT MORE THAN USUAL: NOT AT ALL
SUM OF ALL RESPONSES TO PHQ QUESTIONS 1-9: 1
7. TROUBLE CONCENTRATING ON THINGS, SUCH AS READING THE NEWSPAPER OR WATCHING TELEVISION: NOT AT ALL
SUM OF ALL RESPONSES TO PHQ QUESTIONS 1-9: 1
SUM OF ALL RESPONSES TO PHQ9 QUESTIONS 1 & 2: 0
SUM OF ALL RESPONSES TO PHQ QUESTIONS 1-9: 1
3. TROUBLE FALLING OR STAYING ASLEEP: NOT AT ALL
5. POOR APPETITE OR OVEREATING: NOT AT ALL
SUM OF ALL RESPONSES TO PHQ QUESTIONS 1-9: 1
2. FEELING DOWN, DEPRESSED OR HOPELESS: NOT AT ALL
10. IF YOU CHECKED OFF ANY PROBLEMS, HOW DIFFICULT HAVE THESE PROBLEMS MADE IT FOR YOU TO DO YOUR WORK, TAKE CARE OF THINGS AT HOME, OR GET ALONG WITH OTHER PEOPLE: NOT DIFFICULT AT ALL
4. FEELING TIRED OR HAVING LITTLE ENERGY: SEVERAL DAYS
6. FEELING BAD ABOUT YOURSELF - OR THAT YOU ARE A FAILURE OR HAVE LET YOURSELF OR YOUR FAMILY DOWN: NOT AT ALL

## 2024-03-20 ASSESSMENT — EJECTION FRACTION: EF_VALUE: 60

## 2024-03-20 ASSESSMENT — EXERCISE STRESS TEST
PEAK_BP: 130/64
PEAK_BP: 130/64
PEAK_HR: 90
PEAK_RPE: 11

## 2024-03-20 NOTE — PROGRESS NOTES
03/20/24 1224   Treatment Diagnosis   Treatment Diagnosis 1 CHIRAG   CHIRAG Date 01/08/24   Treatment Diagnosis 2 HF   HF Date 01/08/24   Referral Date 01/16/24   Significant Cardiovascular History Chronic atrial fibrillation;History of heart failure   Co-morbidities Diabetes   Oxygen Saturation / Titration    Stages of Change  Maintenance   Oxygen Intervention   Oxygen Use No   O2 Sat Greater Than 90% Yes   Individual Treatment Plan   ITP Visit Type Initial assessment   1st Date of Exercise  03/20/24   ITP Next Review Date 04/15/24   Visit #/Total Visits 2/36   EF% 60 %  (EF 60 +/- 5% on echo of 1/8/24)   Risk Stratification Moderate   Exercise    Stages of Change Contemplate   Assisted Devices Cane   Test Exercise tolerance test  (Nustep Test)   Data Measured Before Walk   Heart Rate 75   Blood Pressure 136/70   O2 Saturation 95   O2 Device Room air   RPE 7   Data Measured during Walk   Indicate Mode of RPE 6 minutes   RPE Data Measured During   Symptoms   (none)   Any problems while exercising none   Do You Have Shortness of Breath No   Describe Type of Pain You Are Having none   Peak RPE 11   Data Measured Immediately After Walk   Distance Walked in  mi   Distance Walked (miles) 0.15   Distance Walked in Feet (Calculated) 792 ft   Peak Heart Rate 90   Peak Blood Pressure 130/64   RPE 11   O2 Saturation 95   Data Measured at 5 Minutes After Walk   Heart Rate 75   Blood Pressure 124/68   SpO2 96 %   Comments Pt completed exercise tolerance test on nustep with no difficulties, averaged 10 MANTILLA   Exercise Prescription   Mode Bike;Stepper;Ergometer   Frequency per week 3   Duration Per Session 30-60  (Pt exercised for 18 minutes on first day, will increase to 60 minutes per session)   Intensity METS       2-4  (Pt exercised at 1.3-2.3 METS on first day, will increase to 2-4 METS)   RPE 8-12   Progression Progress to 60 minutes of continuous aerobic exercise at an intenisty of 2-4 METS   Symptoms with Exercise

## 2024-03-20 NOTE — CARDIO/PULMONARY
Patient is here today for her initial cardiac rehab phase 2 evaluation and first day of exercise. She was having SOB, fatigue and lightheadedness. She then had a TAVR done on 1/8/24 at [facility]. Site is the right groin which is BENNIE with no drainage noted; patient denies any tenderness or hematoma to the area. Bilateral lower leg, ankle and pedal edema noted. Patient is retired from working on a farm. She uses a cane for balance and her knees when walking outside but not in the house. 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 No residual remains; no weakness present on either side. She wears glasses and is [place]; she wears a hearing aid in the left ear and has a cochlear implant on the right side. Her PHQ-9 score was a 1 for c/o feeling tired and having little energy several days per week. She denies any signs and symptoms of depression. Her outpatient falls score was a 5/8. She will be a falls risk. She has had vision changes and history of vertigo. She has not been exercising at home except for eye exercises. In the past, she used to walk years ago and ride a bike outside. She states she has never been on a treadmill. She is a diabetic who checks her blood sugars every other day at home. Last glucose check was 118 on 3/19/24; she states it runs from about 95- 110 or 115. She was instructed to eat before coming to exercise. She is also gluten free and an appointment with the dietician was offered to her. ; she is not interested at this time.

## 2024-03-20 NOTE — FLOWSHEET NOTE
Patient scored a 1 on her PHQ-9 for c/o feeling tired or having little energy several days per week. No other c/o voiced; she denies signs and symptoms of depression.

## 2024-03-25 ENCOUNTER — HOSPITAL ENCOUNTER (OUTPATIENT)
Dept: CARDIAC REHAB | Age: 79
Setting detail: THERAPIES SERIES
End: 2024-03-25
Payer: MEDICARE

## 2024-03-29 ENCOUNTER — HOSPITAL ENCOUNTER (OUTPATIENT)
Dept: CARDIAC REHAB | Age: 79
Setting detail: THERAPIES SERIES
Discharge: HOME | End: 2024-03-29
Payer: MEDICARE

## 2024-03-29 PROCEDURE — 93798 PHYS/QHP OP CAR RHAB W/ECG: CPT

## 2024-04-01 ENCOUNTER — HOSPITAL ENCOUNTER (OUTPATIENT)
Dept: CARDIAC REHAB | Age: 79
Setting detail: THERAPIES SERIES
Discharge: HOME | End: 2024-04-01
Payer: MEDICARE

## 2024-04-01 PROCEDURE — 93798 PHYS/QHP OP CAR RHAB W/ECG: CPT

## 2024-04-03 ENCOUNTER — HOSPITAL ENCOUNTER (OUTPATIENT)
Dept: CARDIAC REHAB | Age: 79
Setting detail: THERAPIES SERIES
Discharge: HOME | End: 2024-04-03
Payer: MEDICARE

## 2024-04-03 ENCOUNTER — HOSPITAL ENCOUNTER (OUTPATIENT)
Dept: MRI IMAGING | Age: 79
Discharge: HOME | End: 2024-04-05
Payer: MEDICARE

## 2024-04-03 DIAGNOSIS — R46.89 COGNITIVE AND BEHAVIORAL CHANGES: ICD-10-CM

## 2024-04-03 DIAGNOSIS — R42 DIZZINESS AND GIDDINESS: ICD-10-CM

## 2024-04-03 DIAGNOSIS — R41.89 COGNITIVE AND BEHAVIORAL CHANGES: ICD-10-CM

## 2024-04-03 PROCEDURE — 93798 PHYS/QHP OP CAR RHAB W/ECG: CPT

## 2024-04-03 PROCEDURE — 70551 MRI BRAIN STEM W/O DYE: CPT

## 2024-04-05 ENCOUNTER — HOSPITAL ENCOUNTER (OUTPATIENT)
Dept: CARDIAC REHAB | Age: 79
Setting detail: THERAPIES SERIES
Discharge: HOME | End: 2024-04-05
Payer: MEDICARE

## 2024-04-05 PROCEDURE — 93798 PHYS/QHP OP CAR RHAB W/ECG: CPT

## 2024-04-08 ENCOUNTER — HOSPITAL ENCOUNTER (OUTPATIENT)
Dept: CARDIAC REHAB | Age: 79
Setting detail: THERAPIES SERIES
Discharge: HOME | End: 2024-04-08
Payer: MEDICARE

## 2024-04-08 PROCEDURE — 93798 PHYS/QHP OP CAR RHAB W/ECG: CPT

## 2024-04-10 ENCOUNTER — HOSPITAL ENCOUNTER (OUTPATIENT)
Dept: CARDIAC REHAB | Age: 79
Setting detail: THERAPIES SERIES
Discharge: HOME | End: 2024-04-10
Payer: MEDICARE

## 2024-04-10 PROCEDURE — 93798 PHYS/QHP OP CAR RHAB W/ECG: CPT

## 2024-04-12 ENCOUNTER — HOSPITAL ENCOUNTER (OUTPATIENT)
Dept: CARDIAC REHAB | Age: 79
Setting detail: THERAPIES SERIES
Discharge: HOME | End: 2024-04-12
Payer: MEDICARE

## 2024-04-12 PROCEDURE — 93798 PHYS/QHP OP CAR RHAB W/ECG: CPT

## 2024-04-15 ENCOUNTER — HOSPITAL ENCOUNTER (OUTPATIENT)
Dept: CARDIAC REHAB | Age: 79
Setting detail: THERAPIES SERIES
Discharge: HOME | End: 2024-04-15
Payer: MEDICARE

## 2024-04-15 PROCEDURE — 93798 PHYS/QHP OP CAR RHAB W/ECG: CPT

## 2024-04-15 ASSESSMENT — EXERCISE STRESS TEST: PEAK_BP: 122/60

## 2024-04-15 ASSESSMENT — PATIENT HEALTH QUESTIONNAIRE - PHQ9
SUM OF ALL RESPONSES TO PHQ QUESTIONS 1-9: 0
9. THOUGHTS THAT YOU WOULD BE BETTER OFF DEAD, OR OF HURTING YOURSELF: NOT AT ALL
6. FEELING BAD ABOUT YOURSELF - OR THAT YOU ARE A FAILURE OR HAVE LET YOURSELF OR YOUR FAMILY DOWN: NOT AT ALL
3. TROUBLE FALLING OR STAYING ASLEEP: NOT AT ALL
SUM OF ALL RESPONSES TO PHQ9 QUESTIONS 1 & 2: 0
8. MOVING OR SPEAKING SO SLOWLY THAT OTHER PEOPLE COULD HAVE NOTICED. OR THE OPPOSITE, BEING SO FIGETY OR RESTLESS THAT YOU HAVE BEEN MOVING AROUND A LOT MORE THAN USUAL: NOT AT ALL
4. FEELING TIRED OR HAVING LITTLE ENERGY: NOT AT ALL
7. TROUBLE CONCENTRATING ON THINGS, SUCH AS READING THE NEWSPAPER OR WATCHING TELEVISION: NOT AT ALL
1. LITTLE INTEREST OR PLEASURE IN DOING THINGS: NOT AT ALL
10. IF YOU CHECKED OFF ANY PROBLEMS, HOW DIFFICULT HAVE THESE PROBLEMS MADE IT FOR YOU TO DO YOUR WORK, TAKE CARE OF THINGS AT HOME, OR GET ALONG WITH OTHER PEOPLE: NOT DIFFICULT AT ALL
2. FEELING DOWN, DEPRESSED OR HOPELESS: NOT AT ALL
5. POOR APPETITE OR OVEREATING: NOT AT ALL
SUM OF ALL RESPONSES TO PHQ QUESTIONS 1-9: 0

## 2024-04-15 ASSESSMENT — EJECTION FRACTION: EF_VALUE: 60

## 2024-04-15 NOTE — PROGRESS NOTES
Education   Education Coping techniques;Relaxation techniques   Psychosocial Target Goals   Target Goal(s) Assess presence or absence of depression using a valid screening tool;Engages in self-care behaviors   Uses Stress Mgmt Techniques Yes   Patient Stated Psychosocial Goals none stated   Nutrition   Stages of Change Action  (patient is gluten free and diabetic)   Diabetes Yes      HgbA1c 6.1   BG at Home Yes  (she checks every other day; last FBS was 118 on 3/19/24)   BG Frequency every other day   Diabetes Med(s) Glucophage   Diabetes Med(s) Change No   BG in Range Yes   Random   (drawn at 13:48 on 2/13/24)   Lipids   Date Lipids Drawn 10/22/22   Total 133   HDL 42   LDL 77   Triglycerides 69   Lipid Med(s) Pravachol   Lipid Med Change(s) No   Weight Management   Weight  68.5 kg (151 lb)   Height  1.6 m (5' 3\")   BMI 26.8   Alcohol None   Nutrition Intervention   Dietitian Consult No  (appointment with dietician offered to patient)   Nurse/Patient Discussion Yes  (Heart healthy eating packet given and discussed with patient and her )   Nutrition Class No   Diabetes Education Referral No   Lipid Clinic Referral No   Weight Management Referral No   Nutrition Education   Education Signs/symptoms/treatment of hypo/hyperglycemia;DM & CAD relationship;Low saturated fat diet;Low sodium diet;Limit added sugars;Overall healthy eating pattern that emphasizes vegetables, fruits, wholegrains, healthy proteins and non-tropical oils   Nutrition Target Goals   Target Goals LDL-C less than 70 and non-HDL-C <100 for those with ASCVD;HbA1C less than 7 percent for those with diabetes   Patient Stated Nutrition Goals maintain gluten free diet   Education   Learning Barrier Hearing;Cognitive;Ready to learn   Education Intervention   Education Schedule Given Yes   Patient Education    ACE/ARB Prescribed No   ASA Prescribed Yes   ASA Adherent Yes   Antiplatelet Prescribed Yes  (Coumadin)   Antiplatelet Adherent Yes

## 2024-04-17 ENCOUNTER — HOSPITAL ENCOUNTER (OUTPATIENT)
Dept: CARDIAC REHAB | Age: 79
Setting detail: THERAPIES SERIES
Discharge: HOME | End: 2024-04-17
Payer: MEDICARE

## 2024-04-17 PROCEDURE — 93798 PHYS/QHP OP CAR RHAB W/ECG: CPT

## 2024-04-19 ENCOUNTER — HOSPITAL ENCOUNTER (OUTPATIENT)
Dept: CARDIAC REHAB | Age: 79
Setting detail: THERAPIES SERIES
Discharge: HOME | End: 2024-04-19
Payer: MEDICARE

## 2024-04-19 PROCEDURE — 93798 PHYS/QHP OP CAR RHAB W/ECG: CPT

## 2024-04-26 ENCOUNTER — HOSPITAL ENCOUNTER (OUTPATIENT)
Dept: CARDIAC REHAB | Age: 79
Setting detail: THERAPIES SERIES
Discharge: HOME | End: 2024-04-26
Payer: MEDICARE

## 2024-04-26 PROCEDURE — 93798 PHYS/QHP OP CAR RHAB W/ECG: CPT

## 2024-04-29 ENCOUNTER — HOSPITAL ENCOUNTER (OUTPATIENT)
Dept: CARDIAC REHAB | Age: 79
Setting detail: THERAPIES SERIES
Discharge: HOME | End: 2024-04-29
Payer: MEDICARE

## 2024-04-29 PROCEDURE — 93798 PHYS/QHP OP CAR RHAB W/ECG: CPT

## 2024-05-01 ENCOUNTER — HOSPITAL ENCOUNTER (OUTPATIENT)
Dept: CARDIAC REHAB | Age: 79
Setting detail: THERAPIES SERIES
Discharge: HOME | End: 2024-05-01
Payer: MEDICARE

## 2024-05-01 PROCEDURE — 93798 PHYS/QHP OP CAR RHAB W/ECG: CPT

## 2024-05-03 ENCOUNTER — HOSPITAL ENCOUNTER (OUTPATIENT)
Dept: CARDIAC REHAB | Age: 79
Setting detail: THERAPIES SERIES
End: 2024-05-03
Payer: MEDICARE

## 2024-05-06 ENCOUNTER — HOSPITAL ENCOUNTER (OUTPATIENT)
Dept: CARDIAC REHAB | Age: 79
Setting detail: THERAPIES SERIES
Discharge: HOME | End: 2024-05-06
Payer: MEDICARE

## 2024-05-06 PROCEDURE — 93798 PHYS/QHP OP CAR RHAB W/ECG: CPT

## 2024-05-08 ENCOUNTER — HOSPITAL ENCOUNTER (OUTPATIENT)
Dept: CARDIAC REHAB | Age: 79
Setting detail: THERAPIES SERIES
Discharge: HOME | End: 2024-05-08
Payer: MEDICARE

## 2024-05-08 PROCEDURE — 93798 PHYS/QHP OP CAR RHAB W/ECG: CPT

## 2024-05-10 ENCOUNTER — HOSPITAL ENCOUNTER (OUTPATIENT)
Dept: CARDIAC REHAB | Age: 79
Setting detail: THERAPIES SERIES
Discharge: HOME | End: 2024-05-10
Payer: MEDICARE

## 2024-05-10 PROCEDURE — 93798 PHYS/QHP OP CAR RHAB W/ECG: CPT

## 2024-05-13 ENCOUNTER — HOSPITAL ENCOUNTER (OUTPATIENT)
Dept: CARDIAC REHAB | Age: 79
Setting detail: THERAPIES SERIES
Discharge: HOME | End: 2024-05-13
Payer: MEDICARE

## 2024-05-13 ENCOUNTER — TELEPHONE (OUTPATIENT)
Dept: ENT CLINIC | Age: 79
End: 2024-05-13

## 2024-05-13 PROCEDURE — 93798 PHYS/QHP OP CAR RHAB W/ECG: CPT

## 2024-05-13 NOTE — TELEPHONE ENCOUNTER
Pt daughter called requesting if Pt should still be seeing Audio/Marsha for CI \"adjustments/issues\" and such. Told daughter that Regla/Dayana would be consulted about next steps and she would receive a call back. Elizabeth, [phone]

## 2024-05-15 ENCOUNTER — HOSPITAL ENCOUNTER (OUTPATIENT)
Dept: CARDIAC REHAB | Age: 79
Setting detail: THERAPIES SERIES
Discharge: HOME | End: 2024-05-15
Payer: MEDICARE

## 2024-05-15 PROCEDURE — 93798 PHYS/QHP OP CAR RHAB W/ECG: CPT

## 2024-05-17 ENCOUNTER — HOSPITAL ENCOUNTER (OUTPATIENT)
Dept: CARDIAC REHAB | Age: 79
Setting detail: THERAPIES SERIES
Discharge: HOME | End: 2024-05-17
Payer: MEDICARE

## 2024-05-17 PROCEDURE — 93798 PHYS/QHP OP CAR RHAB W/ECG: CPT

## 2024-05-17 ASSESSMENT — PATIENT HEALTH QUESTIONNAIRE - PHQ9
SUM OF ALL RESPONSES TO PHQ QUESTIONS 1-9: 0
2. FEELING DOWN, DEPRESSED OR HOPELESS: NOT AT ALL
SUM OF ALL RESPONSES TO PHQ QUESTIONS 1-9: 0
9. THOUGHTS THAT YOU WOULD BE BETTER OFF DEAD, OR OF HURTING YOURSELF: NOT AT ALL
4. FEELING TIRED OR HAVING LITTLE ENERGY: NOT AT ALL
7. TROUBLE CONCENTRATING ON THINGS, SUCH AS READING THE NEWSPAPER OR WATCHING TELEVISION: NOT AT ALL
SUM OF ALL RESPONSES TO PHQ QUESTIONS 1-9: 0
SUM OF ALL RESPONSES TO PHQ QUESTIONS 1-9: 0
6. FEELING BAD ABOUT YOURSELF - OR THAT YOU ARE A FAILURE OR HAVE LET YOURSELF OR YOUR FAMILY DOWN: NOT AT ALL
10. IF YOU CHECKED OFF ANY PROBLEMS, HOW DIFFICULT HAVE THESE PROBLEMS MADE IT FOR YOU TO DO YOUR WORK, TAKE CARE OF THINGS AT HOME, OR GET ALONG WITH OTHER PEOPLE: NOT DIFFICULT AT ALL
5. POOR APPETITE OR OVEREATING: NOT AT ALL
3. TROUBLE FALLING OR STAYING ASLEEP: NOT AT ALL
8. MOVING OR SPEAKING SO SLOWLY THAT OTHER PEOPLE COULD HAVE NOTICED. OR THE OPPOSITE, BEING SO FIGETY OR RESTLESS THAT YOU HAVE BEEN MOVING AROUND A LOT MORE THAN USUAL: NOT AT ALL
SUM OF ALL RESPONSES TO PHQ9 QUESTIONS 1 & 2: 0

## 2024-05-17 ASSESSMENT — EJECTION FRACTION: EF_VALUE: 60

## 2024-05-17 ASSESSMENT — EXERCISE STRESS TEST: PEAK_BP: 124/50

## 2024-05-17 NOTE — PROGRESS NOTES
05/17/24 1537   Treatment Diagnosis   Treatment Diagnosis 1 CHIRAG   CHIRAG Date 01/08/24   Treatment Diagnosis 2 HF   HF Date 01/08/24   Referral Date 01/16/24   Significant Cardiovascular History Chronic atrial fibrillation;History of heart failure   Co-morbidities Diabetes   Oxygen Saturation / Titration    Stages of Change  Maintenance   Oxygen Intervention   Oxygen Use No   O2 Sat Greater Than 90% Yes   Individual Treatment Plan   ITP Visit Type Re-assessment   1st Date of Exercise  03/20/24   ITP Next Review Date 06/14/24   Visit #/Total Visits 21/36   EF% 60 %  (EF 60 +/- 5% on echo of 1/8/24)   Risk Stratification Moderate   Exercise    Stages of Change Contemplate   Assisted Devices Cane   Test Exercise tolerance test  (Nustep Test)   Exercise Prescription   Mode Bike;Stepper;Ergometer   Frequency per week 3   Duration Per Session 60 minutes  (Pt exercised for 60 minutes as of /17/24)   Intensity METS       2-4  (Pt exercised at 1.3-2.3 METS on 5/17)   RPE 8-12   Progression Progress to 60 minutes of continuous aerobic exercise at an intenisty of 2-4 METS   Symptoms with Exercise   (none)   Target Heart Rate   (UNM -10%)   Resistance Training No   Exercise Blood Pressures   Resting /50   Peak /50   Is BP WDL Yes   Exercise Activity at Home   Type none   Exercise Education   Education Exercise safety;Signs/symptoms to report;RPE scale;Equipment orientation;Warm up/cool down;Physically active   Exercise Target Goal   Target Goal(s) Individual exercise RX   Patient Stated Exercise Goals Increase aerobic capacity   Psychosocial   Stages of Change Maintenance   PHQ-9 Total Score 0   Psychosocial Intervention   Interventions No intervention indicated   Currently Taking Psychotropic Meds No   Medication Changes No   Psychosocial Education   Education Coping techniques;Relaxation techniques   Psychosocial Target Goals   Target Goal(s) Assess presence or absence of depression using a valid screening

## 2024-05-20 ENCOUNTER — HOSPITAL ENCOUNTER (OUTPATIENT)
Dept: CARDIAC REHAB | Age: 79
Setting detail: THERAPIES SERIES
End: 2024-05-20
Payer: MEDICARE

## 2024-05-22 ENCOUNTER — APPOINTMENT (OUTPATIENT)
Dept: CARDIAC REHAB | Age: 79
End: 2024-05-22
Payer: MEDICARE

## 2024-05-24 ENCOUNTER — HOSPITAL ENCOUNTER (OUTPATIENT)
Dept: CARDIAC REHAB | Age: 79
Setting detail: THERAPIES SERIES
Discharge: HOME | End: 2024-05-24
Payer: MEDICARE

## 2024-05-24 PROCEDURE — 93798 PHYS/QHP OP CAR RHAB W/ECG: CPT

## 2024-05-29 ENCOUNTER — HOSPITAL ENCOUNTER (OUTPATIENT)
Dept: CARDIAC REHAB | Age: 79
Setting detail: THERAPIES SERIES
Discharge: HOME | End: 2024-05-29
Payer: MEDICARE

## 2024-05-29 PROCEDURE — 93798 PHYS/QHP OP CAR RHAB W/ECG: CPT

## 2024-05-31 ENCOUNTER — HOSPITAL ENCOUNTER (OUTPATIENT)
Dept: CARDIAC REHAB | Age: 79
Setting detail: THERAPIES SERIES
Discharge: HOME | End: 2024-05-31
Payer: MEDICARE

## 2024-05-31 PROCEDURE — 93798 PHYS/QHP OP CAR RHAB W/ECG: CPT

## 2024-06-03 ENCOUNTER — HOSPITAL ENCOUNTER (OUTPATIENT)
Dept: CARDIAC REHAB | Age: 79
Setting detail: THERAPIES SERIES
Discharge: HOME | End: 2024-06-03
Payer: MEDICARE

## 2024-06-03 PROCEDURE — 93798 PHYS/QHP OP CAR RHAB W/ECG: CPT

## 2024-06-05 ENCOUNTER — HOSPITAL ENCOUNTER (OUTPATIENT)
Dept: CARDIAC REHAB | Age: 79
Setting detail: THERAPIES SERIES
Discharge: HOME | End: 2024-06-05
Payer: MEDICARE

## 2024-06-05 PROCEDURE — 93798 PHYS/QHP OP CAR RHAB W/ECG: CPT

## 2024-06-07 ENCOUNTER — HOSPITAL ENCOUNTER (OUTPATIENT)
Dept: CARDIAC REHAB | Age: 79
Setting detail: THERAPIES SERIES
Discharge: HOME | End: 2024-06-07
Payer: MEDICARE

## 2024-06-07 PROCEDURE — 93798 PHYS/QHP OP CAR RHAB W/ECG: CPT

## 2024-06-10 ENCOUNTER — HOSPITAL ENCOUNTER (OUTPATIENT)
Dept: CARDIAC REHAB | Age: 79
Setting detail: THERAPIES SERIES
Discharge: HOME | End: 2024-06-10
Payer: MEDICARE

## 2024-06-10 PROCEDURE — 93798 PHYS/QHP OP CAR RHAB W/ECG: CPT

## 2024-06-12 ENCOUNTER — HOSPITAL ENCOUNTER (OUTPATIENT)
Dept: CARDIAC REHAB | Age: 79
Setting detail: THERAPIES SERIES
Discharge: HOME | End: 2024-06-12
Payer: MEDICARE

## 2024-06-12 PROCEDURE — 93798 PHYS/QHP OP CAR RHAB W/ECG: CPT

## 2024-06-13 ENCOUNTER — PROCEDURE VISIT (OUTPATIENT)
Dept: AUDIOLOGY | Age: 79
End: 2024-06-13
Payer: MEDICARE

## 2024-06-13 DIAGNOSIS — H90.3 SENSORINEURAL HEARING LOSS (SNHL) OF BOTH EARS: Primary | ICD-10-CM

## 2024-06-13 PROCEDURE — 92604 REPROGRAM COCHLEAR IMPLT 7/>: CPT

## 2024-06-14 ENCOUNTER — HOSPITAL ENCOUNTER (OUTPATIENT)
Dept: CARDIAC REHAB | Age: 79
Setting detail: THERAPIES SERIES
Discharge: HOME | End: 2024-06-14
Payer: MEDICARE

## 2024-06-14 PROCEDURE — 93798 PHYS/QHP OP CAR RHAB W/ECG: CPT

## 2024-06-14 ASSESSMENT — PATIENT HEALTH QUESTIONNAIRE - PHQ9
SUM OF ALL RESPONSES TO PHQ QUESTIONS 1-9: 0
7. TROUBLE CONCENTRATING ON THINGS, SUCH AS READING THE NEWSPAPER OR WATCHING TELEVISION: NOT AT ALL
9. THOUGHTS THAT YOU WOULD BE BETTER OFF DEAD, OR OF HURTING YOURSELF: NOT AT ALL
4. FEELING TIRED OR HAVING LITTLE ENERGY: NOT AT ALL
6. FEELING BAD ABOUT YOURSELF - OR THAT YOU ARE A FAILURE OR HAVE LET YOURSELF OR YOUR FAMILY DOWN: NOT AT ALL
5. POOR APPETITE OR OVEREATING: NOT AT ALL
SUM OF ALL RESPONSES TO PHQ QUESTIONS 1-9: 0
SUM OF ALL RESPONSES TO PHQ QUESTIONS 1-9: 0
3. TROUBLE FALLING OR STAYING ASLEEP: NOT AT ALL
SUM OF ALL RESPONSES TO PHQ QUESTIONS 1-9: 0
2. FEELING DOWN, DEPRESSED OR HOPELESS: NOT AT ALL
1. LITTLE INTEREST OR PLEASURE IN DOING THINGS: NOT AT ALL
10. IF YOU CHECKED OFF ANY PROBLEMS, HOW DIFFICULT HAVE THESE PROBLEMS MADE IT FOR YOU TO DO YOUR WORK, TAKE CARE OF THINGS AT HOME, OR GET ALONG WITH OTHER PEOPLE: NOT DIFFICULT AT ALL
8. MOVING OR SPEAKING SO SLOWLY THAT OTHER PEOPLE COULD HAVE NOTICED. OR THE OPPOSITE, BEING SO FIGETY OR RESTLESS THAT YOU HAVE BEEN MOVING AROUND A LOT MORE THAN USUAL: NOT AT ALL
SUM OF ALL RESPONSES TO PHQ9 QUESTIONS 1 & 2: 0

## 2024-06-14 ASSESSMENT — EJECTION FRACTION: EF_VALUE: 60

## 2024-06-14 ASSESSMENT — EXERCISE STRESS TEST: PEAK_BP: 132/60

## 2024-06-14 NOTE — PROGRESS NOTES
06/14/24 1400   Treatment Diagnosis   Treatment Diagnosis 1 CHIRAG   CHIRAG Date 01/08/24   Treatment Diagnosis 2 HF   HF Date 01/08/24   Referral Date 01/16/24   Significant Cardiovascular History Chronic atrial fibrillation;History of heart failure   Co-morbidities Diabetes   Oxygen Saturation / Titration    Stages of Change  Maintenance   Oxygen Intervention   Oxygen Use No   O2 Sat Greater Than 90% Yes   Individual Treatment Plan   ITP Visit Type Re-assessment   1st Date of Exercise  03/20/24   ITP Next Review Date 07/15/24   Visit #/Total Visits 30/36   EF% 60 %  (EF 60 +/- 5% on echo of 1/8/24)   Risk Stratification Moderate   Exercise    Stages of Change Maintenance   Assisted Devices Cane   Exercise Prescription   Mode Bike;Stepper;Ergometer   Frequency per week 3   Duration Per Session 60 minutes   Intensity METS       2-4  (Pt exercised at 1.3-2.3 METS on 5/17)   RPE 8-12   Progression Progress to 60 minutes of continuous aerobic exercise at an intenisty of 2-4 METS  (Pt exercised for 60 min at 1.7-2.6  METs on 6-)   Symptoms with Exercise   (none)   Target Heart Rate   (UNM -10%)   Resistance Training No   Exercise Blood Pressures   Resting /56   Peak /60   Is BP WDL Yes   Exercise Activity at Home   Type encourage home exercise   Exercise Education   Education Exercise safety;Signs/symptoms to report;RPE scale;Equipment orientation;Warm up/cool down;Physically active   Exercise Target Goal   Target Goal(s) Individual exercise RX   Patient Stated Exercise Goals Increase aerobic capacity   Psychosocial   Stages of Change Maintenance   PHQ-9 Total Score 0   Psychosocial Intervention   Interventions No intervention indicated   Currently Taking Psychotropic Meds No   Medication Changes No   Psychosocial Education   Education Coping techniques;Relaxation techniques   Psychosocial Target Goals   Target Goal(s) Assess presence or absence of depression using a valid screening tool;Engages

## 2024-06-14 NOTE — PROGRESS NOTES
Contacted Cochlear Audiology Representative for appointment.     Patient was here for CI follow up, after last appointment 8/10/23. Impedances were good. Magnet site looked good.    Patient reported wearing processor all waking hours. Datalogging confirmed 13.7 hours.    Patient stated that overall sound has decreased and that she sounds like she is in a barrel with an echo. Patient also stated that other voices sound like in a barrel. Patient stated that she was at a family reunion this past weekend and was able to hear background noise over other's voices.    Measured each individual electrode (C's) and was able to pinpoint electrode 7 (sounds more scratchy than a beep) and disable that electrode. Turned overall C levels down. Patient satisfied with change and felt echo/barrel like sounds were no longer there. Patient was satisfied with loudness and comfort.    Counseled patient on how to use remote for volume and programs (forward focus = program 2).    Battery estimation was 15 hours.     Patient will follow up 9/5.    Aggie Fonseca/CCC-A  [place] Lic [id number]  Electronically signed by Aggie Fonseca on 6/14/2024 at 8:26 AM

## 2024-06-17 ENCOUNTER — APPOINTMENT (OUTPATIENT)
Dept: CARDIAC REHAB | Age: 79
End: 2024-06-17
Payer: MEDICARE

## 2024-06-19 ENCOUNTER — HOSPITAL ENCOUNTER (OUTPATIENT)
Dept: CARDIAC REHAB | Age: 79
Setting detail: THERAPIES SERIES
Discharge: HOME | End: 2024-06-19
Payer: MEDICARE

## 2024-06-19 PROCEDURE — 93798 PHYS/QHP OP CAR RHAB W/ECG: CPT

## 2024-06-21 ENCOUNTER — HOSPITAL ENCOUNTER (OUTPATIENT)
Dept: CARDIAC REHAB | Age: 79
Setting detail: THERAPIES SERIES
Discharge: HOME | End: 2024-06-21
Payer: MEDICARE

## 2024-06-21 PROCEDURE — 93798 PHYS/QHP OP CAR RHAB W/ECG: CPT

## 2024-06-24 ENCOUNTER — HOSPITAL ENCOUNTER (OUTPATIENT)
Dept: CARDIAC REHAB | Age: 79
Setting detail: THERAPIES SERIES
Discharge: HOME | End: 2024-06-24
Payer: MEDICARE

## 2024-06-24 PROCEDURE — 93798 PHYS/QHP OP CAR RHAB W/ECG: CPT

## 2024-06-26 ENCOUNTER — HOSPITAL ENCOUNTER (OUTPATIENT)
Dept: CARDIAC REHAB | Age: 79
Setting detail: THERAPIES SERIES
Discharge: HOME | End: 2024-06-26
Payer: MEDICARE

## 2024-06-26 PROCEDURE — 93798 PHYS/QHP OP CAR RHAB W/ECG: CPT

## 2024-06-28 ENCOUNTER — HOSPITAL ENCOUNTER (OUTPATIENT)
Dept: CARDIAC REHAB | Age: 79
Setting detail: THERAPIES SERIES
Discharge: HOME | End: 2024-06-28
Payer: MEDICARE

## 2024-06-28 PROCEDURE — 93798 PHYS/QHP OP CAR RHAB W/ECG: CPT

## 2024-07-01 ENCOUNTER — HOSPITAL ENCOUNTER (OUTPATIENT)
Dept: CARDIAC REHAB | Age: 79
Setting detail: THERAPIES SERIES
Discharge: HOME | End: 2024-07-01
Payer: MEDICARE

## 2024-07-01 PROCEDURE — 93798 PHYS/QHP OP CAR RHAB W/ECG: CPT

## 2024-07-01 ASSESSMENT — PATIENT HEALTH QUESTIONNAIRE - PHQ9
8. MOVING OR SPEAKING SO SLOWLY THAT OTHER PEOPLE COULD HAVE NOTICED. OR THE OPPOSITE, BEING SO FIGETY OR RESTLESS THAT YOU HAVE BEEN MOVING AROUND A LOT MORE THAN USUAL: NOT AT ALL
SUM OF ALL RESPONSES TO PHQ QUESTIONS 1-9: 0
SUM OF ALL RESPONSES TO PHQ QUESTIONS 1-9: 0
7. TROUBLE CONCENTRATING ON THINGS, SUCH AS READING THE NEWSPAPER OR WATCHING TELEVISION: NOT AT ALL
SUM OF ALL RESPONSES TO PHQ QUESTIONS 1-9: 0
3. TROUBLE FALLING OR STAYING ASLEEP: NOT AT ALL
4. FEELING TIRED OR HAVING LITTLE ENERGY: NOT AT ALL
2. FEELING DOWN, DEPRESSED OR HOPELESS: NOT AT ALL
5. POOR APPETITE OR OVEREATING: NOT AT ALL
9. THOUGHTS THAT YOU WOULD BE BETTER OFF DEAD, OR OF HURTING YOURSELF: NOT AT ALL
10. IF YOU CHECKED OFF ANY PROBLEMS, HOW DIFFICULT HAVE THESE PROBLEMS MADE IT FOR YOU TO DO YOUR WORK, TAKE CARE OF THINGS AT HOME, OR GET ALONG WITH OTHER PEOPLE: NOT DIFFICULT AT ALL
SUM OF ALL RESPONSES TO PHQ9 QUESTIONS 1 & 2: 0
6. FEELING BAD ABOUT YOURSELF - OR THAT YOU ARE A FAILURE OR HAVE LET YOURSELF OR YOUR FAMILY DOWN: NOT AT ALL
1. LITTLE INTEREST OR PLEASURE IN DOING THINGS: NOT AT ALL
SUM OF ALL RESPONSES TO PHQ QUESTIONS 1-9: 0

## 2024-07-01 ASSESSMENT — EXERCISE STRESS TEST
PEAK_BP: 134/60
PEAK_RPE: 11

## 2024-07-01 ASSESSMENT — EJECTION FRACTION: EF_VALUE: 60

## 2024-07-01 NOTE — PROGRESS NOTES
07/01/24 1500   Treatment Diagnosis   Treatment Diagnosis 1 CHIRAG   CHIRAG Date 01/08/24   Treatment Diagnosis 2 HF   HF Date 01/08/24   Referral Date 01/16/24   Significant Cardiovascular History Chronic atrial fibrillation;History of heart failure   Co-morbidities Diabetes   Oxygen Saturation / Titration    Stages of Change  Maintenance   Oxygen Intervention   Oxygen Use No   O2 Sat Greater Than 90% Yes   Individual Treatment Plan   ITP Visit Type Discharge, completed program  (Pt completed 36/36 sessions and is going to continue exercise at home)   1st Date of Exercise  03/20/24   ITP Next Review Date 07/15/24   Visit #/Total Visits 30/36   EF% 60 %  (EF 60 +/- 5% on echo of 1/8/24)   Risk Stratification Moderate   ITP Exercise;Education   Exercise    Stages of Change Maintenance   Assisted Devices Cane   Test Exercise tolerance test  (pt completed 6MWT on Nstep at 13 Wetzel and level 2)   Data Measured Before Walk   Heart Rate 62   Blood Pressure 120/60   O2 Saturation 96   O2 Device Room air   RPE 11   Data Measured during Walk   Indicate Mode of RPE 6 minutes   RPE Data Measured During   Symptoms   (none)   Any problems while exercising none   Do You Have Shortness of Breath No   Describe Type of Pain You Are Having none   Peak RPE 11   Exercise Prescription   Mode Bike;Stepper;Ergometer   Frequency per week 3   Duration Per Session 60 minutes   Intensity METS       2-4   RPE 8-12   Progression Progress to 60 minutes of continuous aerobic exercise at an intenisty of 2-4 METS  (Pt exercised for 60 min at 1.7-2.6  METs on 7-1-2024)   Symptoms with Exercise   (none)   Target Heart Rate   (UNM -10%)   Resistance Training No   Exercise Blood Pressures   Resting /60   Peak /60   Exercise Activity at Home   Type encourage home exercise   Exercise Education   Education Exercise safety;Signs/symptoms to report;RPE scale;Equipment orientation;Warm up/cool down;Physically active   Exercise Target Goal

## 2024-09-05 ENCOUNTER — PROCEDURE VISIT (OUTPATIENT)
Dept: AUDIOLOGY | Age: 79
End: 2024-09-05
Payer: MEDICARE

## 2024-09-05 DIAGNOSIS — H90.3 SENSORINEURAL HEARING LOSS (SNHL) OF BOTH EARS: Primary | ICD-10-CM

## 2024-09-05 PROCEDURE — 92604 REPROGRAM COCHLEAR IMPLT 7/>: CPT

## 2024-09-05 NOTE — PROGRESS NOTES
Patient was here for CI follow up, after last appointment 6/13. Impedances were good. Magnet site looked good.    Patient reported wearing processor all waking hours. Datalogging confirmed 13.8 hours.    Patient stated that she still had a barrel like sound. NOTE: patient reported when she left the office at previous appointment that barrel like sound was taken away. Looked at patient's remote, patient was on program 2, which is only supposed to be utilized in noise. Changed patient to program 1 and adjusted on software. Decreased bass and overall volume. Patient stated that she did not have as much clarity and volume needed increased. Increased volume overall. Patient stated that she has difficulty hearing her  and sons. Counseled patient on certain clarity of sounds in the lower frequencies are going to be taken away due to barrel like sound quality. Going to have representative at next appointment for patient support if needed. Patient satisfied with this plan.     Counseled patient on utilizing remote for programs and volume. Explained to patient that program 1 is everyday program, where as program 2 is meant for noisy situations.     Patient will follow up 12/5.    Aggie Fonseca/CCC-A  [facility] [id number]  Electronically signed by Aggie Fonseca on 9/5/2024 at 3:29 PM

## 2024-09-30 ENCOUNTER — TELEPHONE (OUTPATIENT)
Dept: AUDIOLOGY | Age: 79
End: 2024-09-30

## 2024-09-30 NOTE — TELEPHONE ENCOUNTER
Called and LVM regarding \"square thing\" that attached to cochlear. Assuming it is a battery, will get that ordered and sent to patient.     Electronically signed by Aggie Fonseca on 9/30/2024 at 10:07 AM

## 2024-09-30 NOTE — TELEPHONE ENCOUNTER
Patient called and stated that she was supposed to have a \"square thing that goes on cochlear\" ordered and she has not gotten anything yet.     Please advise

## 2024-10-15 ENCOUNTER — PROCEDURE VISIT (OUTPATIENT)
Dept: AUDIOLOGY | Age: 79
End: 2024-10-15

## 2024-10-15 DIAGNOSIS — H90.3 SENSORINEURAL HEARING LOSS (SNHL) OF BOTH EARS: Primary | ICD-10-CM

## 2024-10-15 PROCEDURE — 99024 POSTOP FOLLOW-UP VISIT: CPT

## 2024-10-16 NOTE — PROGRESS NOTES
Patient came in due to battery issues with cochlear implant. Checked batteries and processor, and all looked great and working well. Patient reported that she is having difficulty finding her internal magnet to turn it on, and thought that was the battery. Explained the difference between the magnet and the battery, and replaced the magnet with a strength 1(I) magnet. Patient satisfied with strength and felt better knowing everything was working as it should.     Aggie Fonseca/CCC-A  [place] Lic [id number]  Electronically signed by Aggie Fonseca on 10/16/2024 at 1:01 PM

## 2024-12-05 ENCOUNTER — OFFICE VISIT (OUTPATIENT)
Dept: ENT CLINIC | Age: 79
End: 2024-12-05
Payer: MEDICARE

## 2024-12-05 ENCOUNTER — PROCEDURE VISIT (OUTPATIENT)
Dept: AUDIOLOGY | Age: 79
End: 2024-12-05

## 2024-12-05 VITALS
HEART RATE: 57 BPM | BODY MASS INDEX: 25.98 KG/M2 | SYSTOLIC BLOOD PRESSURE: 144 MMHG | WEIGHT: 146.6 LBS | HEIGHT: 63 IN | DIASTOLIC BLOOD PRESSURE: 67 MMHG

## 2024-12-05 DIAGNOSIS — H90.3 SENSORINEURAL HEARING LOSS (SNHL) OF BOTH EARS: Primary | ICD-10-CM

## 2024-12-05 DIAGNOSIS — H61.22 IMPACTED CERUMEN OF LEFT EAR: Primary | ICD-10-CM

## 2024-12-05 PROCEDURE — 69210 REMOVE IMPACTED EAR WAX UNI: CPT | Performed by: OTOLARYNGOLOGY

## 2024-12-06 NOTE — PROGRESS NOTES
Cochlear representative virtually present for today's appointment.    Patient was here for CI follow up, after last appointment 9/5. Impedances were good. Magnet site looked good.    Counseled patient on how to find magnet on the top of her head located above the ear. Patient stated she has been having problems with attaching it to her head.    Patient reported wearing processor all waking hours. Datalogging confirmed 13.8 hours.    Patient was having difficulty with barrel-like sounds with her hearing when she talks. Patient stated that she went to her family doctor and he stated that she had a significant amount of cerumen in her left ear (hearing aid ear). Otoscopy revealed significant cerumen with no visual of the eardrum. The right ear (CI) was clear, cone of light visualized. Explained to patient that this could be the cause of the barrel-like sounds, as well as the dizziness she may be having.     ENT on schedule (Dr. Van) removed patient's cerumen. Patient reported that she does not hear the barrel sounds at this time. Patient was satisfied with loudness and comfort. Did not make any changes to hearing today due to barrel quality going away.     Battery estimation was 17 hours.     Patient will follow up as needed or when barrel-like sound quality begins. Patient counseled on taking hearing aid out when that sound returns (if it returns) and see if she still has that sound with just her cochlear implant on.    Aggie Fonseca/CCC-A  [place] Lic [id number]  Electronically signed by Aggie Fonseca on 12/6/2024 at 7:54 AM

## 2024-12-09 ASSESSMENT — ENCOUNTER SYMPTOMS
RHINORRHEA: 0
RESPIRATORY NEGATIVE: 1
ALLERGIC/IMMUNOLOGIC NEGATIVE: 1
SORE THROAT: 0

## 2024-12-09 NOTE — PROGRESS NOTES
Department of Otolaryngology  Office Consult Note  12/9/24          Subjective:        Chief Complaint:  had concerns including Follow-up (cerumen/).     Patient ID: Grecia Wilhelm is a 79 y.o. female.    HPI: Patient presents as  established patient for cerumen removal.  Patient was following with audiology for CI activation and has noticed muffled hearing mostly from her left ear.  Cerumen was noted.  She has otherwise been doing well with no complications from the CI site    Review of Systems   Constitutional: Negative.    HENT:  Positive for hearing loss. Negative for congestion, ear discharge, ear pain, rhinorrhea, sneezing and sore throat.    Respiratory: Negative.     Cardiovascular:  Negative for chest pain.   Musculoskeletal: Negative.    Skin: Negative.    Allergic/Immunologic: Negative.    Neurological: Negative.    Psychiatric/Behavioral: Negative.     All other systems reviewed and are negative.        Past Medical History:   Diagnosis Date    Aortic stenosis, mild 10/01/2015    follows  Dr Gallardo at [facility] last visit 9/30/22    Arrhythmia     Arthritis     knees    Atrial fibrillation (HCC)     Bleeding from varicose veins of right lower extremity 06/08/2017    CAD (coronary artery disease)     Cerebrovascular accident (CVA) due to embolic occlusion of left middle cerebral artery (HCC) 10/2015    Confirmed by neurology    Cerebrovascular accident (CVA) due to embolic occlusion of left middle cerebral artery (HCC) 2013    Left parietal confirmed by neurology    Cerebrovascular disease 03/21/2013    CVA.no weakness/deficits    CHF (congestive heart failure) (HCC)     Diabetes mellitus (HCC)     Hearing deficit     wears hearing aide left ear only    Heart disease     Hyperlipidemia     Hypertension     Iron deficiency anemia 10/23/2022    Migraine headache with aura     Mitral regurgitation 10/01/2015    mild    Moderate aortic stenosis by prior echocardiogram 2018    NCGS (non-celiac gluten

## 2025-02-22 ENCOUNTER — HOSPITAL ENCOUNTER (INPATIENT)
Age: 80
LOS: 10 days | Discharge: NURSING, REHAB OR LONG-TERM CARE | DRG: 271 | End: 2025-03-04
Attending: STUDENT IN AN ORGANIZED HEALTH CARE EDUCATION/TRAINING PROGRAM | Admitting: INTERNAL MEDICINE
Payer: MEDICARE

## 2025-02-22 ENCOUNTER — HOSPITAL ENCOUNTER (EMERGENCY)
Age: 80
Discharge: ANOTHER HOSPITAL | End: 2025-02-22
Attending: STUDENT IN AN ORGANIZED HEALTH CARE EDUCATION/TRAINING PROGRAM
Payer: MEDICARE

## 2025-02-22 ENCOUNTER — ANESTHESIA (OUTPATIENT)
Dept: OPERATING ROOM | Age: 80
End: 2025-02-22
Payer: MEDICARE

## 2025-02-22 ENCOUNTER — APPOINTMENT (OUTPATIENT)
Dept: CT IMAGING | Age: 80
End: 2025-02-22
Payer: MEDICARE

## 2025-02-22 ENCOUNTER — ANESTHESIA EVENT (OUTPATIENT)
Dept: OPERATING ROOM | Age: 80
End: 2025-02-22
Payer: MEDICARE

## 2025-02-22 ENCOUNTER — APPOINTMENT (OUTPATIENT)
Dept: GENERAL RADIOLOGY | Age: 80
End: 2025-02-22
Payer: MEDICARE

## 2025-02-22 VITALS
TEMPERATURE: 97.4 F | OXYGEN SATURATION: 94 % | DIASTOLIC BLOOD PRESSURE: 83 MMHG | WEIGHT: 153 LBS | HEART RATE: 98 BPM | HEIGHT: 63 IN | BODY MASS INDEX: 27.11 KG/M2 | SYSTOLIC BLOOD PRESSURE: 190 MMHG | RESPIRATION RATE: 26 BRPM

## 2025-02-22 DIAGNOSIS — M79.605 PAIN IN BOTH LOWER EXTREMITIES: ICD-10-CM

## 2025-02-22 DIAGNOSIS — I99.8 ACUTE LOWER LIMB ISCHEMIA: ICD-10-CM

## 2025-02-22 DIAGNOSIS — I70.0 AORTIC OCCLUSION: Primary | ICD-10-CM

## 2025-02-22 DIAGNOSIS — I48.21 PERMANENT ATRIAL FIBRILLATION (HCC): ICD-10-CM

## 2025-02-22 DIAGNOSIS — I74.09: Primary | ICD-10-CM

## 2025-02-22 DIAGNOSIS — I35.0 NONRHEUMATIC AORTIC VALVE STENOSIS: ICD-10-CM

## 2025-02-22 DIAGNOSIS — M79.604 PAIN IN BOTH LOWER EXTREMITIES: ICD-10-CM

## 2025-02-22 PROBLEM — I70.223 CRITICAL LIMB ISCHEMIA OF BOTH LOWER EXTREMITIES (HCC): Status: ACTIVE | Noted: 2025-02-22

## 2025-02-22 LAB
ALBUMIN SERPL-MCNC: 4.4 G/DL (ref 3.5–5.2)
ALP SERPL-CCNC: 102 U/L (ref 35–104)
ALT SERPL-CCNC: 11 U/L (ref 0–32)
ANION GAP SERPL CALCULATED.3IONS-SCNC: 17 MMOL/L (ref 7–16)
AST SERPL-CCNC: 21 U/L (ref 0–31)
BACTERIA URNS QL MICRO: ABNORMAL
BASOPHILS # BLD: 0.02 K/UL (ref 0–0.2)
BASOPHILS NFR BLD: 0 % (ref 0–2)
BILIRUB SERPL-MCNC: 0.7 MG/DL (ref 0–1.2)
BILIRUB UR QL STRIP: NEGATIVE
BUN SERPL-MCNC: 18 MG/DL (ref 6–23)
CALCIUM SERPL-MCNC: 9.4 MG/DL (ref 8.6–10.2)
CHLORIDE SERPL-SCNC: 103 MMOL/L (ref 98–107)
CK SERPL-CCNC: 1229 U/L (ref 20–180)
CK SERPL-CCNC: 1355 U/L (ref 20–180)
CK SERPL-CCNC: 36 U/L (ref 20–180)
CLARITY UR: CLEAR
CO2 SERPL-SCNC: 19 MMOL/L (ref 22–29)
COLOR UR: YELLOW
CREAT SERPL-MCNC: 0.8 MG/DL (ref 0.5–1)
EOSINOPHIL # BLD: 0.12 K/UL (ref 0.05–0.5)
EOSINOPHILS RELATIVE PERCENT: 1 % (ref 0–6)
ERYTHROCYTE [DISTWIDTH] IN BLOOD BY AUTOMATED COUNT: 14.5 % (ref 11.5–15)
ERYTHROCYTE [DISTWIDTH] IN BLOOD BY AUTOMATED COUNT: 14.5 % (ref 11.5–15)
GFR, ESTIMATED: 72 ML/MIN/1.73M2
GLUCOSE SERPL-MCNC: 206 MG/DL (ref 74–99)
GLUCOSE UR STRIP-MCNC: 250 MG/DL
HCT VFR BLD AUTO: 35.3 % (ref 34–48)
HCT VFR BLD AUTO: 40.1 % (ref 34–48)
HGB BLD-MCNC: 11.6 G/DL (ref 11.5–15.5)
HGB BLD-MCNC: 13 G/DL (ref 11.5–15.5)
HGB UR QL STRIP.AUTO: ABNORMAL
IMM GRANULOCYTES # BLD AUTO: 0.08 K/UL (ref 0–0.58)
IMM GRANULOCYTES NFR BLD: 1 % (ref 0–5)
INR PPP: 1.4
KETONES UR STRIP-MCNC: NEGATIVE MG/DL
LACTATE BLDV-SCNC: 2.6 MMOL/L (ref 0.5–2.2)
LEUKOCYTE ESTERASE UR QL STRIP: NEGATIVE
LYMPHOCYTES NFR BLD: 1.28 K/UL (ref 1.5–4)
LYMPHOCYTES RELATIVE PERCENT: 11 % (ref 20–42)
MAGNESIUM SERPL-MCNC: 1.9 MG/DL (ref 1.6–2.6)
MCH RBC QN AUTO: 31.2 PG (ref 26–35)
MCH RBC QN AUTO: 31.3 PG (ref 26–35)
MCHC RBC AUTO-ENTMCNC: 32.4 G/DL (ref 32–34.5)
MCHC RBC AUTO-ENTMCNC: 32.9 G/DL (ref 32–34.5)
MCV RBC AUTO: 94.9 FL (ref 80–99.9)
MCV RBC AUTO: 96.4 FL (ref 80–99.9)
MONOCYTES NFR BLD: 0.48 K/UL (ref 0.1–0.95)
MONOCYTES NFR BLD: 4 % (ref 2–12)
NEUTROPHILS NFR BLD: 82 % (ref 43–80)
NEUTS SEG NFR BLD: 9.2 K/UL (ref 1.8–7.3)
NITRITE UR QL STRIP: NEGATIVE
PARTIAL THROMBOPLASTIN TIME: 134.2 SEC (ref 24.5–35.1)
PARTIAL THROMBOPLASTIN TIME: 31 SEC (ref 24.5–35.1)
PARTIAL THROMBOPLASTIN TIME: 31.9 SEC (ref 24.5–35.1)
PH UR STRIP: 7 [PH] (ref 5–8)
PLATELET # BLD AUTO: 149 K/UL (ref 130–450)
PLATELET # BLD AUTO: 202 K/UL (ref 130–450)
PMV BLD AUTO: 9.4 FL (ref 7–12)
PMV BLD AUTO: 9.8 FL (ref 7–12)
POTASSIUM SERPL-SCNC: 3.8 MMOL/L (ref 3.5–5)
PROT SERPL-MCNC: 7.5 G/DL (ref 6.4–8.3)
PROT UR STRIP-MCNC: NEGATIVE MG/DL
PROTHROMBIN TIME: 15.1 SEC (ref 9.3–12.4)
RBC # BLD AUTO: 3.72 M/UL (ref 3.5–5.5)
RBC # BLD AUTO: 4.16 M/UL (ref 3.5–5.5)
RBC #/AREA URNS HPF: ABNORMAL /HPF
SODIUM SERPL-SCNC: 139 MMOL/L (ref 132–146)
SP GR UR STRIP: 1.01 (ref 1–1.03)
TROPONIN I SERPL HS-MCNC: 15 NG/L (ref 0–9)
TROPONIN I SERPL HS-MCNC: 24 NG/L (ref 0–9)
UROBILINOGEN UR STRIP-ACNC: 0.2 EU/DL (ref 0–1)
WBC #/AREA URNS HPF: ABNORMAL /HPF
WBC OTHER # BLD: 10.5 K/UL (ref 4.5–11.5)
WBC OTHER # BLD: 11.2 K/UL (ref 4.5–11.5)

## 2025-02-22 PROCEDURE — 82550 ASSAY OF CK (CPK): CPT

## 2025-02-22 PROCEDURE — 96365 THER/PROPH/DIAG IV INF INIT: CPT

## 2025-02-22 PROCEDURE — 04CC0ZZ EXTIRPATION OF MATTER FROM RIGHT COMMON ILIAC ARTERY, OPEN APPROACH: ICD-10-PCS | Performed by: SURGERY

## 2025-02-22 PROCEDURE — 99285 EMERGENCY DEPT VISIT HI MDM: CPT

## 2025-02-22 PROCEDURE — 82803 BLOOD GASES ANY COMBINATION: CPT

## 2025-02-22 PROCEDURE — 96375 TX/PRO/DX INJ NEW DRUG ADDON: CPT

## 2025-02-22 PROCEDURE — 6360000002 HC RX W HCPCS

## 2025-02-22 PROCEDURE — 93005 ELECTROCARDIOGRAM TRACING: CPT

## 2025-02-22 PROCEDURE — 81001 URINALYSIS AUTO W/SCOPE: CPT

## 2025-02-22 PROCEDURE — 7100000001 HC PACU RECOVERY - ADDTL 15 MIN

## 2025-02-22 PROCEDURE — 7100000000 HC PACU RECOVERY - FIRST 15 MIN

## 2025-02-22 PROCEDURE — 86923 COMPATIBILITY TEST ELECTRIC: CPT

## 2025-02-22 PROCEDURE — 86900 BLOOD TYPING SEROLOGIC ABO: CPT

## 2025-02-22 PROCEDURE — 2709999900 HC NON-CHARGEABLE SUPPLY: Performed by: SURGERY

## 2025-02-22 PROCEDURE — 85730 THROMBOPLASTIN TIME PARTIAL: CPT

## 2025-02-22 PROCEDURE — C1757 CATH, THROMBECTOMY/EMBOLECT: HCPCS | Performed by: SURGERY

## 2025-02-22 PROCEDURE — 84484 ASSAY OF TROPONIN QUANT: CPT

## 2025-02-22 PROCEDURE — 3600000012 HC SURGERY LEVEL 2 ADDTL 15MIN: Performed by: SURGERY

## 2025-02-22 PROCEDURE — 2580000003 HC RX 258

## 2025-02-22 PROCEDURE — 80053 COMPREHEN METABOLIC PANEL: CPT

## 2025-02-22 PROCEDURE — 85347 COAGULATION TIME ACTIVATED: CPT

## 2025-02-22 PROCEDURE — 2500000003 HC RX 250 WO HCPCS: Performed by: SURGERY

## 2025-02-22 PROCEDURE — 2580000003 HC RX 258: Performed by: SURGERY

## 2025-02-22 PROCEDURE — 99291 CRITICAL CARE FIRST HOUR: CPT | Performed by: INTERNAL MEDICINE

## 2025-02-22 PROCEDURE — 75635 CT ANGIO ABDOMINAL ARTERIES: CPT

## 2025-02-22 PROCEDURE — 85610 PROTHROMBIN TIME: CPT

## 2025-02-22 PROCEDURE — 6360000002 HC RX W HCPCS: Performed by: SURGERY

## 2025-02-22 PROCEDURE — 99291 CRITICAL CARE FIRST HOUR: CPT | Performed by: SURGERY

## 2025-02-22 PROCEDURE — 86850 RBC ANTIBODY SCREEN: CPT

## 2025-02-22 PROCEDURE — 3600000002 HC SURGERY LEVEL 2 BASE: Performed by: SURGERY

## 2025-02-22 PROCEDURE — 3700000000 HC ANESTHESIA ATTENDED CARE: Performed by: SURGERY

## 2025-02-22 PROCEDURE — 6370000000 HC RX 637 (ALT 250 FOR IP): Performed by: SURGERY

## 2025-02-22 PROCEDURE — 71045 X-RAY EXAM CHEST 1 VIEW: CPT

## 2025-02-22 PROCEDURE — 3700000001 HC ADD 15 MINUTES (ANESTHESIA): Performed by: SURGERY

## 2025-02-22 PROCEDURE — 70496 CT ANGIOGRAPHY HEAD: CPT

## 2025-02-22 PROCEDURE — 85014 HEMATOCRIT: CPT

## 2025-02-22 PROCEDURE — 86901 BLOOD TYPING SEROLOGIC RH(D): CPT

## 2025-02-22 PROCEDURE — 83735 ASSAY OF MAGNESIUM: CPT

## 2025-02-22 PROCEDURE — 80047 BASIC METABLC PNL IONIZED CA: CPT

## 2025-02-22 PROCEDURE — 83605 ASSAY OF LACTIC ACID: CPT

## 2025-02-22 PROCEDURE — 2000000000 HC ICU R&B

## 2025-02-22 PROCEDURE — 85027 COMPLETE CBC AUTOMATED: CPT

## 2025-02-22 PROCEDURE — 71275 CT ANGIOGRAPHY CHEST: CPT

## 2025-02-22 PROCEDURE — 96366 THER/PROPH/DIAG IV INF ADDON: CPT

## 2025-02-22 PROCEDURE — 85025 COMPLETE CBC W/AUTO DIFF WBC: CPT

## 2025-02-22 PROCEDURE — 04CD0ZZ EXTIRPATION OF MATTER FROM LEFT COMMON ILIAC ARTERY, OPEN APPROACH: ICD-10-PCS | Performed by: SURGERY

## 2025-02-22 PROCEDURE — 2500000003 HC RX 250 WO HCPCS

## 2025-02-22 PROCEDURE — 6360000004 HC RX CONTRAST MEDICATION: Performed by: RADIOLOGY

## 2025-02-22 PROCEDURE — 34201 REMOVAL OF ARTERY CLOT: CPT | Performed by: SURGERY

## 2025-02-22 PROCEDURE — 70498 CT ANGIOGRAPHY NECK: CPT

## 2025-02-22 PROCEDURE — 2700000000 HC OXYGEN THERAPY PER DAY

## 2025-02-22 PROCEDURE — 70450 CT HEAD/BRAIN W/O DYE: CPT

## 2025-02-22 PROCEDURE — 37799 UNLISTED PX VASCULAR SURGERY: CPT

## 2025-02-22 RX ORDER — HEPARIN SODIUM 1000 [USP'U]/ML
40 INJECTION, SOLUTION INTRAVENOUS; SUBCUTANEOUS PRN
Status: DISCONTINUED | OUTPATIENT
Start: 2025-02-22 | End: 2025-02-22 | Stop reason: HOSPADM

## 2025-02-22 RX ORDER — LIDOCAINE HYDROCHLORIDE 20 MG/ML
INJECTION, SOLUTION INTRAVENOUS
Status: DISCONTINUED | OUTPATIENT
Start: 2025-02-22 | End: 2025-02-22 | Stop reason: SDUPTHER

## 2025-02-22 RX ORDER — DEXAMETHASONE SODIUM PHOSPHATE 10 MG/ML
INJECTION, SOLUTION INTRA-ARTICULAR; INTRALESIONAL; INTRAMUSCULAR; INTRAVENOUS; SOFT TISSUE
Status: DISCONTINUED | OUTPATIENT
Start: 2025-02-22 | End: 2025-02-22 | Stop reason: SDUPTHER

## 2025-02-22 RX ORDER — LABETALOL HYDROCHLORIDE 5 MG/ML
INJECTION, SOLUTION INTRAVENOUS
Status: DISCONTINUED | OUTPATIENT
Start: 2025-02-22 | End: 2025-02-22 | Stop reason: SDUPTHER

## 2025-02-22 RX ORDER — ONDANSETRON 4 MG/1
4 TABLET, ORALLY DISINTEGRATING ORAL EVERY 8 HOURS PRN
Status: DISCONTINUED | OUTPATIENT
Start: 2025-02-22 | End: 2025-03-04 | Stop reason: HOSPADM

## 2025-02-22 RX ORDER — IOPAMIDOL 755 MG/ML
75 INJECTION, SOLUTION INTRAVASCULAR
Status: COMPLETED | OUTPATIENT
Start: 2025-02-22 | End: 2025-02-22

## 2025-02-22 RX ORDER — ONDANSETRON 2 MG/ML
INJECTION INTRAMUSCULAR; INTRAVENOUS
Status: DISCONTINUED | OUTPATIENT
Start: 2025-02-22 | End: 2025-02-22 | Stop reason: SDUPTHER

## 2025-02-22 RX ORDER — HEPARIN SODIUM 1000 [USP'U]/ML
80 INJECTION, SOLUTION INTRAVENOUS; SUBCUTANEOUS ONCE
Status: COMPLETED | OUTPATIENT
Start: 2025-02-22 | End: 2025-02-22

## 2025-02-22 RX ORDER — FUROSEMIDE 20 MG/1
20 TABLET ORAL DAILY
Status: DISCONTINUED | OUTPATIENT
Start: 2025-02-22 | End: 2025-03-04 | Stop reason: HOSPADM

## 2025-02-22 RX ORDER — ROCURONIUM BROMIDE 10 MG/ML
INJECTION, SOLUTION INTRAVENOUS
Status: DISCONTINUED | OUTPATIENT
Start: 2025-02-22 | End: 2025-02-22 | Stop reason: SDUPTHER

## 2025-02-22 RX ORDER — HEPARIN SODIUM 10000 [USP'U]/100ML
INJECTION, SOLUTION INTRAVENOUS
Status: DISCONTINUED | OUTPATIENT
Start: 2025-02-22 | End: 2025-02-22 | Stop reason: SDUPTHER

## 2025-02-22 RX ORDER — HEPARIN SODIUM 1000 [USP'U]/ML
80 INJECTION, SOLUTION INTRAVENOUS; SUBCUTANEOUS PRN
Status: DISCONTINUED | OUTPATIENT
Start: 2025-02-22 | End: 2025-02-22 | Stop reason: HOSPADM

## 2025-02-22 RX ORDER — VITAMIN B COMPLEX
1000 TABLET ORAL DAILY
Status: DISCONTINUED | OUTPATIENT
Start: 2025-02-22 | End: 2025-03-04 | Stop reason: HOSPADM

## 2025-02-22 RX ORDER — SODIUM CHLORIDE 9 MG/ML
INJECTION, SOLUTION INTRAVENOUS PRN
Status: DISCONTINUED | OUTPATIENT
Start: 2025-02-22 | End: 2025-03-04 | Stop reason: HOSPADM

## 2025-02-22 RX ORDER — HEPARIN SODIUM 10000 [USP'U]/100ML
5-30 INJECTION, SOLUTION INTRAVENOUS CONTINUOUS
Status: DISCONTINUED | OUTPATIENT
Start: 2025-02-22 | End: 2025-02-23

## 2025-02-22 RX ORDER — ASPIRIN 81 MG/1
81 TABLET ORAL DAILY
Status: DISCONTINUED | OUTPATIENT
Start: 2025-02-22 | End: 2025-02-22 | Stop reason: SDUPTHER

## 2025-02-22 RX ORDER — POTASSIUM CHLORIDE 750 MG/1
10 TABLET, EXTENDED RELEASE ORAL 2 TIMES DAILY
Status: DISCONTINUED | OUTPATIENT
Start: 2025-02-22 | End: 2025-03-04 | Stop reason: HOSPADM

## 2025-02-22 RX ORDER — HYDRALAZINE HYDROCHLORIDE 20 MG/ML
10 INJECTION INTRAMUSCULAR; INTRAVENOUS
Status: DISCONTINUED | OUTPATIENT
Start: 2025-02-22 | End: 2025-03-04 | Stop reason: HOSPADM

## 2025-02-22 RX ORDER — SODIUM CHLORIDE 0.9 % (FLUSH) 0.9 %
5-40 SYRINGE (ML) INJECTION PRN
Status: DISCONTINUED | OUTPATIENT
Start: 2025-02-22 | End: 2025-03-04 | Stop reason: HOSPADM

## 2025-02-22 RX ORDER — ONDANSETRON 2 MG/ML
4 INJECTION INTRAMUSCULAR; INTRAVENOUS EVERY 6 HOURS PRN
Status: DISCONTINUED | OUTPATIENT
Start: 2025-02-22 | End: 2025-03-04 | Stop reason: HOSPADM

## 2025-02-22 RX ORDER — HEPARIN SODIUM 1000 [USP'U]/ML
40 INJECTION, SOLUTION INTRAVENOUS; SUBCUTANEOUS PRN
Status: DISCONTINUED | OUTPATIENT
Start: 2025-02-22 | End: 2025-02-23

## 2025-02-22 RX ORDER — HYDROMORPHONE HYDROCHLORIDE 2 MG/ML
INJECTION, SOLUTION INTRAMUSCULAR; INTRAVENOUS; SUBCUTANEOUS
Status: DISCONTINUED | OUTPATIENT
Start: 2025-02-22 | End: 2025-02-22 | Stop reason: SDUPTHER

## 2025-02-22 RX ORDER — FENTANYL CITRATE 50 UG/ML
25 INJECTION, SOLUTION INTRAMUSCULAR; INTRAVENOUS ONCE
Status: COMPLETED | OUTPATIENT
Start: 2025-02-22 | End: 2025-02-22

## 2025-02-22 RX ORDER — DILTIAZEM HYDROCHLORIDE 120 MG/1
240 CAPSULE, COATED, EXTENDED RELEASE ORAL DAILY
Status: DISCONTINUED | OUTPATIENT
Start: 2025-02-22 | End: 2025-03-04 | Stop reason: HOSPADM

## 2025-02-22 RX ORDER — IOPAMIDOL 755 MG/ML
100 INJECTION, SOLUTION INTRAVASCULAR
Status: DISCONTINUED | OUTPATIENT
Start: 2025-02-22 | End: 2025-02-22 | Stop reason: HOSPADM

## 2025-02-22 RX ORDER — LANOLIN ALCOHOL/MO/W.PET/CERES
500 CREAM (GRAM) TOPICAL 2 TIMES DAILY
Status: DISCONTINUED | OUTPATIENT
Start: 2025-02-22 | End: 2025-03-04 | Stop reason: HOSPADM

## 2025-02-22 RX ORDER — ETOMIDATE 2 MG/ML
INJECTION INTRAVENOUS
Status: DISCONTINUED | OUTPATIENT
Start: 2025-02-22 | End: 2025-02-22 | Stop reason: SDUPTHER

## 2025-02-22 RX ORDER — ASPIRIN 81 MG/1
81 TABLET ORAL DAILY
Status: DISCONTINUED | OUTPATIENT
Start: 2025-02-22 | End: 2025-03-04 | Stop reason: HOSPADM

## 2025-02-22 RX ORDER — LABETALOL HYDROCHLORIDE 5 MG/ML
10 INJECTION, SOLUTION INTRAVENOUS
Status: DISCONTINUED | OUTPATIENT
Start: 2025-02-22 | End: 2025-03-04 | Stop reason: HOSPADM

## 2025-02-22 RX ORDER — HEPARIN SODIUM 1000 [USP'U]/ML
INJECTION, SOLUTION INTRAVENOUS; SUBCUTANEOUS
Status: DISCONTINUED | OUTPATIENT
Start: 2025-02-22 | End: 2025-02-22 | Stop reason: SDUPTHER

## 2025-02-22 RX ORDER — SODIUM CHLORIDE 9 MG/ML
INJECTION, SOLUTION INTRAVENOUS CONTINUOUS
Status: DISCONTINUED | OUTPATIENT
Start: 2025-02-22 | End: 2025-02-23

## 2025-02-22 RX ORDER — M-VIT,TX,IRON,MINS/CALC/FOLIC 27MG-0.4MG
1 TABLET ORAL DAILY
Status: DISCONTINUED | OUTPATIENT
Start: 2025-02-22 | End: 2025-03-04 | Stop reason: HOSPADM

## 2025-02-22 RX ORDER — PRAVASTATIN SODIUM 20 MG
20 TABLET ORAL DAILY
Status: DISCONTINUED | OUTPATIENT
Start: 2025-02-22 | End: 2025-03-04 | Stop reason: HOSPADM

## 2025-02-22 RX ORDER — PANTOPRAZOLE SODIUM 40 MG/1
40 TABLET, DELAYED RELEASE ORAL
Status: DISCONTINUED | OUTPATIENT
Start: 2025-02-22 | End: 2025-03-04 | Stop reason: HOSPADM

## 2025-02-22 RX ORDER — HEPARIN SODIUM 10000 [USP'U]/100ML
5-30 INJECTION, SOLUTION INTRAVENOUS CONTINUOUS
Status: DISCONTINUED | OUTPATIENT
Start: 2025-02-22 | End: 2025-02-22 | Stop reason: HOSPADM

## 2025-02-22 RX ORDER — HEPARIN SODIUM 1000 [USP'U]/ML
80 INJECTION, SOLUTION INTRAVENOUS; SUBCUTANEOUS PRN
Status: DISCONTINUED | OUTPATIENT
Start: 2025-02-22 | End: 2025-02-23

## 2025-02-22 RX ORDER — FENTANYL CITRATE 50 UG/ML
INJECTION, SOLUTION INTRAMUSCULAR; INTRAVENOUS
Status: DISCONTINUED | OUTPATIENT
Start: 2025-02-22 | End: 2025-02-22 | Stop reason: SDUPTHER

## 2025-02-22 RX ORDER — SODIUM CHLORIDE 9 MG/ML
INJECTION, SOLUTION INTRAVENOUS
Status: DISCONTINUED | OUTPATIENT
Start: 2025-02-22 | End: 2025-02-22 | Stop reason: SDUPTHER

## 2025-02-22 RX ORDER — FLUTICASONE PROPIONATE 50 MCG
1 SPRAY, SUSPENSION (ML) NASAL DAILY PRN
Status: DISCONTINUED | OUTPATIENT
Start: 2025-02-22 | End: 2025-03-04 | Stop reason: HOSPADM

## 2025-02-22 RX ORDER — METOPROLOL TARTRATE 50 MG
50 TABLET ORAL 2 TIMES DAILY
Status: DISCONTINUED | OUTPATIENT
Start: 2025-02-22 | End: 2025-02-26

## 2025-02-22 RX ORDER — SODIUM CHLORIDE 0.9 % (FLUSH) 0.9 %
5-40 SYRINGE (ML) INJECTION EVERY 12 HOURS SCHEDULED
Status: DISCONTINUED | OUTPATIENT
Start: 2025-02-22 | End: 2025-03-04 | Stop reason: HOSPADM

## 2025-02-22 RX ADMIN — HEPARIN SODIUM 5600 UNITS: 1000 INJECTION INTRAVENOUS; SUBCUTANEOUS at 06:05

## 2025-02-22 RX ADMIN — HEPARIN SODIUM 18 UNITS/KG/HR: 10000 INJECTION, SOLUTION INTRAVENOUS at 16:45

## 2025-02-22 RX ADMIN — HEPARIN SODIUM 4000 UNITS: 1000 INJECTION INTRAVENOUS; SUBCUTANEOUS at 10:47

## 2025-02-22 RX ADMIN — HYDROMORPHONE HYDROCHLORIDE 1 MG: 2 INJECTION, SOLUTION INTRAMUSCULAR; INTRAVENOUS; SUBCUTANEOUS at 12:01

## 2025-02-22 RX ADMIN — LABETALOL HYDROCHLORIDE 10 MG: 5 INJECTION INTRAVENOUS at 13:57

## 2025-02-22 RX ADMIN — POTASSIUM CHLORIDE 10 MEQ: 750 TABLET, EXTENDED RELEASE ORAL at 21:35

## 2025-02-22 RX ADMIN — FENTANYL CITRATE 25 MCG: 50 INJECTION INTRAMUSCULAR; INTRAVENOUS at 04:58

## 2025-02-22 RX ADMIN — ROCURONIUM BROMIDE 50 MG: 10 INJECTION, SOLUTION INTRAVENOUS at 09:44

## 2025-02-22 RX ADMIN — FENTANYL CITRATE 75 MCG: 50 INJECTION, SOLUTION INTRAMUSCULAR; INTRAVENOUS at 10:03

## 2025-02-22 RX ADMIN — ETOMIDATE 20 MG: 2 INJECTION, SOLUTION INTRAVENOUS at 09:43

## 2025-02-22 RX ADMIN — LABETALOL HYDROCHLORIDE 5 MG: 5 INJECTION INTRAVENOUS at 12:43

## 2025-02-22 RX ADMIN — LABETALOL HYDROCHLORIDE 10 MG: 5 INJECTION INTRAVENOUS at 15:06

## 2025-02-22 RX ADMIN — WATER 2000 MG: 1 INJECTION INTRAMUSCULAR; INTRAVENOUS; SUBCUTANEOUS at 18:15

## 2025-02-22 RX ADMIN — ONDANSETRON 4 MG: 2 INJECTION INTRAMUSCULAR; INTRAVENOUS at 09:50

## 2025-02-22 RX ADMIN — METOPROLOL TARTRATE 50 MG: 50 TABLET, FILM COATED ORAL at 21:35

## 2025-02-22 RX ADMIN — HEPARIN SODIUM 18 UNITS/KG/HR: 10000 INJECTION, SOLUTION INTRAVENOUS at 06:05

## 2025-02-22 RX ADMIN — SODIUM CHLORIDE, PRESERVATIVE FREE 10 ML: 5 INJECTION INTRAVENOUS at 21:36

## 2025-02-22 RX ADMIN — HEPARIN SODIUM 18 UNITS/KG/HR: 10000 INJECTION, SOLUTION INTRAVENOUS at 18:36

## 2025-02-22 RX ADMIN — HYDROMORPHONE HYDROCHLORIDE 0.5 MG: 1 INJECTION, SOLUTION INTRAMUSCULAR; INTRAVENOUS; SUBCUTANEOUS at 22:41

## 2025-02-22 RX ADMIN — SODIUM CHLORIDE: 9 INJECTION, SOLUTION INTRAVENOUS at 09:35

## 2025-02-22 RX ADMIN — SODIUM CHLORIDE: 0.9 INJECTION, SOLUTION INTRAVENOUS at 13:26

## 2025-02-22 RX ADMIN — CEFAZOLIN 2000 MG: 2 INJECTION, POWDER, FOR SOLUTION INTRAMUSCULAR; INTRAVENOUS at 09:50

## 2025-02-22 RX ADMIN — HYDROMORPHONE HYDROCHLORIDE 1 MG: 2 INJECTION, SOLUTION INTRAMUSCULAR; INTRAVENOUS; SUBCUTANEOUS at 10:35

## 2025-02-22 RX ADMIN — DEXAMETHASONE SODIUM PHOSPHATE 10 MG: 10 INJECTION INTRAMUSCULAR; INTRAVENOUS at 09:50

## 2025-02-22 RX ADMIN — LIDOCAINE HYDROCHLORIDE 100 MG: 20 INJECTION, SOLUTION INTRAVENOUS at 09:43

## 2025-02-22 RX ADMIN — FENTANYL CITRATE 25 MCG: 50 INJECTION, SOLUTION INTRAMUSCULAR; INTRAVENOUS at 09:43

## 2025-02-22 RX ADMIN — IOPAMIDOL 75 ML: 755 INJECTION, SOLUTION INTRAVENOUS at 04:08

## 2025-02-22 RX ADMIN — HEPARIN SODIUM AND DEXTROSE 18 UNITS/KG/HR: 10000; 5 INJECTION INTRAVENOUS at 09:35

## 2025-02-22 RX ADMIN — LABETALOL HYDROCHLORIDE 5 MG: 5 INJECTION INTRAVENOUS at 10:32

## 2025-02-22 RX ADMIN — Medication 500 MG: at 21:35

## 2025-02-22 RX ADMIN — LABETALOL HYDROCHLORIDE 5 MG: 5 INJECTION INTRAVENOUS at 12:12

## 2025-02-22 RX ADMIN — LABETALOL HYDROCHLORIDE 10 MG: 5 INJECTION INTRAVENOUS at 12:05

## 2025-02-22 RX ADMIN — HYDROMORPHONE HYDROCHLORIDE 0.25 MG: 1 INJECTION, SOLUTION INTRAMUSCULAR; INTRAVENOUS; SUBCUTANEOUS at 19:46

## 2025-02-22 ASSESSMENT — PAIN DESCRIPTION - ONSET
ONSET: ON-GOING

## 2025-02-22 ASSESSMENT — PAIN SCALES - GENERAL
PAINLEVEL_OUTOF10: 7
PAINLEVEL_OUTOF10: 9
PAINLEVEL_OUTOF10: 6
PAINLEVEL_OUTOF10: 7
PAINLEVEL_OUTOF10: 10
PAINLEVEL_OUTOF10: 10
PAINLEVEL_OUTOF10: 7
PAINLEVEL_OUTOF10: 9
PAINLEVEL_OUTOF10: 9

## 2025-02-22 ASSESSMENT — PAIN DESCRIPTION - ORIENTATION
ORIENTATION: RIGHT;LEFT
ORIENTATION: LEFT;RIGHT
ORIENTATION: RIGHT;LEFT
ORIENTATION: RIGHT
ORIENTATION: RIGHT;LEFT
ORIENTATION: RIGHT;LEFT

## 2025-02-22 ASSESSMENT — PAIN - FUNCTIONAL ASSESSMENT: PAIN_FUNCTIONAL_ASSESSMENT: 0-10

## 2025-02-22 ASSESSMENT — PAIN DESCRIPTION - FREQUENCY
FREQUENCY: CONTINUOUS

## 2025-02-22 ASSESSMENT — LIFESTYLE VARIABLES: SMOKING_STATUS: 0

## 2025-02-22 ASSESSMENT — PAIN DESCRIPTION - LOCATION
LOCATION: LEG

## 2025-02-22 ASSESSMENT — PAIN DESCRIPTION - DESCRIPTORS
DESCRIPTORS: SHARP
DESCRIPTORS: SHARP
DESCRIPTORS: ACHING;CRAMPING
DESCRIPTORS: SHARP
DESCRIPTORS: SHARP

## 2025-02-22 NOTE — ED PROVIDER NOTES
[facility] EMERGENCY DEPARTMENT  EMERGENCY DEPARTMENT ENCOUNTER        Pt Name: Grecia Wilhelm  MRN: [id number]  Birthdate 1945  Date of evaluation: 2/22/2025  Provider: Teagan Barros MD  PCP: Bismark Beauchamp MD  Note Started: 8:39 AM EST 2/22/25    HPI  79 y.o. female from outside ED for distal aortic occlusion.  Patient developed bilateral leg pain/soreness yesterday.  She was seen at outside ED, found to have distal aortic occlusion, started heparin and transferred to this ED for vascular surgery consult.  She denies chest pain or shortness of breath.      --------------------------------------------- PAST HISTORY ---------------------------------------------  Past Medical History:  has a past medical history of Aortic stenosis, mild, Arrhythmia, Arthritis, Atrial fibrillation (HCC), Bleeding from varicose veins of right lower extremity, CAD (coronary artery disease), Cerebrovascular accident (CVA) due to embolic occlusion of left middle cerebral artery (HCC), Cerebrovascular accident (CVA) due to embolic occlusion of left middle cerebral artery (HCC), Cerebrovascular disease, CHF (congestive heart failure) (HCC), Critical limb ischemia of both lower extremities (HCC), Diabetes mellitus (HCC), Hearing deficit, Heart disease, Hyperlipidemia, Hypertension, Iron deficiency anemia, Migraine headache with aura, Mitral regurgitation, Moderate aortic stenosis by prior echocardiogram, NCGS (non-celiac gluten sensitivity), Severe aortic stenosis, TIA (transient ischemic attack), Unspecified cerebral artery occlusion with cerebral infarction, Varicose veins of left leg with edema, Venous stasis ulcer of right calf with fat layer exposed with varicose veins (HCC), and Warfarin-induced coagulopathy.    Past Surgical History:  has a past surgical history that includes ECHO Complete 2D W Doppler W Color (08/30/2012); Abdomen surgery; Ovary surgery; Small intestine surgery; Inner ear

## 2025-02-22 NOTE — OP NOTE
Operative Note      Patient: Grecia Wilhelm  YOB: 1945  MRN: [id number]    Date of Procedure: 2/22/2025    Pre-Op Diagnosis Codes:      * Acute lower limb ischemia [I99.8]    Post-Op Diagnosis: Same       Procedure  Bilateral LE aortoiliac and distal thrombectomy    Surgeon(s):  Ganesh Huerta MD    Assistant:   Resident: Kiesha Sheikh MD    Anesthesia: General    Estimated Blood Loss (mL): 200     Complications: None    Specimens:   ID Type Source Tests Collected by Time Destination   A : THROMBUS BILATERAL LOWER EXTREMITIES Tissue Tissue SURGICAL PATHOLOGY Ganesh Huerta MD 2/22/2025 1136        Implants:  * No implants in log *      Drains:   Urinary Catheter 02/22/25 Munoz-Temperature (Active)       Findings:  Infection Present At Time Of Surgery (PATOS) (choose all levels that have infection present):  No infection present  Other Findings: large amt of thrombus bilaterally R > L  R DP biphasic, PT no signal, L DP weakly biphasic and PT biphasic    DESCRIPTION OF PROCEDURE: The patient was identified and the procedure was confirmed. The Bilateral leg was prepped and draped in the usual sterile fashion.     On the right a vertical skin incision was made after identifying the pubic symphysis and anterior superior iliac spine. It was carried down through the subcutaneous tissue. Dissection continued through the femoral fascia and down to expose the common femoral, superficial femoral, and profunda, vessel loop control obtained. Identified superficial veins were divided between silk ties.     On the left a vertical skin incision was made after identifying the pubic symphysis and anterior superior iliac spine. It was carried down through the subcutaneous tissue. Dissection continued through the femoral fascia and down to expose the common femoral, superficial femoral, and profunda, vessel loop control obtained. Identified superficial veins were divided between silk ties.

## 2025-02-22 NOTE — ED NOTES
Procedure consent signed by patient & placed on paper chart.  Wedding band given to .  Incontinence care provied, fresh gown applied for surgery.

## 2025-02-22 NOTE — ANESTHESIA POSTPROCEDURE EVALUATION
Department of Anesthesiology  Postprocedure Note    Patient: Grecia Wilhelm  MRN: [id number]  YOB: 1945  Date of evaluation: 2/22/2025    Procedure Summary       Date: 02/22/25 Room / Location: [address]    Anesthesia Start: 0935 Anesthesia Stop: 1255    Procedure: bilateral lower extremity thrombectomy (Bilateral: Leg Upper) Diagnosis: Acute lower limb ischemia    Surgeons: Ganesh Huerta MD Responsible Provider: Camilo Meléndez DO    Anesthesia Type: general ASA Status: 4 - Emergent            Anesthesia Type: No value filed.    Denis Phase I:      Denis Phase II:      Anesthesia Post Evaluation    Patient location during evaluation: ICU  Patient participation: complete - patient participated  Level of consciousness: awake  Airway patency: patent  Nausea & Vomiting: no nausea and no vomiting  Cardiovascular status: hemodynamically stable  Respiratory status: acceptable  Hydration status: stable  Pain management: adequate    No notable events documented.

## 2025-02-22 NOTE — ED NOTES
Radiology Procedure Waiver   Name: Grecia Wilhelm  : 1945  MRN: [id number]    Date:  25    Time: 4:04 AM EST    Benefits of immediately proceeding with Radiology exam(s) without pre-testing outweigh the risks or are not indicated as specified below and therefore the following is/are being waived:    [] Pregnancy test   [] Patients LMP on-time and regular.   [] Patient had Tubal Ligation or has other Contraception Device.   [] Patient  is Menopausal or Premenarcheal.    [] Patient had Full or Partial Hysterectomy.    [] Protocol for Iodine allergy    [] MRI Questionnaire     [x] BUN/Creatinine   [] Patient age w/no hx of renal dysfunction.   [] Patient on Dialysis.   [] Recent Normal Labs.  Electronically signed by Gato Geller MD on 25 at 4:04 AM EST

## 2025-02-22 NOTE — ED NOTES
Patient cleaned up for incontinence, purewick repositioned. Complete bed change provided and patient readjusted for comfort. Call light within reach, family at bedside.

## 2025-02-22 NOTE — ED PROVIDER NOTES
[facility] EMERGENCY DEPARTMENT  EMERGENCY DEPARTMENT ENCOUNTER        Pt Name: Grecia Wilhelm  MRN: [id number]  Birthdate 1945  Date of evaluation: 2/22/2025  Provider: Gato Geller MD  PCP: Bismark Beauchamp MD  Note Started: 3:37 AM EST 2/22/25    CHIEF COMPLAINT       No chief complaint on file.      HISTORY OF PRESENT ILLNESS: 1 or more Elements   History From: Patient    Limitations to history : None  Social Determinants : None    Grecia Wilhelm is a 79 y.o. female with a history of hypertension, obesity, atrial fibrillation, CVA, varicose veins on anticoagulation with warfarin who presents with complaints of bilateral leg pain.  Patient mentions that she was walking and was not in pain around 11 PM last night when she went to sleep however she woke up with bilateral leg pain, more on the right side around 2:30 AM.  She does mention that after she woke up she was able to get up to the side of the bed so that she could urinate.  Denies any falls.    Denies any fever, chills, nausea, vomiting, headache, dizziness, vision changes, neck tenderness or stiffness, chest pain, palpitations, leg swelling sob, cough, abdominal pain, dysuria, hematuria, diarrhea, constipation, bloody stools.    Nursing Notes were all reviewed and agreed with or any disagreements were addressed in the HPI.    ROS:   Pertinent positives and negatives are stated within HPI, all other systems reviewed and are negative.      --------------------------------------------- PAST HISTORY ---------------------------------------------  Past Medical History:       Diagnosis Date    Aortic stenosis, mild 10/01/2015    follows  Dr Gallardo at [facility] last visit 9/30/22    Arrhythmia     Arthritis     knees    Atrial fibrillation (HCC)     Bleeding from varicose veins of right lower extremity 06/08/2017    CAD (coronary artery disease)     Cerebrovascular accident (CVA) due to embolic occlusion of left middle cerebral  derangements. Lactic acid as a marker of hypoperfusion or ischemia. Urinalysis to evaluate for a UTI. Troponin as a marker for myocardial ischemia or heart strain. Chest x-ray for any possible signs of, but without limitation to, pneumonia, pleural effusions, cardiomegaly, pneumothorax, atelectasis, rib or sternal abnormalities including fractures. CT abdomen for, but without limitation to, ureterolithiasis, nephrolithiasis, constipation, hollow organ perforation, small bowel obstruction, bowel ischemia, pneumoperitoneum, diverticulitis, cholecystitis, appendicitis, intra-abdominal abscess, or malignancy. CT chest for, but without limitation to, pulmonary embolism, effusions, pneumonia, dissection or acute bony fractures.BNP to evaluate for heart failure and/or as a marker for heart strain.     Patient administered .  Medications   iopamidol (ISOVUE-370) 76 % injection 100 mL (has no administration in time range)   heparin (porcine) injection 5,600 Units (has no administration in time range)   heparin (porcine) injection 2,800 Units (has no administration in time range)   heparin 25,000 units in dextrose 5% 250 mL (premix) infusion (18 Units/kg/hr × 66 kg (Order-Specific) IntraVENous New Bag 2/22/25 0605)   iopamidol (ISOVUE-370) 76 % injection 75 mL (75 mLs IntraVENous Given 2/22/25 0408)   fentaNYL (SUBLIMAZE) injection 25 mcg (25 mcg IntraVENous Given 2/22/25 0458)   heparin (porcine) injection 5,600 Units (5,600 Units IntraVENous Given 2/22/25 0605)     79-year-old female presents for evaluation of bilateral leg pain, right worse than the left.  Patient has history of A-fib, she is on warfarin.  Denies any falls.  Patient mentions that she woke up with the pain.  On arrival afebrile and hemodynamically stable.  On examination, bilateral lower extremities are cool to touch.  Distal pulses were nonpalpable or dopplerable.  She does have weakness on the right lower leg compared to the left.  Glucose within range.

## 2025-02-22 NOTE — ANESTHESIA PRE PROCEDURE
Department of Anesthesiology  Preprocedure Note       Name:  Grecia Wilhelm   Age:  79 y.o.  :  1945                                          MRN:  [id number]         Date:  2025      Surgeon: Surgeon(s):  Ganesh Huerta MD    Procedure: Procedure(s):  bilateral lower extremity thrombectomy possible angiogram possible intervention possible bilateral lower extremity fasciotomy    Medications prior to admission:   Prior to Admission medications    Medication Sig Start Date End Date Taking? Authorizing Provider   fluticasone (FLONASE) 50 MCG/ACT nasal spray 1 spray by Nasal route 2 times daily 2 sprays in each nostril  Twice a day 3/7/24   Hali Meza MD   dilTIAZem (CARDIZEM CD) 240 MG extended release capsule take 1 capsule by mouth once daily 22   Cullen Turcios MD   warfarin (COUMADIN) 1 MG tablet 1.5 mg warfarin daily beginning tomorrow 11/3/2022 11/2/22   Shakir German DO   aspirin 81 MG EC tablet Take 1 tablet by mouth daily    Cullen Turcios MD   potassium chloride (KLOR-CON) 10 MEQ extended release tablet take 1 tablet by mouth twice a day 6/10/19   Cullen Turcios MD   furosemide (LASIX) 20 MG tablet take 1 tablet by mouth once daily 19   Cullen Turcios MD   ACCU-CHEK JR PLUS strip  18   Cullen Turcios MD   calcium carbonate-vitamin D3 (CALTRATE) 600-400 MG-UNIT TABS per tab Take by mouth    Cullen Turcios MD   pravastatin (PRAVACHOL) 40 MG tablet Take 0.5 tablets by mouth daily    Cullen Turcios MD   niacin (SLO-NIACIN) 500 MG tablet Take 1 tablet by mouth 2 times daily    Cullen Turcios MD   omeprazole (PRILOSEC) 20 MG capsule Take 1 capsule by mouth 2 times daily    Cullen Turcios MD   therapeutic multivitamin-minerals (THERAGRAN-M) tablet Take 1 tablet by mouth daily    Cullen Turcios MD   Vitamin D (CHOLECALCIFEROL) 1000 UNITS CAPS capsule Take 600 Units by mouth daily     Tam

## 2025-02-23 ENCOUNTER — APPOINTMENT (OUTPATIENT)
Age: 80
DRG: 271 | End: 2025-02-23
Attending: INTERNAL MEDICINE
Payer: MEDICARE

## 2025-02-23 LAB
ALBUMIN SERPL-MCNC: 3.6 G/DL (ref 3.5–5.2)
ALP SERPL-CCNC: 69 U/L (ref 35–104)
ALT SERPL-CCNC: 16 U/L (ref 0–32)
ANION GAP SERPL CALCULATED.3IONS-SCNC: 12 MMOL/L (ref 7–16)
AST SERPL-CCNC: 40 U/L (ref 0–31)
BILIRUB SERPL-MCNC: 0.5 MG/DL (ref 0–1.2)
BUN SERPL-MCNC: 17 MG/DL (ref 6–23)
CALCIUM SERPL-MCNC: 8.3 MG/DL (ref 8.6–10.2)
CHLORIDE SERPL-SCNC: 104 MMOL/L (ref 98–107)
CK SERPL-CCNC: 1040 U/L (ref 20–180)
CK SERPL-CCNC: 1071 U/L (ref 20–180)
CK SERPL-CCNC: 1087 U/L (ref 20–180)
CK SERPL-CCNC: 1130 U/L (ref 20–180)
CO2 SERPL-SCNC: 21 MMOL/L (ref 22–29)
CREAT SERPL-MCNC: 0.7 MG/DL (ref 0.5–1)
ECHO AO ASC DIAM: 3.5 CM
ECHO AO ASCENDING AORTA INDEX: 2.02 CM/M2
ECHO AR MAX VEL PISA: 3.3 M/S
ECHO AV AREA PEAK VELOCITY: 1.1 CM2
ECHO AV AREA VTI: 1.3 CM2
ECHO AV AREA/BSA PEAK VELOCITY: 0.6 CM2/M2
ECHO AV AREA/BSA VTI: 0.8 CM2/M2
ECHO AV CUSP MM: 1.3 CM
ECHO AV MEAN GRADIENT: 14 MMHG
ECHO AV MEAN VELOCITY: 1.7 M/S
ECHO AV PEAK GRADIENT: 30 MMHG
ECHO AV PEAK VELOCITY: 2.7 M/S
ECHO AV REGURGITANT PHT: 501.5 MS
ECHO AV VELOCITY RATIO: 0.41
ECHO AV VTI: 43 CM
ECHO EST RA PRESSURE: 3 MMHG
ECHO LA DIAMETER INDEX: 3.29 CM/M2
ECHO LA DIAMETER: 5.7 CM
ECHO LA VOL A-L A2C: 88 ML (ref 22–52)
ECHO LA VOL A-L A4C: 129 ML (ref 22–52)
ECHO LA VOL BP: 106 ML (ref 22–52)
ECHO LA VOL MOD A2C: 85 ML (ref 22–52)
ECHO LA VOL MOD A4C: 120 ML (ref 22–52)
ECHO LA VOL/BSA BIPLANE: 61 ML/M2 (ref 16–34)
ECHO LA VOLUME AREA LENGTH: 112 ML
ECHO LA VOLUME INDEX A-L A2C: 51 ML/M2 (ref 16–34)
ECHO LA VOLUME INDEX A-L A4C: 75 ML/M2 (ref 16–34)
ECHO LA VOLUME INDEX AREA LENGTH: 65 ML/M2 (ref 16–34)
ECHO LA VOLUME INDEX MOD A2C: 49 ML/M2 (ref 16–34)
ECHO LA VOLUME INDEX MOD A4C: 69 ML/M2 (ref 16–34)
ECHO LV EF PHYSICIAN: 55 %
ECHO LV FRACTIONAL SHORTENING: 33 % (ref 28–44)
ECHO LV INTERNAL DIMENSION DIASTOLE INDEX: 2.25 CM/M2
ECHO LV INTERNAL DIMENSION DIASTOLIC: 3.9 CM (ref 3.9–5.3)
ECHO LV INTERNAL DIMENSION SYSTOLIC INDEX: 1.5 CM/M2
ECHO LV INTERNAL DIMENSION SYSTOLIC: 2.6 CM
ECHO LV ISOVOLUMETRIC RELAXATION TIME (IVRT): 76.1 MS
ECHO LV IVSD: 1.3 CM (ref 0.6–0.9)
ECHO LV IVSS: 1.8 CM
ECHO LV MASS 2D: 169.4 G (ref 67–162)
ECHO LV MASS INDEX 2D: 97.9 G/M2 (ref 43–95)
ECHO LV POSTERIOR WALL DIASTOLIC: 1.2 CM (ref 0.6–0.9)
ECHO LV POSTERIOR WALL SYSTOLIC: 1.9 CM
ECHO LV RELATIVE WALL THICKNESS RATIO: 0.62
ECHO LVOT AREA: 2.8 CM2
ECHO LVOT AV VTI INDEX: 0.44
ECHO LVOT DIAM: 1.9 CM
ECHO LVOT MEAN GRADIENT: 3 MMHG
ECHO LVOT PEAK GRADIENT: 5 MMHG
ECHO LVOT PEAK VELOCITY: 1.1 M/S
ECHO LVOT STROKE VOLUME INDEX: 31.1 ML/M2
ECHO LVOT SV: 53.8 ML
ECHO LVOT VTI: 19 CM
ECHO MV AREA PHT: 3.5 CM2
ECHO MV AREA VTI: 2.3 CM2
ECHO MV E DECELERATION TIME (DT): 175.9 MS
ECHO MV LVOT VTI INDEX: 1.25
ECHO MV MAX VELOCITY: 1.4 M/S
ECHO MV MEAN GRADIENT: 4 MMHG
ECHO MV MEAN VELOCITY: 0.9 M/S
ECHO MV PEAK GRADIENT: 7 MMHG
ECHO MV PRESSURE HALF TIME (PHT): 62.9 MS
ECHO MV VTI: 23.8 CM
ECHO PV MAX VELOCITY: 1 M/S
ECHO PV MEAN GRADIENT: 2 MMHG
ECHO PV MEAN VELOCITY: 0.7 M/S
ECHO PV PEAK GRADIENT: 4 MMHG
ECHO PV VTI: 16.2 CM
ECHO RIGHT VENTRICULAR SYSTOLIC PRESSURE (RVSP): 35 MMHG
ECHO RV INTERNAL DIMENSION: 3.7 CM
ECHO RV LONGITUDINAL DIMENSION: 5.7 CM
ECHO RV MID DIMENSION: 2.6 CM
ECHO TV REGURGITANT MAX VELOCITY: 2.85 M/S
ECHO TV REGURGITANT PEAK GRADIENT: 33 MMHG
ERYTHROCYTE [DISTWIDTH] IN BLOOD BY AUTOMATED COUNT: 14.6 % (ref 11.5–15)
GFR, ESTIMATED: 83 ML/MIN/1.73M2
GLUCOSE BLD-MCNC: 138 MG/DL (ref 74–99)
GLUCOSE BLD-MCNC: 151 MG/DL (ref 74–99)
GLUCOSE BLD-MCNC: 195 MG/DL (ref 74–99)
GLUCOSE BLD-MCNC: 197 MG/DL (ref 74–99)
GLUCOSE SERPL-MCNC: 164 MG/DL (ref 74–99)
HBA1C MFR BLD: 6 % (ref 4–5.6)
HCT VFR BLD AUTO: 30.5 % (ref 34–48)
HGB BLD-MCNC: 9.9 G/DL (ref 11.5–15.5)
MAGNESIUM SERPL-MCNC: 1.9 MG/DL (ref 1.6–2.6)
MCH RBC QN AUTO: 31.1 PG (ref 26–35)
MCHC RBC AUTO-ENTMCNC: 32.5 G/DL (ref 32–34.5)
MCV RBC AUTO: 95.9 FL (ref 80–99.9)
PARTIAL THROMBOPLASTIN TIME: 178.8 SEC (ref 24.5–35.1)
PHOSPHATE SERPL-MCNC: 3.4 MG/DL (ref 2.5–4.5)
PLATELET # BLD AUTO: 172 K/UL (ref 130–450)
PMV BLD AUTO: 10 FL (ref 7–12)
POTASSIUM SERPL-SCNC: 4.3 MMOL/L (ref 3.5–5)
PROT SERPL-MCNC: 5.8 G/DL (ref 6.4–8.3)
RBC # BLD AUTO: 3.18 M/UL (ref 3.5–5.5)
SODIUM SERPL-SCNC: 137 MMOL/L (ref 132–146)
WBC OTHER # BLD: 12.4 K/UL (ref 4.5–11.5)

## 2025-02-23 PROCEDURE — 80053 COMPREHEN METABOLIC PANEL: CPT

## 2025-02-23 PROCEDURE — 2700000000 HC OXYGEN THERAPY PER DAY

## 2025-02-23 PROCEDURE — 2580000003 HC RX 258

## 2025-02-23 PROCEDURE — 6360000002 HC RX W HCPCS

## 2025-02-23 PROCEDURE — 85730 THROMBOPLASTIN TIME PARTIAL: CPT

## 2025-02-23 PROCEDURE — 6370000000 HC RX 637 (ALT 250 FOR IP): Performed by: SURGERY

## 2025-02-23 PROCEDURE — 84100 ASSAY OF PHOSPHORUS: CPT

## 2025-02-23 PROCEDURE — 82550 ASSAY OF CK (CPK): CPT

## 2025-02-23 PROCEDURE — 85027 COMPLETE CBC AUTOMATED: CPT

## 2025-02-23 PROCEDURE — 83735 ASSAY OF MAGNESIUM: CPT

## 2025-02-23 PROCEDURE — 99291 CRITICAL CARE FIRST HOUR: CPT | Performed by: INTERNAL MEDICINE

## 2025-02-23 PROCEDURE — 6370000000 HC RX 637 (ALT 250 FOR IP): Performed by: INTERNAL MEDICINE

## 2025-02-23 PROCEDURE — 37799 UNLISTED PX VASCULAR SURGERY: CPT

## 2025-02-23 PROCEDURE — 82962 GLUCOSE BLOOD TEST: CPT

## 2025-02-23 PROCEDURE — 88304 TISSUE EXAM BY PATHOLOGIST: CPT

## 2025-02-23 PROCEDURE — 93306 TTE W/DOPPLER COMPLETE: CPT | Performed by: INTERNAL MEDICINE

## 2025-02-23 PROCEDURE — 6370000000 HC RX 637 (ALT 250 FOR IP)

## 2025-02-23 PROCEDURE — 93306 TTE W/DOPPLER COMPLETE: CPT

## 2025-02-23 PROCEDURE — 2000000000 HC ICU R&B

## 2025-02-23 PROCEDURE — 6360000002 HC RX W HCPCS: Performed by: SURGERY

## 2025-02-23 PROCEDURE — 2500000003 HC RX 250 WO HCPCS: Performed by: SURGERY

## 2025-02-23 PROCEDURE — 83036 HEMOGLOBIN GLYCOSYLATED A1C: CPT

## 2025-02-23 RX ORDER — DEXTROSE MONOHYDRATE 100 MG/ML
INJECTION, SOLUTION INTRAVENOUS CONTINUOUS PRN
Status: DISCONTINUED | OUTPATIENT
Start: 2025-02-23 | End: 2025-03-04 | Stop reason: HOSPADM

## 2025-02-23 RX ORDER — GLUCAGON 1 MG/ML
1 KIT INJECTION PRN
Status: DISCONTINUED | OUTPATIENT
Start: 2025-02-23 | End: 2025-03-04 | Stop reason: HOSPADM

## 2025-02-23 RX ORDER — INSULIN LISPRO 100 [IU]/ML
0-4 INJECTION, SOLUTION INTRAVENOUS; SUBCUTANEOUS
Status: DISCONTINUED | OUTPATIENT
Start: 2025-02-23 | End: 2025-03-04 | Stop reason: HOSPADM

## 2025-02-23 RX ORDER — OXYCODONE HYDROCHLORIDE 5 MG/1
5 TABLET ORAL EVERY 4 HOURS PRN
Status: DISCONTINUED | OUTPATIENT
Start: 2025-02-23 | End: 2025-03-04 | Stop reason: HOSPADM

## 2025-02-23 RX ORDER — SODIUM CHLORIDE 9 MG/ML
INJECTION, SOLUTION INTRAVENOUS CONTINUOUS
Status: DISCONTINUED | OUTPATIENT
Start: 2025-02-23 | End: 2025-02-26

## 2025-02-23 RX ADMIN — HYDROMORPHONE HYDROCHLORIDE 0.5 MG: 1 INJECTION, SOLUTION INTRAMUSCULAR; INTRAVENOUS; SUBCUTANEOUS at 09:15

## 2025-02-23 RX ADMIN — Medication 500 MG: at 20:20

## 2025-02-23 RX ADMIN — WATER 2000 MG: 1 INJECTION INTRAMUSCULAR; INTRAVENOUS; SUBCUTANEOUS at 10:30

## 2025-02-23 RX ADMIN — OXYCODONE 5 MG: 5 TABLET ORAL at 20:19

## 2025-02-23 RX ADMIN — APIXABAN 5 MG: 5 TABLET, FILM COATED ORAL at 20:20

## 2025-02-23 RX ADMIN — Medication 1000 UNITS: at 09:17

## 2025-02-23 RX ADMIN — OXYCODONE 5 MG: 5 TABLET ORAL at 12:21

## 2025-02-23 RX ADMIN — PRAVASTATIN SODIUM 20 MG: 20 TABLET ORAL at 09:16

## 2025-02-23 RX ADMIN — SODIUM CHLORIDE, PRESERVATIVE FREE 10 ML: 5 INJECTION INTRAVENOUS at 09:17

## 2025-02-23 RX ADMIN — SODIUM CHLORIDE: 0.9 INJECTION, SOLUTION INTRAVENOUS at 01:50

## 2025-02-23 RX ADMIN — WATER 2000 MG: 1 INJECTION INTRAMUSCULAR; INTRAVENOUS; SUBCUTANEOUS at 02:12

## 2025-02-23 RX ADMIN — POTASSIUM CHLORIDE 10 MEQ: 750 TABLET, EXTENDED RELEASE ORAL at 20:20

## 2025-02-23 RX ADMIN — SODIUM CHLORIDE: 9 INJECTION, SOLUTION INTRAVENOUS at 11:52

## 2025-02-23 RX ADMIN — HYDROMORPHONE HYDROCHLORIDE 0.25 MG: 1 INJECTION, SOLUTION INTRAMUSCULAR; INTRAVENOUS; SUBCUTANEOUS at 21:25

## 2025-02-23 RX ADMIN — CALCIUM CARBONATE-VITAMIN D TAB 500 MG-200 UNIT 1 TABLET: 500-200 TAB at 09:17

## 2025-02-23 RX ADMIN — Medication 500 MG: at 09:17

## 2025-02-23 RX ADMIN — PANTOPRAZOLE SODIUM 40 MG: 40 TABLET, DELAYED RELEASE ORAL at 17:08

## 2025-02-23 RX ADMIN — SODIUM CHLORIDE: 9 INJECTION, SOLUTION INTRAVENOUS at 07:59

## 2025-02-23 RX ADMIN — METFORMIN HYDROCHLORIDE 500 MG: 500 TABLET ORAL at 09:17

## 2025-02-23 RX ADMIN — PANTOPRAZOLE SODIUM 40 MG: 40 TABLET, DELAYED RELEASE ORAL at 05:44

## 2025-02-23 RX ADMIN — POTASSIUM CHLORIDE 10 MEQ: 750 TABLET, EXTENDED RELEASE ORAL at 09:16

## 2025-02-23 RX ADMIN — INSULIN LISPRO 1 UNITS: 100 INJECTION, SOLUTION INTRAVENOUS; SUBCUTANEOUS at 18:14

## 2025-02-23 RX ADMIN — DILTIAZEM HYDROCHLORIDE 240 MG: 120 CAPSULE, COATED, EXTENDED RELEASE ORAL at 09:16

## 2025-02-23 RX ADMIN — ASPIRIN 81 MG: 81 TABLET, COATED ORAL at 09:16

## 2025-02-23 RX ADMIN — APIXABAN 5 MG: 5 TABLET, FILM COATED ORAL at 09:16

## 2025-02-23 RX ADMIN — HYDROMORPHONE HYDROCHLORIDE 0.5 MG: 1 INJECTION, SOLUTION INTRAMUSCULAR; INTRAVENOUS; SUBCUTANEOUS at 04:39

## 2025-02-23 RX ADMIN — METOPROLOL TARTRATE 50 MG: 50 TABLET, FILM COATED ORAL at 20:20

## 2025-02-23 RX ADMIN — Medication 1 TABLET: at 09:16

## 2025-02-23 RX ADMIN — SODIUM CHLORIDE, PRESERVATIVE FREE 10 ML: 5 INJECTION INTRAVENOUS at 20:20

## 2025-02-23 RX ADMIN — METOPROLOL TARTRATE 50 MG: 50 TABLET, FILM COATED ORAL at 09:16

## 2025-02-23 RX ADMIN — FUROSEMIDE 20 MG: 20 TABLET ORAL at 09:16

## 2025-02-23 RX ADMIN — SODIUM CHLORIDE: 9 INJECTION, SOLUTION INTRAVENOUS at 23:25

## 2025-02-23 ASSESSMENT — PAIN DESCRIPTION - DESCRIPTORS
DESCRIPTORS: ACHING;DISCOMFORT;SORE;TENDER
DESCRIPTORS: ACHING;DISCOMFORT;SORE;TENDER
DESCRIPTORS: ACHING;DISCOMFORT;CRAMPING

## 2025-02-23 ASSESSMENT — PAIN DESCRIPTION - LOCATION
LOCATION: GROIN

## 2025-02-23 ASSESSMENT — PAIN SCALES - GENERAL
PAINLEVEL_OUTOF10: 7
PAINLEVEL_OUTOF10: 6
PAINLEVEL_OUTOF10: 10
PAINLEVEL_OUTOF10: 3
PAINLEVEL_OUTOF10: 7
PAINLEVEL_OUTOF10: 9
PAINLEVEL_OUTOF10: 10
PAINLEVEL_OUTOF10: 3
PAINLEVEL_OUTOF10: 6
PAINLEVEL_OUTOF10: 4
PAINLEVEL_OUTOF10: 7
PAINLEVEL_OUTOF10: 6

## 2025-02-23 ASSESSMENT — PAIN DESCRIPTION - ORIENTATION
ORIENTATION: RIGHT
ORIENTATION: RIGHT
ORIENTATION: LEFT;RIGHT

## 2025-02-23 ASSESSMENT — PAIN - FUNCTIONAL ASSESSMENT
PAIN_FUNCTIONAL_ASSESSMENT: ACTIVITIES ARE NOT PREVENTED

## 2025-02-23 NOTE — H&P
Hospital Medicine History & Physical      PCP: Bismark Beauchamp MD    Date of Admission: 2/22/2025    Date of Service: .FEB. 23, 2025    Chief Complaint:   BILATERAL LOWER EXTREM PAIN      History Of Present Illness:     79 y.o. female presented with BILATERAL LOWER EXTREM PAIN. FOUND TO BE ISCHEMIC AND HAD A Bilateral LE aortoiliac and distal thrombectomy .  HISTORY GIVEN BY DAUGHTER.   SHE HAS A KNOWN HISTORY OF BAD  VARICOSE VEINS, WOKE UP IN A LOT OF PAIN, AND FAMILY CALLED 911    Past Medical History:          Diagnosis Date    Aortic stenosis, mild 10/01/2015    follows  Dr Gallardo at [facility] last visit 9/30/22    Arrhythmia     Arthritis     knees    Atrial fibrillation (HCC)     Bleeding from varicose veins of right lower extremity 06/08/2017    CAD (coronary artery disease)     Cerebrovascular accident (CVA) due to embolic occlusion of left middle cerebral artery (HCC) 10/2015    Confirmed by neurology    Cerebrovascular accident (CVA) due to embolic occlusion of left middle cerebral artery (HCC) 2013    Left parietal confirmed by neurology    Cerebrovascular disease 03/21/2013    CVA.no weakness/deficits    CHF (congestive heart failure) ([facility])     Critical limb ischemia of both lower extremities (HCC) 02/22/2025    Diabetes mellitus (HCC)     Hearing deficit     wears hearing aide left ear only    Heart disease     Hyperlipidemia     Hypertension     Iron deficiency anemia 10/23/2022    Migraine headache with aura     Mitral regurgitation 10/01/2015    mild    Moderate aortic stenosis by prior echocardiogram 2018    NCGS (non-celiac gluten sensitivity)     Severe aortic stenosis 09/25/2020    By 2D echo    TIA (transient ischemic attack)     Unspecified cerebral artery occlusion with cerebral infarction     Varicose veins of left leg with edema 06/08/2017    Venous stasis ulcer of right

## 2025-02-24 LAB
ACTIVATED CLOTTING TIME, LOW RANGE: 236 SEC
ACTIVATED CLOTTING TIME, LOW RANGE: 297 SEC
ACTIVATED CLOTTING TIME, LOW RANGE: 330 SEC
ALBUMIN SERPL-MCNC: 3.4 G/DL (ref 3.5–5.2)
ALP SERPL-CCNC: 63 U/L (ref 35–104)
ALT SERPL-CCNC: 15 U/L (ref 0–32)
ANION GAP SERPL CALCULATED.3IONS-SCNC: 6 MMOL/L (ref 7–16)
AST SERPL-CCNC: 52 U/L (ref 0–31)
BILIRUB SERPL-MCNC: 0.5 MG/DL (ref 0–1.2)
BUN SERPL-MCNC: 17 MG/DL (ref 6–23)
CALCIUM SERPL-MCNC: 8 MG/DL (ref 8.6–10.2)
CHLORIDE SERPL-SCNC: 107 MMOL/L (ref 98–107)
CK SERPL-CCNC: 1154 U/L (ref 20–180)
CK SERPL-CCNC: 1210 U/L (ref 20–180)
CK SERPL-CCNC: 1622 U/L (ref 20–180)
CK SERPL-CCNC: 1770 U/L (ref 20–180)
CK SERPL-CCNC: 1937 U/L (ref 20–180)
CO2 SERPL-SCNC: 23 MMOL/L (ref 22–29)
CREAT SERPL-MCNC: 0.7 MG/DL (ref 0.5–1)
EKG ATRIAL RATE: 120 BPM
EKG Q-T INTERVAL: 384 MS
EKG QRS DURATION: 98 MS
EKG QTC CALCULATION (BAZETT): 477 MS
EKG R AXIS: 54 DEGREES
EKG T AXIS: 11 DEGREES
EKG VENTRICULAR RATE: 93 BPM
ERYTHROCYTE [DISTWIDTH] IN BLOOD BY AUTOMATED COUNT: 14.9 % (ref 11.5–15)
GFR, ESTIMATED: 83 ML/MIN/1.73M2
GLUCOSE BLD-MCNC: 140 MG/DL (ref 74–99)
GLUCOSE BLD-MCNC: 152 MG/DL (ref 74–99)
GLUCOSE BLD-MCNC: 182 MG/DL (ref 74–99)
GLUCOSE BLD-MCNC: 268 MG/DL (ref 74–99)
GLUCOSE SERPL-MCNC: 149 MG/DL (ref 74–99)
HCT VFR BLD AUTO: 24.1 % (ref 34–48)
HGB BLD-MCNC: 8 G/DL (ref 11.5–15.5)
MAGNESIUM SERPL-MCNC: 1.9 MG/DL (ref 1.6–2.6)
MCH RBC QN AUTO: 31.6 PG (ref 26–35)
MCHC RBC AUTO-ENTMCNC: 33.2 G/DL (ref 32–34.5)
MCV RBC AUTO: 95.3 FL (ref 80–99.9)
PHOSPHATE SERPL-MCNC: 1.9 MG/DL (ref 2.5–4.5)
PLATELET # BLD AUTO: 146 K/UL (ref 130–450)
PMV BLD AUTO: 10.1 FL (ref 7–12)
POTASSIUM SERPL-SCNC: 4 MMOL/L (ref 3.5–5)
PROT SERPL-MCNC: 5.2 G/DL (ref 6.4–8.3)
RBC # BLD AUTO: 2.53 M/UL (ref 3.5–5.5)
SODIUM SERPL-SCNC: 136 MMOL/L (ref 132–146)
WBC OTHER # BLD: 12.6 K/UL (ref 4.5–11.5)

## 2025-02-24 PROCEDURE — 84100 ASSAY OF PHOSPHORUS: CPT

## 2025-02-24 PROCEDURE — 6370000000 HC RX 637 (ALT 250 FOR IP): Performed by: SURGERY

## 2025-02-24 PROCEDURE — 82550 ASSAY OF CK (CPK): CPT

## 2025-02-24 PROCEDURE — 2700000000 HC OXYGEN THERAPY PER DAY

## 2025-02-24 PROCEDURE — 85027 COMPLETE CBC AUTOMATED: CPT

## 2025-02-24 PROCEDURE — 99291 CRITICAL CARE FIRST HOUR: CPT | Performed by: INTERNAL MEDICINE

## 2025-02-24 PROCEDURE — 2000000000 HC ICU R&B

## 2025-02-24 PROCEDURE — 6370000000 HC RX 637 (ALT 250 FOR IP)

## 2025-02-24 PROCEDURE — 2500000003 HC RX 250 WO HCPCS: Performed by: SURGERY

## 2025-02-24 PROCEDURE — 37799 UNLISTED PX VASCULAR SURGERY: CPT

## 2025-02-24 PROCEDURE — 83735 ASSAY OF MAGNESIUM: CPT

## 2025-02-24 PROCEDURE — 6370000000 HC RX 637 (ALT 250 FOR IP): Performed by: INTERNAL MEDICINE

## 2025-02-24 PROCEDURE — 6360000002 HC RX W HCPCS

## 2025-02-24 PROCEDURE — 2580000003 HC RX 258

## 2025-02-24 PROCEDURE — 80053 COMPREHEN METABOLIC PANEL: CPT

## 2025-02-24 PROCEDURE — 93010 ELECTROCARDIOGRAM REPORT: CPT | Performed by: INTERNAL MEDICINE

## 2025-02-24 PROCEDURE — 82962 GLUCOSE BLOOD TEST: CPT

## 2025-02-24 RX ADMIN — APIXABAN 5 MG: 5 TABLET, FILM COATED ORAL at 20:32

## 2025-02-24 RX ADMIN — Medication 500 MG: at 20:33

## 2025-02-24 RX ADMIN — LABETALOL HYDROCHLORIDE 10 MG: 5 INJECTION INTRAVENOUS at 19:05

## 2025-02-24 RX ADMIN — CALCIUM CARBONATE-VITAMIN D TAB 500 MG-200 UNIT 1 TABLET: 500-200 TAB at 10:38

## 2025-02-24 RX ADMIN — PANTOPRAZOLE SODIUM 40 MG: 40 TABLET, DELAYED RELEASE ORAL at 05:31

## 2025-02-24 RX ADMIN — OXYCODONE 5 MG: 5 TABLET ORAL at 09:19

## 2025-02-24 RX ADMIN — ASPIRIN 81 MG: 81 TABLET, COATED ORAL at 10:37

## 2025-02-24 RX ADMIN — Medication 1000 UNITS: at 10:39

## 2025-02-24 RX ADMIN — Medication 500 MG: at 10:38

## 2025-02-24 RX ADMIN — HYDROMORPHONE HYDROCHLORIDE 0.25 MG: 1 INJECTION, SOLUTION INTRAMUSCULAR; INTRAVENOUS; SUBCUTANEOUS at 13:30

## 2025-02-24 RX ADMIN — METOPROLOL TARTRATE 50 MG: 50 TABLET, FILM COATED ORAL at 20:32

## 2025-02-24 RX ADMIN — INSULIN LISPRO 1 UNITS: 100 INJECTION, SOLUTION INTRAVENOUS; SUBCUTANEOUS at 11:17

## 2025-02-24 RX ADMIN — SODIUM CHLORIDE, PRESERVATIVE FREE 10 ML: 5 INJECTION INTRAVENOUS at 22:10

## 2025-02-24 RX ADMIN — FUROSEMIDE 20 MG: 20 TABLET ORAL at 10:36

## 2025-02-24 RX ADMIN — METOPROLOL TARTRATE 50 MG: 50 TABLET, FILM COATED ORAL at 10:37

## 2025-02-24 RX ADMIN — OXYCODONE 5 MG: 5 TABLET ORAL at 14:55

## 2025-02-24 RX ADMIN — INSULIN LISPRO 4 UNITS: 100 INJECTION, SOLUTION INTRAVENOUS; SUBCUTANEOUS at 20:32

## 2025-02-24 RX ADMIN — PANTOPRAZOLE SODIUM 40 MG: 40 TABLET, DELAYED RELEASE ORAL at 14:55

## 2025-02-24 RX ADMIN — APIXABAN 5 MG: 5 TABLET, FILM COATED ORAL at 10:36

## 2025-02-24 RX ADMIN — PRAVASTATIN SODIUM 20 MG: 20 TABLET ORAL at 10:37

## 2025-02-24 RX ADMIN — SODIUM CHLORIDE: 9 INJECTION, SOLUTION INTRAVENOUS at 13:19

## 2025-02-24 RX ADMIN — HYDROMORPHONE HYDROCHLORIDE 0.25 MG: 1 INJECTION, SOLUTION INTRAMUSCULAR; INTRAVENOUS; SUBCUTANEOUS at 09:38

## 2025-02-24 RX ADMIN — POTASSIUM CHLORIDE 10 MEQ: 750 TABLET, EXTENDED RELEASE ORAL at 10:37

## 2025-02-24 RX ADMIN — HYDROMORPHONE HYDROCHLORIDE 0.25 MG: 1 INJECTION, SOLUTION INTRAMUSCULAR; INTRAVENOUS; SUBCUTANEOUS at 20:33

## 2025-02-24 RX ADMIN — OXYCODONE 5 MG: 5 TABLET ORAL at 03:27

## 2025-02-24 RX ADMIN — DILTIAZEM HYDROCHLORIDE 240 MG: 120 CAPSULE, COATED, EXTENDED RELEASE ORAL at 10:37

## 2025-02-24 RX ADMIN — SODIUM CHLORIDE, PRESERVATIVE FREE 10 ML: 5 INJECTION INTRAVENOUS at 10:38

## 2025-02-24 RX ADMIN — POTASSIUM CHLORIDE 10 MEQ: 750 TABLET, EXTENDED RELEASE ORAL at 20:33

## 2025-02-24 RX ADMIN — Medication 1 TABLET: at 10:37

## 2025-02-24 ASSESSMENT — PAIN SCALES - GENERAL
PAINLEVEL_OUTOF10: 0
PAINLEVEL_OUTOF10: 5
PAINLEVEL_OUTOF10: 6
PAINLEVEL_OUTOF10: 0
PAINLEVEL_OUTOF10: 10
PAINLEVEL_OUTOF10: 0
PAINLEVEL_OUTOF10: 10
PAINLEVEL_OUTOF10: 10
PAINLEVEL_OUTOF10: 8
PAINLEVEL_OUTOF10: 9
PAINLEVEL_OUTOF10: 10
PAINLEVEL_OUTOF10: 0
PAINLEVEL_OUTOF10: 10
PAINLEVEL_OUTOF10: 6
PAINLEVEL_OUTOF10: 10
PAINLEVEL_OUTOF10: 5

## 2025-02-24 ASSESSMENT — PAIN DESCRIPTION - ONSET
ONSET: ON-GOING
ONSET: GRADUAL

## 2025-02-24 ASSESSMENT — PAIN DESCRIPTION - DESCRIPTORS
DESCRIPTORS: CRAMPING;BURNING
DESCRIPTORS: SHARP;THROBBING
DESCRIPTORS: BURNING;ACHING;SHARP
DESCRIPTORS: ACHING;DISCOMFORT
DESCRIPTORS: BURNING;SHARP;TENDER

## 2025-02-24 ASSESSMENT — PAIN DESCRIPTION - FREQUENCY
FREQUENCY: INTERMITTENT
FREQUENCY: CONTINUOUS

## 2025-02-24 ASSESSMENT — PAIN DESCRIPTION - ORIENTATION
ORIENTATION: RIGHT
ORIENTATION: RIGHT
ORIENTATION: RIGHT;LOWER
ORIENTATION: RIGHT

## 2025-02-24 ASSESSMENT — PAIN DESCRIPTION - PAIN TYPE
TYPE: ACUTE PAIN;SURGICAL PAIN
TYPE: SURGICAL PAIN

## 2025-02-24 ASSESSMENT — PAIN - FUNCTIONAL ASSESSMENT
PAIN_FUNCTIONAL_ASSESSMENT: PREVENTS OR INTERFERES SOME ACTIVE ACTIVITIES AND ADLS
PAIN_FUNCTIONAL_ASSESSMENT: PREVENTS OR INTERFERES WITH ALL ACTIVE AND SOME PASSIVE ACTIVITIES
PAIN_FUNCTIONAL_ASSESSMENT: PREVENTS OR INTERFERES SOME ACTIVE ACTIVITIES AND ADLS
PAIN_FUNCTIONAL_ASSESSMENT: PREVENTS OR INTERFERES WITH MANY ACTIVE NOT PASSIVE ACTIVITIES

## 2025-02-24 ASSESSMENT — PAIN DESCRIPTION - LOCATION
LOCATION: GROIN;LEG
LOCATION: LEG
LOCATION: GROIN
LOCATION: LEG
LOCATION: LEG

## 2025-02-24 NOTE — CARE COORDINATION
Case Management Assessment  Initial Evaluation    Date/Time of Evaluation: 2/24/2025 2:39 PM  Assessment Completed by: Tay Hopper RN    If patient is discharged prior to next notation, then this note serves as note for discharge by case management.    Patient Name: Grecia Wilhelm                   YOB: 1945  Diagnosis: Aortic occlusion [I70.0]  Critical limb ischemia of both lower extremities (HCC) [I70.223]  Acute lower limb ischemia [I99.8]                   Date / Time: 2/22/2025  8:30 AM    Patient Admission Status: Inpatient   Readmission Risk (Low < 19, Mod (19-27), High > 27): Readmission Risk Score: 20.6    Current PCP: Bismark Beauchamp MD  PCP verified by ? Yes    Chart Reviewed: Yes      History Provided by: Child/Family  Patient Orientation: Alert and Oriented    Patient Cognition: Alert    Hospitalization in the last 30 days (Readmission):  No    If yes, Readmission Assessment in CM Navigator will be completed.    Advance Directives:      Code Status: Full Code   Patient's Primary Decision Maker is: Legal Next of Kin    Primary Decision Maker: Feng Wilhelm - Spouse - [phone]    Secondary Decision Maker: Eddie Wilhelm - Child - [phone]    Discharge Planning:    Patient lives with:   Type of Home:    Primary Care Giver: Self  Patient Support Systems include: Spouse/Significant Other, Children   Current Financial resources:    Current community resources:    Current services prior to admission:              Current DME:              Type of Home Care services:       ADLS  Prior functional level: Independent in ADLs/IADLs  Current functional level: Independent in ADLs/IADLs    PT AM-PAC:   /24  OT AM-PAC:   /24    Family can provide assistance at DC: Yes  Would you like Case Management to discuss the discharge plan with any other family members/significant others, and if so, who? No  Plans to Return to Present Housing: Unknown at present  Other Identified Issues/Barriers to

## 2025-02-24 NOTE — CARE COORDINATION
2/24 Care Coordination:Pt presenting to [place] ED with difficulty using her right leg. Acute occlusion of aortic bifurcation due to thrombosis.Pt transferred to [facility], Vascular consult Acute Limb ischemia. Admit to [facility]. Pt hx Afib, was on Coumadin.Severe AS s/p TAVR 1/8/2024.  Started on IV Heparin drip.Pt to OR, For Bilateral LE aortoiliac and distal thrombectomy. Pt remains in [facility]. POD#2. CM spoke with Pt's daughter, Introduced self and role of CM.  PTA pt was from home with her , was Independent. Plan is to return home if able. Pt remains in sig. Amount of discomfort. IV Pain meds. Pt'd daughter also asked about prescription assistance. HONEY gave her Eliquis coupon and prescription assistance info.  HONEY/LUCERO will continue to follow for discharge planning.   Tay SMILEY,RN-CV-BC  [phone]

## 2025-02-25 LAB
ALBUMIN SERPL-MCNC: 3 G/DL (ref 3.5–5.2)
ALP SERPL-CCNC: 68 U/L (ref 35–104)
ALT SERPL-CCNC: 23 U/L (ref 0–32)
ANION GAP SERPL CALCULATED.3IONS-SCNC: 9 MMOL/L (ref 7–16)
AST SERPL-CCNC: 74 U/L (ref 0–31)
BILIRUB SERPL-MCNC: 0.8 MG/DL (ref 0–1.2)
BUN SERPL-MCNC: 13 MG/DL (ref 6–23)
CALCIUM SERPL-MCNC: 7.8 MG/DL (ref 8.6–10.2)
CHLORIDE SERPL-SCNC: 105 MMOL/L (ref 98–107)
CK SERPL-CCNC: 1207 U/L (ref 20–180)
CK SERPL-CCNC: 1293 U/L (ref 20–180)
CK SERPL-CCNC: 1810 U/L (ref 20–180)
CO2 SERPL-SCNC: 23 MMOL/L (ref 22–29)
CREAT SERPL-MCNC: 0.7 MG/DL (ref 0.5–1)
ERYTHROCYTE [DISTWIDTH] IN BLOOD BY AUTOMATED COUNT: 15.1 % (ref 11.5–15)
GFR, ESTIMATED: 90 ML/MIN/1.73M2
GLUCOSE BLD-MCNC: 141 MG/DL (ref 74–99)
GLUCOSE BLD-MCNC: 145 MG/DL (ref 74–99)
GLUCOSE BLD-MCNC: 163 MG/DL (ref 74–99)
GLUCOSE BLD-MCNC: 182 MG/DL (ref 74–99)
GLUCOSE SERPL-MCNC: 166 MG/DL (ref 74–99)
HCT VFR BLD AUTO: 22.6 % (ref 34–48)
HCT VFR BLD AUTO: 26.4 % (ref 34–48)
HGB BLD-MCNC: 7.3 G/DL (ref 11.5–15.5)
HGB BLD-MCNC: 8.5 G/DL (ref 11.5–15.5)
MCH RBC QN AUTO: 30.9 PG (ref 26–35)
MCHC RBC AUTO-ENTMCNC: 32.3 G/DL (ref 32–34.5)
MCV RBC AUTO: 95.8 FL (ref 80–99.9)
PLATELET # BLD AUTO: 140 K/UL (ref 130–450)
PMV BLD AUTO: 9.8 FL (ref 7–12)
POTASSIUM SERPL-SCNC: 4.1 MMOL/L (ref 3.5–5)
PROT SERPL-MCNC: 5.1 G/DL (ref 6.4–8.3)
RBC # BLD AUTO: 2.36 M/UL (ref 3.5–5.5)
SODIUM SERPL-SCNC: 137 MMOL/L (ref 132–146)
WBC OTHER # BLD: 12.4 K/UL (ref 4.5–11.5)

## 2025-02-25 PROCEDURE — 80053 COMPREHEN METABOLIC PANEL: CPT

## 2025-02-25 PROCEDURE — 6360000002 HC RX W HCPCS

## 2025-02-25 PROCEDURE — 6370000000 HC RX 637 (ALT 250 FOR IP): Performed by: NURSE PRACTITIONER

## 2025-02-25 PROCEDURE — 6370000000 HC RX 637 (ALT 250 FOR IP)

## 2025-02-25 PROCEDURE — 82550 ASSAY OF CK (CPK): CPT

## 2025-02-25 PROCEDURE — 2700000000 HC OXYGEN THERAPY PER DAY

## 2025-02-25 PROCEDURE — 2500000003 HC RX 250 WO HCPCS: Performed by: SURGERY

## 2025-02-25 PROCEDURE — 99291 CRITICAL CARE FIRST HOUR: CPT | Performed by: INTERNAL MEDICINE

## 2025-02-25 PROCEDURE — 82962 GLUCOSE BLOOD TEST: CPT

## 2025-02-25 PROCEDURE — 36430 TRANSFUSION BLD/BLD COMPNT: CPT

## 2025-02-25 PROCEDURE — 37799 UNLISTED PX VASCULAR SURGERY: CPT

## 2025-02-25 PROCEDURE — 30233N1 TRANSFUSION OF NONAUTOLOGOUS RED BLOOD CELLS INTO PERIPHERAL VEIN, PERCUTANEOUS APPROACH: ICD-10-PCS | Performed by: INTERNAL MEDICINE

## 2025-02-25 PROCEDURE — 2580000003 HC RX 258: Performed by: NURSE PRACTITIONER

## 2025-02-25 PROCEDURE — P9016 RBC LEUKOCYTES REDUCED: HCPCS

## 2025-02-25 PROCEDURE — 85018 HEMOGLOBIN: CPT

## 2025-02-25 PROCEDURE — 6370000000 HC RX 637 (ALT 250 FOR IP): Performed by: SURGERY

## 2025-02-25 PROCEDURE — 85027 COMPLETE CBC AUTOMATED: CPT

## 2025-02-25 PROCEDURE — 6370000000 HC RX 637 (ALT 250 FOR IP): Performed by: INTERNAL MEDICINE

## 2025-02-25 PROCEDURE — 2000000000 HC ICU R&B

## 2025-02-25 PROCEDURE — 85014 HEMATOCRIT: CPT

## 2025-02-25 RX ORDER — 0.9 % SODIUM CHLORIDE 0.9 %
500 INTRAVENOUS SOLUTION INTRAVENOUS ONCE
Status: COMPLETED | OUTPATIENT
Start: 2025-02-25 | End: 2025-02-25

## 2025-02-25 RX ORDER — SODIUM CHLORIDE 9 MG/ML
INJECTION, SOLUTION INTRAVENOUS PRN
Status: DISCONTINUED | OUTPATIENT
Start: 2025-02-25 | End: 2025-03-04 | Stop reason: HOSPADM

## 2025-02-25 RX ORDER — DOCUSATE SODIUM 100 MG/1
100 CAPSULE, LIQUID FILLED ORAL DAILY
Status: DISCONTINUED | OUTPATIENT
Start: 2025-02-25 | End: 2025-03-04 | Stop reason: HOSPADM

## 2025-02-25 RX ADMIN — Medication 500 MG: at 20:15

## 2025-02-25 RX ADMIN — INSULIN LISPRO 1 UNITS: 100 INJECTION, SOLUTION INTRAVENOUS; SUBCUTANEOUS at 20:13

## 2025-02-25 RX ADMIN — CALCIUM CARBONATE-VITAMIN D TAB 500 MG-200 UNIT 1 TABLET: 500-200 TAB at 08:58

## 2025-02-25 RX ADMIN — ASPIRIN 81 MG: 81 TABLET, COATED ORAL at 09:05

## 2025-02-25 RX ADMIN — DILTIAZEM HYDROCHLORIDE 240 MG: 120 CAPSULE, COATED, EXTENDED RELEASE ORAL at 09:16

## 2025-02-25 RX ADMIN — SODIUM CHLORIDE, PRESERVATIVE FREE 10 ML: 5 INJECTION INTRAVENOUS at 20:14

## 2025-02-25 RX ADMIN — SODIUM CHLORIDE 500 ML: 9 INJECTION, SOLUTION INTRAVENOUS at 13:04

## 2025-02-25 RX ADMIN — POTASSIUM CHLORIDE 10 MEQ: 750 TABLET, EXTENDED RELEASE ORAL at 09:06

## 2025-02-25 RX ADMIN — PRAVASTATIN SODIUM 20 MG: 20 TABLET ORAL at 09:05

## 2025-02-25 RX ADMIN — POTASSIUM CHLORIDE 10 MEQ: 750 TABLET, EXTENDED RELEASE ORAL at 20:14

## 2025-02-25 RX ADMIN — PANTOPRAZOLE SODIUM 40 MG: 40 TABLET, DELAYED RELEASE ORAL at 05:38

## 2025-02-25 RX ADMIN — APIXABAN 5 MG: 5 TABLET, FILM COATED ORAL at 09:05

## 2025-02-25 RX ADMIN — HYDROMORPHONE HYDROCHLORIDE 0.25 MG: 1 INJECTION, SOLUTION INTRAMUSCULAR; INTRAVENOUS; SUBCUTANEOUS at 16:07

## 2025-02-25 RX ADMIN — OXYCODONE 5 MG: 5 TABLET ORAL at 05:35

## 2025-02-25 RX ADMIN — METOPROLOL TARTRATE 50 MG: 50 TABLET, FILM COATED ORAL at 09:16

## 2025-02-25 RX ADMIN — APIXABAN 5 MG: 5 TABLET, FILM COATED ORAL at 20:14

## 2025-02-25 RX ADMIN — FUROSEMIDE 20 MG: 20 TABLET ORAL at 09:06

## 2025-02-25 RX ADMIN — Medication 500 MG: at 08:55

## 2025-02-25 RX ADMIN — METOPROLOL TARTRATE 50 MG: 50 TABLET, FILM COATED ORAL at 20:14

## 2025-02-25 RX ADMIN — HYDROMORPHONE HYDROCHLORIDE 0.25 MG: 1 INJECTION, SOLUTION INTRAMUSCULAR; INTRAVENOUS; SUBCUTANEOUS at 20:32

## 2025-02-25 RX ADMIN — DOCUSATE SODIUM 100 MG: 100 CAPSULE, LIQUID FILLED ORAL at 11:10

## 2025-02-25 RX ADMIN — OXYCODONE 5 MG: 5 TABLET ORAL at 23:06

## 2025-02-25 RX ADMIN — OXYCODONE 5 MG: 5 TABLET ORAL at 00:34

## 2025-02-25 RX ADMIN — Medication 1000 UNITS: at 08:55

## 2025-02-25 RX ADMIN — OXYCODONE 5 MG: 5 TABLET ORAL at 15:19

## 2025-02-25 RX ADMIN — SODIUM CHLORIDE, PRESERVATIVE FREE 10 ML: 5 INJECTION INTRAVENOUS at 09:10

## 2025-02-25 RX ADMIN — Medication 1 TABLET: at 09:06

## 2025-02-25 ASSESSMENT — PAIN SCALES - GENERAL
PAINLEVEL_OUTOF10: 3
PAINLEVEL_OUTOF10: 6
PAINLEVEL_OUTOF10: 10
PAINLEVEL_OUTOF10: 2
PAINLEVEL_OUTOF10: 5
PAINLEVEL_OUTOF10: 6
PAINLEVEL_OUTOF10: 0
PAINLEVEL_OUTOF10: 7
PAINLEVEL_OUTOF10: 0
PAINLEVEL_OUTOF10: 6
PAINLEVEL_OUTOF10: 8
PAINLEVEL_OUTOF10: 0
PAINLEVEL_OUTOF10: 0
PAINLEVEL_OUTOF10: 10
PAINLEVEL_OUTOF10: 10
PAINLEVEL_OUTOF10: 0

## 2025-02-25 ASSESSMENT — PAIN - FUNCTIONAL ASSESSMENT
PAIN_FUNCTIONAL_ASSESSMENT: PREVENTS OR INTERFERES WITH MANY ACTIVE NOT PASSIVE ACTIVITIES
PAIN_FUNCTIONAL_ASSESSMENT: PREVENTS OR INTERFERES SOME ACTIVE ACTIVITIES AND ADLS

## 2025-02-25 ASSESSMENT — PAIN DESCRIPTION - LOCATION
LOCATION: GROIN;LEG
LOCATION: LEG
LOCATION: INCISION
LOCATION: LEG
LOCATION: GROIN;LEG

## 2025-02-25 ASSESSMENT — PAIN DESCRIPTION - DESCRIPTORS
DESCRIPTORS: STABBING
DESCRIPTORS: SORE;DISCOMFORT;ACHING
DESCRIPTORS: STABBING
DESCRIPTORS: ACHING;SORE;DISCOMFORT
DESCRIPTORS: ACHING;DULL;SORE
DESCRIPTORS: ACHING;TENDER;THROBBING

## 2025-02-25 ASSESSMENT — PAIN DESCRIPTION - ONSET
ONSET: ON-GOING
ONSET: GRADUAL

## 2025-02-25 ASSESSMENT — PAIN DESCRIPTION - FREQUENCY
FREQUENCY: INTERMITTENT
FREQUENCY: CONTINUOUS

## 2025-02-25 ASSESSMENT — PAIN DESCRIPTION - ORIENTATION
ORIENTATION: MID
ORIENTATION: RIGHT;LEFT
ORIENTATION: RIGHT
ORIENTATION: RIGHT;LEFT
ORIENTATION: RIGHT
ORIENTATION: RIGHT;LEFT

## 2025-02-25 ASSESSMENT — PAIN DESCRIPTION - PAIN TYPE
TYPE: ACUTE PAIN;SURGICAL PAIN
TYPE: ACUTE PAIN;SURGICAL PAIN

## 2025-02-26 LAB
ABO + RH BLD: NORMAL
ABO/RH: NORMAL
ANION GAP SERPL CALCULATED.3IONS-SCNC: 13 MMOL/L (ref 7–16)
ANTIBODY SCREEN: NEGATIVE
ARM BAND NUMBER: NORMAL
ARM BAND NUMBER: NORMAL
BLOOD BANK BLOOD PRODUCT EXPIRATION DATE: NORMAL
BLOOD BANK DISPENSE STATUS: NORMAL
BLOOD BANK ISBT PRODUCT BLOOD TYPE: 9500
BLOOD BANK PRODUCT CODE: NORMAL
BLOOD BANK SAMPLE EXPIRATION: NORMAL
BLOOD BANK SAMPLE EXPIRATION: NORMAL
BLOOD BANK UNIT TYPE AND RH: NORMAL
BLOOD GROUP ANTIBODIES SERPL: NEGATIVE
BPU ID: NORMAL
BUN SERPL-MCNC: 12 MG/DL (ref 6–23)
CALCIUM SERPL-MCNC: 8 MG/DL (ref 8.6–10.2)
CHLORIDE SERPL-SCNC: 105 MMOL/L (ref 98–107)
CK SERPL-CCNC: 1028 U/L (ref 20–180)
CK SERPL-CCNC: 513 U/L (ref 20–180)
CK SERPL-CCNC: 615 U/L (ref 20–180)
CK SERPL-CCNC: 851 U/L (ref 20–180)
CO2 SERPL-SCNC: 18 MMOL/L (ref 22–29)
COMPONENT: NORMAL
CREAT SERPL-MCNC: 0.6 MG/DL (ref 0.5–1)
CROSSMATCH RESULT: NORMAL
ERYTHROCYTE [DISTWIDTH] IN BLOOD BY AUTOMATED COUNT: 15.3 % (ref 11.5–15)
GFR, ESTIMATED: >90 ML/MIN/1.73M2
GLUCOSE BLD-MCNC: 152 MG/DL (ref 74–99)
GLUCOSE BLD-MCNC: 155 MG/DL (ref 74–99)
GLUCOSE BLD-MCNC: 203 MG/DL (ref 74–99)
GLUCOSE SERPL-MCNC: 137 MG/DL (ref 74–99)
HCT VFR BLD AUTO: 25.4 % (ref 34–48)
HGB BLD-MCNC: 8.3 G/DL (ref 11.5–15.5)
MCH RBC QN AUTO: 31.2 PG (ref 26–35)
MCHC RBC AUTO-ENTMCNC: 32.7 G/DL (ref 32–34.5)
MCV RBC AUTO: 95.5 FL (ref 80–99.9)
PLATELET # BLD AUTO: 143 K/UL (ref 130–450)
PMV BLD AUTO: 9.9 FL (ref 7–12)
POTASSIUM SERPL-SCNC: 3.8 MMOL/L (ref 3.5–5)
RBC # BLD AUTO: 2.66 M/UL (ref 3.5–5.5)
SODIUM SERPL-SCNC: 136 MMOL/L (ref 132–146)
SURGICAL PATHOLOGY REPORT: NORMAL
TRANSFUSION STATUS: NORMAL
UNIT DIVISION: 0
UNIT ISSUE DATE/TIME: NORMAL
WBC OTHER # BLD: 9.3 K/UL (ref 4.5–11.5)

## 2025-02-26 PROCEDURE — 2580000003 HC RX 258

## 2025-02-26 PROCEDURE — 2700000000 HC OXYGEN THERAPY PER DAY

## 2025-02-26 PROCEDURE — 2000000000 HC ICU R&B

## 2025-02-26 PROCEDURE — 97162 PT EVAL MOD COMPLEX 30 MIN: CPT

## 2025-02-26 PROCEDURE — 51798 US URINE CAPACITY MEASURE: CPT

## 2025-02-26 PROCEDURE — 86901 BLOOD TYPING SEROLOGIC RH(D): CPT

## 2025-02-26 PROCEDURE — 85027 COMPLETE CBC AUTOMATED: CPT

## 2025-02-26 PROCEDURE — 37799 UNLISTED PX VASCULAR SURGERY: CPT

## 2025-02-26 PROCEDURE — 86900 BLOOD TYPING SEROLOGIC ABO: CPT

## 2025-02-26 PROCEDURE — 97166 OT EVAL MOD COMPLEX 45 MIN: CPT

## 2025-02-26 PROCEDURE — 80048 BASIC METABOLIC PNL TOTAL CA: CPT

## 2025-02-26 PROCEDURE — 6370000000 HC RX 637 (ALT 250 FOR IP)

## 2025-02-26 PROCEDURE — 6370000000 HC RX 637 (ALT 250 FOR IP): Performed by: SURGERY

## 2025-02-26 PROCEDURE — 6370000000 HC RX 637 (ALT 250 FOR IP): Performed by: NURSE PRACTITIONER

## 2025-02-26 PROCEDURE — 97530 THERAPEUTIC ACTIVITIES: CPT

## 2025-02-26 PROCEDURE — 86850 RBC ANTIBODY SCREEN: CPT

## 2025-02-26 PROCEDURE — 6360000002 HC RX W HCPCS

## 2025-02-26 PROCEDURE — 2500000003 HC RX 250 WO HCPCS: Performed by: SURGERY

## 2025-02-26 PROCEDURE — 6370000000 HC RX 637 (ALT 250 FOR IP): Performed by: INTERNAL MEDICINE

## 2025-02-26 PROCEDURE — 82550 ASSAY OF CK (CPK): CPT

## 2025-02-26 PROCEDURE — 82962 GLUCOSE BLOOD TEST: CPT

## 2025-02-26 PROCEDURE — 99291 CRITICAL CARE FIRST HOUR: CPT | Performed by: INTERNAL MEDICINE

## 2025-02-26 RX ORDER — METOPROLOL TARTRATE 50 MG
100 TABLET ORAL 2 TIMES DAILY
Status: DISCONTINUED | OUTPATIENT
Start: 2025-02-27 | End: 2025-03-04 | Stop reason: HOSPADM

## 2025-02-26 RX ADMIN — Medication 500 MG: at 08:28

## 2025-02-26 RX ADMIN — POTASSIUM CHLORIDE 10 MEQ: 750 TABLET, EXTENDED RELEASE ORAL at 20:44

## 2025-02-26 RX ADMIN — HYDROMORPHONE HYDROCHLORIDE 0.25 MG: 1 INJECTION, SOLUTION INTRAMUSCULAR; INTRAVENOUS; SUBCUTANEOUS at 10:33

## 2025-02-26 RX ADMIN — DOCUSATE SODIUM 100 MG: 100 CAPSULE, LIQUID FILLED ORAL at 08:28

## 2025-02-26 RX ADMIN — APIXABAN 5 MG: 5 TABLET, FILM COATED ORAL at 08:29

## 2025-02-26 RX ADMIN — SODIUM CHLORIDE: 9 INJECTION, SOLUTION INTRAVENOUS at 05:12

## 2025-02-26 RX ADMIN — PRAVASTATIN SODIUM 20 MG: 20 TABLET ORAL at 08:28

## 2025-02-26 RX ADMIN — OXYCODONE 5 MG: 5 TABLET ORAL at 06:11

## 2025-02-26 RX ADMIN — POTASSIUM CHLORIDE 10 MEQ: 750 TABLET, EXTENDED RELEASE ORAL at 08:28

## 2025-02-26 RX ADMIN — METOPROLOL TARTRATE 50 MG: 50 TABLET, FILM COATED ORAL at 08:29

## 2025-02-26 RX ADMIN — FUROSEMIDE 20 MG: 20 TABLET ORAL at 08:29

## 2025-02-26 RX ADMIN — INSULIN LISPRO 1 UNITS: 100 INJECTION, SOLUTION INTRAVENOUS; SUBCUTANEOUS at 20:44

## 2025-02-26 RX ADMIN — Medication 1 TABLET: at 08:28

## 2025-02-26 RX ADMIN — HYDROMORPHONE HYDROCHLORIDE 0.25 MG: 1 INJECTION, SOLUTION INTRAMUSCULAR; INTRAVENOUS; SUBCUTANEOUS at 16:51

## 2025-02-26 RX ADMIN — SODIUM CHLORIDE, PRESERVATIVE FREE 10 ML: 5 INJECTION INTRAVENOUS at 09:00

## 2025-02-26 RX ADMIN — DILTIAZEM HYDROCHLORIDE 240 MG: 120 CAPSULE, COATED, EXTENDED RELEASE ORAL at 08:28

## 2025-02-26 RX ADMIN — CALCIUM CARBONATE-VITAMIN D TAB 500 MG-200 UNIT 1 TABLET: 500-200 TAB at 08:31

## 2025-02-26 RX ADMIN — HYDROMORPHONE HYDROCHLORIDE 0.25 MG: 1 INJECTION, SOLUTION INTRAMUSCULAR; INTRAVENOUS; SUBCUTANEOUS at 00:09

## 2025-02-26 RX ADMIN — ASPIRIN 81 MG: 81 TABLET, COATED ORAL at 08:28

## 2025-02-26 RX ADMIN — PANTOPRAZOLE SODIUM 40 MG: 40 TABLET, DELAYED RELEASE ORAL at 06:11

## 2025-02-26 RX ADMIN — APIXABAN 5 MG: 5 TABLET, FILM COATED ORAL at 20:43

## 2025-02-26 RX ADMIN — OXYCODONE 5 MG: 5 TABLET ORAL at 12:21

## 2025-02-26 RX ADMIN — SODIUM CHLORIDE, PRESERVATIVE FREE 10 ML: 5 INJECTION INTRAVENOUS at 20:44

## 2025-02-26 RX ADMIN — Medication 500 MG: at 20:45

## 2025-02-26 RX ADMIN — Medication 1000 UNITS: at 08:30

## 2025-02-26 RX ADMIN — METOPROLOL TARTRATE 50 MG: 50 TABLET, FILM COATED ORAL at 20:43

## 2025-02-26 ASSESSMENT — PAIN DESCRIPTION - DESCRIPTORS
DESCRIPTORS: ACHING;SORE;DISCOMFORT
DESCRIPTORS: STABBING
DESCRIPTORS: STABBING
DESCRIPTORS: ACHING;SPASM;SORE
DESCRIPTORS: ACHING;DISCOMFORT;SORE

## 2025-02-26 ASSESSMENT — PAIN SCALES - GENERAL
PAINLEVEL_OUTOF10: 6
PAINLEVEL_OUTOF10: 8
PAINLEVEL_OUTOF10: 6
PAINLEVEL_OUTOF10: 3
PAINLEVEL_OUTOF10: 2
PAINLEVEL_OUTOF10: 0
PAINLEVEL_OUTOF10: 0
PAINLEVEL_OUTOF10: 9
PAINLEVEL_OUTOF10: 4
PAINLEVEL_OUTOF10: 3
PAINLEVEL_OUTOF10: 0
PAINLEVEL_OUTOF10: 10
PAINLEVEL_OUTOF10: 10
PAINLEVEL_OUTOF10: 7

## 2025-02-26 ASSESSMENT — PAIN DESCRIPTION - ORIENTATION
ORIENTATION: RIGHT;LEFT
ORIENTATION: RIGHT
ORIENTATION: MID
ORIENTATION: RIGHT
ORIENTATION: LEFT;RIGHT

## 2025-02-26 ASSESSMENT — PAIN - FUNCTIONAL ASSESSMENT
PAIN_FUNCTIONAL_ASSESSMENT: PREVENTS OR INTERFERES SOME ACTIVE ACTIVITIES AND ADLS
PAIN_FUNCTIONAL_ASSESSMENT: PREVENTS OR INTERFERES SOME ACTIVE ACTIVITIES AND ADLS

## 2025-02-26 ASSESSMENT — PAIN DESCRIPTION - LOCATION
LOCATION: LEG
LOCATION: LEG
LOCATION: ABDOMEN;INCISION
LOCATION: LEG;INCISION
LOCATION: INCISION

## 2025-02-26 NOTE — CARE COORDINATION
2/26. . H/H 8.3/25.4. IV dilaudid x 3/24H. Po pain meds 3/24H. Discharge plan is home when medically stable. Andree Tang RN

## 2025-02-27 ENCOUNTER — APPOINTMENT (OUTPATIENT)
Dept: CT IMAGING | Age: 80
DRG: 271 | End: 2025-02-27
Payer: MEDICARE

## 2025-02-27 PROBLEM — I99.8 ACUTE LOWER LIMB ISCHEMIA: Status: ACTIVE | Noted: 2025-02-27

## 2025-02-27 PROBLEM — R41.82 ACUTE ALTERATION IN MENTAL STATUS: Status: ACTIVE | Noted: 2025-02-27

## 2025-02-27 LAB
ANION GAP SERPL CALCULATED.3IONS-SCNC: 14 MMOL/L (ref 7–16)
BUN SERPL-MCNC: 9 MG/DL (ref 6–23)
CALCIUM SERPL-MCNC: 8.3 MG/DL (ref 8.6–10.2)
CHLORIDE SERPL-SCNC: 104 MMOL/L (ref 98–107)
CK SERPL-CCNC: 403 U/L (ref 20–180)
CO2 SERPL-SCNC: 19 MMOL/L (ref 22–29)
CREAT SERPL-MCNC: 0.6 MG/DL (ref 0.5–1)
EKG ATRIAL RATE: 163 BPM
EKG Q-T INTERVAL: 390 MS
EKG QRS DURATION: 92 MS
EKG QTC CALCULATION (BAZETT): 495 MS
EKG R AXIS: 44 DEGREES
EKG T AXIS: 61 DEGREES
EKG VENTRICULAR RATE: 97 BPM
ERYTHROCYTE [DISTWIDTH] IN BLOOD BY AUTOMATED COUNT: 15.3 % (ref 11.5–15)
GFR, ESTIMATED: >90 ML/MIN/1.73M2
GLUCOSE BLD-MCNC: 151 MG/DL (ref 74–99)
GLUCOSE BLD-MCNC: 168 MG/DL (ref 74–99)
GLUCOSE BLD-MCNC: 205 MG/DL (ref 74–99)
GLUCOSE BLD-MCNC: 209 MG/DL (ref 74–99)
GLUCOSE SERPL-MCNC: 152 MG/DL (ref 74–99)
HCT VFR BLD AUTO: 25.7 % (ref 34–48)
HGB BLD-MCNC: 8.6 G/DL (ref 11.5–15.5)
MCH RBC QN AUTO: 31.2 PG (ref 26–35)
MCHC RBC AUTO-ENTMCNC: 33.5 G/DL (ref 32–34.5)
MCV RBC AUTO: 93.1 FL (ref 80–99.9)
PLATELET # BLD AUTO: 171 K/UL (ref 130–450)
PMV BLD AUTO: 9.4 FL (ref 7–12)
POTASSIUM SERPL-SCNC: 3.9 MMOL/L (ref 3.5–5)
RBC # BLD AUTO: 2.76 M/UL (ref 3.5–5.5)
SODIUM SERPL-SCNC: 137 MMOL/L (ref 132–146)
WBC OTHER # BLD: 9.6 K/UL (ref 4.5–11.5)

## 2025-02-27 PROCEDURE — 99222 1ST HOSP IP/OBS MODERATE 55: CPT | Performed by: PSYCHIATRY & NEUROLOGY

## 2025-02-27 PROCEDURE — 82962 GLUCOSE BLOOD TEST: CPT

## 2025-02-27 PROCEDURE — 2140000000 HC CCU INTERMEDIATE R&B

## 2025-02-27 PROCEDURE — 6360000002 HC RX W HCPCS

## 2025-02-27 PROCEDURE — 85027 COMPLETE CBC AUTOMATED: CPT

## 2025-02-27 PROCEDURE — 70496 CT ANGIOGRAPHY HEAD: CPT

## 2025-02-27 PROCEDURE — 6370000000 HC RX 637 (ALT 250 FOR IP): Performed by: INTERNAL MEDICINE

## 2025-02-27 PROCEDURE — 70498 CT ANGIOGRAPHY NECK: CPT

## 2025-02-27 PROCEDURE — 51798 US URINE CAPACITY MEASURE: CPT

## 2025-02-27 PROCEDURE — 82550 ASSAY OF CK (CPK): CPT

## 2025-02-27 PROCEDURE — 70450 CT HEAD/BRAIN W/O DYE: CPT

## 2025-02-27 PROCEDURE — 6370000000 HC RX 637 (ALT 250 FOR IP): Performed by: NURSE PRACTITIONER

## 2025-02-27 PROCEDURE — 6370000000 HC RX 637 (ALT 250 FOR IP): Performed by: SURGERY

## 2025-02-27 PROCEDURE — 97530 THERAPEUTIC ACTIVITIES: CPT

## 2025-02-27 PROCEDURE — 2700000000 HC OXYGEN THERAPY PER DAY

## 2025-02-27 PROCEDURE — 2500000003 HC RX 250 WO HCPCS: Performed by: SURGERY

## 2025-02-27 PROCEDURE — 6360000004 HC RX CONTRAST MEDICATION: Performed by: RADIOLOGY

## 2025-02-27 PROCEDURE — 80048 BASIC METABOLIC PNL TOTAL CA: CPT

## 2025-02-27 PROCEDURE — 97535 SELF CARE MNGMENT TRAINING: CPT

## 2025-02-27 PROCEDURE — 37799 UNLISTED PX VASCULAR SURGERY: CPT

## 2025-02-27 PROCEDURE — 6370000000 HC RX 637 (ALT 250 FOR IP)

## 2025-02-27 PROCEDURE — 99291 CRITICAL CARE FIRST HOUR: CPT | Performed by: INTERNAL MEDICINE

## 2025-02-27 RX ORDER — LACTULOSE 10 G/15ML
20 SOLUTION ORAL DAILY
Status: DISCONTINUED | OUTPATIENT
Start: 2025-02-27 | End: 2025-03-04 | Stop reason: HOSPADM

## 2025-02-27 RX ORDER — SODIUM CHLORIDE 0.9 % (FLUSH) 0.9 %
10 SYRINGE (ML) INJECTION
Status: ACTIVE | OUTPATIENT
Start: 2025-02-27 | End: 2025-02-28

## 2025-02-27 RX ORDER — BISACODYL 10 MG
10 SUPPOSITORY, RECTAL RECTAL DAILY PRN
Status: DISCONTINUED | OUTPATIENT
Start: 2025-02-27 | End: 2025-03-04 | Stop reason: HOSPADM

## 2025-02-27 RX ORDER — IOPAMIDOL 755 MG/ML
75 INJECTION, SOLUTION INTRAVASCULAR
Status: COMPLETED | OUTPATIENT
Start: 2025-02-27 | End: 2025-02-27

## 2025-02-27 RX ADMIN — PRAVASTATIN SODIUM 20 MG: 20 TABLET ORAL at 08:00

## 2025-02-27 RX ADMIN — APIXABAN 5 MG: 5 TABLET, FILM COATED ORAL at 19:38

## 2025-02-27 RX ADMIN — Medication 500 MG: at 08:01

## 2025-02-27 RX ADMIN — SODIUM CHLORIDE, PRESERVATIVE FREE 10 ML: 5 INJECTION INTRAVENOUS at 19:38

## 2025-02-27 RX ADMIN — POTASSIUM CHLORIDE 10 MEQ: 750 TABLET, EXTENDED RELEASE ORAL at 08:00

## 2025-02-27 RX ADMIN — OXYCODONE 5 MG: 5 TABLET ORAL at 03:58

## 2025-02-27 RX ADMIN — IOPAMIDOL 75 ML: 755 INJECTION, SOLUTION INTRAVENOUS at 17:12

## 2025-02-27 RX ADMIN — LACTULOSE 20 G: 20 SOLUTION ORAL at 15:02

## 2025-02-27 RX ADMIN — POTASSIUM CHLORIDE 10 MEQ: 750 TABLET, EXTENDED RELEASE ORAL at 19:38

## 2025-02-27 RX ADMIN — SODIUM CHLORIDE, PRESERVATIVE FREE 10 ML: 5 INJECTION INTRAVENOUS at 08:03

## 2025-02-27 RX ADMIN — DOCUSATE SODIUM 100 MG: 100 CAPSULE, LIQUID FILLED ORAL at 08:00

## 2025-02-27 RX ADMIN — PANTOPRAZOLE SODIUM 40 MG: 40 TABLET, DELAYED RELEASE ORAL at 15:01

## 2025-02-27 RX ADMIN — CALCIUM CARBONATE-VITAMIN D TAB 500 MG-200 UNIT 1 TABLET: 500-200 TAB at 08:01

## 2025-02-27 RX ADMIN — HYDROMORPHONE HYDROCHLORIDE 0.25 MG: 1 INJECTION, SOLUTION INTRAMUSCULAR; INTRAVENOUS; SUBCUTANEOUS at 05:39

## 2025-02-27 RX ADMIN — OXYCODONE 5 MG: 5 TABLET ORAL at 16:26

## 2025-02-27 RX ADMIN — Medication 1 TABLET: at 08:00

## 2025-02-27 RX ADMIN — BISACODYL 10 MG: 10 SUPPOSITORY RECTAL at 15:02

## 2025-02-27 RX ADMIN — INSULIN LISPRO 1 UNITS: 100 INJECTION, SOLUTION INTRAVENOUS; SUBCUTANEOUS at 16:07

## 2025-02-27 RX ADMIN — HYDROMORPHONE HYDROCHLORIDE 0.25 MG: 1 INJECTION, SOLUTION INTRAMUSCULAR; INTRAVENOUS; SUBCUTANEOUS at 18:36

## 2025-02-27 RX ADMIN — INSULIN LISPRO 4 UNITS: 100 INJECTION, SOLUTION INTRAVENOUS; SUBCUTANEOUS at 10:49

## 2025-02-27 RX ADMIN — Medication 500 MG: at 19:39

## 2025-02-27 RX ADMIN — ASPIRIN 81 MG: 81 TABLET, COATED ORAL at 08:00

## 2025-02-27 RX ADMIN — Medication 1000 UNITS: at 08:01

## 2025-02-27 RX ADMIN — PANTOPRAZOLE SODIUM 40 MG: 40 TABLET, DELAYED RELEASE ORAL at 07:31

## 2025-02-27 RX ADMIN — DILTIAZEM HYDROCHLORIDE 240 MG: 120 CAPSULE, COATED, EXTENDED RELEASE ORAL at 08:00

## 2025-02-27 RX ADMIN — METOPROLOL TARTRATE 100 MG: 50 TABLET, FILM COATED ORAL at 19:38

## 2025-02-27 RX ADMIN — APIXABAN 5 MG: 5 TABLET, FILM COATED ORAL at 08:30

## 2025-02-27 RX ADMIN — FUROSEMIDE 20 MG: 20 TABLET ORAL at 08:00

## 2025-02-27 RX ADMIN — METOPROLOL TARTRATE 100 MG: 50 TABLET, FILM COATED ORAL at 08:00

## 2025-02-27 ASSESSMENT — PAIN SCALES - GENERAL
PAINLEVEL_OUTOF10: 0
PAINLEVEL_OUTOF10: 6
PAINLEVEL_OUTOF10: 0
PAINLEVEL_OUTOF10: 6
PAINLEVEL_OUTOF10: 0
PAINLEVEL_OUTOF10: 2
PAINLEVEL_OUTOF10: 2
PAINLEVEL_OUTOF10: 10
PAINLEVEL_OUTOF10: 0
PAINLEVEL_OUTOF10: 6
PAINLEVEL_OUTOF10: 0
PAINLEVEL_OUTOF10: 4
PAINLEVEL_OUTOF10: 0
PAINLEVEL_OUTOF10: 9
PAINLEVEL_OUTOF10: 0

## 2025-02-27 ASSESSMENT — PAIN DESCRIPTION - ORIENTATION
ORIENTATION: RIGHT;LEFT
ORIENTATION: RIGHT;LEFT
ORIENTATION: RIGHT
ORIENTATION: RIGHT

## 2025-02-27 ASSESSMENT — PAIN DESCRIPTION - DESCRIPTORS
DESCRIPTORS: ACHING;DISCOMFORT
DESCRIPTORS: ACHING;CRUSHING;THROBBING
DESCRIPTORS: ACHING;SORE
DESCRIPTORS: ACHING;SORE

## 2025-02-27 ASSESSMENT — PAIN DESCRIPTION - LOCATION
LOCATION: GROIN
LOCATION: INCISION
LOCATION: INCISION
LOCATION: LEG

## 2025-02-27 ASSESSMENT — PAIN - FUNCTIONAL ASSESSMENT
PAIN_FUNCTIONAL_ASSESSMENT: PREVENTS OR INTERFERES SOME ACTIVE ACTIVITIES AND ADLS
PAIN_FUNCTIONAL_ASSESSMENT: ACTIVITIES ARE NOT PREVENTED

## 2025-02-27 ASSESSMENT — PAIN DESCRIPTION - PAIN TYPE
TYPE: SURGICAL PAIN
TYPE: SURGICAL PAIN

## 2025-02-27 NOTE — CARE COORDINATION
2/27 Care Coordination:Pt remains in [facility].POD#5 . s/p bilateral lower extremity cutdown/embolectomy  Cont with pain management, PRN pain meds, IV dilaudid and po oxycodone. continue Eliquis for permanent afib CM spoke with pt and her daughter. Again discussed discharge plan/Needs. CM discussed AM-PAC, possible need for [facility]. Pt states will discuss  with her dad. CM did give her a [facility] list for [facility]. If still able to go home was agreeable to [facility]. Would also discuss with her dad. CM/LUCERO will continue to follow for discharge planning.   Tay SMILEY,RN--BC  [phone]

## 2025-02-27 NOTE — SIGNIFICANT EVENT
Rapid Response Team Note  Date of event: 2/27/2025   Time of event: 4:43 pm  Grecia Wilhelm 79 y.o. year old female   YOB: 1945   Admit date:  2/22/2025   Location: [facility]   Witnessed? : [x]Yes  [] No  Monitored? : [x]Yes  [] No  Code status: [x] Full  [] DNR-CCA  []DNR-CC  ______________________________________________________________________  Reason for RRT:    [] RR < 8     [] RR > 28   [] SpO2 <90%   [] HR < 40 bpm   [] HR > 130 bpm  [] SBP < 90 mmHg    [] SpO2 <90%   [] LOC   [] Seizures    [] Significant Bleeding Event    [] Other: concern for stroke    Subjective:   CTSP regarding the above event, Stroke alert is calld. As per [facility] nurse, last well known was 15-20 minutes ago.  On evaluation, patient is confused, not following commands. Ct scans ordered, neurology consult is placed. On a brief note, patient was admitted yesterday due to critical limb ischemia, thrombectomy was done by vascular, pt's INR was sub-therapeutic. Was on warfarin for permanent A Fib. S/p TAVR for severe AS last year. Hx of CVA twice 10 years back.   Objective:   Vital signs: Temp: N/BP: 127/55/RR: 18/HR: 87  Initial Condition:  Conscious   [] Yes  [] No     Breathing [] Yes  [] No     Pulse  [] Yes  [] No    Airway:   [] Open/ Clear     Intervention: [] None  [] Pooled secretions     [] Suctioned  [] Stridor      [] Intubation    Lungs:   [] Symmetrical chest rise/ CTABL Intervention: [] None  [] Use of accessory muscles    [] NIV (CPAP/BiPAP)  [] Cyanosis      [] Nasal Oxygen/Mask  [] Wheezing       [] ABG             [] CXR  [] Other: ***    Circulation:   Rhythm:  [] Sinus [] Other: ***  Intervention: [] None            [] IV Access  [] Peripheral              [] Central            [] EKG            [] Cardioversion            [] Defibrillation     Capillary Refill:  [] > 2 seconds [] < 2 seconds    Neurologic:   [] NIHSS      [] Pupillary Response:  ***   Response to pain:   [] Yes  [] No  Follow

## 2025-02-28 LAB
GLUCOSE BLD-MCNC: 163 MG/DL (ref 74–99)
GLUCOSE BLD-MCNC: 165 MG/DL (ref 74–99)
GLUCOSE BLD-MCNC: 191 MG/DL (ref 74–99)
GLUCOSE BLD-MCNC: 234 MG/DL (ref 74–99)

## 2025-02-28 PROCEDURE — 6370000000 HC RX 637 (ALT 250 FOR IP): Performed by: NURSE PRACTITIONER

## 2025-02-28 PROCEDURE — 6370000000 HC RX 637 (ALT 250 FOR IP): Performed by: SURGERY

## 2025-02-28 PROCEDURE — 82962 GLUCOSE BLOOD TEST: CPT

## 2025-02-28 PROCEDURE — 6370000000 HC RX 637 (ALT 250 FOR IP): Performed by: INTERNAL MEDICINE

## 2025-02-28 PROCEDURE — 2500000003 HC RX 250 WO HCPCS: Performed by: SURGERY

## 2025-02-28 PROCEDURE — 99233 SBSQ HOSP IP/OBS HIGH 50: CPT | Performed by: INTERNAL MEDICINE

## 2025-02-28 PROCEDURE — 2140000000 HC CCU INTERMEDIATE R&B

## 2025-02-28 PROCEDURE — 6370000000 HC RX 637 (ALT 250 FOR IP)

## 2025-02-28 PROCEDURE — 6360000002 HC RX W HCPCS

## 2025-02-28 PROCEDURE — 2700000000 HC OXYGEN THERAPY PER DAY

## 2025-02-28 RX ADMIN — OXYCODONE 5 MG: 5 TABLET ORAL at 03:18

## 2025-02-28 RX ADMIN — SODIUM CHLORIDE, PRESERVATIVE FREE 10 ML: 5 INJECTION INTRAVENOUS at 21:07

## 2025-02-28 RX ADMIN — POTASSIUM CHLORIDE 10 MEQ: 750 TABLET, EXTENDED RELEASE ORAL at 09:29

## 2025-02-28 RX ADMIN — HYDROMORPHONE HYDROCHLORIDE 0.25 MG: 1 INJECTION, SOLUTION INTRAMUSCULAR; INTRAVENOUS; SUBCUTANEOUS at 14:36

## 2025-02-28 RX ADMIN — METOPROLOL TARTRATE 100 MG: 50 TABLET, FILM COATED ORAL at 09:29

## 2025-02-28 RX ADMIN — PRAVASTATIN SODIUM 20 MG: 20 TABLET ORAL at 09:29

## 2025-02-28 RX ADMIN — DILTIAZEM HYDROCHLORIDE 240 MG: 120 CAPSULE, COATED, EXTENDED RELEASE ORAL at 09:29

## 2025-02-28 RX ADMIN — INSULIN LISPRO 1 UNITS: 100 INJECTION, SOLUTION INTRAVENOUS; SUBCUTANEOUS at 10:50

## 2025-02-28 RX ADMIN — FUROSEMIDE 20 MG: 20 TABLET ORAL at 09:30

## 2025-02-28 RX ADMIN — APIXABAN 5 MG: 5 TABLET, FILM COATED ORAL at 21:07

## 2025-02-28 RX ADMIN — SODIUM CHLORIDE, PRESERVATIVE FREE 10 ML: 5 INJECTION INTRAVENOUS at 09:30

## 2025-02-28 RX ADMIN — PANTOPRAZOLE SODIUM 40 MG: 40 TABLET, DELAYED RELEASE ORAL at 05:19

## 2025-02-28 RX ADMIN — ASPIRIN 81 MG: 81 TABLET, COATED ORAL at 09:29

## 2025-02-28 RX ADMIN — OXYCODONE 5 MG: 5 TABLET ORAL at 13:32

## 2025-02-28 RX ADMIN — DOCUSATE SODIUM 100 MG: 100 CAPSULE, LIQUID FILLED ORAL at 09:29

## 2025-02-28 RX ADMIN — CALCIUM CARBONATE-VITAMIN D TAB 500 MG-200 UNIT 1 TABLET: 500-200 TAB at 09:30

## 2025-02-28 RX ADMIN — Medication 1000 UNITS: at 09:29

## 2025-02-28 RX ADMIN — Medication 500 MG: at 21:08

## 2025-02-28 RX ADMIN — Medication 500 MG: at 09:29

## 2025-02-28 RX ADMIN — APIXABAN 5 MG: 5 TABLET, FILM COATED ORAL at 09:30

## 2025-02-28 RX ADMIN — POTASSIUM CHLORIDE 10 MEQ: 750 TABLET, EXTENDED RELEASE ORAL at 21:07

## 2025-02-28 RX ADMIN — PANTOPRAZOLE SODIUM 40 MG: 40 TABLET, DELAYED RELEASE ORAL at 16:51

## 2025-02-28 RX ADMIN — METOPROLOL TARTRATE 100 MG: 50 TABLET, FILM COATED ORAL at 21:06

## 2025-02-28 RX ADMIN — HYDROMORPHONE HYDROCHLORIDE 0.25 MG: 1 INJECTION, SOLUTION INTRAMUSCULAR; INTRAVENOUS; SUBCUTANEOUS at 05:19

## 2025-02-28 RX ADMIN — INSULIN LISPRO 1 UNITS: 100 INJECTION, SOLUTION INTRAVENOUS; SUBCUTANEOUS at 21:07

## 2025-02-28 RX ADMIN — Medication 1 TABLET: at 09:29

## 2025-02-28 ASSESSMENT — PAIN DESCRIPTION - ORIENTATION
ORIENTATION: RIGHT;LEFT
ORIENTATION: RIGHT

## 2025-02-28 ASSESSMENT — PAIN SCALES - GENERAL
PAINLEVEL_OUTOF10: 4
PAINLEVEL_OUTOF10: 0
PAINLEVEL_OUTOF10: 9
PAINLEVEL_OUTOF10: 9
PAINLEVEL_OUTOF10: 6
PAINLEVEL_OUTOF10: 9

## 2025-02-28 ASSESSMENT — PAIN DESCRIPTION - DESCRIPTORS
DESCRIPTORS: ACHING;DISCOMFORT;SORE

## 2025-02-28 ASSESSMENT — PAIN DESCRIPTION - LOCATION
LOCATION: LEG
LOCATION: GROIN;INCISION;LEG
LOCATION: GROIN
LOCATION: GROIN

## 2025-02-28 ASSESSMENT — PAIN - FUNCTIONAL ASSESSMENT
PAIN_FUNCTIONAL_ASSESSMENT: ACTIVITIES ARE NOT PREVENTED
PAIN_FUNCTIONAL_ASSESSMENT: ACTIVITIES ARE NOT PREVENTED
PAIN_FUNCTIONAL_ASSESSMENT: PREVENTS OR INTERFERES SOME ACTIVE ACTIVITIES AND ADLS
PAIN_FUNCTIONAL_ASSESSMENT: ACTIVITIES ARE NOT PREVENTED

## 2025-02-28 ASSESSMENT — PAIN SCALES - WONG BAKER
WONGBAKER_NUMERICALRESPONSE: NO HURT
WONGBAKER_NUMERICALRESPONSE: NO HURT

## 2025-02-28 NOTE — CARE COORDINATION
Transition of care update: Received pt in transfer from [facility]. LOS: day 6. S/P bilateral lower extremity cutdown/embolectomy on 02/22. Labs and orders noted. Chart reviewed. PT update from 02/27 am-pac 6/24. OT update from 02/27 am-pac 10/24. Met with pt and pt's daughter in room earlier today for efrain choices. Pt's daughter said they have not made choices yet and ask for a efrain list that includes [facility]. EFRAIN list was given to her to make 4 choices. Pt's daughter said she will talk with her father regarding their choices. Asked for PT to work with pt again today. Cm/sw will follow.

## 2025-02-28 NOTE — PATIENT CARE CONFERENCE
P Quality Flow/Interdisciplinary Rounds Progress Note        Quality Flow Rounds held on February 28, 2025    Disciplines Attending:  Bedside Nurse, , , and Nursing Unit Leadership    Grecia Wilhelm was admitted on 2/22/2025  8:30 AM    Anticipated Discharge Date:       Disposition:    Moody Score:  Moody Scale Score: 19    [facility] RISK OF UNPLANNED READMISSION 2.0             18.9 Total Score        Discussed patient goal for the day, patient clinical progression, and barriers to discharge.  The following Goal(s) of the Day/Commitment(s) have been identified:  Report labs/diagnostics       Riri Quezada RN  February 28, 2025

## 2025-03-01 LAB
ANION GAP SERPL CALCULATED.3IONS-SCNC: 15 MMOL/L (ref 7–16)
BUN BLD-MCNC: 11 MG/DL (ref 6–23)
BUN SERPL-MCNC: 10 MG/DL (ref 6–23)
CA-I BLD-SCNC: 1.2 MMOL/L (ref 1.15–1.33)
CALCIUM SERPL-MCNC: 8.7 MG/DL (ref 8.6–10.2)
CHLORIDE BLD-SCNC: 112 MMOL/L (ref 100–108)
CHLORIDE SERPL-SCNC: 100 MMOL/L (ref 98–107)
CO2 BLD CALC-SCNC: 23 MMOL/L (ref 22–29)
CO2 SERPL-SCNC: 21 MMOL/L (ref 22–29)
CREAT BLD-MCNC: 0.7 MG/DL (ref 0.5–1)
CREAT SERPL-MCNC: 0.6 MG/DL (ref 0.5–1)
EGFR, POC: 89 ML/MIN/1.73M2
ERYTHROCYTE [DISTWIDTH] IN BLOOD BY AUTOMATED COUNT: 15.5 % (ref 11.5–15)
GFR, ESTIMATED: >90 ML/MIN/1.73M2
GLUCOSE BLD-MCNC: 150 MG/DL (ref 74–99)
GLUCOSE BLD-MCNC: 164 MG/DL (ref 74–99)
GLUCOSE BLD-MCNC: 167 MG/DL (ref 74–99)
GLUCOSE BLD-MCNC: 172 MG/DL (ref 74–99)
GLUCOSE BLD-MCNC: 201 MG/DL (ref 74–99)
GLUCOSE SERPL-MCNC: 157 MG/DL (ref 74–99)
HCT VFR BLD AUTO: 28.4 % (ref 34–48)
HCT VFR BLD AUTO: 30 % (ref 37–54)
HGB BLD-MCNC: 9.2 G/DL (ref 11.5–15.5)
MCH RBC QN AUTO: 30.9 PG (ref 26–35)
MCHC RBC AUTO-ENTMCNC: 32.4 G/DL (ref 32–34.5)
MCV RBC AUTO: 95.3 FL (ref 80–99.9)
NEGATIVE BASE EXCESS, ART: 2.3 MMOL/L
PLATELET # BLD AUTO: 263 K/UL (ref 130–450)
PMV BLD AUTO: 9.2 FL (ref 7–12)
POC ANION GAP: 6 MMOL/L (ref 7–16)
POC HCO3: 23.2 MMOL/L (ref 22–26)
POC HEMOGLOBIN (CALC): 10.2 G/DL (ref 12.5–15.5)
POC LACTIC ACID: 0.9 MMOL/L (ref 0.5–2.2)
POC O2 SATURATION: 97.1 % (ref 92–98.5)
POC PCO2: 41.7 MM HG (ref 35–45)
POC PH: 7.35 (ref 7.35–7.45)
POC PO2: 95.5 MM HG (ref 60–80)
POTASSIUM BLD-SCNC: 3.6 MMOL/L (ref 3.5–5)
POTASSIUM SERPL-SCNC: 4 MMOL/L (ref 3.5–5)
RBC # BLD AUTO: 2.98 M/UL (ref 3.5–5.5)
SODIUM BLD-SCNC: 141 MMOL/L (ref 132–146)
SODIUM SERPL-SCNC: 136 MMOL/L (ref 132–146)
WBC OTHER # BLD: 11.2 K/UL (ref 4.5–11.5)

## 2025-03-01 PROCEDURE — 36415 COLL VENOUS BLD VENIPUNCTURE: CPT

## 2025-03-01 PROCEDURE — 2140000000 HC CCU INTERMEDIATE R&B

## 2025-03-01 PROCEDURE — 6370000000 HC RX 637 (ALT 250 FOR IP): Performed by: INTERNAL MEDICINE

## 2025-03-01 PROCEDURE — 85027 COMPLETE CBC AUTOMATED: CPT

## 2025-03-01 PROCEDURE — 2500000003 HC RX 250 WO HCPCS: Performed by: SURGERY

## 2025-03-01 PROCEDURE — 6370000000 HC RX 637 (ALT 250 FOR IP): Performed by: SURGERY

## 2025-03-01 PROCEDURE — 6370000000 HC RX 637 (ALT 250 FOR IP)

## 2025-03-01 PROCEDURE — 6370000000 HC RX 637 (ALT 250 FOR IP): Performed by: NURSE PRACTITIONER

## 2025-03-01 PROCEDURE — 80048 BASIC METABOLIC PNL TOTAL CA: CPT

## 2025-03-01 PROCEDURE — 51701 INSERT BLADDER CATHETER: CPT

## 2025-03-01 PROCEDURE — 51798 US URINE CAPACITY MEASURE: CPT

## 2025-03-01 PROCEDURE — 82962 GLUCOSE BLOOD TEST: CPT

## 2025-03-01 RX ADMIN — DILTIAZEM HYDROCHLORIDE 240 MG: 120 CAPSULE, COATED, EXTENDED RELEASE ORAL at 09:08

## 2025-03-01 RX ADMIN — Medication 1000 UNITS: at 09:09

## 2025-03-01 RX ADMIN — Medication 500 MG: at 21:23

## 2025-03-01 RX ADMIN — APIXABAN 5 MG: 5 TABLET, FILM COATED ORAL at 09:10

## 2025-03-01 RX ADMIN — METOPROLOL TARTRATE 100 MG: 50 TABLET, FILM COATED ORAL at 21:24

## 2025-03-01 RX ADMIN — PRAVASTATIN SODIUM 20 MG: 20 TABLET ORAL at 09:10

## 2025-03-01 RX ADMIN — SODIUM CHLORIDE, PRESERVATIVE FREE 10 ML: 5 INJECTION INTRAVENOUS at 21:24

## 2025-03-01 RX ADMIN — POTASSIUM CHLORIDE 10 MEQ: 750 TABLET, EXTENDED RELEASE ORAL at 21:24

## 2025-03-01 RX ADMIN — APIXABAN 5 MG: 5 TABLET, FILM COATED ORAL at 21:24

## 2025-03-01 RX ADMIN — Medication 1 TABLET: at 09:09

## 2025-03-01 RX ADMIN — SODIUM CHLORIDE, PRESERVATIVE FREE 10 ML: 5 INJECTION INTRAVENOUS at 09:10

## 2025-03-01 RX ADMIN — PANTOPRAZOLE SODIUM 40 MG: 40 TABLET, DELAYED RELEASE ORAL at 16:38

## 2025-03-01 RX ADMIN — PANTOPRAZOLE SODIUM 40 MG: 40 TABLET, DELAYED RELEASE ORAL at 05:55

## 2025-03-01 RX ADMIN — DOCUSATE SODIUM 100 MG: 100 CAPSULE, LIQUID FILLED ORAL at 09:09

## 2025-03-01 RX ADMIN — POTASSIUM CHLORIDE 10 MEQ: 750 TABLET, EXTENDED RELEASE ORAL at 09:10

## 2025-03-01 RX ADMIN — FUROSEMIDE 20 MG: 20 TABLET ORAL at 09:09

## 2025-03-01 RX ADMIN — Medication 500 MG: at 09:10

## 2025-03-01 RX ADMIN — INSULIN LISPRO 1 UNITS: 100 INJECTION, SOLUTION INTRAVENOUS; SUBCUTANEOUS at 16:37

## 2025-03-01 RX ADMIN — METOPROLOL TARTRATE 100 MG: 50 TABLET, FILM COATED ORAL at 09:09

## 2025-03-01 RX ADMIN — ASPIRIN 81 MG: 81 TABLET, COATED ORAL at 09:10

## 2025-03-01 RX ADMIN — OXYCODONE 5 MG: 5 TABLET ORAL at 19:52

## 2025-03-01 RX ADMIN — CALCIUM CARBONATE-VITAMIN D TAB 500 MG-200 UNIT 1 TABLET: 500-200 TAB at 09:09

## 2025-03-01 ASSESSMENT — PAIN DESCRIPTION - LOCATION: LOCATION: KNEE

## 2025-03-01 ASSESSMENT — PAIN DESCRIPTION - DESCRIPTORS: DESCRIPTORS: ACHING;DISCOMFORT;SORE

## 2025-03-01 ASSESSMENT — PAIN - FUNCTIONAL ASSESSMENT: PAIN_FUNCTIONAL_ASSESSMENT: ACTIVITIES ARE NOT PREVENTED

## 2025-03-01 ASSESSMENT — PAIN SCALES - GENERAL: PAINLEVEL_OUTOF10: 5

## 2025-03-01 ASSESSMENT — PAIN DESCRIPTION - ORIENTATION: ORIENTATION: RIGHT

## 2025-03-01 NOTE — CONSULTS
NEUROLOGY CONSULT NOTE      Requesting Physician:  Kathryn Barrientos MD    Reason for Consult:  Evaluate for concern for stroke, hx of CVA, sub-therapeutic INR s/p bilateral lower ex thrmobectomy, permanent AF was on warfarin.     History of Present Illness:  Grecia Wilhelm is a 79 y.o. female  with h/o CVA, Migraines, Aortic Stenosis s/p TAVR, CAD, A-fib, HTN, DM, HLD, CHF, iron deficiency anemia, warfarin induced coagulopathy and hearing loss with right cochlear implant who was admitted to [facility] on 2/22/2025 with presentation of  bilateral leg pain.  Patient reports on day of admission she woke about 2:30 AM with right greater than left bilateral leg pain.  She denies any pain or other leg symptoms prior to going to bed the evening before.  She indicated that she was able to get up from bed and go urinate side of the bed but no report of other significant symptoms.  She denies falling or balance problems.  There was no specific precipitating event or factors.  Patient subsequently presented to [place] ED for evaluation with CTA of abdominal aorta with bilateral runoff showing thrombotic occlusion of the distal aorta 3 cm above the bifurcation extending into bilateral common iliac arteries with a retrograde right reperfusion of bilateral external iliac arteries.  CPK was within normal limits.  INR of 1.4 on warfarin therapy for anticoagulation.  Patient also been taking a daily aspirin at the time of presentation.  CT head and CTA of head and neck were unremarkable.  Vascular surgery and cardiology was consulted with bilateral lower extremity thrombectomy performed after cardiology clearance.  Patient subsequently switched from warfarin to Eliquis for anticoagulation and CVICU.  Repeat CTA head and neck and CT head done 2/27/2025 did not show any acute intracranial abnormalities or significant stenosis/occlusion in the cerebral vasculature.      Past Medical History:        Diagnosis Date    Aortic 
Vascular Surgery Consultation Note    Reason for Consult:  Acute limb ischemia    HPI : This is a 79 y.o. female admitted on 2/22/2025  8:30 AM with acute limb ischemia.  She developed acute onset of right lower extremity pain and weakness.  She denies previous episodes.  This started about 11 pm 2/21/25.      She came to [facility] and was noted to have thrombus in the aortoiliac segment bilaterally on CTA.  She was tx downtown for vascular evaluation and tx.      She has no significant hx of pvd in the past.  She has chronic afib and was subtx on her INR.      Vascular surgery is consulted in regards to acute limb ischemia.     Prior to admission she is independent and lives at home with her .  She walks sometimes with a cane.  She denies hx of rest pain, tissue loss or lifestyle limiting claudication.       ROS : All others Negative if blank [], Positive if [x]  General Urinary   [] Fevers [] Hematuria   [] Chills [] Dysuria   [] Weight Loss Vascular   Skin [] Claudication   [] Tissue Loss [] Rest Pain   Eyes Neurologic   [x] Wears Glasses/Contacts [x] Stroke/TIA - denies residual sxs   [] Vision Changes [] Focal weakness   Respiratory [] Slurred Speech    [] Shortness of breath ENT   Cardiovascular [] Difficulty swallowing   [] Chest Pain Endocrine    [] Shortness of breath with exertion [] Increased Thirst   Gastrointestinal    [] Abdominal Pain    [] Melena   [] Hematochezia         Past Medical History:   Diagnosis Date    Aortic stenosis, mild 10/01/2015    follows  Dr Gallardo at [facility] last visit 9/30/22    Arrhythmia     Arthritis     knees    Atrial fibrillation (HCC)     Bleeding from varicose veins of right lower extremity 06/08/2017    CAD (coronary artery disease)     Cerebrovascular accident (CVA) due to embolic occlusion of left middle cerebral artery (HCC) 10/2015    Confirmed by neurology    Cerebrovascular accident (CVA) due to embolic occlusion of left middle cerebral artery (HCC) 
biopsy-benign    COCHLEAR IMPLANT Right 10/26/2022    RIGHT COCHLEAR IMPLANT INSERTION WITH NERVE INTEGRITY MONITOR performed by Hali Meza MD at [facility] OR    COLONOSCOPY  01/01/2008    ECHOCARDIOGRAM COMPLETE 2D W DOPPLER W COLOR  08/30/2012 9/30/22 at [facility]    INNER EAR SURGERY Left     Multiple surgeries    OVARY SURGERY      SMALL INTESTINE SURGERY      TRANSCATHETER AORTIC VALVE REPLACEMENT  01/08/2024    done at [facility]    VASCULAR SURGERY Bilateral 2/22/2025    bilateral lower extremity thrombectomy performed by Ganesh Huerta MD at [facility]    VEIN SURGERY         Medications Prior to Admission:    Medications Prior to Admission: fluticasone (FLONASE) 50 MCG/ACT nasal spray, 1 spray by Nasal route 2 times daily 2 sprays in each nostril  Twice a day  dilTIAZem (CARDIZEM CD) 240 MG extended release capsule, take 1 capsule by mouth once daily  warfarin (COUMADIN) 1 MG tablet, 1.5 mg warfarin daily beginning tomorrow 11/3/2022  aspirin 81 MG EC tablet, Take 1 tablet by mouth daily  potassium chloride (KLOR-CON) 10 MEQ extended release tablet, take 1 tablet by mouth twice a day  furosemide (LASIX) 20 MG tablet, take 1 tablet by mouth once daily  ACCU-CHEK JR PLUS strip,   calcium carbonate-vitamin D3 (CALTRATE) 600-400 MG-UNIT TABS per tab, Take by mouth  pravastatin (PRAVACHOL) 40 MG tablet, Take 0.5 tablets by mouth daily  niacin (SLO-NIACIN) 500 MG tablet, Take 1 tablet by mouth 2 times daily  omeprazole (PRILOSEC) 20 MG capsule, Take 1 capsule by mouth 2 times daily  therapeutic multivitamin-minerals (THERAGRAN-M) tablet, Take 1 tablet by mouth daily  Vitamin D (CHOLECALCIFEROL) 1000 UNITS CAPS capsule, Take 600 Units by mouth daily   metFORMIN (GLUCOPHAGE) 500 MG tablet, Take 1 tablet by mouth daily (with breakfast)  Chromium-Cinnamon (CINNAMON PLUS CHROMIUM PO), Take 2 tablets by mouth Daily with supper.  metoprolol (LOPRESSOR) 100 MG tablet, Take 0.5 tablets by mouth 2 times 
PRN, Ganesh Huerta MD    0.9 % sodium chloride infusion, , IntraVENous, PRN, Ganesh Huerta MD    HYDROmorphone (DILAUDID) injection 0.25 mg, 0.25 mg, IntraVENous, Q3H PRN **OR** HYDROmorphone (DILAUDID) injection 0.5 mg, 0.5 mg, IntraVENous, Q3H PRN, Ganesh Huerta MD    aspirin EC tablet 81 mg, 81 mg, Oral, Daily, Ganesh Huerta MD    ondansetron (ZOFRAN-ODT) disintegrating tablet 4 mg, 4 mg, Oral, Q8H PRN **OR** ondansetron (ZOFRAN) injection 4 mg, 4 mg, IntraVENous, Q6H PRN, Ganesh Huerta MD    ceFAZolin (ANCEF) 2,000 mg in sterile water 20 mL IV syringe, 2,000 mg, IntraVENous, Q8H, Ganesh Huerta MD    heparin (porcine) injection 5,600 Units, 80 Units/kg, IntraVENous, PRN, Ganesh Huerta MD    heparin (porcine) injection 2,800 Units, 40 Units/kg, IntraVENous, PRN, Ganesh Huerta MD    heparin 25,000 units in dextrose 5% 250 mL (premix) infusion, 5-30 Units/kg/hr, IntraVENous, Continuous, Ganesh Huerta MD    labetalol (NORMODYNE;TRANDATE) injection 10 mg, 10 mg, IntraVENous, Q1H PRN, Kenia Brooks DO, 10 mg at 02/22/25 1357    hydrALAZINE (APRESOLINE) injection 10 mg, 10 mg, IntraVENous, Q1H PRN, Kenia Brooks DO    ALLERGIES:  Gluten    SOCIAL HISTORY:    Patient lives with her . She only uses a cane outside of her home. She remains very active (grocery shops, cleans and ambulates up/down a flight of steps without chest pain or SOB). At times she is only able to walk short distances due to bilateral knee pain.   Denies alcohol, illicit drug and tobacco use.     FAMILY HISTORY:   Non-contributory at this time due to patient's advanced age.     REVIEW OF SYSTEMS:     Negative except as noted above in HPI.    PHYSICAL EXAM:   BP (!) 175/92   Pulse 89   Temp 98 °F (36.7 °C) (Temporal)   Resp 21   SpO2 100%   CONST:  Well developed, well nourished elderly  female who appears stated age. Awake, alert,

## 2025-03-02 ENCOUNTER — APPOINTMENT (OUTPATIENT)
Dept: CT IMAGING | Age: 80
DRG: 271 | End: 2025-03-02
Payer: MEDICARE

## 2025-03-02 LAB
ANION GAP SERPL CALCULATED.3IONS-SCNC: 14 MMOL/L (ref 7–16)
BUN SERPL-MCNC: 9 MG/DL (ref 6–23)
CALCIUM SERPL-MCNC: 8.9 MG/DL (ref 8.6–10.2)
CHLORIDE SERPL-SCNC: 101 MMOL/L (ref 98–107)
CO2 SERPL-SCNC: 22 MMOL/L (ref 22–29)
CREAT SERPL-MCNC: 0.6 MG/DL (ref 0.5–1)
GFR, ESTIMATED: >90 ML/MIN/1.73M2
GLUCOSE BLD-MCNC: 138 MG/DL (ref 74–99)
GLUCOSE BLD-MCNC: 156 MG/DL (ref 74–99)
GLUCOSE BLD-MCNC: 162 MG/DL (ref 74–99)
GLUCOSE BLD-MCNC: 167 MG/DL (ref 74–99)
GLUCOSE SERPL-MCNC: 145 MG/DL (ref 74–99)
POTASSIUM SERPL-SCNC: 3.7 MMOL/L (ref 3.5–5)
SODIUM SERPL-SCNC: 137 MMOL/L (ref 132–146)

## 2025-03-02 PROCEDURE — 2500000003 HC RX 250 WO HCPCS: Performed by: SURGERY

## 2025-03-02 PROCEDURE — 6370000000 HC RX 637 (ALT 250 FOR IP): Performed by: NURSE PRACTITIONER

## 2025-03-02 PROCEDURE — 36415 COLL VENOUS BLD VENIPUNCTURE: CPT

## 2025-03-02 PROCEDURE — 82962 GLUCOSE BLOOD TEST: CPT

## 2025-03-02 PROCEDURE — 70450 CT HEAD/BRAIN W/O DYE: CPT

## 2025-03-02 PROCEDURE — 2140000000 HC CCU INTERMEDIATE R&B

## 2025-03-02 PROCEDURE — 6370000000 HC RX 637 (ALT 250 FOR IP): Performed by: SURGERY

## 2025-03-02 PROCEDURE — 80048 BASIC METABOLIC PNL TOTAL CA: CPT

## 2025-03-02 PROCEDURE — 6360000002 HC RX W HCPCS: Performed by: NURSE PRACTITIONER

## 2025-03-02 PROCEDURE — 6370000000 HC RX 637 (ALT 250 FOR IP): Performed by: INTERNAL MEDICINE

## 2025-03-02 RX ORDER — HALOPERIDOL 5 MG/ML
2 INJECTION INTRAMUSCULAR ONCE
Status: COMPLETED | OUTPATIENT
Start: 2025-03-02 | End: 2025-03-02

## 2025-03-02 RX ADMIN — METOPROLOL TARTRATE 100 MG: 50 TABLET, FILM COATED ORAL at 20:50

## 2025-03-02 RX ADMIN — PRAVASTATIN SODIUM 20 MG: 20 TABLET ORAL at 08:49

## 2025-03-02 RX ADMIN — SODIUM CHLORIDE, PRESERVATIVE FREE 10 ML: 5 INJECTION INTRAVENOUS at 20:51

## 2025-03-02 RX ADMIN — DILTIAZEM HYDROCHLORIDE 240 MG: 120 CAPSULE, COATED, EXTENDED RELEASE ORAL at 08:49

## 2025-03-02 RX ADMIN — POTASSIUM CHLORIDE 10 MEQ: 750 TABLET, EXTENDED RELEASE ORAL at 08:49

## 2025-03-02 RX ADMIN — APIXABAN 5 MG: 5 TABLET, FILM COATED ORAL at 20:50

## 2025-03-02 RX ADMIN — Medication 500 MG: at 08:48

## 2025-03-02 RX ADMIN — ASPIRIN 81 MG: 81 TABLET, COATED ORAL at 08:50

## 2025-03-02 RX ADMIN — Medication 1 TABLET: at 08:49

## 2025-03-02 RX ADMIN — HALOPERIDOL LACTATE 2 MG: 5 INJECTION, SOLUTION INTRAMUSCULAR at 01:13

## 2025-03-02 RX ADMIN — APIXABAN 5 MG: 5 TABLET, FILM COATED ORAL at 08:50

## 2025-03-02 RX ADMIN — PANTOPRAZOLE SODIUM 40 MG: 40 TABLET, DELAYED RELEASE ORAL at 16:18

## 2025-03-02 RX ADMIN — FUROSEMIDE 20 MG: 20 TABLET ORAL at 08:48

## 2025-03-02 RX ADMIN — DOCUSATE SODIUM 100 MG: 100 CAPSULE, LIQUID FILLED ORAL at 08:50

## 2025-03-02 RX ADMIN — SODIUM CHLORIDE, PRESERVATIVE FREE 10 ML: 5 INJECTION INTRAVENOUS at 08:50

## 2025-03-02 RX ADMIN — POTASSIUM CHLORIDE 10 MEQ: 750 TABLET, EXTENDED RELEASE ORAL at 20:50

## 2025-03-02 RX ADMIN — PANTOPRAZOLE SODIUM 40 MG: 40 TABLET, DELAYED RELEASE ORAL at 06:28

## 2025-03-02 RX ADMIN — CALCIUM CARBONATE-VITAMIN D TAB 500 MG-200 UNIT 1 TABLET: 500-200 TAB at 08:49

## 2025-03-02 RX ADMIN — Medication 1000 UNITS: at 08:49

## 2025-03-02 RX ADMIN — Medication 3 MG: at 20:50

## 2025-03-02 RX ADMIN — METOPROLOL TARTRATE 100 MG: 50 TABLET, FILM COATED ORAL at 08:49

## 2025-03-02 RX ADMIN — Medication 500 MG: at 20:52

## 2025-03-03 LAB
ANION GAP SERPL CALCULATED.3IONS-SCNC: 14 MMOL/L (ref 7–16)
BUN SERPL-MCNC: 11 MG/DL (ref 6–23)
CALCIUM SERPL-MCNC: 9.1 MG/DL (ref 8.6–10.2)
CHLORIDE SERPL-SCNC: 104 MMOL/L (ref 98–107)
CO2 SERPL-SCNC: 20 MMOL/L (ref 22–29)
CREAT SERPL-MCNC: 0.6 MG/DL (ref 0.5–1)
ERYTHROCYTE [DISTWIDTH] IN BLOOD BY AUTOMATED COUNT: 16.6 % (ref 11.5–15)
GFR, ESTIMATED: >90 ML/MIN/1.73M2
GLUCOSE BLD-MCNC: 145 MG/DL (ref 74–99)
GLUCOSE BLD-MCNC: 145 MG/DL (ref 74–99)
GLUCOSE BLD-MCNC: 182 MG/DL (ref 74–99)
GLUCOSE BLD-MCNC: 212 MG/DL (ref 74–99)
GLUCOSE SERPL-MCNC: 145 MG/DL (ref 74–99)
HCT VFR BLD AUTO: 32.3 % (ref 34–48)
HGB BLD-MCNC: 10.3 G/DL (ref 11.5–15.5)
MCH RBC QN AUTO: 31.5 PG (ref 26–35)
MCHC RBC AUTO-ENTMCNC: 31.9 G/DL (ref 32–34.5)
MCV RBC AUTO: 98.8 FL (ref 80–99.9)
PLATELET # BLD AUTO: 423 K/UL (ref 130–450)
PMV BLD AUTO: 9 FL (ref 7–12)
POTASSIUM SERPL-SCNC: 4.6 MMOL/L (ref 3.5–5)
RBC # BLD AUTO: 3.27 M/UL (ref 3.5–5.5)
SODIUM SERPL-SCNC: 138 MMOL/L (ref 132–146)
WBC OTHER # BLD: 12.7 K/UL (ref 4.5–11.5)

## 2025-03-03 PROCEDURE — 85027 COMPLETE CBC AUTOMATED: CPT

## 2025-03-03 PROCEDURE — 2140000000 HC CCU INTERMEDIATE R&B

## 2025-03-03 PROCEDURE — 97530 THERAPEUTIC ACTIVITIES: CPT

## 2025-03-03 PROCEDURE — 6370000000 HC RX 637 (ALT 250 FOR IP): Performed by: SURGERY

## 2025-03-03 PROCEDURE — 82962 GLUCOSE BLOOD TEST: CPT

## 2025-03-03 PROCEDURE — 6370000000 HC RX 637 (ALT 250 FOR IP): Performed by: INTERNAL MEDICINE

## 2025-03-03 PROCEDURE — 2500000003 HC RX 250 WO HCPCS: Performed by: SURGERY

## 2025-03-03 PROCEDURE — 36415 COLL VENOUS BLD VENIPUNCTURE: CPT

## 2025-03-03 PROCEDURE — 97535 SELF CARE MNGMENT TRAINING: CPT

## 2025-03-03 PROCEDURE — 80048 BASIC METABOLIC PNL TOTAL CA: CPT

## 2025-03-03 RX ADMIN — PRAVASTATIN SODIUM 20 MG: 20 TABLET ORAL at 09:36

## 2025-03-03 RX ADMIN — Medication 1000 UNITS: at 09:36

## 2025-03-03 RX ADMIN — INSULIN LISPRO 1 UNITS: 100 INJECTION, SOLUTION INTRAVENOUS; SUBCUTANEOUS at 21:56

## 2025-03-03 RX ADMIN — METOPROLOL TARTRATE 100 MG: 50 TABLET, FILM COATED ORAL at 09:37

## 2025-03-03 RX ADMIN — INSULIN LISPRO 1 UNITS: 100 INJECTION, SOLUTION INTRAVENOUS; SUBCUTANEOUS at 16:26

## 2025-03-03 RX ADMIN — CALCIUM CARBONATE-VITAMIN D TAB 500 MG-200 UNIT 1 TABLET: 500-200 TAB at 09:37

## 2025-03-03 RX ADMIN — ASPIRIN 81 MG: 81 TABLET, COATED ORAL at 09:36

## 2025-03-03 RX ADMIN — PANTOPRAZOLE SODIUM 40 MG: 40 TABLET, DELAYED RELEASE ORAL at 05:36

## 2025-03-03 RX ADMIN — SODIUM CHLORIDE, PRESERVATIVE FREE 10 ML: 5 INJECTION INTRAVENOUS at 09:38

## 2025-03-03 RX ADMIN — APIXABAN 5 MG: 5 TABLET, FILM COATED ORAL at 21:55

## 2025-03-03 RX ADMIN — FUROSEMIDE 20 MG: 20 TABLET ORAL at 09:36

## 2025-03-03 RX ADMIN — PANTOPRAZOLE SODIUM 40 MG: 40 TABLET, DELAYED RELEASE ORAL at 16:27

## 2025-03-03 RX ADMIN — APIXABAN 5 MG: 5 TABLET, FILM COATED ORAL at 09:36

## 2025-03-03 RX ADMIN — SODIUM CHLORIDE, PRESERVATIVE FREE 10 ML: 5 INJECTION INTRAVENOUS at 21:56

## 2025-03-03 RX ADMIN — Medication 500 MG: at 09:36

## 2025-03-03 RX ADMIN — Medication 1 TABLET: at 09:37

## 2025-03-03 RX ADMIN — METOPROLOL TARTRATE 100 MG: 50 TABLET, FILM COATED ORAL at 21:56

## 2025-03-03 RX ADMIN — Medication 500 MG: at 21:56

## 2025-03-03 RX ADMIN — DILTIAZEM HYDROCHLORIDE 240 MG: 120 CAPSULE, COATED, EXTENDED RELEASE ORAL at 09:36

## 2025-03-03 RX ADMIN — POTASSIUM CHLORIDE 10 MEQ: 750 TABLET, EXTENDED RELEASE ORAL at 21:56

## 2025-03-03 RX ADMIN — POTASSIUM CHLORIDE 10 MEQ: 750 TABLET, EXTENDED RELEASE ORAL at 09:37

## 2025-03-03 NOTE — CARE COORDINATION
0935  Transition of care update: S/P bilateral lower extremity cutdown/embolectomy on 02/22. Eliquis po 5mg 2 times daily. Hgb 9.2 and other labs noted. Vascular following. Met with pt and pt's daughter in room. Up in chair. On RA. Plan is [facility]. Choices are #1 [facility]- referral made to liaison. #2 [facility] #3 [facility] #4 [facility] [place] -referral was made to liaison (this liaison covers [facility]). PT/OT to provide updates. Will have pre cert started once accepted at a facility. Cm/sw will follow.       1130  Pt was accepted at [facility] and they will start pre cert once updated therapy notes on chart. Pt will need to be sitter free. Notified pt's nurse. Met with pt and pt's daughter in room and informed them [facility] has accepted pt.       1155  Ambulance form was started. PASRR completed. Transport envelope was placed with pt's soft chart.

## 2025-03-03 NOTE — PATIENT CARE CONFERENCE
P Quality Flow/Interdisciplinary Rounds Progress Note        Quality Flow Rounds held on March 3, 2025    Disciplines Attending:  Bedside Nurse, , , and Nursing Unit Leadership    Grecia Wilhelm was admitted on 2/22/2025  8:30 AM    Anticipated Discharge Date:       Disposition:    Moody Score:  Moody Scale Score: 19    [facility] RISK OF UNPLANNED READMISSION 2.0             15.5 Total Score        Discussed patient goal for the day, patient clinical progression, and barriers to discharge.  The following Goal(s) of the Day/Commitment(s) have been identified:  downgrade/discharge planning       Riri Quezada RN  March 3, 2025

## 2025-03-03 NOTE — DISCHARGE INSTR - COC
therapy.  Ventilator:    - No ventilator support    Rehab Therapies: Physical Therapy and Occupational Therapy  Weight Bearing Status/Restrictions: No weight bearing restrictions  Other Medical Equipment (for information only, NOT a DME order):  walker and bedside commode  Other Treatments: none    Patient's personal belongings (please select all that are sent with patient):  Glasses    RN SIGNATURE:  Electronically signed by Meka Davison RN on 3/4/25 at 1:25 PM EST    CASE MANAGEMENT/SOCIAL WORK SECTION    Inpatient Status Date: 02/22/25    Readmission Risk Assessment Score:  [facility] RISK OF UNPLANNED READMISSION 2.0             15.7 Total Score        Discharging to Facility/ Agency   Name: [facility]   Address:  Phone:  Fax:    Dialysis Facility (if applicable)   Name:  Address:  Dialysis Schedule:  Phone:  Fax:    / signature: Electronically signed by Cherri Betancur RN on 3/3/25 at 11:38 AM EST    PHYSICIAN SECTION    Prognosis: Good    Condition at Discharge: Stable    Rehab Potential (if transferring to Rehab): Good    Recommended Labs or Other Treatments After Discharge: cbc and bmp in 3 days    Physician Certification: I certify the above information and transfer of Grecia Wilhelm  is necessary for the continuing treatment of the diagnosis listed and that she requires Skilled Nursing Facility for less 30 days.     Update Admission H&P: No change in H&P    PHYSICIAN SIGNATURE:  Electronically signed by KASSIE Ellis CNP on 3/4/25 at 11:59 AM EST

## 2025-03-04 ENCOUNTER — TELEPHONE (OUTPATIENT)
Dept: VASCULAR SURGERY | Age: 80
End: 2025-03-04

## 2025-03-04 VITALS
HEIGHT: 63 IN | HEART RATE: 86 BPM | WEIGHT: 150.57 LBS | TEMPERATURE: 97.1 F | RESPIRATION RATE: 16 BRPM | OXYGEN SATURATION: 94 % | SYSTOLIC BLOOD PRESSURE: 106 MMHG | DIASTOLIC BLOOD PRESSURE: 54 MMHG | BODY MASS INDEX: 26.68 KG/M2

## 2025-03-04 LAB
ANION GAP SERPL CALCULATED.3IONS-SCNC: 11 MMOL/L (ref 7–16)
BUN SERPL-MCNC: 14 MG/DL (ref 6–23)
CALCIUM SERPL-MCNC: 8.9 MG/DL (ref 8.6–10.2)
CHLORIDE SERPL-SCNC: 103 MMOL/L (ref 98–107)
CO2 SERPL-SCNC: 25 MMOL/L (ref 22–29)
CREAT SERPL-MCNC: 0.7 MG/DL (ref 0.5–1)
ERYTHROCYTE [DISTWIDTH] IN BLOOD BY AUTOMATED COUNT: 16.9 % (ref 11.5–15)
GFR, ESTIMATED: 85 ML/MIN/1.73M2
GLUCOSE BLD-MCNC: 153 MG/DL (ref 74–99)
GLUCOSE BLD-MCNC: 291 MG/DL (ref 74–99)
GLUCOSE SERPL-MCNC: 159 MG/DL (ref 74–99)
HCT VFR BLD AUTO: 32 % (ref 34–48)
HGB BLD-MCNC: 10.1 G/DL (ref 11.5–15.5)
MCH RBC QN AUTO: 31.3 PG (ref 26–35)
MCHC RBC AUTO-ENTMCNC: 31.6 G/DL (ref 32–34.5)
MCV RBC AUTO: 99.1 FL (ref 80–99.9)
PLATELET # BLD AUTO: 350 K/UL (ref 130–450)
PMV BLD AUTO: 9 FL (ref 7–12)
POTASSIUM SERPL-SCNC: 3.8 MMOL/L (ref 3.5–5)
RBC # BLD AUTO: 3.23 M/UL (ref 3.5–5.5)
SODIUM SERPL-SCNC: 139 MMOL/L (ref 132–146)
WBC OTHER # BLD: 9.5 K/UL (ref 4.5–11.5)

## 2025-03-04 PROCEDURE — 6370000000 HC RX 637 (ALT 250 FOR IP): Performed by: INTERNAL MEDICINE

## 2025-03-04 PROCEDURE — 6370000000 HC RX 637 (ALT 250 FOR IP): Performed by: SURGERY

## 2025-03-04 PROCEDURE — 36415 COLL VENOUS BLD VENIPUNCTURE: CPT

## 2025-03-04 PROCEDURE — 82962 GLUCOSE BLOOD TEST: CPT

## 2025-03-04 PROCEDURE — 85027 COMPLETE CBC AUTOMATED: CPT

## 2025-03-04 PROCEDURE — 2500000003 HC RX 250 WO HCPCS: Performed by: SURGERY

## 2025-03-04 PROCEDURE — 80048 BASIC METABOLIC PNL TOTAL CA: CPT

## 2025-03-04 PROCEDURE — 97530 THERAPEUTIC ACTIVITIES: CPT

## 2025-03-04 PROCEDURE — 97535 SELF CARE MNGMENT TRAINING: CPT

## 2025-03-04 RX ORDER — METOPROLOL TARTRATE 100 MG/1
100 TABLET ORAL 2 TIMES DAILY
Qty: 60 TABLET | Refills: 3 | Status: SHIPPED | OUTPATIENT
Start: 2025-03-04

## 2025-03-04 RX ADMIN — PANTOPRAZOLE SODIUM 40 MG: 40 TABLET, DELAYED RELEASE ORAL at 06:15

## 2025-03-04 RX ADMIN — Medication 1 TABLET: at 08:09

## 2025-03-04 RX ADMIN — Medication 1000 UNITS: at 08:09

## 2025-03-04 RX ADMIN — DILTIAZEM HYDROCHLORIDE 240 MG: 120 CAPSULE, COATED, EXTENDED RELEASE ORAL at 08:09

## 2025-03-04 RX ADMIN — CALCIUM CARBONATE-VITAMIN D TAB 500 MG-200 UNIT 1 TABLET: 500-200 TAB at 08:08

## 2025-03-04 RX ADMIN — FUROSEMIDE 20 MG: 20 TABLET ORAL at 08:09

## 2025-03-04 RX ADMIN — APIXABAN 5 MG: 5 TABLET, FILM COATED ORAL at 08:09

## 2025-03-04 RX ADMIN — PRAVASTATIN SODIUM 20 MG: 20 TABLET ORAL at 08:09

## 2025-03-04 RX ADMIN — ASPIRIN 81 MG: 81 TABLET, COATED ORAL at 08:09

## 2025-03-04 RX ADMIN — POTASSIUM CHLORIDE 10 MEQ: 750 TABLET, EXTENDED RELEASE ORAL at 08:09

## 2025-03-04 RX ADMIN — INSULIN LISPRO 2 UNITS: 100 INJECTION, SOLUTION INTRAVENOUS; SUBCUTANEOUS at 12:15

## 2025-03-04 RX ADMIN — Medication 500 MG: at 08:08

## 2025-03-04 RX ADMIN — METOPROLOL TARTRATE 100 MG: 50 TABLET, FILM COATED ORAL at 08:10

## 2025-03-04 RX ADMIN — SODIUM CHLORIDE, PRESERVATIVE FREE 10 ML: 5 INJECTION INTRAVENOUS at 08:10

## 2025-03-04 ASSESSMENT — PAIN SCALES - GENERAL: PAINLEVEL_OUTOF10: 0

## 2025-03-04 NOTE — PLAN OF CARE
Problem: Chronic Conditions and Co-morbidities  Goal: Patient's chronic conditions and co-morbidity symptoms are monitored and maintained or improved  2/23/2025 2104 by Bella Stein RN  Outcome: Progressing     Problem: Safety - Adult  Goal: Free from fall injury  2/23/2025 2104 by Bella Stein RN  Outcome: Progressing     Problem: Neurosensory - Adult  Goal: Achieves stable or improved neurological status  2/23/2025 2104 by Bella Stein RN  Outcome: Progressing     Problem: Respiratory - Adult  Goal: Achieves optimal ventilation and oxygenation  2/23/2025 2104 by Bella Stein RN  Outcome: Progressing     Problem: Cardiovascular - Adult  Goal: Maintains optimal cardiac output and hemodynamic stability  2/23/2025 2104 by Bella Stein RN  Outcome: Progressing     Problem: Infection - Adult  Goal: Absence of infection at discharge  2/23/2025 2104 by Bella Stein RN  Outcome: Progressing     Problem: Metabolic/Fluid and Electrolytes - Adult  Goal: Electrolytes maintained within normal limits  2/23/2025 2104 by Bella Stein RN  Outcome: Progressing     Problem: Skin/Tissue Integrity  Goal: Skin integrity remains intact  Description: 1.  Monitor for areas of redness and/or skin breakdown  2.  Assess vascular access sites hourly  3.  Every 4-6 hours minimum:  Change oxygen saturation probe site  4.  Every 4-6 hours:  If on nasal continuous positive airway pressure, respiratory therapy assess nares and determine need for appliance change or resting period  2/23/2025 2104 by Bella Stein RN  Outcome: Progressing     Problem: Pain  Goal: Verbalizes/displays adequate comfort level or baseline comfort level  2/23/2025 2104 by Bella Stein RN  Outcome: Progressing     
  Problem: Chronic Conditions and Co-morbidities  Goal: Patient's chronic conditions and co-morbidity symptoms are monitored and maintained or improved  2/25/2025 0742 by Nahum Daniel RN  Outcome: Progressing     Problem: Discharge Planning  Goal: Discharge to home or other facility with appropriate resources  2/25/2025 0742 by Nahum Daniel RN  Outcome: Progressing     Problem: Safety - Adult  Goal: Free from fall injury  2/25/2025 0742 by Nahum Daniel RN  Outcome: Progressing     Problem: Neurosensory - Adult  Goal: Achieves stable or improved neurological status  2/25/2025 0742 by Nahum Daniel RN  Outcome: Progressing     Problem: Respiratory - Adult  Goal: Achieves optimal ventilation and oxygenation  2/25/2025 0742 by Nahum Daniel RN  Outcome: Progressing     Problem: Cardiovascular - Adult  Goal: Maintains optimal cardiac output and hemodynamic stability  2/25/2025 0742 by Nahum Daniel RN  Outcome: Progressing     Problem: Infection - Adult  Goal: Absence of infection at discharge  2/25/2025 0742 by Nahmu Daniel RN  Outcome: Progressing     Problem: Metabolic/Fluid and Electrolytes - Adult  Goal: Electrolytes maintained within normal limits  2/25/2025 0742 by Nahum Daniel RN  Outcome: Progressing     Problem: Metabolic/Fluid and Electrolytes - Adult  Goal: Hemodynamic stability and optimal renal function maintained  2/25/2025 0742 by Nahum Daniel RN  Outcome: Progressing     Problem: Skin/Tissue Integrity  Goal: Skin integrity remains intact  Description: 1.  Monitor for areas of redness and/or skin breakdown  2.  Assess vascular access sites hourly  3.  Every 4-6 hours minimum:  Change oxygen saturation probe site  4.  Every 4-6 hours:  If on nasal continuous positive airway pressure, respiratory therapy assess nares and determine need for appliance change or resting period  2/25/2025 0742 by Nahum Daniel RN  Outcome: Progressing     Problem: 
  Problem: Chronic Conditions and Co-morbidities  Goal: Patient's chronic conditions and co-morbidity symptoms are monitored and maintained or improved  2/26/2025 0748 by Nahum Daniel RN  Outcome: Progressing     Problem: Discharge Planning  Goal: Discharge to home or other facility with appropriate resources  2/26/2025 0748 by Nahum Daniel RN  Outcome: Progressing     Problem: Safety - Adult  Goal: Free from fall injury  2/26/2025 0748 by Nahum Daniel RN  Outcome: Progressing     Problem: Neurosensory - Adult  Goal: Achieves stable or improved neurological status  2/26/2025 0748 by Nahum Daniel RN  Outcome: Progressing     Problem: Respiratory - Adult  Goal: Achieves optimal ventilation and oxygenation  2/26/2025 0748 by Nahum Daniel RN  Outcome: Progressing     Problem: Cardiovascular - Adult  Goal: Maintains optimal cardiac output and hemodynamic stability  2/26/2025 0748 by Nahum Daniel RN  Outcome: Progressing     Problem: Infection - Adult  Goal: Absence of infection at discharge  2/26/2025 0748 by Nahum Daniel RN  Outcome: Progressing     Problem: Metabolic/Fluid and Electrolytes - Adult  Goal: Electrolytes maintained within normal limits  Outcome: Progressing     Problem: Skin/Tissue Integrity  Goal: Skin integrity remains intact  Description: 1.  Monitor for areas of redness and/or skin breakdown  2.  Assess vascular access sites hourly  3.  Every 4-6 hours minimum:  Change oxygen saturation probe site  4.  Every 4-6 hours:  If on nasal continuous positive airway pressure, respiratory therapy assess nares and determine need for appliance change or resting period  2/26/2025 0748 by Nahum Daniel RN  Outcome: Progressing     Problem: Pain  Goal: Verbalizes/displays adequate comfort level or baseline comfort level  Outcome: Progressing     Problem: ABCDS Injury Assessment  Goal: Absence of physical injury  Outcome: Progressing     Problem: Skin/Tissue Integrity - 
  Problem: Chronic Conditions and Co-morbidities  Goal: Patient's chronic conditions and co-morbidity symptoms are monitored and maintained or improved  2/28/2025 1139 by Meka Neal RN  Outcome: Progressing     Problem: Neurosensory - Adult  Goal: Achieves stable or improved neurological status  2/28/2025 1139 by Meka Neal RN  Outcome: Progressing     Problem: Respiratory - Adult  Goal: Achieves optimal ventilation and oxygenation  2/28/2025 1139 by Meka Neal RN  Outcome: Progressing     Problem: Cardiovascular - Adult  Goal: Maintains optimal cardiac output and hemodynamic stability  2/28/2025 1139 by Meka Neal RN  Outcome: Progressing     Problem: Cardiovascular - Adult  Goal: Absence of cardiac dysrhythmias or at baseline  2/28/2025 1139 by Meka Neal RN  Outcome: Progressing     Problem: Infection - Adult  Goal: Absence of infection at discharge  2/28/2025 1139 by Meka Neal RN  Outcome: Progressing     
  Problem: Chronic Conditions and Co-morbidities  Goal: Patient's chronic conditions and co-morbidity symptoms are monitored and maintained or improved  3/1/2025 0829 by Ian Malik RN  Outcome: Progressing     Problem: Discharge Planning  Goal: Discharge to home or other facility with appropriate resources  3/1/2025 0829 by aIn Malik RN  Outcome: Progressing     Problem: Safety - Adult  Goal: Free from fall injury  3/1/2025 0829 by Ian Malik RN  Outcome: Progressing     Problem: Neurosensory - Adult  Goal: Achieves stable or improved neurological status  3/1/2025 0829 by Ian Malik RN  Outcome: Progressing     Problem: Respiratory - Adult  Goal: Achieves optimal ventilation and oxygenation  3/1/2025 0829 by Ian Malik RN  Outcome: Progressing     Problem: Cardiovascular - Adult  Goal: Maintains optimal cardiac output and hemodynamic stability  3/1/2025 0829 by Ian Malik RN  Outcome: Progressing     Problem: Cardiovascular - Adult  Goal: Absence of cardiac dysrhythmias or at baseline  3/1/2025 0829 by Ian Malik RN  Outcome: Progressing     Problem: Infection - Adult  Goal: Absence of infection at discharge  3/1/2025 0829 by Ian Malik RN  Outcome: Progressing     Problem: Infection - Adult  Goal: Absence of infection during hospitalization  Outcome: Progressing     Problem: Infection - Adult  Goal: Absence of fever/infection during anticipated neutropenic period  Outcome: Progressing     Problem: Metabolic/Fluid and Electrolytes - Adult  Goal: Electrolytes maintained within normal limits  3/1/2025 0829 by Ian Malik RN  Outcome: Progressing     Problem: Metabolic/Fluid and Electrolytes - Adult  Goal: Hemodynamic stability and optimal renal function maintained  Outcome: Progressing     Problem: Skin/Tissue Integrity  Goal: Skin integrity remains intact  Description: 1.  Monitor for areas of redness and/or skin breakdown  2.  Assess vascular access sites hourly  3.  Every 4-6 
  Problem: Chronic Conditions and Co-morbidities  Goal: Patient's chronic conditions and co-morbidity symptoms are monitored and maintained or improved  3/2/2025 0808 by Ian Malik RN  Outcome: Progressing     Problem: Discharge Planning  Goal: Discharge to home or other facility with appropriate resources  3/2/2025 0808 by Ian Malik RN  Outcome: Progressing     Problem: Safety - Adult  Goal: Free from fall injury  3/2/2025 0808 by Ian Malik RN  Outcome: Progressing     Problem: Neurosensory - Adult  Goal: Achieves stable or improved neurological status  3/2/2025 0808 by Ian Malik RN  Outcome: Progressing     Problem: Respiratory - Adult  Goal: Achieves optimal ventilation and oxygenation  3/2/2025 0808 by Ian Malik RN  Outcome: Progressing     Problem: Cardiovascular - Adult  Goal: Maintains optimal cardiac output and hemodynamic stability  3/2/2025 0808 by Ian Malik RN  Outcome: Progressing     Problem: Cardiovascular - Adult  Goal: Absence of cardiac dysrhythmias or at baseline  3/2/2025 0808 by Ian Malik RN  Outcome: Progressing     Problem: Infection - Adult  Goal: Absence of infection at discharge  3/2/2025 0808 by Ian Malik RN  Outcome: Progressing     Problem: Infection - Adult  Goal: Absence of infection during hospitalization  3/2/2025 0808 by Ian Malik RN  Outcome: Progressing     Problem: Infection - Adult  Goal: Absence of fever/infection during anticipated neutropenic period  3/2/2025 0808 by Ian Malik RN  Outcome: Progressing     Problem: Metabolic/Fluid and Electrolytes - Adult  Goal: Electrolytes maintained within normal limits  3/2/2025 0808 by Ian Malik RN  Outcome: Progressing     Problem: Metabolic/Fluid and Electrolytes - Adult  Goal: Hemodynamic stability and optimal renal function maintained  3/2/2025 0808 by Ian Malik RN  Outcome: Progressing     Problem: Skin/Tissue Integrity  Goal: Skin integrity remains intact  Description: 1.  
  Problem: Chronic Conditions and Co-morbidities  Goal: Patient's chronic conditions and co-morbidity symptoms are monitored and maintained or improved  Outcome: Progressing     Problem: Discharge Planning  Goal: Discharge to home or other facility with appropriate resources  Outcome: Progressing     Problem: Safety - Adult  Goal: Free from fall injury  Outcome: Progressing     Problem: Neurosensory - Adult  Goal: Achieves stable or improved neurological status  Outcome: Progressing     Problem: Respiratory - Adult  Goal: Achieves optimal ventilation and oxygenation  Outcome: Progressing     Problem: Cardiovascular - Adult  Goal: Maintains optimal cardiac output and hemodynamic stability  Outcome: Progressing     Problem: Cardiovascular - Adult  Goal: Absence of cardiac dysrhythmias or at baseline  Outcome: Progressing     Problem: Infection - Adult  Goal: Absence of infection at discharge  Outcome: Progressing     Problem: Infection - Adult  Goal: Absence of infection during hospitalization  Outcome: Progressing     Problem: Infection - Adult  Goal: Absence of fever/infection during anticipated neutropenic period  Outcome: Progressing     Problem: Metabolic/Fluid and Electrolytes - Adult  Goal: Electrolytes maintained within normal limits  Outcome: Progressing     Problem: Metabolic/Fluid and Electrolytes - Adult  Goal: Hemodynamic stability and optimal renal function maintained  Outcome: Progressing     Problem: Skin/Tissue Integrity - Adult  Goal: Skin integrity remains intact  Description: 1.  Monitor for areas of redness and/or skin breakdown  2.  Assess vascular access sites hourly  3.  Every 4-6 hours minimum:  Change oxygen saturation probe site  4.  Every 4-6 hours:  If on nasal continuous positive airway pressure, respiratory therapy assess nares and determine need for appliance change or resting period  Outcome: Progressing     Problem: Musculoskeletal - Adult  Goal: Return mobility to safest level of 
  Problem: Chronic Conditions and Co-morbidities  Goal: Patient's chronic conditions and co-morbidity symptoms are monitored and maintained or improved  Outcome: Progressing     Problem: Discharge Planning  Goal: Discharge to home or other facility with appropriate resources  Outcome: Progressing     Problem: Safety - Adult  Goal: Free from fall injury  Outcome: Progressing     Problem: Neurosensory - Adult  Goal: Achieves stable or improved neurological status  Outcome: Progressing     Problem: Respiratory - Adult  Goal: Achieves optimal ventilation and oxygenation  Outcome: Progressing     Problem: Cardiovascular - Adult  Goal: Maintains optimal cardiac output and hemodynamic stability  Outcome: Progressing  Goal: Absence of cardiac dysrhythmias or at baseline  Outcome: Progressing     Problem: Infection - Adult  Goal: Absence of infection at discharge  Outcome: Progressing  Goal: Absence of infection during hospitalization  Outcome: Progressing  Goal: Absence of fever/infection during anticipated neutropenic period  Outcome: Progressing     Problem: Metabolic/Fluid and Electrolytes - Adult  Goal: Electrolytes maintained within normal limits  Outcome: Progressing  Goal: Hemodynamic stability and optimal renal function maintained  Outcome: Progressing     Problem: Skin/Tissue Integrity  Goal: Skin integrity remains intact  Outcome: Progressing     Problem: Pain  Goal: Verbalizes/displays adequate comfort level or baseline comfort level  Outcome: Progressing     Problem: ABCDS Injury Assessment  Goal: Absence of physical injury  Outcome: Progressing     Problem: Skin/Tissue Integrity - Adult  Goal: Skin integrity remains intact  Outcome: Progressing  Goal: Incisions, wounds, or drain sites healing without S/S of infection  Outcome: Progressing  Goal: Oral mucous membranes remain intact  Outcome: Progressing     Problem: Musculoskeletal - Adult  Goal: Return mobility to safest level of function  Outcome: 
  Problem: Chronic Conditions and Co-morbidities  Goal: Patient's chronic conditions and co-morbidity symptoms are monitored and maintained or improved  Outcome: Progressing     Problem: Discharge Planning  Goal: Discharge to home or other facility with appropriate resources  Outcome: Progressing     Problem: Safety - Adult  Goal: Free from fall injury  Outcome: Progressing     Problem: Neurosensory - Adult  Goal: Achieves stable or improved neurological status  Outcome: Progressing     Problem: Respiratory - Adult  Goal: Achieves optimal ventilation and oxygenation  Outcome: Progressing     Problem: Cardiovascular - Adult  Goal: Maintains optimal cardiac output and hemodynamic stability  Outcome: Progressing  Goal: Absence of cardiac dysrhythmias or at baseline  Outcome: Progressing     Problem: Infection - Adult  Goal: Absence of infection at discharge  Outcome: Progressing  Goal: Absence of infection during hospitalization  Outcome: Progressing  Goal: Absence of fever/infection during anticipated neutropenic period  Outcome: Progressing     Problem: Metabolic/Fluid and Electrolytes - Adult  Goal: Electrolytes maintained within normal limits  Outcome: Progressing  Goal: Hemodynamic stability and optimal renal function maintained  Outcome: Progressing     Problem: Skin/Tissue Integrity - Adult  Goal: Skin integrity remains intact  Description: 1.  Monitor for areas of redness and/or skin breakdown  2.  Assess vascular access sites hourly  3.  Every 4-6 hours minimum:  Change oxygen saturation probe site  4.  Every 4-6 hours:  If on nasal continuous positive airway pressure, respiratory therapy assess nares and determine need for appliance change or resting period  Outcome: Progressing  Goal: Incisions, wounds, or drain sites healing without S/S of infection  Outcome: Progressing  Goal: Oral mucous membranes remain intact  Outcome: Progressing     Problem: Musculoskeletal - Adult  Goal: Return mobility to safest 
  Problem: Chronic Conditions and Co-morbidities  Goal: Patient's chronic conditions and co-morbidity symptoms are monitored and maintained or improved  Outcome: Progressing     Problem: Discharge Planning  Goal: Discharge to home or other facility with appropriate resources  Outcome: Progressing     Problem: Safety - Adult  Goal: Free from fall injury  Outcome: Progressing     Problem: Neurosensory - Adult  Goal: Achieves stable or improved neurological status  Outcome: Progressing     Problem: Respiratory - Adult  Goal: Achieves optimal ventilation and oxygenation  Outcome: Progressing     Problem: Cardiovascular - Adult  Goal: Maintains optimal cardiac output and hemodynamic stability  Outcome: Progressing  Goal: Absence of cardiac dysrhythmias or at baseline  Outcome: Progressing     Problem: Infection - Adult  Goal: Absence of infection at discharge  Outcome: Progressing  Goal: Absence of infection during hospitalization  Outcome: Progressing  Goal: Absence of fever/infection during anticipated neutropenic period  Outcome: Progressing     Problem: Metabolic/Fluid and Electrolytes - Adult  Goal: Electrolytes maintained within normal limits  Outcome: Progressing  Goal: Hemodynamic stability and optimal renal function maintained  Outcome: Progressing     Problem: Skin/Tissue Integrity - Adult  Goal: Skin integrity remains intact  Outcome: Progressing  Goal: Incisions, wounds, or drain sites healing without S/S of infection  Outcome: Progressing  Goal: Oral mucous membranes remain intact  Outcome: Progressing     Problem: Musculoskeletal - Adult  Goal: Return mobility to safest level of function  Outcome: Progressing  Goal: Maintain proper alignment of affected body part  Outcome: Progressing  Goal: Return ADL status to a safe level of function  Outcome: Progressing     Problem: Gastrointestinal - Adult  Goal: Minimal or absence of nausea and vomiting  Outcome: Progressing  Goal: Maintains or returns to baseline 
  Problem: Chronic Conditions and Co-morbidities  Goal: Patient's chronic conditions and co-morbidity symptoms are monitored and maintained or improved  Outcome: Progressing  Flowsheets (Taken 2/22/2025 1618)  Care Plan - Patient's Chronic Conditions and Co-Morbidity Symptoms are Monitored and Maintained or Improved:   Monitor and assess patient's chronic conditions and comorbid symptoms for stability, deterioration, or improvement   Collaborate with multidisciplinary team to address chronic and comorbid conditions and prevent exacerbation or deterioration     Problem: Discharge Planning  Goal: Discharge to home or other facility with appropriate resources  Outcome: Progressing  Flowsheets (Taken 2/22/2025 1618)  Discharge to home or other facility with appropriate resources:   Arrange for needed discharge resources and transportation as appropriate   Identify discharge learning needs (meds, wound care, etc)   Identify barriers to discharge with patient and caregiver     Problem: Safety - Adult  Goal: Free from fall injury  Outcome: Progressing     Problem: Neurosensory - Adult  Goal: Achieves stable or improved neurological status  Outcome: Progressing  Flowsheets (Taken 2/22/2025 1618)  Achieves stable or improved neurological status:   Assess for and report changes in neurological status   Initiate measures to prevent increased intracranial pressure   Maintain blood pressure and fluid volume within ordered parameters to optimize cerebral perfusion and minimize risk of hemorrhage     Problem: Respiratory - Adult  Goal: Achieves optimal ventilation and oxygenation  Outcome: Progressing  Flowsheets (Taken 2/22/2025 1618)  Achieves optimal ventilation and oxygenation:   Assess for changes in mentation and behavior   Assess for changes in respiratory status   Position to facilitate oxygenation and minimize respiratory effort   Oxygen supplementation based on oxygen saturation or arterial blood gases     Problem: 
  Problem: Skin/Tissue Integrity  Goal: Skin integrity remains intact  Description: 1.  Monitor for areas of redness and/or skin breakdown  2.  Assess vascular access sites hourly  3.  Every 4-6 hours minimum:  Change oxygen saturation probe site  4.  Every 4-6 hours:  If on nasal continuous positive airway pressure, respiratory therapy assess nares and determine need for appliance change or resting period  2/24/2025 0755 by Eli Pena, RN  Note: Turn pt frequently elevate heels and elbows off bed  2/23/2025 2104 by Bella Stein, RN  Outcome: Progressing     Problem: Cardiovascular - Adult  Goal: Maintains optimal cardiac output and hemodynamic stability  2/24/2025 0755 by Eli Pena, RN  Note: Monitor bp maintain sbp<160 adm meds  2/23/2025 2104 by Bella Stein, RN  Outcome: Progressing     
  Problem: Genitourinary - Adult  Goal: Absence of urinary retention  Outcome: Progressing     Problem: Skin/Tissue Integrity  Goal: Skin integrity remains intact  Description: 1.  Monitor for areas of redness and/or skin breakdown  2.  Assess vascular access sites hourly  3.  Every 4-6 hours minimum:  Change oxygen saturation probe site  4.  Every 4-6 hours:  If on nasal continuous positive airway pressure, respiratory therapy assess nares and determine need for appliance change or resting period  Outcome: Progressing     Problem: Pain  Goal: Verbalizes/displays adequate comfort level or baseline comfort level  Outcome: Progressing     Problem: ABCDS Injury Assessment  Goal: Absence of physical injury  Outcome: Progressing     Problem: Confusion  Goal: Confusion, delirium, dementia, or psychosis is improved or at baseline  Description: INTERVENTIONS:  1. Assess for possible contributors to thought disturbance, including medications, impaired vision or hearing, underlying metabolic abnormalities, dehydration, psychiatric diagnoses, and notify attending LIP  2. [place] high risk fall precautions, as indicated  3. Provide frequent short contacts to provide reality reorientation, refocusing and direction  4. Decrease environmental stimuli, including noise as appropriate  5. Monitor and intervene to maintain adequate nutrition, hydration, elimination, sleep and activity  6. If unable to ensure safety without constant attention obtain sitter and review sitter guidelines with assigned personnel  7. Initiate Psychosocial CNS and Spiritual Care consult, as indicated  Outcome: Progressing     
Adult  Goal: Absence of urinary retention  Outcome: Progressing     Problem: Hematologic - Adult  Goal: Maintains hematologic stability  Outcome: Progressing     Problem: Skin/Tissue Integrity  Goal: Skin integrity remains intact  Description: 1.  Monitor for areas of redness and/or skin breakdown  2.  Assess vascular access sites hourly  3.  Every 4-6 hours minimum:  Change oxygen saturation probe site  4.  Every 4-6 hours:  If on nasal continuous positive airway pressure, respiratory therapy assess nares and determine need for appliance change or resting period  Outcome: Progressing     Problem: Pain  Goal: Verbalizes/displays adequate comfort level or baseline comfort level  Outcome: Progressing     Problem: ABCDS Injury Assessment  Goal: Absence of physical injury  Outcome: Progressing     Problem: Confusion  Goal: Confusion, delirium, dementia, or psychosis is improved or at baseline  Description: INTERVENTIONS:  1. Assess for possible contributors to thought disturbance, including medications, impaired vision or hearing, underlying metabolic abnormalities, dehydration, psychiatric diagnoses, and notify attending LIP  2. [place] high risk fall precautions, as indicated  3. Provide frequent short contacts to provide reality reorientation, refocusing and direction  4. Decrease environmental stimuli, including noise as appropriate  5. Monitor and intervene to maintain adequate nutrition, hydration, elimination, sleep and activity  6. If unable to ensure safety without constant attention obtain sitter and review sitter guidelines with assigned personnel  7. Initiate Psychosocial CNS and Spiritual Care consult, as indicated  Outcome: Progressing     
Cardiovascular - Adult  Goal: Maintains optimal cardiac output and hemodynamic stability  Outcome: Progressing  Flowsheets (Taken 2/22/2025 1618 by Silvano Lee RN)  Maintains optimal cardiac output and hemodynamic stability:   Monitor urine output and notify Licensed Independent Practitioner for values outside of normal range   Assess for signs of decreased cardiac output   Monitor blood pressure and heart rate     Problem: Infection - Adult  Goal: Absence of infection at discharge  Outcome: Progressing  Flowsheets (Taken 2/22/2025 1618 by Silvano Lee RN)  Absence of infection at discharge:   Assess and monitor for signs and symptoms of infection   Monitor lab/diagnostic results     Problem: Skin/Tissue Integrity - Adult  Goal: Skin integrity remains intact  Description: 1.  Monitor for areas of redness and/or skin breakdown  2.  Assess vascular access sites hourly  3.  Every 4-6 hours minimum:  Change oxygen saturation probe site  4.  Every 4-6 hours:  If on nasal continuous positive airway pressure, respiratory therapy assess nares and determine need for appliance change or resting period  Outcome: Progressing  Flowsheets (Taken 2/26/2025 0631)  Skin Integrity Remains Intact: Monitor for areas of redness and/or skin breakdown     Problem: Skin/Tissue Integrity - Adult  Goal: Incisions, wounds, or drain sites healing without S/S of infection  Outcome: Progressing  Flowsheets (Taken 2/26/2025 0631)  Incisions, Wounds, or Drain Sites Healing Without Sign and Symptoms of Infection: Initiate pressure ulcer prevention bundle as indicated     Problem: Skin/Tissue Integrity - Adult  Goal: Oral mucous membranes remain intact  Outcome: Progressing  Flowsheets (Taken 2/26/2025 0631)  Oral Mucous Membranes Remain Intact: Assess oral mucosa and hygiene practices     Problem: Skin/Tissue Integrity  Goal: Skin integrity remains intact  Description: 1.  Monitor for areas of redness and/or skin breakdown  2.  
behavior   Assess for changes in respiratory status   Position to facilitate oxygenation and minimize respiratory effort   Oxygen supplementation based on oxygen saturation or arterial blood gases     Problem: Cardiovascular - Adult  Goal: Maintains optimal cardiac output and hemodynamic stability  Outcome: Progressing  Flowsheets (Taken 2/22/2025 1618)  Maintains optimal cardiac output and hemodynamic stability:   Monitor urine output and notify Licensed Independent Practitioner for values outside of normal range   Assess for signs of decreased cardiac output   Monitor blood pressure and heart rate     Problem: Infection - Adult  Goal: Absence of infection at discharge  Outcome: Progressing  Flowsheets (Taken 2/22/2025 1618)  Absence of infection at discharge:   Assess and monitor for signs and symptoms of infection   Monitor lab/diagnostic results     Problem: Metabolic/Fluid and Electrolytes - Adult  Goal: Electrolytes maintained within normal limits  Outcome: Progressing  Flowsheets (Taken 2/22/2025 1618)  Electrolytes maintained within normal limits:   Monitor labs and assess patient for signs and symptoms of electrolyte imbalances   Administer electrolyte replacement as ordered   Monitor response to electrolyte replacements, including repeat lab results as appropriate   Fluid restriction as ordered     
dehydration, psychiatric diagnoses, notify LIP     Problem: Nutrition Deficit:  Goal: Optimize nutritional status  Outcome: Progressing     
absence of nausea and vomiting  2/27/2025 0827 by Mary Lou Beckford RN  Outcome: Progressing  2/26/2025 2348 by Ginger Allan RN  Outcome: Progressing  Flowsheets (Taken 2/26/2025 2000)  Minimal or absence of nausea and vomiting:   Administer ordered antiemetic medications as needed   Provide nonpharmacologic comfort measures as appropriate   Nutrition consult to assist patient with adequate nutrition and appropriate food choices  Goal: Maintains or returns to baseline bowel function  2/27/2025 0827 by Mary Lou Beckford RN  Outcome: Progressing  2/26/2025 2348 by Ginger Allan RN  Outcome: Progressing  Flowsheets (Taken 2/26/2025 2000)  Maintains or returns to baseline bowel function:   Assess bowel function   Encourage oral fluids to ensure adequate hydration   Administer ordered medications as needed   Encourage mobilization and activity  Goal: Maintains adequate nutritional intake  2/27/2025 0827 by Mary Lou Beckford RN  Outcome: Progressing  2/26/2025 2348 by Ginger Allan RN  Outcome: Progressing  Flowsheets (Taken 2/26/2025 2000)  Maintains adequate nutritional intake:   Monitor percentage of each meal consumed   Identify factors contributing to decreased intake, treat as appropriate   Assist with meals as needed   Monitor intake and output, weight and lab values   Obtain nutritional consult as needed     Problem: Genitourinary - Adult  Goal: Absence of urinary retention  2/27/2025 0827 by Mary Lou Beckford RN  Outcome: Progressing  2/26/2025 2348 by Ginger Allan RN  Outcome: Progressing  Flowsheets (Taken 2/26/2025 2000)  Absence of urinary retention:   Assess patient’s ability to void and empty bladder   Monitor intake/output and perform bladder scan as needed   Place urinary catheter per Licensed Independent Practitioner order if needed  Goal: Urinary catheter remains patent  2/27/2025 0827 by Mary Lou Beckford RN  Outcome: Progressing  2/26/2025 2348 by Ginger Allan RN  Outcome:

## 2025-03-04 NOTE — PROGRESS NOTES
SUBJECTIVE:    Munoz has been removed and pt now voiding comfortabely    OBJECTIVE:    BP (!) 106/54   Pulse 86   Temp 97.1 °F (36.2 °C) (Temporal)   Resp 16   Ht 1.6 m (5' 3\")   Wt 68.3 kg (150 lb 9.2 oz)   SpO2 94%   BMI 26.67 kg/m²     PHYSICAL EXAMINATION:  Skin: dry, without rashes  Respirations: non-labored, intact  Abdomen: soft, non-tender, non-distended      Lab Results   Component Value Date    WBC 9.5 03/04/2025    HGB 10.1 (L) 03/04/2025    HCT 32.0 (L) 03/04/2025    MCV 99.1 03/04/2025     03/04/2025       Lab Results   Component Value Date    CREATININE 0.7 03/04/2025       No results found for: \"PSA\"    REVIEW OF SYSTEMS:    [unfilled]    PAST FAMILY HISTORY:    Family History   Problem Relation Age of Onset    Heart Attack Mother     Cancer Father     Diabetes Father      PAST SOCIAL HISTORY:    Social History     Socioeconomic History    Marital status:    Tobacco Use    Smoking status: Never    Smokeless tobacco: Never   Vaping Use    Vaping status: Never Used   Substance and Sexual Activity    Alcohol use: No    Drug use: No    Sexual activity: Defer     Partners: Male     Social Determinants of Health     Food Insecurity: No Food Insecurity (2/27/2025)    Hunger Vital Sign     Worried About Running Out of Food in the Last Year: Never true     Ran Out of Food in the Last Year: Never true   Transportation Needs: No Transportation Needs (2/27/2025)    PRAPARE - Transportation     Lack of Transportation (Medical): No     Lack of Transportation (Non-Medical): No   Housing Stability: Patient Unable To Answer (2/27/2025)    Housing Stability Vital Sign     Unable to Pay for Housing in the Last Year: Patient unable to answer     Number of Times Moved in the Last Year: 0     Homeless in the Last Year: Patient unable to answer       Scheduled Meds:   lactulose  20 g Oral Daily    metoprolol  100 mg Oral BID    
      Hospitalist Progress Note      PCP: Bismark Beauchamp MD    Date of Admission: 2/22/2025        Hospital Course:  79 y.o. female presented with BILATERAL LOWER EXTREM PAIN. FOUND TO BE ISCHEMIC AND HAD A Bilateral LE aortoiliac and distal thrombectomy .  HISTORY GIVEN BY DAUGHTER.   SHE HAS A KNOWN HISTORY OF BAD  VARICOSE VEINS, WOKE UP IN A LOT OF PAIN, AND FAMILY CALLED 911      3/1  STARTED ON ELIQUIS, UROLOGY CONSULTED FOR URINARY RETENTION       3/2 CT OF HEAD NEG, APPARENTLY SLIPPED OOB    Subjective:  MORE ALERT  BUT CONFUSED                     Medications:  Reviewed    Infusion Medications    sodium chloride      dextrose      sodium chloride       Scheduled Medications    lactulose  20 g Oral Daily    metoprolol  100 mg Oral BID    docusate sodium  100 mg Oral Daily    apixaban  5 mg Oral BID    insulin lispro  0-4 Units SubCUTAneous 4x Daily AC & HS    pravastatin  20 mg Oral Daily    niacin  500 mg Oral BID    therapeutic multivitamin-minerals  1 tablet Oral Daily    Vitamin D  1,000 Units Oral Daily    oyster shell calcium w/D  1 tablet Oral Daily    furosemide  20 mg Oral Daily    potassium chloride  10 mEq Oral BID    dilTIAZem  240 mg Oral Daily    pantoprazole  40 mg Oral BID AC    sodium chloride flush  5-40 mL IntraVENous 2 times per day    aspirin  81 mg Oral Daily     PRN Meds: melatonin, bisacodyl, sodium chloride, glucose, dextrose bolus **OR** dextrose bolus, glucagon (rDNA), dextrose, oxyCODONE, HYDROmorphone **OR** [DISCONTINUED] HYDROmorphone, fluticasone, sodium chloride flush, sodium chloride, ondansetron **OR** ondansetron, labetalol, hydrALAZINE      Intake/Output Summary (Last 24 hours) at 3/2/2025 1352  Last data filed at 3/2/2025 1207  Gross per 24 hour   Intake 480 ml   Output 1100 ml   Net -620 ml       Exam:    BP (!) 149/62   Pulse 94   Temp 97.4 °F (36.3 °C) (Temporal)   Resp 18   Ht 1.6 m (5' 3\")   Wt 72.6 kg (160 lb 0.9 oz)   SpO2 94%   BMI 28.35 kg/m² 
      Hospitalist Progress Note      PCP: Bismark Beauchamp MD    Date of Admission: 2/22/2025        Hospital Course:  79 y.o. female presented with BILATERAL LOWER EXTREM PAIN. FOUND TO BE ISCHEMIC AND HAD A Bilateral LE aortoiliac and distal thrombectomy .  HISTORY GIVEN BY DAUGHTER.   SHE HAS A KNOWN HISTORY OF BAD  VARICOSE VEINS, WOKE UP IN A LOT OF PAIN, AND FAMILY CALLED 911     3/1  STARTED ON ELIQUIS, UROLOGY CONSULTED FOR URINARY RETENTION    Subjective:  MORE ALERT           Medications:  Reviewed    Infusion Medications    sodium chloride      dextrose      sodium chloride       Scheduled Medications    lactulose  20 g Oral Daily    metoprolol  100 mg Oral BID    docusate sodium  100 mg Oral Daily    apixaban  5 mg Oral BID    insulin lispro  0-4 Units SubCUTAneous 4x Daily AC & HS    pravastatin  20 mg Oral Daily    niacin  500 mg Oral BID    therapeutic multivitamin-minerals  1 tablet Oral Daily    Vitamin D  1,000 Units Oral Daily    oyster shell calcium w/D  1 tablet Oral Daily    furosemide  20 mg Oral Daily    potassium chloride  10 mEq Oral BID    dilTIAZem  240 mg Oral Daily    pantoprazole  40 mg Oral BID AC    sodium chloride flush  5-40 mL IntraVENous 2 times per day    aspirin  81 mg Oral Daily     PRN Meds: bisacodyl, sodium chloride, glucose, dextrose bolus **OR** dextrose bolus, glucagon (rDNA), dextrose, oxyCODONE, HYDROmorphone **OR** [DISCONTINUED] HYDROmorphone, fluticasone, sodium chloride flush, sodium chloride, ondansetron **OR** ondansetron, labetalol, hydrALAZINE      Intake/Output Summary (Last 24 hours) at 3/1/2025 1422  Last data filed at 3/1/2025 1344  Gross per 24 hour   Intake 180 ml   Output 550 ml   Net -370 ml       Exam:    /60   Pulse 90   Temp 97.6 °F (36.4 °C) (Temporal)   Resp 16   Ht 1.6 m (5' 3\")   Wt 72.6 kg (160 lb 0.9 oz)   SpO2 96%   BMI 28.35 kg/m²       General appearance:  No apparent distress, appears stated age.  HEENT:  Normal 
    [facility]   Progress note      Subjective:    Patient is lying in bed awake and alert yet a bit confused.  Patient currently has a sitter at bedside.    Objective:    /73   Pulse 78   Temp 97.9 °F (36.6 °C)   Resp 20   Ht 1.6 m (5' 3\")   Wt 72.6 kg (160 lb 0.9 oz)   SpO2 94%   BMI 28.35 kg/m²     In: 840 [P.O.:840]  Out: 1500   In: 840   Out: 1500 [Urine:1500]    General appearance: NAD, awake and alert with somewhat confusion.  HEENT: AT/NC, MMM  Neck: FROM, supple  Lungs: Clear to auscultation  CV: Regularly regular no MRGs  Vasc: Radial pulses 2+  Abdomen: Soft, non-tender; no masses or HSM  Extremities: Bilateral dressings in groins, tenderness to palpation to be expected, warm to touch bilateral legs/feet, no discoloration noted, intact pulses  Skin: no rash, lesions or ulcers  Unable to assess psych and neuro     Recent Labs     03/01/25  0649   WBC 11.2   HGB 9.2*   HCT 28.4*          Recent Labs     03/01/25  0649 03/02/25  1014 03/03/25  0651    137 138   K 4.0 3.7 4.6    101 104   CO2 21* 22 20*   BUN 10 9 11   CREATININE 0.6 0.6 0.6   CALCIUM 8.7 8.9 9.1       Assessment:    Principal Problem:    Critical limb ischemia of both lower extremities (HCC)  Active Problems:    Severe aortic stenosis    Diabetes mellitus (HCC)    Hypertension    Atrial fibrillation (HCC)    Aortic occlusion    Acute alteration in mental status    Acute lower limb ischemia  Resolved Problems:    * No resolved hospital problems. *      Plan:    79-year-old  woman on Coumadin therapy for history of atrial fibrillation/TAVR-found to be subtherapeutic INR comes in with complaints of exquisite pain in right lower extremity-critical limb ischemia    Status post endovascular intervention with thrombectomy by vascular team  Has been transitioned to oral anticoagulation as of 2/23/2025-  H&H stable 8/24, 11.6 on arrival-monitor for GI bleed  Ongoing pain and cool to touch right 
    [facility]   Progress note      Subjective:    Patient is lying in bed resting.  Patient was transferred out of the ICU.  Patient continues on 3 L O2    Objective:    BP (!) 119/55   Pulse 95   Temp 97.9 °F (36.6 °C) (Temporal)   Resp 18   Ht 1.6 m (5' 3\")   Wt 72.6 kg (160 lb 0.9 oz)   SpO2 (!) 88%   BMI 28.35 kg/m²     In: 0   Out: 500   In: 0   Out: 500 [Urine:500]    General appearance: NAD, fatigued  HEENT: AT/NC, MMM  Neck: FROM, supple  Lungs: Clear to auscultation  CV: Regularly regular no MRGs  Vasc: Radial pulses 2+  Abdomen: Soft, non-tender; no masses or HSM  Extremities: Bilateral dressings in groins, tenderness to palpation to be expected, warm to touch bilateral legs/feet, no discoloration noted, intact pulses  Skin: no rash, lesions or ulcers  Unable to assess psych and neuro     Recent Labs     02/25/25  1849 02/26/25  0355 02/27/25  0605   WBC  --  9.3 9.6   HGB 8.5* 8.3* 8.6*   HCT 26.4* 25.4* 25.7*   PLT  --  143 171       Recent Labs     02/26/25  0355 02/27/25  0605    137   K 3.8 3.9    104   CO2 18* 19*   BUN 12 9   CREATININE 0.6 0.6   CALCIUM 8.0* 8.3*       Assessment:    Principal Problem:    Critical limb ischemia of both lower extremities (HCC)  Active Problems:    Severe aortic stenosis    Diabetes mellitus (HCC)    Hypertension    Atrial fibrillation (HCC)    Aortic occlusion    Acute alteration in mental status    Acute lower limb ischemia  Resolved Problems:    * No resolved hospital problems. *      Plan:    79-year-old  woman on Coumadin therapy for history of atrial fibrillation/TAVR-found to be subtherapeutic INR comes in with complaints of exquisite pain in right lower extremity-critical limb ischemia    Status post endovascular intervention with thrombectomy by vascular team  Has been transitioned to oral anticoagulation as of 2/23/2025-  H&H stable 8/24, 11.6 on arrival-monitor for GI bleed  Ongoing pain and cool to touch right 
    Inpatient Cardiology Progress note        SUBJECTIVE/OBJECTIVE:   Continues to have LE pain    PHYSICAL EXAM:   BP (!) 149/56   Pulse (!) 106   Temp 97.7 °F (36.5 °C) (Temporal)   Resp 19   SpO2 97%    B/P Range last 24 hours: Systolic (24hrs), Av , Min:102 , Max:155    Diastolic (24hrs), Av, Min:45, Max:75      GENERAL: Not in distress.   HEAD: Normocephalic, Atraumatic.   NECK: No JVD. No carotid bruits. No neck masses.?   CARDIOVASCULAR: Irregularly irregular, normal S1, S2, no MRG    LUNGS: Clear to auscultation bilaterally, no wheezing.?   ABDOMEN: Abdomen is soft and not distended. No tenderness.  EXTREMITIES:There is no pedal edema.   MUSCULOSKELETAL: No joint swelling. Normal range of motion?   SKIN: Skins is moist. No ulcers or lesions.   NEUROLOGICAL: Conscious, alert, oriented to time, place and person. No evidence of obvious neurological deficits.?   PSYCHOLOGICAL: Patient is in normal mood.?       Intake/Output Summary (Last 24 hours) at 2025  Last data filed at 2025 1900  Gross per 24 hour   Intake 1322.26 ml   Output 1165 ml   Net 157.26 ml       Weight:   Wt Readings from Last 3 Encounters:   25 69.4 kg (153 lb)   24 66.5 kg (146 lb 9.6 oz)   24 71 kg (156 lb 9.6 oz)       Current Inpatient Medications:   docusate sodium  100 mg Oral Daily    apixaban  5 mg Oral BID    insulin lispro  0-4 Units SubCUTAneous 4x Daily AC & HS    metoprolol  50 mg Oral BID    pravastatin  20 mg Oral Daily    niacin  500 mg Oral BID    therapeutic multivitamin-minerals  1 tablet Oral Daily    Vitamin D  1,000 Units Oral Daily    oyster shell calcium w/D  1 tablet Oral Daily    furosemide  20 mg Oral Daily    potassium chloride  10 mEq Oral BID    dilTIAZem  240 mg Oral Daily    pantoprazole  40 mg Oral BID AC    sodium chloride flush  5-40 mL IntraVENous 2 times per day    aspirin  81 mg Oral Daily       IV Infusions (if any):   sodium chloride      dextrose      sodium 
    Inpatient Cardiology Progress note        SUBJECTIVE/OBJECTIVE:   Continues to have LE pain upon touching legs.  She was altered today and had AMS with CT scan showing no acute changes.    PHYSICAL EXAM:   /64   Pulse (!) 119   Temp 97.6 °F (36.4 °C) (Temporal)   Resp 26   Wt 73 kg (160 lb 15 oz)   SpO2 97%   BMI 28.51 kg/m²    B/P Range last 24 hours: Systolic (24hrs), Av , Min:100 , Max:144    Diastolic (24hrs), Av, Min:46, Max:89      GENERAL: Not in distress.   HEAD: Normocephalic, Atraumatic.   NECK: No JVD. No carotid bruits. No neck masses.?   CARDIOVASCULAR: Irregularly irregular, normal S1, S2, no MRG    LUNGS: Clear to auscultation bilaterally, no wheezing.?   ABDOMEN: Abdomen is soft and not distended. No tenderness.  EXTREMITIES:There is no pedal edema.   MUSCULOSKELETAL: No joint swelling. Normal range of motion?   SKIN: Skins is moist. No ulcers or lesions.   NEUROLOGICAL: Conscious, alert, oriented to time, place and person. No evidence of obvious neurological deficits.?   PSYCHOLOGICAL: Patient is in normal mood.?       Intake/Output Summary (Last 24 hours) at 2025 2154  Last data filed at 2025 1900  Gross per 24 hour   Intake --   Output 800 ml   Net -800 ml       Weight:   Wt Readings from Last 3 Encounters:   25 73 kg (160 lb 15 oz)   25 69.4 kg (153 lb)   24 66.5 kg (146 lb 9.6 oz)       Current Inpatient Medications:   lactulose  20 g Oral Daily    metoprolol  100 mg Oral BID    docusate sodium  100 mg Oral Daily    apixaban  5 mg Oral BID    insulin lispro  0-4 Units SubCUTAneous 4x Daily AC & HS    pravastatin  20 mg Oral Daily    niacin  500 mg Oral BID    therapeutic multivitamin-minerals  1 tablet Oral Daily    Vitamin D  1,000 Units Oral Daily    oyster shell calcium w/D  1 tablet Oral Daily    furosemide  20 mg Oral Daily    potassium chloride  10 mEq Oral BID    dilTIAZem  240 mg Oral Daily    pantoprazole  40 mg Oral BID AC    sodium 
    Inpatient Cardiology Progress note        SUBJECTIVE/OBJECTIVE:   Denies chest pain or shortness of breath.  Seems to be interested in Watchman device implantation      PHYSICAL EXAM:   /67   Pulse 78   Temp 99.3 °F (37.4 °C) (Bladder)   Resp 19   SpO2 99%    B/P Range last 24 hours: Systolic (24hrs), Av , Min:101 , Max:178    Diastolic (24hrs), Av, Min:51, Max:99      GENERAL: Not in distress.   HEAD: Normocephalic, Atraumatic.   NECK: No JVD. No carotid bruits. No neck masses.?   CARDIOVASCULAR: Irregularly irregular, normal S1, S2, no MRG    LUNGS: Clear to auscultation bilaterally, no wheezing.?   ABDOMEN: Abdomen is soft and not distended. No tenderness.  EXTREMITIES:There is no pedal edema.   MUSCULOSKELETAL: No joint swelling. Normal range of motion?   SKIN: Skins is moist. No ulcers or lesions.   NEUROLOGICAL: Conscious, alert, oriented to time, place and person. No evidence of obvious neurological deficits.?   PSYCHOLOGICAL: Patient is in normal mood.?       Intake/Output Summary (Last 24 hours) at 2025 1928  Last data filed at 2025 1900  Gross per 24 hour   Intake 2124.64 ml   Output 788 ml   Net 1336.64 ml       Weight:   Wt Readings from Last 3 Encounters:   25 69.4 kg (153 lb)   24 66.5 kg (146 lb 9.6 oz)   24 71 kg (156 lb 9.6 oz)       Current Inpatient Medications:   apixaban  5 mg Oral BID    insulin lispro  0-4 Units SubCUTAneous 4x Daily AC & HS    metoprolol  50 mg Oral BID    pravastatin  20 mg Oral Daily    niacin  500 mg Oral BID    therapeutic multivitamin-minerals  1 tablet Oral Daily    Vitamin D  1,000 Units Oral Daily    oyster shell calcium w/D  1 tablet Oral Daily    furosemide  20 mg Oral Daily    potassium chloride  10 mEq Oral BID    dilTIAZem  240 mg Oral Daily    pantoprazole  40 mg Oral BID AC    sodium chloride flush  5-40 mL IntraVENous 2 times per day    aspirin  81 mg Oral Daily       IV Infusions (if any):   sodium chloride 
    Inpatient Cardiology Progress note        SUBJECTIVE/OBJECTIVE:   Resting comfortably in bed; voices no complaints. Daughter at bedside, multiple questions answered    PHYSICAL EXAM:   /65   Pulse 76   Temp 97.9 °F (36.6 °C) (Temporal)   Resp 18   Ht 1.6 m (5' 3\")   Wt 72.6 kg (160 lb 0.9 oz)   SpO2 96%   BMI 28.35 kg/m²    B/P Range last 24 hours: Systolic (24hrs), Av , Min:100 , Max:152    Diastolic (24hrs), Av, Min:46, Max:89      GENERAL: Not in distress.   HEAD: Normocephalic, Atraumatic.   NECK: No JVD. No carotid bruits. No neck masses.?   CARDIOVASCULAR: Irregularly irregular, normal S1, S2, no MRG    LUNGS: Clear to auscultation bilaterally, no wheezing.?   ABDOMEN: Abdomen is soft and not distended. No tenderness.  EXTREMITIES:There is no pedal edema.   MUSCULOSKELETAL: No joint swelling. Normal range of motion?   SKIN: Skins is moist. No ulcers or lesions.   NEUROLOGICAL: Conscious, alert, oriented to time, place and person. No evidence of obvious neurological deficits.?   PSYCHOLOGICAL: Patient is in normal mood.?       Intake/Output Summary (Last 24 hours) at 2025 1157  Last data filed at 2025 1028  Gross per 24 hour   Intake 0 ml   Output 350 ml   Net -350 ml       Weight:   Wt Readings from Last 3 Encounters:   25 72.6 kg (160 lb 0.9 oz)   25 69.4 kg (153 lb)   24 66.5 kg (146 lb 9.6 oz)       Current Inpatient Medications:   lactulose  20 g Oral Daily    metoprolol  100 mg Oral BID    docusate sodium  100 mg Oral Daily    apixaban  5 mg Oral BID    insulin lispro  0-4 Units SubCUTAneous 4x Daily AC & HS    pravastatin  20 mg Oral Daily    niacin  500 mg Oral BID    therapeutic multivitamin-minerals  1 tablet Oral Daily    Vitamin D  1,000 Units Oral Daily    oyster shell calcium w/D  1 tablet Oral Daily    furosemide  20 mg Oral Daily    potassium chloride  10 mEq Oral BID    dilTIAZem  240 mg Oral Daily    pantoprazole  40 mg Oral BID AC    
    OCCUPATIONAL THERAPY INITIAL EVALUATION    [facility]  [address] [place], [place]       Date:2025                                                               Patient Name: Grecia Wilhelm  MRN: [id number]  : 1945  Room: [address] [place] [address]    Evaluating OT: Courtney Salguero, OTR/L [id number]    Referring Provider:   izing   Ganesh Huerta MD      Specific Provider Orders/Date: OT eval and treat (25)        Diagnosis: Aortic occlusion [I70.0]  Critical limb ischemia of both lower extremities (HCC) [I70.223]  Acute lower limb ischemia [I99.8]         Reason for admission:  2025 with complaints of right leg pain.  Patient stated that she went to bed at ~ 11 PM on 2025 and had been feeling well. She stated that she woke up at ~ 2:30 AM and had an acute onset of right lower extremity pain. She was unable to stand on it and therefore she presented to [facility]. She was found to have a thrombus in the aortoiliac segment bilaterally CT in the setting of subtherapeutic INR 1.4. She is on Coumadin for permanent AF. She was transferred downtown for vascular surgery evaluation.        Surgery/Procedures:   Bilateral LE aortoiliac and distal thrombectomy             Pertinent Medical History:    Past Medical History:   Diagnosis Date    Aortic stenosis, mild 10/01/2015    follows  Dr Gallardo at [facility] last visit 22    Arrhythmia     Arthritis     knees    Atrial fibrillation (HCC)     Bleeding from varicose veins of right lower extremity 2017    CAD (coronary artery disease)     Cerebrovascular accident (CVA) due to embolic occlusion of left middle cerebral artery (HCC) 10/2015    Confirmed by neurology    Cerebrovascular accident (CVA) due to embolic occlusion of left middle cerebral artery (HCC)     Left parietal confirmed by neurology    Cerebrovascular disease 2013    CVA.no weakness/deficits    CHF 
    [facility]   Progress note      Subjective:    Appears much more comfortable today  Denies any significant pain in right lower extremity except for groin area    Objective:    /64   Pulse 77   Temp 97.8 °F (36.6 °C) (Temporal)   Resp 21   SpO2 95%     In: 2846.6 [I.V.:1998.4; Blood:348]  Out: 1550   In: 2846.6   Out: 1550 [Urine:1550]    General appearance: NAD, more awake alert and oriented today  HEENT: AT/NC, MMM  Neck: FROM, supple  Lungs: Clear to auscultation  CV: Regularly regular no MRGs  Vasc: Radial pulses 2+  Abdomen: Soft, non-tender; no masses or HSM  Extremities: Bilateral dressings in groins, tenderness to palpation to be expected, warm to touch bilateral legs/feet, no discoloration noted, intact pulses  Skin: no rash, lesions or ulcers  Unable to assess psych and neuro     Recent Labs     02/24/25  0406 02/25/25  0522 02/25/25  1849 02/26/25  0355   WBC 12.6* 12.4*  --  9.3   HGB 8.0* 7.3* 8.5* 8.3*   HCT 24.1* 22.6* 26.4* 25.4*    140  --  143       Recent Labs     02/24/25  0406 02/25/25  0522 02/26/25  0355    137 136   K 4.0 4.1 3.8    105 105   CO2 23 23 18*   BUN 17 13 12   CREATININE 0.7 0.7 0.6   CALCIUM 8.0* 7.8* 8.0*       Assessment:    Principal Problem:    Critical limb ischemia of both lower extremities (HCC)  Active Problems:    Severe aortic stenosis    Diabetes mellitus (HCC)    Hypertension    Atrial fibrillation (HCC)    Aortic occlusion  Resolved Problems:    * No resolved hospital problems. *      Plan:    79-year-old  woman on Coumadin therapy for history of atrial fibrillation/TAVR-found to be subtherapeutic INR comes in with complaints of exquisite pain in right lower extremity-critical limb ischemia    Status post endovascular intervention with thrombectomy by vascular team  Has been transitioned to oral anticoagulation as of 2/23/2025-  H&H stable 8/24, 11.6 on arrival-monitor for GI bleed  Ongoing pain and cool to 
  OCCUPATIONAL THERAPY TREATMENT NOTE   [facility]  [address] [place], [place]       Date:3/3/2025                                                               Patient Name: Grecia Wilhelm  MRN: [id number]  : 1945  Room: [address] [place] [address]    Evaluating OT: Courtney Salguero, OTR/L [id number]     Referring Provider:   izing   Ganesh Huerta MD       Specific Provider Orders/Date: OT eval and treat (25)        Diagnosis: Aortic occlusion [I70.0]  Critical limb ischemia of both lower extremities (HCC) [I70.223]  Acute lower limb ischemia [I99.8]          Reason for admission:  2025 with complaints of right leg pain.  Patient stated that she went to bed at ~ 11 PM on 2025 and had been feeling well. She stated that she woke up at ~ 2:30 AM and had an acute onset of right lower extremity pain. She was unable to stand on it and therefore she presented to [facility]. She was found to have a thrombus in the aortoiliac segment bilaterally CT in the setting of subtherapeutic INR 1.4. She is on Coumadin for permanent AF. She was transferred downtown for vascular surgery evaluation.         Surgery/Procedures:   Bilateral LE aortoiliac and distal thrombectomy             Pertinent Medical History:    Past Medical History        Past Medical History:   Diagnosis Date    Aortic stenosis, mild 10/01/2015     follows  Dr Gallardo at [facility] last visit 22    Arrhythmia      Arthritis       knees    Atrial fibrillation (HCC)      Bleeding from varicose veins of right lower extremity 2017    CAD (coronary artery disease)      Cerebrovascular accident (CVA) due to embolic occlusion of left middle cerebral artery (HCC) 10/2015     Confirmed by neurology    Cerebrovascular accident (CVA) due to embolic occlusion of left middle cerebral artery (HCC)      Left parietal confirmed by neurology    Cerebrovascular disease 2013     CVA.no 
4 Eyes Skin Assessment     NAME:  Grecia Wilhelm  YOB: 1945  MEDICAL RECORD NUMBER:  [id number]    The patient is being assessed for  Admission    I agree that at least one RN has performed a thorough Head to Toe Skin Assessment on the patient. ALL assessment sites listed below have been assessed.      Areas assessed by both nurses:    Head, Face, Ears, Shoulders, Back, Chest, Arms, Elbows, Hands, Sacrum. Buttock, Coccyx, Ischium, Legs. Feet and Heels, and Under Medical Devices         Does the Patient have a Wound? No noted wound(s)       Moody Prevention initiated by RN: Yes  Wound Care Orders initiated by RN: No    Pressure Injury (Stage 3,4, Unstageable, DTI, NWPT, and Complex wounds) if present, place Wound referral order by RN under : No    New Ostomies, if present place, Ostomy referral order under : No     Nurse 1 eSignature: Electronically signed by Bronwyn Peterson RN on 2/27/25 at 10:32 PM EST    **SHARE this note so that the co-signing nurse can place an eSignature**    Nurse 2 eSignature: Electronically signed by Luma Brewer RN on 2/28/25 at 1:44 AM EST  
4 Eyes Skin Assessment     NAME:  Grecia Wilhelm  YOB: 1945  MEDICAL RECORD NUMBER:  [id number]    The patient is being assessed for  Admission    I agree that at least one RN has performed a thorough Head to Toe Skin Assessment on the patient. ALL assessment sites listed below have been assessed.      Areas assessed by both nurses:    Head, Face, Ears, Shoulders, Back, Chest, Arms, Elbows, Hands, Sacrum. Buttock, Coccyx, Ischium, Legs. Feet and Heels, and Under Medical Devices         Does the Patient have a Wound? No noted wound(s)       M[place]y Prevention initiated by RN: Yes  Wound Care Orders initiated by RN: NO    Pressure Injury (Stage 3,4, Unstageable, DTI, NWPT, and Complex wounds) if present, place Wound referral order by RN under : No    New Ostomies, if present place, Ostomy referral order under : No     Nurse 1 eSignature: Electronically signed by Silvano Lee RN on 2/22/25 at 4:17 PM EST    **SHARE this note so that the co-signing nurse can place an eSignature**    Nurse 2 eSignature: Electronically signed by Kaylynn Hunter RN on 2/22/25 at 5:21 PM EST  
Ace wrap applied to RLE toes to knee. Skin warm, edematous,taught and shiny.  Pulses palpable. Elevated on 2 pillows.  
Attending notified patient has not peed in DTV time. Bladder scan was 425. Order to straight cath patient was given.  
Comprehensive Nutrition Assessment    Type and Reason for Visit:  Initial (LOS)    Nutrition Recommendations/Plan:   Recommend and start Glucerna supplement TID and Bassem wound healing supplement BID to help meet increased nutritional needs from surgical wound healing and d/t decreased po intake of meals.         Malnutrition Assessment:  Malnutrition Status:  At risk for malnutrition (03/03/25 1221)    Context:  Acute Illness     Findings of the 6 clinical characteristics of malnutrition:  Energy Intake:  50% or less of estimated energy requirements for 5 or more days  Weight Loss:  Unable to assess (d/t poor weight history)     Body Fat Loss:  Unable to assess     Muscle Mass Loss:  Unable to assess    Fluid Accumulation:  No fluid accumulation     Strength:  Not Performed    Nutrition Assessment:    Patient adm w/ leg pain (transfer from [facility] d/t bilateral ischemic legs) ; concerning for embolic source ; noted acute lower limb ischemia and aortic occlusion ; s/p bilateral lower extremity cutdown/embolectomy on 2/22 ; hx of DM/TIA/CVA ; hx of CAD/CHF/atrial fibrillation/hypertension ; hx of severe aortic stenosis s/p TAVR on 1/8/24 at [facility] ; will provide recommendations    Nutrition Related Findings:    I&Os WNL, 1+ edema, redness to buttocks, A&O x 2, active BS, hyperglycemia ; Wound Type: Multiple, Surgical Incision (Incisions x 2)       Current Nutrition Intake & Therapies:    Average Meal Intake: 26-50%     ADULT DIET; Regular; 4 carb choices (60 gm/meal)    Anthropometric Measures:  Height: 160 cm (5' 3\")  Ideal Body Weight (IBW): 115 lbs (52 kg)    Admission Body Weight: 72.6 kg (160 lb) (2/27, bedscale)  Current Body Weight: 72.6 kg (160 lb) (2/27, bedscale), 139.1 % IBW. Weight Source: Bed scale  Current BMI (kg/m2): 28.4  Usual Body Weight:  (UTO ; limited weight history ; EMR shows past weight of 146# actual on 12/5/24)                          BMI Categories: Overweight (BMI 25.0-29.9)    Estimated 
Critical care notified of more drainage on dressing on left groin dressing.   
Critical care notified of most recent ck and urine output being 25 ml the last 3 hours.   
Dr. HERRING at bedside. Wants the Heparin infusion held until 1830  
N-N report called to [facility]  
New consult to Neurology complete. Message sent to Dr. Forbes via [facility].   
Notified Dena Salazar NP that pt has not voided by her DTV time and has been straight cathed twice already.  
Notified SROC pain not relieved by oxy and dilaudid. Resident going to come assess.   
OCCUPATIONAL THERAPY    Date:2025  Patient Name: Grecia Wilhelm  MRN: [id number]  : 1945  Room: 3820/3820-A              Chart reviewed. Discussed with NP. Will hold OT evaluation this am due to patient unable to tolerate (pain) at this time.  Will re-attempt at later time. Thank you for consult.    Courtney Salguero, OTR/L [id number]     
OCCUPATIONAL THERAPY    Date:2025  Patient Name: Grecia Wilhelm  MRN: [id number]  : 1945  Room: [facility]-A              Chart reviewed. Attempted OT evaluation this pm; pt on hold per RN due to hypotension this pm.  Will re-attempt at later time.     History obtained from daughter: Pt lives with  and son in a 2 floor home with 2 steps and no rail. Bathroom on 1st level and bedroom on 2nd level (walk in shower.) Full flight of steps with rail to 2nd level.   Pt can stay on first floor upon discharge.   Pt was independent with ADLs and ambulated with SPC d/t vertigo.   Thank you for consult.    Courtney Salguero, OTR/L [id number]     
Patient admitted to [facility] with the following belongings:  Glasses and Hearing Aids . The following belongings admitted with the patient, None, were sent home with the patient's family.   
Patient arrived to the Surgical ICU from OR with melgoza catheter intact and patent. Securing device applied. Melgoza bag is hanging below the level of the bladder, safety clip attached to the bed sheet, tamper seal is intact, drainage bag is not on the floor, lack of dependent loop in tubing, and the drainage bag is less than half full.   
Patient began slurring words, was A&Ox4 all day and now is confused about situation, time, and, place. Stroke Alert Called. RN transported to STAT CTA and back.  
Patient moaning in pain and crying with movement. Medicated per order for pain management. No change in neuro assessment of affected RLE. Small change in position made, refusing turn. Educated on importance of repositioning. Dr. Guzman at bedside also evaluating painful RLE. Extremity pink and warm and patient able to wiggle toes.  
Patient unable to void throughout the night. Bladder scanned patient for 790cc urine. Straight cath x1 for 800cc beba, urine. Patient tolerated well and due to void at [id number].    Bronwyn Peterson RN    
Patient's family,  Feng and son Galileo both updated on patient unwitnessed fall this am. Informed them that patient will have a CT scan of the Head this am, as she is on Eliquis and that a sitter will be placed next to the bedside. All questions answered.Elissa Ortiz RN    
Patient's hearing aid removed and placed in an envelope. Envelope given to patient. Patient left CT department with hearing aid.  
Pharmacy Note    Grecia Wilhelm was ordered chromium-cinnamon 200-1000 mg.  As per the [facility] Formulary Committee Policy, herbals and certain dietary supplements will be discontinued.  The herbal or dietary supplement may be continued after discharge from the hospital.  Corby Elias, PharmD, BCPS 2/22/2025 1:32 PM    
Physical Therapy    PT consult to evaluate/treat received and appreciated.  Pt chart reviewed and evaluation attempted.      Initiated evaluation and discussed social history and PLOF with pt and family.  Pt [place], A&Ox4.  Pt lives with  and son in a 2 floor home with 2 steps and no rail.  Bathroom on 1st level and bedroom on 2nd level.  Full flight of steps with rail to 2nd level.  Pt ambulated with SPC d/t vertigo.    RN came to room and stated physicians may want to hold off on OOB d/t hypotension.  RN went to discuss and came back to room.  Pt currently on hold d/t hypotension.  Discussed with pt and family member.  Will return tomorrow or as able.     Time in 1320  Time out 1330     Wilfrido Waldrop, PT, DPT   [id number]       
Physical Therapy    PT consult to evaluate/treat received and appreciated.  Pt chart reviewed and evaluation attempted.  Pt is currently on hold d/t unable to tolerate pain.  Pt yelling out throughout morning when at rest.  Will check back as able.  Thank you.        Wilfrido Waldrop, PT, DPT   [id number]       
Pt admitted to CVIC bed 3820. Vital signs stable.   
Pt groin sites oozing from dressings. Hematoma forming on R side. Pressure held. Surgical resident called to bedside. Sand Bag applied to site.  
Pt signed out of anesthesia. Vital signs stable  
Pt straight cathed for bladder scan of 358cc. 250cc of dark yellow urine returned, pt tolerated well. DTV [id number]  
Pt's bed alarm sounded and this RN plus multiple others immediately responded but upon arrival patient was already half way out of the bed with her knees on the ground. Pt assisted back into bed with alarm turned on.    At time of fall, pt had gripper socks and fall band on. Bed alarm was on and KENZIE camera was already initiated prior to incident. Marlen Chavez NP notified and stat CT head was ordered since pt is on eliquis.   
Reached out to Marlen Chavez NP regarding pt being restless, trying to get out of bed, and hard to redirect. Orders placed  
Resident notified Pt. daughter is in the room and has a few questions. Asked if able to come speak with her  
Spiritual Health History and Assessment/Progress Note  [facility]    (P) Initial Encounter, Spiritual/Emotional Needs,  ,  ,      Name: Grecia Wilhelm MRN: [id number]    Age: 79 y.o.     Sex: female   Language: English   Mandaeism: Presbyterian   Critical limb ischemia of both lower extremities (HCC)     Date: 2/24/2025                           Spiritual Assessment began in [facility]        Referral/Consult From: (P) Rounding   Encounter Overview/Reason: (P) Initial Encounter, Spiritual/Emotional Needs  Service Provided For: (P) Patient, Family    Josefina, Belief, Meaning:   Patient has beliefs or practices that help with coping during difficult times  Family/Friends have beliefs or practices that help with coping during difficult times      Importance and Influence:  Patient has spiritual/personal beliefs that influence decisions regarding their health  Family/Friends have spiritual/personal beliefs that influence decisions regarding the patient's health    Community:  Patient feels well-supported. Support system includes: Spouse/Partner and Children  Family/Friends feel well-supported. Support system includes: Parent/s and Extended family    Assessment and Plan of Care:     Patient Interventions include: Facilitated expression of thoughts and feelings, Engaged in theological reflection, and Affirmed coping skills/support systems  Family/Friends Interventions include: Facilitated expression of thoughts and feelings, Engaged in theological reflection, and Affirmed coping skills/support systems    Patient Plan of Care: Spiritual Care available upon further referral  Family/Friends Plan of Care: Spiritual Care available upon further referral    Electronically signed by Chaplain Sanket on 2/24/2025 at 6:42 PM    
Spiritual Health History and Assessment/Progress Note  [facility]    (P) Spiritual/Emotional Needs,  ,  ,      Name: Grecia Wilhelm MRN: [id number]    Age: 79 y.o.     Sex: female   Language: English   Restoration: Presbyterian   Critical limb ischemia of both lower extremities (HCC)     Date: 3/4/2025                           Spiritual Assessment began in [facility]        Referral/Consult From: (P) Rounding   Encounter Overview/Reason: (P) Spiritual/Emotional Needs  Service Provided For: (P) Patient and family together    Josefina, Belief, Meaning:   Patient identifies as spiritual  Family/Friends identify as spiritual      Importance and Influence:  Patient has spiritual/personal beliefs that influence decisions regarding their health  Family/Friends have spiritual/personal beliefs that influence decisions regarding the patient's health    Community:  Patient feels well-supported. Support system includes: Spouse/Partner, Children, and Friends  Family/Friends feel well-supported. Support system includes: Children and Friends    Assessment and Plan of Care:     Patient Interventions include: Facilitated expression of thoughts and feelings and Explored spiritual coping/struggle/distress  Family/Friends Interventions include: Facilitated expression of thoughts and feelings and Explored spiritual coping/struggle/distress    Patient Plan of Care: Spiritual Care available upon further referral  Family/Friends Plan of Care: Spiritual Care available upon further referral    Electronically signed by VANESA NAZARIO on 3/4/2025 at 2:41 PM   
VASCULAR SURGERY  DAILY PROGRESS NOTE    Patient's Name/Date of Birth: Grecia Wilhelm / 1945    Date: 2025     Chief Complaint   Patient presents with    Leg Pain     Transfer elza d/t bilateral ischemic legs- heparin gtt 18 u/hr on arrival        Subjective:  Improved mentation this AM. Still with some groin soreness.       Objective:  Last 24Hrs  Temp  Av.5 °F (36.9 °C)  Min: 97.5 °F (36.4 °C)  Max: 99.7 °F (37.6 °C)  Resp  Av.5  Min: 14  Max: 20  Pulse  Av.5  Min: 78  Max: 103  Systolic (24hrs), Av , Min:117 , Max:151     Diastolic (24hrs), Av, Min:54, Max:93    SpO2  Av.3 %  Min: 90 %  Max: 95 %    I/O last 3 completed shifts:  In: 780 [P.O.:780]  Out: 300 [Urine:300]      General: In no acute distress, alert to self and place  Cardiovascular: Warm throughout, no edema  Respiratory: no respiratory distress, equal chest rise  Abdomen: Soft, nondistended, nontender  Skin: no obvious rashes or lesions appreciated, no jaundice  Extremities: R femoral incision CDI, old drainage over incision but no active drainage. Stable hematoma and ecchymosis. L femoral incision CDI, old drainage over incision but no active drainage. compartments of her bilateral lower extremities soft/compressible.  Palpable 2+ DP/ 1+PT pulse on the left.  2+ DP, biphasic PT Sensation to light touch intact over bilateral lower extremities.       CBC  Recent Labs     25  1353 25  0555   WBC 12.7* 9.5   RBC 3.27* 3.23*   HGB 10.3* 10.1*   HCT 32.3* 32.0*   MCV 98.8 99.1   MCH 31.5 31.3   MCHC 31.9* 31.6*   RDW 16.6* 16.9*    350   MPV 9.0 9.0       CMP  Recent Labs     25  1014 25  0651 25  0555    138 139   K 3.7 4.6 3.8    104 103   CO2 22 20* 25   BUN 9 11 14   CREATININE 0.6 0.6 0.7   GLUCOSE 145* 145* 159*   CALCIUM 8.9 9.1 8.9         Assessment/Plan:    Patient Active Problem List   Diagnosis    Hypertension    Cerebral infarction (HCC)    DJD 
VASCULAR SURGERY  DAILY PROGRESS NOTE    Patient's Name/Date of Birth: Grecia Wilhelm / 1945    Date: 2025     Chief Complaint   Patient presents with    Leg Pain     Transfer elza d/t bilateral ischemic legs- heparin gtt 18 u/hr on arrival        Subjective:  No acute changes. Aquacel removed. No drainage      Objective:  Last 24Hrs  Temp  Av.9 °F (36.6 °C)  Min: 97.7 °F (36.5 °C)  Max: 98.2 °F (36.8 °C)  Resp  Av  Min: 16  Max: 37  Pulse  Av.5  Min: 72  Max: 116  Systolic (24hrs), Av , Min:102 , Max:149     Diastolic (24hrs), Av, Min:45, Max:88    SpO2  Av.1 %  Min: 93 %  Max: 99 %    I/O last 3 completed shifts:  In: 1322.3 [P.O.:240; I.V.:1082.3]  Out: 1515 [Urine:1515]      General: In no acute distress, alert and oriented x4  Cardiovascular: Warm throughout, no edema  Respiratory: no respiratory distress, equal chest rise  Abdomen: Soft, nondistended, nontender  Skin: no obvious rashes or lesions appreciated, no jaundice  Extremities: R femoral incision CDI, old drainage over incision but no active drainage. ABD placed overtop. Stable hematoma and ecchymosis. L femoral incision CDI, old drainage over incision but no active drainage. ABD placed overtop. compartments of her bilateral lower extremities soft/compressible.  Palpable 2+ DP/ 1+PT pulse on the left.  2+ DP, biphasic PT Sensation to light touch intact over bilateral lower extremities.       CBC  Recent Labs     25  0522 25  1849 25  0355 25  0605   WBC 12.4*  --  9.3 9.6   RBC 2.36*  --  2.66* 2.76*   HGB 7.3* 8.5* 8.3* 8.6*   HCT 22.6* 26.4* 25.4* 25.7*   MCV 95.8  --  95.5 93.1   MCH 30.9  --  31.2 31.2   MCHC 32.3  --  32.7 33.5   RDW 15.1*  --  15.3* 15.3*     --  143 171   MPV 9.8  --  9.9 9.4       CMP  Recent Labs     25  0522 25  0355 25  0605    136 137   K 4.1 3.8 3.9    105 104   CO2 23 18* 19*   BUN 13 12 9   CREATININE 0.7 0.6 
VASCULAR SURGERY  DAILY PROGRESS NOTE    Patient's Name/Date of Birth: Grecia Wilhelm / 1945    Date: March 3, 2025     Chief Complaint   Patient presents with    Leg Pain     Transfer elza d/t bilateral ischemic legs- heparin gtt 18 u/hr on arrival        Subjective:  Still with confusion. Reportedly had fall onto her knees. CT head obtained negative for acute intracranial injuries.      Objective:  Last 24Hrs  Temp  Av.2 °F (29.6 °C)  Min: 32.9 °F (0.5 °C)  Max: 98.9 °F (37.2 °C)  Resp  Av.4  Min: 18  Max: 20  Pulse  Av.3  Min: 73  Max: 109  Systolic (24hrs), Av , Min:124 , Max:160     Diastolic (24hrs), Av, Min:58, Max:82    SpO2  Av %  Min: 94 %  Max: 96 %    I/O last 3 completed shifts:  In: 720 [P.O.:720]  Out: 1200 [Urine:1200]      General: In no acute distress, alert to self and place  Cardiovascular: Warm throughout, no edema  Respiratory: no respiratory distress, equal chest rise  Abdomen: Soft, nondistended, nontender  Skin: no obvious rashes or lesions appreciated, no jaundice  Extremities: R femoral incision CDI, old drainage over incision but no active drainage. Stable hematoma and ecchymosis. L femoral incision CDI, old drainage over incision but no active drainage. compartments of her bilateral lower extremities soft/compressible.  Palpable 2+ DP/ 1+PT pulse on the left.  2+ DP, biphasic PT Sensation to light touch intact over bilateral lower extremities.       CBC  Recent Labs     25  0649   WBC 11.2   RBC 2.98*   HGB 9.2*   HCT 28.4*   MCV 95.3   MCH 30.9   MCHC 32.4   RDW 15.5*      MPV 9.2       CMP  Recent Labs     25  0649 25  1014    137   K 4.0 3.7    101   CO2 21* 22   BUN 10 9   CREATININE 0.6 0.6   GLUCOSE 157* 145*   CALCIUM 8.7 8.9         Assessment/Plan:    Patient Active Problem List   Diagnosis    Hypertension    Cerebral infarction (HCC)    DJD (degenerative joint disease)    Atrial fibrillation (HCC)    
Vascular Surgery Progress Note    Pt is being seen in f/u today regarding acute bilateral lower extremity ischemia    Subjective  Pt s/e.  Resting more comfortably this morning. Denies groin or lower extremity pain, numbness or weakness. Has not had a BM yet but passing flatus.    Current Medications:    sodium chloride 75 mL/hr at 02/25/25 0555    dextrose      sodium chloride        glucose, dextrose bolus **OR** dextrose bolus, glucagon (rDNA), dextrose, oxyCODONE, HYDROmorphone **OR** [DISCONTINUED] HYDROmorphone, fluticasone, sodium chloride flush, sodium chloride, ondansetron **OR** ondansetron, labetalol, hydrALAZINE    docusate sodium  100 mg Oral Daily    apixaban  5 mg Oral BID    insulin lispro  0-4 Units SubCUTAneous 4x Daily AC & HS    metoprolol  50 mg Oral BID    pravastatin  20 mg Oral Daily    niacin  500 mg Oral BID    therapeutic multivitamin-minerals  1 tablet Oral Daily    Vitamin D  1,000 Units Oral Daily    oyster shell calcium w/D  1 tablet Oral Daily    furosemide  20 mg Oral Daily    potassium chloride  10 mEq Oral BID    dilTIAZem  240 mg Oral Daily    pantoprazole  40 mg Oral BID AC    sodium chloride flush  5-40 mL IntraVENous 2 times per day    aspirin  81 mg Oral Daily      PHYSICAL EXAM:    BP (!) 151/62   Pulse 92   Temp 99.5 °F (37.5 °C) (Bladder)   Resp 17   SpO2 100%     Intake/Output Summary (Last 24 hours) at 2/25/2025 1049  Last data filed at 2/25/2025 0900  Gross per 24 hour   Intake 2212.36 ml   Output 2015 ml   Net 197.36 ml        Gen Awake, alert, [place]  [facility]S normal rate, irregular rhythm  Resp non labored  Abd soft, non tender  R LE Groin soft and nontender, dressing with some strikethrough-stable. Wrap in place. Swelling improved. Compartments soft. Multiphasic PT and DP. + motor and sensation  L LE  Groin soft and nontender. Dressing with some strikethrough- stable. 2+ PT and DP. No edema. + motor and sensation    LABS:    Lab Results   Component Value Date    WBC 12.4 
Vascular Surgery Progress Note    Pt is being seen in f/u today regarding acute bilateral lower extremity ischemia    Subjective  Pt s/e. Sitting up in a chair.  Much more alert today.  Still with some tenderness to the right groin and right leg weakness.  Family at the bedside.     Current Medications:    sodium chloride      dextrose      sodium chloride        melatonin, bisacodyl, sodium chloride, glucose, dextrose bolus **OR** dextrose bolus, glucagon (rDNA), dextrose, oxyCODONE, HYDROmorphone **OR** [DISCONTINUED] HYDROmorphone, fluticasone, sodium chloride flush, sodium chloride, ondansetron **OR** ondansetron, labetalol, hydrALAZINE    lactulose  20 g Oral Daily    metoprolol  100 mg Oral BID    docusate sodium  100 mg Oral Daily    apixaban  5 mg Oral BID    insulin lispro  0-4 Units SubCUTAneous 4x Daily AC & HS    pravastatin  20 mg Oral Daily    niacin  500 mg Oral BID    therapeutic multivitamin-minerals  1 tablet Oral Daily    Vitamin D  1,000 Units Oral Daily    oyster shell calcium w/D  1 tablet Oral Daily    furosemide  20 mg Oral Daily    potassium chloride  10 mEq Oral BID    dilTIAZem  240 mg Oral Daily    pantoprazole  40 mg Oral BID AC    sodium chloride flush  5-40 mL IntraVENous 2 times per day    aspirin  81 mg Oral Daily      PHYSICAL EXAM:    BP (!) 151/73   Pulse 98   Temp 98.4 °F (36.9 °C) (Oral)   Resp 18   Ht 1.6 m (5' 3\")   Wt 68.3 kg (150 lb 9.2 oz)   SpO2 94%   BMI 26.67 kg/m²     Intake/Output Summary (Last 24 hours) at 3/4/2025 0952  Last data filed at 3/4/2025 0638  Gross per 24 hour   Intake 540 ml   Output 0 ml   Net 540 ml        Gen Awake, alert, [place]  CVS normal rate, irregular rhythm  Resp non labored  Abd soft, non tender  R LE Groin soft, slight edema and tenderness, + ecchymosis.  Staples intact. 1+ DP, +motor and sensation  L LE  Groin soft and nontender.  1+ PT and DP. Staples intact. No edema. +motor and sensation    LABS:    Lab Results   Component Value Date 
      Assessment/Plan: POD #4    Acute bilateral lower extremity ischemia s/p Bilateral LE aortoiliac and distal thrombectomy   - No acute events overnight, labs reviewed, pain controlled, denies pain in legs or groins   - Diet  - Stop IVF, remove melgoza catheter   - PRN pain meds, IV dilaudid and po oxycodone   - Acute blood loss anemia following surgery, s/p one unit PRBC 2/25  - Permanent afib, on eliquis   - Cardiology following, TTE 2/23 without thrombus       Dispo: Likely transfer to step down unit today, discuss with VS     Electronically signed by KASSIE MONTOYA - CNP on 2/26/2025 at 10:20 AM   
  Diagnosis    Hypertension    Cerebral infarction (HCC)    DJD (degenerative joint disease)    Atrial fibrillation (HCC)    TIA (transient ischemic attack)    Warfarin-induced coagulopathy    Cerebral ischemia    Migraine headache with aura    Cerebral infarction (HCC)    Aortic stenosis, mild    Mitral regurgitation    Pulmonary hypertension (HCC)    Varicose veins of left leg with edema    Bleeding from varicose veins of right lower extremity    Hematoma of leg, right, initial encounter    Venous insufficiency (chronic) (peripheral)    Profound hearing loss    Cerebrovascular accident (CVA) due to embolic occlusion of left middle cerebral artery (HCC)    Severe aortic stenosis    Bilateral deafness    Hearing loss    Diabetes mellitus (HCC)    Iron deficiency anemia    S/P ear surgery    History of transient ischemic attack (TIA)    Critical limb ischemia of both lower extremities (HCC)    Aortic occlusion    Acute alteration in mental status    Acute lower limb ischemia       79 y.o. female with acute bilateral lower extremity ischemia concerning for embolic source s/p bilateral lower extremity cutdown/embolectomy 2/22    - continue Eliquis for permanent afib   - Will place alginate dressing over b/l fem incision, please ensure these stay in place   - Echo w no residual thrombus   - neurovascular exam   - diet as tolerated         Electronically signed on 2/28/2025 at 7:21 AM    Says legs feel great, she needs alginate over her groins    Dinh Barth MD    
  Does pt have pain? Severe BLE pain with all activity  Severe BLE pain with all activity     Bed Mobility  Rolling: NT  Supine to sit: Dep x2  Sit to supine: Dep x2  Scooting: Dep x2 NT -- OOB to chair with RN staff (two assist) Rolling: Min A  Supine to sit: Min A  Sit to supine: Min A  Scooting: Min A   Transfers Sit to stand: NT  Stand to sit: NT  Stand pivot: NT Sit to stand: Max A x2  Stand to sit: Max A x2  Stand pivot: NT Sit to stand: Min A  Stand to sit: Min A  Stand pivot: Min A with AAD   Ambulation    NT NT >50 feet with AAD Min A   Stair negotiation: ascended and descended  NT NT >2 steps with 1 rail Min A   ROM BUE:  Per OT eval   BLE:  Limited in all planes d/t pain      Strength BUE:  Per OT eval   BLE:  Limited in all planes d/t pain      Balance Sitting EOB:  Max A>mod A   Dynamic Standing:  NT Static standing: Max A x2  Sitting EOB:  SBA  Dynamic Standing:  Min A     Pt is A & O x 4  Sensation:  Pt denies numbness and tingling to extremities  Edema:  Unremarkable    Therapeutic Exercises:    BLE AAROM x 10 reps in chair   STS x 2 reps     Patient education  Pt educated on PT role, safety during functional mobility    Patient response to education:   Pt verbalized understanding Pt demonstrated skill Pt requires further education in this area   Yes  Yes  Reinforce      ASSESSMENT:    Conditions Requiring Skilled Therapeutic Intervention:    [x]Decreased strength     []Decreased ROM  [x]Decreased functional mobility  [x]Decreased balance   [x]Decreased endurance   []Decreased posture  []Decreased sensation  []Decreased coordination   []Decreased vision  []Decreased safety awareness   [x]Increased pain       Comments:  Pt received sitting in chair and agreeable to PT treatment with OT collaboration .  Pt cleared for participation by RN prior to session.  Vitals monitored during session.  Pt assisted with BLE AAROM in chair.  Still limited by increased pain in RLE compared to LLE.  Completed STS x2 
25.4 (L) 02/26/2025     02/26/2025    PROTIME 15.1 (H) 02/22/2025    INR 1.4 02/22/2025    APTT 178.8 (H) 02/23/2025    K 3.8 02/26/2025    BUN 12 02/26/2025    CREATININE 0.6 02/26/2025     A/P Acute limb ischemia s/p bilateral LE aortoiliac and distal thrombectomy   Pain and swelling of RLE remains improved  CK downtrending  Continue ace wrap and elevation  Cr stable 0.6  Good UOP  Monitor hgb - 8.3  No signs of active bleeding  S/P prbc transfusion 2/25  Eliquis for a fib  Monitor Hgb  Appreciate cardiology recommendations  PRN pain control    Alecia Jane PA-C      
CVA.no weakness/deficits    CHF (congestive heart failure) (HCC)      Critical limb ischemia of both lower extremities (HCC) 02/22/2025    Diabetes mellitus (HCC)      Hearing deficit       wears hearing aide left ear only    Heart disease      Hyperlipidemia      Hypertension      Iron deficiency anemia 10/23/2022    Migraine headache with aura      Mitral regurgitation 10/01/2015     mild    Moderate aortic stenosis by prior echocardiogram 2018    NCGS (non-celiac gluten sensitivity)      Severe aortic stenosis 09/25/2020     By 2D echo    TIA (transient ischemic attack)      Unspecified cerebral artery occlusion with cerebral infarction      Varicose veins of left leg with edema 06/08/2017    Venous stasis ulcer of right calf with fat layer exposed with varicose veins ([facility]) 09/13/2019    Warfarin-induced coagulopathy 09/29/2015             *Precautions:  Fall Risk, alarms, severe pain B LE's to light touch, very [place], SBP<160, vertigo     Assessment of current deficits   [x] Functional mobility          [x]ADLs           [x] Strength                  []Cognition   [x] Functional transfers        [x] IADLs         [x] Safety Awareness   [x]Endurance   [] Fine Coordination           [x] ROM           [] Vision/perception    [x]Sensation     [x]Gross Motor Coordination [x] Balance    [] Delirium                  [x]Motor Control     [x] Communication     OT PLAN OF CARE   OT POC based on physician orders, patient diagnosis and results of clinical assessment.        Frequency/Duration: 1-3 days/wk for 1-2 weeks PRN    Specific OT Treatment to include:   ADL retraining/adapted techniques and AE recommendations to increase functional independence within precautions                    Energy conservation techniques to improve tolerance for selfcare routine   Functional transfer/mobility training/DME recommendations for increased independence, safety and fall prevention         Patient/family education to increase safety 
Date    WBC 11.2 03/01/2025    HGB 9.2 (L) 03/01/2025    HCT 28.4 (L) 03/01/2025     03/01/2025    PROTIME 15.1 (H) 02/22/2025    INR 1.4 02/22/2025    APTT 178.8 (H) 02/23/2025    K 4.6 03/03/2025    BUN 11 03/03/2025    CREATININE 0.6 03/03/2025     A/P Acute limb ischemia s/p bilateral LE aortoiliac and distal thrombectomy 2/22  Having some R groin pain and swelling  Continue elevation  Monitor neurovascular exam  Cr stable 0.6  Urology following for urinary retention  Monitor hgb - CBC pending  Remains on Eliquis for afib  Monitor Hgb  PRN pain control  Discharge planning    Mark Abdi, APRN - CNP    
Hypertension    Cerebral infarction (HCC)    DJD (degenerative joint disease)    Atrial fibrillation (HCC)    TIA (transient ischemic attack)    Warfarin-induced coagulopathy    Cerebral ischemia    Migraine headache with aura    Cerebral infarction (HCC)    Aortic stenosis, mild    Mitral regurgitation    Pulmonary hypertension (HCC)    Varicose veins of left leg with edema    Bleeding from varicose veins of right lower extremity    Hematoma of leg, right, initial encounter    Venous insufficiency (chronic) (peripheral)    Profound hearing loss    Cerebrovascular accident (CVA) due to embolic occlusion of left middle cerebral artery (HCC)    Severe aortic stenosis    Bilateral deafness    Hearing loss    Diabetes mellitus (HCC)    Iron deficiency anemia    S/P ear surgery    History of transient ischemic attack (TIA)    Critical limb ischemia of both lower extremities (HCC)    Aortic occlusion    Acute alteration in mental status    Acute lower limb ischemia       79 y.o. female with acute bilateral lower extremity ischemia concerning for embolic source s/p bilateral lower extremity cutdown/embolectomy 2/22    - continue Eliquis for permanent afib   - Continue alginate dressing over b/l fem incision  - Echo w no residual thrombus   - neurovascular exam   - diet as tolerated   - Stop dilaudid   - Uro following for retention- melgoza   - PT/OT 6/24    Electronically signed on 3/2/2025 at 5:13 AM      
aspirin  81 mg Oral Daily       IV Infusions (if any):   sodium chloride 75 mL/hr at 02/24/25 2000    dextrose      sodium chloride         DIAGNOSTIC/ LABORATORY DATA:  Labs:   CBC:   Recent Labs     02/23/25  0501 02/24/25  0406   WBC 12.4* 12.6*   HGB 9.9* 8.0*   HCT 30.5* 24.1*    146     BMP:   Recent Labs     02/23/25  0501 02/24/25  0406    136   K 4.3 4.0   CO2 21* 23   BUN 17 17   CREATININE 0.7 0.7   LABGLOM 83 83   CALCIUM 8.3* 8.0*     Mag:   Recent Labs     02/23/25  0501 02/24/25  0406   MG 1.9 1.9     Phos:   Recent Labs     02/23/25  0501 02/24/25  0406   PHOS 3.4 1.9*     TFT:   Lab Results   Component Value Date    TSH 1.490 10/22/2022    T4FREE 1.31 10/22/2022      HgA1c:   Lab Results   Component Value Date    LABA1C 6.0 (H) 02/23/2025     No results found for: \"EAG\"    BNP: No results for input(s): \"BNP\" in the last 72 hours.  PT/INR:   Recent Labs     02/22/25  0418   PROTIME 15.1*   INR 1.4     APTT:  Recent Labs     02/22/25  1401 02/23/25  0043   APTT 134.2* 178.8*     CARDIAC ENZYMES:  Recent Labs     02/22/25  0418 02/22/25  0608 02/22/25  1401 02/24/25  1023 02/24/25  1518 02/24/25  2041   CKTOTAL 36  --    < > 1,622* 1,770* 1,937*   TROPHS 15* 24*  --   --   --   --     < > = values in this interval not displayed.     FASTING LIPID PANEL:  Lab Results   Component Value Date/Time    CHOL 133 10/22/2022 01:19 AM    HDL 42 10/22/2022 01:19 AM    TRIG 69 10/22/2022 01:19 AM     LIVER PROFILE:  Recent Labs     02/23/25  0501 02/24/25  0406   AST 40* 52*   ALT 16 15         Pertinent imaging studies:    -TTE 2/13/2024:  CONCLUSIONS:   - Exam indication: s/p TAVR (01/08/2024)   - The left ventricle is normal in size. There is mild left ventricular   hypertrophy. Left ventricular systolic function is normal. EF = 62 ± 5% (2D biplane)   - The right ventricle is normal in size. Right ventricular systolic function is normal.   - The left atrial cavity is severely dilated.   - The right 
complaints   - Frequent neurovascular checks   - Ongoing incision checks for signs of swelling/hematoma   - Diet  - Continue IVF, trending CK q6hrs slightly up from previous draw  - PRN pain meds, IV dilaudid and po oxycodone   - Acute blood loss anemia following surgery, hemoglobin 8.0 this AM, monitor   - Permanent afib, now on eliquis  - Cardiology following, TTE done 2/23  - Family at beside     Dispo: CVICU    Electronically signed by KASSIE Calixto - CNP on 2/24/2025 at 9:40 AM   
discussed at length with daughter at bedside      Code Status: Full code  Consultants: Vascular, SICU team, cardiology  DVT Prophylaxis   PT/OT  Discharge planning            Courtney Bauer MD  4:28 PM  2/24/2025  
lower extremity ischemia s/p Bilateral LE aortoiliac and distal thrombectomy   - S/E, No acute events overnight, labs reviewed, seems much better today, pain better controlled, No complaints of pain in legs or groins this morning   - Diet  - Continue IVF, trending CK q6hrs   - PRN pain meds, IV dilaudid and po oxycodone   - Acute blood loss anemia following surgery, hemoglobin 7.3 this AM likely IVF contributing, no s/s of active bleeding--monitor   - Permanent afib, now on eliquis  - Cardiology following, TTE done 2/23    Dispo: CVICU    Electronically signed by KASSIE Calixto - CNP on 2/25/2025 at 9:21 AM   
pain? Severe BLE pain with all activity      Bed Mobility  Rolling: NT  Supine to sit: Dep x2  Sit to supine: Dep x2  Scooting: Dep x2  Rolling: Min A  Supine to sit: Min A  Sit to supine: Min A  Scooting: Min A   Transfers Sit to stand: NT  Stand to sit: NT  Stand pivot: NT  Sit to stand: Min A  Stand to sit: Min A  Stand pivot: Min A with AAD   Ambulation    NT  >50 feet with AAD Min A   Stair negotiation: ascended and descended  NT  >2 steps with 1 rail Min A   ROM BUE:  Per OT eval   BLE:  Limited in all planes d/t pain      Strength BUE:  Per OT eval   BLE:  Limited in all planes d/t pain      Balance Sitting EOB:  Max A>mod A   Dynamic Standing:  NT  Sitting EOB:  SBA  Dynamic Standing:  Min A     Pt is A & O x 4  RASS:  0  CAM-ICU:  NT  Sensation:  Pt denies numbness and tingling to extremities  Edema:  Unremarkable    Vitals:   HR at rest: 89 bpm HR at end of session: 83 bpm   Spo2 at rest:99% Spo2 at end of session 93%   BP at rest:111/58 mmHg BP at end of session 131/65 mmHg       Therapeutic Exercises:    BLE PROM within tolerance     Patient education  Pt educated on PT role, safety during functional mobility    Patient response to education:   Pt verbalized understanding Pt demonstrated skill Pt requires further education in this area   Yes  Yes  Reinforce      ASSESSMENT:    Conditions Requiring Skilled Therapeutic Intervention:    [x]Decreased strength     []Decreased ROM  [x]Decreased functional mobility  [x]Decreased balance   [x]Decreased endurance   []Decreased posture  []Decreased sensation  []Decreased coordination   []Decreased vision  []Decreased safety awareness   [x]Increased pain       Comments:  Pt received supine and agreeable to PT evaluation with OT collaboration .  Pt cleared for participation by RN prior to session.  Vitals monitored during session.  Pt limited by pain in BLE R>L.  Pt assisted with BLE and trunk during supine<>sit.  Demonstrates posterior lean at EOB.  Improved with 
today right lower extremity on my evaluation after being moved and turned in bed  Overall blood pressure is adequately controlled  Remains on blood thinners  Continue multimodal pain control  Full PT OT evaluation as able  Case discussed with Dr. HERRING  H&H 7.3/22.6, transfuse if drops any further-acute drop from 13/40 at the time of admission  Remains on Eliquis 5 mg p.o. twice daily along with 81 mg aspirin  Continue to monitor H&H closely    Code Status: Full code  Consultants: Vascular, SICU team, cardiology  DVT Prophylaxis   PT/OT  Discharge planning            Courtney Bauer MD  6:43 PM  2/25/2025  
today-requiring IV Dilaudid- ? Showering emboli still  Await plans by vascular   Case discussed at length with daughter at bedside    2/25/2025  In pain today right lower extremity on my evaluation after being moved and turned in bed  Overall blood pressure is adequately controlled  Remains on blood thinners  Continue multimodal pain control  Full PT OT evaluation as able  Case discussed with Dr. HERRING  H&H 7.3/22.6, transfuse if drops any further-acute drop from 13/40 at the time of admission  Remains on Eliquis 5 mg p.o. twice daily along with 81 mg aspirin  Continue to monitor H&H closely    2/26/2025  No acute events or issues overnight  Appears more comfortable today  Rhabdo noted with CK total of 1200, trending down to 615 most recently  Continue oral anticoagulation at discretion of vascular team  Ongoing multimodal pain control  H&H improved after PRBC transfusion 2/25/2025 and seems to have remained around 8/24 today  PT OT evaluation when able  Hold pravastatin while elevated CK totals    2/27/2025  No acute events or issues overnight  Laying comfortably in bed  PT/OT 6/24-Will require rehab  Remains on p.o. Eliquis 5 twice daily with adequate maintenance of hemoglobin x 2 days now  Discharge plan when okay with vascular service    Code Status: Full code  Consultants: Vascular, SICU team, cardiology  DVT Prophylaxis   PT/OT  Discharge planning            Courtney Bauer MD  3:52 PM  2/27/2025  
2/26/2025 at 7:49 AM      
93-94% on RA with all activity. Pt was left in bed with all needs met at conclusion of session. Daughter present.    Treatment:  Patient practiced and was instructed in the following treatment:    Therapeutic activities:  Bed mobility: Cues for technique during bed mobility transfers.  Transfers: Cues for hand placement during sit <> stand transfers. Pt completed x2 transfers from EOB.  Ambulation: Cues for WW sequencing and technique when sidestepping at EOB.  Sitting EOB for 10+ minutes to improve upright tolerance.  Vitals and symptoms were closely monitored throughout session.    PLAN:    Patient is making Good progress towards established goals. Will continue with current POC.      Time in: 1325  Time out: 1350    Total Treatment Time 25 minutes     CPT codes:  [] Gait training 30908 0 minutes  [] Manual therapy 72038 0 minutes  [x] Therapeutic activities 85370 25 minutes  [] Therapeutic exercises 81870 0 minutes  [] Neuromuscular reeducation 51775 0 minutes      Jagdeep Jennings, PT, DPT   [id number]    
AM      Pt seen and exanined  Severe R calf pain - suspect some tightness related to reperfusion  Ace wrap, elevation  Disc with pt and family may need fasciotomy  will nonitor    If not improving will take to surgery    Reevaluate later in day and pain improved, rom imprlved  No plans for OR at this time  Ganesh Huerta MD    
dorsifleion  Stable vascular exam  Hgb decreased but no signs of active bleeding =- will transufse as mild hypotension    Ganesh Huerta MD      
sites.  -Creatinine stable at 0.7 this a.m.  Hemoglobin noted to downtrend to 9.9.  Continue to trend H/H and transfuse as necessary.  - Discussed with Dr. Deanna Brooks DO  General Surgery Resident, PGY-3    Electronically signed on 2/23/2025 at 7:38 AM    Pt seen and examined  Some oozing from R groin  Hgb decreased  Stop heparin drip  Eliquis  Increase activity, pt ot  Cr stable  No evidence of compartment syndrome  R AT 1+, DP biphasic, PT weakly biphasic  L DP 1+, PT 2+    Ganesh Huerta MD    
demonstrating G safety      Balance Sitting:     Static:    Max A to ModA/posterior lean    Dynamic:Max A  Standing: NT Sitting:     Static:    SBA    Dynamic:  Min A  Standing: Min A  Using ww    SBA dynamic sitting balance; Min A dynamic standing balance  during ADL tasks & transfers   Endurance/  Activity Tolerance    poor tolerance with light EOB activity.   Pt limited by severe pain (RN aware) Fair tolerance with light  activity    HR 97-127bpm with activity G   tolerance with moderate activity/self care routine   Visual/  Perceptual Impaired: grossly WFL                                  Comments: Upon arrival pt supine in bed. ADL retraining to increase independence in dressing, grooming and toileting tasks, balance and trf training to increase participation in functional mobility and standing aspects of ADLs with increased safety. Pt educated on modified techniques to increase independence and safety during ADLs, bed mobility, and functional transfers. Pt would benefit from continued skilled OT to increase safety and independence with completion of ADL/IADL tasks for functional independence and quality of life.    At end of session pt left seated in bedside chair, call light within reach, sitter present.     Pt has made fair- progress towards set goals.     Continue with current plan of care    Treatment Time In:0815          Treatment Time Out: 0840             Treatment Charges: Mins Units   Ther Ex  94865     Manual Therapy 28646     Thera Activities 37222 15 1   ADL/Home Mgt 10814 10 1   Neuro Re-ed 55373     Group Therapy      Orthotic manage/training  35609     Non-Billable Time     Total Timed Treatment 25 2     [facility] ESTRADA/L 32159   
would likely need to be transitioned to NOAC versus Watchman device implantation.  I discussed with patient and her family the options and they would like to think about the options.  Continue heparin drip for now with bridging to NOAC  Refer patient to me after discharge for Watchman device implantation consideration  Can consider MIKE for further assessment of KEYONNA and if there is residual thrombus  Continue metoprolol 50 mg twice daily  Severe AS s/p TAVR with 23 mm Merrill Doris valve at [facility]  1/8/24  TTE with mean gradient of 14 mmHg and moderate regurgitation  Continue aspirin  Nonobstructive CAD:    Continue pravastatin 20 mg daily      Tejinder Guzman MD  Interventional Cardiology/ Structural heart disease    I spent a total of 37 minutes of critical care time on the date of the service which included preparing to see the patient, face-to-face patient care, completing clinical documentation, obtaining and/or reviewing separately obtained history, performing a medically appropriate examination, counseling and educating the patient/family/caregiver, and ordering medications, tests, or procedures.

## 2025-03-04 NOTE — CARE COORDINATION
Transition of care update: S/P bilateral lower extremity cutdown/embolectomy on 02/22. Plan is efrain at [facility]. [facility] has auth and can accept pt this afternoon if pt remains sitter free. Per Yasmin with [facility], their facility van-wheelchair can pick pt up at 2pm and family member/friend can ride with pt. Met with pt and visitors in room. Visitors are friends with pt. Marline who is one of the visitors said she wants to ride with pt to [facility]. [facility] and transport envelope is with pt's soft chart. Pt's nurse was notified of  time.     1248  Spoke with pt's , Feng, and son, Eddie, and informed them of  time to [facility]. Feng was agreeable for Marline to ride with pt to [facility].

## 2025-03-04 NOTE — PATIENT CARE CONFERENCE
P Quality Flow/Interdisciplinary Rounds Progress Note        Quality Flow Rounds held on March 4, 2025    Disciplines Attending:  Bedside Nurse, , , and Nursing Unit Leadership    Grecia Wilhelm was admitted on 2/22/2025  8:30 AM    Anticipated Discharge Date:       Disposition:    Moody Score:  Moody Scale Score: 15    [facility] RISK OF UNPLANNED READMISSION 2.0             18 Total Score        Discussed patient goal for the day, patient clinical progression, and barriers to discharge.  The following Goal(s) of the Day/Commitment(s) have been identified:  discharge planning       Riri Quezada RN  March 4, 2025

## 2025-03-04 NOTE — DISCHARGE SUMMARY
carbonate-vitamin D3 600-400 MG-UNIT Tabs per tab  Commonly known as: CALTRATE     dilTIAZem 240 MG extended release capsule  Commonly known as: CARDIZEM CD     fluticasone 50 MCG/ACT nasal spray  Commonly known as: FLONASE  1 spray by Nasal route 2 times daily 2 sprays in each nostril  Twice a day     furosemide 20 MG tablet  Commonly known as: LASIX     metFORMIN 500 MG tablet  Commonly known as: GLUCOPHAGE     niacin 500 MG extended release tablet  Commonly known as: SLO-NIACIN     omeprazole 20 MG delayed release capsule  Commonly known as: PRILOSEC     potassium chloride 10 MEQ extended release tablet  Commonly known as: KLOR-CON     pravastatin 40 MG tablet  Commonly known as: PRAVACHOL     therapeutic multivitamin-minerals tablet     Vitamin D 1000 units Caps capsule  Commonly known as: CHOLECALCIFEROL            STOP taking these medications      CINNAMON PLUS CHROMIUM PO     warfarin 1 MG tablet  Commonly known as: COUMADIN               Where to Get Your Medications        These medications were sent to [facility] [address] [facility] [phone] -  [phone] [address] [facility] [address]      Phone: [phone]   apixaban 5 MG Tabs tablet  metoprolol 100 MG tablet       Activity: activity as tolerated  Diet: cardiac diet and diabetic diet    Pt has been advised to:    Follow-up with Bismark Beauchamp MD in 1 week.  Follow-up with consultants as recommended by them      Signed:  KASSIE Ellis CNP  3/4/2025  12:21 PM    Above note edited to reflect my thoughts     I personally provided care for the patient. Radiographs, labs and medication list were reviewed by me independently.  The case was discussed in detail and plans for care were established. Review of Barbara EDEN CNP, documentation was conducted and revisions were made as appropriate directly by me. I agree with the above documented exam, problem list, and plan of care.     Courtney Bauer MD

## 2025-03-05 ENCOUNTER — TELEPHONE (OUTPATIENT)
Dept: ADMINISTRATIVE | Age: 80
End: 2025-03-05

## 2025-03-05 NOTE — TELEPHONE ENCOUNTER
[facility] is calling to make a HFU with any available Neurologist.    She was only seen by Dr. Forbes in the hospital.

## 2025-03-06 ENCOUNTER — TELEPHONE (OUTPATIENT)
Age: 80
End: 2025-03-06

## 2025-03-06 NOTE — TELEPHONE ENCOUNTER
Haylee appointment scheduled with Kenia at [facility] for patient with Dr. Guzman on 3/18/25, 1100.

## 2025-03-10 ENCOUNTER — OFFICE VISIT (OUTPATIENT)
Dept: VASCULAR SURGERY | Age: 80
End: 2025-03-10

## 2025-03-10 ENCOUNTER — TELEPHONE (OUTPATIENT)
Dept: NEUROLOGY | Age: 80
End: 2025-03-10

## 2025-03-10 DIAGNOSIS — I99.8 ACUTE LOWER LIMB ISCHEMIA: Primary | ICD-10-CM

## 2025-03-10 DIAGNOSIS — I73.9 PVD (PERIPHERAL VASCULAR DISEASE): ICD-10-CM

## 2025-03-10 PROCEDURE — 99024 POSTOP FOLLOW-UP VISIT: CPT | Performed by: SURGERY

## 2025-03-10 NOTE — TELEPHONE ENCOUNTER
Xuan from [facility] called in to [facility] a hosp f/u. Xuan can be reached at [phone]

## 2025-03-10 NOTE — PROGRESS NOTES
3/10/2025    Grecia Wilhelm  1945    Previous Procedures  2/22/25 Bilateral aortoiliac and distal thrombectomy       Patient returns for post operative evaluation.  It was done for acute bilateral lower limb ischemia and currently patient has had improvement in this issue.  She is at a facility working with therapy.  She feels her right leg is still weak but she is able to move it somewhat better.  The patient denies any unexpected problems since the procedure.     Physical Exam:    Gen Awake and Alert  [facility] RRR    Right LE   - The femoral incision site is soft without evidence of infection or hematoma, staples removed  Left LE   -  The femoral incision site is soft without evidence of infection or hematoma, staples removed        Right      Left   Femoral 1+ 1+     Dorsalis Pedis 2+ 2+   Posterior Tibial 1+ 1+     A/P Acute limb ischemia s/p bilateral LE aortoiliac and distal thrombectomy   Continue Medical management with ASA and statin  Discussed with pt tobacco use and significant relationship to PVD and potential to cause increased problems post intervention   pt currently is not a smoker  Walking Program  Emphasized importance of foot care, and to call if develops any wounds or ulcerations, changes in appearance or symptoms  I reviewed with the patient that normal activities can be resumed as tolerated  Return in 6 weeks for follow-up office visit  Arterial studies ordered    Continue eliquis at this time.  Pt advised to make an appointment at the eliquis clinic  She may need to be temporarily started on Xarelto if she is unable to get an appointment with the eliquis clinic due to cost  Pt is at a facility now and has a one month free voucher for eliquis once she leaves the facility  Will review this again with her at her 6 week follow up    Pt seen and plan reviewed with Dr. Deanna Abdi, APRN - CNP

## 2025-03-11 ENCOUNTER — TELEPHONE (OUTPATIENT)
Dept: VASCULAR SURGERY | Age: 80
End: 2025-03-11

## 2025-03-11 PROBLEM — I73.9 PVD (PERIPHERAL VASCULAR DISEASE): Status: ACTIVE | Noted: 2025-03-11

## 2025-03-11 NOTE — TELEPHONE ENCOUNTER
The reason for consult was concern for stroke, hx of CVA, sub-therapeutic INR s/p bilateral lower ex thrmobectomy, permanent AF was on warfarin.

## 2025-03-11 NOTE — TELEPHONE ENCOUNTER
I called Grecia and left a message . I scheduled her Ultra Sound appointment for [facility] at 10:00 am. To register , for a 10:30 Ultra Sound appointment .She can use the main Entrance A . She is to bring her list of medications, insurance cards, and her photo Id. With her. I rescheduled her appointment with Dr. Huerta. Her new appointment is for May 19, 2025 , at 1:45 pm. He would like  to see her after the Ultra Sound is done.

## 2025-03-14 ENCOUNTER — HOSPITAL ENCOUNTER (INPATIENT)
Age: 80
LOS: 11 days | Discharge: HOME | DRG: 872 | End: 2025-03-25
Attending: EMERGENCY MEDICINE | Admitting: INTERNAL MEDICINE
Payer: MEDICARE

## 2025-03-14 ENCOUNTER — APPOINTMENT (OUTPATIENT)
Dept: CT IMAGING | Age: 80
DRG: 872 | End: 2025-03-14
Attending: EMERGENCY MEDICINE
Payer: MEDICARE

## 2025-03-14 ENCOUNTER — APPOINTMENT (OUTPATIENT)
Dept: GENERAL RADIOLOGY | Age: 80
DRG: 872 | End: 2025-03-14
Payer: MEDICARE

## 2025-03-14 DIAGNOSIS — R41.82 ALTERED MENTAL STATUS, UNSPECIFIED ALTERED MENTAL STATUS TYPE: ICD-10-CM

## 2025-03-14 DIAGNOSIS — N39.0 URINARY TRACT INFECTION WITHOUT HEMATURIA, SITE UNSPECIFIED: Primary | ICD-10-CM

## 2025-03-14 PROBLEM — A41.9 SEPSIS (HCC): Status: ACTIVE | Noted: 2025-03-14

## 2025-03-14 LAB
ALBUMIN SERPL-MCNC: 3.4 G/DL (ref 3.5–5.2)
ALP SERPL-CCNC: 95 U/L (ref 35–104)
ALT SERPL-CCNC: 10 U/L (ref 0–32)
ANION GAP SERPL CALCULATED.3IONS-SCNC: 16 MMOL/L (ref 7–16)
AST SERPL-CCNC: 20 U/L (ref 0–31)
B PARAP IS1001 DNA NPH QL NAA+NON-PROBE: NOT DETECTED
B PERT DNA SPEC QL NAA+PROBE: NOT DETECTED
BACTERIA URNS QL MICRO: ABNORMAL
BASOPHILS # BLD: 0 K/UL (ref 0–0.2)
BASOPHILS NFR BLD: 0 % (ref 0–2)
BILIRUB SERPL-MCNC: 0.9 MG/DL (ref 0–1.2)
BILIRUB UR QL STRIP: NEGATIVE
BUN SERPL-MCNC: 15 MG/DL (ref 6–23)
C PNEUM DNA NPH QL NAA+NON-PROBE: NOT DETECTED
CALCIUM SERPL-MCNC: 9 MG/DL (ref 8.6–10.2)
CHLORIDE SERPL-SCNC: 97 MMOL/L (ref 98–107)
CLARITY UR: CLEAR
CO2 SERPL-SCNC: 19 MMOL/L (ref 22–29)
COLOR UR: YELLOW
CREAT SERPL-MCNC: 1 MG/DL (ref 0.5–1)
EKG ATRIAL RATE: 122 BPM
EKG Q-T INTERVAL: 326 MS
EKG QRS DURATION: 86 MS
EKG QTC CALCULATION (BAZETT): 456 MS
EKG R AXIS: 40 DEGREES
EKG T AXIS: 49 DEGREES
EKG VENTRICULAR RATE: 118 BPM
EOSINOPHIL # BLD: 0 K/UL (ref 0.05–0.5)
EOSINOPHILS RELATIVE PERCENT: 0 % (ref 0–6)
EPI CELLS #/AREA URNS HPF: ABNORMAL /HPF
ERYTHROCYTE [DISTWIDTH] IN BLOOD BY AUTOMATED COUNT: 17.7 % (ref 11.5–15)
FLUAV RNA NPH QL NAA+NON-PROBE: NOT DETECTED
FLUBV RNA NPH QL NAA+NON-PROBE: NOT DETECTED
GFR, ESTIMATED: 62 ML/MIN/1.73M2
GLUCOSE SERPL-MCNC: 168 MG/DL (ref 74–99)
GLUCOSE UR STRIP-MCNC: NEGATIVE MG/DL
HADV DNA NPH QL NAA+NON-PROBE: NOT DETECTED
HCOV 229E RNA NPH QL NAA+NON-PROBE: NOT DETECTED
HCOV HKU1 RNA NPH QL NAA+NON-PROBE: NOT DETECTED
HCOV NL63 RNA NPH QL NAA+NON-PROBE: NOT DETECTED
HCOV OC43 RNA NPH QL NAA+NON-PROBE: NOT DETECTED
HCT VFR BLD AUTO: 31.7 % (ref 34–48)
HGB BLD-MCNC: 10.2 G/DL (ref 11.5–15.5)
HGB UR QL STRIP.AUTO: ABNORMAL
HMPV RNA NPH QL NAA+NON-PROBE: NOT DETECTED
HPIV1 RNA NPH QL NAA+NON-PROBE: NOT DETECTED
HPIV2 RNA NPH QL NAA+NON-PROBE: NOT DETECTED
HPIV3 RNA NPH QL NAA+NON-PROBE: NOT DETECTED
HPIV4 RNA NPH QL NAA+NON-PROBE: NOT DETECTED
KETONES UR STRIP-MCNC: ABNORMAL MG/DL
LACTATE BLDV-SCNC: 1.3 MMOL/L (ref 0.5–1.9)
LACTATE BLDV-SCNC: 1.3 MMOL/L (ref 0.5–1.9)
LEUKOCYTE ESTERASE UR QL STRIP: ABNORMAL
LYMPHOCYTES NFR BLD: 0.38 K/UL (ref 1.5–4)
LYMPHOCYTES RELATIVE PERCENT: 2 % (ref 20–42)
M PNEUMO DNA NPH QL NAA+NON-PROBE: NOT DETECTED
MCH RBC QN AUTO: 32 PG (ref 26–35)
MCHC RBC AUTO-ENTMCNC: 32.2 G/DL (ref 32–34.5)
MCV RBC AUTO: 99.4 FL (ref 80–99.9)
MONOCYTES NFR BLD: 1.32 K/UL (ref 0.1–0.95)
MONOCYTES NFR BLD: 6 % (ref 2–12)
NEUTROPHILS NFR BLD: 92 % (ref 43–80)
NEUTS SEG NFR BLD: 20 K/UL (ref 1.8–7.3)
NITRITE UR QL STRIP: NEGATIVE
PH UR STRIP: 6 [PH] (ref 5–8)
PLATELET # BLD AUTO: 255 K/UL (ref 130–450)
PMV BLD AUTO: 8.9 FL (ref 7–12)
POTASSIUM SERPL-SCNC: 4.2 MMOL/L (ref 3.5–5)
PROT SERPL-MCNC: 6.2 G/DL (ref 6.4–8.3)
PROT UR STRIP-MCNC: NEGATIVE MG/DL
RBC # BLD AUTO: 3.19 M/UL (ref 3.5–5.5)
RBC # BLD: ABNORMAL 10*6/UL
RBC # BLD: ABNORMAL 10*6/UL
RBC #/AREA URNS HPF: ABNORMAL /HPF
RSV RNA NPH QL NAA+NON-PROBE: NOT DETECTED
RV+EV RNA NPH QL NAA+NON-PROBE: NOT DETECTED
SARS-COV-2 RNA NPH QL NAA+NON-PROBE: NOT DETECTED
SODIUM SERPL-SCNC: 132 MMOL/L (ref 132–146)
SP GR UR STRIP: <1.005 (ref 1–1.03)
SPECIMEN DESCRIPTION: NORMAL
TROPONIN I SERPL HS-MCNC: 36 NG/L (ref 0–9)
TROPONIN I SERPL HS-MCNC: 39 NG/L (ref 0–9)
UROBILINOGEN UR STRIP-ACNC: 0.2 EU/DL (ref 0–1)
WBC #/AREA URNS HPF: ABNORMAL /HPF
WBC OTHER # BLD: 21.7 K/UL (ref 4.5–11.5)

## 2025-03-14 PROCEDURE — 2580000003 HC RX 258: Performed by: EMERGENCY MEDICINE

## 2025-03-14 PROCEDURE — 6360000002 HC RX W HCPCS: Performed by: EMERGENCY MEDICINE

## 2025-03-14 PROCEDURE — 93010 ELECTROCARDIOGRAM REPORT: CPT | Performed by: INTERNAL MEDICINE

## 2025-03-14 PROCEDURE — 87077 CULTURE AEROBIC IDENTIFY: CPT

## 2025-03-14 PROCEDURE — 93005 ELECTROCARDIOGRAM TRACING: CPT | Performed by: EMERGENCY MEDICINE

## 2025-03-14 PROCEDURE — 87086 URINE CULTURE/COLONY COUNT: CPT

## 2025-03-14 PROCEDURE — 83605 ASSAY OF LACTIC ACID: CPT

## 2025-03-14 PROCEDURE — 85025 COMPLETE CBC W/AUTO DIFF WBC: CPT

## 2025-03-14 PROCEDURE — 96374 THER/PROPH/DIAG INJ IV PUSH: CPT

## 2025-03-14 PROCEDURE — 0202U NFCT DS 22 TRGT SARS-COV-2: CPT

## 2025-03-14 PROCEDURE — 2140000000 HC CCU INTERMEDIATE R&B

## 2025-03-14 PROCEDURE — 87040 BLOOD CULTURE FOR BACTERIA: CPT

## 2025-03-14 PROCEDURE — 80053 COMPREHEN METABOLIC PANEL: CPT

## 2025-03-14 PROCEDURE — 71045 X-RAY EXAM CHEST 1 VIEW: CPT

## 2025-03-14 PROCEDURE — 81001 URINALYSIS AUTO W/SCOPE: CPT

## 2025-03-14 PROCEDURE — 2500000003 HC RX 250 WO HCPCS: Performed by: EMERGENCY MEDICINE

## 2025-03-14 PROCEDURE — 84484 ASSAY OF TROPONIN QUANT: CPT

## 2025-03-14 PROCEDURE — 70450 CT HEAD/BRAIN W/O DYE: CPT

## 2025-03-14 PROCEDURE — 99285 EMERGENCY DEPT VISIT HI MDM: CPT

## 2025-03-14 RX ORDER — 0.9 % SODIUM CHLORIDE 0.9 %
1000 INTRAVENOUS SOLUTION INTRAVENOUS ONCE
Status: COMPLETED | OUTPATIENT
Start: 2025-03-14 | End: 2025-03-15

## 2025-03-14 RX ORDER — 0.9 % SODIUM CHLORIDE 0.9 %
1000 INTRAVENOUS SOLUTION INTRAVENOUS ONCE
Status: COMPLETED | OUTPATIENT
Start: 2025-03-14 | End: 2025-03-14

## 2025-03-14 RX ORDER — DEXTROSE MONOHYDRATE 100 MG/ML
INJECTION, SOLUTION INTRAVENOUS CONTINUOUS PRN
Status: CANCELLED | OUTPATIENT
Start: 2025-03-14

## 2025-03-14 RX ADMIN — SODIUM CHLORIDE 1000 ML: 0.9 INJECTION, SOLUTION INTRAVENOUS at 20:30

## 2025-03-14 RX ADMIN — WATER 1000 MG: 1 INJECTION INTRAMUSCULAR; INTRAVENOUS; SUBCUTANEOUS at 20:26

## 2025-03-14 RX ADMIN — SODIUM CHLORIDE 1000 ML: 9 INJECTION, SOLUTION INTRAVENOUS at 13:46

## 2025-03-14 ASSESSMENT — LIFESTYLE VARIABLES
HOW OFTEN DO YOU HAVE A DRINK CONTAINING ALCOHOL: NEVER
HOW MANY STANDARD DRINKS CONTAINING ALCOHOL DO YOU HAVE ON A TYPICAL DAY: PATIENT DOES NOT DRINK

## 2025-03-14 NOTE — ED PROVIDER NOTES
[facility] EMERGENCY DEPARTMENT  EMERGENCY DEPARTMENT ENCOUNTER        Pt Name: Grecia Wilhelm  MRN: [id number]  Birthdate 1945  Date of evaluation: 3/14/2025  Provider: Ingrid Serrano DO  PCP: Bismark Beauchamp MD  Note Started: 1:02 PM EDT 3/14/25    CHIEF COMPLAINT       Chief Complaint   Patient presents with    Aphasia     Patient is at a nursing home for rehab. LKW at 0830 and at 1130 saff came to get her for therapy when she was slurring her speech and \"saying weird things.\" And increased weakness requiring 2 people to help transfer. All symptoms have resolved by arrival to the hospital.        HISTORY OF PRESENT ILLNESS: 1 or more Elements   History From: Patient and EMS    Limitations to history : None    Grecia Wilhelm is a 79 y.o. female who presents with concern for an episode of slurred speech.  Per nursing facility, she is currently residing there for rehab, she had been perfectly well at 8:30 AM when they came to get her for her therapy at 1130 this morning she was slurring her speech and not acting like herself.  She did have increasing weakness that was different from her baseline.  They have been concerned that she was having a stroke and all of her symptoms have completely arrived prior to arrival.  Facility noted that she did have a temperature of 102, no noted cough or dysuria or other associated complaints.  Patient does not have any complaints at this time, notes that she is feels well and is asymptomatic.  No chest pain, abdominal pain, headaches, numbness, weakness, tingling, no other associated complaints.      EXTERNAL NOTE REVIEW:      On chart review she was admitted to the hospital on 2/22/2025 for aortic occlusion.  She did have vascular surgery on 2/22/2025. She had a bilateral aortoiliac and distal thrombectomy at that time.  She had acute bilateral lower limb ischemia.    REVIEW OF SYSTEMS :      Positives and Pertinent negatives as per

## 2025-03-15 ENCOUNTER — APPOINTMENT (OUTPATIENT)
Dept: MRI IMAGING | Age: 80
DRG: 872 | End: 2025-03-15
Payer: MEDICARE

## 2025-03-15 LAB
ANION GAP SERPL CALCULATED.3IONS-SCNC: 16 MMOL/L (ref 7–16)
BASOPHILS # BLD: 0.03 K/UL (ref 0–0.2)
BASOPHILS NFR BLD: 0 % (ref 0–2)
BUN SERPL-MCNC: 12 MG/DL (ref 6–23)
CALCIUM SERPL-MCNC: 8.6 MG/DL (ref 8.6–10.2)
CHLORIDE SERPL-SCNC: 105 MMOL/L (ref 98–107)
CHOLEST SERPL-MCNC: 111 MG/DL
CO2 SERPL-SCNC: 17 MMOL/L (ref 22–29)
CREAT SERPL-MCNC: 0.7 MG/DL (ref 0.5–1)
EOSINOPHIL # BLD: 0.02 K/UL (ref 0.05–0.5)
EOSINOPHILS RELATIVE PERCENT: 0 % (ref 0–6)
ERYTHROCYTE [DISTWIDTH] IN BLOOD BY AUTOMATED COUNT: 17.8 % (ref 11.5–15)
GFR, ESTIMATED: >90 ML/MIN/1.73M2
GLUCOSE BLD-MCNC: 135 MG/DL (ref 74–99)
GLUCOSE BLD-MCNC: 136 MG/DL (ref 74–99)
GLUCOSE BLD-MCNC: 163 MG/DL (ref 74–99)
GLUCOSE BLD-MCNC: 202 MG/DL (ref 74–99)
GLUCOSE SERPL-MCNC: 134 MG/DL (ref 74–99)
HCT VFR BLD AUTO: 28.4 % (ref 34–48)
HDLC SERPL-MCNC: 43 MG/DL
HGB BLD-MCNC: 8.9 G/DL (ref 11.5–15.5)
IMM GRANULOCYTES # BLD AUTO: 0.09 K/UL (ref 0–0.58)
IMM GRANULOCYTES NFR BLD: 1 % (ref 0–5)
LDLC SERPL CALC-MCNC: 55 MG/DL
LYMPHOCYTES NFR BLD: 0.88 K/UL (ref 1.5–4)
LYMPHOCYTES RELATIVE PERCENT: 6 % (ref 20–42)
MCH RBC QN AUTO: 31.4 PG (ref 26–35)
MCHC RBC AUTO-ENTMCNC: 31.3 G/DL (ref 32–34.5)
MCV RBC AUTO: 100.4 FL (ref 80–99.9)
MONOCYTES NFR BLD: 0.98 K/UL (ref 0.1–0.95)
MONOCYTES NFR BLD: 7 % (ref 2–12)
NEUTROPHILS NFR BLD: 87 % (ref 43–80)
NEUTS SEG NFR BLD: 12.78 K/UL (ref 1.8–7.3)
PLATELET # BLD AUTO: 217 K/UL (ref 130–450)
PMV BLD AUTO: 8.9 FL (ref 7–12)
POTASSIUM SERPL-SCNC: 3.8 MMOL/L (ref 3.5–5)
RBC # BLD AUTO: 2.83 M/UL (ref 3.5–5.5)
SODIUM SERPL-SCNC: 138 MMOL/L (ref 132–146)
TRIGL SERPL-MCNC: 66 MG/DL
TROPONIN I SERPL HS-MCNC: 40 NG/L (ref 0–9)
VLDLC SERPL CALC-MCNC: 13 MG/DL
WBC OTHER # BLD: 14.8 K/UL (ref 4.5–11.5)

## 2025-03-15 PROCEDURE — 6360000002 HC RX W HCPCS: Performed by: NURSE PRACTITIONER

## 2025-03-15 PROCEDURE — 6370000000 HC RX 637 (ALT 250 FOR IP): Performed by: NURSE PRACTITIONER

## 2025-03-15 PROCEDURE — 2140000000 HC CCU INTERMEDIATE R&B

## 2025-03-15 PROCEDURE — 80061 LIPID PANEL: CPT

## 2025-03-15 PROCEDURE — 92610 EVALUATE SWALLOWING FUNCTION: CPT

## 2025-03-15 PROCEDURE — 85025 COMPLETE CBC W/AUTO DIFF WBC: CPT

## 2025-03-15 PROCEDURE — 2500000003 HC RX 250 WO HCPCS: Performed by: NURSE PRACTITIONER

## 2025-03-15 PROCEDURE — 70551 MRI BRAIN STEM W/O DYE: CPT

## 2025-03-15 PROCEDURE — 80048 BASIC METABOLIC PNL TOTAL CA: CPT

## 2025-03-15 PROCEDURE — 82962 GLUCOSE BLOOD TEST: CPT

## 2025-03-15 PROCEDURE — 92523 SPEECH SOUND LANG COMPREHEN: CPT

## 2025-03-15 PROCEDURE — 2580000003 HC RX 258: Performed by: NURSE PRACTITIONER

## 2025-03-15 PROCEDURE — 36415 COLL VENOUS BLD VENIPUNCTURE: CPT

## 2025-03-15 RX ORDER — POTASSIUM CHLORIDE 750 MG/1
10 TABLET, EXTENDED RELEASE ORAL 2 TIMES DAILY
Status: DISCONTINUED | OUTPATIENT
Start: 2025-03-15 | End: 2025-03-26 | Stop reason: HOSPADM

## 2025-03-15 RX ORDER — SODIUM CHLORIDE 0.9 % (FLUSH) 0.9 %
5-40 SYRINGE (ML) INJECTION PRN
Status: DISCONTINUED | OUTPATIENT
Start: 2025-03-15 | End: 2025-03-26 | Stop reason: HOSPADM

## 2025-03-15 RX ORDER — VITAMIN B COMPLEX
1000 TABLET ORAL DAILY
Status: DISCONTINUED | OUTPATIENT
Start: 2025-03-15 | End: 2025-03-26 | Stop reason: HOSPADM

## 2025-03-15 RX ORDER — FLUTICASONE PROPIONATE 50 MCG
1 SPRAY, SUSPENSION (ML) NASAL 2 TIMES DAILY
Status: DISCONTINUED | OUTPATIENT
Start: 2025-03-15 | End: 2025-03-26 | Stop reason: HOSPADM

## 2025-03-15 RX ORDER — ONDANSETRON 2 MG/ML
4 INJECTION INTRAMUSCULAR; INTRAVENOUS EVERY 6 HOURS PRN
Status: DISCONTINUED | OUTPATIENT
Start: 2025-03-15 | End: 2025-03-26 | Stop reason: HOSPADM

## 2025-03-15 RX ORDER — M-VIT,TX,IRON,MINS/CALC/FOLIC 27MG-0.4MG
1 TABLET ORAL DAILY
Status: DISCONTINUED | OUTPATIENT
Start: 2025-03-15 | End: 2025-03-26 | Stop reason: HOSPADM

## 2025-03-15 RX ORDER — PANTOPRAZOLE SODIUM 40 MG/1
40 TABLET, DELAYED RELEASE ORAL
Status: DISCONTINUED | OUTPATIENT
Start: 2025-03-15 | End: 2025-03-26 | Stop reason: HOSPADM

## 2025-03-15 RX ORDER — LANOLIN ALCOHOL/MO/W.PET/CERES
500 CREAM (GRAM) TOPICAL 2 TIMES DAILY
Status: DISCONTINUED | OUTPATIENT
Start: 2025-03-15 | End: 2025-03-26 | Stop reason: HOSPADM

## 2025-03-15 RX ORDER — SODIUM CHLORIDE 0.9 % (FLUSH) 0.9 %
5-40 SYRINGE (ML) INJECTION EVERY 12 HOURS SCHEDULED
Status: DISCONTINUED | OUTPATIENT
Start: 2025-03-15 | End: 2025-03-26 | Stop reason: HOSPADM

## 2025-03-15 RX ORDER — FUROSEMIDE 20 MG/1
20 TABLET ORAL DAILY
Status: DISCONTINUED | OUTPATIENT
Start: 2025-03-15 | End: 2025-03-26 | Stop reason: HOSPADM

## 2025-03-15 RX ORDER — GLUCAGON 1 MG/ML
1 KIT INJECTION PRN
Status: DISCONTINUED | OUTPATIENT
Start: 2025-03-15 | End: 2025-03-26 | Stop reason: HOSPADM

## 2025-03-15 RX ORDER — SODIUM CHLORIDE 9 MG/ML
INJECTION, SOLUTION INTRAVENOUS CONTINUOUS
Status: DISCONTINUED | OUTPATIENT
Start: 2025-03-15 | End: 2025-03-17

## 2025-03-15 RX ORDER — INSULIN LISPRO 100 [IU]/ML
0-4 INJECTION, SOLUTION INTRAVENOUS; SUBCUTANEOUS
Status: DISCONTINUED | OUTPATIENT
Start: 2025-03-15 | End: 2025-03-24

## 2025-03-15 RX ORDER — ACETAMINOPHEN 650 MG/1
650 SUPPOSITORY RECTAL EVERY 6 HOURS PRN
Status: DISCONTINUED | OUTPATIENT
Start: 2025-03-15 | End: 2025-03-26 | Stop reason: HOSPADM

## 2025-03-15 RX ORDER — ONDANSETRON 4 MG/1
4 TABLET, ORALLY DISINTEGRATING ORAL EVERY 8 HOURS PRN
Status: DISCONTINUED | OUTPATIENT
Start: 2025-03-15 | End: 2025-03-26 | Stop reason: HOSPADM

## 2025-03-15 RX ORDER — SODIUM CHLORIDE 9 MG/ML
INJECTION, SOLUTION INTRAVENOUS PRN
Status: DISCONTINUED | OUTPATIENT
Start: 2025-03-15 | End: 2025-03-26 | Stop reason: HOSPADM

## 2025-03-15 RX ORDER — DEXTROSE MONOHYDRATE 100 MG/ML
INJECTION, SOLUTION INTRAVENOUS CONTINUOUS PRN
Status: DISCONTINUED | OUTPATIENT
Start: 2025-03-15 | End: 2025-03-26 | Stop reason: HOSPADM

## 2025-03-15 RX ORDER — METOPROLOL TARTRATE 50 MG
100 TABLET ORAL 2 TIMES DAILY
Status: DISCONTINUED | OUTPATIENT
Start: 2025-03-15 | End: 2025-03-26 | Stop reason: HOSPADM

## 2025-03-15 RX ORDER — ASPIRIN 81 MG/1
81 TABLET ORAL DAILY
Status: DISCONTINUED | OUTPATIENT
Start: 2025-03-15 | End: 2025-03-26 | Stop reason: HOSPADM

## 2025-03-15 RX ORDER — PRAVASTATIN SODIUM 20 MG
20 TABLET ORAL NIGHTLY
Status: DISCONTINUED | OUTPATIENT
Start: 2025-03-15 | End: 2025-03-26 | Stop reason: HOSPADM

## 2025-03-15 RX ORDER — ACETAMINOPHEN 325 MG/1
650 TABLET ORAL EVERY 6 HOURS PRN
Status: DISCONTINUED | OUTPATIENT
Start: 2025-03-15 | End: 2025-03-26 | Stop reason: HOSPADM

## 2025-03-15 RX ORDER — DILTIAZEM HYDROCHLORIDE 120 MG/1
240 CAPSULE, COATED, EXTENDED RELEASE ORAL DAILY
Status: DISCONTINUED | OUTPATIENT
Start: 2025-03-15 | End: 2025-03-26 | Stop reason: HOSPADM

## 2025-03-15 RX ORDER — BISACODYL 5 MG/1
5 TABLET, DELAYED RELEASE ORAL DAILY PRN
Status: DISCONTINUED | OUTPATIENT
Start: 2025-03-15 | End: 2025-03-26 | Stop reason: HOSPADM

## 2025-03-15 RX ADMIN — PRAVASTATIN SODIUM 20 MG: 20 TABLET ORAL at 20:21

## 2025-03-15 RX ADMIN — INSULIN LISPRO 1 UNITS: 100 INJECTION, SOLUTION INTRAVENOUS; SUBCUTANEOUS at 21:33

## 2025-03-15 RX ADMIN — Medication 1 TABLET: at 12:50

## 2025-03-15 RX ADMIN — POTASSIUM CHLORIDE 10 MEQ: 750 TABLET, EXTENDED RELEASE ORAL at 09:18

## 2025-03-15 RX ADMIN — APIXABAN 5 MG: 5 TABLET, FILM COATED ORAL at 02:28

## 2025-03-15 RX ADMIN — METOPROLOL TARTRATE 100 MG: 50 TABLET, FILM COATED ORAL at 20:21

## 2025-03-15 RX ADMIN — ASPIRIN 81 MG: 81 TABLET, COATED ORAL at 09:18

## 2025-03-15 RX ADMIN — SODIUM CHLORIDE: 0.9 INJECTION, SOLUTION INTRAVENOUS at 06:27

## 2025-03-15 RX ADMIN — FUROSEMIDE 20 MG: 20 TABLET ORAL at 09:18

## 2025-03-15 RX ADMIN — Medication 500 MG: at 12:49

## 2025-03-15 RX ADMIN — METOPROLOL TARTRATE 100 MG: 50 TABLET, FILM COATED ORAL at 02:28

## 2025-03-15 RX ADMIN — POTASSIUM CHLORIDE 10 MEQ: 750 TABLET, EXTENDED RELEASE ORAL at 21:31

## 2025-03-15 RX ADMIN — POTASSIUM CHLORIDE 10 MEQ: 750 TABLET, EXTENDED RELEASE ORAL at 02:28

## 2025-03-15 RX ADMIN — DILTIAZEM HYDROCHLORIDE 240 MG: 120 CAPSULE, EXTENDED RELEASE ORAL at 09:18

## 2025-03-15 RX ADMIN — PANTOPRAZOLE SODIUM 40 MG: 40 TABLET, DELAYED RELEASE ORAL at 09:25

## 2025-03-15 RX ADMIN — Medication 500 MG: at 20:21

## 2025-03-15 RX ADMIN — METOPROLOL TARTRATE 100 MG: 50 TABLET, FILM COATED ORAL at 09:18

## 2025-03-15 RX ADMIN — Medication 1000 UNITS: at 09:18

## 2025-03-15 RX ADMIN — FLUTICASONE PROPIONATE 1 SPRAY: 50 SPRAY, METERED NASAL at 20:32

## 2025-03-15 RX ADMIN — APIXABAN 5 MG: 5 TABLET, FILM COATED ORAL at 09:18

## 2025-03-15 RX ADMIN — APIXABAN 5 MG: 5 TABLET, FILM COATED ORAL at 20:21

## 2025-03-15 RX ADMIN — WATER 1000 MG: 1 INJECTION INTRAMUSCULAR; INTRAVENOUS; SUBCUTANEOUS at 20:21

## 2025-03-15 NOTE — H&P
[facility]  History and Physical      CHIEF COMPLAINT:    Chief Complaint   Patient presents with    Aphasia     Patient is at a nursing home for rehab. LKW at 0830 and at 1130 saff came to get her for therapy when she was slurring her speech and \"saying weird things.\" And increased weakness requiring 2 people to help transfer. All symptoms have resolved by arrival to the hospital.         Patient of Bismark Beauchamp MD presents with:  Altered mental status, unspecified    History of Present Illness:   Ms. Wilhelm is a 79 year old  female with a past medical history of   Severe AS s/p TAVR (1/8/2024), moderate MS, mild TR/MR, CAD (2020/ 2023 [facility] mild nonobstructive dz), chronic HFpEF, chronic atrial fibrillation (On Eliquis), HTN, HLD, Hx of CVA/TIA, diabetes type II and [place] with right cochlear implant.  She recently had bilateral lower extremity thrombectomies done on recent admission about 3 weeks ago by vascular service.  Ms. Wilhelm presented to [facility] ED on 03/14/2025 from a nursing facility where she currently resides for rehab with complaints of aphasia.  Reportedly staff came to get her for PT and she was found to have slurred speech, \" saying weird things\" with noticeable increased weakness requiring 2 people to transfer.  Of note, she reportedly also had an oral temperature of 102 without accompanying cough or dysuria.  She denies accompanying chest pain, abdominal pain, headaches.  Upon arrival to the ED her VS oral temperature -71-93% RA-98/61.  Upon arrival to the ED her aphasia had resolved.  CT of the head with mild chronic microvascular ischemic changes and right sided cochlear implant.  CXR with moderate generalized cardiomegaly and mild interstitial prominence in both lungs improved compared to 02/22/2025.  No focal pneumonia.  EKG Atrial Fibrillation with RVR with .  .  K4.2.  BUN/SCR 15/1.  Blood glucose 168.  Troponin 40, 39, 36.  WBC 21.7.  H&H

## 2025-03-16 LAB
ANION GAP SERPL CALCULATED.3IONS-SCNC: 14 MMOL/L (ref 7–16)
BASOPHILS # BLD: 0.02 K/UL (ref 0–0.2)
BASOPHILS NFR BLD: 0 % (ref 0–2)
BUN SERPL-MCNC: 10 MG/DL (ref 6–23)
CALCIUM SERPL-MCNC: 8.5 MG/DL (ref 8.6–10.2)
CHLORIDE SERPL-SCNC: 107 MMOL/L (ref 98–107)
CO2 SERPL-SCNC: 18 MMOL/L (ref 22–29)
CREAT SERPL-MCNC: 0.7 MG/DL (ref 0.5–1)
EOSINOPHIL # BLD: 0.07 K/UL (ref 0.05–0.5)
EOSINOPHILS RELATIVE PERCENT: 1 % (ref 0–6)
ERYTHROCYTE [DISTWIDTH] IN BLOOD BY AUTOMATED COUNT: 17.3 % (ref 11.5–15)
GFR, ESTIMATED: 86 ML/MIN/1.73M2
GLUCOSE BLD-MCNC: 153 MG/DL (ref 74–99)
GLUCOSE BLD-MCNC: 161 MG/DL (ref 74–99)
GLUCOSE BLD-MCNC: 163 MG/DL (ref 74–99)
GLUCOSE BLD-MCNC: 182 MG/DL (ref 74–99)
GLUCOSE SERPL-MCNC: 138 MG/DL (ref 74–99)
HCT VFR BLD AUTO: 29 % (ref 34–48)
HGB BLD-MCNC: 9 G/DL (ref 11.5–15.5)
IMM GRANULOCYTES # BLD AUTO: 0.08 K/UL (ref 0–0.58)
IMM GRANULOCYTES NFR BLD: 1 % (ref 0–5)
LYMPHOCYTES NFR BLD: 0.89 K/UL (ref 1.5–4)
LYMPHOCYTES RELATIVE PERCENT: 9 % (ref 20–42)
MCH RBC QN AUTO: 31.3 PG (ref 26–35)
MCHC RBC AUTO-ENTMCNC: 31 G/DL (ref 32–34.5)
MCV RBC AUTO: 100.7 FL (ref 80–99.9)
MONOCYTES NFR BLD: 0.6 K/UL (ref 0.1–0.95)
MONOCYTES NFR BLD: 6 % (ref 2–12)
NEUTROPHILS NFR BLD: 84 % (ref 43–80)
NEUTS SEG NFR BLD: 8.78 K/UL (ref 1.8–7.3)
PLATELET # BLD AUTO: 226 K/UL (ref 130–450)
PMV BLD AUTO: 9.2 FL (ref 7–12)
POTASSIUM SERPL-SCNC: 3.9 MMOL/L (ref 3.5–5)
RBC # BLD AUTO: 2.88 M/UL (ref 3.5–5.5)
SODIUM SERPL-SCNC: 139 MMOL/L (ref 132–146)
WBC OTHER # BLD: 10.4 K/UL (ref 4.5–11.5)

## 2025-03-16 PROCEDURE — 2500000003 HC RX 250 WO HCPCS: Performed by: NURSE PRACTITIONER

## 2025-03-16 PROCEDURE — 6370000000 HC RX 637 (ALT 250 FOR IP): Performed by: NURSE PRACTITIONER

## 2025-03-16 PROCEDURE — 36415 COLL VENOUS BLD VENIPUNCTURE: CPT

## 2025-03-16 PROCEDURE — 6360000002 HC RX W HCPCS: Performed by: NURSE PRACTITIONER

## 2025-03-16 PROCEDURE — 85025 COMPLETE CBC W/AUTO DIFF WBC: CPT

## 2025-03-16 PROCEDURE — 82962 GLUCOSE BLOOD TEST: CPT

## 2025-03-16 PROCEDURE — 80048 BASIC METABOLIC PNL TOTAL CA: CPT

## 2025-03-16 PROCEDURE — 2140000000 HC CCU INTERMEDIATE R&B

## 2025-03-16 RX ADMIN — DILTIAZEM HYDROCHLORIDE 240 MG: 120 CAPSULE, EXTENDED RELEASE ORAL at 09:40

## 2025-03-16 RX ADMIN — APIXABAN 5 MG: 5 TABLET, FILM COATED ORAL at 09:40

## 2025-03-16 RX ADMIN — POTASSIUM CHLORIDE 10 MEQ: 750 TABLET, EXTENDED RELEASE ORAL at 09:40

## 2025-03-16 RX ADMIN — Medication 500 MG: at 22:45

## 2025-03-16 RX ADMIN — METOPROLOL TARTRATE 100 MG: 50 TABLET, FILM COATED ORAL at 22:45

## 2025-03-16 RX ADMIN — FLUTICASONE PROPIONATE 1 SPRAY: 50 SPRAY, METERED NASAL at 22:45

## 2025-03-16 RX ADMIN — Medication 1000 UNITS: at 09:40

## 2025-03-16 RX ADMIN — Medication 500 MG: at 09:41

## 2025-03-16 RX ADMIN — PANTOPRAZOLE SODIUM 40 MG: 40 TABLET, DELAYED RELEASE ORAL at 05:18

## 2025-03-16 RX ADMIN — APIXABAN 5 MG: 5 TABLET, FILM COATED ORAL at 22:45

## 2025-03-16 RX ADMIN — PRAVASTATIN SODIUM 20 MG: 20 TABLET ORAL at 22:45

## 2025-03-16 RX ADMIN — ASPIRIN 81 MG: 81 TABLET, COATED ORAL at 09:40

## 2025-03-16 RX ADMIN — METOPROLOL TARTRATE 100 MG: 50 TABLET, FILM COATED ORAL at 09:40

## 2025-03-16 RX ADMIN — FUROSEMIDE 20 MG: 20 TABLET ORAL at 09:40

## 2025-03-16 RX ADMIN — WATER 1000 MG: 1 INJECTION INTRAMUSCULAR; INTRAVENOUS; SUBCUTANEOUS at 22:45

## 2025-03-16 RX ADMIN — INSULIN LISPRO 1 UNITS: 100 INJECTION, SOLUTION INTRAVENOUS; SUBCUTANEOUS at 22:45

## 2025-03-16 RX ADMIN — FLUTICASONE PROPIONATE 1 SPRAY: 50 SPRAY, METERED NASAL at 09:41

## 2025-03-16 RX ADMIN — POTASSIUM CHLORIDE 10 MEQ: 750 TABLET, EXTENDED RELEASE ORAL at 22:45

## 2025-03-16 RX ADMIN — SODIUM CHLORIDE, PRESERVATIVE FREE 10 ML: 5 INJECTION INTRAVENOUS at 22:46

## 2025-03-16 RX ADMIN — Medication 1 TABLET: at 09:41

## 2025-03-16 ASSESSMENT — PAIN DESCRIPTION - DESCRIPTORS: DESCRIPTORS: CRAMPING

## 2025-03-16 ASSESSMENT — PAIN SCALES - GENERAL
PAINLEVEL_OUTOF10: 0
PAINLEVEL_OUTOF10: 2

## 2025-03-16 ASSESSMENT — PAIN DESCRIPTION - FREQUENCY: FREQUENCY: INTERMITTENT

## 2025-03-16 ASSESSMENT — PAIN SCALES - WONG BAKER
WONGBAKER_NUMERICALRESPONSE: HURTS A LITTLE BIT
WONGBAKER_NUMERICALRESPONSE: NO HURT

## 2025-03-16 ASSESSMENT — PAIN DESCRIPTION - LOCATION: LOCATION: LEG

## 2025-03-16 ASSESSMENT — PAIN DESCRIPTION - ORIENTATION: ORIENTATION: RIGHT

## 2025-03-16 ASSESSMENT — PAIN DESCRIPTION - ONSET: ONSET: GRADUAL

## 2025-03-16 ASSESSMENT — PAIN - FUNCTIONAL ASSESSMENT: PAIN_FUNCTIONAL_ASSESSMENT: PREVENTS OR INTERFERES SOME ACTIVE ACTIVITIES AND ADLS

## 2025-03-16 ASSESSMENT — PAIN DESCRIPTION - PAIN TYPE: TYPE: CHRONIC PAIN;ACUTE PAIN

## 2025-03-17 LAB
ANION GAP SERPL CALCULATED.3IONS-SCNC: 17 MMOL/L (ref 7–16)
BASOPHILS # BLD: 0.02 K/UL (ref 0–0.2)
BASOPHILS NFR BLD: 0 % (ref 0–2)
BUN SERPL-MCNC: 12 MG/DL (ref 6–23)
CALCIUM SERPL-MCNC: 8.8 MG/DL (ref 8.6–10.2)
CHLORIDE SERPL-SCNC: 105 MMOL/L (ref 98–107)
CO2 SERPL-SCNC: 19 MMOL/L (ref 22–29)
CREAT SERPL-MCNC: 0.7 MG/DL (ref 0.5–1)
EOSINOPHIL # BLD: 0.11 K/UL (ref 0.05–0.5)
EOSINOPHILS RELATIVE PERCENT: 1 % (ref 0–6)
ERYTHROCYTE [DISTWIDTH] IN BLOOD BY AUTOMATED COUNT: 17.2 % (ref 11.5–15)
GFR, ESTIMATED: 85 ML/MIN/1.73M2
GLUCOSE BLD-MCNC: 127 MG/DL (ref 74–99)
GLUCOSE BLD-MCNC: 147 MG/DL (ref 74–99)
GLUCOSE BLD-MCNC: 157 MG/DL (ref 74–99)
GLUCOSE BLD-MCNC: 232 MG/DL (ref 74–99)
GLUCOSE SERPL-MCNC: 149 MG/DL (ref 74–99)
HCT VFR BLD AUTO: 30.9 % (ref 34–48)
HGB BLD-MCNC: 9.6 G/DL (ref 11.5–15.5)
IMM GRANULOCYTES # BLD AUTO: 0.12 K/UL (ref 0–0.58)
IMM GRANULOCYTES NFR BLD: 1 % (ref 0–5)
LYMPHOCYTES NFR BLD: 1.03 K/UL (ref 1.5–4)
LYMPHOCYTES RELATIVE PERCENT: 11 % (ref 20–42)
MAGNESIUM SERPL-MCNC: 1.7 MG/DL (ref 1.6–2.6)
MCH RBC QN AUTO: 31 PG (ref 26–35)
MCHC RBC AUTO-ENTMCNC: 31.1 G/DL (ref 32–34.5)
MCV RBC AUTO: 99.7 FL (ref 80–99.9)
MONOCYTES NFR BLD: 0.59 K/UL (ref 0.1–0.95)
MONOCYTES NFR BLD: 7 % (ref 2–12)
NEUTROPHILS NFR BLD: 80 % (ref 43–80)
NEUTS SEG NFR BLD: 7.24 K/UL (ref 1.8–7.3)
PLATELET # BLD AUTO: 246 K/UL (ref 130–450)
PMV BLD AUTO: 9.1 FL (ref 7–12)
POTASSIUM SERPL-SCNC: 3.5 MMOL/L (ref 3.5–5)
RBC # BLD AUTO: 3.1 M/UL (ref 3.5–5.5)
SODIUM SERPL-SCNC: 141 MMOL/L (ref 132–146)
WBC OTHER # BLD: 9.1 K/UL (ref 4.5–11.5)

## 2025-03-17 PROCEDURE — 2140000000 HC CCU INTERMEDIATE R&B

## 2025-03-17 PROCEDURE — 97535 SELF CARE MNGMENT TRAINING: CPT

## 2025-03-17 PROCEDURE — 2580000003 HC RX 258: Performed by: NURSE PRACTITIONER

## 2025-03-17 PROCEDURE — 6360000002 HC RX W HCPCS: Performed by: NURSE PRACTITIONER

## 2025-03-17 PROCEDURE — 97165 OT EVAL LOW COMPLEX 30 MIN: CPT

## 2025-03-17 PROCEDURE — 36415 COLL VENOUS BLD VENIPUNCTURE: CPT

## 2025-03-17 PROCEDURE — 83735 ASSAY OF MAGNESIUM: CPT

## 2025-03-17 PROCEDURE — 97129 THER IVNTJ 1ST 15 MIN: CPT

## 2025-03-17 PROCEDURE — 80048 BASIC METABOLIC PNL TOTAL CA: CPT

## 2025-03-17 PROCEDURE — 82962 GLUCOSE BLOOD TEST: CPT

## 2025-03-17 PROCEDURE — 2500000003 HC RX 250 WO HCPCS: Performed by: NURSE PRACTITIONER

## 2025-03-17 PROCEDURE — 97130 THER IVNTJ EA ADDL 15 MIN: CPT

## 2025-03-17 PROCEDURE — 85025 COMPLETE CBC W/AUTO DIFF WBC: CPT

## 2025-03-17 PROCEDURE — 97161 PT EVAL LOW COMPLEX 20 MIN: CPT

## 2025-03-17 PROCEDURE — 97530 THERAPEUTIC ACTIVITIES: CPT

## 2025-03-17 PROCEDURE — 6370000000 HC RX 637 (ALT 250 FOR IP): Performed by: NURSE PRACTITIONER

## 2025-03-17 RX ORDER — POTASSIUM CHLORIDE 1500 MG/1
20 TABLET, EXTENDED RELEASE ORAL ONCE
Status: COMPLETED | OUTPATIENT
Start: 2025-03-17 | End: 2025-03-17

## 2025-03-17 RX ORDER — MAGNESIUM SULFATE IN WATER 40 MG/ML
2000 INJECTION, SOLUTION INTRAVENOUS ONCE
Status: COMPLETED | OUTPATIENT
Start: 2025-03-17 | End: 2025-03-17

## 2025-03-17 RX ADMIN — DILTIAZEM HYDROCHLORIDE 240 MG: 120 CAPSULE, EXTENDED RELEASE ORAL at 10:29

## 2025-03-17 RX ADMIN — FUROSEMIDE 20 MG: 20 TABLET ORAL at 10:29

## 2025-03-17 RX ADMIN — PRAVASTATIN SODIUM 20 MG: 20 TABLET ORAL at 20:59

## 2025-03-17 RX ADMIN — POTASSIUM CHLORIDE 10 MEQ: 750 TABLET, EXTENDED RELEASE ORAL at 20:59

## 2025-03-17 RX ADMIN — SODIUM CHLORIDE, PRESERVATIVE FREE 10 ML: 5 INJECTION INTRAVENOUS at 20:59

## 2025-03-17 RX ADMIN — Medication 500 MG: at 21:23

## 2025-03-17 RX ADMIN — FLUTICASONE PROPIONATE 1 SPRAY: 50 SPRAY, METERED NASAL at 10:28

## 2025-03-17 RX ADMIN — PANTOPRAZOLE SODIUM 40 MG: 40 TABLET, DELAYED RELEASE ORAL at 05:08

## 2025-03-17 RX ADMIN — ASPIRIN 81 MG: 81 TABLET, COATED ORAL at 10:30

## 2025-03-17 RX ADMIN — POTASSIUM CHLORIDE 20 MEQ: 1500 TABLET, EXTENDED RELEASE ORAL at 12:01

## 2025-03-17 RX ADMIN — METOPROLOL TARTRATE 100 MG: 50 TABLET, FILM COATED ORAL at 20:59

## 2025-03-17 RX ADMIN — MAGNESIUM SULFATE HEPTAHYDRATE 2000 MG: 40 INJECTION, SOLUTION INTRAVENOUS at 12:02

## 2025-03-17 RX ADMIN — Medication 1000 UNITS: at 10:29

## 2025-03-17 RX ADMIN — POTASSIUM CHLORIDE 10 MEQ: 750 TABLET, EXTENDED RELEASE ORAL at 10:30

## 2025-03-17 RX ADMIN — MEROPENEM 1000 MG: 1 INJECTION INTRAVENOUS at 16:42

## 2025-03-17 RX ADMIN — APIXABAN 5 MG: 5 TABLET, FILM COATED ORAL at 10:29

## 2025-03-17 RX ADMIN — APIXABAN 5 MG: 5 TABLET, FILM COATED ORAL at 20:59

## 2025-03-17 RX ADMIN — Medication 500 MG: at 10:29

## 2025-03-17 RX ADMIN — METOPROLOL TARTRATE 100 MG: 50 TABLET, FILM COATED ORAL at 10:29

## 2025-03-17 RX ADMIN — Medication 1 TABLET: at 10:29

## 2025-03-17 RX ADMIN — INSULIN LISPRO 1 UNITS: 100 INJECTION, SOLUTION INTRAVENOUS; SUBCUTANEOUS at 21:11

## 2025-03-17 NOTE — CARE COORDINATION
Patient presented to the ED from [facility] due to slurred speech and increased weakness; admitted for urinary tract infection and altered mental status. Call made to Yasmin at [facility], she states patient was there skilled, not a bed hold, able to return and will need pre-cert. Met with patient and daughterElizabeth at bedside for transition of care planning. Plan is for patient to return to [facility] to complete rehab. Patient's daughter inquiring about Eliquis coupon and assistance with medication; voucher and contact number provided. Patient on room air, ID consulted, started on IV Merrem Q8. Ambulette form and ALO completed; envelope placed on the soft chart.    3:50P  Received a call from Yasmin at [facility], auth obtained and should be good through Wednesday.    Case Management Assessment  Initial Evaluation    Date/Time of Evaluation: 3/17/2025 3:52 PM  Assessment Completed by: LIZBET Enamorado    If patient is discharged prior to next notation, then this note serves as note for discharge by case management.    Patient Name: Grecia Wilhelm                   YOB: 1945  Diagnosis: Urinary tract infection without hematuria, site unspecified [N39.0]  Altered mental status, unspecified altered mental status type [R41.82]  Altered mental status, unspecified [R41.82]                   Date / Time: 3/14/2025 12:55 PM    Patient Admission Status: Inpatient   Readmission Risk (Low < 19, Mod (19-27), High > 27): Readmission Risk Score: 21.3    Current PCP: Bismark Beauchamp MD  PCP verified by CM? Yes    Chart Reviewed: Yes      History Provided by: Patient, Child/Family  Patient Orientation: Alert and Oriented    Patient Cognition: Alert    Hospitalization in the last 30 days (Readmission):  Yes    If yes, Readmission Assessment in  Navigator will be completed.    Advance Directives:      Code Status: Full Code   Patient's Primary Decision Maker is: Legal Next of Kin    Primary Decision

## 2025-03-17 NOTE — ACP (ADVANCE CARE PLANNING)
Advance Care Planning   Healthcare Decision Maker:    Primary Decision Maker: ChocoFeng - Spouse - [phone]    Secondary Decision Maker: Eddie Wilhelm - Child - [phone]    Secondary Decision Maker: Elizabeth Woodard - Child - [phone]    Secondary Decision Maker: Galileo Wilhelm - Child - [phone]    Today we documented Decision Maker(s) consistent with Legal Next of Kin hierarchy.    Electronically signed by LIZBET Enamorado on 3/17/2025 at 3:53 PM

## 2025-03-17 NOTE — DISCHARGE INSTR - COC
Continuity of Care Form    Patient Name: Grecia Wilhelm   :  1945  MRN:  [id number]    Admit date:  3/14/2025  Discharge date:      Code Status Order: Full Code   Advance Directives:     Admitting Physician:  Courtney Bauer MD  PCP: Bismark Beauchamp MD    Discharging Nurse: carly Spear RN  Discharging Hospital Unit/Room#: 6412/6412-A  Discharging Unit Phone Number: [phone]    Emergency Contact:   Extended Emergency Contact Information  Primary Emergency Contact: Feng Wilhelm  Address: [address] [place] [address] of [facility]  Home Phone: [phone]  Mobile Phone: [phone]  Relation: Spouse  Secondary Emergency Contact: Eddie Wilhelm  Home Phone: [phone]  Mobile Phone: [phone]  Relation: Child    Past Surgical History:  Past Surgical History:   Procedure Laterality Date    ABDOMEN SURGERY      AORTIC VALVE REPLACEMENT N/A     APPENDECTOMY      BREAST SURGERY Right     biopsy-benign    COCHLEAR IMPLANT Right 10/26/2022    RIGHT COCHLEAR IMPLANT INSERTION WITH NERVE INTEGRITY MONITOR performed by Hali Meza MD at [facility] OR    COLONOSCOPY  2008    ECHOCARDIOGRAM COMPLETE 2D W DOPPLER W COLOR  2012 at [facility]    INNER EAR SURGERY Left     Multiple surgeries    OVARY SURGERY      SMALL INTESTINE SURGERY      TRANSCATHETER AORTIC VALVE REPLACEMENT  2024    done at [facility]    VASCULAR SURGERY Bilateral 2025    bilateral lower extremity thrombectomy performed by Ganesh Huerta MD at [facility] OR    VEIN SURGERY         Immunization History:   Immunization History   Administered Date(s) Administered    Pneumococcal, PPSV23, PNEUMOVAX 23, (age 2y+), SC/IM, 0.5mL 2022       Active Problems:  Patient Active Problem List   Diagnosis Code    Hypertension I10    Cerebral infarction (HCC) I63.9    DJD (degenerative joint disease) M19.90    Atrial fibrillation (HCC) I48.91    TIA (transient ischemic

## 2025-03-18 ENCOUNTER — APPOINTMENT (OUTPATIENT)
Dept: CT IMAGING | Age: 80
DRG: 872 | End: 2025-03-18
Payer: MEDICARE

## 2025-03-18 LAB
ANION GAP SERPL CALCULATED.3IONS-SCNC: 15 MMOL/L (ref 7–16)
BASOPHILS # BLD: 0.04 K/UL (ref 0–0.2)
BASOPHILS NFR BLD: 1 % (ref 0–2)
BUN SERPL-MCNC: 9 MG/DL (ref 6–23)
CALCIUM SERPL-MCNC: 8.8 MG/DL (ref 8.6–10.2)
CHLORIDE SERPL-SCNC: 110 MMOL/L (ref 98–107)
CO2 SERPL-SCNC: 19 MMOL/L (ref 22–29)
CREAT SERPL-MCNC: 0.6 MG/DL (ref 0.5–1)
EOSINOPHIL # BLD: 0.25 K/UL (ref 0.05–0.5)
EOSINOPHILS RELATIVE PERCENT: 4 % (ref 0–6)
ERYTHROCYTE [DISTWIDTH] IN BLOOD BY AUTOMATED COUNT: 17.5 % (ref 11.5–15)
GFR, ESTIMATED: >90 ML/MIN/1.73M2
GLUCOSE BLD-MCNC: 120 MG/DL (ref 74–99)
GLUCOSE BLD-MCNC: 134 MG/DL (ref 74–99)
GLUCOSE BLD-MCNC: 179 MG/DL (ref 74–99)
GLUCOSE BLD-MCNC: 196 MG/DL (ref 74–99)
GLUCOSE SERPL-MCNC: 138 MG/DL (ref 74–99)
HCT VFR BLD AUTO: 29.7 % (ref 34–48)
HGB BLD-MCNC: 9.4 G/DL (ref 11.5–15.5)
IMM GRANULOCYTES # BLD AUTO: 0.22 K/UL (ref 0–0.58)
IMM GRANULOCYTES NFR BLD: 3 % (ref 0–5)
LYMPHOCYTES NFR BLD: 1.37 K/UL (ref 1.5–4)
LYMPHOCYTES RELATIVE PERCENT: 20 % (ref 20–42)
MCH RBC QN AUTO: 31.6 PG (ref 26–35)
MCHC RBC AUTO-ENTMCNC: 31.6 G/DL (ref 32–34.5)
MCV RBC AUTO: 100 FL (ref 80–99.9)
MICROORGANISM SPEC CULT: ABNORMAL
MONOCYTES NFR BLD: 0.54 K/UL (ref 0.1–0.95)
MONOCYTES NFR BLD: 8 % (ref 2–12)
NEUTROPHILS NFR BLD: 64 % (ref 43–80)
NEUTS SEG NFR BLD: 4.32 K/UL (ref 1.8–7.3)
PLATELET # BLD AUTO: 224 K/UL (ref 130–450)
PMV BLD AUTO: 9 FL (ref 7–12)
POTASSIUM SERPL-SCNC: 4.3 MMOL/L (ref 3.5–5)
RBC # BLD AUTO: 2.97 M/UL (ref 3.5–5.5)
SERVICE CMNT-IMP: ABNORMAL
SODIUM SERPL-SCNC: 144 MMOL/L (ref 132–146)
SPECIMEN DESCRIPTION: ABNORMAL
WBC OTHER # BLD: 6.7 K/UL (ref 4.5–11.5)

## 2025-03-18 PROCEDURE — 6360000002 HC RX W HCPCS: Performed by: NURSE PRACTITIONER

## 2025-03-18 PROCEDURE — 36415 COLL VENOUS BLD VENIPUNCTURE: CPT

## 2025-03-18 PROCEDURE — 80048 BASIC METABOLIC PNL TOTAL CA: CPT

## 2025-03-18 PROCEDURE — 2500000003 HC RX 250 WO HCPCS: Performed by: NURSE PRACTITIONER

## 2025-03-18 PROCEDURE — 74177 CT ABD & PELVIS W/CONTRAST: CPT

## 2025-03-18 PROCEDURE — 82962 GLUCOSE BLOOD TEST: CPT

## 2025-03-18 PROCEDURE — 97530 THERAPEUTIC ACTIVITIES: CPT

## 2025-03-18 PROCEDURE — 97129 THER IVNTJ 1ST 15 MIN: CPT

## 2025-03-18 PROCEDURE — 6360000004 HC RX CONTRAST MEDICATION: Performed by: RADIOLOGY

## 2025-03-18 PROCEDURE — 97535 SELF CARE MNGMENT TRAINING: CPT

## 2025-03-18 PROCEDURE — 6370000000 HC RX 637 (ALT 250 FOR IP): Performed by: NURSE PRACTITIONER

## 2025-03-18 PROCEDURE — 97130 THER IVNTJ EA ADDL 15 MIN: CPT

## 2025-03-18 PROCEDURE — 2140000000 HC CCU INTERMEDIATE R&B

## 2025-03-18 PROCEDURE — 2580000003 HC RX 258: Performed by: NURSE PRACTITIONER

## 2025-03-18 PROCEDURE — 85025 COMPLETE CBC W/AUTO DIFF WBC: CPT

## 2025-03-18 RX ORDER — IOPAMIDOL 755 MG/ML
75 INJECTION, SOLUTION INTRAVASCULAR
Status: COMPLETED | OUTPATIENT
Start: 2025-03-18 | End: 2025-03-18

## 2025-03-18 RX ORDER — LACTOBACILLUS RHAMNOSUS GG 10B CELL
1 CAPSULE ORAL DAILY
Status: DISCONTINUED | OUTPATIENT
Start: 2025-03-18 | End: 2025-03-26 | Stop reason: HOSPADM

## 2025-03-18 RX ADMIN — DILTIAZEM HYDROCHLORIDE 240 MG: 120 CAPSULE, EXTENDED RELEASE ORAL at 08:37

## 2025-03-18 RX ADMIN — ACETAMINOPHEN 650 MG: 325 TABLET ORAL at 00:14

## 2025-03-18 RX ADMIN — BISACODYL 5 MG: 5 TABLET, COATED ORAL at 12:32

## 2025-03-18 RX ADMIN — Medication 500 MG: at 08:38

## 2025-03-18 RX ADMIN — Medication 500 MG: at 21:15

## 2025-03-18 RX ADMIN — IOPAMIDOL 75 ML: 755 INJECTION, SOLUTION INTRAVENOUS at 19:02

## 2025-03-18 RX ADMIN — APIXABAN 5 MG: 5 TABLET, FILM COATED ORAL at 21:16

## 2025-03-18 RX ADMIN — POTASSIUM CHLORIDE 10 MEQ: 750 TABLET, EXTENDED RELEASE ORAL at 08:37

## 2025-03-18 RX ADMIN — MEROPENEM 1000 MG: 1 INJECTION INTRAVENOUS at 00:07

## 2025-03-18 RX ADMIN — Medication 1 CAPSULE: at 17:26

## 2025-03-18 RX ADMIN — INSULIN LISPRO 1 UNITS: 100 INJECTION, SOLUTION INTRAVENOUS; SUBCUTANEOUS at 21:16

## 2025-03-18 RX ADMIN — Medication 1 TABLET: at 08:38

## 2025-03-18 RX ADMIN — APIXABAN 5 MG: 5 TABLET, FILM COATED ORAL at 08:38

## 2025-03-18 RX ADMIN — METOPROLOL TARTRATE 100 MG: 50 TABLET, FILM COATED ORAL at 08:37

## 2025-03-18 RX ADMIN — MEROPENEM 1000 MG: 1 INJECTION INTRAVENOUS at 08:48

## 2025-03-18 RX ADMIN — PANTOPRAZOLE SODIUM 40 MG: 40 TABLET, DELAYED RELEASE ORAL at 05:12

## 2025-03-18 RX ADMIN — Medication 1000 UNITS: at 08:37

## 2025-03-18 RX ADMIN — METOPROLOL TARTRATE 100 MG: 50 TABLET, FILM COATED ORAL at 21:15

## 2025-03-18 RX ADMIN — MEROPENEM 1000 MG: 1 INJECTION INTRAVENOUS at 17:10

## 2025-03-18 RX ADMIN — ASPIRIN 81 MG: 81 TABLET, COATED ORAL at 08:37

## 2025-03-18 RX ADMIN — SODIUM CHLORIDE, PRESERVATIVE FREE 10 ML: 5 INJECTION INTRAVENOUS at 21:16

## 2025-03-18 RX ADMIN — PRAVASTATIN SODIUM 20 MG: 20 TABLET ORAL at 21:15

## 2025-03-18 RX ADMIN — FUROSEMIDE 20 MG: 20 TABLET ORAL at 08:37

## 2025-03-18 RX ADMIN — POTASSIUM CHLORIDE 10 MEQ: 750 TABLET, EXTENDED RELEASE ORAL at 21:16

## 2025-03-18 RX ADMIN — FLUTICASONE PROPIONATE 1 SPRAY: 50 SPRAY, METERED NASAL at 21:16

## 2025-03-18 RX ADMIN — SODIUM CHLORIDE, PRESERVATIVE FREE 10 ML: 5 INJECTION INTRAVENOUS at 08:38

## 2025-03-18 ASSESSMENT — PAIN SCALES - GENERAL
PAINLEVEL_OUTOF10: 0
PAINLEVEL_OUTOF10: 0
PAINLEVEL_OUTOF10: 3
PAINLEVEL_OUTOF10: 0
PAINLEVEL_OUTOF10: 0

## 2025-03-18 ASSESSMENT — PAIN DESCRIPTION - DESCRIPTORS: DESCRIPTORS: TENDER;SORE;ACHING

## 2025-03-18 ASSESSMENT — PAIN - FUNCTIONAL ASSESSMENT: PAIN_FUNCTIONAL_ASSESSMENT: ACTIVITIES ARE NOT PREVENTED

## 2025-03-18 ASSESSMENT — PAIN DESCRIPTION - LOCATION: LOCATION: LEG

## 2025-03-18 ASSESSMENT — PAIN DESCRIPTION - ORIENTATION: ORIENTATION: LEFT;RIGHT

## 2025-03-18 NOTE — CARE COORDINATION
Social Work/ Case Management Transition of Care Planning (Lottie Church, [phone]):     Per report and chart review Pt is on room air. Pt on IV Merrem q8. Pt for MRI Lumbar Spine. ID following, Urology consulted. PT , OT . Pt's discharge plan is to return to [facility]. Auth has been received and will  tomorrow 3/19.  Ambulette form and ALO completed; envelope placed on the soft chart. SW/CM to follow.  Lottie Church, REILLY  3/18/2025

## 2025-03-18 NOTE — CONSULTS
[facility]  [facility]  Consultation Note     Admit Date: 3/14/2025 12:55 PM    Reason for Consult:   esbl uti    Attending Physician:  Courtney Bauer MD    HISTORY OF PRESENT ILLNESS:             The history is obtained from extensive review of available past medical records. PMH afib, CVA, CAD, DM. Patient not know to ID. The patient is a 79 y.o. female who presents with slurred speech and increased weakness. She came from nursing facility where she was at for rehab. She was in hospital last month for aortic occlusion, acute lower limb ischemia and s/p bilateral aortoiliac and distal thrombectomy from Feb 22 thru March 4. During that hospital stay she had melgoza placed due to urinary retention. She was seen by urology. Melgoza was removed prior to discharge.   She had urine culture done per clean cath that grew esbl ecoli. UA wbc 1--20 and small leuko estrase. She is on Merrem. WBC elevated on admission, 21. She had fever tmax 102 at nursing facility. Blood cx neg thus far. Imaging neg for acute cva. Denies any urinary complaints prior to hospital. Had been staying hydrated. Daughter reports she could not urinate and needed to be straight cath during this ED visits. Rn in room and bladder scanned for 11 cc.     Past Medical History:        Diagnosis Date    Aortic stenosis, mild 10/01/2015    follows  Dr Gallardo at [facility] last visit 9/30/22    Arrhythmia     Arthritis     knees    Atrial fibrillation ([facility])     Bleeding from varicose veins of right lower extremity 06/08/2017    CAD (coronary artery disease)     Cerebrovascular accident (CVA) due to embolic occlusion of left middle cerebral artery ([facility]) 10/2015    Confirmed by neurology    Cerebrovascular accident (CVA) due to embolic occlusion of left middle cerebral artery ([facility]) 2013    Left parietal confirmed by neurology    Cerebrovascular disease 03/21/2013    CVA.no weakness/deficits    CHF (congestive heart failure) ([facility])     Critical

## 2025-03-18 NOTE — CONSULTS
3/18/2025 4:57 PM  Grecia Wilhelm  [id number]     Chief Complaint:    UTI, urinary retention    History of Present Illness:      The patient is a 79 y.o. female patient who presented to the hospital with complaints of altered mental status with aphasia from a nursing facility.  She was admitted for UTI.  Urology is asked to evaluate for UTI and urinary retention.  She was bladder scanned earlier today for 11 mL.  She was seen by our practice in the beginning of March during an admission for urinary retention.  Munoz was removed and she passed the voiding trial at that time.      Past Medical History:   Diagnosis Date    Aortic stenosis, mild 10/01/2015    follows  Dr Gallardo at [facility] last visit 9/30/22    Arrhythmia     Arthritis     knees    Atrial fibrillation (HCC)     Bleeding from varicose veins of right lower extremity 06/08/2017    CAD (coronary artery disease)     Cerebrovascular accident (CVA) due to embolic occlusion of left middle cerebral artery (HCC) 10/2015    Confirmed by neurology    Cerebrovascular accident (CVA) due to embolic occlusion of left middle cerebral artery (HCC) 2013    Left parietal confirmed by neurology    Cerebrovascular disease 03/21/2013    CVA.no weakness/deficits    CHF (congestive heart failure) (HCC)     Critical limb ischemia of both lower extremities (HCC) 02/22/2025    Diabetes mellitus (HCC)     Hearing deficit     wears hearing aide left ear only    Heart disease     Hyperlipidemia     Hypertension     Iron deficiency anemia 10/23/2022    Migraine headache with aura     Mitral regurgitation 10/01/2015    mild    Moderate aortic stenosis by prior echocardiogram 2018    NCGS (non-celiac gluten sensitivity)     Severe aortic stenosis 09/25/2020    By 2D echo    TIA (transient ischemic attack)     Unspecified cerebral artery occlusion with cerebral infarction     Varicose veins of left leg with edema 06/08/2017    Venous stasis ulcer of right calf with fat layer exposed

## 2025-03-19 ENCOUNTER — APPOINTMENT (OUTPATIENT)
Dept: MRI IMAGING | Age: 80
DRG: 872 | End: 2025-03-19
Payer: MEDICARE

## 2025-03-19 PROBLEM — R29.898 RIGHT LEG WEAKNESS: Status: ACTIVE | Noted: 2025-03-19

## 2025-03-19 LAB
ALBUMIN SERPL-MCNC: 3.1 G/DL (ref 3.5–5.2)
ALP SERPL-CCNC: 78 U/L (ref 35–104)
ALT SERPL-CCNC: 8 U/L (ref 0–32)
ANION GAP SERPL CALCULATED.3IONS-SCNC: 15 MMOL/L (ref 7–16)
AST SERPL-CCNC: 23 U/L (ref 0–31)
BASOPHILS # BLD: 0.06 K/UL (ref 0–0.2)
BASOPHILS NFR BLD: 1 % (ref 0–2)
BILIRUB DIRECT SERPL-MCNC: <0.2 MG/DL (ref 0–0.3)
BILIRUB INDIRECT SERPL-MCNC: ABNORMAL MG/DL (ref 0–1)
BILIRUB SERPL-MCNC: 0.6 MG/DL (ref 0–1.2)
BUN SERPL-MCNC: 7 MG/DL (ref 6–23)
CALCIUM SERPL-MCNC: 9.2 MG/DL (ref 8.6–10.2)
CHLORIDE SERPL-SCNC: 106 MMOL/L (ref 98–107)
CO2 SERPL-SCNC: 20 MMOL/L (ref 22–29)
CREAT SERPL-MCNC: 0.6 MG/DL (ref 0.5–1)
EOSINOPHIL # BLD: 0.3 K/UL (ref 0.05–0.5)
EOSINOPHILS RELATIVE PERCENT: 4 % (ref 0–6)
ERYTHROCYTE [DISTWIDTH] IN BLOOD BY AUTOMATED COUNT: 17.6 % (ref 11.5–15)
GFR, ESTIMATED: >90 ML/MIN/1.73M2
GLUCOSE BLD-MCNC: 125 MG/DL (ref 74–99)
GLUCOSE BLD-MCNC: 166 MG/DL (ref 74–99)
GLUCOSE BLD-MCNC: 182 MG/DL (ref 74–99)
GLUCOSE BLD-MCNC: 194 MG/DL (ref 74–99)
GLUCOSE SERPL-MCNC: 125 MG/DL (ref 74–99)
HCT VFR BLD AUTO: 33.2 % (ref 34–48)
HGB BLD-MCNC: 10.2 G/DL (ref 11.5–15.5)
IMM GRANULOCYTES # BLD AUTO: 0.3 K/UL (ref 0–0.58)
IMM GRANULOCYTES NFR BLD: 4 % (ref 0–5)
LYMPHOCYTES NFR BLD: 1.65 K/UL (ref 1.5–4)
LYMPHOCYTES RELATIVE PERCENT: 21 % (ref 20–42)
MCH RBC QN AUTO: 31.2 PG (ref 26–35)
MCHC RBC AUTO-ENTMCNC: 30.7 G/DL (ref 32–34.5)
MCV RBC AUTO: 101.5 FL (ref 80–99.9)
MICROORGANISM SPEC CULT: NORMAL
MICROORGANISM SPEC CULT: NORMAL
MONOCYTES NFR BLD: 0.59 K/UL (ref 0.1–0.95)
MONOCYTES NFR BLD: 8 % (ref 2–12)
NEUTROPHILS NFR BLD: 63 % (ref 43–80)
NEUTS SEG NFR BLD: 4.91 K/UL (ref 1.8–7.3)
PLATELET # BLD AUTO: 271 K/UL (ref 130–450)
PMV BLD AUTO: 9.3 FL (ref 7–12)
POTASSIUM SERPL-SCNC: 4.2 MMOL/L (ref 3.5–5)
PROT SERPL-MCNC: 6.2 G/DL (ref 6.4–8.3)
RBC # BLD AUTO: 3.27 M/UL (ref 3.5–5.5)
SERVICE CMNT-IMP: NORMAL
SERVICE CMNT-IMP: NORMAL
SODIUM SERPL-SCNC: 141 MMOL/L (ref 132–146)
SPECIMEN DESCRIPTION: NORMAL
SPECIMEN DESCRIPTION: NORMAL
WBC OTHER # BLD: 7.8 K/UL (ref 4.5–11.5)

## 2025-03-19 PROCEDURE — 72148 MRI LUMBAR SPINE W/O DYE: CPT

## 2025-03-19 PROCEDURE — 2580000003 HC RX 258: Performed by: NURSE PRACTITIONER

## 2025-03-19 PROCEDURE — 99222 1ST HOSP IP/OBS MODERATE 55: CPT | Performed by: NEUROLOGICAL SURGERY

## 2025-03-19 PROCEDURE — 2140000000 HC CCU INTERMEDIATE R&B

## 2025-03-19 PROCEDURE — 36415 COLL VENOUS BLD VENIPUNCTURE: CPT

## 2025-03-19 PROCEDURE — 85025 COMPLETE CBC W/AUTO DIFF WBC: CPT

## 2025-03-19 PROCEDURE — 6360000002 HC RX W HCPCS: Performed by: NURSE PRACTITIONER

## 2025-03-19 PROCEDURE — 82248 BILIRUBIN DIRECT: CPT

## 2025-03-19 PROCEDURE — 6370000000 HC RX 637 (ALT 250 FOR IP): Performed by: NURSE PRACTITIONER

## 2025-03-19 PROCEDURE — 80053 COMPREHEN METABOLIC PANEL: CPT

## 2025-03-19 PROCEDURE — 2500000003 HC RX 250 WO HCPCS: Performed by: NURSE PRACTITIONER

## 2025-03-19 PROCEDURE — 51798 US URINE CAPACITY MEASURE: CPT

## 2025-03-19 PROCEDURE — 82962 GLUCOSE BLOOD TEST: CPT

## 2025-03-19 RX ADMIN — POTASSIUM CHLORIDE 10 MEQ: 750 TABLET, EXTENDED RELEASE ORAL at 20:37

## 2025-03-19 RX ADMIN — Medication 500 MG: at 20:37

## 2025-03-19 RX ADMIN — Medication 1000 UNITS: at 09:06

## 2025-03-19 RX ADMIN — FLUTICASONE PROPIONATE 1 SPRAY: 50 SPRAY, METERED NASAL at 21:01

## 2025-03-19 RX ADMIN — METOPROLOL TARTRATE 100 MG: 50 TABLET, FILM COATED ORAL at 09:07

## 2025-03-19 RX ADMIN — SODIUM CHLORIDE, PRESERVATIVE FREE 10 ML: 5 INJECTION INTRAVENOUS at 09:07

## 2025-03-19 RX ADMIN — PRAVASTATIN SODIUM 20 MG: 20 TABLET ORAL at 20:37

## 2025-03-19 RX ADMIN — Medication 500 MG: at 09:06

## 2025-03-19 RX ADMIN — METOPROLOL TARTRATE 100 MG: 50 TABLET, FILM COATED ORAL at 20:37

## 2025-03-19 RX ADMIN — INSULIN LISPRO 1 UNITS: 100 INJECTION, SOLUTION INTRAVENOUS; SUBCUTANEOUS at 20:36

## 2025-03-19 RX ADMIN — FUROSEMIDE 20 MG: 20 TABLET ORAL at 09:06

## 2025-03-19 RX ADMIN — APIXABAN 5 MG: 5 TABLET, FILM COATED ORAL at 20:37

## 2025-03-19 RX ADMIN — Medication 1 CAPSULE: at 09:06

## 2025-03-19 RX ADMIN — Medication 1 TABLET: at 09:06

## 2025-03-19 RX ADMIN — DILTIAZEM HYDROCHLORIDE 240 MG: 120 CAPSULE, EXTENDED RELEASE ORAL at 09:06

## 2025-03-19 RX ADMIN — ACETAMINOPHEN 650 MG: 325 TABLET ORAL at 00:52

## 2025-03-19 RX ADMIN — MEROPENEM 1000 MG: 1 INJECTION INTRAVENOUS at 04:00

## 2025-03-19 RX ADMIN — APIXABAN 5 MG: 5 TABLET, FILM COATED ORAL at 09:06

## 2025-03-19 RX ADMIN — POTASSIUM CHLORIDE 10 MEQ: 750 TABLET, EXTENDED RELEASE ORAL at 09:06

## 2025-03-19 RX ADMIN — MEROPENEM 1000 MG: 1 INJECTION INTRAVENOUS at 12:13

## 2025-03-19 RX ADMIN — PANTOPRAZOLE SODIUM 40 MG: 40 TABLET, DELAYED RELEASE ORAL at 06:02

## 2025-03-19 RX ADMIN — INSULIN LISPRO 1 UNITS: 100 INJECTION, SOLUTION INTRAVENOUS; SUBCUTANEOUS at 17:55

## 2025-03-19 RX ADMIN — MEROPENEM 1000 MG: 1 INJECTION INTRAVENOUS at 21:01

## 2025-03-19 RX ADMIN — ASPIRIN 81 MG: 81 TABLET, COATED ORAL at 09:06

## 2025-03-19 RX ADMIN — SODIUM CHLORIDE, PRESERVATIVE FREE 10 ML: 5 INJECTION INTRAVENOUS at 21:03

## 2025-03-19 RX ADMIN — FLUTICASONE PROPIONATE 1 SPRAY: 50 SPRAY, METERED NASAL at 09:08

## 2025-03-19 ASSESSMENT — PAIN SCALES - GENERAL: PAINLEVEL_OUTOF10: 3

## 2025-03-19 ASSESSMENT — PAIN DESCRIPTION - ORIENTATION: ORIENTATION: RIGHT

## 2025-03-19 ASSESSMENT — PAIN DESCRIPTION - LOCATION: LOCATION: LEG

## 2025-03-19 ASSESSMENT — PAIN DESCRIPTION - DESCRIPTORS: DESCRIPTORS: THROBBING

## 2025-03-19 NOTE — DISCHARGE INSTR - DIET
Good nutrition is important when healing from an illness, injury, or surgery.  Follow any nutrition recommendations given to you during your hospital stay.   If you were given an oral nutrition supplement while in the hospital, continue to take this supplement at home.  You can take it with meals, in-between meals, and/or before bedtime. These supplements can be purchased at most local grocery stores, pharmacies, and chain [employer]-stores.   If you have any questions about your diet or nutrition, call the hospital and ask for the dietitian.    You have sensitivity to gluten, which are proteins found in wheat, barley, and rye.  Gluten-free nutrition therapy will allow your intestine to heal.  It also will help prevent complications like bone disease that can happen if celiac disease goes untreated.  Oats  In the past, oats were believed to be harmful to persons with celiac disease.  If you would like to eat gluten-free oats, you should discuss their use with your physician and dietitian.  Manufacturers of gluten-free oats include [employer], Only Oats, Gluten-Free Oats, [employer], and [employer].    Foods Recommended  The following are examples of the many grains and plant foods that you can safely eat:  Amaranth  Arrowroot  Buckwheat  Cassava (manioc)  Corn  Flax  Greek rice grass (Montina)  Job’s tears  Legumes (dry beans, peas, lentils)  Millet  Finger millet (Ragi)  Nuts  Potatoes  Quinoa  Rice  Sago  Seeds  Sorghum  Soy  Tapioca  Tef (or teff)  Wild rice  Yucca  You may not be familiar with some of these grains and plant foods. However, they are used in gluten-free foods, and you will often find them in food ingredient lists for gluten-free products.    Foods Not Recommended  Do not eat any foods containing any of the following ingredients:  Wheat (all types, including einkorn, emmer, spelt, and kamut)  Barley  [place]  Malt  Oats (unless they are gluten-free)  These ingredients are found in many  Statement Selected

## 2025-03-19 NOTE — CARE COORDINATION
Social Work/ Case Management Transition of Care Planning (Lottie Churhc, REILLY [phone]):     Per report and chart review Pt is on room air. Pt on IV Merrem q8. Pending MRI Lumbar Spine today. PT , OT . Urology and ID following. Discharge plans are to return to [facility].  Auth has been received and will  today 3/19.  Ambulette form and ALO completed; envelope placed on the soft chart. SW/CM to follow.  REILLY Urbano  3/19/2025

## 2025-03-19 NOTE — CONSULTS
NEUROSURGERY CONSULTATION     Grecia Wilhelm     Chief Complaint: right leg weakness    HPI:   Grecia Wilhelm is a 79 y.o.  female who has history of AMS/UTI and was admitted.  Her history is significant for bilateral leg thrombectomies one month ago.  Since that time she has had right leg weakness.  She denies any leg pain, but does have some right thigh numbness.  She denies low back pain.     Past Medical History:   Diagnosis Date    Aortic stenosis, mild 10/01/2015    follows  Dr Gallardo at [facility] last visit 9/30/22    Arrhythmia     Arthritis     knees    Atrial fibrillation (HCC)     Bleeding from varicose veins of right lower extremity 06/08/2017    CAD (coronary artery disease)     Cerebrovascular accident (CVA) due to embolic occlusion of left middle cerebral artery (HCC) 10/2015    Confirmed by neurology    Cerebrovascular accident (CVA) due to embolic occlusion of left middle cerebral artery (HCC) 2013    Left parietal confirmed by neurology    Cerebrovascular disease 03/21/2013    CVA.no weakness/deficits    CHF (congestive heart failure) (HCC)     Critical limb ischemia of both lower extremities (HCC) 02/22/2025    Diabetes mellitus (HCC)     Hearing deficit     wears hearing aide left ear only    Heart disease     Hyperlipidemia     Hypertension     Iron deficiency anemia 10/23/2022    Migraine headache with aura     Mitral regurgitation 10/01/2015    mild    Moderate aortic stenosis by prior echocardiogram 2018    NCGS (non-celiac gluten sensitivity)     Severe aortic stenosis 09/25/2020    By 2D echo    TIA (transient ischemic attack)     Unspecified cerebral artery occlusion with cerebral infarction     Varicose veins of left leg with edema 06/08/2017    Venous stasis ulcer of right calf with fat layer exposed with varicose veins (HCC) 09/13/2019    Warfarin-induced coagulopathy 09/29/2015     Past Surgical History:   Procedure Laterality Date    ABDOMEN SURGERY      AORTIC VALVE REPLACEMENT N/A

## 2025-03-20 LAB
ANION GAP SERPL CALCULATED.3IONS-SCNC: 14 MMOL/L (ref 7–16)
BASOPHILS # BLD: 0.06 K/UL (ref 0–0.2)
BASOPHILS NFR BLD: 1 % (ref 0–2)
BUN SERPL-MCNC: 9 MG/DL (ref 6–23)
CALCIUM SERPL-MCNC: 8.8 MG/DL (ref 8.6–10.2)
CHLORIDE SERPL-SCNC: 107 MMOL/L (ref 98–107)
CO2 SERPL-SCNC: 21 MMOL/L (ref 22–29)
CREAT SERPL-MCNC: 0.7 MG/DL (ref 0.5–1)
EOSINOPHIL # BLD: 0.36 K/UL (ref 0.05–0.5)
EOSINOPHILS RELATIVE PERCENT: 4 % (ref 0–6)
ERYTHROCYTE [DISTWIDTH] IN BLOOD BY AUTOMATED COUNT: 18.1 % (ref 11.5–15)
GFR, ESTIMATED: 90 ML/MIN/1.73M2
GLUCOSE BLD-MCNC: 157 MG/DL (ref 74–99)
GLUCOSE BLD-MCNC: 190 MG/DL (ref 74–99)
GLUCOSE BLD-MCNC: 203 MG/DL (ref 74–99)
GLUCOSE BLD-MCNC: 231 MG/DL (ref 74–99)
GLUCOSE SERPL-MCNC: 144 MG/DL (ref 74–99)
HCT VFR BLD AUTO: 33.3 % (ref 34–48)
HGB BLD-MCNC: 10.3 G/DL (ref 11.5–15.5)
IMM GRANULOCYTES # BLD AUTO: 0.28 K/UL (ref 0–0.58)
IMM GRANULOCYTES NFR BLD: 3 % (ref 0–5)
INR PPP: 1.6
LYMPHOCYTES NFR BLD: 1.7 K/UL (ref 1.5–4)
LYMPHOCYTES RELATIVE PERCENT: 19 % (ref 20–42)
MCH RBC QN AUTO: 31.3 PG (ref 26–35)
MCHC RBC AUTO-ENTMCNC: 30.9 G/DL (ref 32–34.5)
MCV RBC AUTO: 101.2 FL (ref 80–99.9)
MONOCYTES NFR BLD: 0.58 K/UL (ref 0.1–0.95)
MONOCYTES NFR BLD: 7 % (ref 2–12)
NEUTROPHILS NFR BLD: 67 % (ref 43–80)
NEUTS SEG NFR BLD: 6.01 K/UL (ref 1.8–7.3)
PARTIAL THROMBOPLASTIN TIME: 33.8 SEC (ref 24.5–35.1)
PLATELET # BLD AUTO: 268 K/UL (ref 130–450)
PMV BLD AUTO: 9.2 FL (ref 7–12)
POTASSIUM SERPL-SCNC: 4 MMOL/L (ref 3.5–5)
PROTHROMBIN TIME: 17.9 SEC (ref 9.3–12.4)
RBC # BLD AUTO: 3.29 M/UL (ref 3.5–5.5)
SODIUM SERPL-SCNC: 142 MMOL/L (ref 132–146)
WBC OTHER # BLD: 9 K/UL (ref 4.5–11.5)

## 2025-03-20 PROCEDURE — 99232 SBSQ HOSP IP/OBS MODERATE 35: CPT | Performed by: NEUROLOGICAL SURGERY

## 2025-03-20 PROCEDURE — 05HB33Z INSERTION OF INFUSION DEVICE INTO RIGHT BASILIC VEIN, PERCUTANEOUS APPROACH: ICD-10-PCS | Performed by: INTERNAL MEDICINE

## 2025-03-20 PROCEDURE — 82962 GLUCOSE BLOOD TEST: CPT

## 2025-03-20 PROCEDURE — 85025 COMPLETE CBC W/AUTO DIFF WBC: CPT

## 2025-03-20 PROCEDURE — 2580000003 HC RX 258: Performed by: NURSE PRACTITIONER

## 2025-03-20 PROCEDURE — 51702 INSERT TEMP BLADDER CATH: CPT

## 2025-03-20 PROCEDURE — 76937 US GUIDE VASCULAR ACCESS: CPT

## 2025-03-20 PROCEDURE — 6370000000 HC RX 637 (ALT 250 FOR IP): Performed by: NURSE PRACTITIONER

## 2025-03-20 PROCEDURE — 51798 US URINE CAPACITY MEASURE: CPT

## 2025-03-20 PROCEDURE — 6360000002 HC RX W HCPCS: Performed by: NURSE PRACTITIONER

## 2025-03-20 PROCEDURE — 2140000000 HC CCU INTERMEDIATE R&B

## 2025-03-20 PROCEDURE — 80048 BASIC METABOLIC PNL TOTAL CA: CPT

## 2025-03-20 PROCEDURE — 2500000003 HC RX 250 WO HCPCS: Performed by: NURSE PRACTITIONER

## 2025-03-20 PROCEDURE — 97530 THERAPEUTIC ACTIVITIES: CPT

## 2025-03-20 PROCEDURE — 85610 PROTHROMBIN TIME: CPT

## 2025-03-20 PROCEDURE — C1751 CATH, INF, PER/CENT/MIDLINE: HCPCS

## 2025-03-20 PROCEDURE — 97129 THER IVNTJ 1ST 15 MIN: CPT

## 2025-03-20 PROCEDURE — 85730 THROMBOPLASTIN TIME PARTIAL: CPT

## 2025-03-20 PROCEDURE — 36410 VNPNXR 3YR/> PHY/QHP DX/THER: CPT

## 2025-03-20 PROCEDURE — 97535 SELF CARE MNGMENT TRAINING: CPT

## 2025-03-20 PROCEDURE — 36415 COLL VENOUS BLD VENIPUNCTURE: CPT

## 2025-03-20 RX ORDER — SODIUM CHLORIDE 0.9 % (FLUSH) 0.9 %
5-40 SYRINGE (ML) INJECTION PRN
Status: DISCONTINUED | OUTPATIENT
Start: 2025-03-20 | End: 2025-03-26 | Stop reason: HOSPADM

## 2025-03-20 RX ORDER — SODIUM CHLORIDE 0.9 % (FLUSH) 0.9 %
5-40 SYRINGE (ML) INJECTION EVERY 12 HOURS SCHEDULED
Status: DISCONTINUED | OUTPATIENT
Start: 2025-03-20 | End: 2025-03-26 | Stop reason: HOSPADM

## 2025-03-20 RX ORDER — LIDOCAINE HYDROCHLORIDE 10 MG/ML
50 INJECTION, SOLUTION EPIDURAL; INFILTRATION; INTRACAUDAL; PERINEURAL ONCE
Status: DISCONTINUED | OUTPATIENT
Start: 2025-03-20 | End: 2025-03-26 | Stop reason: HOSPADM

## 2025-03-20 RX ORDER — SODIUM CHLORIDE 9 MG/ML
INJECTION, SOLUTION INTRAVENOUS PRN
Status: DISCONTINUED | OUTPATIENT
Start: 2025-03-20 | End: 2025-03-26 | Stop reason: HOSPADM

## 2025-03-20 RX ADMIN — PRAVASTATIN SODIUM 20 MG: 20 TABLET ORAL at 20:49

## 2025-03-20 RX ADMIN — PANTOPRAZOLE SODIUM 40 MG: 40 TABLET, DELAYED RELEASE ORAL at 05:59

## 2025-03-20 RX ADMIN — SODIUM CHLORIDE, PRESERVATIVE FREE 10 ML: 5 INJECTION INTRAVENOUS at 08:19

## 2025-03-20 RX ADMIN — INSULIN LISPRO 1 UNITS: 100 INJECTION, SOLUTION INTRAVENOUS; SUBCUTANEOUS at 12:25

## 2025-03-20 RX ADMIN — Medication 500 MG: at 08:18

## 2025-03-20 RX ADMIN — DILTIAZEM HYDROCHLORIDE 240 MG: 120 CAPSULE, EXTENDED RELEASE ORAL at 08:19

## 2025-03-20 RX ADMIN — FLUTICASONE PROPIONATE 1 SPRAY: 50 SPRAY, METERED NASAL at 08:19

## 2025-03-20 RX ADMIN — INSULIN LISPRO 1 UNITS: 100 INJECTION, SOLUTION INTRAVENOUS; SUBCUTANEOUS at 17:27

## 2025-03-20 RX ADMIN — METOPROLOL TARTRATE 100 MG: 50 TABLET, FILM COATED ORAL at 20:49

## 2025-03-20 RX ADMIN — METOPROLOL TARTRATE 100 MG: 50 TABLET, FILM COATED ORAL at 08:19

## 2025-03-20 RX ADMIN — SODIUM CHLORIDE, PRESERVATIVE FREE 5 ML: 5 INJECTION INTRAVENOUS at 19:47

## 2025-03-20 RX ADMIN — POTASSIUM CHLORIDE 10 MEQ: 750 TABLET, EXTENDED RELEASE ORAL at 08:18

## 2025-03-20 RX ADMIN — FUROSEMIDE 20 MG: 20 TABLET ORAL at 08:19

## 2025-03-20 RX ADMIN — APIXABAN 5 MG: 5 TABLET, FILM COATED ORAL at 08:18

## 2025-03-20 RX ADMIN — FLUTICASONE PROPIONATE 1 SPRAY: 50 SPRAY, METERED NASAL at 20:50

## 2025-03-20 RX ADMIN — Medication 1 TABLET: at 08:18

## 2025-03-20 RX ADMIN — POTASSIUM CHLORIDE 10 MEQ: 750 TABLET, EXTENDED RELEASE ORAL at 20:50

## 2025-03-20 RX ADMIN — INSULIN LISPRO 1 UNITS: 100 INJECTION, SOLUTION INTRAVENOUS; SUBCUTANEOUS at 20:49

## 2025-03-20 RX ADMIN — MEROPENEM 1000 MG: 1 INJECTION INTRAVENOUS at 12:02

## 2025-03-20 RX ADMIN — SODIUM CHLORIDE 1000 MG: 9 INJECTION INTRAMUSCULAR; INTRAVENOUS; SUBCUTANEOUS at 14:07

## 2025-03-20 RX ADMIN — Medication 1 CAPSULE: at 08:19

## 2025-03-20 RX ADMIN — APIXABAN 5 MG: 5 TABLET, FILM COATED ORAL at 20:49

## 2025-03-20 RX ADMIN — Medication 500 MG: at 20:49

## 2025-03-20 RX ADMIN — MEROPENEM 1000 MG: 1 INJECTION INTRAVENOUS at 06:04

## 2025-03-20 RX ADMIN — Medication 1000 UNITS: at 08:18

## 2025-03-20 RX ADMIN — ASPIRIN 81 MG: 81 TABLET, COATED ORAL at 08:18

## 2025-03-20 ASSESSMENT — PAIN SCALES - GENERAL: PAINLEVEL_OUTOF10: 0

## 2025-03-20 NOTE — CARE COORDINATION
Social Work/ Case Management Transition of Care Planning (Lottie Church, [phone]):     Per report and chart review Pt is on room air. Pt on IV Merrem q8. Pt had melgoza re-inserted. Per Neurosurgery documentation, they will make their plan pending therapy evals and if there is improvement, they may want a LACHO or lumbar decompression if Eliquis can be held. They also state if therapy shows no improvement they may consider a Laminectomy and L2-L5 fusion. Pt plans to discharge to [facility], auth was obtained but  yesterday, will resubmit when plan is finalized.  Ambulette form and ALO completed; envelope placed on the soft chart. SW/CM to follow.  Lottie Church, REILLY  3/20/2025

## 2025-03-21 LAB
ANION GAP SERPL CALCULATED.3IONS-SCNC: 12 MMOL/L (ref 7–16)
BASOPHILS # BLD: 0.04 K/UL (ref 0–0.2)
BASOPHILS NFR BLD: 1 % (ref 0–2)
BUN SERPL-MCNC: 9 MG/DL (ref 6–23)
CALCIUM SERPL-MCNC: 8.9 MG/DL (ref 8.6–10.2)
CHLORIDE SERPL-SCNC: 104 MMOL/L (ref 98–107)
CO2 SERPL-SCNC: 22 MMOL/L (ref 22–29)
CREAT SERPL-MCNC: 0.6 MG/DL (ref 0.5–1)
EOSINOPHIL # BLD: 0.29 K/UL (ref 0.05–0.5)
EOSINOPHILS RELATIVE PERCENT: 3 % (ref 0–6)
ERYTHROCYTE [DISTWIDTH] IN BLOOD BY AUTOMATED COUNT: 18.3 % (ref 11.5–15)
GFR, ESTIMATED: >90 ML/MIN/1.73M2
GLUCOSE BLD-MCNC: 125 MG/DL (ref 74–99)
GLUCOSE BLD-MCNC: 161 MG/DL (ref 74–99)
GLUCOSE BLD-MCNC: 221 MG/DL (ref 74–99)
GLUCOSE BLD-MCNC: 247 MG/DL (ref 74–99)
GLUCOSE SERPL-MCNC: 150 MG/DL (ref 74–99)
HCT VFR BLD AUTO: 34.3 % (ref 34–48)
HGB BLD-MCNC: 10.6 G/DL (ref 11.5–15.5)
IMM GRANULOCYTES # BLD AUTO: 0.21 K/UL (ref 0–0.58)
IMM GRANULOCYTES NFR BLD: 3 % (ref 0–5)
LYMPHOCYTES NFR BLD: 1.59 K/UL (ref 1.5–4)
LYMPHOCYTES RELATIVE PERCENT: 19 % (ref 20–42)
MCH RBC QN AUTO: 31.1 PG (ref 26–35)
MCHC RBC AUTO-ENTMCNC: 30.9 G/DL (ref 32–34.5)
MCV RBC AUTO: 100.6 FL (ref 80–99.9)
MONOCYTES NFR BLD: 0.48 K/UL (ref 0.1–0.95)
MONOCYTES NFR BLD: 6 % (ref 2–12)
NEUTROPHILS NFR BLD: 69 % (ref 43–80)
NEUTS SEG NFR BLD: 5.94 K/UL (ref 1.8–7.3)
PARTIAL THROMBOPLASTIN TIME: 35 SEC (ref 24.5–35.1)
PARTIAL THROMBOPLASTIN TIME: 55.5 SEC (ref 24.5–35.1)
PLATELET # BLD AUTO: 267 K/UL (ref 130–450)
PMV BLD AUTO: 9.1 FL (ref 7–12)
POTASSIUM SERPL-SCNC: 3.9 MMOL/L (ref 3.5–5)
RBC # BLD AUTO: 3.41 M/UL (ref 3.5–5.5)
SODIUM SERPL-SCNC: 138 MMOL/L (ref 132–146)
WBC OTHER # BLD: 8.6 K/UL (ref 4.5–11.5)

## 2025-03-21 PROCEDURE — 6360000002 HC RX W HCPCS: Performed by: FAMILY MEDICINE

## 2025-03-21 PROCEDURE — 2580000003 HC RX 258: Performed by: NURSE PRACTITIONER

## 2025-03-21 PROCEDURE — 80048 BASIC METABOLIC PNL TOTAL CA: CPT

## 2025-03-21 PROCEDURE — 82962 GLUCOSE BLOOD TEST: CPT

## 2025-03-21 PROCEDURE — 2500000003 HC RX 250 WO HCPCS: Performed by: NURSE PRACTITIONER

## 2025-03-21 PROCEDURE — 6370000000 HC RX 637 (ALT 250 FOR IP): Performed by: NURSE PRACTITIONER

## 2025-03-21 PROCEDURE — 2140000000 HC CCU INTERMEDIATE R&B

## 2025-03-21 PROCEDURE — 85025 COMPLETE CBC W/AUTO DIFF WBC: CPT

## 2025-03-21 PROCEDURE — 51702 INSERT TEMP BLADDER CATH: CPT

## 2025-03-21 PROCEDURE — 36415 COLL VENOUS BLD VENIPUNCTURE: CPT

## 2025-03-21 PROCEDURE — 6360000002 HC RX W HCPCS: Performed by: NURSE PRACTITIONER

## 2025-03-21 PROCEDURE — 97530 THERAPEUTIC ACTIVITIES: CPT

## 2025-03-21 PROCEDURE — 85730 THROMBOPLASTIN TIME PARTIAL: CPT

## 2025-03-21 RX ORDER — HEPARIN SODIUM 1000 [USP'U]/ML
2000 INJECTION, SOLUTION INTRAVENOUS; SUBCUTANEOUS PRN
Status: DISCONTINUED | OUTPATIENT
Start: 2025-03-21 | End: 2025-03-21

## 2025-03-21 RX ORDER — HEPARIN SODIUM 1000 [USP'U]/ML
2000 INJECTION, SOLUTION INTRAVENOUS; SUBCUTANEOUS PRN
Status: DISCONTINUED | OUTPATIENT
Start: 2025-03-21 | End: 2025-03-26 | Stop reason: HOSPADM

## 2025-03-21 RX ORDER — HEPARIN SODIUM 1000 [USP'U]/ML
4000 INJECTION, SOLUTION INTRAVENOUS; SUBCUTANEOUS ONCE
Status: COMPLETED | OUTPATIENT
Start: 2025-03-21 | End: 2025-03-21

## 2025-03-21 RX ORDER — HEPARIN SODIUM 10000 [USP'U]/100ML
5-30 INJECTION, SOLUTION INTRAVENOUS CONTINUOUS
Status: DISPENSED | OUTPATIENT
Start: 2025-03-21 | End: 2025-03-24

## 2025-03-21 RX ORDER — HEPARIN SODIUM 1000 [USP'U]/ML
4000 INJECTION, SOLUTION INTRAVENOUS; SUBCUTANEOUS PRN
Status: DISCONTINUED | OUTPATIENT
Start: 2025-03-21 | End: 2025-03-21

## 2025-03-21 RX ORDER — HEPARIN SODIUM 1000 [USP'U]/ML
4000 INJECTION, SOLUTION INTRAVENOUS; SUBCUTANEOUS PRN
Status: DISCONTINUED | OUTPATIENT
Start: 2025-03-21 | End: 2025-03-26 | Stop reason: HOSPADM

## 2025-03-21 RX ORDER — HEPARIN SODIUM 10000 [USP'U]/100ML
5-30 INJECTION, SOLUTION INTRAVENOUS CONTINUOUS
Status: DISCONTINUED | OUTPATIENT
Start: 2025-03-21 | End: 2025-03-21

## 2025-03-21 RX ADMIN — Medication 1000 UNITS: at 08:58

## 2025-03-21 RX ADMIN — Medication 500 MG: at 21:14

## 2025-03-21 RX ADMIN — PANTOPRAZOLE SODIUM 40 MG: 40 TABLET, DELAYED RELEASE ORAL at 05:34

## 2025-03-21 RX ADMIN — HEPARIN SODIUM 4000 UNITS: 1000 INJECTION INTRAVENOUS; SUBCUTANEOUS at 09:21

## 2025-03-21 RX ADMIN — HEPARIN SODIUM 12 UNITS/KG/HR: 10000 INJECTION, SOLUTION INTRAVENOUS at 15:39

## 2025-03-21 RX ADMIN — INSULIN LISPRO 1 UNITS: 100 INJECTION, SOLUTION INTRAVENOUS; SUBCUTANEOUS at 21:16

## 2025-03-21 RX ADMIN — Medication 500 MG: at 08:59

## 2025-03-21 RX ADMIN — SODIUM CHLORIDE, PRESERVATIVE FREE 5 ML: 5 INJECTION INTRAVENOUS at 19:38

## 2025-03-21 RX ADMIN — Medication 1 CAPSULE: at 09:00

## 2025-03-21 RX ADMIN — SODIUM CHLORIDE, PRESERVATIVE FREE 10 ML: 5 INJECTION INTRAVENOUS at 08:58

## 2025-03-21 RX ADMIN — METOPROLOL TARTRATE 100 MG: 50 TABLET, FILM COATED ORAL at 08:59

## 2025-03-21 RX ADMIN — FLUTICASONE PROPIONATE 1 SPRAY: 50 SPRAY, METERED NASAL at 21:14

## 2025-03-21 RX ADMIN — HEPARIN SODIUM 12 UNITS/KG/HR: 10000 INJECTION, SOLUTION INTRAVENOUS at 09:31

## 2025-03-21 RX ADMIN — METOPROLOL TARTRATE 100 MG: 50 TABLET, FILM COATED ORAL at 21:14

## 2025-03-21 RX ADMIN — INSULIN LISPRO 1 UNITS: 100 INJECTION, SOLUTION INTRAVENOUS; SUBCUTANEOUS at 13:21

## 2025-03-21 RX ADMIN — SODIUM CHLORIDE 1000 MG: 9 INJECTION INTRAMUSCULAR; INTRAVENOUS; SUBCUTANEOUS at 13:27

## 2025-03-21 RX ADMIN — POTASSIUM CHLORIDE 10 MEQ: 750 TABLET, EXTENDED RELEASE ORAL at 09:00

## 2025-03-21 RX ADMIN — POTASSIUM CHLORIDE 10 MEQ: 750 TABLET, EXTENDED RELEASE ORAL at 21:14

## 2025-03-21 RX ADMIN — PRAVASTATIN SODIUM 20 MG: 20 TABLET ORAL at 21:14

## 2025-03-21 RX ADMIN — DILTIAZEM HYDROCHLORIDE 240 MG: 120 CAPSULE, EXTENDED RELEASE ORAL at 09:00

## 2025-03-21 RX ADMIN — HEPARIN SODIUM 4000 UNITS: 1000 INJECTION INTRAVENOUS; SUBCUTANEOUS at 15:35

## 2025-03-21 RX ADMIN — FLUTICASONE PROPIONATE 1 SPRAY: 50 SPRAY, METERED NASAL at 09:32

## 2025-03-21 RX ADMIN — FUROSEMIDE 20 MG: 20 TABLET ORAL at 08:59

## 2025-03-21 RX ADMIN — SODIUM CHLORIDE, PRESERVATIVE FREE 10 ML: 5 INJECTION INTRAVENOUS at 09:32

## 2025-03-21 RX ADMIN — Medication 1 TABLET: at 08:58

## 2025-03-21 ASSESSMENT — PAIN SCALES - GENERAL: PAINLEVEL_OUTOF10: 0

## 2025-03-21 NOTE — CARE COORDINATION
Social Work/ Case Management Transition of Care Planning (Lottie Church, [phone]):     Per report and chart review Pt is on room air. Pt on IV Invanz q24. Per RN Eliquis held and Heparin drip stated.  PT , OT 15/24. Pt has midline. General surgery, Neurosurgery, Dietitian, Urology and ID following. Plan today is LACHO in IR, will attempt PT again, if that does not show improvement neurosurgery stated they may consider a Laminectomy and L2-L5 fusion . Pt plans to discharge to [facility], auth was obtained but  on 3/19, LUCERO spoke with CM assistant who is calling to restart auth.  Ambulette form and ALO completed; envelope placed on the soft chart. LUCERO/CM to follow.    Lottie Church, REILLY  3/21/2025

## 2025-03-21 NOTE — PROCEDURES
PROCEDURE NOTE  Date: 3/21/2025   Name: Grecia Wilhelm  YOB: 1945    Procedures: Lumbar LACHO  Discussed patient and IR procedure with bedside RN, all questions answered. Will call if able to send for patient, patient is an add on case.    [phone] - Consent obtained via telephone with patient's .

## 2025-03-22 LAB
ANION GAP SERPL CALCULATED.3IONS-SCNC: 16 MMOL/L (ref 7–16)
BASOPHILS # BLD: 0.04 K/UL (ref 0–0.2)
BASOPHILS NFR BLD: 0 % (ref 0–2)
BUN SERPL-MCNC: 12 MG/DL (ref 6–23)
CALCIUM SERPL-MCNC: 9 MG/DL (ref 8.6–10.2)
CHLORIDE SERPL-SCNC: 107 MMOL/L (ref 98–107)
CO2 SERPL-SCNC: 19 MMOL/L (ref 22–29)
CREAT SERPL-MCNC: 0.6 MG/DL (ref 0.5–1)
EOSINOPHIL # BLD: 0.2 K/UL (ref 0.05–0.5)
EOSINOPHILS RELATIVE PERCENT: 2 % (ref 0–6)
ERYTHROCYTE [DISTWIDTH] IN BLOOD BY AUTOMATED COUNT: 18.3 % (ref 11.5–15)
GFR, ESTIMATED: >90 ML/MIN/1.73M2
GLUCOSE BLD-MCNC: 139 MG/DL (ref 74–99)
GLUCOSE BLD-MCNC: 148 MG/DL (ref 74–99)
GLUCOSE BLD-MCNC: 203 MG/DL (ref 74–99)
GLUCOSE BLD-MCNC: 246 MG/DL (ref 74–99)
GLUCOSE SERPL-MCNC: 161 MG/DL (ref 74–99)
HCT VFR BLD AUTO: 35.4 % (ref 34–48)
HGB BLD-MCNC: 10.8 G/DL (ref 11.5–15.5)
IMM GRANULOCYTES # BLD AUTO: 0.15 K/UL (ref 0–0.58)
IMM GRANULOCYTES NFR BLD: 2 % (ref 0–5)
LYMPHOCYTES NFR BLD: 1.5 K/UL (ref 1.5–4)
LYMPHOCYTES RELATIVE PERCENT: 16 % (ref 20–42)
MCH RBC QN AUTO: 31.4 PG (ref 26–35)
MCHC RBC AUTO-ENTMCNC: 30.5 G/DL (ref 32–34.5)
MCV RBC AUTO: 102.9 FL (ref 80–99.9)
MONOCYTES NFR BLD: 0.52 K/UL (ref 0.1–0.95)
MONOCYTES NFR BLD: 6 % (ref 2–12)
NEUTROPHILS NFR BLD: 74 % (ref 43–80)
NEUTS SEG NFR BLD: 6.96 K/UL (ref 1.8–7.3)
PARTIAL THROMBOPLASTIN TIME: 42.1 SEC (ref 24.5–35.1)
PARTIAL THROMBOPLASTIN TIME: 61.7 SEC (ref 24.5–35.1)
PARTIAL THROMBOPLASTIN TIME: 69 SEC (ref 24.5–35.1)
PLATELET # BLD AUTO: 300 K/UL (ref 130–450)
PMV BLD AUTO: 9.5 FL (ref 7–12)
POTASSIUM SERPL-SCNC: 4 MMOL/L (ref 3.5–5)
RBC # BLD AUTO: 3.44 M/UL (ref 3.5–5.5)
SODIUM SERPL-SCNC: 142 MMOL/L (ref 132–146)
WBC OTHER # BLD: 9.4 K/UL (ref 4.5–11.5)

## 2025-03-22 PROCEDURE — 85730 THROMBOPLASTIN TIME PARTIAL: CPT

## 2025-03-22 PROCEDURE — 80048 BASIC METABOLIC PNL TOTAL CA: CPT

## 2025-03-22 PROCEDURE — 6370000000 HC RX 637 (ALT 250 FOR IP): Performed by: NURSE PRACTITIONER

## 2025-03-22 PROCEDURE — 2140000000 HC CCU INTERMEDIATE R&B

## 2025-03-22 PROCEDURE — 2500000003 HC RX 250 WO HCPCS: Performed by: NURSE PRACTITIONER

## 2025-03-22 PROCEDURE — 2580000003 HC RX 258: Performed by: NURSE PRACTITIONER

## 2025-03-22 PROCEDURE — 82962 GLUCOSE BLOOD TEST: CPT

## 2025-03-22 PROCEDURE — 6360000002 HC RX W HCPCS: Performed by: FAMILY MEDICINE

## 2025-03-22 PROCEDURE — 6360000002 HC RX W HCPCS: Performed by: NURSE PRACTITIONER

## 2025-03-22 PROCEDURE — 85025 COMPLETE CBC W/AUTO DIFF WBC: CPT

## 2025-03-22 PROCEDURE — 36415 COLL VENOUS BLD VENIPUNCTURE: CPT

## 2025-03-22 PROCEDURE — 97530 THERAPEUTIC ACTIVITIES: CPT

## 2025-03-22 RX ORDER — HYDRALAZINE HYDROCHLORIDE 20 MG/ML
10 INJECTION INTRAMUSCULAR; INTRAVENOUS EVERY 6 HOURS PRN
Status: DISCONTINUED | OUTPATIENT
Start: 2025-03-22 | End: 2025-03-26 | Stop reason: HOSPADM

## 2025-03-22 RX ADMIN — HEPARIN SODIUM 2000 UNITS: 1000 INJECTION, SOLUTION INTRAVENOUS; SUBCUTANEOUS at 06:35

## 2025-03-22 RX ADMIN — METOPROLOL TARTRATE 100 MG: 50 TABLET, FILM COATED ORAL at 20:56

## 2025-03-22 RX ADMIN — SODIUM CHLORIDE, PRESERVATIVE FREE 5 ML: 5 INJECTION INTRAVENOUS at 19:18

## 2025-03-22 RX ADMIN — Medication 1000 UNITS: at 09:55

## 2025-03-22 RX ADMIN — FUROSEMIDE 20 MG: 20 TABLET ORAL at 09:55

## 2025-03-22 RX ADMIN — ASPIRIN 81 MG: 81 TABLET, COATED ORAL at 09:56

## 2025-03-22 RX ADMIN — Medication 500 MG: at 20:57

## 2025-03-22 RX ADMIN — SODIUM CHLORIDE 1000 MG: 9 INJECTION INTRAMUSCULAR; INTRAVENOUS; SUBCUTANEOUS at 15:14

## 2025-03-22 RX ADMIN — HEPARIN SODIUM 14 UNITS/KG/HR: 10000 INJECTION, SOLUTION INTRAVENOUS at 20:46

## 2025-03-22 RX ADMIN — FLUTICASONE PROPIONATE 1 SPRAY: 50 SPRAY, METERED NASAL at 20:56

## 2025-03-22 RX ADMIN — DILTIAZEM HYDROCHLORIDE 240 MG: 120 CAPSULE, EXTENDED RELEASE ORAL at 09:55

## 2025-03-22 RX ADMIN — Medication 500 MG: at 09:56

## 2025-03-22 RX ADMIN — SODIUM CHLORIDE, PRESERVATIVE FREE 10 ML: 5 INJECTION INTRAVENOUS at 09:57

## 2025-03-22 RX ADMIN — HYDRALAZINE HYDROCHLORIDE 10 MG: 20 INJECTION INTRAMUSCULAR; INTRAVENOUS at 08:29

## 2025-03-22 RX ADMIN — POTASSIUM CHLORIDE 10 MEQ: 750 TABLET, EXTENDED RELEASE ORAL at 20:57

## 2025-03-22 RX ADMIN — PANTOPRAZOLE SODIUM 40 MG: 40 TABLET, DELAYED RELEASE ORAL at 05:18

## 2025-03-22 RX ADMIN — METOPROLOL TARTRATE 100 MG: 50 TABLET, FILM COATED ORAL at 09:56

## 2025-03-22 RX ADMIN — Medication 1 CAPSULE: at 09:56

## 2025-03-22 RX ADMIN — PRAVASTATIN SODIUM 20 MG: 20 TABLET ORAL at 20:57

## 2025-03-22 RX ADMIN — Medication 1 TABLET: at 09:57

## 2025-03-22 RX ADMIN — INSULIN LISPRO 1 UNITS: 100 INJECTION, SOLUTION INTRAVENOUS; SUBCUTANEOUS at 20:56

## 2025-03-22 RX ADMIN — FLUTICASONE PROPIONATE 1 SPRAY: 50 SPRAY, METERED NASAL at 09:57

## 2025-03-22 RX ADMIN — POTASSIUM CHLORIDE 10 MEQ: 750 TABLET, EXTENDED RELEASE ORAL at 09:56

## 2025-03-22 NOTE — CONSULTS
GENERAL SURGERY  CONSULT NOTE  3/22/2025      HPI  Grecia Wilhelm is a 79 y.o. female who presents for evaluation of CT findings.  Patient was admitted due to concern for possible stroke with slurred speech on 3/15.  Although on arrival although mental status and aphasia resolved was found to have a UTI.  Urology and neurosurgery have been following patient along with infectious disease.  Ultimately had a CT abdomen on 3/19 that showed cholelithiasis and questionable cholecystitis.  Patient's not had any abdominal tenderness and has been tolerating diet.  LFTs  are normal.  CT imaging was reviewed that shows cholelithiasis without any common bile duct or intrahepatic ductal dilatation.  No gallbladder wall thickening.      Past Medical History:   Diagnosis Date    Aortic stenosis, mild 10/01/2015    follows  Dr Gallardo at [facility] last visit 9/30/22    Arrhythmia     Arthritis     knees    Atrial fibrillation (HCC)     Bleeding from varicose veins of right lower extremity 06/08/2017    CAD (coronary artery disease)     Cerebrovascular accident (CVA) due to embolic occlusion of left middle cerebral artery (HCC) 10/2015    Confirmed by neurology    Cerebrovascular accident (CVA) due to embolic occlusion of left middle cerebral artery (HCC) 2013    Left parietal confirmed by neurology    Cerebrovascular disease 03/21/2013    CVA.no weakness/deficits    CHF (congestive heart failure) (HCC)     Critical limb ischemia of both lower extremities (HCC) 02/22/2025    Diabetes mellitus (HCC)     Hearing deficit     wears hearing aide left ear only    Heart disease     Hyperlipidemia     Hypertension     Iron deficiency anemia 10/23/2022    Migraine headache with aura     Mitral regurgitation 10/01/2015    mild    Moderate aortic stenosis by prior echocardiogram 2018    NCGS (non-celiac gluten sensitivity)     Severe aortic stenosis 09/25/2020    By 2D echo    TIA (transient ischemic attack)     Unspecified cerebral artery

## 2025-03-23 LAB
GLUCOSE BLD-MCNC: 125 MG/DL (ref 74–99)
GLUCOSE BLD-MCNC: 147 MG/DL (ref 74–99)
GLUCOSE BLD-MCNC: 173 MG/DL (ref 74–99)
GLUCOSE BLD-MCNC: 314 MG/DL (ref 74–99)
PARTIAL THROMBOPLASTIN TIME: 64.8 SEC (ref 24.5–35.1)
PARTIAL THROMBOPLASTIN TIME: 66.1 SEC (ref 24.5–35.1)
PARTIAL THROMBOPLASTIN TIME: 67 SEC (ref 24.5–35.1)
PARTIAL THROMBOPLASTIN TIME: 68.8 SEC (ref 24.5–35.1)

## 2025-03-23 PROCEDURE — 36415 COLL VENOUS BLD VENIPUNCTURE: CPT

## 2025-03-23 PROCEDURE — 2500000003 HC RX 250 WO HCPCS: Performed by: NURSE PRACTITIONER

## 2025-03-23 PROCEDURE — 1200000000 HC SEMI PRIVATE

## 2025-03-23 PROCEDURE — 6360000002 HC RX W HCPCS: Performed by: FAMILY MEDICINE

## 2025-03-23 PROCEDURE — 82962 GLUCOSE BLOOD TEST: CPT

## 2025-03-23 PROCEDURE — 85730 THROMBOPLASTIN TIME PARTIAL: CPT

## 2025-03-23 PROCEDURE — 6370000000 HC RX 637 (ALT 250 FOR IP): Performed by: NURSE PRACTITIONER

## 2025-03-23 PROCEDURE — 2580000003 HC RX 258: Performed by: NURSE PRACTITIONER

## 2025-03-23 PROCEDURE — 6360000002 HC RX W HCPCS: Performed by: NURSE PRACTITIONER

## 2025-03-23 RX ADMIN — SODIUM CHLORIDE, PRESERVATIVE FREE 10 ML: 5 INJECTION INTRAVENOUS at 20:44

## 2025-03-23 RX ADMIN — SODIUM CHLORIDE 1000 MG: 9 INJECTION INTRAMUSCULAR; INTRAVENOUS; SUBCUTANEOUS at 15:34

## 2025-03-23 RX ADMIN — INSULIN LISPRO 3 UNITS: 100 INJECTION, SOLUTION INTRAVENOUS; SUBCUTANEOUS at 20:49

## 2025-03-23 RX ADMIN — Medication 500 MG: at 10:03

## 2025-03-23 RX ADMIN — SODIUM CHLORIDE, PRESERVATIVE FREE 10 ML: 5 INJECTION INTRAVENOUS at 10:05

## 2025-03-23 RX ADMIN — Medication 500 MG: at 22:28

## 2025-03-23 RX ADMIN — ASPIRIN 81 MG: 81 TABLET, COATED ORAL at 10:02

## 2025-03-23 RX ADMIN — HEPARIN SODIUM 14 UNITS/KG/HR: 10000 INJECTION, SOLUTION INTRAVENOUS at 21:07

## 2025-03-23 RX ADMIN — METOPROLOL TARTRATE 100 MG: 50 TABLET, FILM COATED ORAL at 10:03

## 2025-03-23 RX ADMIN — FLUTICASONE PROPIONATE 1 SPRAY: 50 SPRAY, METERED NASAL at 20:45

## 2025-03-23 RX ADMIN — Medication 1 CAPSULE: at 10:03

## 2025-03-23 RX ADMIN — PRAVASTATIN SODIUM 20 MG: 20 TABLET ORAL at 20:49

## 2025-03-23 RX ADMIN — PANTOPRAZOLE SODIUM 40 MG: 40 TABLET, DELAYED RELEASE ORAL at 05:25

## 2025-03-23 RX ADMIN — POTASSIUM CHLORIDE 10 MEQ: 750 TABLET, EXTENDED RELEASE ORAL at 20:49

## 2025-03-23 RX ADMIN — METOPROLOL TARTRATE 100 MG: 50 TABLET, FILM COATED ORAL at 20:49

## 2025-03-23 RX ADMIN — SODIUM CHLORIDE, PRESERVATIVE FREE 10 ML: 5 INJECTION INTRAVENOUS at 10:03

## 2025-03-23 RX ADMIN — Medication 1000 UNITS: at 10:02

## 2025-03-23 RX ADMIN — Medication 1 TABLET: at 10:03

## 2025-03-23 RX ADMIN — DILTIAZEM HYDROCHLORIDE 240 MG: 120 CAPSULE, EXTENDED RELEASE ORAL at 10:02

## 2025-03-23 RX ADMIN — POTASSIUM CHLORIDE 10 MEQ: 750 TABLET, EXTENDED RELEASE ORAL at 10:02

## 2025-03-23 RX ADMIN — FUROSEMIDE 20 MG: 20 TABLET ORAL at 10:02

## 2025-03-23 ASSESSMENT — PAIN SCALES - GENERAL
PAINLEVEL_OUTOF10: 0

## 2025-03-23 ASSESSMENT — PAIN SCALES - WONG BAKER: WONGBAKER_NUMERICALRESPONSE: NO HURT

## 2025-03-24 ENCOUNTER — APPOINTMENT (OUTPATIENT)
Dept: INTERVENTIONAL RADIOLOGY/VASCULAR | Age: 80
DRG: 872 | End: 2025-03-24
Attending: NEUROLOGICAL SURGERY
Payer: MEDICARE

## 2025-03-24 LAB
ALBUMIN SERPL-MCNC: 3.4 G/DL (ref 3.5–5.2)
ALP SERPL-CCNC: 109 U/L (ref 35–104)
ALT SERPL-CCNC: 10 U/L (ref 0–32)
ANION GAP SERPL CALCULATED.3IONS-SCNC: 13 MMOL/L (ref 7–16)
AST SERPL-CCNC: 28 U/L (ref 0–31)
BASOPHILS # BLD: 0.03 K/UL (ref 0–0.2)
BASOPHILS NFR BLD: 0 % (ref 0–2)
BILIRUB SERPL-MCNC: 0.6 MG/DL (ref 0–1.2)
BUN SERPL-MCNC: 14 MG/DL (ref 6–23)
CALCIUM SERPL-MCNC: 9.5 MG/DL (ref 8.6–10.2)
CHLORIDE SERPL-SCNC: 104 MMOL/L (ref 98–107)
CO2 SERPL-SCNC: 23 MMOL/L (ref 22–29)
CREAT SERPL-MCNC: 0.7 MG/DL (ref 0.5–1)
EOSINOPHIL # BLD: 0.08 K/UL (ref 0.05–0.5)
EOSINOPHILS RELATIVE PERCENT: 1 % (ref 0–6)
ERYTHROCYTE [DISTWIDTH] IN BLOOD BY AUTOMATED COUNT: 18.2 % (ref 11.5–15)
GFR, ESTIMATED: 88 ML/MIN/1.73M2
GLUCOSE BLD-MCNC: 160 MG/DL (ref 74–99)
GLUCOSE BLD-MCNC: 226 MG/DL (ref 74–99)
GLUCOSE BLD-MCNC: 419 MG/DL (ref 74–99)
GLUCOSE SERPL-MCNC: 184 MG/DL (ref 74–99)
HCT VFR BLD AUTO: 36 % (ref 34–48)
HGB BLD-MCNC: 11.1 G/DL (ref 11.5–15.5)
IMM GRANULOCYTES # BLD AUTO: 0.07 K/UL (ref 0–0.58)
IMM GRANULOCYTES NFR BLD: 1 % (ref 0–5)
LYMPHOCYTES NFR BLD: 1.08 K/UL (ref 1.5–4)
LYMPHOCYTES RELATIVE PERCENT: 11 % (ref 20–42)
MCH RBC QN AUTO: 31.2 PG (ref 26–35)
MCHC RBC AUTO-ENTMCNC: 30.8 G/DL (ref 32–34.5)
MCV RBC AUTO: 101.1 FL (ref 80–99.9)
MONOCYTES NFR BLD: 0.64 K/UL (ref 0.1–0.95)
MONOCYTES NFR BLD: 7 % (ref 2–12)
NEUTROPHILS NFR BLD: 80 % (ref 43–80)
NEUTS SEG NFR BLD: 7.76 K/UL (ref 1.8–7.3)
PARTIAL THROMBOPLASTIN TIME: 29.8 SEC (ref 24.5–35.1)
PARTIAL THROMBOPLASTIN TIME: 62.7 SEC (ref 24.5–35.1)
PARTIAL THROMBOPLASTIN TIME: 64.9 SEC (ref 24.5–35.1)
PLATELET # BLD AUTO: 338 K/UL (ref 130–450)
PMV BLD AUTO: 9.2 FL (ref 7–12)
POTASSIUM SERPL-SCNC: 4.9 MMOL/L (ref 3.5–5)
PROT SERPL-MCNC: 6.3 G/DL (ref 6.4–8.3)
RBC # BLD AUTO: 3.56 M/UL (ref 3.5–5.5)
SODIUM SERPL-SCNC: 140 MMOL/L (ref 132–146)
WBC OTHER # BLD: 9.7 K/UL (ref 4.5–11.5)

## 2025-03-24 PROCEDURE — 85025 COMPLETE CBC W/AUTO DIFF WBC: CPT

## 2025-03-24 PROCEDURE — 1200000000 HC SEMI PRIVATE

## 2025-03-24 PROCEDURE — 2500000003 HC RX 250 WO HCPCS: Performed by: NURSE PRACTITIONER

## 2025-03-24 PROCEDURE — 6360000002 HC RX W HCPCS: Performed by: RADIOLOGY

## 2025-03-24 PROCEDURE — 3E0R33Z INTRODUCTION OF ANTI-INFLAMMATORY INTO SPINAL CANAL, PERCUTANEOUS APPROACH: ICD-10-PCS | Performed by: RADIOLOGY

## 2025-03-24 PROCEDURE — 6370000000 HC RX 637 (ALT 250 FOR IP): Performed by: NURSE PRACTITIONER

## 2025-03-24 PROCEDURE — 6360000004 HC RX CONTRAST MEDICATION: Performed by: RADIOLOGY

## 2025-03-24 PROCEDURE — 85730 THROMBOPLASTIN TIME PARTIAL: CPT

## 2025-03-24 PROCEDURE — 36415 COLL VENOUS BLD VENIPUNCTURE: CPT

## 2025-03-24 PROCEDURE — 80053 COMPREHEN METABOLIC PANEL: CPT

## 2025-03-24 PROCEDURE — 62323 NJX INTERLAMINAR LMBR/SAC: CPT

## 2025-03-24 PROCEDURE — 82962 GLUCOSE BLOOD TEST: CPT

## 2025-03-24 PROCEDURE — 2709999900 IR INJ INTERLAMINAR EPI/SUBARACHNOID LUMB/SAC

## 2025-03-24 RX ORDER — BUPIVACAINE HYDROCHLORIDE 2.5 MG/ML
INJECTION, SOLUTION EPIDURAL; INFILTRATION; INTRACAUDAL; PERINEURAL PRN
Status: COMPLETED | OUTPATIENT
Start: 2025-03-24 | End: 2025-03-24

## 2025-03-24 RX ORDER — TRIAMCINOLONE ACETONIDE 40 MG/ML
INJECTION, SUSPENSION INTRA-ARTICULAR; INTRAMUSCULAR PRN
Status: COMPLETED | OUTPATIENT
Start: 2025-03-24 | End: 2025-03-24

## 2025-03-24 RX ORDER — INSULIN LISPRO 100 [IU]/ML
0-8 INJECTION, SOLUTION INTRAVENOUS; SUBCUTANEOUS
Status: DISCONTINUED | OUTPATIENT
Start: 2025-03-25 | End: 2025-03-25

## 2025-03-24 RX ORDER — LIDOCAINE HYDROCHLORIDE 20 MG/ML
INJECTION, SOLUTION INFILTRATION; PERINEURAL PRN
Status: COMPLETED | OUTPATIENT
Start: 2025-03-24 | End: 2025-03-24

## 2025-03-24 RX ORDER — IOPAMIDOL 612 MG/ML
INJECTION, SOLUTION INTRATHECAL PRN
Status: COMPLETED | OUTPATIENT
Start: 2025-03-24 | End: 2025-03-24

## 2025-03-24 RX ADMIN — TRIAMCINOLONE ACETONIDE 40 MG: 40 INJECTION, SUSPENSION INTRA-ARTICULAR; INTRAMUSCULAR at 11:48

## 2025-03-24 RX ADMIN — Medication 1000 UNITS: at 07:56

## 2025-03-24 RX ADMIN — SODIUM CHLORIDE, PRESERVATIVE FREE 10 ML: 5 INJECTION INTRAVENOUS at 21:30

## 2025-03-24 RX ADMIN — IOPAMIDOL 2 ML: 612 INJECTION, SOLUTION INTRATHECAL at 11:48

## 2025-03-24 RX ADMIN — FLUTICASONE PROPIONATE 1 SPRAY: 50 SPRAY, METERED NASAL at 21:40

## 2025-03-24 RX ADMIN — POTASSIUM CHLORIDE 10 MEQ: 750 TABLET, EXTENDED RELEASE ORAL at 21:29

## 2025-03-24 RX ADMIN — SODIUM CHLORIDE, PRESERVATIVE FREE 10 ML: 5 INJECTION INTRAVENOUS at 21:29

## 2025-03-24 RX ADMIN — INSULIN LISPRO 4 UNITS: 100 INJECTION, SOLUTION INTRAVENOUS; SUBCUTANEOUS at 23:00

## 2025-03-24 RX ADMIN — FUROSEMIDE 20 MG: 20 TABLET ORAL at 07:56

## 2025-03-24 RX ADMIN — SODIUM CHLORIDE, PRESERVATIVE FREE 10 ML: 5 INJECTION INTRAVENOUS at 07:56

## 2025-03-24 RX ADMIN — INSULIN LISPRO 1 UNITS: 100 INJECTION, SOLUTION INTRAVENOUS; SUBCUTANEOUS at 17:00

## 2025-03-24 RX ADMIN — Medication 500 MG: at 07:56

## 2025-03-24 RX ADMIN — PRAVASTATIN SODIUM 20 MG: 20 TABLET ORAL at 21:29

## 2025-03-24 RX ADMIN — BUPIVACAINE HYDROCHLORIDE 1 ML: 2.5 INJECTION, SOLUTION EPIDURAL; INFILTRATION; INTRACAUDAL; PERINEURAL at 11:48

## 2025-03-24 RX ADMIN — METOPROLOL TARTRATE 100 MG: 50 TABLET, FILM COATED ORAL at 21:28

## 2025-03-24 RX ADMIN — FLUTICASONE PROPIONATE 1 SPRAY: 50 SPRAY, METERED NASAL at 07:57

## 2025-03-24 RX ADMIN — PANTOPRAZOLE SODIUM 40 MG: 40 TABLET, DELAYED RELEASE ORAL at 05:14

## 2025-03-24 RX ADMIN — Medication 500 MG: at 21:28

## 2025-03-24 RX ADMIN — Medication 1 CAPSULE: at 07:55

## 2025-03-24 RX ADMIN — SODIUM CHLORIDE, PRESERVATIVE FREE 10 ML: 5 INJECTION INTRAVENOUS at 07:57

## 2025-03-24 RX ADMIN — ASPIRIN 81 MG: 81 TABLET, COATED ORAL at 07:56

## 2025-03-24 RX ADMIN — POTASSIUM CHLORIDE 10 MEQ: 750 TABLET, EXTENDED RELEASE ORAL at 07:55

## 2025-03-24 RX ADMIN — LIDOCAINE HYDROCHLORIDE 8 ML: 20 INJECTION, SOLUTION INFILTRATION; PERINEURAL at 11:38

## 2025-03-24 RX ADMIN — DILTIAZEM HYDROCHLORIDE 240 MG: 120 CAPSULE, EXTENDED RELEASE ORAL at 07:56

## 2025-03-24 RX ADMIN — METOPROLOL TARTRATE 100 MG: 50 TABLET, FILM COATED ORAL at 07:56

## 2025-03-24 RX ADMIN — Medication 1 TABLET: at 07:56

## 2025-03-24 ASSESSMENT — PAIN SCALES - WONG BAKER: WONGBAKER_NUMERICALRESPONSE: NO HURT

## 2025-03-24 ASSESSMENT — PAIN DESCRIPTION - LOCATION: LOCATION: LEG

## 2025-03-24 ASSESSMENT — PAIN DESCRIPTION - ORIENTATION: ORIENTATION: RIGHT

## 2025-03-24 ASSESSMENT — PAIN SCALES - GENERAL
PAINLEVEL_OUTOF10: 5
PAINLEVEL_OUTOF10: 0

## 2025-03-24 ASSESSMENT — PAIN DESCRIPTION - DESCRIPTORS: DESCRIPTORS: ACHING;DISCOMFORT;THROBBING

## 2025-03-24 NOTE — BRIEF OP NOTE
Brief Postoperative Note      Patient: Grecia Wilhelm  YOB: 1945  MRN: [id number]    Date of Procedure: 3/24/2025    Lumbar stenoses    Post-Op Diagnosis: Same       Lumbar LACHO    Surgeon(s):  Hood Patiño MD    Anesthesia: Local    Estimated Blood Loss (mL): Minimal    Complications: None    Specimens:   NA    Findings:  Other Findings: Lumar LACHO at L3-4    Electronically signed by Hood Patiño MD on 3/24/2025 at 5:10 PM

## 2025-03-24 NOTE — PROCEDURES
PROCEDURE NOTE  Date: 3/24/2025   Name: Grecia Wilhelm  YOB: 1945    Procedures    Discussed patient and IR procedure with bedside RN, all questions answered. Will call when able to send for patient.

## 2025-03-24 NOTE — CARE COORDINATION
CM update note.  Precert for [facility]  on 3/19.  Left message with Promise to check if precert was restarted on 3/21.  Jong/destination are completed.  Ambulance form with envelope is on the soft chart.  Patient is NPO for IR guided LACHO today.  Heparin gtt on hold.  NS following, if no relief after LACHO and PT will discuss possible L2-L5 fusion if cleared to be off blood thinners.      1105:  Spoke with Promise from [facility].  Precert was not restarted.  She will submit today.  CM/SW to follow.  Pedro Shields RN [phone].

## 2025-03-25 VITALS
HEART RATE: 82 BPM | OXYGEN SATURATION: 95 % | BODY MASS INDEX: 26.26 KG/M2 | RESPIRATION RATE: 18 BRPM | TEMPERATURE: 96.5 F | HEIGHT: 63 IN | WEIGHT: 148.2 LBS | DIASTOLIC BLOOD PRESSURE: 64 MMHG | SYSTOLIC BLOOD PRESSURE: 124 MMHG

## 2025-03-25 LAB
ALBUMIN SERPL-MCNC: 3.5 G/DL (ref 3.5–5.2)
ALP SERPL-CCNC: 107 U/L (ref 35–104)
ALT SERPL-CCNC: 10 U/L (ref 0–32)
ANION GAP SERPL CALCULATED.3IONS-SCNC: 14 MMOL/L (ref 7–16)
AST SERPL-CCNC: 18 U/L (ref 0–31)
BASOPHILS # BLD: 0.01 K/UL (ref 0–0.2)
BASOPHILS NFR BLD: 0 % (ref 0–2)
BILIRUB SERPL-MCNC: 0.6 MG/DL (ref 0–1.2)
BUN SERPL-MCNC: 17 MG/DL (ref 6–23)
CALCIUM SERPL-MCNC: 9.3 MG/DL (ref 8.6–10.2)
CHLORIDE SERPL-SCNC: 103 MMOL/L (ref 98–107)
CO2 SERPL-SCNC: 21 MMOL/L (ref 22–29)
CREAT SERPL-MCNC: 0.6 MG/DL (ref 0.5–1)
EOSINOPHIL # BLD: 0 K/UL (ref 0.05–0.5)
EOSINOPHILS RELATIVE PERCENT: 0 % (ref 0–6)
ERYTHROCYTE [DISTWIDTH] IN BLOOD BY AUTOMATED COUNT: 17.4 % (ref 11.5–15)
GFR, ESTIMATED: >90 ML/MIN/1.73M2
GLUCOSE BLD-MCNC: 202 MG/DL (ref 74–99)
GLUCOSE BLD-MCNC: 243 MG/DL (ref 74–99)
GLUCOSE BLD-MCNC: 375 MG/DL (ref 74–99)
GLUCOSE BLD-MCNC: 409 MG/DL (ref 74–99)
GLUCOSE SERPL-MCNC: 244 MG/DL (ref 74–99)
HBA1C MFR BLD: 6.3 % (ref 4–5.6)
HCT VFR BLD AUTO: 36.1 % (ref 34–48)
HGB BLD-MCNC: 11.6 G/DL (ref 11.5–15.5)
IMM GRANULOCYTES # BLD AUTO: 0.07 K/UL (ref 0–0.58)
IMM GRANULOCYTES NFR BLD: 1 % (ref 0–5)
LYMPHOCYTES NFR BLD: 0.58 K/UL (ref 1.5–4)
LYMPHOCYTES RELATIVE PERCENT: 7 % (ref 20–42)
MCH RBC QN AUTO: 31.4 PG (ref 26–35)
MCHC RBC AUTO-ENTMCNC: 32.1 G/DL (ref 32–34.5)
MCV RBC AUTO: 97.8 FL (ref 80–99.9)
MONOCYTES NFR BLD: 0.14 K/UL (ref 0.1–0.95)
MONOCYTES NFR BLD: 2 % (ref 2–12)
NEUTROPHILS NFR BLD: 90 % (ref 43–80)
NEUTS SEG NFR BLD: 7.52 K/UL (ref 1.8–7.3)
PLATELET # BLD AUTO: 314 K/UL (ref 130–450)
PMV BLD AUTO: 9.2 FL (ref 7–12)
POTASSIUM SERPL-SCNC: 4.6 MMOL/L (ref 3.5–5)
PROT SERPL-MCNC: 6.3 G/DL (ref 6.4–8.3)
RBC # BLD AUTO: 3.69 M/UL (ref 3.5–5.5)
RBC # BLD: ABNORMAL 10*6/UL
RBC # BLD: ABNORMAL 10*6/UL
SODIUM SERPL-SCNC: 138 MMOL/L (ref 132–146)
WBC OTHER # BLD: 8.3 K/UL (ref 4.5–11.5)

## 2025-03-25 PROCEDURE — 97535 SELF CARE MNGMENT TRAINING: CPT

## 2025-03-25 PROCEDURE — 83036 HEMOGLOBIN GLYCOSYLATED A1C: CPT

## 2025-03-25 PROCEDURE — 6370000000 HC RX 637 (ALT 250 FOR IP): Performed by: NURSE PRACTITIONER

## 2025-03-25 PROCEDURE — 80053 COMPREHEN METABOLIC PANEL: CPT

## 2025-03-25 PROCEDURE — 82962 GLUCOSE BLOOD TEST: CPT

## 2025-03-25 PROCEDURE — 2500000003 HC RX 250 WO HCPCS: Performed by: NURSE PRACTITIONER

## 2025-03-25 PROCEDURE — 36415 COLL VENOUS BLD VENIPUNCTURE: CPT

## 2025-03-25 PROCEDURE — 85025 COMPLETE CBC W/AUTO DIFF WBC: CPT

## 2025-03-25 PROCEDURE — 97530 THERAPEUTIC ACTIVITIES: CPT

## 2025-03-25 RX ORDER — LACTOBACILLUS RHAMNOSUS GG 10B CELL
1 CAPSULE ORAL DAILY
Qty: 30 CAPSULE | Refills: 0 | Status: SHIPPED | OUTPATIENT
Start: 2025-03-26

## 2025-03-25 RX ORDER — INSULIN LISPRO 100 [IU]/ML
0-16 INJECTION, SOLUTION INTRAVENOUS; SUBCUTANEOUS
Status: DISCONTINUED | OUTPATIENT
Start: 2025-03-25 | End: 2025-03-26 | Stop reason: HOSPADM

## 2025-03-25 RX ADMIN — INSULIN LISPRO 2 UNITS: 100 INJECTION, SOLUTION INTRAVENOUS; SUBCUTANEOUS at 09:18

## 2025-03-25 RX ADMIN — DILTIAZEM HYDROCHLORIDE 240 MG: 120 CAPSULE, EXTENDED RELEASE ORAL at 08:40

## 2025-03-25 RX ADMIN — METOPROLOL TARTRATE 100 MG: 50 TABLET, FILM COATED ORAL at 20:07

## 2025-03-25 RX ADMIN — FUROSEMIDE 20 MG: 20 TABLET ORAL at 08:41

## 2025-03-25 RX ADMIN — SODIUM CHLORIDE, PRESERVATIVE FREE 10 ML: 5 INJECTION INTRAVENOUS at 08:43

## 2025-03-25 RX ADMIN — ASPIRIN 81 MG: 81 TABLET, COATED ORAL at 08:41

## 2025-03-25 RX ADMIN — METOPROLOL TARTRATE 100 MG: 50 TABLET, FILM COATED ORAL at 08:40

## 2025-03-25 RX ADMIN — PRAVASTATIN SODIUM 20 MG: 20 TABLET ORAL at 20:07

## 2025-03-25 RX ADMIN — PANTOPRAZOLE SODIUM 40 MG: 40 TABLET, DELAYED RELEASE ORAL at 06:56

## 2025-03-25 RX ADMIN — INSULIN LISPRO 16 UNITS: 100 INJECTION, SOLUTION INTRAVENOUS; SUBCUTANEOUS at 11:58

## 2025-03-25 RX ADMIN — POTASSIUM CHLORIDE 10 MEQ: 750 TABLET, EXTENDED RELEASE ORAL at 20:07

## 2025-03-25 RX ADMIN — INSULIN LISPRO 16 UNITS: 100 INJECTION, SOLUTION INTRAVENOUS; SUBCUTANEOUS at 16:24

## 2025-03-25 RX ADMIN — POTASSIUM CHLORIDE 10 MEQ: 750 TABLET, EXTENDED RELEASE ORAL at 08:41

## 2025-03-25 RX ADMIN — FLUTICASONE PROPIONATE 1 SPRAY: 50 SPRAY, METERED NASAL at 08:42

## 2025-03-25 RX ADMIN — Medication 1 CAPSULE: at 08:41

## 2025-03-25 RX ADMIN — Medication 1000 UNITS: at 08:41

## 2025-03-25 RX ADMIN — Medication 500 MG: at 08:42

## 2025-03-25 RX ADMIN — Medication 500 MG: at 20:07

## 2025-03-25 RX ADMIN — CALCIUM CARBONATE-VITAMIN D TAB 500 MG-200 UNIT 1 TABLET: 500-200 TAB at 11:59

## 2025-03-25 RX ADMIN — INSULIN LISPRO 4 UNITS: 100 INJECTION, SOLUTION INTRAVENOUS; SUBCUTANEOUS at 20:07

## 2025-03-25 NOTE — CARE COORDINATION
CM update note.  Insurance precert obtained for [facility].  Per NP patient is ready for discharge today.  [facility]ong/destination completed.      1450:  Discharge order noted.  Transport set up via stretcher with [facility].   time is between [id number].  Nurse will need to call report to [phone].  Facility liaison, patient daughter and RN notified of  time.  Ambulance form with envelope placed on the soft chart.  Pedro Shields RN -[phone].

## 2025-03-25 NOTE — PLAN OF CARE
Problem: Chronic Conditions and Co-morbidities  Goal: Patient's chronic conditions and co-morbidity symptoms are monitored and maintained or improved  3/15/2025 2242 by Courtney Espinal RN  Outcome: Progressing  3/15/2025 1051 by Courtney Espinal RN  Outcome: Progressing  Flowsheets (Taken 3/15/2025 0634 by Fallon Sharma, RN)  Care Plan - Patient's Chronic Conditions and Co-Morbidity Symptoms are Monitored and Maintained or Improved: Monitor and assess patient's chronic conditions and comorbid symptoms for stability, deterioration, or improvement     Problem: Discharge Planning  Goal: Discharge to home or other facility with appropriate resources  3/15/2025 2242 by Courtney Espinal RN  Outcome: Progressing  3/15/2025 1051 by Courtney Espinal RN  Outcome: Progressing  Flowsheets (Taken 3/15/2025 0634 by Fallon Sharma, RN)  Discharge to home or other facility with appropriate resources: Identify barriers to discharge with patient and caregiver     Problem: Skin/Tissue Integrity  Goal: Skin integrity remains intact  Description: 1.  Monitor for areas of redness and/or skin breakdown  2.  Assess vascular access sites hourly  3.  Every 4-6 hours minimum:  Change oxygen saturation probe site  4.  Every 4-6 hours:  If on nasal continuous positive airway pressure, respiratory therapy assess nares and determine need for appliance change or resting period  3/15/2025 2242 by Courtney Espinal RN  Outcome: Progressing  3/15/2025 1051 by Courtney Espinal RN  Outcome: Progressing  Flowsheets (Taken 3/15/2025 0634 by Fallon Sharma, RN)  Skin Integrity Remains Intact: Monitor for areas of redness and/or skin breakdown     Problem: Safety - Adult  Goal: Free from fall injury  3/15/2025 2242 by Courtney Espinal RN  Outcome: Progressing  3/15/2025 1051 by Courtney Espinal RN  Outcome: Progressing     Problem: ABCDS Injury Assessment  Goal: Absence of physical injury  3/15/2025 2242 by 
  Problem: Chronic Conditions and Co-morbidities  Goal: Patient's chronic conditions and co-morbidity symptoms are monitored and maintained or improved  3/16/2025 1001 by Courtney Espinal RN  Outcome: Progressing  3/15/2025 2242 by Courtney Espinal RN  Outcome: Progressing     Problem: Discharge Planning  Goal: Discharge to home or other facility with appropriate resources  3/16/2025 1001 by Courtney Espinal RN  Outcome: Progressing  3/15/2025 2242 by Courtney Espinal RN  Outcome: Progressing     Problem: Skin/Tissue Integrity  Goal: Skin integrity remains intact  Description: 1.  Monitor for areas of redness and/or skin breakdown  2.  Assess vascular access sites hourly  3.  Every 4-6 hours minimum:  Change oxygen saturation probe site  4.  Every 4-6 hours:  If on nasal continuous positive airway pressure, respiratory therapy assess nares and determine need for appliance change or resting period  3/16/2025 1001 by Courtney Espinal RN  Outcome: Progressing  3/15/2025 2242 by Courtney Espinal RN  Outcome: Progressing     Problem: Safety - Adult  Goal: Free from fall injury  3/16/2025 1001 by Courtney Espinal RN  Outcome: Progressing  3/15/2025 2242 by Courtney Espinal RN  Outcome: Progressing     Problem: ABCDS Injury Assessment  Goal: Absence of physical injury  3/16/2025 1001 by Courtney Espinal RN  Outcome: Progressing  3/15/2025 2242 by Courtney Espinal RN  Outcome: Progressing     
  Problem: Chronic Conditions and Co-morbidities  Goal: Patient's chronic conditions and co-morbidity symptoms are monitored and maintained or improved  3/18/2025 0037 by Yane Elkins RN  Outcome: Progressing  Flowsheets (Taken 3/17/2025 1945)  Care Plan - Patient's Chronic Conditions and Co-Morbidity Symptoms are Monitored and Maintained or Improved: Monitor and assess patient's chronic conditions and comorbid symptoms for stability, deterioration, or improvement  3/17/2025 1134 by Tanya Cruz RN  Outcome: Progressing     Problem: Discharge Planning  Goal: Discharge to home or other facility with appropriate resources  3/18/2025 0037 by Yane Elkins RN  Outcome: Progressing  Flowsheets (Taken 3/17/2025 1945)  Discharge to home or other facility with appropriate resources: Identify barriers to discharge with patient and caregiver  3/17/2025 1134 by Tanya Cruz RN  Outcome: Progressing     Problem: Skin/Tissue Integrity  Goal: Skin integrity remains intact  Description: 1.  Monitor for areas of redness and/or skin breakdown  2.  Assess vascular access sites hourly  3.  Every 4-6 hours minimum:  Change oxygen saturation probe site  4.  Every 4-6 hours:  If on nasal continuous positive airway pressure, respiratory therapy assess nares and determine need for appliance change or resting period  3/18/2025 0037 by Yane Elkins RN  Outcome: Progressing  3/17/2025 1134 by Tanya Cruz RN  Outcome: Progressing     Problem: Safety - Adult  Goal: Free from fall injury  3/18/2025 0037 by Yane Elkins RN  Outcome: Progressing  3/17/2025 1134 by Tanya Cruz RN  Outcome: Progressing     Problem: ABCDS Injury Assessment  Goal: Absence of physical injury  3/18/2025 0037 by Yane Elkins RN  Outcome: Progressing  3/17/2025 1134 by Tanya Cruz RN  Outcome: Progressing     Problem: Pain  Goal: Verbalizes/displays adequate comfort level or baseline comfort level  3/18/2025 0037 by Severo 
  Problem: Chronic Conditions and Co-morbidities  Goal: Patient's chronic conditions and co-morbidity symptoms are monitored and maintained or improved  3/19/2025 1036 by Leslee Lezama RN  Outcome: Progressing  3/18/2025 2257 by Shyla Miller RN  Outcome: Progressing     Problem: Discharge Planning  Goal: Discharge to home or other facility with appropriate resources  3/19/2025 1036 by Leslee Lezama RN  Outcome: Progressing  3/18/2025 2257 by Shyla Miller RN  Outcome: Progressing     Problem: Skin/Tissue Integrity  Goal: Skin integrity remains intact  Description: 1.  Monitor for areas of redness and/or skin breakdown  2.  Assess vascular access sites hourly  3.  Every 4-6 hours minimum:  Change oxygen saturation probe site  4.  Every 4-6 hours:  If on nasal continuous positive airway pressure, respiratory therapy assess nares and determine need for appliance change or resting period  3/19/2025 1036 by Leslee Lezama RN  Outcome: Progressing  3/18/2025 2257 by Shyla Miller RN  Outcome: Progressing     Problem: Safety - Adult  Goal: Free from fall injury  3/19/2025 1036 by Leslee Lezama RN  Outcome: Progressing  3/18/2025 2257 by Shyla Miller RN  Outcome: Progressing     Problem: ABCDS Injury Assessment  Goal: Absence of physical injury  3/19/2025 1036 by Leslee Lezama RN  Outcome: Progressing  3/18/2025 2257 by Shyla Miller RN  Outcome: Progressing     Problem: Pain  Goal: Verbalizes/displays adequate comfort level or baseline comfort level  3/19/2025 1036 by Leslee Lezama RN  Outcome: Progressing  3/18/2025 2257 by Shyla Miller RN  Outcome: Progressing     Problem: Musculoskeletal - Adult  Goal: Return mobility to safest level of function  3/19/2025 1036 by Leslee Lezama RN  Outcome: Progressing  3/18/2025 2257 by Shyla Miller RN  Outcome: Progressing     
  Problem: Chronic Conditions and Co-morbidities  Goal: Patient's chronic conditions and co-morbidity symptoms are monitored and maintained or improved  3/20/2025 1618 by Mathew Ppoe RN  Outcome: Progressing  3/20/2025 0322 by Melody Novoa RN  Outcome: Progressing     Problem: Discharge Planning  Goal: Discharge to home or other facility with appropriate resources  3/20/2025 1618 by Mathew Pope RN  Outcome: Progressing  3/20/2025 0322 by Melody Novoa RN  Outcome: Progressing     Problem: Skin/Tissue Integrity  Goal: Skin integrity remains intact  Description: 1.  Monitor for areas of redness and/or skin breakdown  2.  Assess vascular access sites hourly  3.  Every 4-6 hours minimum:  Change oxygen saturation probe site  4.  Every 4-6 hours:  If on nasal continuous positive airway pressure, respiratory therapy assess nares and determine need for appliance change or resting period  3/20/2025 1618 by Mathew Pope RN  Outcome: Progressing  Flowsheets  Taken 3/20/2025 1035  Skin Integrity Remains Intact: Monitor for areas of redness and/or skin breakdown  Taken 3/20/2025 0750  Skin Integrity Remains Intact: Monitor for areas of redness and/or skin breakdown  3/20/2025 0322 by Melody Novoa RN  Outcome: Progressing     Problem: Safety - Adult  Goal: Free from fall injury  Recent Flowsheet Documentation  Taken 3/20/2025 1035 by Mathew Ppoe RN  Free From Fall Injury: Instruct family/caregiver on patient safety  3/20/2025 0322 by Melody Novoa RN  Outcome: Progressing     Problem: ABCDS Injury Assessment  Goal: Absence of physical injury  Recent Flowsheet Documentation  Taken 3/20/2025 1035 by Mathew Pope RN  Absence of Physical Injury: Implement safety measures based on patient assessment  3/20/2025 0322 by Melody Novoa RN  Outcome: Progressing     Problem: Pain  Goal: Verbalizes/displays adequate comfort level or baseline comfort level  3/20/2025 0322 by Melody Novoa 
  Problem: Chronic Conditions and Co-morbidities  Goal: Patient's chronic conditions and co-morbidity symptoms are monitored and maintained or improved  3/22/2025 1924 by Alannah Benson RN  Outcome: Progressing  3/22/2025 1256 by Johana Rust RN  Outcome: Progressing     Problem: Discharge Planning  Goal: Discharge to home or other facility with appropriate resources  3/22/2025 1924 by Alannah Benson RN  Outcome: Progressing  3/22/2025 1256 by Johana Rust RN  Outcome: Progressing     Problem: Skin/Tissue Integrity  Goal: Skin integrity remains intact  Description: 1.  Monitor for areas of redness and/or skin breakdown  2.  Assess vascular access sites hourly  3.  Every 4-6 hours minimum:  Change oxygen saturation probe site  4.  Every 4-6 hours:  If on nasal continuous positive airway pressure, respiratory therapy assess nares and determine need for appliance change or resting period  3/22/2025 1924 by Alannah Benson RN  Outcome: Progressing  3/22/2025 1256 by Johana Rust RN  Outcome: Progressing     Problem: Safety - Adult  Goal: Free from fall injury  3/22/2025 1924 by Alannah Benson RN  Outcome: Progressing  3/22/2025 1256 by Johana Rust RN  Outcome: Progressing     Problem: ABCDS Injury Assessment  Goal: Absence of physical injury  3/22/2025 1924 by Alannah Benson RN  Outcome: Progressing  3/22/2025 1256 by Johana Rust RN  Outcome: Progressing     Problem: Pain  Goal: Verbalizes/displays adequate comfort level or baseline comfort level  3/22/2025 1924 by Alannah Benson RN  Outcome: Progressing  3/22/2025 1256 by Johana Rust RN  Outcome: Adequate for Discharge     Problem: Musculoskeletal - Adult  Goal: Return mobility to safest level of function  3/22/2025 1924 by Alannah Benson RN  Outcome: Progressing  3/22/2025 1256 by Johana Rust RN  Outcome: Progressing     Problem: Neurosensory - Adult  Goal: Achieves stable or improved neurological status  3/22/2025 1924 by Kelley 
  Problem: Chronic Conditions and Co-morbidities  Goal: Patient's chronic conditions and co-morbidity symptoms are monitored and maintained or improved  3/24/2025 2349 by Johanny Fuller RN  Outcome: Progressing     Problem: Discharge Planning  Goal: Discharge to home or other facility with appropriate resources  3/24/2025 2349 by Johanny Fuller RN  Outcome: Progressing     Problem: Skin/Tissue Integrity  Goal: Skin integrity remains intact  Description: 1.  Monitor for areas of redness and/or skin breakdown  2.  Assess vascular access sites hourly  3.  Every 4-6 hours minimum:  Change oxygen saturation probe site  4.  Every 4-6 hours:  If on nasal continuous positive airway pressure, respiratory therapy assess nares and determine need for appliance change or resting period  3/24/2025 2349 by Johanny Fuller RN  Outcome: Progressing     Problem: Safety - Adult  Goal: Free from fall injury  3/24/2025 2349 by Johanny Fuller RN  Outcome: Progressing     Problem: ABCDS Injury Assessment  Goal: Absence of physical injury  3/24/2025 2349 by Johanny Fuller RN  Outcome: Progressing     Problem: Pain  Goal: Verbalizes/displays adequate comfort level or baseline comfort level  3/24/2025 2349 by Johanny Fuller RN  Outcome: Progressing     Problem: Musculoskeletal - Adult  Goal: Return mobility to safest level of function  Outcome: Progressing     Problem: Skin/Tissue Integrity - Adult  Goal: Skin integrity remains intact  Description: 1.  Monitor for areas of redness and/or skin breakdown  2.  Assess vascular access sites hourly  3.  Every 4-6 hours minimum:  Change oxygen saturation probe site  4.  Every 4-6 hours:  If on nasal continuous positive airway pressure, respiratory therapy assess nares and determine need for appliance change or resting period  3/24/2025 2349 by Johanny Fuller RN  Outcome: Progressing     Problem: Genitourinary - Adult  Goal: Absence of 
  Problem: Chronic Conditions and Co-morbidities  Goal: Patient's chronic conditions and co-morbidity symptoms are monitored and maintained or improved  Outcome: Not Progressing     Problem: Discharge Planning  Goal: Discharge to home or other facility with appropriate resources  Outcome: Not Progressing     Problem: Skin/Tissue Integrity  Goal: Skin integrity remains intact  Description: 1.  Monitor for areas of redness and/or skin breakdown  2.  Assess vascular access sites hourly  3.  Every 4-6 hours minimum:  Change oxygen saturation probe site  4.  Every 4-6 hours:  If on nasal continuous positive airway pressure, respiratory therapy assess nares and determine need for appliance change or resting period  Outcome: Not Progressing     Problem: Safety - Adult  Goal: Free from fall injury  Outcome: Not Progressing     Problem: ABCDS Injury Assessment  Goal: Absence of physical injury  Outcome: Not Progressing     Problem: Chronic Conditions and Co-morbidities  Goal: Patient's chronic conditions and co-morbidity symptoms are monitored and maintained or improved  Outcome: Not Progressing     Problem: Discharge Planning  Goal: Discharge to home or other facility with appropriate resources  Outcome: Not Progressing     Problem: Skin/Tissue Integrity  Goal: Skin integrity remains intact  Description: 1.  Monitor for areas of redness and/or skin breakdown  2.  Assess vascular access sites hourly  3.  Every 4-6 hours minimum:  Change oxygen saturation probe site  4.  Every 4-6 hours:  If on nasal continuous positive airway pressure, respiratory therapy assess nares and determine need for appliance change or resting period  Outcome: Not Progressing     Problem: Safety - Adult  Goal: Free from fall injury  Outcome: Not Progressing     Problem: ABCDS Injury Assessment  Goal: Absence of physical injury  Outcome: Not Progressing     
  Problem: Chronic Conditions and Co-morbidities  Goal: Patient's chronic conditions and co-morbidity symptoms are monitored and maintained or improved  Outcome: Progressing     Problem: Discharge Planning  Goal: Discharge to home or other facility with appropriate resources  Outcome: Progressing     Problem: Skin/Tissue Integrity  Goal: Skin integrity remains intact  Description: 1.  Monitor for areas of redness and/or skin breakdown  2.  Assess vascular access sites hourly  3.  Every 4-6 hours minimum:  Change oxygen saturation probe site  4.  Every 4-6 hours:  If on nasal continuous positive airway pressure, respiratory therapy assess nares and determine need for appliance change or resting period  Outcome: Progressing     Problem: Safety - Adult  Goal: Free from fall injury  Outcome: Progressing     Problem: ABCDS Injury Assessment  Goal: Absence of physical injury  Outcome: Progressing     Problem: Musculoskeletal - Adult  Goal: Return mobility to safest level of function  Outcome: Progressing     Problem: Neurosensory - Adult  Goal: Achieves stable or improved neurological status  Outcome: Progressing  Goal: Achieves maximal functionality and self care  Outcome: Progressing     Problem: Skin/Tissue Integrity - Adult  Goal: Skin integrity remains intact  Description: 1.  Monitor for areas of redness and/or skin breakdown  2.  Assess vascular access sites hourly  3.  Every 4-6 hours minimum:  Change oxygen saturation probe site  4.  Every 4-6 hours:  If on nasal continuous positive airway pressure, respiratory therapy assess nares and determine need for appliance change or resting period  Outcome: Progressing  Goal: Incisions, wounds, or drain sites healing without S/S of infection  Outcome: Progressing     Problem: Genitourinary - Adult  Goal: Absence of urinary retention  Outcome: Progressing     Problem: Infection - Adult  Goal: Absence of infection at discharge  Outcome: Progressing     Problem: Nutrition 
  Problem: Chronic Conditions and Co-morbidities  Goal: Patient's chronic conditions and co-morbidity symptoms are monitored and maintained or improved  Outcome: Progressing     Problem: Discharge Planning  Goal: Discharge to home or other facility with appropriate resources  Outcome: Progressing     Problem: Skin/Tissue Integrity  Goal: Skin integrity remains intact  Description: 1.  Monitor for areas of redness and/or skin breakdown  2.  Assess vascular access sites hourly  3.  Every 4-6 hours minimum:  Change oxygen saturation probe site  4.  Every 4-6 hours:  If on nasal continuous positive airway pressure, respiratory therapy assess nares and determine need for appliance change or resting period  Outcome: Progressing     Problem: Safety - Adult  Goal: Free from fall injury  Outcome: Progressing     Problem: ABCDS Injury Assessment  Goal: Absence of physical injury  Outcome: Progressing     Problem: Pain  Goal: Verbalizes/displays adequate comfort level or baseline comfort level  Outcome: Progressing     Problem: Musculoskeletal - Adult  Goal: Return mobility to safest level of function  Outcome: Progressing     
  Problem: Chronic Conditions and Co-morbidities  Goal: Patient's chronic conditions and co-morbidity symptoms are monitored and maintained or improved  Outcome: Progressing     Problem: Discharge Planning  Goal: Discharge to home or other facility with appropriate resources  Outcome: Progressing     Problem: Skin/Tissue Integrity  Goal: Skin integrity remains intact  Description: 1.  Monitor for areas of redness and/or skin breakdown  2.  Assess vascular access sites hourly  3.  Every 4-6 hours minimum:  Change oxygen saturation probe site  4.  Every 4-6 hours:  If on nasal continuous positive airway pressure, respiratory therapy assess nares and determine need for appliance change or resting period  Outcome: Progressing     Problem: Safety - Adult  Goal: Free from fall injury  Outcome: Progressing     Problem: ABCDS Injury Assessment  Goal: Absence of physical injury  Outcome: Progressing     Problem: Pain  Goal: Verbalizes/displays adequate comfort level or baseline comfort level  Outcome: Progressing     Problem: Musculoskeletal - Adult  Goal: Return mobility to safest level of function  Outcome: Progressing     Problem: Neurosensory - Adult  Goal: Achieves stable or improved neurological status  Outcome: Progressing  Goal: Achieves maximal functionality and self care  Outcome: Progressing     Problem: Respiratory - Adult  Goal: Achieves optimal ventilation and oxygenation  Outcome: Progressing     Problem: Skin/Tissue Integrity - Adult  Goal: Skin integrity remains intact  Description: 1.  Monitor for areas of redness and/or skin breakdown  2.  Assess vascular access sites hourly  3.  Every 4-6 hours minimum:  Change oxygen saturation probe site  4.  Every 4-6 hours:  If on nasal continuous positive airway pressure, respiratory therapy assess nares and determine need for appliance change or resting period  Outcome: Progressing  Goal: Incisions, wounds, or drain sites healing without S/S of infection  Outcome: 
  Problem: Chronic Conditions and Co-morbidities  Goal: Patient's chronic conditions and co-morbidity symptoms are monitored and maintained or improved  Outcome: Progressing     Problem: Discharge Planning  Goal: Discharge to home or other facility with appropriate resources  Outcome: Progressing     Problem: Skin/Tissue Integrity  Goal: Skin integrity remains intact  Description: 1.  Monitor for areas of redness and/or skin breakdown  2.  Assess vascular access sites hourly  3.  Every 4-6 hours minimum:  Change oxygen saturation probe site  4.  Every 4-6 hours:  If on nasal continuous positive airway pressure, respiratory therapy assess nares and determine need for appliance change or resting period  Outcome: Progressing     Problem: Safety - Adult  Goal: Free from fall injury  Outcome: Progressing     Problem: ABCDS Injury Assessment  Goal: Absence of physical injury  Outcome: Progressing     Problem: Pain  Goal: Verbalizes/displays adequate comfort level or baseline comfort level  Outcome: Progressing     Problem: Skin/Tissue Integrity - Adult  Goal: Skin integrity remains intact  Description: 1.  Monitor for areas of redness and/or skin breakdown  2.  Assess vascular access sites hourly  3.  Every 4-6 hours minimum:  Change oxygen saturation probe site  4.  Every 4-6 hours:  If on nasal continuous positive airway pressure, respiratory therapy assess nares and determine need for appliance change or resting period  Outcome: Progressing     
  Problem: Chronic Conditions and Co-morbidities  Goal: Patient's chronic conditions and co-morbidity symptoms are monitored and maintained or improved  Outcome: Progressing     Problem: Discharge Planning  Goal: Discharge to home or other facility with appropriate resources  Outcome: Progressing     Problem: Skin/Tissue Integrity  Goal: Skin integrity remains intact  Description: 1.  Monitor for areas of redness and/or skin breakdown  2.  Assess vascular access sites hourly  3.  Every 4-6 hours minimum:  Change oxygen saturation probe site  4.  Every 4-6 hours:  If on nasal continuous positive airway pressure, respiratory therapy assess nares and determine need for appliance change or resting period  Outcome: Progressing  Flowsheets  Taken 3/21/2025 0951 by Mathew Pope RN  Skin Integrity Remains Intact: Monitor for areas of redness and/or skin breakdown  Taken 3/21/2025 0745 by Mathew Pope RN  Skin Integrity Remains Intact: Monitor for areas of redness and/or skin breakdown     Problem: Safety - Adult  Goal: Free from fall injury  Outcome: Progressing  Flowsheets (Taken 3/21/2025 0951 by Mathew Pope RN)  Free From Fall Injury: Instruct family/caregiver on patient safety     Problem: ABCDS Injury Assessment  Goal: Absence of physical injury  Outcome: Progressing  Flowsheets (Taken 3/21/2025 0951 by Mathew Pope RN)  Absence of Physical Injury: Implement safety measures based on patient assessment     Problem: Pain  Goal: Verbalizes/displays adequate comfort level or baseline comfort level  Outcome: Progressing     Problem: Musculoskeletal - Adult  Goal: Return mobility to safest level of function  Outcome: Progressing     Problem: Neurosensory - Adult  Goal: Achieves stable or improved neurological status  Outcome: Progressing     Problem: Neurosensory - Adult  Goal: Achieves maximal functionality and self care  Outcome: Progressing     Problem: Respiratory - Adult  Goal: Achieves optimal ventilation 
  Problem: Chronic Conditions and Co-morbidities  Goal: Patient's chronic conditions and co-morbidity symptoms are monitored and maintained or improved  Outcome: Progressing     Problem: Skin/Tissue Integrity  Goal: Skin integrity remains intact  Description: 1.  Monitor for areas of redness and/or skin breakdown  2.  Assess vascular access sites hourly  3.  Every 4-6 hours minimum:  Change oxygen saturation probe site  4.  Every 4-6 hours:  If on nasal continuous positive airway pressure, respiratory therapy assess nares and determine need for appliance change or resting period  Outcome: Progressing     
  Problem: Chronic Conditions and Co-morbidities  Goal: Patient's chronic conditions and co-morbidity symptoms are monitored and maintained or improved  Outcome: Progressing     Problem: Skin/Tissue Integrity  Goal: Skin integrity remains intact  Description: 1.  Monitor for areas of redness and/or skin breakdown  2.  Assess vascular access sites hourly  3.  Every 4-6 hours minimum:  Change oxygen saturation probe site  4.  Every 4-6 hours:  If on nasal continuous positive airway pressure, respiratory therapy assess nares and determine need for appliance change or resting period  Outcome: Progressing     Problem: Skin/Tissue Integrity - Adult  Goal: Skin integrity remains intact  Description: 1.  Monitor for areas of redness and/or skin breakdown  2.  Assess vascular access sites hourly  3.  Every 4-6 hours minimum:  Change oxygen saturation probe site  4.  Every 4-6 hours:  If on nasal continuous positive airway pressure, respiratory therapy assess nares and determine need for appliance change or resting period  Outcome: Progressing     
  Problem: Chronic Conditions and Co-morbidities  Goal: Patient's chronic conditions and co-morbidity symptoms are monitored and maintained or improved  Outcome: Progressing  Flowsheets (Taken 3/15/2025 0634 by Fallon Sharma, RN)  Care Plan - Patient's Chronic Conditions and Co-Morbidity Symptoms are Monitored and Maintained or Improved: Monitor and assess patient's chronic conditions and comorbid symptoms for stability, deterioration, or improvement     Problem: Discharge Planning  Goal: Discharge to home or other facility with appropriate resources  Outcome: Progressing  Flowsheets (Taken 3/15/2025 0634 by Fallon Sharma, RN)  Discharge to home or other facility with appropriate resources: Identify barriers to discharge with patient and caregiver     Problem: Skin/Tissue Integrity  Goal: Skin integrity remains intact  Description: 1.  Monitor for areas of redness and/or skin breakdown  2.  Assess vascular access sites hourly  3.  Every 4-6 hours minimum:  Change oxygen saturation probe site  4.  Every 4-6 hours:  If on nasal continuous positive airway pressure, respiratory therapy assess nares and determine need for appliance change or resting period  Outcome: Progressing  Flowsheets (Taken 3/15/2025 0634 by Fallon Sharma, RN)  Skin Integrity Remains Intact: Monitor for areas of redness and/or skin breakdown     Problem: Safety - Adult  Goal: Free from fall injury  Outcome: Progressing     Problem: ABCDS Injury Assessment  Goal: Absence of physical injury  Outcome: Progressing     
Adult  Goal: Achieves stable or improved neurological status  Outcome: Progressing  Flowsheets (Taken 3/20/2025 0750 by Mathew Pope RN)  Achieves stable or improved neurological status: Assess for and report changes in neurological status     Problem: Neurosensory - Adult  Goal: Achieves maximal functionality and self care  Outcome: Progressing     Problem: Respiratory - Adult  Goal: Achieves optimal ventilation and oxygenation  Outcome: Progressing  Flowsheets (Taken 3/20/2025 0750 by Mathew Pope RN)  Achieves optimal ventilation and oxygenation: Assess for changes in respiratory status     Problem: Skin/Tissue Integrity - Adult  Goal: Skin integrity remains intact  Description: 1.  Monitor for areas of redness and/or skin breakdown  2.  Assess vascular access sites hourly  3.  Every 4-6 hours minimum:  Change oxygen saturation probe site  4.  Every 4-6 hours:  If on nasal continuous positive airway pressure, respiratory therapy assess nares and determine need for appliance change or resting period  3/20/2025 2144 by Alannah Benson RN  Outcome: Progressing  3/20/2025 1618 by Mathew Pope RN  Outcome: Progressing  Flowsheets  Taken 3/20/2025 1035  Skin Integrity Remains Intact: Monitor for areas of redness and/or skin breakdown  Taken 3/20/2025 0750  Skin Integrity Remains Intact: Monitor for areas of redness and/or skin breakdown     Problem: Genitourinary - Adult  Goal: Absence of urinary retention  Outcome: Progressing  Flowsheets (Taken 3/20/2025 0750 by Mathew Pope RN)  Absence of urinary retention: Assess patient’s ability to void and empty bladder     Problem: Infection - Adult  Goal: Absence of infection at discharge  Outcome: Progressing     
0 = swallows foods/liquids without difficulty

## 2025-03-25 NOTE — DISCHARGE SUMMARY
[facility]   Progress note      Subjective:    The patient is awake and alert.    She looks well today  Indicates that she is feeling better overall  Right lower extremity remains weak  Objective:    BP (!) 145/78   Pulse (!) 116   Temp 98.1 °F (36.7 °C) (Oral)   Resp 20   Ht 1.6 m (5' 3\")   Wt 70.7 kg (155 lb 14.4 oz)   SpO2 92%   BMI 27.62 kg/m²     In: 120 [P.O.:120]  Out: 1350   In: 120   Out: 1350 [Urine:1350]    General appearance: NAD, conversant  HEENT: AT/NC, MMM  Neck: FROM, supple  Lungs: Clear to auscultation  CV: RRR, no MRGs  Vasc: Radial pulses 2+  Abdomen: Soft, non-tender; no masses or HSM  Extremities: Weakness of right leg  Skin: no rash, lesions or ulcers  Psych: Alert and oriented to person, place and time  Neuro: Alert and interactive     Recent Labs     03/14/25  1339 03/15/25  0654 03/16/25  0620   WBC 21.7* 14.8* 10.4   HGB 10.2* 8.9* 9.0*   HCT 31.7* 28.4* 29.0*    217 226       Recent Labs     03/14/25  1339 03/15/25  0654 03/16/25  0620    138 139   K 4.2 3.8 3.9   CL 97* 105 107   CO2 19* 17* 18*   BUN 15 12 10   CREATININE 1.0 0.7 0.7   CALCIUM 9.0 8.6 8.5*       Assessment:    Principal Problem:    Altered mental status, unspecified  Active Problems:    UTI (urinary tract infection)    Sepsis (HCC)  Resolved Problems:    * No resolved hospital problems. *      Plan:    ASSESSMENT:  AMS with aphasia: Resolved.  CT of the head without acute findings  +UTI  Leukocytosis, trending down/max temperature 99.4 in the past 24 hours  Known Hx CVA/TIA  Known Hx Severe AS s/p TAVR (1/8/2024)  Troponin elevation without acute ischemic EKG changes or chest pain  CAD (2020/ 2023 [facility] mild nonobstructive disease)  Chronic HFpEF  Chronic Atrial Fibrillation  Chronic anticoagulation with Eliquis  HTN  HLD  T2DM  [place] s/p Right cochlear implant  Anemia with H&H 10.2/31.7 (03/14)--> 8.9/28.4 (03/15), likely dilutional component        PLAN:  Obtain MRI brain to rule 
carotid bruit  Heart Inspection- shows no noted pulsations  Heart Palpation- no heaves or thrills; PMI is non-displaced   Heart Ausculation- Irregular rate and rhythm, no murmur. No s3, s4 or rub   PV: No lower extremity edema. No varicosities. Pedal pulses palpable, no clubbing or cyanosis.  All toes warm to touch with normal pallor  ABDOMEN: Soft, non-tender to light palpation. Bowel sounds present. No palpable masses no organomegaly; no abdominal bruit  MS: Good muscle strength and tone. No atrophy or abnormal movements.   : Munoz catheter with clear yellow urine  SKIN: Warm and dry no statis dermatitis or ulcers   NEURO / PSYCH: Oriented to person, place and time. Speech clear and appropriate. Follows all commands. Pleasant affect      Disposition: [facility]     Patient Condition at Discharge: Stable    Patient Instructions:      Medication List        START taking these medications      lactobacillus Caps capsule  Take 1 capsule by mouth daily  Start taking on: March 26, 2025            CONTINUE taking these medications      Accu-Chek [place] Plus strip  Generic drug: blood glucose test strips     apixaban 5 MG Tabs tablet  Commonly known as: ELIQUIS  Take 1 tablet by mouth 2 times daily     aspirin 81 MG EC tablet     calcium carbonate-vitamin D3 600-400 MG-UNIT Tabs per tab  Commonly known as: CALTRATE     dilTIAZem 240 MG extended release capsule  Commonly known as: CARDIZEM CD     fluticasone 50 MCG/ACT nasal spray  Commonly known as: FLONASE  1 spray by Nasal route 2 times daily 2 sprays in each nostril  Twice a day     furosemide 20 MG tablet  Commonly known as: LASIX     metFORMIN 500 MG tablet  Commonly known as: GLUCOPHAGE     metoprolol 100 MG tablet  Commonly known as: LOPRESSOR  Take 1 tablet by mouth 2 times daily     niacin 500 MG extended release tablet  Commonly known as: SLO-NIACIN     omeprazole 20 MG delayed release capsule  Commonly known as: PRILOSEC     potassium chloride 10 MEQ extended release

## 2025-03-26 ENCOUNTER — TELEPHONE (OUTPATIENT)
Dept: NEUROSURGERY | Age: 80
End: 2025-03-26

## 2025-03-26 ENCOUNTER — TELEPHONE (OUTPATIENT)
Dept: CARDIOLOGY CLINIC | Age: 80
End: 2025-03-26

## 2025-03-26 NOTE — TELEPHONE ENCOUNTER
Gallo from [facility] called to reschedule Grecia's appt w/ Dr. Guzman to discuss [place].  Appt made for 5/6/25 at 11:15 am  w/ an arrival time of 10:45 am.

## 2025-03-26 NOTE — PROGRESS NOTES
[facility]                                Progress note    Subjective:  Patient is lying in bed without complaints.  No acute events overnight.  Patient denies chest pain/shortness of breath.      Objective:    BP (!) 198/87   Pulse 97   Temp 97.8 °F (36.6 °C) (Temporal)   Resp 22   Ht 1.6 m (5' 3\")   Wt 68.6 kg (151 lb 3.2 oz)   SpO2 91%   BMI 26.78 kg/m²   CONST:  Well developed, well nourished elderly  female who appears stated age. Awake, alert, cooperative, no apparent distress  HEENT:   Head- Normocephalic, atraumatic   Eyes- Conjunctivae pink, anicteric  Throat- Oral mucosa pink and moist  Neck-  No stridor, trachea midline, no jugular venous distention. No adenopathy   CHEST: Chest symmetrical and non-tender to palpation. No accessory muscle use or intercostal retractions  RESPIRATORY: Lung sounds - clear throughout fields   CARDIOVASCULAR:     No carotid bruit  Heart Inspection- shows no noted pulsations  Heart Palpation- no heaves or thrills; PMI is non-displaced   Heart Ausculation- Regular rate and rhythm, no murmur. No s3, s4 or rub   PV: No lower extremity edema. No varicosities. Pedal pulses palpable, no clubbing or cyanosis   ABDOMEN: Soft, non-tender to light palpation. Bowel sounds present. No palpable masses no organomegaly; no abdominal bruit  MS: Good muscle strength and tone. No atrophy or abnormal movements.   : Deferred  SKIN: Warm and dry no statis dermatitis or ulcers   NEURO / PSYCH: Oriented to person, place and time. Speech clear and appropriate. Follows all commands. Pleasant affect      Recent Labs     03/20/25  0441 03/21/25  0707 03/22/25  0518   WBC 9.0 8.6 9.4   HGB 10.3* 10.6* 10.8*   HCT 33.3* 34.3 35.4    267 300       Recent Labs     03/20/25  0441 03/21/25  0707 03/22/25  0518    138 142   K 4.0 3.9 4.0    104 107   CO2 21* 22 19*   BUN 9 9 12   CREATININE 0.7 0.6 0.6   CALCIUM 8.8 8.9 9.0     03/15/2025 MRI Brain WO 
    [facility]                                Progress note    Subjective:  Denies chest pain and dyspnea  Complains of inability to move right leg completely.   States that her right leg is not in pain, \"I just can't get it to move\".     Objective:  Sitting up in bed, conversing without difficulty  No acute distress  Mary Johnson at the bedside, all questions answered   /68   Pulse 75   Temp 98 °F (36.7 °C) (Oral)   Resp 16   Ht 1.6 m (5' 3\")   Wt 69.8 kg (153 lb 12.8 oz)   SpO2 92%   BMI 27.24 kg/m²   CONST:  Well developed, well nourished elderly  female who appears stated age. Awake, alert, cooperative, no apparent distress  HEENT:   Head- Normocephalic, atraumatic   Eyes- Conjunctivae pink, anicteric  Throat- Oral mucosa pink and moist  Neck-  No stridor, trachea midline, no jugular venous distention. No adenopathy   CHEST: Chest symmetrical and non-tender to palpation. No accessory muscle use or intercostal retractions  RESPIRATORY: Lung sounds - clear throughout fields   CARDIOVASCULAR:     No carotid bruit  Heart Inspection- shows no noted pulsations  Heart Palpation- no heaves or thrills; PMI is non-displaced   Heart Ausculation- Regular rate and rhythm, no murmur. No s3, s4 or rub   PV: No lower extremity edema. No varicosities. Pedal pulses palpable, no clubbing or cyanosis   ABDOMEN: Soft, non-tender to light palpation. Bowel sounds present. No palpable masses no organomegaly; no abdominal bruit  MS: Good muscle strength and tone. No atrophy or abnormal movements.   : Deferred  SKIN: Warm and dry no statis dermatitis or ulcers   NEURO / PSYCH: Oriented to person, place and time. Speech clear and appropriate. Follows all commands. Pleasant affect      Recent Labs     03/15/25  0654 03/16/25  0620 03/17/25 0429   WBC 14.8* 10.4 9.1   HGB 8.9* 9.0* 9.6*   HCT 28.4* 29.0* 30.9*    226 246       Recent Labs     03/15/25  0654 03/16/25  0620 03/17/25  0429 
    [facility]                                Progress note    Subjective:  Denies chest pain and dyspnea  Denies lumbar pain, recently returned from IR s/p epidural injection  Complains of left great toe pain    Objective:  Lying almost completely flat in bed, conversing without difficulty  No acute distress  No family present at the bedside  BP (!) 146/69   Pulse 95   Temp 98 °F (36.7 °C) (Oral)   Resp 18   Ht 1.6 m (5' 3\")   Wt 69.2 kg (152 lb 8 oz)   SpO2 95%   BMI 27.01 kg/m²   CONST:  Well developed, well nourished elderly  female who appears stated age. Awake, alert, cooperative, no apparent distress  HEENT:   Head- Normocephalic, atraumatic   Eyes- Conjunctivae pink, anicteric  Throat- Oral mucosa pink and moist  Neck-  No stridor, trachea midline, no jugular venous distention. No adenopathy   CHEST: Chest symmetrical and non-tender to palpation. No accessory muscle use or intercostal retractions  RESPIRATORY: Lung sounds - clear throughout anterior and lateral fields   CARDIOVASCULAR:     No carotid bruit  Heart Inspection- shows no noted pulsations  Heart Palpation- no heaves or thrills; PMI is non-displaced   Heart Ausculation- Irregular rate and rhythm, no murmur. No s3, s4 or rub   PV: No lower extremity edema. No varicosities. Pedal pulses palpable, no clubbing or cyanosis.  All toes warm to touch with normal pallor  ABDOMEN: Soft, non-tender to light palpation. Bowel sounds present. No palpable masses no organomegaly; no abdominal bruit  MS: Good muscle strength and tone. No atrophy or abnormal movements.   : Deferred  SKIN: Warm and dry no statis dermatitis or ulcers   NEURO / PSYCH: Oriented to person, place and time. Speech clear and appropriate. Follows all commands. Pleasant affect      Recent Labs     03/22/25  0518   WBC 9.4   HGB 10.8*   HCT 35.4          Recent Labs     03/22/25  0518      K 4.0      CO2 19*   BUN 12   CREATININE 0.6 
    [facility]                                Progress note    Subjective:  Denies chest pain and dyspnea  Complains of inability to move right leg completely with intermittent numbness and tingling to RLE as well as pain sensations      Objective:  Sitting up in bed, conversing without difficulty  No acute distress  Daughter Elizabeth at the bedside, all questions answered   Daughter requesting probiotic as she is on antibiotic therapy and prone to yeast infections  BP (!) 143/75   Pulse 96   Temp 97.6 °F (36.4 °C) (Oral)   Resp 18   Ht 1.6 m (5' 3\")   Wt 69.5 kg (153 lb 3.5 oz)   SpO2 94%   BMI 27.14 kg/m²   CONST:  Well developed, well nourished elderly  female who appears stated age. Awake, alert, cooperative, no apparent distress  HEENT:   Head- Normocephalic, atraumatic   Eyes- Conjunctivae pink, anicteric  Throat- Oral mucosa pink and moist  Neck-  No stridor, trachea midline, no jugular venous distention. No adenopathy   CHEST: Chest symmetrical and non-tender to palpation. No accessory muscle use or intercostal retractions  RESPIRATORY: Lung sounds - clear throughout fields   CARDIOVASCULAR:     No carotid bruit  Heart Inspection- shows no noted pulsations  Heart Palpation- no heaves or thrills; PMI is non-displaced   Heart Ausculation- Regular rate and rhythm, no murmur. No s3, s4 or rub   PV: No lower extremity edema. No varicosities. Pedal pulses palpable, no clubbing or cyanosis   ABDOMEN: Soft, non-tender to light palpation. Bowel sounds present. No palpable masses no organomegaly; no abdominal bruit  MS: Good muscle strength and tone. No atrophy or abnormal movements.   : Deferred  SKIN: Warm and dry no statis dermatitis or ulcers   NEURO / PSYCH: Oriented to person, place and time. Speech clear and appropriate. Follows all commands. Pleasant affect      Recent Labs     03/16/25  0620 03/17/25  0429 03/18/25  0514   WBC 10.4 9.1 6.7   HGB 9.0* 9.6* 9.4*   HCT 29.0* 30.9* 
    [facility]                                Progress note    Subjective:  Patient is lying in bed without complaints.  No acute events overnight.  Patient denies chest pain/shortness of breath.      Objective:    /61   Pulse 63   Temp 97.8 °F (36.6 °C) (Oral)   Resp 18   Ht 1.6 m (5' 3\")   Wt 68.6 kg (151 lb 3.2 oz)   SpO2 95%   BMI 26.78 kg/m²   CONST:  Well developed, well nourished elderly  female who appears stated age. Awake, alert, cooperative, no apparent distress  HEENT:   Head- Normocephalic, atraumatic   Eyes- Conjunctivae pink, anicteric  Throat- Oral mucosa pink and moist  Neck-  No stridor, trachea midline, no jugular venous distention. No adenopathy   CHEST: Chest symmetrical and non-tender to palpation. No accessory muscle use or intercostal retractions  RESPIRATORY: Lung sounds - clear throughout fields   CARDIOVASCULAR:     No carotid bruit  Heart Inspection- shows no noted pulsations  Heart Palpation- no heaves or thrills; PMI is non-displaced   Heart Ausculation- Regular rate and rhythm, no murmur. No s3, s4 or rub   PV: No lower extremity edema. No varicosities. Pedal pulses palpable, no clubbing or cyanosis   ABDOMEN: Soft, non-tender to light palpation. Bowel sounds present. No palpable masses no organomegaly; no abdominal bruit  MS: Good muscle strength and tone. No atrophy or abnormal movements.   : Deferred  SKIN: Warm and dry no statis dermatitis or ulcers   NEURO / PSYCH: Oriented to person, place and time. Speech clear and appropriate. Follows all commands. Pleasant affect      Recent Labs     03/21/25  0707 03/22/25  0518   WBC 8.6 9.4   HGB 10.6* 10.8*   HCT 34.3 35.4    300       Recent Labs     03/21/25  0707 03/22/25  0518    142   K 3.9 4.0    107   CO2 22 19*   BUN 9 12   CREATININE 0.6 0.6   CALCIUM 8.9 9.0     03/15/2025 MRI Brain WO contrast:  1. Markedly limited examination secondary to hardware artifact. The 
    [facility]                                Progress note    Subjective:  Patient is lying in bed without complaints.  No acute events overnight.  Patient denies chest pain/shortness of breath.      Objective:    BP (!) 118/54   Pulse 72   Temp 98.4 °F (36.9 °C) (Oral)   Resp 18   Ht 1.6 m (5' 3\")   Wt 69.2 kg (152 lb 8.9 oz)   SpO2 96%   BMI 27.02 kg/m²   CONST:  Well developed, well nourished elderly  female who appears stated age. Awake, alert, cooperative, no apparent distress  HEENT:   Head- Normocephalic, atraumatic   Eyes- Conjunctivae pink, anicteric  Throat- Oral mucosa pink and moist  Neck-  No stridor, trachea midline, no jugular venous distention. No adenopathy   CHEST: Chest symmetrical and non-tender to palpation. No accessory muscle use or intercostal retractions  RESPIRATORY: Lung sounds - clear throughout fields   CARDIOVASCULAR:     No carotid bruit  Heart Inspection- shows no noted pulsations  Heart Palpation- no heaves or thrills; PMI is non-displaced   Heart Ausculation- Regular rate and rhythm, no murmur. No s3, s4 or rub   PV: No lower extremity edema. No varicosities. Pedal pulses palpable, no clubbing or cyanosis   ABDOMEN: Soft, non-tender to light palpation. Bowel sounds present. No palpable masses no organomegaly; no abdominal bruit  MS: Good muscle strength and tone. No atrophy or abnormal movements.   : Deferred  SKIN: Warm and dry no statis dermatitis or ulcers   NEURO / PSYCH: Oriented to person, place and time. Speech clear and appropriate. Follows all commands. Pleasant affect      Recent Labs     03/19/25 0425 03/20/25 0441 03/21/25  0707   WBC 7.8 9.0 8.6   HGB 10.2* 10.3* 10.6*   HCT 33.2* 33.3* 34.3    268 267       Recent Labs     03/19/25 0425 03/20/25  0441 03/21/25  0707    142 138   K 4.2 4.0 3.9    107 104   CO2 20* 21* 22   BUN 7 9 9   CREATININE 0.6 0.7 0.6   CALCIUM 9.2 8.8 8.9     03/15/2025 MRI Brain WO 
  [facility]  [place]  Progress Note    SUBJECTIVE:  Chief Complaint   Patient presents with    Aphasia     Patient is at a nursing home for rehab. LKW at 0830 and at 1130 saff came to get her for therapy when she was slurring her speech and \"saying weird things.\" And increased weakness requiring 2 people to help transfer. All symptoms have resolved by arrival to the hospital.        Patient resting in bed. Denies fever or chills. No nausea, vomiting, rash or diarrhea. Able to carry on appropriate discussions. Daughter at bedside       Review of systems:  As stated above in the chief complaint, otherwise negative.    Medications:  Scheduled Meds:   sodium chloride flush  5-40 mL IntraVENous 2 times per day    lidocaine 1 % injection  50 mg IntraDERmal Once    ertapenem (INVanz) 1,000 mg in sodium chloride (PF) 0.9 % 10 mL IV syringe  1,000 mg IntraVENous Q24H    lactobacillus  1 capsule Oral Daily    sodium chloride flush  5-40 mL IntraVENous 2 times per day    [Held by provider] apixaban  5 mg Oral BID    aspirin  81 mg Oral Daily    dilTIAZem  240 mg Oral Daily    fluticasone  1 spray Nasal BID    furosemide  20 mg Oral Daily    metoprolol  100 mg Oral BID    niacin  500 mg Oral BID    pantoprazole  40 mg Oral QAM AC    potassium chloride  10 mEq Oral BID    pravastatin  20 mg Oral Nightly    therapeutic multivitamin-minerals  1 tablet Oral Daily    Vitamin D  1,000 Units Oral Daily    insulin lispro  0-4 Units SubCUTAneous 4x Daily AC & HS     Continuous Infusions:   heparin (PORCINE) Infusion 14 Units/kg/hr (03/22/25 1342)    sodium chloride      sodium chloride      dextrose       PRN Meds:hydrALAZINE, heparin (porcine), heparin (porcine), sodium chloride flush, sodium chloride, sodium chloride flush, sodium chloride, ondansetron **OR** ondansetron, acetaminophen **OR** acetaminophen, bisacodyl, dextrose bolus **OR** dextrose bolus, glucagon (rDNA), dextrose, 
  Physician Progress Note      PATIENT:               SOURAV ACUNA  CSN #:                  [id number]  :                       1945  ADMIT DATE:       3/14/2025 12:55 PM  DISCH DATE:  RESPONDING  PROVIDER #:        Courtney Bauer MD          QUERY TEXT:    Patient admitted with AMS/UTI. Documentation reflects \"Sepsis\" in DS, no other   mention of sepsis.  On admission: WBC: 21.7, 14.8, HR: 113. If possible,   please document in progress notes and discharge summary the present on   admission status of sepsis:  The medical record reflects the following:  Risk Factors: UTI  Clinical Indicators: UC: ESCHERICHIA COLI ESBL >100,000 CFU/ML  Treatment: 1L NS boluses, Rocephin, Merrem, Continuous IVF  Options provided:  -- Yes, sepsis was present at the time of the order to admit to the hospital  -- Sepsis ruled out  -- Other - I will add my own diagnosis  -- Disagree - Not applicable / Not valid  -- Disagree - Clinically unable to determine / Unknown  -- Refer to Clinical Documentation Reviewer    PROVIDER RESPONSE TEXT:    Yes, sepsis was present at the time of the order to admit to the hospital.    Query created by: Evelia Owens on 3/19/2025 11:28 AM      Electronically signed by:  Courtney Bauer MD 3/21/2025 5:35 AM          
  [facility]  [place]  Progress Note    SUBJECTIVE:  Chief Complaint   Patient presents with    Aphasia     Patient is at a nursing home for rehab. LKW at 0830 and at 1130 saff came to get her for therapy when she was slurring her speech and \"saying weird things.\" And increased weakness requiring 2 people to help transfer. All symptoms have resolved by arrival to the hospital.        Patient resting in bed. Denies fever or chills. No nausea, vomiting, rash or diarrhea.    Review of systems:  As stated above in the chief complaint, otherwise negative.    Medications:  Scheduled Meds:   sodium chloride flush  5-40 mL IntraVENous 2 times per day    lidocaine 1 % injection  50 mg IntraDERmal Once    ertapenem (INVanz) 1,000 mg in sodium chloride (PF) 0.9 % 10 mL IV syringe  1,000 mg IntraVENous Q24H    lactobacillus  1 capsule Oral Daily    sodium chloride flush  5-40 mL IntraVENous 2 times per day    [Held by provider] apixaban  5 mg Oral BID    aspirin  81 mg Oral Daily    dilTIAZem  240 mg Oral Daily    fluticasone  1 spray Nasal BID    furosemide  20 mg Oral Daily    metoprolol  100 mg Oral BID    niacin  500 mg Oral BID    pantoprazole  40 mg Oral QAM AC    potassium chloride  10 mEq Oral BID    pravastatin  20 mg Oral Nightly    therapeutic multivitamin-minerals  1 tablet Oral Daily    Vitamin D  1,000 Units Oral Daily    insulin lispro  0-4 Units SubCUTAneous 4x Daily AC & HS     Continuous Infusions:   heparin (PORCINE) Infusion 14 Units/kg/hr (03/22/25 2046)    sodium chloride      sodium chloride      dextrose       PRN Meds:hydrALAZINE, heparin (porcine), heparin (porcine), sodium chloride flush, sodium chloride, sodium chloride flush, sodium chloride, ondansetron **OR** ondansetron, acetaminophen **OR** acetaminophen, bisacodyl, dextrose bolus **OR** dextrose bolus, glucagon (rDNA), dextrose, glucose    OBJECTIVE:  /87   Pulse 84   Temp 97.7 °F (36.5 °C) (Temporal)   
 [place] PROGRESS NOTE      Chief complaint: Follow-up of ESBL E coli UTI    The patient is a 79 y.o. female hard-of-hearing, with history of CAD, atrial fibrillation, aortic valve stenosis s/p TAVR in 2024, DM, previously admitted from 02/22-03/04 for lower limb ischemia concerning for embolic source s/p bilateral LE aortoiliac and distal thrombectomy on 02/22/2025, presented on 03/14 with slurred speech and fever of 102F at the nursing home.  On admission, she was afebrile and hemodynamically stable with leukocytosis of 21,000. Chest x-ray showed moderate generalized cardiomegaly, mild interstitial prominence in both lungs, no focal pneumonia.  CT abdomen and pelvis showed new moderate right pleural effusion with new moderate compressive atelectasis of posterior right lower lobe adjacent to the effusion, stable mild linear atelectasis posterior left lower lobe adjacent to the stable mildly elevated left hemidiaphragm, new mild pericholecystic fluid with mild thickening of gallbladder wall, 9 mm calculus in the gallbladder neck, no sign of pyelonephritis, stable moderate descending and sigmoid diverticulosis without diverticulitis.  Respiratory pathogen PCR panel was negative.  Blood cultures showed no growth.  Urinalysis showed pyuria of 10-20 WBCs with urine culture growing more than 100,000 CFU per mL of ESBL E. coli (resistant to fluoroquinolones and co-trimoxazole).  Ceftriaxone was started on admission then switched to meropenem on 03/17 then switched to ertapenem on 03/20.     Subjective: Patient was seen and examined. No chills, no abdominal pain, no diarrhea, no rash, no itching. Afebrile.      Objective:    Vitals:    03/21/25 0745   BP: 139/77   Pulse: 84   Resp: 20   Temp: 98.1 °F (36.7 °C)   SpO2: 92%     Constitutional: Alert, not in distress  Respiratory: Clear breath sounds, no crackles, no wheezes  Cardiovascular: Regular rate and rhythm, no murmurs  Gastrointestinal: Bowel sounds present, soft, 
4 Eyes Skin Assessment     NAME:  Grecia Wilhelm  YOB: 1945  MEDICAL RECORD NUMBER:  [id number]    The patient is being assessed for  Admission    I agree that at least one RN has performed a thorough Head to Toe Skin Assessment on the patient. ALL assessment sites listed below have been assessed.      Areas assessed by both nurses:    Head, Face, Ears, Shoulders, Back, Chest, Arms, Elbows, Hands, Sacrum. Buttock, Coccyx, Ischium, Legs. Feet and Heels, and Under Medical Devices         Does the Patient have a Wound? No noted wound(s)       Moody Prevention initiated by RN: Yes  Wound Care Orders initiated by RN: No    Pressure Injury (Stage 3,4, Unstageable, DTI, NWPT, and Complex wounds) if present, place Wound referral order by RN under : No    New Ostomies, if present place, Ostomy referral order under : No     Nurse 1 eSignature: Electronically signed by Ranjan Waite RN on 3/23/25 at 5:54 PM EDT    **SHARE this note so that the co-signing nurse can place an eSignature**    Nurse 2 eSignature: Electronically signed by Mary Coy RN on 3/23/25 at 5:55 PM EDT  
4 Eyes Skin Assessment     NAME:  Grecia Wilhelm  YOB: 1945  MEDICAL RECORD NUMBER:  [id number]    The patient is being assessed for  Admission    I agree that at least one RN has performed a thorough Head to Toe Skin Assessment on the patient. ALL assessment sites listed below have been assessed.      Areas assessed by both nurses:    Head, Face, Ears, Shoulders, Back, Chest, Arms, Elbows, Hands, Sacrum. Buttock, Coccyx, Ischium, Legs. Feet and Heels, and Under Medical Devices         Does the Patient have a Wound? No noted wound(s)       Moody Prevention initiated by RN: Yes  Wound Care Orders initiated by RN: No    Pressure Injury (Stage 3,4, Unstageable, DTI, NWPT, and Complex wounds) if present, place Wound referral order by RN under : No    New Ostomies, if present place, Ostomy referral order under : No     Nurse 1 eSignature: Electronically signed by Fallon Sharma RN on 3/15/25 at 6:34 AM EDT    **SHARE this note so that the co-signing nurse can place an eSignature**    Nurse 2 eSignature: Electronically signed by Yane Elkins RN on 3/15/25 at 6:35 AM EDT    
Assisted patient up to the chair for dinner. Patient needed minimal assistance once up with the walker. Patient's family brought food in for patient. This was heated up as requested.   
Bladder scan of post-void residual showed 11 cc.  
CHG single lumen power midline catheter Placement 3/20/2025    Product number: [id number]   Lot Number: [id number]      Ultrasound: yes   Right Basilic vein:                Upper Arm Circumference: (CM) 28    Size:(FR)/GUAGE 4.5/15 cm    Exposed Length: (CM) 3    Internal Length: (CM) 12   Cut: (CM) 0   Vein Measurement: 0.48 cm              Lidocaine Given: yes lidocaine 1% (from midline kit)                Procedure performed by RICHELLE Velasquez RN  3/20/2025  3:13 PM                          
Comprehensive Nutrition Assessment    Type and Reason for Visit:  Initial (LOS)    Nutrition Recommendations/Plan:   Recommend and start Glucerna supplement BID and Gelatein high protein supplement BID to help meet increased nutritional needs from surgical wound healing and d/t decreased po intake of meals.       Malnutrition Assessment:  Malnutrition Status:  At risk for malnutrition (03/21/25 1052)    Context:  Acute Illness     Findings of the 6 clinical characteristics of malnutrition:  Energy Intake:  50% or less of estimated energy requirements for 5 or more days  Weight Loss:  Unable to assess (d/t possible fluid shifts)     Body Fat Loss:  Unable to assess (d/t isolation)     Muscle Mass Loss:  Unable to assess (d/t isolation)    Fluid Accumulation:  No fluid accumulation     Strength:  Not Performed    Nutrition Assessment:    Patients po intake has been sporadic and decreased, averaging 25-50% of meals served since admission ; adm from nursing facility w/ AMS/aphasia/slurred speech ; CT of the head without acute findings ; noted right leg weakness and UTI ; recent admission for leg pain/bilateral ischemic legs/acute lower limb ischemia/aortic occlusion ; s/p bilateral lower extremity cutdown/embolectomy on 2/22 ; hx of DM/TIA/CVA ; hx of CAD/CHF/atrial fibrillation/hypertension ; hx of severe aortic stenosis s/p TAVR on 1/8/24 at [facility] ;  noted functional oropharyngeal swallow for age/premorbid functioning per speech who recommended easy to chew consistency solids with thin liquids ; will provide recommendations    Nutrition Related Findings:    -I&Os (-2.0 L), 1+ edema, U/L dentures, A&O x 3, active BS, A&O x 3, redness to buttocks, hyperglycemia ; Wound Type: Multiple, Surgical Incision (Incisions x 2)       Current Nutrition Intake & Therapies:    Average Meal Intake: 26-50%     ADULT DIET; Easy to Chew; 5 carb choices (75 gm/meal); Low Fat/Low Chol/High Fiber/EDIE; Low Sodium (2 gm); 1500 ml; Gluten 
Department of Neurosurgery  Progress Note    CHIEF COMPLAINT: right leg weakness     SUBJECTIVE:  LACHO yesterday, continues to have the same pain and weakness    REVIEW OF SYSTEMS :  Constitutional: Negative for chills and fever.    Neurological: Negative for dizziness, tremors and speech change.   CVS: negative fro chest pain  Resp: negative fro SOB    OBJECTIVE:   VITALS:  BP (!) 112/51   Pulse 69   Temp 98.3 °F (36.8 °C) (Temporal)   Resp 16   Ht 1.6 m (5' 3\")   Wt 67.2 kg (148 lb 3.2 oz)   SpO2 94%   BMI 26.25 kg/m²     PHYSICAL:  Alert, oriented  Appears stated age  PERRL  EOMI  Motor strength full except right HF and KE 2/5  Sensation decreased to LT in right L3 dist   Skin is warm  Abdomen is soft    DATA:  CBC:   Lab Results   Component Value Date/Time    WBC 8.3 03/25/2025 05:13 AM    RBC 3.69 03/25/2025 05:13 AM    HGB 11.6 03/25/2025 05:13 AM    HCT 36.1 03/25/2025 05:13 AM    MCV 97.8 03/25/2025 05:13 AM    MCH 31.4 03/25/2025 05:13 AM    MCHC 32.1 03/25/2025 05:13 AM    RDW 17.4 03/25/2025 05:13 AM     03/25/2025 05:13 AM    MPV 9.2 03/25/2025 05:13 AM     BMP:    Lab Results   Component Value Date/Time     03/25/2025 05:13 AM    K 4.6 03/25/2025 05:13 AM    K 3.9 10/21/2022 06:10 PM     03/25/2025 05:13 AM    CO2 21 03/25/2025 05:13 AM    BUN 17 03/25/2025 05:13 AM    CREATININE 0.6 03/25/2025 05:13 AM    CALCIUM 9.3 03/25/2025 05:13 AM    GFRAA >60 09/30/2015 06:40 AM    LABGLOM >90 03/25/2025 05:13 AM    LABGLOM >60 11/02/2022 01:57 AM    GLUCOSE 244 03/25/2025 05:13 AM     PT/INR:    Lab Results   Component Value Date/Time    PROTIME 17.9 03/20/2025 04:44 PM    INR 1.6 03/20/2025 04:44 PM     PTT:    Lab Results   Component Value Date/Time    APTT 29.8 03/24/2025 01:36 PM    APTT 71.2 11/02/2022 01:57 AM   [APTT}    Current Inpatient Medications  Current Facility-Administered Medications: oyster shell calcium w/D 500-5 MG-MCG tablet 1 tablet, 1 tablet, Oral, Daily  [START ON 
Department of Neurosurgery  Progress Note    CHIEF COMPLAINT: right leg weakness     SUBJECTIVE:  Patient sitting up in chair, still with some weakness, daughter at bedside     REVIEW OF SYSTEMS :  Constitutional: Negative for chills and fever.    Neurological: Negative for dizziness, tremors and speech change.   CVS: negative fro chest pain  Resp: negative fro SOB    OBJECTIVE:   VITALS:  /76   Pulse 85   Temp 97.2 °F (36.2 °C) (Temporal)   Resp 18   Ht 1.6 m (5' 3\")   Wt 68.6 kg (151 lb 3.2 oz)   SpO2 94%   BMI 26.78 kg/m²     PHYSICAL:  Alert, oriented  Appears stated age  PERRL  EOMI  Motor strength full except right HF and KE 2/5  Sensation decreased to LT in right L3 dist   Skin is warm  Abdomen is soft    DATA:  CBC:   Lab Results   Component Value Date/Time    WBC 9.0 03/20/2025 04:41 AM    RBC 3.29 03/20/2025 04:41 AM    HGB 10.3 03/20/2025 04:41 AM    HCT 33.3 03/20/2025 04:41 AM    .2 03/20/2025 04:41 AM    MCH 31.3 03/20/2025 04:41 AM    MCHC 30.9 03/20/2025 04:41 AM    RDW 18.1 03/20/2025 04:41 AM     03/20/2025 04:41 AM    MPV 9.2 03/20/2025 04:41 AM     BMP:    Lab Results   Component Value Date/Time     03/20/2025 04:41 AM    K 4.0 03/20/2025 04:41 AM    K 3.9 10/21/2022 06:10 PM     03/20/2025 04:41 AM    CO2 21 03/20/2025 04:41 AM    BUN 9 03/20/2025 04:41 AM    CREATININE 0.7 03/20/2025 04:41 AM    CALCIUM 8.8 03/20/2025 04:41 AM    GFRAA >60 09/30/2015 06:40 AM    LABGLOM 90 03/20/2025 04:41 AM    LABGLOM >60 11/02/2022 01:57 AM    GLUCOSE 144 03/20/2025 04:41 AM     PT/INR:    Lab Results   Component Value Date/Time    PROTIME 17.9 03/20/2025 04:44 PM    INR 1.6 03/20/2025 04:44 PM     PTT:    Lab Results   Component Value Date/Time    APTT 33.8 03/20/2025 04:44 PM    APTT 71.2 11/02/2022 01:57 AM   [APTT}    Current Inpatient Medications  Current Facility-Administered Medications: sodium chloride flush 0.9 % injection 5-40 mL, 5-40 mL, IntraVENous, 2 times 
Dr. Martin to take over as ID physician per office. Dr. Martin added to treatment team.    ALYSSA FARAH RN    
Dr. Sultana notfied via [employer] pt and daughter are requesting to see a different ID physician. Notified him floor will call the office tomorrow morning (3/21/25).   
ID consult called to office.     ID office returned call; Dr. Sultana to see the pt   
IV team consult sent via [facility]  
Infectious Disease office notified of pt request for different ID physician.   
MRI screening needs to be completed.    Please make sure your patient can lay flat onto their back. (not kyphotic/SOB/contracted, etc) -if not patient cannot come down to MRI.    If it is stated on screening that they need medicated for claustrophobia/pain/holding still -have the doctor put med orders in.    If patient is a prisoner- check with patients security they have the correct MRI accommodations done as in correct flex/plastic cuffs. Also let MRI know on screening patient is a prisoner.    If patient is on a IV drip that they cannot come off of for MRI- please inform MRI so we can coordinate with the RN that will come down to switch to MRI safe pump.    Thank you from your MRI team.    
Message sent via [employer] serve to Dr Sultana regarding clarification r/t Urology consult.  
Midline removed from right arm, minimal bleeding, pressure applied, clean bandage attached. Family at bedside. Vitals stable.  
Notified Dr. Zendejas of plans for Eliquis to be administered tonight and heparin drip to start tomorrow per primary team for patient to be able to have LACHO. Dr. Zendejas to place orders for LACHO.     ALYSSA FARAH RN    
Occupational Therapy  OCCUPATIONAL THERAPY INITIAL EVALUATION    [facility] BORG[facility]  [address] [place], [place]      Date:3/17/2025                                                Patient Name: Grecia Wilhelm  MRN: [id number]  : 1945  Room: [address] [place] [address]    Evaluating OT: Jeremy Casanova OTR/L #[id number]     Referring Provider: Courtney Bauer MD   Specific Provider Orders/Date: OT eval and treat 3/16/25    Diagnosis: Urinary tract infection without hematuria, site unspecified [N39.0]  Altered mental status, unspecified altered mental status type [R41.82]  Altered mental status, unspecified [R41.82]   Pt admitted to hospital with AMS, aphasia and weakness; UTI    Pertinent Medical History:  has a past medical history of Aortic stenosis, mild, Arrhythmia, Arthritis, Atrial fibrillation (HCC), Bleeding from varicose veins of right lower extremity, CAD (coronary artery disease), Cerebrovascular accident (CVA) due to embolic occlusion of left middle cerebral artery (HCC), Cerebrovascular accident (CVA) due to embolic occlusion of left middle cerebral artery (HCC), Cerebrovascular disease, CHF (congestive heart failure) (HCC), Critical limb ischemia of both lower extremities (HCC), Diabetes mellitus (HCC), Hearing deficit, Heart disease, Hyperlipidemia, Hypertension, Iron deficiency anemia, Migraine headache with aura, Mitral regurgitation, Moderate aortic stenosis by prior echocardiogram, NCGS (non-celiac gluten sensitivity), Severe aortic stenosis, TIA (transient ischemic attack), Unspecified cerebral artery occlusion with cerebral infarction, Varicose veins of left leg with edema, Venous stasis ulcer of right calf with fat layer exposed with varicose veins (HCC), and Warfarin-induced coagulopathy.       Precautions:  Fall Risk, R LE pain / weakness, bed/chair alarm, [place] - cochlear implant     Assessment of current deficits    [x] Functional mobility  [x]ADLs  [x] 
Occupational Therapy  OT BEDSIDE TREATMENT NOTE   [facility]ATURN[facility] JONY [facility]  [address] [place], [place]      Date:3/18/2025  Patient Name: Grecia Wilhelm  MRN: [id number]  : 1945  Room: [address] [place] [address]     Evaluating OT: Jeremy Casanova OTR/L #[id number]      Referring Provider: Courtney Bauer MD   Specific Provider Orders/Date: OT eval and treat 3/16/25     Diagnosis: Urinary tract infection without hematuria, site unspecified [N39.0]  Altered mental status, unspecified altered mental status type [R41.82]  Altered mental status, unspecified [R41.82]   Pt admitted to hospital with AMS, aphasia and weakness; UTI     Pertinent Medical History:  has a past medical history of Aortic stenosis, mild, Arrhythmia, Arthritis, Atrial fibrillation (HCC), Bleeding from varicose veins of right lower extremity, CAD (coronary artery disease), Cerebrovascular accident (CVA) due to embolic occlusion of left middle cerebral artery (HCC), Cerebrovascular accident (CVA) due to embolic occlusion of left middle cerebral artery (HCC), Cerebrovascular disease, CHF (congestive heart failure) (HCC), Critical limb ischemia of both lower extremities (HCC), Diabetes mellitus (HCC), Hearing deficit, Heart disease, Hyperlipidemia, Hypertension, Iron deficiency anemia, Migraine headache with aura, Mitral regurgitation, Moderate aortic stenosis by prior echocardiogram, NCGS (non-celiac gluten sensitivity), Severe aortic stenosis, TIA (transient ischemic attack), Unspecified cerebral artery occlusion with cerebral infarction, Varicose veins of left leg with edema, Venous stasis ulcer of right calf with fat layer exposed with varicose veins (HCC), and Warfarin-induced coagulopathy.         Precautions:  Fall Risk, R LE pain / weakness, bed/chair alarm, [place] - cochlear implant      Assessment of current deficits    [x] Functional mobility          [x]ADLs           [x] Strength                  []Cognition    
Occupational Therapy  OT BEDSIDE TREATMENT NOTE   [facility] JONY [facility]  [address] [place], [place]      Date:3/20/2025  Patient Name: Grecia Wilhelm  MRN: [id number]  : 1945  Room: [address] [place] [address]     Evaluating OT: Jeremy Casanova OTR/L #[id number]      Referring Provider: Courtney Bauer MD   Specific Provider Orders/Date: OT eval and treat 3/16/25     Diagnosis: Urinary tract infection without hematuria, site unspecified [N39.0]  Altered mental status, unspecified altered mental status type [R41.82]  Altered mental status, unspecified [R41.82]   Pt admitted to hospital with AMS, aphasia and weakness; UTI     Pertinent Medical History:  has a past medical history of Aortic stenosis, mild, Arrhythmia, Arthritis, Atrial fibrillation (HCC), Bleeding from varicose veins of right lower extremity, CAD (coronary artery disease), Cerebrovascular accident (CVA) due to embolic occlusion of left middle cerebral artery (HCC), Cerebrovascular accident (CVA) due to embolic occlusion of left middle cerebral artery (HCC), Cerebrovascular disease, CHF (congestive heart failure) (HCC), Critical limb ischemia of both lower extremities (HCC), Diabetes mellitus (HCC), Hearing deficit, Heart disease, Hyperlipidemia, Hypertension, Iron deficiency anemia, Migraine headache with aura, Mitral regurgitation, Moderate aortic stenosis by prior echocardiogram, NCGS (non-celiac gluten sensitivity), Severe aortic stenosis, TIA (transient ischemic attack), Unspecified cerebral artery occlusion with cerebral infarction, Varicose veins of left leg with edema, Venous stasis ulcer of right calf with fat layer exposed with varicose veins (HCC), and Warfarin-induced coagulopathy.         Precautions:  Fall Risk, R LE pain / weakness, bed/chair alarm, [place] - cochlear implant      Assessment of current deficits    [x] Functional mobility          [x]ADLs           [x] Strength                  []Cognition    
Patient's daughter states that her mother has never been able to get MRI's due to her cochlear implant on right side. She said she was told in the ED that her mother will not need MRI due to her CT results being okay per doctor.   
Perfect serve sent to Dr. Sultana to notify him that the patient's family members are requesting to see him at the bedside.  Andreina Suarez RN   
Physical Therapy  Physical Therapy Initial Assessment     Name: Grecia Wilhelm  : 1945  MRN: [id number]      Date of Service: 3/17/2025    Evaluating PT:  Mary Acuña PT, DPT [id number]    Room #:  6410/6410-A  Diagnosis:  Urinary tract infection without hematuria, site unspecified [N39.0]  Altered mental status, unspecified altered mental status type [R41.82]  Altered mental status, unspecified [R41.82]  PMHx/PSHx:    Past Medical History:   Diagnosis Date    Aortic stenosis, mild 10/01/2015    follows  Dr Gallardo at [facility] last visit 22    Arrhythmia     Arthritis     knees    Atrial fibrillation (HCC)     Bleeding from varicose veins of right lower extremity 2017    CAD (coronary artery disease)     Cerebrovascular accident (CVA) due to embolic occlusion of left middle cerebral artery (HCC) 10/2015    Confirmed by neurology    Cerebrovascular accident (CVA) due to embolic occlusion of left middle cerebral artery (HCC)     Left parietal confirmed by neurology    Cerebrovascular disease 2013    CVA.no weakness/deficits    CHF (congestive heart failure) (HCC)     Critical limb ischemia of both lower extremities (HCC) 2025    Diabetes mellitus (HCC)     Hearing deficit     wears hearing aide left ear only    Heart disease     Hyperlipidemia     Hypertension     Iron deficiency anemia 10/23/2022    Migraine headache with aura     Mitral regurgitation 10/01/2015    mild    Moderate aortic stenosis by prior echocardiogram     NCGS (non-celiac gluten sensitivity)     Severe aortic stenosis 2020    By 2D echo    TIA (transient ischemic attack)     Unspecified cerebral artery occlusion with cerebral infarction     Varicose veins of left leg with edema 2017    Venous stasis ulcer of right calf with fat layer exposed with varicose veins (HCC) 2019    Warfarin-induced coagulopathy 2015      Procedure/Surgery:  none this admission  Precautions:  Falls, contact 
Physical Therapy  Physical Therapy Treatment     Name: Grecia Wilhelm  : 1945  MRN: [id number]      Date of Service: 3/22/2025    Evaluating PT:  Mary Acuña PT, DPT [id number]    Room #:  6412/6412-A  Diagnosis:  Urinary tract infection without hematuria, site unspecified [N39.0]  Altered mental status, unspecified altered mental status type [R41.82]  Altered mental status, unspecified [R41.82]  PMHx/PSHx:    Past Medical History:   Diagnosis Date    Aortic stenosis, mild 10/01/2015    follows  Dr Gallardo at [facility] last visit 22    Arrhythmia     Arthritis     knees    Atrial fibrillation (HCC)     Bleeding from varicose veins of right lower extremity 2017    CAD (coronary artery disease)     Cerebrovascular accident (CVA) due to embolic occlusion of left middle cerebral artery (HCC) 10/2015    Confirmed by neurology    Cerebrovascular accident (CVA) due to embolic occlusion of left middle cerebral artery (HCC)     Left parietal confirmed by neurology    Cerebrovascular disease 2013    CVA.no weakness/deficits    CHF (congestive heart failure) (HCC)     Critical limb ischemia of both lower extremities (HCC) 2025    Diabetes mellitus (HCC)     Hearing deficit     wears hearing aide left ear only    Heart disease     Hyperlipidemia     Hypertension     Iron deficiency anemia 10/23/2022    Migraine headache with aura     Mitral regurgitation 10/01/2015    mild    Moderate aortic stenosis by prior echocardiogram     NCGS (non-celiac gluten sensitivity)     Severe aortic stenosis 2020    By 2D echo    TIA (transient ischemic attack)     Unspecified cerebral artery occlusion with cerebral infarction     Varicose veins of left leg with edema 2017    Venous stasis ulcer of right calf with fat layer exposed with varicose veins (HCC) 2019    Warfarin-induced coagulopathy 2015      Procedure/Surgery:  none this admission  Precautions:  Falls, contact iso, mild 
Physical Therapy  Physical Therapy Treatment     Name: Grecia Wilhelm  : 1945  MRN: [id number]      Date of Service: 3/18/2025    Evaluating PT:  Mary Acuña PT, DPT [id number]    Room #:  6412/6412-A  Diagnosis:  Urinary tract infection without hematuria, site unspecified [N39.0]  Altered mental status, unspecified altered mental status type [R41.82]  Altered mental status, unspecified [R41.82]  PMHx/PSHx:    Past Medical History:   Diagnosis Date    Aortic stenosis, mild 10/01/2015    follows  Dr Gallardo at [facility] last visit 22    Arrhythmia     Arthritis     knees    Atrial fibrillation (HCC)     Bleeding from varicose veins of right lower extremity 2017    CAD (coronary artery disease)     Cerebrovascular accident (CVA) due to embolic occlusion of left middle cerebral artery (HCC) 10/2015    Confirmed by neurology    Cerebrovascular accident (CVA) due to embolic occlusion of left middle cerebral artery (HCC)     Left parietal confirmed by neurology    Cerebrovascular disease 2013    CVA.no weakness/deficits    CHF (congestive heart failure) (HCC)     Critical limb ischemia of both lower extremities (HCC) 2025    Diabetes mellitus (HCC)     Hearing deficit     wears hearing aide left ear only    Heart disease     Hyperlipidemia     Hypertension     Iron deficiency anemia 10/23/2022    Migraine headache with aura     Mitral regurgitation 10/01/2015    mild    Moderate aortic stenosis by prior echocardiogram     NCGS (non-celiac gluten sensitivity)     Severe aortic stenosis 2020    By 2D echo    TIA (transient ischemic attack)     Unspecified cerebral artery occlusion with cerebral infarction     Varicose veins of left leg with edema 2017    Venous stasis ulcer of right calf with fat layer exposed with varicose veins (HCC) 2019    Warfarin-induced coagulopathy 2015      Procedure/Surgery:  none this admission  Precautions:  Falls, contact iso, mild 
Physical Therapy  Physical Therapy Treatment     Name: Grecia Wilhelm  : 1945  MRN: [id number]      Date of Service: 3/21/2025    Evaluating PT:  Mary Acuña PT, DPT [id number]    Room #:  6412/6412-A  Diagnosis:  Urinary tract infection without hematuria, site unspecified [N39.0]  Altered mental status, unspecified altered mental status type [R41.82]  Altered mental status, unspecified [R41.82]  PMHx/PSHx:    Past Medical History:   Diagnosis Date    Aortic stenosis, mild 10/01/2015    follows  Dr Gallardo at [facility] last visit 22    Arrhythmia     Arthritis     knees    Atrial fibrillation (HCC)     Bleeding from varicose veins of right lower extremity 2017    CAD (coronary artery disease)     Cerebrovascular accident (CVA) due to embolic occlusion of left middle cerebral artery (HCC) 10/2015    Confirmed by neurology    Cerebrovascular accident (CVA) due to embolic occlusion of left middle cerebral artery (HCC)     Left parietal confirmed by neurology    Cerebrovascular disease 2013    CVA.no weakness/deficits    CHF (congestive heart failure) (HCC)     Critical limb ischemia of both lower extremities (HCC) 2025    Diabetes mellitus (HCC)     Hearing deficit     wears hearing aide left ear only    Heart disease     Hyperlipidemia     Hypertension     Iron deficiency anemia 10/23/2022    Migraine headache with aura     Mitral regurgitation 10/01/2015    mild    Moderate aortic stenosis by prior echocardiogram     NCGS (non-celiac gluten sensitivity)     Severe aortic stenosis 2020    By 2D echo    TIA (transient ischemic attack)     Unspecified cerebral artery occlusion with cerebral infarction     Varicose veins of left leg with edema 2017    Venous stasis ulcer of right calf with fat layer exposed with varicose veins (HCC) 2019    Warfarin-induced coagulopathy 2015      Procedure/Surgery:  none this admission  Precautions:  Falls, contact iso, mild 
Pt daughter emmanuelle updated on patient being picked up at present, updated yady sanchez [facility] of pt being discharged.  
Pt tolerated procedure well. Report called and pt placed in transport.  
Pt urinated 100mL in purewick. Bladder scanned PVR for 346mL. Munoz catheter inserted per order. 600mL output.     Melody Novoa RN    
Received a call from IR. IR unable to do epidural today, scheduled for Monday now. New orders received from Barbara Ford to start heparin drip.    ALYSSA FARAH RN    
SPEECH LANGUAGE PATHOLOGY  DAILY PROGRESS NOTE        PATIENT NAME:  Grecia Wilhelm      ROOM:  6412/6412-A :  1945         TODAY'S DATE:  3/20/2025       Patient seen for cognition      Pt was oriented to Person, Place, Date, Reason for hospitalization  Recall of personal information was intact   Recall of recent events/information required min cues  Pt completed STM tasks required min cues  Thought processing was delayed inconsistently   PT reported cognition is now at baseline.       Will discharge per her request.    
SPEECH/LANGUAGE PATHOLOGY  CLINICAL ASSESSMENT OF SWALLOWING FUNCTION   and PLAN OF CARE      PATIENT NAME:  Grecia Wilhelm  (female)     MRN:  [id number]    :  1945  (79 y.o.)  STATUS:  Inpatient: Room 6410/6410-A    TODAY'S DATE:  3/15/2025  ORDER DATE, DESCRIPTION AND REFERRING PROVIDER:  Dena Castellanos, KASSIE - TOMMIE   REASON FOR REFERRAL: speech and swallow eval   EVALUATING THERAPIST: PRATIK Orozco                 ASSESSMENT:    DYSPHAGIA DIAGNOSIS:   functional oropharyngeal swallow for age/premorbid functioning      DIET RECOMMENDATIONS:  Easy to chew consistency solids (IDDSI level 7, transitional) with  thin liquids (IDDSI level 0)     FEEDING RECOMMENDATIONS:     Assistance level:  No assistance needed      Compensatory strategies recommended: No strategies are recommended at this time      Discussed recommendations with:  Patient     SPEECH THERAPY  PLAN OF CARE   The dysphagia POC is established based on physician order, dysphagia diagnosis and results of clinical assessment     Dysphagia therapy is not recommended     Conditions Requiring Skilled Therapeutic Intervention for dysphagia:    not applicable    Specific dysphagia interventions to include:     Not applicable    Specific instructions for next treatment:  not applicable   Patient Treatment Goals:    Short Term Goals:  Not applicable no therapy warranted     Long Term Goals:   Not applicable no therapy warranted      Patient/family Goal:    not applicable                    ADMITTING DIAGNOSIS: Urinary tract infection without hematuria, site unspecified [N39.0]  Altered mental status, unspecified altered mental status type [R41.82]  Altered mental status, unspecified [R41.82]    VISIT DIAGNOSIS:      PATIENT REPORT/COMPLAINT: denies difficulty swallowing  RN cleared patient for participation in assessment     yes     PRIOR LEVEL OF SWALLOW FUNCTION:    PAST HISTORY OF DYSPHAGIA?: none reported    Home diet: Regular 
Spoke with Barbara OT who states pt is okay to have eliquis tonight but no doses after for plans for LACHO tentatively Monday. Barbara TO to place additional orders tomorrow for heparin gtt. Nursing to contact neuro surgery regarding plan and orders  
[facility]  [place]  Progress Note      Chief Complaint   Patient presents with    Aphasia     Patient is at a nursing home for rehab. LKW at 0830 and at 1130 saff came to get her for therapy when she was slurring her speech and \"saying weird things.\" And increased weakness requiring 2 people to help transfer. All symptoms have resolved by arrival to the hospital.        SUBJECTIVE:    Patient is tolerating medications. No reported adverse drug reactions.  No nausea, vomiting, diarrhea. Up in chair. Daughter present.     Review of systems:  As stated above in the chief complaint, otherwise negative.    Medications:  Scheduled Meds:   lactobacillus  1 capsule Oral Daily    meropenem  1,000 mg IntraVENous Q8H    sodium chloride flush  5-40 mL IntraVENous 2 times per day    apixaban  5 mg Oral BID    aspirin  81 mg Oral Daily    dilTIAZem  240 mg Oral Daily    fluticasone  1 spray Nasal BID    furosemide  20 mg Oral Daily    metoprolol  100 mg Oral BID    niacin  500 mg Oral BID    pantoprazole  40 mg Oral QAM AC    potassium chloride  10 mEq Oral BID    pravastatin  20 mg Oral Nightly    therapeutic multivitamin-minerals  1 tablet Oral Daily    Vitamin D  1,000 Units Oral Daily    insulin lispro  0-4 Units SubCUTAneous 4x Daily AC & HS     Continuous Infusions:   sodium chloride      dextrose       PRN Meds:sodium chloride flush, sodium chloride, ondansetron **OR** ondansetron, acetaminophen **OR** acetaminophen, bisacodyl, dextrose bolus **OR** dextrose bolus, glucagon (rDNA), dextrose, glucose    OBJECTIVE:  BP (!) 144/81   Pulse 91   Temp 98.2 °F (36.8 °C) (Oral)   Resp 18   Ht 1.6 m (5' 3\")   Wt 68.6 kg (151 lb 3.2 oz)   SpO2 98%   BMI 26.78 kg/m²   Temp  Av °F (36.7 °C)  Min: 97.7 °F (36.5 °C)  Max: 98.2 °F (36.8 °C)  Constitutional: The patient is awake, alert, and oriented.   Skin: Warm and dry. No rashes were noted.   HEENT:  No jaundice. Moist mucous membranes, no 
[facility] [facility]  [facility]  Progress Note      Chief Complaint   Patient presents with    Aphasia     Patient is at a nursing home for rehab. LKW at 0830 and at 1130 saff came to get her for therapy when she was slurring her speech and \"saying weird things.\" And increased weakness requiring 2 people to help transfer. All symptoms have resolved by arrival to the hospital.        SUBJECTIVE:    Patient is tolerating medications. No reported adverse drug reactions.  No nausea, vomiting, diarrhea.    Review of systems:  As stated above in the chief complaint, otherwise negative.    Medications:  Scheduled Meds:   lactobacillus  1 capsule Oral Daily    meropenem  1,000 mg IntraVENous Q8H    sodium chloride flush  5-40 mL IntraVENous 2 times per day    apixaban  5 mg Oral BID    aspirin  81 mg Oral Daily    dilTIAZem  240 mg Oral Daily    fluticasone  1 spray Nasal BID    furosemide  20 mg Oral Daily    metoprolol  100 mg Oral BID    niacin  500 mg Oral BID    pantoprazole  40 mg Oral QAM AC    potassium chloride  10 mEq Oral BID    pravastatin  20 mg Oral Nightly    therapeutic multivitamin-minerals  1 tablet Oral Daily    Vitamin D  1,000 Units Oral Daily    insulin lispro  0-4 Units SubCUTAneous 4x Daily AC & HS     Continuous Infusions:   sodium chloride      dextrose       PRN Meds:sodium chloride flush, sodium chloride, ondansetron **OR** ondansetron, acetaminophen **OR** acetaminophen, bisacodyl, dextrose bolus **OR** dextrose bolus, glucagon (rDNA), dextrose, glucose    OBJECTIVE:  BP (!) 142/84   Pulse 88   Temp 97.8 °F (36.6 °C) (Temporal)   Resp 20   Ht 1.6 m (5' 3\")   Wt 70.1 kg (154 lb 9.6 oz)   SpO2 93%   BMI 27.39 kg/m²   Temp  Av °F (36.7 °C)  Min: 97.8 °F (36.6 °C)  Max: 98.2 °F (36.8 °C)  Constitutional: The patient is awake, alert, and oriented.   Skin: Warm and dry. No rashes were noted.   HEENT:  No jaundice. Moist mucous membranes, no ulcerations, no thrush. 
Urology consult called via answering service-message left-Juanita Morillo NP, is receiving consult calls today.   
Visit with PT at bedside discussed james belief and relationship with God ministered prayer and hope in God.  
  GLUCOSE 144*   CALCIUM 8.8       Lab Results   Component Value Date/Time    HGB 10.3 03/20/2025 04:41 AM    HCT 33.3 03/20/2025 04:41 AM         Assessment:  Grecia Wilhelm 79 y.o. female     Principal Problem:    Altered mental status, unspecified  Active Problems:    UTI (urinary tract infection)    Sepsis (HCC)    Right leg weakness  Resolved Problems:    * No resolved hospital problems. *    Urinary retention   UTI     Plan:    Continue the melgoza   Repeat voiding trial when ambulatory  This can be done in rehab if there are plans for that  She will continue antibiotics per ID recommendations  No further  interventions are planned at this time  Please call with any further questions or concerns  Thank you for allowing us to participate in her care       Carly FERNANDO CNP  [facility]  Urology    
as warranted.        Rehabilitation Potential/Prognosis: Good               CLINICAL ASSESSMENT:  MOTOR SPEECH       Oral Peripheral Examination   Adequate lingual/labial strength     Parameters of Speech Production  Respiration:  Adequate for speech production  Articulation:  Within functional limits  Resonance:  Within functional limits  Quality:   Within functional limits  Pitch:    Within functional limits  Intensity: Within functional limits  Fluency:  Intact  Prosody Intact    Speech Intelligibility      Good given unstructured task and unknown context             RECEPTIVE LANGUAGE    Comprehension of Yes/No Questions:   Within functional limits    Process  Simple Verbal Commands:   Within functional limits  Process Intermediate Verbal Commands:   Within functional limits  Process Complex Verbal Commands:     Within functional limits    Comprehension of Conversation:      Within functional limits      EXPRESSIVE LANGUAGE     Serials: Functional    Imitation:  Words   Functional   Sentences Functional    Naming:  (Modality used:  Verbal)  Confrontation Naming  Functional  Functional Description  Functional  Response Naming: Functional    Conversation:      Conversation was within functional limits    COGNITION     Attention/Orientation  Attention: Sustained attention   Orientation:  Oriented to Person    Memory   Immediate Recall: Repeated 3/3    Delayed Recall:   Recalled 2/3, 3/3 with cues    Long Term Recall:   Recalled Address, Birthdate, Age, and Family    Organization/Problem Solving/Reasoning   Verbal Sequencing:   Functional        Verbal Problem solving:   Impaired          CLINICAL OBSERVATIONS NOTED DURING THE EVALUATION  Cueing was required                  EDUCATION:   The Speech Language Pathologist (SLP) completed education regarding results of evaluation and that intervention is warranted at this time.  Learner: Patient  Education: Reviewed results and recommendations of this 
dilutional component        PLAN:  Obtain MRI brain to rule out acute stroke-if cochlear implant allows  Treat for urinary tract infection which is most likely what is causing her altered mental status \"strokelike symptoms\" as white count was elevated to 21,000 on admission along with a Tmax of 102, now white count improved to 14,000 with no fever since admission with initiation of IV Rocephin-await final cultures  Monitor VS  Monitor BMP, CBC  Hemoccult stool  PT OT evaluation  Case discussed with daughter at bedside        03/16/2025  No acute events overnight  Continue to monitor  Await MRI-completed shows no acute findings-no evidence of stroke to explain weakness in right leg which started post vascular thrombectomies  Continue treatment for UTI  PT OT evaluation to assess right lower extremity weakness  DC plan next 24 to 48 hours    03/17/2025  AMS with aphasia:  Resolved  MRI of the brain without acute findings  UTI  Continue Merrem   ID consult  Persistent Atrial Fibrillation, continue Eliquis  RLE weakness that began s/p vascular thrombectomies  PT OT   AM PAC 12/24  Obtain Lumbar Spine MRI as discussed with Attending    03/18/2025  AMS with aphasia:  Resolved  MRI of the brain without acute findings  E. coli ESBL UTI  Continue Merrem as discussed with Pharmacy  ID consulted  Probiotic initiated  Persistent Atrial Fibrillation, continue Eliquis  VS stable, continue to monitor  Anemia, stable with H&H 9.4/29.7 (03/18)  Monitor CBC  Hyperglycemia, continue SSI and carb controlled diet  Monitor BMP  RLE weakness that began s/p vascular thrombectomies  MRI of the lumbar spine pending  PT OT   AM PAC 12/24 03/19/2025  AMS with aphasia:  Resolved  MRI of the brain without acute findings  E. coli ESBL UTI, probably complicated with history of urinary retention  Continue Merrem per ID  PVR 11 mL, no indication for Munoz catheter per Urology  Persistent Atrial Fibrillation, continue Eliquis  VS stable, continue 
in supine to sit transfer. Pt able to ambulate longer distance this date with no reports of pain during ambulation. PT required PT assist to with STS and getting back into bed.  Decreased step length with RLE. Pt completed all mobility noted above. Pt was left in the bed with all needs met at conclusion of session.     Treatment:  Patient practiced and was instructed in the following treatment:    Therapeutic activities:  Bed mobility: Cues for hand placement during bed mobility transfers.  Transfers: Cues for weight shifting and hand placement during sit <> stand transfers. Pt completed multiple transfers from different surface heights (bed).  Ambulation: Cues for step length and WW safety during turns.  Vitals and symptoms were closely monitored throughout session.  Skilled positioning in bed to protect skin/joint integrity and for pain management.    PLAN:    Patient is making good progress towards established goals. Will continue with current POC.      Time in: 1246  Time out: 1310    Total Treatment Time 24 minutes     CPT codes:  [] Gait training 26562 0 minutes  [] Manual therapy 94870 0 minutes  [x] Therapeutic activities 28362 24 minutes  [] Therapeutic exercises 64859 0 minutes  [] Neuromuscular reeducation 15700 0 minutes    Eric Montenegro, PT, DPT  [id number]       
A  To/from various household surfaces Stand by Assist    Functional Mobility Minimal Assist      Several steps to chair with w/w  Min A   Using ww in room for short household distance, slow pace, vc for sequencing with good carryover Stand by Assist    Balance Sitting:     Static:  SBA    Dynamic:SBA - min A  Standing: min A  Sitting:     Static:  SBA    Dynamic:SBA - min A  Standing: min A with ww     Activity Tolerance F-     Pt limited due to fatigue, pain and weakness F-  Pt quick to fatigue    F+   Visual/  Perceptual wfl                          Hand Dominance right    Strength ROM Additional Info:    RUE  Grossly 4-/5 Grossly wfl    good  and wfl  FMC/dexterity noted during ADL tasks      LUE Grossly 4-/5 Grossly wfl    good  and wfl  FMC/dexterity noted during ADL tasks   B finger to nose coordination intact   Hearing: [place] - cochlear implant  Sensation: denies numbness and tingling   Tone: wfl  Edema:none noted        Comments:   Upon arrival patient supine in bed and agreeable to OT session. At end of session, patient sitting up in chair with call light and phone within reach, all lines and tubes intact.  Extensive time required for rest breaks and recovery. Overall patient demonstrated decreased independence and safety during completion of ADL and transitional movements    Treatment: includes above grid, therapist facilitated ADL tasks, bed mobility to address safety awareness, implementation of fall prevention strategies, & safety awareness throughout ADLs. Pt would benefit from continued skilled OT to increase safety and independence with completion of ADL/IADL tasks for functional independence and quality of life.    Education: Pt educated on role of OT in acute setting, benefits of participation in ADL tasks, compensatory methods, use of call light in room,  EC/WS tasks, expectation of rehab post-DC.     Pt has made fair- progress towards set goals.     Continue with current plan of 
changes with severe central canal stenosis at L3-4.  Consult Neurosurgery  CT of the abdomen and pelvis yesterday with new moderate right pleural effusion with new moderate compressive atelectasis of the posterior RLL  CT of the abdomen and pelvis yesterday with new mild Jerrell cholecystic fluid with mild thickening of the gallbladder raising possibility of acute cholecystitis  Obtain LFT  [facility] consult  PT OT   AM PAC 12/24    3/20/2025  Vital signs stable  Labs stable  Neurosurgery recommending LACHO  Spoke with IR-will hold Eliquis beginning tomorrow and initiate heparin drip and patient can have LACHO on Monday afternoon  Continue Merrem per ID  Liver panel-negative  Patient needs to be up to chair for all meals  Daughter at bedside Case discussed    Code status: FULL  Consultants:  Infectious Disease, Urology  DVT Prophylaxis Eliquis  PT/OT  Discharge planning           KASSIE Ellis - CNP,  3:19 PM  3/20/2025  
of  diverticulitis.  6. Stable small fat containing midline supraumbilical ventral hernia.  Stable  small bilateral fat containing inguinal hernias.     RECOMMENDATIONS:  Consider ultrasound of the right upper quadrant if acute cholecystitis is of  clinical concern.    Assessment/Plan:  Hx of Urinary retention   UTI     Continue the antibiotics per ID   CT imaging without any evidence of any obstructive uropathy at this time   Awaiting PVRs  As long as voiding comfortably without elevated PVR no melgoza needed       Carly Morillo, APRN - CNP   [facility]  Urology

## 2025-03-26 NOTE — TELEPHONE ENCOUNTER
Eneida with [facility] called to schedule a hospital follow up. I advised last notes from Dr. Zendejas and Flor Argueta stating for the patient to come back to follow up with him in clinic if conservative treatment fails. Eneida will make note in her file and said they do have a PT that comes to their facility. Will call back if interested in another LACHO.

## 2025-04-01 ENCOUNTER — TELEPHONE (OUTPATIENT)
Dept: NEUROSURGERY | Age: 80
End: 2025-04-01

## 2025-04-01 NOTE — TELEPHONE ENCOUNTER
L/M with Gallo at [facility] to provide them with patients appt info with Dr. Zendejas on 4/17. Also wanted to ask if they had any PT notes from her therapy and the facility. If they do, please request for them to fax them to us.

## 2025-04-13 PROBLEM — N39.0 UTI (URINARY TRACT INFECTION): Status: RESOLVED | Noted: 2025-03-14 | Resolved: 2025-04-13

## 2025-05-05 ENCOUNTER — HOSPITAL ENCOUNTER (OUTPATIENT)
Dept: INTERVENTIONAL RADIOLOGY/VASCULAR | Age: 80
Discharge: HOME | End: 2025-05-07
Payer: MEDICARE

## 2025-05-05 DIAGNOSIS — I99.8 ACUTE LOWER LIMB ISCHEMIA: ICD-10-CM

## 2025-05-05 DIAGNOSIS — I73.9 PVD (PERIPHERAL VASCULAR DISEASE): ICD-10-CM

## 2025-05-05 PROCEDURE — 93923 UPR/LXTR ART STDY 3+ LVLS: CPT

## 2025-05-06 ENCOUNTER — OFFICE VISIT (OUTPATIENT)
Dept: CARDIOLOGY CLINIC | Age: 80
End: 2025-05-06
Payer: MEDICARE

## 2025-05-06 VITALS
DIASTOLIC BLOOD PRESSURE: 60 MMHG | HEART RATE: 60 BPM | SYSTOLIC BLOOD PRESSURE: 110 MMHG | BODY MASS INDEX: 25.69 KG/M2 | HEIGHT: 63 IN | WEIGHT: 145 LBS | RESPIRATION RATE: 18 BRPM

## 2025-05-06 DIAGNOSIS — I48.21 PERMANENT ATRIAL FIBRILLATION (HCC): Primary | ICD-10-CM

## 2025-05-06 PROCEDURE — 1090F PRES/ABSN URINE INCON ASSESS: CPT | Performed by: INTERNAL MEDICINE

## 2025-05-06 PROCEDURE — 99205 OFFICE O/P NEW HI 60 MIN: CPT | Performed by: INTERNAL MEDICINE

## 2025-05-06 PROCEDURE — G8419 CALC BMI OUT NRM PARAM NOF/U: HCPCS | Performed by: INTERNAL MEDICINE

## 2025-05-06 PROCEDURE — 1123F ACP DISCUSS/DSCN MKR DOCD: CPT | Performed by: INTERNAL MEDICINE

## 2025-05-06 PROCEDURE — 1159F MED LIST DOCD IN RCRD: CPT | Performed by: INTERNAL MEDICINE

## 2025-05-06 PROCEDURE — 3078F DIAST BP <80 MM HG: CPT | Performed by: INTERNAL MEDICINE

## 2025-05-06 PROCEDURE — G8427 DOCREV CUR MEDS BY ELIG CLIN: HCPCS | Performed by: INTERNAL MEDICINE

## 2025-05-06 PROCEDURE — G2211 COMPLEX E/M VISIT ADD ON: HCPCS | Performed by: INTERNAL MEDICINE

## 2025-05-06 PROCEDURE — 1036F TOBACCO NON-USER: CPT | Performed by: INTERNAL MEDICINE

## 2025-05-06 PROCEDURE — G8400 PT W/DXA NO RESULTS DOC: HCPCS | Performed by: INTERNAL MEDICINE

## 2025-05-06 PROCEDURE — 3074F SYST BP LT 130 MM HG: CPT | Performed by: INTERNAL MEDICINE

## 2025-05-06 PROCEDURE — 93000 ELECTROCARDIOGRAM COMPLETE: CPT | Performed by: INTERNAL MEDICINE

## 2025-05-06 ASSESSMENT — ENCOUNTER SYMPTOMS
VOMITING: 0
ABDOMINAL PAIN: 0
BLOOD IN STOOL: 0
NAUSEA: 0
COUGH: 0
SHORTNESS OF BREATH: 0
BACK PAIN: 0
CHEST TIGHTNESS: 0

## 2025-05-06 NOTE — PROGRESS NOTES
OFFICE VISIT    NAME: Grecia Wilhelm     YOB: 1945   AGE: 79 y.o.   MEDICAL RECORD NUMBER: [id number]       CONSULTATION INFORMATION:   DATE OF CLINIC CONSULTATION: 5/6/2025   PRINCIPLE DIAGNOSIS / reason for visit: LAAO    CARE TEAM MEMBERS    PCP : Bismark Beauchamp MD     PRIMARY CARDIOLOGIST: [facility]   REFERRING PROVIDER: F/U inpatient consult    HISTORY OF PRESENT ILLNESS:   Grecia Wilhelm is a 79 y.o. female who I saw as an inpatient consult who presents to follow-up for LAAO. 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        Presents to clinic today with her family.  She denies having chest pain or shortness of breath but reports having LE edema.  They are still working on getting Eliquis through programs as they cannot afford it.      -TTE 2/13/2024:  CONCLUSIONS:   - Exam indication: s/p TAVR (01/08/2024)   - The left ventricle is normal in size. There is mild left ventricular   hypertrophy. Left ventricular systolic function is normal. EF = 62 ± 5% (2D biplane)   - The right ventricle is normal in size. Right ventricular systolic function is normal.   - The left atrial cavity is severely dilated.   - The right atrial cavity is dilated.   - The visualized aorta is borderline dilated with a maximal dimension of 3.8 cm.   - There is moderate (2+ - 3+) tricuspid valve regurgitation caused by annular dilatation.   - S/P transcatheter aortic valve replacement. Merrill S3 prosthetic aortic valve (size #23). There is mild (1+) aortic valve regurgitation. The peak gradient is 27  mmHg, the mean gradient is 13 mmHg and the

## 2025-05-12 RX ORDER — FUROSEMIDE 20 MG/1
20 TABLET ORAL 2 TIMES DAILY
Qty: 60 TABLET | Refills: 3 | Status: SHIPPED | OUTPATIENT
Start: 2025-05-12

## 2025-05-19 ENCOUNTER — OFFICE VISIT (OUTPATIENT)
Dept: VASCULAR SURGERY | Age: 80
End: 2025-05-19
Payer: MEDICARE

## 2025-05-19 DIAGNOSIS — I99.8 ACUTE LOWER LIMB ISCHEMIA: ICD-10-CM

## 2025-05-19 DIAGNOSIS — I48.20 ATRIAL FIBRILLATION, CHRONIC (HCC): Primary | ICD-10-CM

## 2025-05-19 DIAGNOSIS — I73.9 ASYMPTOMATIC PVD (PERIPHERAL VASCULAR DISEASE): ICD-10-CM

## 2025-05-19 PROCEDURE — 1159F MED LIST DOCD IN RCRD: CPT | Performed by: PHYSICIAN ASSISTANT

## 2025-05-19 PROCEDURE — G8419 CALC BMI OUT NRM PARAM NOF/U: HCPCS | Performed by: PHYSICIAN ASSISTANT

## 2025-05-19 PROCEDURE — 1036F TOBACCO NON-USER: CPT | Performed by: PHYSICIAN ASSISTANT

## 2025-05-19 PROCEDURE — 1090F PRES/ABSN URINE INCON ASSESS: CPT | Performed by: PHYSICIAN ASSISTANT

## 2025-05-19 PROCEDURE — G8400 PT W/DXA NO RESULTS DOC: HCPCS | Performed by: PHYSICIAN ASSISTANT

## 2025-05-19 PROCEDURE — G8427 DOCREV CUR MEDS BY ELIG CLIN: HCPCS | Performed by: PHYSICIAN ASSISTANT

## 2025-05-19 PROCEDURE — 1123F ACP DISCUSS/DSCN MKR DOCD: CPT | Performed by: PHYSICIAN ASSISTANT

## 2025-05-19 PROCEDURE — 99213 OFFICE O/P EST LOW 20 MIN: CPT | Performed by: PHYSICIAN ASSISTANT

## 2025-05-19 NOTE — PROGRESS NOTES
Vascular Surgery Outpatient Followup    PCP : Bismark Beauchamp MD    Previous lower extremity procedures  Previous Procedures  2/22/25 Bilateral aortoiliac and distal thrombectomy        HISTORY OF PRESENT ILLNESS:    The patient is a 79 y.o. female who is here in regards to follow up of their PVD.  Patient has history of acute limb ischemia in 2/2025 secondary to A-fib with subtherapeutic INR.  She underwent bilateral lower extremity aortoiliac and distal thrombectomy with improvement in her arterial flow.  Since surgery she has been experiencing right lower extremity edema.  She also has been experiencing right lower extremity weakness since the event which is very gradually improving. Family states she has lumbar spine disease which she is scheduled to see Dr. Zendejas for in a few weeks to discuss.    She is no longer at rehab and is residing at home with her .  She is still doing therapy.  She has been elevating her legs some.  She is not wearing compression.  She states she has been compliant with her Eliquis twice daily.  They are currently working on submitting paperwork to [employer] for assistance in obtaining more affordable Eliquis.    ROS : All others Negative if blank [], Positive if [x]  General Urinary   [] Fevers [] Hematuria   [] Chills [] Dysuria   [] Weight Loss Vascular   Skin [] Claudication   [] Tissue Loss [] Rest Pain   Eyes Neurologic   [] Wears Glasses/Contacts [] Stroke/TIA   [] Vision Changes [] Focal weakness   Respiratory [] Slurred Speech    [] Shortness of breath    Cardiovascular    [] Chest Pain    [] Shortness of breath with exertion    Gastrointestinal    [] Abdominal Pain    [] Melena   [] Hematochezia         Past Medical History:        Diagnosis Date    Aortic stenosis, mild 10/01/2015    follows  Dr Gallardo at [facility] last visit 9/30/22    Arrhythmia     Arthritis     knees    Atrial fibrillation (HCC)     Bleeding from varicose veins of right lower extremity 06/08/2017

## 2025-05-27 DIAGNOSIS — I48.20 ATRIAL FIBRILLATION, CHRONIC (HCC): ICD-10-CM

## 2025-05-27 DIAGNOSIS — I99.8 ACUTE LOWER LIMB ISCHEMIA: ICD-10-CM

## 2025-05-28 ENCOUNTER — OFFICE VISIT (OUTPATIENT)
Age: 80
End: 2025-05-28
Payer: MEDICARE

## 2025-05-28 ENCOUNTER — TELEPHONE (OUTPATIENT)
Age: 80
End: 2025-05-28

## 2025-05-28 VITALS — HEIGHT: 63 IN | WEIGHT: 145 LBS | BODY MASS INDEX: 25.69 KG/M2 | RESPIRATION RATE: 16 BRPM

## 2025-05-28 DIAGNOSIS — M48.062 LUMBAR STENOSIS WITH NEUROGENIC CLAUDICATION: Primary | ICD-10-CM

## 2025-05-28 PROCEDURE — G8400 PT W/DXA NO RESULTS DOC: HCPCS | Performed by: NEUROLOGICAL SURGERY

## 2025-05-28 PROCEDURE — 1036F TOBACCO NON-USER: CPT | Performed by: NEUROLOGICAL SURGERY

## 2025-05-28 PROCEDURE — 1159F MED LIST DOCD IN RCRD: CPT | Performed by: NEUROLOGICAL SURGERY

## 2025-05-28 PROCEDURE — G8427 DOCREV CUR MEDS BY ELIG CLIN: HCPCS | Performed by: NEUROLOGICAL SURGERY

## 2025-05-28 PROCEDURE — 1123F ACP DISCUSS/DSCN MKR DOCD: CPT | Performed by: NEUROLOGICAL SURGERY

## 2025-05-28 PROCEDURE — 99213 OFFICE O/P EST LOW 20 MIN: CPT | Performed by: NEUROLOGICAL SURGERY

## 2025-05-28 PROCEDURE — G8419 CALC BMI OUT NRM PARAM NOF/U: HCPCS | Performed by: NEUROLOGICAL SURGERY

## 2025-05-28 PROCEDURE — 1090F PRES/ABSN URINE INCON ASSESS: CPT | Performed by: NEUROLOGICAL SURGERY

## 2025-05-28 NOTE — TELEPHONE ENCOUNTER
Patients daughter Brielle called as they discussed surgery with Dr. Zendejas today. She said that after consideration, she would like to proceed with scheduling surgery. Please contact Brielle once a date is set. Thank you.

## 2025-05-28 NOTE — PROGRESS NOTES
Patient is here for follow up from a hospital consult for: right leg pain and weakness. The pain and weakness has not improved despite exhaustive physical therapy.  She is a non-smoker.  The pain is rated as 7/10 in her leg and it is interfering with activities of daily living.     Physical exam  Alert and Oriented X3  PERRLA, EOMI  LOPEZ 5/5 except 2/5 in RLE  Sensation intact to LT and PP      A/P: patient is here for follow up for: right leg pain and weakness.  Her MRI shows stenosis from L2-L5 with L3-L4 and L4-L5 spondylolisthesis. I am recommending an L2-L5 laminectomy and fusion.  Risks, benefits and alternatives have been discussed and she wishes to proceed. She will need medical and cardiac clearance    Shana Zendejas MD

## 2025-06-02 ENCOUNTER — PREP FOR PROCEDURE (OUTPATIENT)
Age: 80
End: 2025-06-02

## 2025-06-02 ENCOUNTER — PREP FOR PROCEDURE (OUTPATIENT)
Dept: NEUROSURGERY | Age: 80
End: 2025-06-02

## 2025-06-02 DIAGNOSIS — Z01.818 PRE-OP TESTING: Primary | ICD-10-CM

## 2025-06-02 DIAGNOSIS — M43.16 SPONDYLOLISTHESIS OF LUMBAR REGION: ICD-10-CM

## 2025-06-02 PROBLEM — M48.061 STENOSIS, SPINAL, LUMBAR: Status: ACTIVE | Noted: 2025-06-02

## 2025-06-02 RX ORDER — SODIUM CHLORIDE 9 MG/ML
INJECTION, SOLUTION INTRAVENOUS CONTINUOUS
Status: CANCELLED | OUTPATIENT
Start: 2025-06-02

## 2025-06-02 RX ORDER — SODIUM CHLORIDE 0.9 % (FLUSH) 0.9 %
5-40 SYRINGE (ML) INJECTION PRN
Status: CANCELLED | OUTPATIENT
Start: 2025-06-02

## 2025-06-02 RX ORDER — SODIUM CHLORIDE 9 MG/ML
INJECTION, SOLUTION INTRAVENOUS PRN
Status: CANCELLED | OUTPATIENT
Start: 2025-06-02

## 2025-06-02 RX ORDER — SODIUM CHLORIDE 0.9 % (FLUSH) 0.9 %
5-40 SYRINGE (ML) INJECTION EVERY 12 HOURS SCHEDULED
Status: CANCELLED | OUTPATIENT
Start: 2025-06-02

## 2025-06-13 DIAGNOSIS — M48.062 LUMBAR STENOSIS WITH NEUROGENIC CLAUDICATION: ICD-10-CM

## 2025-06-13 DIAGNOSIS — M43.16 SPONDYLOLISTHESIS OF LUMBAR REGION: Primary | ICD-10-CM

## 2025-06-18 ENCOUNTER — TELEPHONE (OUTPATIENT)
Dept: NEUROSURGERY | Age: 80
End: 2025-06-18

## 2025-06-18 NOTE — TELEPHONE ENCOUNTER
Dr. Bismark Beauchamp's office called.  He wants to talk to Dr. Zendejas.  Patient has surgery on 7-25-25.        Gave note to Abbi, She will give to

## 2025-06-25 DIAGNOSIS — I99.8 ACUTE LOWER LIMB ISCHEMIA: ICD-10-CM

## 2025-06-25 DIAGNOSIS — I48.20 ATRIAL FIBRILLATION, CHRONIC (HCC): ICD-10-CM

## 2025-06-26 NOTE — PROGRESS NOTES
Spoke to patient and  for PAT call.  While reviewing medications with patient, I asked her if she received instructions from Dr. Beauchamp regarding when she should discontinue the Eliquis prior to surgery.  I informed the patient that Dr. Zendejas holds Eliquis 3-5 days prior to surgery.  Patient stated that she was to get a \"bridge over\" per Dr. Beauchamp.  The patient's  mentioned that she was to bridge to Lovenox but did not know when the bridge would start and when to stop the Eliquis.  The patient stated that Dr. Beauchamp was supposed to talk to Dr. Zendejas.  After completing the PAT call, I called Dr. Zendejas's office and spoke to Abbi regarding the above conversation.  Abbi stated that she would talk to Dr. Zendejas and call us back regarding Dr. Beauchamp's plans for Eliquis and Lovenox.

## 2025-06-26 NOTE — PROGRESS NOTES
[facility]   PRE-ADMISSION TESTING GENERAL INSTRUCTIONS  PAT Phone Number: [phone]    GENERAL INSTRUCTIONS:    [x] The Night Before Surgery: Take an antibacterial soap shower - followed by CHG Wipes.   -The Morning of Surgery: Repeat CHG Wipes.  [x] Do not wear makeup, lotions, powders, deodorant the morning of surgery.  [x] No solid food after midnight. You may have SIPS of clear liquids up until 2 hours before your arrival time to the hospital.   [x] You may brush your teeth, gargle, but do not swallow water.   [x] No tobacco products, illegal drugs, or alcohol within 24 hours of your surgery.  [x] Jewelry or valuables should not be brought to the hospital. All body and/or tongue piercing's must be removed prior to arriving to hospital. No contact lens or hair pins.   [x] Arrange transportation with a responsible adult  to and from the hospital. Arrange for someone to be with you for the remainder of the day and for 24 hours after your procedure due to having had anesthesia.          -Who will be your  for transportation?  and daughter        -Who will be staying with you for 24 hrs after your procedure?   [x] Bring insurance card and photo ID.  [x] Bring copy of living will or healthcare power of  papers to be placed in your electronic record.  [x] Transfusion (Green) Bracelet: Please bring with you to hospital, day of surgery.       PARKING INSTRUCTIONS:     [x] ARRIVAL DATE & TIME: Monday 7/7 at 9 am  [x] Times are subject to change. We will contact you the business day before surgery if that were to occur.  [x] Enter into the [facility] Entrance. Two people may accompany you. Masks are not required.  [x] Parking Lot \"I\" is where you will park. It is located on the corner of [address] and [address]. The entrance is on [address].   Only one vehicle - per patient, is permitted in parking lot.   Upon entering the parking lot,

## 2025-06-27 NOTE — PROGRESS NOTES
Abbi called PAT to inform us that she called Dr. Beauchamp's office to clarify if the patient was going to be bridged with an anticoagulant prior to surgery.  Dr. Beauchamp's office staff informed Abbi that Dr. Beauchamp was \"planning on admitting the patient before surgery and is taking care of it\".  Abbi stated that she is going to check with Dr. Zendejas and will call PAT back.

## 2025-06-27 NOTE — PROGRESS NOTES
Abbi called [facility] to inform us that she spoke to Ian Beauchamp at Dr. Beauchamp's office regarding \"bridge\" since patient needs to be off of Eliquis.  Ian informed Abbi that patient is to be admitted to hospital on 7/4 at 7 am and Dr. Snow \"is taking care of everything\".  Per Abbi, patient still needs to come into PAT for her prescheduled appointment.

## 2025-06-30 ENCOUNTER — HOSPITAL ENCOUNTER (OUTPATIENT)
Dept: GENERAL RADIOLOGY | Age: 80
Discharge: HOME | End: 2025-07-02
Payer: MEDICARE

## 2025-06-30 ENCOUNTER — HOSPITAL ENCOUNTER (OUTPATIENT)
Dept: PREADMISSION TESTING | Age: 80
Discharge: HOME | End: 2025-06-30
Payer: MEDICARE

## 2025-06-30 VITALS
WEIGHT: 144.4 LBS | OXYGEN SATURATION: 95 % | SYSTOLIC BLOOD PRESSURE: 150 MMHG | HEIGHT: 63 IN | HEART RATE: 71 BPM | RESPIRATION RATE: 20 BRPM | BODY MASS INDEX: 25.59 KG/M2 | DIASTOLIC BLOOD PRESSURE: 66 MMHG | TEMPERATURE: 98.6 F

## 2025-06-30 DIAGNOSIS — Z01.818 PRE-OP TESTING: ICD-10-CM

## 2025-06-30 DIAGNOSIS — Z01.812 PRE-OPERATIVE LABORATORY EXAMINATION: ICD-10-CM

## 2025-06-30 LAB
ABO + RH BLD: NORMAL
ANION GAP SERPL CALCULATED.3IONS-SCNC: 14 MMOL/L (ref 7–16)
ARM BAND NUMBER: NORMAL
BASOPHILS # BLD: 0.02 K/UL (ref 0–0.2)
BILIRUB UR QL STRIP: NEGATIVE
BUN SERPL-MCNC: 17 MG/DL (ref 8–23)
CLARITY UR: CLEAR
COMMENT: NORMAL
CREAT SERPL-MCNC: 0.7 MG/DL (ref 0.5–1)
EOSINOPHIL # BLD: 0.17 K/UL (ref 0.05–0.5)
EOSINOPHILS RELATIVE PERCENT: 2 % (ref 0–6)
ERYTHROCYTE [DISTWIDTH] IN BLOOD BY AUTOMATED COUNT: 14.7 % (ref 11.5–15)
GFR, ESTIMATED: 89 ML/MIN/1.73M2
GLUCOSE SERPL-MCNC: 121 MG/DL (ref 74–99)
GLUCOSE UR STRIP-MCNC: NEGATIVE MG/DL
HCT VFR BLD AUTO: 35.9 % (ref 34–48)
HGB BLD-MCNC: 11.1 G/DL (ref 11.5–15.5)
HGB UR QL STRIP.AUTO: NEGATIVE
IMM GRANULOCYTES # BLD AUTO: 0.03 K/UL (ref 0–0.58)
IMM GRANULOCYTES NFR BLD: 0 % (ref 0–5)
INR PPP: 1.8
KETONES UR STRIP-MCNC: NEGATIVE MG/DL
LEUKOCYTE ESTERASE UR QL STRIP: NEGATIVE
LYMPHOCYTES NFR BLD: 1.58 K/UL (ref 1.5–4)
LYMPHOCYTES RELATIVE PERCENT: 21 % (ref 20–42)
MCH RBC QN AUTO: 28.5 PG (ref 26–35)
MCHC RBC AUTO-ENTMCNC: 30.9 G/DL (ref 32–34.5)
MCV RBC AUTO: 92.1 FL (ref 80–99.9)
MONOCYTES NFR BLD: 0.45 K/UL (ref 0.1–0.95)
MONOCYTES NFR BLD: 6 % (ref 2–12)
NEUTROPHILS NFR BLD: 70 % (ref 43–80)
NEUTS SEG NFR BLD: 5.31 K/UL (ref 1.8–7.3)
NITRITE UR QL STRIP: NEGATIVE
PLATELET # BLD AUTO: 270 K/UL (ref 130–450)
PMV BLD AUTO: 9.5 FL (ref 7–12)
POTASSIUM SERPL-SCNC: 5.2 MMOL/L (ref 3.5–5.1)
PROT UR STRIP-MCNC: NEGATIVE MG/DL
PROTHROMBIN TIME: 19.5 SEC (ref 9.3–12.4)
RBC # BLD AUTO: 3.9 M/UL (ref 3.5–5.5)
SODIUM SERPL-SCNC: 144 MMOL/L (ref 136–145)
SP GR UR STRIP: 1.01 (ref 1–1.03)
UROBILINOGEN UR STRIP-ACNC: 1 EU/DL (ref 0–1)

## 2025-06-30 PROCEDURE — 36415 COLL VENOUS BLD VENIPUNCTURE: CPT

## 2025-06-30 PROCEDURE — 71046 X-RAY EXAM CHEST 2 VIEWS: CPT

## 2025-06-30 PROCEDURE — 85025 COMPLETE CBC W/AUTO DIFF WBC: CPT

## 2025-06-30 PROCEDURE — 80048 BASIC METABOLIC PNL TOTAL CA: CPT

## 2025-06-30 PROCEDURE — 86850 RBC ANTIBODY SCREEN: CPT

## 2025-06-30 PROCEDURE — 85610 PROTHROMBIN TIME: CPT

## 2025-06-30 PROCEDURE — 86900 BLOOD TYPING SEROLOGIC ABO: CPT

## 2025-06-30 PROCEDURE — 87081 CULTURE SCREEN ONLY: CPT

## 2025-06-30 PROCEDURE — 81003 URINALYSIS AUTO W/O SCOPE: CPT

## 2025-06-30 PROCEDURE — 87086 URINE CULTURE/COLONY COUNT: CPT

## 2025-06-30 PROCEDURE — 86901 BLOOD TYPING SEROLOGIC RH(D): CPT

## 2025-07-01 LAB
MICROORGANISM SPEC CULT: ABNORMAL
MICROORGANISM SPEC CULT: NORMAL
SERVICE CMNT-IMP: ABNORMAL
SPECIMEN DESCRIPTION: ABNORMAL
SPECIMEN DESCRIPTION: NORMAL

## 2025-07-02 ENCOUNTER — TELEPHONE (OUTPATIENT)
Age: 80
End: 2025-07-02

## 2025-07-02 NOTE — TELEPHONE ENCOUNTER
Patient's surgery on 7/7/25 with Dr Zendejas is canceled due to insurance denial. Submitted an appeal for denial on 7/1/25. Spoke with [employer] and they stated there was no P2P option and it did not qualify for an expedited appeal per Jada with [employer] appeals. She stated an appeal takes up to 60 days to receive an answer.  Left message for Brielle,daughter, at [phone] and for Grecia at [phone]. I asked for a return call.  I also spoke with Ian Beauchamp at Dr Beauchamp's office and let her know surgery was denied and that it was canceled so they could cancel her admission to the hospital on 7/4/25 prior to surgery.

## 2025-07-04 ENCOUNTER — HOSPITAL ENCOUNTER (INPATIENT)
Age: 80
LOS: 6 days | Discharge: NURSING, REHAB OR LONG-TERM CARE | DRG: 448 | End: 2025-07-10
Attending: STUDENT IN AN ORGANIZED HEALTH CARE EDUCATION/TRAINING PROGRAM | Admitting: STUDENT IN AN ORGANIZED HEALTH CARE EDUCATION/TRAINING PROGRAM
Payer: MEDICARE

## 2025-07-04 DIAGNOSIS — Z01.812 PRE-OPERATIVE LABORATORY EXAMINATION: ICD-10-CM

## 2025-07-04 DIAGNOSIS — Z98.1 S/P LUMBAR FUSION: ICD-10-CM

## 2025-07-04 DIAGNOSIS — M48.061 LUMBAR FORAMINAL STENOSIS: Primary | ICD-10-CM

## 2025-07-04 LAB
ABO + RH BLD: NORMAL
ANION GAP SERPL CALCULATED.3IONS-SCNC: 12 MMOL/L (ref 7–16)
ARM BAND NUMBER: NORMAL
BASOPHILS # BLD: 0.02 K/UL (ref 0–0.2)
BASOPHILS NFR BLD: 0 % (ref 0–2)
BLOOD BANK SAMPLE EXPIRATION: NORMAL
BLOOD GROUP ANTIBODIES SERPL: NEGATIVE
BNP SERPL-MCNC: 1258 PG/ML (ref 0–450)
BUN SERPL-MCNC: 23 MG/DL (ref 8–23)
CALCIUM SERPL-MCNC: 9.8 MG/DL (ref 8.8–10.2)
CHLORIDE SERPL-SCNC: 106 MMOL/L (ref 98–107)
CO2 SERPL-SCNC: 24 MMOL/L (ref 22–29)
CREAT SERPL-MCNC: 0.8 MG/DL (ref 0.5–1)
EOSINOPHIL # BLD: 0.21 K/UL (ref 0.05–0.5)
EOSINOPHILS RELATIVE PERCENT: 2 % (ref 0–6)
ERYTHROCYTE [DISTWIDTH] IN BLOOD BY AUTOMATED COUNT: 14.5 % (ref 11.5–15)
GFR, ESTIMATED: 73 ML/MIN/1.73M2
GLUCOSE BLD-MCNC: 108 MG/DL (ref 74–99)
GLUCOSE BLD-MCNC: 143 MG/DL (ref 74–99)
GLUCOSE SERPL-MCNC: 136 MG/DL (ref 74–99)
HCT VFR BLD AUTO: 36.2 % (ref 34–48)
HGB BLD-MCNC: 11.4 G/DL (ref 11.5–15.5)
IMM GRANULOCYTES # BLD AUTO: 0.03 K/UL (ref 0–0.58)
IMM GRANULOCYTES NFR BLD: 0 % (ref 0–5)
LYMPHOCYTES NFR BLD: 1.32 K/UL (ref 1.5–4)
LYMPHOCYTES RELATIVE PERCENT: 15 % (ref 20–42)
MCH RBC QN AUTO: 29 PG (ref 26–35)
MCHC RBC AUTO-ENTMCNC: 31.5 G/DL (ref 32–34.5)
MCV RBC AUTO: 92.1 FL (ref 80–99.9)
MONOCYTES NFR BLD: 0.54 K/UL (ref 0.1–0.95)
MONOCYTES NFR BLD: 6 % (ref 2–12)
NEUTROPHILS NFR BLD: 76 % (ref 43–80)
NEUTS SEG NFR BLD: 6.68 K/UL (ref 1.8–7.3)
PARTIAL THROMBOPLASTIN TIME: 39.7 SEC (ref 24.5–35.1)
PARTIAL THROMBOPLASTIN TIME: 71.3 SEC (ref 24.5–35.1)
PARTIAL THROMBOPLASTIN TIME: NORMAL SEC (ref 20.5–30.5)
PLATELET # BLD AUTO: 266 K/UL (ref 130–450)
PMV BLD AUTO: 9.5 FL (ref 7–12)
POTASSIUM SERPL-SCNC: 4.1 MMOL/L (ref 3.5–5.1)
RBC # BLD AUTO: 3.93 M/UL (ref 3.5–5.5)
SODIUM SERPL-SCNC: 142 MMOL/L (ref 136–145)
WBC OTHER # BLD: 8.8 K/UL (ref 4.5–11.5)

## 2025-07-04 PROCEDURE — 85025 COMPLETE CBC W/AUTO DIFF WBC: CPT

## 2025-07-04 PROCEDURE — 86850 RBC ANTIBODY SCREEN: CPT

## 2025-07-04 PROCEDURE — 86901 BLOOD TYPING SEROLOGIC RH(D): CPT

## 2025-07-04 PROCEDURE — 82962 GLUCOSE BLOOD TEST: CPT

## 2025-07-04 PROCEDURE — 85730 THROMBOPLASTIN TIME PARTIAL: CPT

## 2025-07-04 PROCEDURE — 6360000002 HC RX W HCPCS: Performed by: STUDENT IN AN ORGANIZED HEALTH CARE EDUCATION/TRAINING PROGRAM

## 2025-07-04 PROCEDURE — 36415 COLL VENOUS BLD VENIPUNCTURE: CPT

## 2025-07-04 PROCEDURE — 2060000000 HC ICU INTERMEDIATE R&B

## 2025-07-04 PROCEDURE — 83880 ASSAY OF NATRIURETIC PEPTIDE: CPT

## 2025-07-04 PROCEDURE — 6360000002 HC RX W HCPCS: Performed by: INTERNAL MEDICINE

## 2025-07-04 PROCEDURE — 6370000000 HC RX 637 (ALT 250 FOR IP): Performed by: STUDENT IN AN ORGANIZED HEALTH CARE EDUCATION/TRAINING PROGRAM

## 2025-07-04 PROCEDURE — 99222 1ST HOSP IP/OBS MODERATE 55: CPT | Performed by: INTERNAL MEDICINE

## 2025-07-04 PROCEDURE — 80048 BASIC METABOLIC PNL TOTAL CA: CPT

## 2025-07-04 PROCEDURE — 86900 BLOOD TYPING SEROLOGIC ABO: CPT

## 2025-07-04 PROCEDURE — 93005 ELECTROCARDIOGRAM TRACING: CPT

## 2025-07-04 RX ORDER — SODIUM CHLORIDE 0.9 % (FLUSH) 0.9 %
5-40 SYRINGE (ML) INJECTION EVERY 12 HOURS SCHEDULED
Status: DISCONTINUED | OUTPATIENT
Start: 2025-07-06 | End: 2025-07-07

## 2025-07-04 RX ORDER — DEXTROSE MONOHYDRATE 100 MG/ML
INJECTION, SOLUTION INTRAVENOUS CONTINUOUS PRN
Status: DISCONTINUED | OUTPATIENT
Start: 2025-07-04 | End: 2025-07-10 | Stop reason: HOSPADM

## 2025-07-04 RX ORDER — INSULIN LISPRO 100 [IU]/ML
0-8 INJECTION, SOLUTION INTRAVENOUS; SUBCUTANEOUS
Status: DISCONTINUED | OUTPATIENT
Start: 2025-07-04 | End: 2025-07-10 | Stop reason: HOSPADM

## 2025-07-04 RX ORDER — METOPROLOL TARTRATE 50 MG
50 TABLET ORAL 2 TIMES DAILY
Status: DISCONTINUED | OUTPATIENT
Start: 2025-07-04 | End: 2025-07-10 | Stop reason: HOSPADM

## 2025-07-04 RX ORDER — GLUCAGON 1 MG/ML
1 KIT INJECTION PRN
Status: DISCONTINUED | OUTPATIENT
Start: 2025-07-04 | End: 2025-07-10 | Stop reason: HOSPADM

## 2025-07-04 RX ORDER — DILTIAZEM HYDROCHLORIDE 240 MG/1
240 CAPSULE, COATED, EXTENDED RELEASE ORAL DAILY
Status: DISCONTINUED | OUTPATIENT
Start: 2025-07-04 | End: 2025-07-10 | Stop reason: HOSPADM

## 2025-07-04 RX ORDER — SODIUM CHLORIDE 9 MG/ML
INJECTION, SOLUTION INTRAVENOUS CONTINUOUS
Status: DISCONTINUED | OUTPATIENT
Start: 2025-07-07 | End: 2025-07-07

## 2025-07-04 RX ORDER — PANTOPRAZOLE SODIUM 40 MG/1
40 TABLET, DELAYED RELEASE ORAL
Status: DISCONTINUED | OUTPATIENT
Start: 2025-07-04 | End: 2025-07-10 | Stop reason: HOSPADM

## 2025-07-04 RX ORDER — FUROSEMIDE 10 MG/ML
20 INJECTION INTRAMUSCULAR; INTRAVENOUS ONCE
Status: COMPLETED | OUTPATIENT
Start: 2025-07-04 | End: 2025-07-04

## 2025-07-04 RX ORDER — SODIUM CHLORIDE 0.9 % (FLUSH) 0.9 %
5-40 SYRINGE (ML) INJECTION PRN
Status: DISCONTINUED | OUTPATIENT
Start: 2025-07-07 | End: 2025-07-07

## 2025-07-04 RX ORDER — SODIUM CHLORIDE 0.9 % (FLUSH) 0.9 %
5-40 SYRINGE (ML) INJECTION EVERY 12 HOURS SCHEDULED
Status: DISCONTINUED | OUTPATIENT
Start: 2025-07-07 | End: 2025-07-10 | Stop reason: HOSPADM

## 2025-07-04 RX ORDER — ASPIRIN 81 MG/1
81 TABLET ORAL DAILY
Status: DISCONTINUED | OUTPATIENT
Start: 2025-07-04 | End: 2025-07-10 | Stop reason: HOSPADM

## 2025-07-04 RX ORDER — HEPARIN SODIUM 1000 [USP'U]/ML
60 INJECTION, SOLUTION INTRAVENOUS; SUBCUTANEOUS PRN
Status: DISCONTINUED | OUTPATIENT
Start: 2025-07-04 | End: 2025-07-07

## 2025-07-04 RX ORDER — HEPARIN SODIUM 1000 [USP'U]/ML
60 INJECTION, SOLUTION INTRAVENOUS; SUBCUTANEOUS ONCE
Status: COMPLETED | OUTPATIENT
Start: 2025-07-04 | End: 2025-07-04

## 2025-07-04 RX ORDER — SODIUM CHLORIDE 0.9 % (FLUSH) 0.9 %
5-40 SYRINGE (ML) INJECTION PRN
Status: DISCONTINUED | OUTPATIENT
Start: 2025-07-06 | End: 2025-07-07

## 2025-07-04 RX ORDER — SODIUM CHLORIDE 9 MG/ML
INJECTION, SOLUTION INTRAVENOUS PRN
Status: DISCONTINUED | OUTPATIENT
Start: 2025-07-06 | End: 2025-07-07

## 2025-07-04 RX ORDER — SODIUM CHLORIDE 9 MG/ML
INJECTION, SOLUTION INTRAVENOUS PRN
Status: DISCONTINUED | OUTPATIENT
Start: 2025-07-07 | End: 2025-07-07

## 2025-07-04 RX ORDER — PRAVASTATIN SODIUM 20 MG
20 TABLET ORAL DAILY
Status: DISCONTINUED | OUTPATIENT
Start: 2025-07-04 | End: 2025-07-10 | Stop reason: HOSPADM

## 2025-07-04 RX ORDER — HEPARIN SODIUM 1000 [USP'U]/ML
30 INJECTION, SOLUTION INTRAVENOUS; SUBCUTANEOUS PRN
Status: DISCONTINUED | OUTPATIENT
Start: 2025-07-04 | End: 2025-07-07

## 2025-07-04 RX ORDER — OXYCODONE HYDROCHLORIDE 5 MG/1
5 TABLET ORAL EVERY 6 HOURS PRN
Refills: 0 | Status: DISCONTINUED | OUTPATIENT
Start: 2025-07-04 | End: 2025-07-06

## 2025-07-04 RX ORDER — HEPARIN SODIUM 10000 [USP'U]/100ML
5-30 INJECTION, SOLUTION INTRAVENOUS CONTINUOUS
Status: DISCONTINUED | OUTPATIENT
Start: 2025-07-04 | End: 2025-07-07

## 2025-07-04 RX ADMIN — FUROSEMIDE 20 MG: 10 INJECTION, SOLUTION INTRAMUSCULAR; INTRAVENOUS at 16:29

## 2025-07-04 RX ADMIN — PRAVASTATIN SODIUM 20 MG: 20 TABLET ORAL at 21:00

## 2025-07-04 RX ADMIN — METOPROLOL TARTRATE 50 MG: 50 TABLET, FILM COATED ORAL at 20:57

## 2025-07-04 RX ADMIN — HEPARIN SODIUM 12 UNITS/KG/HR: 10000 INJECTION, SOLUTION INTRAVENOUS at 12:53

## 2025-07-04 RX ADMIN — HEPARIN SODIUM 3900 UNITS: 1000 INJECTION INTRAVENOUS; SUBCUTANEOUS at 12:50

## 2025-07-04 ASSESSMENT — PAIN SCALES - GENERAL: PAINLEVEL_OUTOF10: 0

## 2025-07-04 NOTE — PROGRESS NOTES
4 Eyes Skin Assessment     NAME:  Grecia Wilhelm  YOB: 1945  MEDICAL RECORD NUMBER:  [id number]    The patient is being assessed for  Admission    I agree that at least one RN has performed a thorough Head to Toe Skin Assessment on the patient. ALL assessment sites listed below have been assessed.      Areas assessed by both nurses:    Head, Face, Ears, Shoulders, Back, Chest, Arms, Elbows, Hands, Sacrum. Buttock, Coccyx, Ischium, Legs. Feet and Heels, and Under Medical Devices         Does the Patient have a Wound? No noted wound(s)       Moody Prevention initiated by RN: No  Wound Care Orders initiated by RN: No    Pressure Injury (Stage 1,2,3,4, Unstageable, DTI, NWPT, and Complex wounds) if present, place Wound referral order by RN under : No    New Ostomies, if present place, Ostomy referral order under : No     Nurse 1 eSignature: Electronically signed by Tiara Devine RN on 7/4/25 at 10:11 AM EDT    **SHARE this note so that the co-signing nurse can place an eSignature**    Nurse 2 eSignature: {Esignature:[id number]}

## 2025-07-04 NOTE — CONSULTS
INPATIENT CARDIOLOGY CONSULT    Name: Grecia Wilhelm    Age: 79 y.o.    Date of Admission: 7/4/2025  7:29 AM    Date of Service: 7/4/2025    Reason for Consultation: \"Preoperative cardiovascular assessment/plan for lumbar fusion\"    Referring Physician: Jeremy Snow MD    Primary Cardiologist: Dr Guzman    History of Present Illness:   Grecia Wilhelm is a 79 y.o. female who presented on 7/4/2025 for elective back surgery several days prior for anticoagulation bridging at recommendation of primary care.     For some reason outpatient preoperative cardiac risk assessment was not pursued.    She has no cardiac complaints.  Limited ambulation due to her back/leg symptoms.  Denies chest pain or shortness of breath.  Has lower extremity edema.  Her furosemide was increased at recent cardiology visit but she did not take the higher dose because she did not want to urinate too much.    She is not sure when her last dose of apixaban was.    Review of Systems:   Complete review of systems negative except as described above.    Past Medical History:  Past Medical History:   Diagnosis Date    Aortic stenosis, mild 10/01/2015    follows  Dr Gallardo at [facility] last visit 9/30/22    Arrhythmia     Arthritis     knees    Atrial fibrillation (HCC)     Bleeding from varicose veins of right lower extremity 06/08/2017    CAD (coronary artery disease)     Cerebrovascular accident (CVA) due to embolic occlusion of left middle cerebral artery (HCC) 10/2015    Confirmed by neurology    Cerebrovascular accident (CVA) due to embolic occlusion of left middle cerebral artery (HCC) 2013    Left parietal confirmed by neurology    Cerebrovascular disease 03/21/2013    CVA.no weakness/deficits    CHF (congestive heart failure) (HCC)     Critical limb ischemia of both lower extremities (HCC) 02/22/2025    Diabetes mellitus (HCC)     Hearing deficit     wears hearing aide left ear only    Heart disease     Hyperlipidemia     Hypertension      14 tabs x 4boxes=56  [id number] 06/26 6/25/25   Darlene Jalloh PA   furosemide (LASIX) 20 MG tablet Take 1 tablet by mouth 2 times daily  Patient taking differently: Take 1 tablet by mouth daily 5/12/25   Tejinder Guzman MD   Cinnamon 500 MG TABS Take 1,000 mg by mouth daily  Patient not taking: Reported on 6/26/2025    Cullen Turcios MD   metoprolol tartrate (LOPRESSOR) 100 MG tablet Take 1 tablet by mouth 2 times daily  Patient taking differently: Take 0.5 tablets by mouth 2 times daily 3/4/25   Shyla Ford APRN - CNP   fluticasone (FLONASE) 50 MCG/ACT nasal spray 1 spray by Nasal route 2 times daily 2 sprays in each nostril  Twice a day  Patient not taking: Reported on 6/26/2025 3/7/24   Hali Meza MD   dilTIAZem (CARDIZEM CD) 240 MG extended release capsule take 1 capsule by mouth once daily 11/2/22   Cullen Turcios MD   aspirin 81 MG EC tablet Take 1 tablet by mouth daily    Cullen Turcios MD   potassium chloride (KLOR-CON) 10 MEQ extended release tablet Take 1 tablet by mouth 6/10/19   Cullen Turcios MD   calcium carbonate-vitamin D3 (CALTRATE) 600-400 MG-UNIT TABS per tab Take 1 tablet by mouth daily    Cullen Turcios MD   pravastatin (PRAVACHOL) 40 MG tablet Take 0.5 tablets by mouth daily    Cullen Turcios MD   niacin (SLO-NIACIN) 500 MG tablet Take 1 tablet by mouth 2 times daily    Cullen Turcios MD   omeprazole (PRILOSEC) 20 MG capsule Take 1 capsule by mouth 2 times daily    Cullen Turcios MD   therapeutic multivitamin-minerals (THERAGRAN-M) tablet Take 1 tablet by mouth daily    Cullen Turcios MD   Vitamin D (CHOLECALCIFEROL) 1000 UNITS CAPS capsule Take 600 Units by mouth daily   Patient not taking: Reported on 6/26/2025    Cullen Turcios MD   metFORMIN (GLUCOPHAGE) 500 MG tablet Take 1 tablet by mouth daily (with breakfast)    Cullen Turcios MD       Current Medications:    Current Facility-Administered

## 2025-07-04 NOTE — CONSULTS
[facility]              [address] [place] [address]                              CONSULTATION      PATIENT NAME: SOURAV ACUNA             : 1945  MED REC NO: [id number]                        ROOM: 8521  ACCOUNT NO: [id number]                       ADMIT DATE: 2025  PROVIDER: Shana Zendejas MD    NEUROSURGERY CONSULT    CONSULT DATE: 2025    REFERRING PHYSICIAN:  NITHIN GALEANA      REASON FOR CONSULT:  Lumbar canal stenosis from L2 to L5 with right lower extremity weakness.    HISTORY OF PRESENT ILLNESS:  The patient is a 79-year-old lady who is known to my service.  She was seen in the hospital several weeks ago for inability to ambulate and right lower extremity weakness.  She was found to have lumbar stenosis from L2 to L5 with also evidence of spondylolisthesis and scoliosis.  She was initially discharged to rehab, but did not get any significant improvement in her leg weakness, and after risks, benefits, and all alternatives were discussed with the patient and her family, it was determined that she would undergo lumbar decompression and fusion.  She was admitted to the hospital today to be bridged from Eliquis to heparin, and she continues to complain of right lower extremity weakness.  Denies any numbness or tingling or loss of control of bowel or bladder function.    PAST MEDICAL HISTORY:  Positive for atrial fibrillation, coronary artery disease, stroke, CHF, diabetes, hyperlipidemia, hypertension.    PAST SURGICAL HISTORY:  Positive for aortic valve replacement, cochlear implant placement, appendectomy, breast surgery.    FAMILY HISTORY:  Positive for cancer and diabetes in her father.    SOCIAL HISTORY:  Negative for tobacco or alcohol use.    ALLERGIES:  INCLUDE GLUTEN.      REVIEW OF SYSTEMS:  HEENT:  Negative for headache, double vision, or blurry vision.  CARDIOVASCULAR:  Positive for irregular heart rate.  RESPIRATORY:   wishes to proceed.  We will also have Cardiology evaluate her.  She will be placed on heparin drip.  Heparin will be stopped at midnight on Monday for Monday afternoon surgery.          KATERINE AMAYA MD      D:  07/04/2025 10:10:25     T:  07/04/2025 10:57:44     DEUCE/KORY  Job #:  [id number]     Doc#:  [id number]

## 2025-07-04 NOTE — H&P
Internal Medicine History & Physical     Name: Grecia Wilhelm  : 1945  Chief Complaint: No chief complaint on file.  Primary Care Physician: Bismark Beauchamp MD  Admission date: 2025  Date of service: 2025     History of Present Illness  Grecia is a 79 y.o. year old female with a PMH of CVA DVT A Fib who presented with a chief complaint of surgery Monday with Dr Zendejas, need for holding OAC and bridging on Heparin gtt due to her significant clot risk. Cardiology has been asked to see and evaluate prior to surgery. She is awake alert oriented, hard of hearing, but doing well overall. Spoke with nursing and with family and patient      Past Medical History:   Diagnosis Date    Aortic stenosis, mild 10/01/2015    follows  Dr Gallardo at [facility] last visit 22    Arrhythmia     Arthritis     knees    Atrial fibrillation (HCC)     Bleeding from varicose veins of right lower extremity 2017    CAD (coronary artery disease)     Cerebrovascular accident (CVA) due to embolic occlusion of left middle cerebral artery (HCC) 10/2015    Confirmed by neurology    Cerebrovascular accident (CVA) due to embolic occlusion of left middle cerebral artery (HCC)     Left parietal confirmed by neurology    Cerebrovascular disease 2013    CVA.no weakness/deficits    CHF (congestive heart failure) (HCC)     Critical limb ischemia of both lower extremities (HCC) 2025    Diabetes mellitus (HCC)     Hearing deficit     wears hearing aide left ear only    Heart disease     Hyperlipidemia     Hypertension     Iron deficiency anemia 10/23/2022    Migraine headache with aura     Mitral regurgitation 10/01/2015    mild    Moderate aortic stenosis by prior echocardiogram     NCGS (non-celiac gluten sensitivity)     Severe aortic stenosis 2020    By 2D echo    TIA (transient ischemic attack)     Unspecified cerebral artery occlusion with cerebral infarction     Varicose veins of left leg with edema

## 2025-07-05 LAB
ANION GAP SERPL CALCULATED.3IONS-SCNC: 11 MMOL/L (ref 7–16)
BASOPHILS # BLD: 0.02 K/UL (ref 0–0.2)
BASOPHILS NFR BLD: 0 % (ref 0–2)
BUN SERPL-MCNC: 17 MG/DL (ref 8–23)
CALCIUM SERPL-MCNC: 9.5 MG/DL (ref 8.8–10.2)
CHLORIDE SERPL-SCNC: 106 MMOL/L (ref 98–107)
CO2 SERPL-SCNC: 24 MMOL/L (ref 22–29)
CREAT SERPL-MCNC: 0.6 MG/DL (ref 0.5–1)
EOSINOPHIL # BLD: 0.24 K/UL (ref 0.05–0.5)
EOSINOPHILS RELATIVE PERCENT: 4 % (ref 0–6)
ERYTHROCYTE [DISTWIDTH] IN BLOOD BY AUTOMATED COUNT: 14.5 % (ref 11.5–15)
GFR, ESTIMATED: >90 ML/MIN/1.73M2
GLUCOSE BLD-MCNC: 107 MG/DL (ref 74–99)
GLUCOSE BLD-MCNC: 114 MG/DL (ref 74–99)
GLUCOSE BLD-MCNC: 130 MG/DL (ref 74–99)
GLUCOSE BLD-MCNC: 189 MG/DL (ref 74–99)
GLUCOSE SERPL-MCNC: 124 MG/DL (ref 74–99)
HBA1C MFR BLD: 6.3 % (ref 4–5.6)
HCT VFR BLD AUTO: 34.6 % (ref 34–48)
HGB BLD-MCNC: 10.9 G/DL (ref 11.5–15.5)
IMM GRANULOCYTES # BLD AUTO: <0.03 K/UL (ref 0–0.58)
IMM GRANULOCYTES NFR BLD: 0 % (ref 0–5)
LYMPHOCYTES NFR BLD: 1.72 K/UL (ref 1.5–4)
LYMPHOCYTES RELATIVE PERCENT: 28 % (ref 20–42)
MCH RBC QN AUTO: 28.3 PG (ref 26–35)
MCHC RBC AUTO-ENTMCNC: 31.5 G/DL (ref 32–34.5)
MCV RBC AUTO: 89.9 FL (ref 80–99.9)
MONOCYTES NFR BLD: 0.47 K/UL (ref 0.1–0.95)
MONOCYTES NFR BLD: 8 % (ref 2–12)
NEUTROPHILS NFR BLD: 60 % (ref 43–80)
NEUTS SEG NFR BLD: 3.65 K/UL (ref 1.8–7.3)
PARTIAL THROMBOPLASTIN TIME: 62.1 SEC (ref 24.5–35.1)
PARTIAL THROMBOPLASTIN TIME: 64.2 SEC (ref 24.5–35.1)
PLATELET # BLD AUTO: 237 K/UL (ref 130–450)
PMV BLD AUTO: 9.8 FL (ref 7–12)
POTASSIUM SERPL-SCNC: 3.7 MMOL/L (ref 3.5–5.1)
RBC # BLD AUTO: 3.85 M/UL (ref 3.5–5.5)
SODIUM SERPL-SCNC: 141 MMOL/L (ref 136–145)
WBC OTHER # BLD: 6.1 K/UL (ref 4.5–11.5)

## 2025-07-05 PROCEDURE — 80048 BASIC METABOLIC PNL TOTAL CA: CPT

## 2025-07-05 PROCEDURE — 85025 COMPLETE CBC W/AUTO DIFF WBC: CPT

## 2025-07-05 PROCEDURE — 36415 COLL VENOUS BLD VENIPUNCTURE: CPT

## 2025-07-05 PROCEDURE — 6370000000 HC RX 637 (ALT 250 FOR IP): Performed by: STUDENT IN AN ORGANIZED HEALTH CARE EDUCATION/TRAINING PROGRAM

## 2025-07-05 PROCEDURE — 85730 THROMBOPLASTIN TIME PARTIAL: CPT

## 2025-07-05 PROCEDURE — 99232 SBSQ HOSP IP/OBS MODERATE 35: CPT | Performed by: INTERNAL MEDICINE

## 2025-07-05 PROCEDURE — 83036 HEMOGLOBIN GLYCOSYLATED A1C: CPT

## 2025-07-05 PROCEDURE — 6360000002 HC RX W HCPCS: Performed by: STUDENT IN AN ORGANIZED HEALTH CARE EDUCATION/TRAINING PROGRAM

## 2025-07-05 PROCEDURE — 2060000000 HC ICU INTERMEDIATE R&B

## 2025-07-05 PROCEDURE — 82962 GLUCOSE BLOOD TEST: CPT

## 2025-07-05 PROCEDURE — 6370000000 HC RX 637 (ALT 250 FOR IP): Performed by: INTERNAL MEDICINE

## 2025-07-05 RX ORDER — FUROSEMIDE 40 MG/1
20 TABLET ORAL DAILY
Status: COMPLETED | OUTPATIENT
Start: 2025-07-05 | End: 2025-07-06

## 2025-07-05 RX ADMIN — HEPARIN SODIUM 12 UNITS/KG/HR: 10000 INJECTION, SOLUTION INTRAVENOUS at 19:17

## 2025-07-05 RX ADMIN — OXYCODONE 5 MG: 5 TABLET ORAL at 18:41

## 2025-07-05 RX ADMIN — METOPROLOL TARTRATE 50 MG: 50 TABLET, FILM COATED ORAL at 20:24

## 2025-07-05 RX ADMIN — INSULIN LISPRO 2 UNITS: 100 INJECTION, SOLUTION INTRAVENOUS; SUBCUTANEOUS at 20:23

## 2025-07-05 RX ADMIN — FUROSEMIDE 20 MG: 40 TABLET ORAL at 14:28

## 2025-07-05 RX ADMIN — DILTIAZEM HYDROCHLORIDE 240 MG: 240 CAPSULE, COATED, EXTENDED RELEASE ORAL at 08:55

## 2025-07-05 RX ADMIN — PRAVASTATIN SODIUM 20 MG: 20 TABLET ORAL at 08:55

## 2025-07-05 RX ADMIN — METOPROLOL TARTRATE 50 MG: 50 TABLET, FILM COATED ORAL at 08:55

## 2025-07-05 RX ADMIN — PANTOPRAZOLE SODIUM 40 MG: 40 TABLET, DELAYED RELEASE ORAL at 05:17

## 2025-07-05 ASSESSMENT — PAIN SCALES - GENERAL
PAINLEVEL_OUTOF10: 0
PAINLEVEL_OUTOF10: 10
PAINLEVEL_OUTOF10: 0
PAINLEVEL_OUTOF10: 10
PAINLEVEL_OUTOF10: 0

## 2025-07-05 ASSESSMENT — PAIN DESCRIPTION - ORIENTATION: ORIENTATION: RIGHT

## 2025-07-05 ASSESSMENT — PAIN DESCRIPTION - ONSET: ONSET: ON-GOING

## 2025-07-05 ASSESSMENT — PAIN DESCRIPTION - FREQUENCY: FREQUENCY: INTERMITTENT

## 2025-07-05 ASSESSMENT — PAIN DESCRIPTION - LOCATION: LOCATION: KNEE

## 2025-07-05 ASSESSMENT — PAIN DESCRIPTION - DESCRIPTORS: DESCRIPTORS: SORE;DISCOMFORT;TENDER

## 2025-07-05 ASSESSMENT — PAIN DESCRIPTION - PAIN TYPE: TYPE: CHRONIC PAIN

## 2025-07-05 ASSESSMENT — PAIN - FUNCTIONAL ASSESSMENT: PAIN_FUNCTIONAL_ASSESSMENT: PREVENTS OR INTERFERES SOME ACTIVE ACTIVITIES AND ADLS

## 2025-07-05 NOTE — PROGRESS NOTES
INPATIENT CARDIOLOGY FOLLOW-UP    Name: Grecia Wilhelm    Age: 79 y.o.    Date of Admission: 7/4/2025  7:29 AM    Date of Service: 7/5/2025    Primary Cardiologist: Dr Guzman    Chief Complaint: Follow-up for \"Preoperative cardiovascular assessment/plan for lumbar fusion\"     Interim History:  Feels okay.  Uncomfortable in the bed back hurts.  Denies chest pain or shortness of breath.  Put out 1.8 L urine net negative about 600 mL.    Review of Systems:   Negative except as described above    Problem List:  Patient Active Problem List   Diagnosis    Hypertension    Cerebral infarction (HCC)    DJD (degenerative joint disease)    Atrial fibrillation (HCC)    TIA (transient ischemic attack)    Warfarin-induced coagulopathy    Cerebral ischemia    Migraine headache with aura    Cerebral infarction (HCC)    Aortic stenosis, mild    Mitral regurgitation    Pulmonary hypertension (HCC)    Varicose veins of left leg with edema    Bleeding from varicose veins of right lower extremity    Hematoma of leg, right, initial encounter    Venous insufficiency (chronic) (peripheral)    Profound hearing loss    Cerebrovascular accident (CVA) due to embolic occlusion of left middle cerebral artery (HCC)    Severe aortic stenosis    Bilateral deafness    Hearing loss    Diabetes mellitus (HCC)    Iron deficiency anemia    S/P ear surgery    History of transient ischemic attack (TIA)    Critical limb ischemia of both lower extremities (HCC)    Aortic occlusion    Acute alteration in mental status    Acute lower limb ischemia    PVD (peripheral vascular disease)    Altered mental status, unspecified    Sepsis (HCC)    Right leg weakness    Stenosis, spinal, lumbar    Spondylolisthesis of lumbar region    Lumbar foraminal stenosis       Current Medications:    Current Facility-Administered Medications:     [START ON 7/7/2025] 0.9 % sodium chloride infusion, , IntraVENous, Continuous, Flor Argueta PA-C    [START ON 7/7/2025]

## 2025-07-06 ENCOUNTER — APPOINTMENT (OUTPATIENT)
Dept: CT IMAGING | Age: 80
DRG: 448 | End: 2025-07-06
Attending: STUDENT IN AN ORGANIZED HEALTH CARE EDUCATION/TRAINING PROGRAM
Payer: MEDICARE

## 2025-07-06 ENCOUNTER — APPOINTMENT (OUTPATIENT)
Dept: GENERAL RADIOLOGY | Age: 80
DRG: 448 | End: 2025-07-06
Attending: STUDENT IN AN ORGANIZED HEALTH CARE EDUCATION/TRAINING PROGRAM
Payer: MEDICARE

## 2025-07-06 LAB
ANION GAP SERPL CALCULATED.3IONS-SCNC: 13 MMOL/L (ref 7–16)
BACTERIA URNS QL MICRO: ABNORMAL
BASOPHILS # BLD: 0.02 K/UL (ref 0–0.2)
BASOPHILS NFR BLD: 0 % (ref 0–2)
BILIRUB UR QL STRIP: NEGATIVE
BUN SERPL-MCNC: 18 MG/DL (ref 8–23)
CALCIUM SERPL-MCNC: 9.7 MG/DL (ref 8.8–10.2)
CHLORIDE SERPL-SCNC: 101 MMOL/L (ref 98–107)
CLARITY UR: CLEAR
CO2 SERPL-SCNC: 23 MMOL/L (ref 22–29)
COLOR UR: YELLOW
CREAT SERPL-MCNC: 0.7 MG/DL (ref 0.5–1)
EOSINOPHIL # BLD: 0.06 K/UL (ref 0.05–0.5)
EOSINOPHILS RELATIVE PERCENT: 1 % (ref 0–6)
ERYTHROCYTE [DISTWIDTH] IN BLOOD BY AUTOMATED COUNT: 14.3 % (ref 11.5–15)
GFR, ESTIMATED: 90 ML/MIN/1.73M2
GLUCOSE BLD-MCNC: 156 MG/DL (ref 74–99)
GLUCOSE BLD-MCNC: 158 MG/DL (ref 74–99)
GLUCOSE BLD-MCNC: 170 MG/DL (ref 74–99)
GLUCOSE BLD-MCNC: 194 MG/DL (ref 74–99)
GLUCOSE SERPL-MCNC: 167 MG/DL (ref 74–99)
GLUCOSE UR STRIP-MCNC: NEGATIVE MG/DL
HCT VFR BLD AUTO: 35.7 % (ref 34–48)
HGB BLD-MCNC: 11.4 G/DL (ref 11.5–15.5)
HGB UR QL STRIP.AUTO: ABNORMAL
IMM GRANULOCYTES # BLD AUTO: 0.03 K/UL (ref 0–0.58)
IMM GRANULOCYTES NFR BLD: 0 % (ref 0–5)
KETONES UR STRIP-MCNC: NEGATIVE MG/DL
LACTATE BLDV-SCNC: 1.8 MMOL/L (ref 0.5–2.2)
LEUKOCYTE ESTERASE UR QL STRIP: NEGATIVE
LYMPHOCYTES NFR BLD: 1.13 K/UL (ref 1.5–4)
LYMPHOCYTES RELATIVE PERCENT: 12 % (ref 20–42)
MCH RBC QN AUTO: 28.2 PG (ref 26–35)
MCHC RBC AUTO-ENTMCNC: 31.9 G/DL (ref 32–34.5)
MCV RBC AUTO: 88.4 FL (ref 80–99.9)
MONOCYTES NFR BLD: 0.73 K/UL (ref 0.1–0.95)
MONOCYTES NFR BLD: 8 % (ref 2–12)
NEUTROPHILS NFR BLD: 80 % (ref 43–80)
NEUTS SEG NFR BLD: 7.81 K/UL (ref 1.8–7.3)
NITRITE UR QL STRIP: NEGATIVE
PARTIAL THROMBOPLASTIN TIME: 47 SEC (ref 24.5–35.1)
PARTIAL THROMBOPLASTIN TIME: 48.6 SEC (ref 24.5–35.1)
PH UR STRIP: 6 [PH] (ref 5–8)
PLATELET # BLD AUTO: 246 K/UL (ref 130–450)
PMV BLD AUTO: 9.5 FL (ref 7–12)
POTASSIUM SERPL-SCNC: 4 MMOL/L (ref 3.5–5.1)
PROCALCITONIN SERPL-MCNC: 0.1 NG/ML (ref 0–0.08)
PROT UR STRIP-MCNC: NEGATIVE MG/DL
RBC # BLD AUTO: 4.04 M/UL (ref 3.5–5.5)
RBC #/AREA URNS HPF: ABNORMAL /HPF
SODIUM SERPL-SCNC: 137 MMOL/L (ref 136–145)
SP GR UR STRIP: 1.01 (ref 1–1.03)
UROBILINOGEN UR STRIP-ACNC: 1 EU/DL (ref 0–1)
WBC #/AREA URNS HPF: ABNORMAL /HPF
WBC OTHER # BLD: 9.8 K/UL (ref 4.5–11.5)

## 2025-07-06 PROCEDURE — 6370000000 HC RX 637 (ALT 250 FOR IP): Performed by: INTERNAL MEDICINE

## 2025-07-06 PROCEDURE — 70450 CT HEAD/BRAIN W/O DYE: CPT

## 2025-07-06 PROCEDURE — 71045 X-RAY EXAM CHEST 1 VIEW: CPT

## 2025-07-06 PROCEDURE — 2500000003 HC RX 250 WO HCPCS: Performed by: NEUROLOGICAL SURGERY

## 2025-07-06 PROCEDURE — 82962 GLUCOSE BLOOD TEST: CPT

## 2025-07-06 PROCEDURE — 81001 URINALYSIS AUTO W/SCOPE: CPT

## 2025-07-06 PROCEDURE — 2060000000 HC ICU INTERMEDIATE R&B

## 2025-07-06 PROCEDURE — 80048 BASIC METABOLIC PNL TOTAL CA: CPT

## 2025-07-06 PROCEDURE — 6360000002 HC RX W HCPCS: Performed by: STUDENT IN AN ORGANIZED HEALTH CARE EDUCATION/TRAINING PROGRAM

## 2025-07-06 PROCEDURE — 6370000000 HC RX 637 (ALT 250 FOR IP): Performed by: STUDENT IN AN ORGANIZED HEALTH CARE EDUCATION/TRAINING PROGRAM

## 2025-07-06 PROCEDURE — 36415 COLL VENOUS BLD VENIPUNCTURE: CPT

## 2025-07-06 PROCEDURE — 84145 PROCALCITONIN (PCT): CPT

## 2025-07-06 PROCEDURE — 83605 ASSAY OF LACTIC ACID: CPT

## 2025-07-06 PROCEDURE — 85730 THROMBOPLASTIN TIME PARTIAL: CPT

## 2025-07-06 PROCEDURE — 85025 COMPLETE CBC W/AUTO DIFF WBC: CPT

## 2025-07-06 RX ORDER — HYDRALAZINE HYDROCHLORIDE 20 MG/ML
10 INJECTION INTRAMUSCULAR; INTRAVENOUS EVERY 4 HOURS PRN
Status: DISCONTINUED | OUTPATIENT
Start: 2025-07-06 | End: 2025-07-10 | Stop reason: HOSPADM

## 2025-07-06 RX ORDER — HYDROCODONE BITARTRATE AND ACETAMINOPHEN 5; 325 MG/1; MG/1
1 TABLET ORAL EVERY 6 HOURS PRN
Status: DISCONTINUED | OUTPATIENT
Start: 2025-07-06 | End: 2025-07-06

## 2025-07-06 RX ORDER — TRAMADOL HYDROCHLORIDE 50 MG/1
50 TABLET ORAL EVERY 6 HOURS PRN
Status: DISCONTINUED | OUTPATIENT
Start: 2025-07-06 | End: 2025-07-10 | Stop reason: HOSPADM

## 2025-07-06 RX ADMIN — DILTIAZEM HYDROCHLORIDE 240 MG: 240 CAPSULE, COATED, EXTENDED RELEASE ORAL at 09:32

## 2025-07-06 RX ADMIN — PRAVASTATIN SODIUM 20 MG: 20 TABLET ORAL at 09:32

## 2025-07-06 RX ADMIN — FUROSEMIDE 20 MG: 40 TABLET ORAL at 09:31

## 2025-07-06 RX ADMIN — OXYCODONE 5 MG: 5 TABLET ORAL at 04:04

## 2025-07-06 RX ADMIN — HEPARIN SODIUM 14 UNITS/KG/HR: 10000 INJECTION, SOLUTION INTRAVENOUS at 17:44

## 2025-07-06 RX ADMIN — INSULIN LISPRO 2 UNITS: 100 INJECTION, SOLUTION INTRAVENOUS; SUBCUTANEOUS at 20:54

## 2025-07-06 RX ADMIN — METOPROLOL TARTRATE 50 MG: 50 TABLET, FILM COATED ORAL at 20:55

## 2025-07-06 RX ADMIN — PANTOPRAZOLE SODIUM 40 MG: 40 TABLET, DELAYED RELEASE ORAL at 04:04

## 2025-07-06 RX ADMIN — SODIUM CHLORIDE, PRESERVATIVE FREE 10 ML: 5 INJECTION INTRAVENOUS at 21:02

## 2025-07-06 RX ADMIN — Medication 3 MG: at 20:55

## 2025-07-06 RX ADMIN — METOPROLOL TARTRATE 50 MG: 50 TABLET, FILM COATED ORAL at 09:31

## 2025-07-06 ASSESSMENT — PAIN SCALES - GENERAL
PAINLEVEL_OUTOF10: 8
PAINLEVEL_OUTOF10: 0

## 2025-07-06 ASSESSMENT — PAIN DESCRIPTION - ORIENTATION: ORIENTATION: RIGHT

## 2025-07-06 ASSESSMENT — PAIN - FUNCTIONAL ASSESSMENT: PAIN_FUNCTIONAL_ASSESSMENT: PREVENTS OR INTERFERES SOME ACTIVE ACTIVITIES AND ADLS

## 2025-07-06 ASSESSMENT — PAIN DESCRIPTION - DESCRIPTORS: DESCRIPTORS: ACHING

## 2025-07-06 ASSESSMENT — PAIN DESCRIPTION - LOCATION: LOCATION: LEG

## 2025-07-06 NOTE — PROGRESS NOTES
Internal Medicine Progress Note    Patient's name: Grecia Wilhelm  : 1945  Chief complaints (on day of admission): No chief complaint on file.  Admission date: 2025  Date of service: 2025   Room: [address] IMCU/NEURO  Primary care physician: Bismark Beauchamp MD  Reason for visit: Follow-up for surgery     Subjective  Grecia was seen and examined at bedside     Doing well   Had an episode of confusion this AM maybe  and related to opiates   Switch to tramadol   Seemed alert and oriented when I spoke with her   Family at bedside   OR tomorrow     Review of Systems  There are no new complaints of chest pain, shortness of breath, abdominal pain, nausea, vomiting, diarrhea, constipation unless otherwise mentioned above.     Hospital Medications  Current Facility-Administered Medications   Medication Dose Route Frequency Provider Last Rate Last Admin    traMADol (ULTRAM) tablet 50 mg  50 mg Oral Q6H PRN Jeremy Snow MD        melatonin tablet 3 mg  3 mg Oral Nightly Jeremy Snow MD        hydrALAZINE (APRESOLINE) injection 10 mg  10 mg IntraVENous Q4H PRN Jeremy Snow MD        [START ON 2025] 0.9 % sodium chloride infusion   IntraVENous Continuous Flor Argueta PA-C        [START ON 2025] sodium chloride flush 0.9 % injection 5-40 mL  5-40 mL IntraVENous 2 times per day Flor Argueta PA-C        [START ON 2025] sodium chloride flush 0.9 % injection 5-40 mL  5-40 mL IntraVENous PRN Flor Argueta PA-C        [START ON 2025] 0.9 % sodium chloride infusion   IntraVENous PRN Flor Argueta PA-C        [START ON 2025] ceFAZolin (ANCEF) 2,000 mg in sterile water 20 mL IV syringe  2,000 mg IntraVENous On Call to OR Flor Argueta PA-C        heparin (porcine) injection 3,900 Units  60 Units/kg IntraVENous PRN Jeremy Snow MD        heparin (porcine) injection 2,000 Units  30 Units/kg IntraVENous PRN Jeremy Snow MD        heparin 25,000 units in dextrose 5%  250 mL (premix) infusion  5-30 Units/kg/hr IntraVENous Continuous Jeremy Snow MD 7.9 mL/hr at 07/05/25 1917 12 Units/kg/hr at 07/05/25 1917    [Held by provider] aspirin EC tablet 81 mg  81 mg Oral Daily Jeremy Snow MD        metoprolol tartrate (LOPRESSOR) tablet 50 mg  50 mg Oral BID Jeremy Snow MD   50 mg at 07/06/25 0931    pantoprazole (PROTONIX) tablet 40 mg  40 mg Oral QAM AC Jeremy Snow MD   40 mg at 07/06/25 0404    pravastatin (PRAVACHOL) tablet 20 mg  20 mg Oral Daily Jeremy Snow MD   20 mg at 07/06/25 0932    dilTIAZem (CARDIZEM CD) extended release capsule 240 mg  240 mg Oral Daily Jeremy Snow MD   240 mg at 07/06/25 0932    insulin lispro (HUMALOG,ADMELOG) injection vial 0-8 Units  0-8 Units SubCUTAneous 4x Daily AC & HS Jeremy Snow MD   2 Units at 07/05/25 2023    glucose chewable tablet 16 g  4 tablet Oral PRN Jeremy Snow MD        dextrose bolus 10% 125 mL  125 mL IntraVENous PRN Jeremy Snow MD        Or    dextrose bolus 10% 250 mL  250 mL IntraVENous PRN Jeremy Snow MD        glucagon injection 1 mg  1 mg SubCUTAneous PRN Jeremy Snow MD        dextrose 10 % infusion   IntraVENous Continuous PRN Jeremy Snow MD        sodium chloride flush 0.9 % injection 5-40 mL  5-40 mL IntraVENous 2 times per day Shana Zendejas MD        sodium chloride flush 0.9 % injection 5-40 mL  5-40 mL IntraVENous PRN Shana Zendejas MD        0.9 % sodium chloride infusion   IntraVENous PRN Shana Zendejas MD           PRN Medications  traMADol, hydrALAZINE, [START ON 7/7/2025] sodium chloride flush, [START ON 7/7/2025] sodium chloride, heparin (porcine), heparin (porcine), glucose, dextrose bolus **OR** dextrose bolus, glucagon (rDNA), dextrose, sodium chloride flush, sodium chloride    Objective  Most Recent Recorded Vitals  BP (!) 156/76   Pulse (!) 105   Temp 99.7 °F (37.6 °C) (Oral)   Resp 19   Wt 66 kg (145 lb 9.6 oz)   SpO2 91%   BMI 25.79 kg/m²   I/O last 3 completed

## 2025-07-06 NOTE — PROGRESS NOTES
Notified Dr. Snow that patient is agitated and confused/hallucinating at times. Daughter at bedside is concerned due to the fact this is not normal for her at all.    Per Dr. Snow, HCT without contrast, lactic acid, procalcitonin, UA and CXR    Per Dr. Snow, ok to straight cath for urine and change HCT to STAT due to increasing AMS

## 2025-07-07 ENCOUNTER — ANESTHESIA (OUTPATIENT)
Dept: OPERATING ROOM | Age: 80
DRG: 448 | End: 2025-07-07
Payer: MEDICARE

## 2025-07-07 ENCOUNTER — ANESTHESIA EVENT (OUTPATIENT)
Dept: OPERATING ROOM | Age: 80
DRG: 448 | End: 2025-07-07
Payer: MEDICARE

## 2025-07-07 ENCOUNTER — APPOINTMENT (OUTPATIENT)
Dept: GENERAL RADIOLOGY | Age: 80
DRG: 448 | End: 2025-07-07
Attending: STUDENT IN AN ORGANIZED HEALTH CARE EDUCATION/TRAINING PROGRAM
Payer: MEDICARE

## 2025-07-07 LAB
ANION GAP SERPL CALCULATED.3IONS-SCNC: 13 MMOL/L (ref 7–16)
BASOPHILS # BLD: 0.01 K/UL (ref 0–0.2)
BASOPHILS NFR BLD: 0 % (ref 0–2)
BUN SERPL-MCNC: 14 MG/DL (ref 8–23)
CALCIUM SERPL-MCNC: 9.5 MG/DL (ref 8.8–10.2)
CHLORIDE SERPL-SCNC: 100 MMOL/L (ref 98–107)
CO2 SERPL-SCNC: 22 MMOL/L (ref 22–29)
CREAT SERPL-MCNC: 0.7 MG/DL (ref 0.5–1)
EOSINOPHIL # BLD: 0 K/UL (ref 0.05–0.5)
EOSINOPHILS RELATIVE PERCENT: 0 % (ref 0–6)
ERYTHROCYTE [DISTWIDTH] IN BLOOD BY AUTOMATED COUNT: 14.6 % (ref 11.5–15)
GFR, ESTIMATED: >90 ML/MIN/1.73M2
GLUCOSE BLD-MCNC: 141 MG/DL (ref 74–99)
GLUCOSE BLD-MCNC: 147 MG/DL (ref 74–99)
GLUCOSE BLD-MCNC: 194 MG/DL (ref 74–99)
GLUCOSE SERPL-MCNC: 152 MG/DL (ref 74–99)
HCT VFR BLD AUTO: 35.7 % (ref 34–48)
HGB BLD-MCNC: 11.7 G/DL (ref 11.5–15.5)
IMM GRANULOCYTES # BLD AUTO: 0.04 K/UL (ref 0–0.58)
IMM GRANULOCYTES NFR BLD: 0 % (ref 0–5)
LYMPHOCYTES NFR BLD: 1.26 K/UL (ref 1.5–4)
LYMPHOCYTES RELATIVE PERCENT: 9 % (ref 20–42)
MAGNESIUM SERPL-MCNC: 1.7 MG/DL (ref 1.6–2.4)
MCH RBC QN AUTO: 28.5 PG (ref 26–35)
MCHC RBC AUTO-ENTMCNC: 32.8 G/DL (ref 32–34.5)
MCV RBC AUTO: 87.1 FL (ref 80–99.9)
MONOCYTES NFR BLD: 1.5 K/UL (ref 0.1–0.95)
MONOCYTES NFR BLD: 11 % (ref 2–12)
NEUTROPHILS NFR BLD: 80 % (ref 43–80)
NEUTS SEG NFR BLD: 10.92 K/UL (ref 1.8–7.3)
PLATELET # BLD AUTO: 233 K/UL (ref 130–450)
PMV BLD AUTO: 9.6 FL (ref 7–12)
POTASSIUM SERPL-SCNC: 3.4 MMOL/L (ref 3.5–5.1)
RBC # BLD AUTO: 4.1 M/UL (ref 3.5–5.5)
SODIUM SERPL-SCNC: 135 MMOL/L (ref 136–145)
WBC OTHER # BLD: 13.7 K/UL (ref 4.5–11.5)

## 2025-07-07 PROCEDURE — 83735 ASSAY OF MAGNESIUM: CPT

## 2025-07-07 PROCEDURE — 63047 LAM FACETEC & FORAMOT LUMBAR: CPT | Performed by: NEUROLOGICAL SURGERY

## 2025-07-07 PROCEDURE — 2500000003 HC RX 250 WO HCPCS: Performed by: NEUROLOGICAL SURGERY

## 2025-07-07 PROCEDURE — 82962 GLUCOSE BLOOD TEST: CPT

## 2025-07-07 PROCEDURE — 22614 ARTHRD PST TQ 1NTRSPC EA ADD: CPT | Performed by: NEUROLOGICAL SURGERY

## 2025-07-07 PROCEDURE — 22842 INSERT SPINE FIXATION DEVICE: CPT | Performed by: NEUROLOGICAL SURGERY

## 2025-07-07 PROCEDURE — 22612 ARTHRD PST TQ 1NTRSPC LUMBAR: CPT | Performed by: NEUROLOGICAL SURGERY

## 2025-07-07 PROCEDURE — 22842 INSERT SPINE FIXATION DEVICE: CPT | Performed by: PHYSICIAN ASSISTANT

## 2025-07-07 PROCEDURE — 3600000004 HC SURGERY LEVEL 4 BASE: Performed by: NEUROLOGICAL SURGERY

## 2025-07-07 PROCEDURE — 63048 LAM FACETEC &FORAMOT EA ADDL: CPT | Performed by: PHYSICIAN ASSISTANT

## 2025-07-07 PROCEDURE — 6370000000 HC RX 637 (ALT 250 FOR IP): Performed by: NEUROLOGICAL SURGERY

## 2025-07-07 PROCEDURE — 7100000001 HC PACU RECOVERY - ADDTL 15 MIN: Performed by: NEUROLOGICAL SURGERY

## 2025-07-07 PROCEDURE — 3600000014 HC SURGERY LEVEL 4 ADDTL 15MIN: Performed by: NEUROLOGICAL SURGERY

## 2025-07-07 PROCEDURE — C1713 ANCHOR/SCREW BN/BN,TIS/BN: HCPCS | Performed by: NEUROLOGICAL SURGERY

## 2025-07-07 PROCEDURE — 7100000000 HC PACU RECOVERY - FIRST 15 MIN: Performed by: NEUROLOGICAL SURGERY

## 2025-07-07 PROCEDURE — 61783 SCAN PROC SPINAL: CPT | Performed by: NEUROLOGICAL SURGERY

## 2025-07-07 PROCEDURE — 80048 BASIC METABOLIC PNL TOTAL CA: CPT

## 2025-07-07 PROCEDURE — C1729 CATH, DRAINAGE: HCPCS | Performed by: NEUROLOGICAL SURGERY

## 2025-07-07 PROCEDURE — 6360000002 HC RX W HCPCS

## 2025-07-07 PROCEDURE — 6360000002 HC RX W HCPCS: Performed by: NEUROLOGICAL SURGERY

## 2025-07-07 PROCEDURE — 95940 IONM IN OPERATNG ROOM 15 MIN: CPT | Performed by: AUDIOLOGIST

## 2025-07-07 PROCEDURE — 22612 ARTHRD PST TQ 1NTRSPC LUMBAR: CPT | Performed by: PHYSICIAN ASSISTANT

## 2025-07-07 PROCEDURE — 2060000000 HC ICU INTERMEDIATE R&B

## 2025-07-07 PROCEDURE — 3700000000 HC ANESTHESIA ATTENDED CARE: Performed by: NEUROLOGICAL SURGERY

## 2025-07-07 PROCEDURE — 3700000001 HC ADD 15 MINUTES (ANESTHESIA): Performed by: NEUROLOGICAL SURGERY

## 2025-07-07 PROCEDURE — 63047 LAM FACETEC & FORAMOT LUMBAR: CPT | Performed by: PHYSICIAN ASSISTANT

## 2025-07-07 PROCEDURE — 85025 COMPLETE CBC W/AUTO DIFF WBC: CPT

## 2025-07-07 PROCEDURE — 2720000010 HC SURG SUPPLY STERILE: Performed by: NEUROLOGICAL SURGERY

## 2025-07-07 PROCEDURE — 36415 COLL VENOUS BLD VENIPUNCTURE: CPT

## 2025-07-07 PROCEDURE — 6370000000 HC RX 637 (ALT 250 FOR IP): Performed by: STUDENT IN AN ORGANIZED HEALTH CARE EDUCATION/TRAINING PROGRAM

## 2025-07-07 PROCEDURE — 2709999900 HC NON-CHARGEABLE SUPPLY: Performed by: NEUROLOGICAL SURGERY

## 2025-07-07 PROCEDURE — 2580000003 HC RX 258

## 2025-07-07 PROCEDURE — 95910 NRV CNDJ TEST 7-8 STUDIES: CPT | Performed by: AUDIOLOGIST

## 2025-07-07 PROCEDURE — 2580000003 HC RX 258: Performed by: PHYSICIAN ASSISTANT

## 2025-07-07 PROCEDURE — 63048 LAM FACETEC &FORAMOT EA ADDL: CPT | Performed by: NEUROLOGICAL SURGERY

## 2025-07-07 PROCEDURE — 6360000002 HC RX W HCPCS: Performed by: STUDENT IN AN ORGANIZED HEALTH CARE EDUCATION/TRAINING PROGRAM

## 2025-07-07 PROCEDURE — 2500000003 HC RX 250 WO HCPCS

## 2025-07-07 DEVICE — CREO® THREADED 6.5 X 50MM POLYAXIAL SCREW
Type: IMPLANTABLE DEVICE | Site: SPINE LUMBAR | Status: FUNCTIONAL
Brand: CREO

## 2025-07-07 DEVICE — THREADED LOCKING CAP, CREO
Type: IMPLANTABLE DEVICE | Site: SPINE LUMBAR | Status: FUNCTIONAL
Brand: CREO

## 2025-07-07 DEVICE — CREO® THREADED 6.5 X 45MM POLYAXIAL SCREW
Type: IMPLANTABLE DEVICE | Site: SPINE LUMBAR | Status: FUNCTIONAL
Brand: CREO

## 2025-07-07 DEVICE — 5.5MM CURVED ROD, TITANIUM ALLOY, 95MM LENGTH
Type: IMPLANTABLE DEVICE | Site: SPINE LUMBAR | Status: FUNCTIONAL
Brand: CREO

## 2025-07-07 DEVICE — 5.5MM CURVED ROD, TITANIUM ALLOY, 80MM LENGTH
Type: IMPLANTABLE DEVICE | Site: SPINE LUMBAR | Status: FUNCTIONAL
Brand: CREO

## 2025-07-07 DEVICE — VIASORB STRP 20X50X5MM: Type: IMPLANTABLE DEVICE | Status: FUNCTIONAL

## 2025-07-07 DEVICE — BONE GRAFT KIT 7510400 INFUSE MEDIUM
Type: IMPLANTABLE DEVICE | Site: SPINE LUMBAR | Status: FUNCTIONAL
Brand: INFUSE® BONE GRAFT

## 2025-07-07 RX ORDER — BUPIVACAINE HYDROCHLORIDE 2.5 MG/ML
INJECTION, SOLUTION EPIDURAL; INFILTRATION; INTRACAUDAL; PERINEURAL PRN
Status: DISCONTINUED | OUTPATIENT
Start: 2025-07-07 | End: 2025-07-07 | Stop reason: ALTCHOICE

## 2025-07-07 RX ORDER — DEXAMETHASONE SODIUM PHOSPHATE 10 MG/ML
INJECTION, SOLUTION INTRA-ARTICULAR; INTRALESIONAL; INTRAMUSCULAR; INTRAVENOUS; SOFT TISSUE
Status: DISCONTINUED | OUTPATIENT
Start: 2025-07-07 | End: 2025-07-07 | Stop reason: SDUPTHER

## 2025-07-07 RX ORDER — ROCURONIUM BROMIDE 10 MG/ML
INJECTION, SOLUTION INTRAVENOUS
Status: DISCONTINUED | OUTPATIENT
Start: 2025-07-07 | End: 2025-07-07 | Stop reason: SDUPTHER

## 2025-07-07 RX ORDER — SODIUM CHLORIDE 0.9 % (FLUSH) 0.9 %
5-40 SYRINGE (ML) INJECTION EVERY 12 HOURS SCHEDULED
Status: DISCONTINUED | OUTPATIENT
Start: 2025-07-07 | End: 2025-07-10 | Stop reason: HOSPADM

## 2025-07-07 RX ORDER — POTASSIUM CHLORIDE 7.45 MG/ML
10 INJECTION INTRAVENOUS
Status: COMPLETED | OUTPATIENT
Start: 2025-07-07 | End: 2025-07-07

## 2025-07-07 RX ORDER — SODIUM CHLORIDE 0.9 % (FLUSH) 0.9 %
5-40 SYRINGE (ML) INJECTION EVERY 12 HOURS SCHEDULED
Status: DISCONTINUED | OUTPATIENT
Start: 2025-07-07 | End: 2025-07-07 | Stop reason: HOSPADM

## 2025-07-07 RX ORDER — PROPOFOL 10 MG/ML
INJECTION, EMULSION INTRAVENOUS
Status: DISCONTINUED | OUTPATIENT
Start: 2025-07-07 | End: 2025-07-07 | Stop reason: SDUPTHER

## 2025-07-07 RX ORDER — SODIUM PHOSPHATE, DIBASIC AND SODIUM PHOSPHATE, MONOBASIC 7; 19 G/230ML; G/230ML
1 ENEMA RECTAL DAILY PRN
Status: DISCONTINUED | OUTPATIENT
Start: 2025-07-07 | End: 2025-07-10 | Stop reason: HOSPADM

## 2025-07-07 RX ORDER — LIDOCAINE HYDROCHLORIDE AND EPINEPHRINE 5; 5 MG/ML; UG/ML
INJECTION, SOLUTION INFILTRATION; PERINEURAL PRN
Status: DISCONTINUED | OUTPATIENT
Start: 2025-07-07 | End: 2025-07-07 | Stop reason: ALTCHOICE

## 2025-07-07 RX ORDER — MORPHINE SULFATE 4 MG/ML
4 INJECTION, SOLUTION INTRAMUSCULAR; INTRAVENOUS
Status: DISCONTINUED | OUTPATIENT
Start: 2025-07-07 | End: 2025-07-10 | Stop reason: HOSPADM

## 2025-07-07 RX ORDER — SODIUM CHLORIDE, SODIUM LACTATE, POTASSIUM CHLORIDE, CALCIUM CHLORIDE 600; 310; 30; 20 MG/100ML; MG/100ML; MG/100ML; MG/100ML
INJECTION, SOLUTION INTRAVENOUS
Status: DISCONTINUED | OUTPATIENT
Start: 2025-07-07 | End: 2025-07-07 | Stop reason: SDUPTHER

## 2025-07-07 RX ORDER — ACETAMINOPHEN 325 MG/1
650 TABLET ORAL EVERY 6 HOURS
Status: DISCONTINUED | OUTPATIENT
Start: 2025-07-07 | End: 2025-07-10 | Stop reason: HOSPADM

## 2025-07-07 RX ORDER — BISACODYL 5 MG/1
5 TABLET, DELAYED RELEASE ORAL DAILY
Status: DISCONTINUED | OUTPATIENT
Start: 2025-07-08 | End: 2025-07-10 | Stop reason: HOSPADM

## 2025-07-07 RX ORDER — LIDOCAINE HYDROCHLORIDE 20 MG/ML
INJECTION, SOLUTION INTRAVENOUS
Status: DISCONTINUED | OUTPATIENT
Start: 2025-07-07 | End: 2025-07-07 | Stop reason: SDUPTHER

## 2025-07-07 RX ORDER — VANCOMYCIN HYDROCHLORIDE 1 G/20ML
INJECTION, POWDER, LYOPHILIZED, FOR SOLUTION INTRAVENOUS PRN
Status: DISCONTINUED | OUTPATIENT
Start: 2025-07-07 | End: 2025-07-07 | Stop reason: ALTCHOICE

## 2025-07-07 RX ORDER — MORPHINE SULFATE 2 MG/ML
2 INJECTION, SOLUTION INTRAMUSCULAR; INTRAVENOUS
Status: DISCONTINUED | OUTPATIENT
Start: 2025-07-07 | End: 2025-07-10 | Stop reason: HOSPADM

## 2025-07-07 RX ORDER — FENTANYL CITRATE 50 UG/ML
25 INJECTION, SOLUTION INTRAMUSCULAR; INTRAVENOUS EVERY 5 MIN PRN
Status: DISCONTINUED | OUTPATIENT
Start: 2025-07-07 | End: 2025-07-07 | Stop reason: HOSPADM

## 2025-07-07 RX ORDER — ONDANSETRON 2 MG/ML
INJECTION INTRAMUSCULAR; INTRAVENOUS
Status: DISCONTINUED | OUTPATIENT
Start: 2025-07-07 | End: 2025-07-07 | Stop reason: SDUPTHER

## 2025-07-07 RX ORDER — PHENYLEPHRINE HYDROCHLORIDE 10 MG/ML
INJECTION INTRAVENOUS
Status: DISCONTINUED | OUTPATIENT
Start: 2025-07-07 | End: 2025-07-07 | Stop reason: SDUPTHER

## 2025-07-07 RX ORDER — SODIUM CHLORIDE 0.9 % (FLUSH) 0.9 %
5-40 SYRINGE (ML) INJECTION PRN
Status: DISCONTINUED | OUTPATIENT
Start: 2025-07-07 | End: 2025-07-07 | Stop reason: HOSPADM

## 2025-07-07 RX ORDER — SENNA AND DOCUSATE SODIUM 50; 8.6 MG/1; MG/1
1 TABLET, FILM COATED ORAL 2 TIMES DAILY
Status: DISCONTINUED | OUTPATIENT
Start: 2025-07-07 | End: 2025-07-10 | Stop reason: HOSPADM

## 2025-07-07 RX ORDER — SODIUM CHLORIDE 9 MG/ML
INJECTION, SOLUTION INTRAVENOUS PRN
Status: DISCONTINUED | OUTPATIENT
Start: 2025-07-07 | End: 2025-07-10 | Stop reason: HOSPADM

## 2025-07-07 RX ORDER — POLYETHYLENE GLYCOL 3350 17 G/17G
17 POWDER, FOR SOLUTION ORAL DAILY
Status: DISCONTINUED | OUTPATIENT
Start: 2025-07-08 | End: 2025-07-10 | Stop reason: HOSPADM

## 2025-07-07 RX ORDER — DIPHENHYDRAMINE HYDROCHLORIDE 50 MG/ML
25 INJECTION, SOLUTION INTRAMUSCULAR; INTRAVENOUS EVERY 6 HOURS PRN
Status: DISCONTINUED | OUTPATIENT
Start: 2025-07-07 | End: 2025-07-10 | Stop reason: HOSPADM

## 2025-07-07 RX ORDER — ONDANSETRON 2 MG/ML
4 INJECTION INTRAMUSCULAR; INTRAVENOUS EVERY 6 HOURS PRN
Status: DISCONTINUED | OUTPATIENT
Start: 2025-07-07 | End: 2025-07-10 | Stop reason: HOSPADM

## 2025-07-07 RX ORDER — DIPHENHYDRAMINE HCL 25 MG
25 TABLET ORAL EVERY 6 HOURS PRN
Status: DISCONTINUED | OUTPATIENT
Start: 2025-07-07 | End: 2025-07-10 | Stop reason: HOSPADM

## 2025-07-07 RX ORDER — SODIUM CHLORIDE 9 MG/ML
INJECTION, SOLUTION INTRAVENOUS PRN
Status: DISCONTINUED | OUTPATIENT
Start: 2025-07-07 | End: 2025-07-07 | Stop reason: HOSPADM

## 2025-07-07 RX ORDER — ONDANSETRON 2 MG/ML
4 INJECTION INTRAMUSCULAR; INTRAVENOUS
Status: DISCONTINUED | OUTPATIENT
Start: 2025-07-07 | End: 2025-07-07 | Stop reason: HOSPADM

## 2025-07-07 RX ORDER — FENTANYL CITRATE 50 UG/ML
INJECTION, SOLUTION INTRAMUSCULAR; INTRAVENOUS
Status: DISCONTINUED | OUTPATIENT
Start: 2025-07-07 | End: 2025-07-07 | Stop reason: SDUPTHER

## 2025-07-07 RX ORDER — BISACODYL 10 MG
10 SUPPOSITORY, RECTAL RECTAL DAILY PRN
Status: DISCONTINUED | OUTPATIENT
Start: 2025-07-07 | End: 2025-07-10 | Stop reason: HOSPADM

## 2025-07-07 RX ORDER — DIAZEPAM 5 MG/1
5 TABLET ORAL EVERY 6 HOURS PRN
Status: DISCONTINUED | OUTPATIENT
Start: 2025-07-07 | End: 2025-07-10 | Stop reason: HOSPADM

## 2025-07-07 RX ORDER — SODIUM CHLORIDE 0.9 % (FLUSH) 0.9 %
5-40 SYRINGE (ML) INJECTION PRN
Status: DISCONTINUED | OUTPATIENT
Start: 2025-07-07 | End: 2025-07-10 | Stop reason: HOSPADM

## 2025-07-07 RX ORDER — FAMOTIDINE 20 MG/1
20 TABLET, FILM COATED ORAL 2 TIMES DAILY
Status: DISCONTINUED | OUTPATIENT
Start: 2025-07-07 | End: 2025-07-10 | Stop reason: HOSPADM

## 2025-07-07 RX ORDER — FENTANYL CITRATE 50 UG/ML
50 INJECTION, SOLUTION INTRAMUSCULAR; INTRAVENOUS EVERY 5 MIN PRN
Status: DISCONTINUED | OUTPATIENT
Start: 2025-07-07 | End: 2025-07-07 | Stop reason: HOSPADM

## 2025-07-07 RX ORDER — CEFAZOLIN SODIUM 1 G/3ML
INJECTION, POWDER, FOR SOLUTION INTRAMUSCULAR; INTRAVENOUS
Status: DISCONTINUED | OUTPATIENT
Start: 2025-07-07 | End: 2025-07-07 | Stop reason: SDUPTHER

## 2025-07-07 RX ORDER — ONDANSETRON 4 MG/1
4 TABLET, ORALLY DISINTEGRATING ORAL EVERY 8 HOURS PRN
Status: DISCONTINUED | OUTPATIENT
Start: 2025-07-07 | End: 2025-07-10 | Stop reason: HOSPADM

## 2025-07-07 RX ORDER — BACITRACIN ZINC 500 [USP'U]/G
OINTMENT TOPICAL PRN
Status: DISCONTINUED | OUTPATIENT
Start: 2025-07-07 | End: 2025-07-07 | Stop reason: ALTCHOICE

## 2025-07-07 RX ADMIN — SUGAMMADEX 125 MG: 100 INJECTION, SOLUTION INTRAVENOUS at 18:37

## 2025-07-07 RX ADMIN — SODIUM CHLORIDE 125 ML/HR: 0.9 INJECTION, SOLUTION INTRAVENOUS at 08:04

## 2025-07-07 RX ADMIN — ONDANSETRON HYDROCHLORIDE 4 MG: 2 SOLUTION INTRAMUSCULAR; INTRAVENOUS at 17:51

## 2025-07-07 RX ADMIN — ROCURONIUM BROMIDE 50 MG: 10 INJECTION, SOLUTION INTRAVENOUS at 15:49

## 2025-07-07 RX ADMIN — SUGAMMADEX 25 MG: 100 INJECTION, SOLUTION INTRAVENOUS at 18:09

## 2025-07-07 RX ADMIN — PROPOFOL 30 MG: 10 INJECTION, EMULSION INTRAVENOUS at 18:12

## 2025-07-07 RX ADMIN — POTASSIUM CHLORIDE 10 MEQ: 7.46 INJECTION, SOLUTION INTRAVENOUS at 08:08

## 2025-07-07 RX ADMIN — SODIUM CHLORIDE, SODIUM LACTATE, POTASSIUM CHLORIDE, CALCIUM CHLORIDE: 600; 310; 30; 20 INJECTION, SOLUTION INTRAVENOUS at 15:55

## 2025-07-07 RX ADMIN — ROCURONIUM BROMIDE 20 MG: 10 INJECTION, SOLUTION INTRAVENOUS at 16:21

## 2025-07-07 RX ADMIN — TRAMADOL HYDROCHLORIDE 50 MG: 50 TABLET, COATED ORAL at 00:04

## 2025-07-07 RX ADMIN — PHENYLEPHRINE HYDROCHLORIDE 100 MCG: 10 INJECTION, SOLUTION INTRAVENOUS at 18:16

## 2025-07-07 RX ADMIN — PRAVASTATIN SODIUM 20 MG: 20 TABLET ORAL at 09:41

## 2025-07-07 RX ADMIN — PROPOFOL 160 MG: 10 INJECTION, EMULSION INTRAVENOUS at 15:48

## 2025-07-07 RX ADMIN — SODIUM CHLORIDE, PRESERVATIVE FREE 10 ML: 5 INJECTION INTRAVENOUS at 23:28

## 2025-07-07 RX ADMIN — TRAMADOL HYDROCHLORIDE 50 MG: 50 TABLET, COATED ORAL at 09:40

## 2025-07-07 RX ADMIN — FENTANYL CITRATE 50 MCG: 0.05 INJECTION, SOLUTION INTRAMUSCULAR; INTRAVENOUS at 16:36

## 2025-07-07 RX ADMIN — INSULIN LISPRO 2 UNITS: 100 INJECTION, SOLUTION INTRAVENOUS; SUBCUTANEOUS at 23:35

## 2025-07-07 RX ADMIN — FENTANYL CITRATE 50 MCG: 0.05 INJECTION, SOLUTION INTRAMUSCULAR; INTRAVENOUS at 16:29

## 2025-07-07 RX ADMIN — FENTANYL CITRATE 50 MCG: 0.05 INJECTION, SOLUTION INTRAMUSCULAR; INTRAVENOUS at 16:18

## 2025-07-07 RX ADMIN — POTASSIUM CHLORIDE 10 MEQ: 7.46 INJECTION, SOLUTION INTRAVENOUS at 09:07

## 2025-07-07 RX ADMIN — FENTANYL CITRATE 50 MCG: 0.05 INJECTION, SOLUTION INTRAMUSCULAR; INTRAVENOUS at 18:39

## 2025-07-07 RX ADMIN — SODIUM CHLORIDE: 0.9 INJECTION, SOLUTION INTRAVENOUS at 00:09

## 2025-07-07 RX ADMIN — SENNOSIDES AND DOCUSATE SODIUM 1 TABLET: 8.6; 5 TABLET ORAL at 23:26

## 2025-07-07 RX ADMIN — SODIUM CHLORIDE, POTASSIUM CHLORIDE, SODIUM LACTATE AND CALCIUM CHLORIDE: 600; 310; 30; 20 INJECTION, SOLUTION INTRAVENOUS at 15:44

## 2025-07-07 RX ADMIN — FAMOTIDINE 20 MG: 20 TABLET, FILM COATED ORAL at 23:28

## 2025-07-07 RX ADMIN — DILTIAZEM HYDROCHLORIDE 240 MG: 240 CAPSULE, COATED, EXTENDED RELEASE ORAL at 09:40

## 2025-07-07 RX ADMIN — DEXAMETHASONE SODIUM PHOSPHATE 10 MG: 10 INJECTION INTRAMUSCULAR; INTRAVENOUS at 15:55

## 2025-07-07 RX ADMIN — PROPOFOL 10 MG: 10 INJECTION, EMULSION INTRAVENOUS at 18:05

## 2025-07-07 RX ADMIN — METOPROLOL TARTRATE 50 MG: 50 TABLET, FILM COATED ORAL at 23:27

## 2025-07-07 RX ADMIN — PROPOFOL 10 MG: 10 INJECTION, EMULSION INTRAVENOUS at 18:09

## 2025-07-07 RX ADMIN — METOPROLOL TARTRATE 50 MG: 50 TABLET, FILM COATED ORAL at 09:41

## 2025-07-07 RX ADMIN — CEFAZOLIN 2 G: 1 INJECTION, POWDER, FOR SOLUTION INTRAMUSCULAR; INTRAVENOUS at 16:09

## 2025-07-07 RX ADMIN — LIDOCAINE HYDROCHLORIDE 60 MG: 20 INJECTION, SOLUTION INTRAVENOUS at 15:48

## 2025-07-07 RX ADMIN — PHENYLEPHRINE HYDROCHLORIDE 100 MCG: 10 INJECTION, SOLUTION INTRAVENOUS at 18:20

## 2025-07-07 RX ADMIN — FENTANYL CITRATE 50 MCG: 0.05 INJECTION, SOLUTION INTRAMUSCULAR; INTRAVENOUS at 15:48

## 2025-07-07 RX ADMIN — ACETAMINOPHEN 650 MG: 325 TABLET ORAL at 23:26

## 2025-07-07 RX ADMIN — SUGAMMADEX 50 MG: 100 INJECTION, SOLUTION INTRAVENOUS at 18:06

## 2025-07-07 ASSESSMENT — PAIN DESCRIPTION - DESCRIPTORS
DESCRIPTORS: ACHING;DISCOMFORT;SORE
DESCRIPTORS: ACHING
DESCRIPTORS: ACHING;THROBBING

## 2025-07-07 ASSESSMENT — PAIN - FUNCTIONAL ASSESSMENT
PAIN_FUNCTIONAL_ASSESSMENT: NONE - DENIES PAIN
PAIN_FUNCTIONAL_ASSESSMENT: PREVENTS OR INTERFERES SOME ACTIVE ACTIVITIES AND ADLS
PAIN_FUNCTIONAL_ASSESSMENT: PREVENTS OR INTERFERES SOME ACTIVE ACTIVITIES AND ADLS

## 2025-07-07 ASSESSMENT — PAIN SCALES - GENERAL
PAINLEVEL_OUTOF10: 3
PAINLEVEL_OUTOF10: 7
PAINLEVEL_OUTOF10: 3
PAINLEVEL_OUTOF10: 9

## 2025-07-07 ASSESSMENT — PAIN DESCRIPTION - LOCATION
LOCATION: BACK
LOCATION: KNEE
LOCATION: BACK

## 2025-07-07 ASSESSMENT — PAIN DESCRIPTION - ORIENTATION
ORIENTATION: RIGHT
ORIENTATION: MID;LOWER

## 2025-07-07 ASSESSMENT — LIFESTYLE VARIABLES: SMOKING_STATUS: 0

## 2025-07-07 NOTE — ANESTHESIA PRE PROCEDURE
Department of Anesthesiology  Preprocedure Note       Name:  Grecia Wilhelm   Age:  79 y.o.  :  1945                                          MRN:  [id number]         Date:  2025      Surgeon: Surgeon(s):  Shana Zendejas MD    Procedure: Procedure(s):  L2-L5 laminectomy and posterolateral fusion/o-arm, francisco table, cell saver, platelet gel, posterior instrumentation, audiology    Medications prior to admission:   Prior to Admission medications    Medication Sig Start Date End Date Taking? Authorizing Provider   apixaban (ELIQUIS) 5 MG TABS tablet Take 1 tablet by mouth 2 times daily 14 tabs x 4boxes=56  [id number] 25   Darlene Jalloh, PA   furosemide (LASIX) 20 MG tablet Take 1 tablet by mouth 2 times daily  Patient taking differently: Take 1 tablet by mouth daily 25   Tejinder Guzman MD   Cinnamon 500 MG TABS Take 1,000 mg by mouth daily  Patient not taking: Reported on 2025    Cullen Turcios MD   metoprolol tartrate (LOPRESSOR) 100 MG tablet Take 1 tablet by mouth 2 times daily  Patient taking differently: Take 0.5 tablets by mouth 2 times daily 3/4/25   Shyla Ford APRN - CNP   fluticasone (FLONASE) 50 MCG/ACT nasal spray 1 spray by Nasal route 2 times daily 2 sprays in each nostril  Twice a day  Patient not taking: Reported on 2025 3/7/24   Hali Meza MD   dilTIAZem (CARDIZEM CD) 240 MG extended release capsule take 1 capsule by mouth once daily 22   Cullen Turcios MD   aspirin 81 MG EC tablet Take 1 tablet by mouth daily    Cullen Turcios MD   potassium chloride (KLOR-CON) 10 MEQ extended release tablet Take 1 tablet by mouth 6/10/19   Cullen Turcios MD   calcium carbonate-vitamin D3 (CALTRATE) 600-400 MG-UNIT TABS per tab Take 1 tablet by mouth daily    Cullen Turcios MD   pravastatin (PRAVACHOL) 40 MG tablet Take 0.5 tablets by mouth daily    Cullen Turcios MD   niacin (SLO-NIACIN) 500 MG tablet Take 1 tablet

## 2025-07-07 NOTE — PROGRESS NOTES
INTRAOPERATIVE MONITORING EMG REPORT    Diagnosis: stenosis, spondylolisthesis  Procedure: posterior fusion, laminectomy L2-L5   Anesthesia: isoflurane  Surgeon: Shana Zendejas M.D.    Intra-op Monitorin.75  hours    Procedure:     EMG recording electrodes were placed over the vastus medialis, vastus lateralis, anterior tibialis, and medial gastrocnemius   muscles for recording spontaneous EMG activity.  A silent control was performed to test the integrity of the system prior to the procedure.     Results:  Spontaneous EMG: was quiet throughout the surgical procedure.     Evoked EMG:   LEFT    Direct Nerve (mA)            Screw (mA)   L2                                                      >30  L3                                                      >30  L4                                                      >30  L5                                                      >30      RIGHT     L2                                                      >30  L3                                                      >30  L4                                                      >30  L5                                                      >30

## 2025-07-07 NOTE — BRIEF OP NOTE
Brief Postoperative Note      Patient: Grecia Wilhelm  YOB: 1945  MRN: [id number]    Date of Procedure: 7/7/2025    Pre-Op Diagnosis Codes:      * Stenosis, spinal, lumbar [M48.061]     * Spondylolisthesis of lumbar region [M43.16]    Post-Op Diagnosis: Same       Procedure(s):  L2-L5 laminectomy and posterolateral fusion/o-arm, francisco table, cell saver, platelet gel, posterior instrumentation, audiology    Surgeon(s):  Shana Zendejas MD    Assistant:  Physician Assistant: Alessandro Soria PA    Anesthesia: General    Estimated Blood Loss (mL): less than 100     Complications: None    Specimens:   * No specimens in log *    Implants:  Implant Name Type Inv. Item Serial No.  Lot No. LRB No. Used Action   VIASORB CANCELLOUS SPONGE STRIP     [id number]  1 Implanted   VIASORB CANCELLOUS SPONGE STRIP     [id number]  1 Implanted   VIASORB CANCELLOUS SPONGE STRIP     [id number]  1 Implanted   KIT BNE GRFT M RHBMP-2 4.2MG INJ 5ML CONTAIN NDL 20GA - [id number]  KIT BNE GRFT M RHBMP-2 4.2MG INJ 5ML CONTAIN NDL 20GA  Macheen SPINALGRAFT TECH-WD [id number]  1 Implanted   SCREW SPNL L45MM DIA6.5MM THORLUM PEDCL NONCANNULATED EVERARDO - [id number]  SCREW SPNL L45MM DIA6.5MM THORLUM PEDCL NONCANNULATED EVERARDO  [employer]-WD   6 Implanted   SCREW SPNL L50MM DIA6.5MM THORLUM PEDCL NONCANNULATED EVERARDO - [id number]  SCREW SPNL L50MM DIA6.5MM THORLUM PEDCL NONCANNULATED EVERARDO  [employer]-WD   2 Implanted   CAP SPNL THORLUM EVERARDO THRD CREO - [id number]  CAP SPNL THORLUM EVERARDO THRD CREO  [employer]-WD   8 Implanted   DENISE SPNL L80MM DIA5.5MM TI ALLY CRV CREO - [id number]  DENISE SPNL L80MM DIA5.5MM TI ALLY CRV CREO  [employer]-WD   1 Implanted   DENISE SPNL L95MM DIA5.5MM TI ALLY CRV CREO - [id number]  DENISE SPNL L95MM DIA5.5MM TI ALLY CRV CREO  [facility]-WD   1 Implanted         Drains:   Closed/Suction Drain Back Accordion (Active)       Urinary Catheter 07/07/25 (Active)

## 2025-07-07 NOTE — CARE COORDINATION
I met with pt and daughter, Elizabeth, this a.m. 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 They have 3 grown children with all local and 2 work. Pt is not a  and she is not active with [facility]. Pt no longer drives for about 2 years now, spouse does. Pt requires at times help with bathing and dressing but cooks and does her own medications. Her pcp is Dr. Bismark Beauchamp whom she saw about 2 weeks ago. Pt has a fww and raised toilet seat. She has a 2 story home with 1st floor bed and bath and a ramp to enter thru the back door. She has a hx at [facility] but if efrain rehab is needed they may be interested in [facility] in [place] since it is closer to the home, will need to talk with pt and spouse about it. May meet criteria for aru but will need to see after  her surgery today of a  L2 to L5 laminectomy and fusion. .REILLY Salazar  .7/7/2025    Case Management Assessment  Initial Evaluation    Date/Time of Evaluation: 7/7/2025 1:25 PM  Assessment Completed by: REILLY Salazar    If patient is discharged prior to next notation, then this note serves as note for discharge by case management.    Patient Name: Grecia Wilhelm                   YOB: 1945  Diagnosis: Lumbar stenosis [M48.061]  Lumbar foraminal stenosis [M48.061]                   Date / Time: 7/4/2025  7:29 AM    Patient Admission Status: Inpatient   Readmission Risk (Low < 19, Mod (19-27), High > 27): Readmission Risk Score: 13.5    Current PCP: Bismark Beauchamp MD  PCP verified by ? Yes    Chart Reviewed: Yes      History Provided by: Child/Family  Patient Orientation: Alert and Oriented    Patient Cognition: Alert    Hospitalization in the last 30 days (Readmission):  No    If yes, Readmission Assessment in  Navigator will be completed.    Advance Directives:      Code Status: Full Code   Patient's Primary Decision Maker is: Legal Next of Kin    Primary Decision Maker:

## 2025-07-07 NOTE — PROGRESS NOTES
Internal Medicine Progress Note    Patient's name: Grecia Wilhelm  : 1945  Chief complaints (on day of admission): No chief complaint on file.  Admission date: 2025  Date of service: 2025   Room: [address] IMCU/NEURO  Primary care physician: Bismark Beauchamp MD  Reason for visit: Follow-up for surgery     Subjective  Grecia was seen and examined at bedside   Had another episode of confusion this AM, confused  as well, likely multifactorial in , opiates  Switched pain meds to tramadol  Daughter at bedside, all questions addressed  Anticipating OR today     Hospital Medications  Current Facility-Administered Medications   Medication Dose Route Frequency Provider Last Rate Last Admin    potassium chloride 10 mEq/100 mL IVPB (Peripheral Line)  10 mEq IntraVENous Q1H Cayden Akers MD        traMADol (ULTRAM) tablet 50 mg  50 mg Oral Q6H PRN Jeremy Snow MD   50 mg at 25 0004    melatonin tablet 3 mg  3 mg Oral Nightly Jeremy Snow MD   3 mg at 25    hydrALAZINE (APRESOLINE) injection 10 mg  10 mg IntraVENous Q4H PRN Jeremy Snow MD        0.9 % sodium chloride infusion   IntraVENous Continuous Flor Argueta PA-C 125 mL/hr at 25 0009 New Bag at 25 0009    sodium chloride flush 0.9 % injection 5-40 mL  5-40 mL IntraVENous 2 times per day Flor Argueta PA-C        sodium chloride flush 0.9 % injection 5-40 mL  5-40 mL IntraVENous PRN Flor Argueta PA-C        0.9 % sodium chloride infusion   IntraVENous PRN Flor Argueta PA-C        ceFAZolin (ANCEF) 2,000 mg in sterile water 20 mL IV syringe  2,000 mg IntraVENous On Call to OR Flor Argueta PA-C        [Held by provider] aspirin EC tablet 81 mg  81 mg Oral Daily Jeremy Snow MD        metoprolol tartrate (LOPRESSOR) tablet 50 mg  50 mg Oral BID Jeremy Snow MD   50 mg at 25    pantoprazole (PROTONIX) tablet 40 mg  40 mg Oral QAM AC Jeremy Snow MD   40 mg at 25 1270

## 2025-07-07 NOTE — ACP (ADVANCE CARE PLANNING)
Advance Care Planning   Healthcare Decision Maker:    Primary Decision Maker: Feng Wilhelm - Spouse - [phone]    Secondary Decision Maker: Rocky Wilhelmik - Child - [phone]    Secondary Decision Maker: Elizabeth Woodard - Child - [phone]    Secondary Decision Maker: Galileo Wilhelm - Child - [phone]    Click here to complete Healthcare Decision Makers including selection of the Healthcare Decision Maker Relationship (ie \"Primary\").          DOCUMENTS ARE IN EPIC. .REILLY Salazar  7/7/2025

## 2025-07-08 LAB
ANION GAP SERPL CALCULATED.3IONS-SCNC: 15 MMOL/L (ref 7–16)
BASOPHILS # BLD: 0.02 K/UL (ref 0–0.2)
BASOPHILS NFR BLD: 0 % (ref 0–2)
BUN SERPL-MCNC: 25 MG/DL (ref 8–23)
CALCIUM SERPL-MCNC: 9.7 MG/DL (ref 8.8–10.2)
CHLORIDE SERPL-SCNC: 103 MMOL/L (ref 98–107)
CO2 SERPL-SCNC: 19 MMOL/L (ref 22–29)
CREAT SERPL-MCNC: 0.8 MG/DL (ref 0.5–1)
EOSINOPHIL # BLD: 0 K/UL (ref 0.05–0.5)
EOSINOPHILS RELATIVE PERCENT: 0 % (ref 0–6)
ERYTHROCYTE [DISTWIDTH] IN BLOOD BY AUTOMATED COUNT: 14.5 % (ref 11.5–15)
GFR, ESTIMATED: 75 ML/MIN/1.73M2
GLUCOSE BLD-MCNC: 170 MG/DL (ref 74–99)
GLUCOSE BLD-MCNC: 210 MG/DL (ref 74–99)
GLUCOSE BLD-MCNC: 264 MG/DL (ref 74–99)
GLUCOSE BLD-MCNC: 339 MG/DL (ref 74–99)
GLUCOSE SERPL-MCNC: 217 MG/DL (ref 74–99)
HCT VFR BLD AUTO: 33 % (ref 34–48)
HGB BLD-MCNC: 10.6 G/DL (ref 11.5–15.5)
IMM GRANULOCYTES # BLD AUTO: 0.09 K/UL (ref 0–0.58)
IMM GRANULOCYTES NFR BLD: 1 % (ref 0–5)
LYMPHOCYTES NFR BLD: 0.9 K/UL (ref 1.5–4)
LYMPHOCYTES RELATIVE PERCENT: 5 % (ref 20–42)
MCH RBC QN AUTO: 28.2 PG (ref 26–35)
MCHC RBC AUTO-ENTMCNC: 32.1 G/DL (ref 32–34.5)
MCV RBC AUTO: 87.8 FL (ref 80–99.9)
MONOCYTES NFR BLD: 1.39 K/UL (ref 0.1–0.95)
MONOCYTES NFR BLD: 8 % (ref 2–12)
NEUTROPHILS NFR BLD: 87 % (ref 43–80)
NEUTS SEG NFR BLD: 15.65 K/UL (ref 1.8–7.3)
PLATELET # BLD AUTO: 262 K/UL (ref 130–450)
PMV BLD AUTO: 9.7 FL (ref 7–12)
POTASSIUM SERPL-SCNC: 4.7 MMOL/L (ref 3.5–5.1)
RBC # BLD AUTO: 3.76 M/UL (ref 3.5–5.5)
SODIUM SERPL-SCNC: 137 MMOL/L (ref 136–145)
WBC OTHER # BLD: 18.1 K/UL (ref 4.5–11.5)

## 2025-07-08 PROCEDURE — 2060000000 HC ICU INTERMEDIATE R&B

## 2025-07-08 PROCEDURE — 6370000000 HC RX 637 (ALT 250 FOR IP): Performed by: STUDENT IN AN ORGANIZED HEALTH CARE EDUCATION/TRAINING PROGRAM

## 2025-07-08 PROCEDURE — 97166 OT EVAL MOD COMPLEX 45 MIN: CPT

## 2025-07-08 PROCEDURE — 6360000002 HC RX W HCPCS: Performed by: NEUROLOGICAL SURGERY

## 2025-07-08 PROCEDURE — 00NY0ZZ RELEASE LUMBAR SPINAL CORD, OPEN APPROACH: ICD-10-PCS | Performed by: NEUROLOGICAL SURGERY

## 2025-07-08 PROCEDURE — 97162 PT EVAL MOD COMPLEX 30 MIN: CPT

## 2025-07-08 PROCEDURE — 2500000003 HC RX 250 WO HCPCS: Performed by: NEUROLOGICAL SURGERY

## 2025-07-08 PROCEDURE — 6370000000 HC RX 637 (ALT 250 FOR IP): Performed by: NEUROLOGICAL SURGERY

## 2025-07-08 PROCEDURE — 82962 GLUCOSE BLOOD TEST: CPT

## 2025-07-08 PROCEDURE — 97535 SELF CARE MNGMENT TRAINING: CPT

## 2025-07-08 PROCEDURE — 85025 COMPLETE CBC W/AUTO DIFF WBC: CPT

## 2025-07-08 PROCEDURE — 36415 COLL VENOUS BLD VENIPUNCTURE: CPT

## 2025-07-08 PROCEDURE — 97530 THERAPEUTIC ACTIVITIES: CPT

## 2025-07-08 PROCEDURE — 01NB0ZZ RELEASE LUMBAR NERVE, OPEN APPROACH: ICD-10-PCS | Performed by: NEUROLOGICAL SURGERY

## 2025-07-08 PROCEDURE — 2700000000 HC OXYGEN THERAPY PER DAY

## 2025-07-08 PROCEDURE — 0SG1071 FUSION OF 2 OR MORE LUMBAR VERTEBRAL JOINTS WITH AUTOLOGOUS TISSUE SUBSTITUTE, POSTERIOR APPROACH, POSTERIOR COLUMN, OPEN APPROACH: ICD-10-PCS | Performed by: NEUROLOGICAL SURGERY

## 2025-07-08 PROCEDURE — 80048 BASIC METABOLIC PNL TOTAL CA: CPT

## 2025-07-08 PROCEDURE — 3E0U0GB INTRODUCTION OF RECOMBINANT BONE MORPHOGENETIC PROTEIN INTO JOINTS, OPEN APPROACH: ICD-10-PCS | Performed by: NEUROLOGICAL SURGERY

## 2025-07-08 RX ORDER — SENNOSIDES 8.6 MG/1
2 TABLET ORAL 2 TIMES DAILY
Status: DISCONTINUED | OUTPATIENT
Start: 2025-07-08 | End: 2025-07-10 | Stop reason: HOSPADM

## 2025-07-08 RX ADMIN — FAMOTIDINE 20 MG: 20 TABLET, FILM COATED ORAL at 09:06

## 2025-07-08 RX ADMIN — INSULIN LISPRO 2 UNITS: 100 INJECTION, SOLUTION INTRAVENOUS; SUBCUTANEOUS at 12:19

## 2025-07-08 RX ADMIN — BISACODYL 5 MG: 5 TABLET, COATED ORAL at 09:06

## 2025-07-08 RX ADMIN — SODIUM CHLORIDE, PRESERVATIVE FREE 10 ML: 5 INJECTION INTRAVENOUS at 21:27

## 2025-07-08 RX ADMIN — SODIUM CHLORIDE, PRESERVATIVE FREE 10 ML: 5 INJECTION INTRAVENOUS at 21:28

## 2025-07-08 RX ADMIN — METOPROLOL TARTRATE 50 MG: 50 TABLET, FILM COATED ORAL at 09:06

## 2025-07-08 RX ADMIN — DILTIAZEM HYDROCHLORIDE 240 MG: 240 CAPSULE, COATED, EXTENDED RELEASE ORAL at 09:06

## 2025-07-08 RX ADMIN — WATER 2000 MG: 1 INJECTION INTRAMUSCULAR; INTRAVENOUS; SUBCUTANEOUS at 09:59

## 2025-07-08 RX ADMIN — ACETAMINOPHEN 650 MG: 325 TABLET ORAL at 05:47

## 2025-07-08 RX ADMIN — ACETAMINOPHEN 650 MG: 325 TABLET ORAL at 17:37

## 2025-07-08 RX ADMIN — METOPROLOL TARTRATE 50 MG: 50 TABLET, FILM COATED ORAL at 21:27

## 2025-07-08 RX ADMIN — SODIUM CHLORIDE, PRESERVATIVE FREE 10 ML: 5 INJECTION INTRAVENOUS at 09:13

## 2025-07-08 RX ADMIN — PRAVASTATIN SODIUM 20 MG: 20 TABLET ORAL at 09:06

## 2025-07-08 RX ADMIN — INSULIN LISPRO 6 UNITS: 100 INJECTION, SOLUTION INTRAVENOUS; SUBCUTANEOUS at 21:41

## 2025-07-08 RX ADMIN — ACETAMINOPHEN 650 MG: 325 TABLET ORAL at 23:42

## 2025-07-08 RX ADMIN — SENNOSIDES AND DOCUSATE SODIUM 1 TABLET: 8.6; 5 TABLET ORAL at 21:27

## 2025-07-08 RX ADMIN — ACETAMINOPHEN 650 MG: 325 TABLET ORAL at 12:19

## 2025-07-08 RX ADMIN — TRAMADOL HYDROCHLORIDE 50 MG: 50 TABLET, COATED ORAL at 21:25

## 2025-07-08 RX ADMIN — Medication 3 MG: at 21:27

## 2025-07-08 RX ADMIN — PANTOPRAZOLE SODIUM 40 MG: 40 TABLET, DELAYED RELEASE ORAL at 05:47

## 2025-07-08 RX ADMIN — INSULIN LISPRO 4 UNITS: 100 INJECTION, SOLUTION INTRAVENOUS; SUBCUTANEOUS at 17:37

## 2025-07-08 RX ADMIN — POLYETHYLENE GLYCOL 3350 17 G: 17 POWDER, FOR SOLUTION ORAL at 09:06

## 2025-07-08 RX ADMIN — WATER 2000 MG: 1 INJECTION INTRAMUSCULAR; INTRAVENOUS; SUBCUTANEOUS at 02:32

## 2025-07-08 RX ADMIN — WATER 2000 MG: 1 INJECTION INTRAMUSCULAR; INTRAVENOUS; SUBCUTANEOUS at 17:38

## 2025-07-08 RX ADMIN — FAMOTIDINE 20 MG: 20 TABLET, FILM COATED ORAL at 21:25

## 2025-07-08 RX ADMIN — SENNOSIDES AND DOCUSATE SODIUM 1 TABLET: 8.6; 5 TABLET ORAL at 09:06

## 2025-07-08 RX ADMIN — SENNOSIDES 17.2 MG: 8.6 TABLET, FILM COATED ORAL at 21:27

## 2025-07-08 ASSESSMENT — PAIN DESCRIPTION - DESCRIPTORS
DESCRIPTORS: ACHING;DISCOMFORT;SORE
DESCRIPTORS: ACHING;DISCOMFORT;SORE
DESCRIPTORS: ACHING;DISCOMFORT;SPASM

## 2025-07-08 ASSESSMENT — PAIN SCALES - GENERAL
PAINLEVEL_OUTOF10: 4
PAINLEVEL_OUTOF10: 4
PAINLEVEL_OUTOF10: 9
PAINLEVEL_OUTOF10: 4
PAINLEVEL_OUTOF10: 6

## 2025-07-08 ASSESSMENT — PAIN DESCRIPTION - PAIN TYPE
TYPE: SURGICAL PAIN
TYPE: SURGICAL PAIN

## 2025-07-08 ASSESSMENT — PAIN DESCRIPTION - ORIENTATION: ORIENTATION: MID;LOWER;RIGHT

## 2025-07-08 ASSESSMENT — PAIN DESCRIPTION - LOCATION
LOCATION: BACK
LOCATION: BACK;LEG
LOCATION: BACK

## 2025-07-08 ASSESSMENT — PAIN - FUNCTIONAL ASSESSMENT: PAIN_FUNCTIONAL_ASSESSMENT: PREVENTS OR INTERFERES SOME ACTIVE ACTIVITIES AND ADLS

## 2025-07-08 NOTE — DISCHARGE INSTR - COC
Continuity of Care Form    Patient Name: Grecia Wilhelm   :  1945  MRN:  [id number]    Admit date:  2025  Discharge date:  7/10/2025    Code Status Order: Full Code   Advance Directives:    Date/Time Healthcare Directive Type of Healthcare Directive Copy in Chart Healthcare Agent Appointed Healthcare Agent's Name Healthcare Agent's Phone Number    [phone] Unknown, patient unable to respond due to medical condition  --  --  --  --  --             Admitting Physician:  Jeremy Snow MD  PCP: Bismark Beauchamp MD    Discharging Nurse: Elba Che RN  Discharging Hospital Unit/Room#: 8521/8521-A  Discharging Unit Phone Number: [phone]    Emergency Contact:   Extended Emergency Contact Information  Primary Emergency Contact: Feng Wilhelm  Address: [address] [place] [address]  Home Phone: [phone]  Mobile Phone: [phone]  Relation: Spouse  Secondary Emergency Contact: Eddie Wilhelm  Home Phone: [phone]  Mobile Phone: [phone]  Relation: Child    Past Surgical History:  Past Surgical History:   Procedure Laterality Date    ABDOMEN SURGERY      AORTIC VALVE REPLACEMENT N/A     APPENDECTOMY      BREAST SURGERY Right     biopsy-benign    COCHLEAR IMPLANT Right 10/26/2022    RIGHT COCHLEAR IMPLANT INSERTION WITH NERVE INTEGRITY MONITOR performed by Hali Meza MD at [facility] OR    COLONOSCOPY  2008    ECHOCARDIOGRAM COMPLETE 2D W DOPPLER W COLOR  2012 at [facility]    INNER EAR SURGERY Left     Multiple surgeries    OVARY SURGERY      SMALL INTESTINE SURGERY      TRANSCATHETER AORTIC VALVE REPLACEMENT  2024    done at [facility]    VASCULAR SURGERY Bilateral 2025    bilateral lower extremity thrombectomy performed by Ganesh Huerta MD at [facility] [place]    VEIN SURGERY         Immunization History:   Immunization History   Administered Date(s) Administered    Pneumococcal, PPSV23, PNEUMOVAX 23,

## 2025-07-08 NOTE — PROGRESS NOTES
Department of Neurosurgery  Progress Note    CHIEF COMPLAINT: s/p L2-L5 posterolateral fusion 7/7    SUBJECTIVE:  Elefgo op site pain okay. Has been up with PT/OT. No new issues overnight.     REVIEW OF SYSTEMS :  Constitutional: Negative for chills and fever.    Neurological: Negative for dizziness, tremors and speech change.     OBJECTIVE:   VITALS:  BP (!) 99/57   Pulse 70   Temp 97.7 °F (36.5 °C) (Temporal)   Resp 18   Wt 65.8 kg (145 lb 1 oz)   SpO2 99%   BMI 25.70 kg/m²     PHYSICAL:  Neurologic: Alert and oriented x3; PERRL  Motor Exam:  Motor exam is symmetrical 5 out of 5 all extremities bilaterally  Sensory:  Sensory intact  Incision c/d/i      DATA:  CBC:   Lab Results   Component Value Date/Time    WBC 18.1 07/08/2025 11:19 AM    RBC 3.76 07/08/2025 11:19 AM    HGB 10.6 07/08/2025 11:19 AM    HCT 33.0 07/08/2025 11:19 AM    MCV 87.8 07/08/2025 11:19 AM    MCH 28.2 07/08/2025 11:19 AM    MCHC 32.1 07/08/2025 11:19 AM    RDW 14.5 07/08/2025 11:19 AM     07/08/2025 11:19 AM    MPV 9.7 07/08/2025 11:19 AM     BMP:    Lab Results   Component Value Date/Time     07/08/2025 11:19 AM    K 4.7 07/08/2025 11:19 AM    K 3.9 10/21/2022 06:10 PM     07/08/2025 11:19 AM    CO2 19 07/08/2025 11:19 AM    BUN 25 07/08/2025 11:19 AM    CREATININE 0.8 07/08/2025 11:19 AM    CALCIUM 9.7 07/08/2025 11:19 AM    GFRAA >60 09/30/2015 06:40 AM    LABGLOM 75 07/08/2025 11:19 AM    LABGLOM >60 11/02/2022 01:57 AM    GLUCOSE 217 07/08/2025 11:19 AM     PT/INR:    Lab Results   Component Value Date/Time    PROTIME 19.5 06/30/2025 11:50 AM    INR 1.8 06/30/2025 11:50 AM     PTT:    Lab Results   Component Value Date/Time    APTT 47.0 07/06/2025 05:16 PM    APTT 71.2 11/02/2022 01:57 AM   [APTT}    Current Inpatient Medications  Current Facility-Administered Medications: senna (SENOKOT) tablet 17.2 mg, 2 tablet, Oral, BID  sodium chloride flush 0.9 % injection 5-40 mL, 5-40 mL, IntraVENous, 2 times per

## 2025-07-08 NOTE — PROGRESS NOTES
OCCUPATIONAL THERAPY INITIAL EVALUATION    [facility] [address] [place], [place]       Date:2025                                                  Patient Name: Grecia Wilhelm  MRN: [id number]  : 1945  Room: [address] [place] [address]    Evaluating OT: Juan Manuel Pennington OTR/L [id number]    Referring Provider:     Shana Zendejas MD        Specific Provider Orders/Date: OT evaluation and treatment 25 [phone]    Diagnosis:  Lumbar stenosis [M48.061]  Lumbar foraminal stenosis [M48.061]      Pertinent Medical History:  has a past medical history of Aortic stenosis, mild, Arrhythmia, Arthritis, Atrial fibrillation (HCC), Bleeding from varicose veins of right lower extremity, CAD (coronary artery disease), Cerebrovascular accident (CVA) due to embolic occlusion of left middle cerebral artery (HCC), Cerebrovascular accident (CVA) due to embolic occlusion of left middle cerebral artery (HCC), Cerebrovascular disease, CHF (congestive heart failure) (HCC), Critical limb ischemia of both lower extremities (HCC), Diabetes mellitus (HCC), Hearing deficit, Heart disease, Hyperlipidemia, Hypertension, Iron deficiency anemia, Migraine headache with aura, Mitral regurgitation, Moderate aortic stenosis by prior echocardiogram, NCGS (non-celiac gluten sensitivity), Severe aortic stenosis, TIA (transient ischemic attack), Unspecified cerebral artery occlusion with cerebral infarction, Varicose veins of left leg with edema, Venous stasis ulcer of right calf with fat layer exposed with varicose veins (HCC), and Warfarin-induced coagulopathy.   Past Surgical History:   Procedure Laterality Date    ABDOMEN SURGERY      AORTIC VALVE REPLACEMENT N/A     APPENDECTOMY      BREAST SURGERY Right     biopsy-benign    COCHLEAR IMPLANT Right 10/26/2022    RIGHT COCHLEAR IMPLANT INSERTION WITH NERVE INTEGRITY MONITOR performed by Hali Meza MD at [facility] OR    COLONOSCOPY  2008  transfers/mobility and ADLs  * Therapeutic exercise to improve motor endurance, ROM, and functional strength for ADLs/functional transfers  * Therapeutic activities to facilitate/challenge dynamic balance, stand tolerance for increased safety and independence with ADLs  * Therapeutic activities to facilitate gross/fine motor skills for increased independence with ADLs  * Positioning to improve skin integrity, interaction with environment and functional independence  * Delirium prevention/treatment  * Neuro-muscular re-education: facilitation of righting/equilibrium reactions, midline orientation, scapular stability/mobility, normalization of muscle tone, and facilitation of volitional active controled movement  * Cognitive retraining/development of therapeutic activities to improve problem solving, judgement, memory, and attention for increased safety/participation in ADL/IADL tasks    Recommended Adaptive Equipment: TBD      Home Living:  Pt lives with spouse   in a 2 story house with ramp entry      Bedroom setup: 1st floor    Bathroom setup: 1st floor  walk in shower   Equipment owned: front wheeled walker       Prior Level of Function:  Pt reports she was independent with dressing, toileting and grooming prior to admission. PRN A from spouse for LB ADLs. Pt needs A with IADLs, and completed functional mobility with FWW   Falls: denies   Driving: no   Occupation/leisure: did not state     Pleasant and cooperative throughout session      Pain Level: 10/10  Location: back   OT provided: repositioning, diversion , and edu on compensatory strategies     Cognition: A&O: 4/4;    Follows single step directions: Good   Memory: Fair   Sequencing: Fair   Problem solving: Fair   Judgement/safety: Fair -   Attention:  Fair     Functional Assessment: AM-PAC Inpatient Daily Activity Raw Score: 13 /24     Initial Eval Status  Date: 7/8/2025   Treatment Status  Date: STG=LTG  Time frame: 10-14 days   Feeding Setup    Mod I

## 2025-07-08 NOTE — PROGRESS NOTES
Floor Called, nurse to nurse given. Spoke with receiving RN . Patients test results review, VS reported to receiving nurse. Any and all important information regarding patient disclosed. Patient transferred to floor on bed in stable condition with ancillary staff. Patient transferred on telemetry monitor and 4lnc.

## 2025-07-08 NOTE — CARE COORDINATION
SOCIAL WORK/CASEMANAGEMENT TRANSITION OF CARE PLANNING( MARTIN ISSAC, [phone]): met with pt's daughter, Elizabeth, pt was sleeping. Went over PT and OT evals. They are in agreement to efrain not aru. They want [facility] , referral made in [facility]. .REILLY Salazar  7/8/2025    Pt was accepted to [facility] , cm dept here to start precert for tentative discharge on Thursday. All discharge paper work with [facility] in place. It is a semi private room and daughter is aware. .REILLY Salazar.7/8/2025    EFRAIN to start precert. .REILLY Salazar  .7/8/2025

## 2025-07-08 NOTE — PROGRESS NOTES
Internal Medicine Progress Note    Patient's name: Grecia Wilhelm  : 1945  Chief complaints (on day of admission): No chief complaint on file.  Admission date: 2025  Date of service: 2025   Room: [address] IMCU/NEURO  Primary care physician: Bismark Beauchamp MD  Reason for visit: Follow-up for surgery     Subjective  Grecia was seen and examined at bedside   S/P OR on 2025  Mentation much improved today.   Doing well post op  Daughter at bedside, all questions addressed      Hospital Medications  Current Facility-Administered Medications   Medication Dose Route Frequency Provider Last Rate Last Admin    sodium chloride flush 0.9 % injection 5-40 mL  5-40 mL IntraVENous 2 times per day Shana Zendejas MD        sodium chloride flush 0.9 % injection 5-40 mL  5-40 mL IntraVENous PRN Shana Zendejas MD        0.9 % sodium chloride infusion   IntraVENous PRN Shana Zendejas MD        acetaminophen (TYLENOL) tablet 650 mg  650 mg Oral Q6H Shana Zendejas MD   650 mg at 25 0547    ondansetron (ZOFRAN-ODT) disintegrating tablet 4 mg  4 mg Oral Q8H PRN Shana Zendejas MD        Or    ondansetron (ZOFRAN) injection 4 mg  4 mg IntraVENous Q6H PRN Shana Zendejas MD        ceFAZolin (ANCEF) 2,000 mg in sterile water 20 mL IV syringe  2,000 mg IntraVENous Q8H Shana Zendejas MD   2,000 mg at 25 0232    morphine (PF) injection 2 mg  2 mg IntraVENous Q2H PRN Shana Zendejas MD        Or    morphine sulfate (PF) injection 4 mg  4 mg IntraVENous Q2H PRN Shana Zendejas MD        diazePAM (VALIUM) tablet 5 mg  5 mg Oral Q6H PRN Shana Zendejas MD        diphenhydrAMINE (BENADRYL) tablet 25 mg  25 mg Oral Q6H PRN Shana Zendejas MD        Or    diphenhydrAMINE (BENADRYL) injection 25 mg  25 mg IntraVENous Q6H PRN Shana Zendejas MD        polyethylene glycol (GLYCOLAX) packet 17 g  17 g Oral Daily Shana Zendejas MD        bisacodyl (DULCOLAX) EC tablet 5 mg  5 mg Oral Daily Shana Zendejas MD        sennosides-docusate sodium

## 2025-07-08 NOTE — OP NOTE
[facility]              [address] [place] [address]                            OPERATIVE REPORT      PATIENT NAME: SOURAV ACUNA             : 1945  MED REC NO: [id number]                        ROOM: 8521  ACCOUNT NO: [id number]                       ADMIT DATE: 2025  PROVIDER: Shana Zendejas MD      DATE OF PROCEDURE:  2025    SURGEON:  Shana Zendejas MD    PREOPERATIVE DIAGNOSES:    1. Lumbar canal stenosis from L2 to L5.  2. L4-5 degenerative spondylolisthesis.    POSTOPERATIVE DIAGNOSES:    1. Lumbar canal stenosis from L2 to L5.  2. L4-5 degenerative spondylolisthesis.    OPERATIVE PROCEDURES:    1. Bilateral segmental arthrodesis and fusion from L2 to L5 with use of bilateral L2, L3, L4, and L5 pedicle screws with use of locally harvested autograft plus allograft in form of ViaSorb strips and recombinant BMP-2 (Infuse) for posterolateral fusion from L2 to L5.  2. Bilateral L2, L3, L4, and L5 laminectomy with bilateral L2-L3, L3-L4, and L4-L5 medial facetectomy and bilateral L2, L3, L4, and L5 foraminotomy.  3. Use of O-arm assisted navigation and placement of pedicle screws.  4. Use of free-running EMG to test pedicle screw heads.  5. AS modifier for Alessandro Soria PA-C, who assisted with primary exposure and primary closure.    ANESTHESIA:  Generalized endotracheal anesthesia.    ASSISTANT:  Alessandro Soria PA-C.    COMPLICATIONS:  None.    ESTIMATED BLOOD LOSS:  100 mL.    SPECIMEN:  None.    OPERATIVE INDICATIONS:  The patient is a 79-year-old lady who presented to the office complaining of back and right lower extremity pain with numbness, tingling, and weakness.  She had an MRI that showed that she had severe stenosis from L2 to L5 with spondylolisthesis at L4-5.  She failed conservative therapy, and after risks, benefits, and alternatives were discussed with the patient, it was determined that she would undergo the

## 2025-07-08 NOTE — PROGRESS NOTES
with  in a 2 story home with ramped entry.  First floor setup.      Pt ambulated with WW and required assistance for ADLs PTA.    OBJECTIVE:   Initial Evaluation  Date: 7/8/25 Treatment  Date: 7/8/25 Short Term/ Long Term   Goals   AM-PAC 6 Clicks 10/24 10/24    Was pt agreeable to Eval/treatment? Yes yes    Does pt have pain? Some surgical pain at rest; 10/10 surgical pain with mobility Unrated back pain    Bed Mobility  Rolling: ModA  Supine to sit: MaxA  Sit to supine: NT  Scooting: MaxA Rolling: mod A  Supine to sit: NT  Sit to supine: max A  Scooting: max A Brian   Transfers Sit to stand: MaxA  Stand to sit: MaxA  Stand pivot: MaxA with WW Sit to stand: max A  Stand to sit: max A  Stand pivot: mod A with st walker Brian with WW   Ambulation   A few steps to chair with MaxA with WW 2' with st walker mod A  (Steps to bedside chair) >10 feet with Brian with WW   Stair negotiation: ascended and descended NT NT    ROM BUE:  WFL  BLE:  WFL     Strength BUE:  WFL  BLE:  2/5 grossly with MMT but able to stand  Increase by 1/3 MMT grade   Balance Sitting EOB:  Brian  Dynamic Standing:  MaxA with WW Sitting EOB:  SBA  Dynamic Standing:  mod A with st walker Sitting EOB:  Independent  Dynamic Standing:  Brian with WW     Pt is A & O x 4  Sensation:  Pt denies numbness and tingling to extremities  Edema:  unremarkable    Vitals:  SpO2 and HR were stable during session    Therapeutic Exercises:    Bed mobility: sit>supine, cued for positioning at EOB  Transfers: STS x1, stand pivot x1, cued for hand placement and postural correction  Ambulation: 2' with st walker  BLE AROM    Patient education  Pt educated on safety with functional mobility, spine precautions    Patient response to education:   Pt verbalized understanding Pt demonstrated skill Pt requires further education in this area   yes partial yes     ASSESSMENT:    Comments:  Nursing requesting to assist pt back to bed. Pt sitting in bedside chair upon entering, pt  agreeable to participate. Pt instructed to transfer from chair, cued for hand placement. Pt standing with poor>fair balance with st walker. Pt instructed to transfer to bedside chair, cueing provided for positioning and st walker spacing. Pt demonstrated limited tolerance to functional mobility, 2/2 pain. Pt was assisted back to bed at end of session. Pt positioned for comfort with all needs met and call bell in reach prior to exiting.    Treatment:  Patient practiced and was instructed in the following treatment:    Bed mobility training - pt given verbal and tactile cues to facilitate proper sequencing and safety during rolling and sit>supine as well as provided with physical assistance to complete task   STS and pivot transfer training - pt educated on proper hand and foot placement, safety and sequencing, and use of verbal and tactile cues to safely complete sit<>stand and pivot transfers with physical assistance to complete task safely       PLAN:    Patient is making fair progress towards established goals.  Will continue with current POC.      Time in  1115  Time out  1130    Total Treatment Time  15 minutes     CPT codes:  [] Gait training 19666 -- minutes  [] Manual therapy 55453 -- minutes  [x] Therapeutic activities 43617 15 minutes  [] Therapeutic exercises 72507 -- minutes  [] Neuromuscular reeducation 66662 -- minutes    Jarad Barnett PT, DPT  [id number]

## 2025-07-08 NOTE — PROGRESS NOTES
Physical Therapy  Physical Therapy Initial Assessment     Name: Grecia Wilhelm  : 1945  MRN: [id number]      Date of Service: 2025    Evaluating PT:  Sabino Tinsley PT, DPT [id number]    Room #:  8521/8521-A  Diagnosis:  Lumbar stenosis [M48.061]  Lumbar foraminal stenosis [M48.061]  PMHx/PSHx:    Past Medical History:   Diagnosis Date    Aortic stenosis, mild 10/01/2015    follows  Dr Gallardo at [facility] last visit 22    Arrhythmia     Arthritis     knees    Atrial fibrillation (HCC)     Bleeding from varicose veins of right lower extremity 2017    CAD (coronary artery disease)     Cerebrovascular accident (CVA) due to embolic occlusion of left middle cerebral artery (HCC) 10/2015    Confirmed by neurology    Cerebrovascular accident (CVA) due to embolic occlusion of left middle cerebral artery (HCC)     Left parietal confirmed by neurology    Cerebrovascular disease 2013    CVA.no weakness/deficits    CHF (congestive heart failure) (HCC)     Critical limb ischemia of both lower extremities (HCC) 2025    Diabetes mellitus (HCC)     Hearing deficit     wears hearing aide left ear only    Heart disease     Hyperlipidemia     Hypertension     Iron deficiency anemia 10/23/2022    Migraine headache with aura     Mitral regurgitation 10/01/2015    mild    Moderate aortic stenosis by prior echocardiogram     NCGS (non-celiac gluten sensitivity)     Severe aortic stenosis 2020    By 2D echo    TIA (transient ischemic attack)     Unspecified cerebral artery occlusion with cerebral infarction     Varicose veins of left leg with edema 2017    Venous stasis ulcer of right calf with fat layer exposed with varicose veins (HCC) 2019    Warfarin-induced coagulopathy 2015     Procedure/Surgery:   L2-L5 laminectomy and posterolateral fusion   Precautions:  Falls, spinal, LSO, hemovac, alarms, contact isolation, [place], cochlear implant, TSM  Equipment Needs:

## 2025-07-08 NOTE — ANESTHESIA POSTPROCEDURE EVALUATION
Department of Anesthesiology  Postprocedure Note    Patient: Grecia Wilhelm  MRN: [id number]  YOB: 1945  Date of evaluation: 7/8/2025    Procedure Summary       Date: 07/07/25 Room / Location: [address]    Anesthesia Start: 1544 Anesthesia Stop: 1851    Procedure: L2-L5 laminectomy and posterolateral fusion/o-arm, francisco table, cell saver, platelet gel, posterior instrumentation, audiology (Bilateral: Back) Diagnosis:       Stenosis, spinal, lumbar      Spondylolisthesis of lumbar region      (Stenosis, spinal, lumbar [M48.061])      (Spondylolisthesis of lumbar region [M43.16])    Surgeons: Shana Zendejas MD Responsible Provider: Casey Mansfield MD    Anesthesia Type: General ASA Status: 4            Anesthesia Type: General    Denis Phase I: Denis Score: 8    Denis Phase II:      Anesthesia Post Evaluation    Patient location during evaluation: PACU  Patient participation: complete - patient participated  Level of consciousness: awake  Pain score: 0  Airway patency: patent  Nausea & Vomiting: no vomiting and no nausea  Cardiovascular status: blood pressure returned to baseline  Respiratory status: acceptable  Hydration status: stable  Pain management: adequate    No notable events documented.

## 2025-07-09 LAB
ANION GAP SERPL CALCULATED.3IONS-SCNC: 11 MMOL/L (ref 7–16)
BASOPHILS # BLD: 0.01 K/UL (ref 0–0.2)
BASOPHILS NFR BLD: 0 % (ref 0–2)
BUN SERPL-MCNC: 30 MG/DL (ref 8–23)
CALCIUM SERPL-MCNC: 9.5 MG/DL (ref 8.8–10.2)
CHLORIDE SERPL-SCNC: 102 MMOL/L (ref 98–107)
CO2 SERPL-SCNC: 24 MMOL/L (ref 22–29)
CREAT SERPL-MCNC: 0.7 MG/DL (ref 0.5–1)
EOSINOPHIL # BLD: 0 K/UL (ref 0.05–0.5)
EOSINOPHILS RELATIVE PERCENT: 0 % (ref 0–6)
ERYTHROCYTE [DISTWIDTH] IN BLOOD BY AUTOMATED COUNT: 14.6 % (ref 11.5–15)
GFR, ESTIMATED: 88 ML/MIN/1.73M2
GLUCOSE BLD-MCNC: 156 MG/DL (ref 74–99)
GLUCOSE BLD-MCNC: 165 MG/DL (ref 74–99)
GLUCOSE BLD-MCNC: 223 MG/DL (ref 74–99)
GLUCOSE BLD-MCNC: 247 MG/DL (ref 74–99)
GLUCOSE SERPL-MCNC: 159 MG/DL (ref 74–99)
HCT VFR BLD AUTO: 30.3 % (ref 34–48)
HGB BLD-MCNC: 9.7 G/DL (ref 11.5–15.5)
IMM GRANULOCYTES # BLD AUTO: 0.06 K/UL (ref 0–0.58)
IMM GRANULOCYTES NFR BLD: 0 % (ref 0–5)
LYMPHOCYTES NFR BLD: 1.05 K/UL (ref 1.5–4)
LYMPHOCYTES RELATIVE PERCENT: 7 % (ref 20–42)
MCH RBC QN AUTO: 28 PG (ref 26–35)
MCHC RBC AUTO-ENTMCNC: 32 G/DL (ref 32–34.5)
MCV RBC AUTO: 87.3 FL (ref 80–99.9)
MONOCYTES NFR BLD: 0.94 K/UL (ref 0.1–0.95)
MONOCYTES NFR BLD: 6 % (ref 2–12)
NEUTROPHILS NFR BLD: 86 % (ref 43–80)
NEUTS SEG NFR BLD: 12.79 K/UL (ref 1.8–7.3)
PLATELET # BLD AUTO: 222 K/UL (ref 130–450)
PMV BLD AUTO: 10 FL (ref 7–12)
POTASSIUM SERPL-SCNC: 4.2 MMOL/L (ref 3.5–5.1)
RBC # BLD AUTO: 3.47 M/UL (ref 3.5–5.5)
SODIUM SERPL-SCNC: 137 MMOL/L (ref 136–145)
WBC OTHER # BLD: 14.9 K/UL (ref 4.5–11.5)

## 2025-07-09 PROCEDURE — 6370000000 HC RX 637 (ALT 250 FOR IP): Performed by: NEUROLOGICAL SURGERY

## 2025-07-09 PROCEDURE — 2060000000 HC ICU INTERMEDIATE R&B

## 2025-07-09 PROCEDURE — 97530 THERAPEUTIC ACTIVITIES: CPT

## 2025-07-09 PROCEDURE — 85025 COMPLETE CBC W/AUTO DIFF WBC: CPT

## 2025-07-09 PROCEDURE — 82962 GLUCOSE BLOOD TEST: CPT

## 2025-07-09 PROCEDURE — 6360000002 HC RX W HCPCS: Performed by: NEUROLOGICAL SURGERY

## 2025-07-09 PROCEDURE — 6370000000 HC RX 637 (ALT 250 FOR IP): Performed by: STUDENT IN AN ORGANIZED HEALTH CARE EDUCATION/TRAINING PROGRAM

## 2025-07-09 PROCEDURE — 2500000003 HC RX 250 WO HCPCS: Performed by: NEUROLOGICAL SURGERY

## 2025-07-09 PROCEDURE — 80048 BASIC METABOLIC PNL TOTAL CA: CPT

## 2025-07-09 PROCEDURE — 2700000000 HC OXYGEN THERAPY PER DAY

## 2025-07-09 PROCEDURE — 97535 SELF CARE MNGMENT TRAINING: CPT

## 2025-07-09 PROCEDURE — 36415 COLL VENOUS BLD VENIPUNCTURE: CPT

## 2025-07-09 PROCEDURE — 97110 THERAPEUTIC EXERCISES: CPT

## 2025-07-09 RX ADMIN — WATER 2000 MG: 1 INJECTION INTRAMUSCULAR; INTRAVENOUS; SUBCUTANEOUS at 08:47

## 2025-07-09 RX ADMIN — METOPROLOL TARTRATE 50 MG: 50 TABLET, FILM COATED ORAL at 08:49

## 2025-07-09 RX ADMIN — DILTIAZEM HYDROCHLORIDE 240 MG: 240 CAPSULE, COATED, EXTENDED RELEASE ORAL at 08:49

## 2025-07-09 RX ADMIN — ACETAMINOPHEN 650 MG: 325 TABLET ORAL at 05:33

## 2025-07-09 RX ADMIN — Medication 3 MG: at 21:29

## 2025-07-09 RX ADMIN — ACETAMINOPHEN 650 MG: 325 TABLET ORAL at 21:32

## 2025-07-09 RX ADMIN — SENNOSIDES 17.2 MG: 8.6 TABLET, FILM COATED ORAL at 08:49

## 2025-07-09 RX ADMIN — SENNOSIDES 17.2 MG: 8.6 TABLET, FILM COATED ORAL at 21:29

## 2025-07-09 RX ADMIN — MORPHINE SULFATE 2 MG: 2 INJECTION, SOLUTION INTRAMUSCULAR; INTRAVENOUS at 17:49

## 2025-07-09 RX ADMIN — ACETAMINOPHEN 650 MG: 325 TABLET ORAL at 17:49

## 2025-07-09 RX ADMIN — WATER 2000 MG: 1 INJECTION INTRAMUSCULAR; INTRAVENOUS; SUBCUTANEOUS at 02:06

## 2025-07-09 RX ADMIN — INSULIN LISPRO 2 UNITS: 100 INJECTION, SOLUTION INTRAVENOUS; SUBCUTANEOUS at 21:31

## 2025-07-09 RX ADMIN — BISACODYL 5 MG: 5 TABLET, COATED ORAL at 08:49

## 2025-07-09 RX ADMIN — FAMOTIDINE 20 MG: 10 INJECTION, SOLUTION INTRAVENOUS at 21:29

## 2025-07-09 RX ADMIN — PRAVASTATIN SODIUM 20 MG: 20 TABLET ORAL at 08:49

## 2025-07-09 RX ADMIN — PANTOPRAZOLE SODIUM 40 MG: 40 TABLET, DELAYED RELEASE ORAL at 05:33

## 2025-07-09 RX ADMIN — FAMOTIDINE 20 MG: 20 TABLET, FILM COATED ORAL at 08:49

## 2025-07-09 RX ADMIN — SODIUM CHLORIDE, PRESERVATIVE FREE 10 ML: 5 INJECTION INTRAVENOUS at 08:53

## 2025-07-09 RX ADMIN — SENNOSIDES AND DOCUSATE SODIUM 1 TABLET: 8.6; 5 TABLET ORAL at 21:29

## 2025-07-09 RX ADMIN — METOPROLOL TARTRATE 50 MG: 50 TABLET, FILM COATED ORAL at 21:29

## 2025-07-09 RX ADMIN — ACETAMINOPHEN 650 MG: 325 TABLET ORAL at 11:51

## 2025-07-09 RX ADMIN — WATER 2000 MG: 1 INJECTION INTRAMUSCULAR; INTRAVENOUS; SUBCUTANEOUS at 17:50

## 2025-07-09 ASSESSMENT — PAIN DESCRIPTION - DESCRIPTORS
DESCRIPTORS: DISCOMFORT;SHOOTING;THROBBING
DESCRIPTORS: DISCOMFORT;TENDER;SORE
DESCRIPTORS: ACHING;DISCOMFORT;SORE
DESCRIPTORS: ACHING;SHOOTING;SHARP

## 2025-07-09 ASSESSMENT — PAIN DESCRIPTION - LOCATION
LOCATION: BACK;LEG
LOCATION: BACK
LOCATION: BACK
LOCATION: LEG;BACK

## 2025-07-09 ASSESSMENT — PAIN SCALES - GENERAL
PAINLEVEL_OUTOF10: 3
PAINLEVEL_OUTOF10: 4
PAINLEVEL_OUTOF10: 8
PAINLEVEL_OUTOF10: 0
PAINLEVEL_OUTOF10: 2
PAINLEVEL_OUTOF10: 10

## 2025-07-09 ASSESSMENT — PAIN DESCRIPTION - PAIN TYPE: TYPE: SURGICAL PAIN

## 2025-07-09 ASSESSMENT — PAIN DESCRIPTION - ORIENTATION
ORIENTATION: LOWER;MID
ORIENTATION: RIGHT

## 2025-07-09 NOTE — PLAN OF CARE
Problem: Chronic Conditions and Co-morbidities  Goal: Patient's chronic conditions and co-morbidity symptoms are monitored and maintained or improved  7/9/2025 0050 by Segundo Mckee, GABY  Outcome: Progressing  7/8/2025 1208 by Miriam Pinzon RN  Outcome: Progressing

## 2025-07-09 NOTE — PROGRESS NOTES
Internal Medicine Progress Note    Patient's name: Grecia Wilhelm  : 1945  Chief complaints (on day of admission): No chief complaint on file.  Admission date: 2025  Date of service: 2025   Room: [address] IMCU/NEURO  Primary care physician: Bismark Beauchamp MD  Reason for visit: Follow-up for surgery     Subjective  Grecia was seen and examined at bedside   S/P OR on 2025  Mentation is well today, AAOx3, remembers me from day prior  No acute concerns  Plan for drain removal tomorrow      Hospital Medications  Current Facility-Administered Medications   Medication Dose Route Frequency Provider Last Rate Last Admin    senna (SENOKOT) tablet 17.2 mg  2 tablet Oral BID Cayden Akers MD   17.2 mg at 25    sodium chloride flush 0.9 % injection 5-40 mL  5-40 mL IntraVENous 2 times per day Shana Zendejas MD   10 mL at 25    sodium chloride flush 0.9 % injection 5-40 mL  5-40 mL IntraVENous PRN Shana Zendejas MD        0.9 % sodium chloride infusion   IntraVENous PRN Shana Zendejas MD        acetaminophen (TYLENOL) tablet 650 mg  650 mg Oral Q6H Shana Zendejas MD   650 mg at 25 0533    ondansetron (ZOFRAN-ODT) disintegrating tablet 4 mg  4 mg Oral Q8H PRN Shana Zendejas MD        Or    ondansetron (ZOFRAN) injection 4 mg  4 mg IntraVENous Q6H PRN Shana Zendejas MD        ceFAZolin (ANCEF) 2,000 mg in sterile water 20 mL IV syringe  2,000 mg IntraVENous Q8H Shana Zendejas MD   2,000 mg at 25 0206    morphine (PF) injection 2 mg  2 mg IntraVENous Q2H PRN Shana Zendejas MD        Or    morphine sulfate (PF) injection 4 mg  4 mg IntraVENous Q2H PRN Shana Zendejas MD        diazePAM (VALIUM) tablet 5 mg  5 mg Oral Q6H PRN Shana Zendejas MD        diphenhydrAMINE (BENADRYL) tablet 25 mg  25 mg Oral Q6H PRN Shana Zendejas MD        Or    diphenhydrAMINE (BENADRYL) injection 25 mg  25 mg IntraVENous Q6H PRN Shana Zendejas MD        polyethylene glycol (GLYCOLAX) packet 17 g  17 g Oral

## 2025-07-09 NOTE — PROGRESS NOTES
Physical Therapy  Treatment Note    Name: Grecia Wilhelm  : 1945  MRN: [id number]      Date of Service: 2025    Evaluating PT:  Sabino Tinsley PT, DPT [id number]    Room #:  8521/8521-A  Diagnosis:  Lumbar stenosis [M48.061]  Lumbar foraminal stenosis [M48.061]  PMHx/PSHx:    Past Medical History:   Diagnosis Date    Aortic stenosis, mild 10/01/2015    follows  Dr Gallardo at [facility] last visit 22    Arrhythmia     Arthritis     knees    Atrial fibrillation (HCC)     Bleeding from varicose veins of right lower extremity 2017    CAD (coronary artery disease)     Cerebrovascular accident (CVA) due to embolic occlusion of left middle cerebral artery (HCC) 10/2015    Confirmed by neurology    Cerebrovascular accident (CVA) due to embolic occlusion of left middle cerebral artery (HCC)     Left parietal confirmed by neurology    Cerebrovascular disease 2013    CVA.no weakness/deficits    CHF (congestive heart failure) (HCC)     Critical limb ischemia of both lower extremities (HCC) 2025    Diabetes mellitus (HCC)     Hearing deficit     wears hearing aide left ear only    Heart disease     Hyperlipidemia     Hypertension     Iron deficiency anemia 10/23/2022    Migraine headache with aura     Mitral regurgitation 10/01/2015    mild    Moderate aortic stenosis by prior echocardiogram     NCGS (non-celiac gluten sensitivity)     Severe aortic stenosis 2020    By 2D echo    TIA (transient ischemic attack)     Unspecified cerebral artery occlusion with cerebral infarction     Varicose veins of left leg with edema 2017    Venous stasis ulcer of right calf with fat layer exposed with varicose veins (HCC) 2019    Warfarin-induced coagulopathy 2015     Procedure/Surgery:   L2-L5 laminectomy and posterolateral fusion   Precautions:  Falls, spinal, LSO, hemovac, alarms, contact isolation, [place], cochlear implant, TSM  Equipment Needs:  TBD    SUBJECTIVE:    Pt lives

## 2025-07-09 NOTE — PROGRESS NOTES
Occupational Therapy  OT BEDSIDE TREATMENT NOTE   [facility] [place]  [address], [place]       Date:2025  Patient Name: Grecia Wilhelm  MRN: [id number]  : 1945  Room: [facility]85-A     Per OT Eval:    Evaluating OT: Juan Manuel Pennington OTR/L [id number]     Referring Provider:     Shana Zendejas MD                                         Specific Provider Orders/Date: OT evaluation and treatment 25 4910     Diagnosis:  Lumbar stenosis [M48.061]  Lumbar foraminal stenosis [M48.061]       Pertinent Medical History:  has a past medical history of Aortic stenosis, mild, Arrhythmia, Arthritis, Atrial fibrillation (HCC), Bleeding from varicose veins of right lower extremity, CAD (coronary artery disease), Cerebrovascular accident (CVA) due to embolic occlusion of left middle cerebral artery (HCC), Cerebrovascular accident (CVA) due to embolic occlusion of left middle cerebral artery (HCC), Cerebrovascular disease, CHF (congestive heart failure) (HCC), Critical limb ischemia of both lower extremities (HCC), Diabetes mellitus (HCC), Hearing deficit, Heart disease, Hyperlipidemia, Hypertension, Iron deficiency anemia, Migraine headache with aura, Mitral regurgitation, Moderate aortic stenosis by prior echocardiogram, NCGS (non-celiac gluten sensitivity), Severe aortic stenosis, TIA (transient ischemic attack), Unspecified cerebral artery occlusion with cerebral infarction, Varicose veins of left leg with edema, Venous stasis ulcer of right calf with fat layer exposed with varicose veins (HCC), and Warfarin-induced coagulopathy.   Past Surgical History         Past Surgical History:   Procedure Laterality Date    ABDOMEN SURGERY        AORTIC VALVE REPLACEMENT N/A      APPENDECTOMY        BREAST SURGERY Right       biopsy-benign    COCHLEAR IMPLANT Right 10/26/2022     RIGHT COCHLEAR IMPLANT INSERTION WITH NERVE INTEGRITY MONITOR performed by Hali  and functional independence  * Recommendation of environmental modifications for increased safety with functional transfers/mobility and ADLs  * Therapeutic exercise to improve motor endurance, ROM, and functional strength for ADLs/functional transfers  * Therapeutic activities to facilitate/challenge dynamic balance, stand tolerance for increased safety and independence with ADLs  * Therapeutic activities to facilitate gross/fine motor skills for increased independence with ADLs  * Positioning to improve skin integrity, interaction with environment and functional independence  * Delirium prevention/treatment  * Neuro-muscular re-education: facilitation of righting/equilibrium reactions, midline orientation, scapular stability/mobility, normalization of muscle tone, and facilitation of volitional active controled movement  * Cognitive retraining/development of therapeutic activities to improve problem solving, judgement, memory, and attention for increased safety/participation in ADL/IADL tasks     Recommended Adaptive Equipment: TBD       Home Living:  Pt lives with spouse   in a 2 story house with ramp entry      Bedroom setup: 1st floor    Bathroom setup: 1st floor  walk in shower   Equipment owned: front wheeled walker        Prior Level of Function:  Pt reports she was independent with dressing, toileting and grooming prior to admission. PRN A from spouse for LB ADLs. Pt needs A with IADLs, and completed functional mobility with FWW   Falls: denies   Driving: no   Occupation/leisure: did not state      Pain Level: no pain at rest, 8-9/10 with activity, pt does anticipate pain with movement of R LE   Location: back & R LE  OT provided: repositioning, diversion, and edu on compensatory strategies      Cognition: A&O: 4/4; pleasant & cooperative, motivated, impulsive, decreased safety, some confusion this date, nsg reports started last night              Follows single step directions: Good              Memory:

## 2025-07-09 NOTE — CARE COORDINATION
Case reviewed in IDR.  Patient POD #2 L2-5 PLIF.  Hemovac in place.  Heparin gtt to be restarted after hemovac removed tomorrow.  Patient accepted to [facility].  Will start authorization closer to when patient can be transitioned back to Eliquis.  PASRR, ALO, and destination completed.  Envelope and ambulance form in the soft chart.  Will continue to follow for further transition of care planning needs.         Nel Nash RN.  P:  [phone]

## 2025-07-09 NOTE — PROGRESS NOTES
Department of Neurosurgery  Progress Note    CHIEF COMPLAINT: s/p L2-L5 posterolateral fusion 7/7    SUBJECTIVE:  Elfego op site pain ok    REVIEW OF SYSTEMS :  Constitutional: Negative for chills and fever.    Neurological: Negative for dizziness, tremors and speech change.     OBJECTIVE:   VITALS:  /60   Pulse 76   Temp 97.6 °F (36.4 °C)   Resp 18   Wt 66.2 kg (145 lb 15.1 oz)   SpO2 98%   BMI 25.85 kg/m²     PHYSICAL:  Neurologic: Alert and oriented x3; PERRL  Motor Exam:  Motor exam is symmetrical 5 out of 5 all extremities bilaterally  Sensory:  Sensory intact  Incision c/d/i      DATA:  CBC:   Lab Results   Component Value Date/Time    WBC 14.9 07/09/2025 05:46 AM    RBC 3.47 07/09/2025 05:46 AM    HGB 9.7 07/09/2025 05:46 AM    HCT 30.3 07/09/2025 05:46 AM    MCV 87.3 07/09/2025 05:46 AM    MCH 28.0 07/09/2025 05:46 AM    MCHC 32.0 07/09/2025 05:46 AM    RDW 14.6 07/09/2025 05:46 AM     07/09/2025 05:46 AM    MPV 10.0 07/09/2025 05:46 AM     BMP:    Lab Results   Component Value Date/Time     07/09/2025 05:46 AM    K 4.2 07/09/2025 05:46 AM    K 3.9 10/21/2022 06:10 PM     07/09/2025 05:46 AM    CO2 24 07/09/2025 05:46 AM    BUN 30 07/09/2025 05:46 AM    CREATININE 0.7 07/09/2025 05:46 AM    CALCIUM 9.5 07/09/2025 05:46 AM    GFRAA >60 09/30/2015 06:40 AM    LABGLOM 88 07/09/2025 05:46 AM    LABGLOM >60 11/02/2022 01:57 AM    GLUCOSE 159 07/09/2025 05:46 AM     PT/INR:    Lab Results   Component Value Date/Time    PROTIME 19.5 06/30/2025 11:50 AM    INR 1.8 06/30/2025 11:50 AM     PTT:    Lab Results   Component Value Date/Time    APTT 47.0 07/06/2025 05:16 PM    APTT 71.2 11/02/2022 01:57 AM   [APTT}    Current Inpatient Medications  Current Facility-Administered Medications: senna (SENOKOT) tablet 17.2 mg, 2 tablet, Oral, BID  sodium chloride flush 0.9 % injection 5-40 mL, 5-40 mL, IntraVENous, 2 times per day  sodium chloride flush 0.9 % injection 5-40 mL, 5-40 mL, IntraVENous,

## 2025-07-10 VITALS
SYSTOLIC BLOOD PRESSURE: 131 MMHG | DIASTOLIC BLOOD PRESSURE: 71 MMHG | BODY MASS INDEX: 25.93 KG/M2 | WEIGHT: 146.39 LBS | OXYGEN SATURATION: 96 % | HEART RATE: 79 BPM | TEMPERATURE: 97.5 F | RESPIRATION RATE: 18 BRPM

## 2025-07-10 LAB
ANION GAP SERPL CALCULATED.3IONS-SCNC: 9 MMOL/L (ref 7–16)
BASOPHILS # BLD: 0.01 K/UL (ref 0–0.2)
BASOPHILS NFR BLD: 0 % (ref 0–2)
BUN SERPL-MCNC: 20 MG/DL (ref 8–23)
CALCIUM SERPL-MCNC: 8.8 MG/DL (ref 8.8–10.2)
CHLORIDE SERPL-SCNC: 106 MMOL/L (ref 98–107)
CO2 SERPL-SCNC: 23 MMOL/L (ref 22–29)
CREAT SERPL-MCNC: 0.5 MG/DL (ref 0.5–1)
EOSINOPHIL # BLD: 0.03 K/UL (ref 0.05–0.5)
EOSINOPHILS RELATIVE PERCENT: 0 % (ref 0–6)
ERYTHROCYTE [DISTWIDTH] IN BLOOD BY AUTOMATED COUNT: 14.6 % (ref 11.5–15)
GFR, ESTIMATED: >90 ML/MIN/1.73M2
GLUCOSE BLD-MCNC: 121 MG/DL (ref 74–99)
GLUCOSE BLD-MCNC: 238 MG/DL (ref 74–99)
GLUCOSE SERPL-MCNC: 123 MG/DL (ref 74–99)
HCT VFR BLD AUTO: 27 % (ref 34–48)
HGB BLD-MCNC: 8.8 G/DL (ref 11.5–15.5)
IMM GRANULOCYTES # BLD AUTO: 0.04 K/UL (ref 0–0.58)
IMM GRANULOCYTES NFR BLD: 1 % (ref 0–5)
LYMPHOCYTES NFR BLD: 1.23 K/UL (ref 1.5–4)
LYMPHOCYTES RELATIVE PERCENT: 15 % (ref 20–42)
MCH RBC QN AUTO: 28.7 PG (ref 26–35)
MCHC RBC AUTO-ENTMCNC: 32.6 G/DL (ref 32–34.5)
MCV RBC AUTO: 87.9 FL (ref 80–99.9)
MONOCYTES NFR BLD: 0.79 K/UL (ref 0.1–0.95)
MONOCYTES NFR BLD: 10 % (ref 2–12)
NEUTROPHILS NFR BLD: 75 % (ref 43–80)
NEUTS SEG NFR BLD: 6.25 K/UL (ref 1.8–7.3)
PLATELET # BLD AUTO: 164 K/UL (ref 130–450)
PMV BLD AUTO: 9.7 FL (ref 7–12)
POTASSIUM SERPL-SCNC: 3.8 MMOL/L (ref 3.5–5.1)
RBC # BLD AUTO: 3.07 M/UL (ref 3.5–5.5)
SODIUM SERPL-SCNC: 138 MMOL/L (ref 136–145)
WBC OTHER # BLD: 8.4 K/UL (ref 4.5–11.5)

## 2025-07-10 PROCEDURE — 2700000000 HC OXYGEN THERAPY PER DAY

## 2025-07-10 PROCEDURE — 6370000000 HC RX 637 (ALT 250 FOR IP): Performed by: STUDENT IN AN ORGANIZED HEALTH CARE EDUCATION/TRAINING PROGRAM

## 2025-07-10 PROCEDURE — 2500000003 HC RX 250 WO HCPCS: Performed by: NEUROLOGICAL SURGERY

## 2025-07-10 PROCEDURE — 6370000000 HC RX 637 (ALT 250 FOR IP): Performed by: NEUROLOGICAL SURGERY

## 2025-07-10 PROCEDURE — 97530 THERAPEUTIC ACTIVITIES: CPT

## 2025-07-10 PROCEDURE — 36415 COLL VENOUS BLD VENIPUNCTURE: CPT

## 2025-07-10 PROCEDURE — 97110 THERAPEUTIC EXERCISES: CPT

## 2025-07-10 PROCEDURE — 6360000002 HC RX W HCPCS: Performed by: NEUROLOGICAL SURGERY

## 2025-07-10 PROCEDURE — 82962 GLUCOSE BLOOD TEST: CPT

## 2025-07-10 PROCEDURE — 6360000002 HC RX W HCPCS: Performed by: STUDENT IN AN ORGANIZED HEALTH CARE EDUCATION/TRAINING PROGRAM

## 2025-07-10 PROCEDURE — 80048 BASIC METABOLIC PNL TOTAL CA: CPT

## 2025-07-10 PROCEDURE — 97535 SELF CARE MNGMENT TRAINING: CPT

## 2025-07-10 PROCEDURE — 85025 COMPLETE CBC W/AUTO DIFF WBC: CPT

## 2025-07-10 RX ORDER — ENOXAPARIN SODIUM 100 MG/ML
1 INJECTION SUBCUTANEOUS 2 TIMES DAILY
Status: DISCONTINUED | OUTPATIENT
Start: 2025-07-10 | End: 2025-07-10 | Stop reason: HOSPADM

## 2025-07-10 RX ORDER — ENOXAPARIN SODIUM 100 MG/ML
1 INJECTION SUBCUTANEOUS 2 TIMES DAILY
DISCHARGE
Start: 2025-07-10 | End: 2025-07-13

## 2025-07-10 RX ORDER — DIAZEPAM 5 MG/1
5 TABLET ORAL EVERY 6 HOURS PRN
Qty: 40 TABLET | Refills: 0 | Status: SHIPPED | OUTPATIENT
Start: 2025-07-10 | End: 2025-07-20

## 2025-07-10 RX ORDER — SODIUM PHOSPHATE, DIBASIC AND SODIUM PHOSPHATE, MONOBASIC 7; 19 G/230ML; G/230ML
1 ENEMA RECTAL DAILY PRN
DISCHARGE
Start: 2025-07-10

## 2025-07-10 RX ORDER — METOPROLOL TARTRATE 50 MG
50 TABLET ORAL 2 TIMES DAILY
DISCHARGE
Start: 2025-07-10

## 2025-07-10 RX ORDER — BISACODYL 10 MG
10 SUPPOSITORY, RECTAL RECTAL DAILY PRN
DISCHARGE
Start: 2025-07-10 | End: 2025-08-09

## 2025-07-10 RX ORDER — TRAMADOL HYDROCHLORIDE 50 MG/1
50 TABLET ORAL EVERY 6 HOURS PRN
Qty: 28 TABLET | Refills: 0 | Status: SHIPPED | OUTPATIENT
Start: 2025-07-10 | End: 2025-07-17

## 2025-07-10 RX ORDER — SENNA AND DOCUSATE SODIUM 50; 8.6 MG/1; MG/1
1 TABLET, FILM COATED ORAL 2 TIMES DAILY
DISCHARGE
Start: 2025-07-10

## 2025-07-10 RX ORDER — BISACODYL 5 MG/1
5 TABLET, DELAYED RELEASE ORAL DAILY
DISCHARGE
Start: 2025-07-11

## 2025-07-10 RX ORDER — POLYETHYLENE GLYCOL 3350 17 G/17G
17 POWDER, FOR SOLUTION ORAL DAILY
DISCHARGE
Start: 2025-07-11 | End: 2025-08-10

## 2025-07-10 RX ADMIN — SODIUM CHLORIDE, PRESERVATIVE FREE 10 ML: 5 INJECTION INTRAVENOUS at 09:15

## 2025-07-10 RX ADMIN — FAMOTIDINE 20 MG: 20 TABLET, FILM COATED ORAL at 09:14

## 2025-07-10 RX ADMIN — BISACODYL 5 MG: 5 TABLET, COATED ORAL at 09:14

## 2025-07-10 RX ADMIN — SENNOSIDES 17.2 MG: 8.6 TABLET, FILM COATED ORAL at 09:15

## 2025-07-10 RX ADMIN — TRAMADOL HYDROCHLORIDE 50 MG: 50 TABLET, COATED ORAL at 09:13

## 2025-07-10 RX ADMIN — SENNOSIDES AND DOCUSATE SODIUM 1 TABLET: 8.6; 5 TABLET ORAL at 09:14

## 2025-07-10 RX ADMIN — ENOXAPARIN SODIUM 70 MG: 100 INJECTION SUBCUTANEOUS at 10:58

## 2025-07-10 RX ADMIN — POLYETHYLENE GLYCOL 3350 17 G: 17 POWDER, FOR SOLUTION ORAL at 09:15

## 2025-07-10 RX ADMIN — WATER 2000 MG: 1 INJECTION INTRAMUSCULAR; INTRAVENOUS; SUBCUTANEOUS at 01:43

## 2025-07-10 RX ADMIN — PANTOPRAZOLE SODIUM 40 MG: 40 TABLET, DELAYED RELEASE ORAL at 05:11

## 2025-07-10 RX ADMIN — PRAVASTATIN SODIUM 20 MG: 20 TABLET ORAL at 09:14

## 2025-07-10 RX ADMIN — TRAMADOL HYDROCHLORIDE 50 MG: 50 TABLET, COATED ORAL at 00:46

## 2025-07-10 RX ADMIN — INSULIN LISPRO 2 UNITS: 100 INJECTION, SOLUTION INTRAVENOUS; SUBCUTANEOUS at 10:58

## 2025-07-10 RX ADMIN — ACETAMINOPHEN 650 MG: 325 TABLET ORAL at 05:11

## 2025-07-10 RX ADMIN — METOPROLOL TARTRATE 50 MG: 50 TABLET, FILM COATED ORAL at 09:14

## 2025-07-10 RX ADMIN — ACETAMINOPHEN 650 MG: 325 TABLET ORAL at 10:57

## 2025-07-10 RX ADMIN — DILTIAZEM HYDROCHLORIDE 240 MG: 240 CAPSULE, COATED, EXTENDED RELEASE ORAL at 09:15

## 2025-07-10 ASSESSMENT — PAIN SCALES - GENERAL
PAINLEVEL_OUTOF10: 7
PAINLEVEL_OUTOF10: 4
PAINLEVEL_OUTOF10: 2
PAINLEVEL_OUTOF10: 0

## 2025-07-10 ASSESSMENT — PAIN DESCRIPTION - DESCRIPTORS
DESCRIPTORS: ACHING;DISCOMFORT;SORE
DESCRIPTORS: ACHING;DISCOMFORT;SORE
DESCRIPTORS: DULL;ACHING;DISCOMFORT

## 2025-07-10 ASSESSMENT — PAIN DESCRIPTION - ORIENTATION
ORIENTATION: LOWER
ORIENTATION: MID
ORIENTATION: MID

## 2025-07-10 ASSESSMENT — PAIN DESCRIPTION - FREQUENCY: FREQUENCY: INTERMITTENT

## 2025-07-10 ASSESSMENT — PAIN DESCRIPTION - PAIN TYPE: TYPE: SURGICAL PAIN

## 2025-07-10 ASSESSMENT — PAIN DESCRIPTION - ONSET: ONSET: GRADUAL

## 2025-07-10 ASSESSMENT — PAIN DESCRIPTION - LOCATION
LOCATION: BACK

## 2025-07-10 NOTE — PROGRESS NOTES
Department of Neurosurgery  Progress Note    CHIEF COMPLAINT: s/p L2-L5 posterolateral fusion 7/7    SUBJECTIVE:  Sitting up in chair, op site pain tolerable. Has worked with PT/OT    REVIEW OF SYSTEMS :  Constitutional: Negative for chills and fever.    Neurological: Negative for dizziness, tremors and speech change.     OBJECTIVE:   VITALS:  /75   Pulse 89   Temp 97.5 °F (36.4 °C) (Axillary)   Resp 18   Wt 66.4 kg (146 lb 6.2 oz)   SpO2 97%   BMI 25.93 kg/m²     PHYSICAL:  Neurologic: Alert and oriented x3; PERRL  Motor Exam:  Motor exam is symmetrical 5 out of 5 all extremities bilaterally  Sensory:  Sensory intact  Incision c/d/i    DATA:  CBC:   Lab Results   Component Value Date/Time    WBC 8.4 07/10/2025 06:08 AM    RBC 3.07 07/10/2025 06:08 AM    HGB 8.8 07/10/2025 06:08 AM    HCT 27.0 07/10/2025 06:08 AM    MCV 87.9 07/10/2025 06:08 AM    MCH 28.7 07/10/2025 06:08 AM    MCHC 32.6 07/10/2025 06:08 AM    RDW 14.6 07/10/2025 06:08 AM     07/10/2025 06:08 AM    MPV 9.7 07/10/2025 06:08 AM     BMP:    Lab Results   Component Value Date/Time     07/10/2025 06:08 AM    K 3.8 07/10/2025 06:08 AM    K 3.9 10/21/2022 06:10 PM     07/10/2025 06:08 AM    CO2 23 07/10/2025 06:08 AM    BUN 20 07/10/2025 06:08 AM    CREATININE 0.5 07/10/2025 06:08 AM    CALCIUM 8.8 07/10/2025 06:08 AM    GFRAA >60 09/30/2015 06:40 AM    LABGLOM >90 07/10/2025 06:08 AM    LABGLOM >60 11/02/2022 01:57 AM    GLUCOSE 123 07/10/2025 06:08 AM     PT/INR:    Lab Results   Component Value Date/Time    PROTIME 19.5 06/30/2025 11:50 AM    INR 1.8 06/30/2025 11:50 AM     PTT:    Lab Results   Component Value Date/Time    APTT 47.0 07/06/2025 05:16 PM    APTT 71.2 11/02/2022 01:57 AM   [APTT}    Current Inpatient Medications  Current Facility-Administered Medications: senna (SENOKOT) tablet 17.2 mg, 2 tablet, Oral, BID  sodium chloride flush 0.9 % injection 5-40 mL, 5-40 mL, IntraVENous, 2 times per day  sodium chloride

## 2025-07-10 NOTE — DISCHARGE SUMMARY
Internal Medicine Discharge Summary    NAME: Grecia Wilhelm :  1945  MRN:  [id number] PCP:Bismark Beauchamp MD    ADMITTED: 2025   DISCHARGED: 7/10/2025    ADMITTING PHYSICIAN: Cayden Akers MD    PCP: Bismark Beauchamp MD    CONSULTANT(S):   IP CONSULT TO NEUROSURGERY  IP CONSULT TO CARDIOLOGY  INPATIENT CONSULT TO ORTHOTIST/PROSTHETIST     ADMITTING DIAGNOSIS:   Lumbar stenosis [M48.061]  Lumbar foraminal stenosis [M48.061]     Please see H&P for further details    DISCHARGE DIAGNOSES:   Active Hospital Problems    Diagnosis     Lumbar foraminal stenosis [M48.061]          HOSPITAL COURSE:   The patient presented to the hospital with the chief complaint of No chief complaint on file.  . The patient was admitted to the hospital.     Grecia is a 79 y.o. year old female with a PMH of CVA DVT A Fib who presented with a chief complaint of surgery Monday with Dr Zendejas, need for holding OAC and bridging on Heparin gtt due to her significant clot risk. Cardiology has been asked to see and evaluate prior to surgery. She is awake alert oriented, hard of hearing, but doing well overall. Spoke with nursing and with family and patient      Severe L3-4 and moderate L2-3 and L4-5 central stenosis  Hold Eliquis 3 days prior ()  Start heparin drip (history of multiple blood clots)  Hold heparin drip 12:01 AM 25 unless otherwise prompted by Dr Zendejas   Switch to Tramadol for confusion for pain  S/p L2-L4 laminectomy and L2-L5 fusion with Dr Zendejas 25   S/p remove drain 7/10/25 per VITAL[facility]  Discussed with NGSY, will plan to discharge on Lovenox 1mg/kg BID until 25 and then at that time can resume home eliquis and ASA     History of CVA ,    In setting of temporary interruption of anticoagulation for procedures   On ASA and OAC     Valvular heart disease / Severe AS   SP bioprosthetic valve replacement      Aortoiliac bilateral emboli in the setting of low INR s/p thrombectomy      Permanent atrial  foraminal stenosis; S/P lumbar fusion      diazePAM (VALIUM) 5 MG tablet Take 1 tablet by mouth every 6 hours as needed for Anxiety (Muscle spasms) for up to 10 days. Max Daily Amount: 20 mg  Qty: 40 tablet, Refills: 0    Associated Diagnoses: Lumbar foraminal stenosis; S/P lumbar fusion      bisacodyl (DULCOLAX) 5 MG EC tablet Take 1 tablet by mouth daily      bisacodyl (DULCOLAX) 10 MG suppository Place 1 suppository rectally daily as needed for Constipation      polyethylene glycol (GLYCOLAX) 17 g packet Take 1 packet by mouth daily      sennosides-docusate sodium (SENOKOT-S) 8.6-50 MG tablet Take 1 tablet by mouth 2 times daily      sodium phosphate (FLEET) Place 1 enema rectally daily as needed (constipation)      enoxaparin (LOVENOX) 80 MG/0.8ML Inject 0.7 mLs into the skin 2 times daily for 3 days             INTERNAL MEDICINE FOLLOW UP/INSTRUCTIONS:  Follow-up with primary care physician within 1 week of discharge from hospital  Please review changes to pre-hospital admission medications and prescriptions for new medications upon discharge from the hospital with PCP  Please review results of labs and imaging studies with PCP  Follow-up with consultants as directed by them   If recurrence or worsening of symptoms please call primary care physician or return to the ER immediately  Diet: ADULT DIET; Regular; 4 carb choices (60 gm/meal)    Preparing for this patient's discharge, including paperwork, orders, instructions, and meeting with patient did required >35 minutes.    Electronically signed by Cayden Akers MD on 7/10/2025 at 12:04 PM

## 2025-07-10 NOTE — PROGRESS NOTES
Occupational Therapy  OT BEDSIDE TREATMENT NOTE   [facility]  [address], [place]       Date:7/10/2025  Patient Name: Grecia Wilhelm  MRN: [id number]  : 1945  Room: [facility]85-A     Per OT Eval:    Evaluating OT: Juan Manuel Pennington OTR/L [id number]     Referring Provider:     Shana Zendejas MD                                         Specific Provider Orders/Date: OT evaluation and treatment 25 6793     Diagnosis:  Lumbar stenosis [M48.061]  Lumbar foraminal stenosis [M48.061]       Pertinent Medical History:  has a past medical history of Aortic stenosis, mild, Arrhythmia, Arthritis, Atrial fibrillation (HCC), Bleeding from varicose veins of right lower extremity, CAD (coronary artery disease), Cerebrovascular accident (CVA) due to embolic occlusion of left middle cerebral artery (HCC), Cerebrovascular accident (CVA) due to embolic occlusion of left middle cerebral artery (HCC), Cerebrovascular disease, CHF (congestive heart failure) (HCC), Critical limb ischemia of both lower extremities (HCC), Diabetes mellitus (HCC), Hearing deficit, Heart disease, Hyperlipidemia, Hypertension, Iron deficiency anemia, Migraine headache with aura, Mitral regurgitation, Moderate aortic stenosis by prior echocardiogram, NCGS (non-celiac gluten sensitivity), Severe aortic stenosis, TIA (transient ischemic attack), Unspecified cerebral artery occlusion with cerebral infarction, Varicose veins of left leg with edema, Venous stasis ulcer of right calf with fat layer exposed with varicose veins (HCC), and Warfarin-induced coagulopathy.   Past Surgical History         Past Surgical History:   Procedure Laterality Date    ABDOMEN SURGERY        AORTIC VALVE REPLACEMENT N/A      APPENDECTOMY        BREAST SURGERY Right       biopsy-benign    COCHLEAR IMPLANT Right 10/26/2022     RIGHT COCHLEAR IMPLANT INSERTION WITH NERVE INTEGRITY MONITOR performed by Hali  solving: Fair-              Judgement/safety: Fair -              Attention:  Fair      Functional Assessment: AM-PAC Inpatient Daily Activity Raw Score: 13/24       Initial Eval Status  Date: 7/8/2025   Treatment Status  Date:  7/10/25 STG=LTG  Time frame: 10-14 days   Feeding Setup     Set up  Recommending meals up in chair  Mod I    Grooming Minimal assistance  Seated in chair with arms  Set up  Simple task washing face while seated in chair  Set up   UB Dressing Maximum assistance   Gown and LSO bed level   Min A with gown  Doff/jose at EOB  Max A donning LSO  Continued education on technique to jose & placement  Mod A    LB Dressing Dependent/Total  Socks   OT began edu on compensatory strategies - Pt unable to fig four. Began edu on AE Pt inturrupted and reported \"that's what I have my  for\"   Max A  Continued education on use of AE Min A    Bathing Maximum assistance  Simulated   Max A  Simulated task  Min A    Toileting Maximum assistance  Simulated in standing for clothing management and aimee care  Max/Dep  Failed pure-wick, dep for hygiene, requesting BSC Min A    Bed Mobility  Rolling L/R: Mod A    Supine to sit: NT   Sit to supine: Max A  Rolling: Mod A  Max A  Supine to Sit     2nd Session  Mod A  Sit to Supine  Rolling: Mod A Supine to sit: Stand by assist   Sit to supine: Stand by assist    Functional Transfers Sit to stand: Max A   Stand to sit: Max A   Stand pivot: Mod A  with walker chair with arms to EOB   Extended time- step by step cues   Mod/Max A  Sit < > Stand  Stand pivot with walker     2nd Session  Max A  Sit < > Stand  From chair  Mod A with walker  Stand pivot   from chair to EOB  Increased time & max cues to complete  Stand by assist    Functional Mobility NT   Mod A  with walker  Able to walk 10' with chair follow, continued education on modified techniques  Stand by assist   with AAD    Balance Sitting - static: Stand by assist    Sitting - dynamic: Min A  during functional

## 2025-07-10 NOTE — PROGRESS NOTES
Physical Therapy  Treatment Note    Name: Grecia Wilhelm  : 1945  MRN: [id number]      Date of Service: 7/10/2025    Evaluating PT:  Sabino Tinsley PT, DPT [id number]    Room #:  8521/8521-A  Diagnosis:  Lumbar stenosis [M48.061]  Lumbar foraminal stenosis [M48.061]  PMHx/PSHx:    Past Medical History:   Diagnosis Date    Aortic stenosis, mild 10/01/2015    follows  Dr Gallardo at [facility] last visit 22    Arrhythmia     Arthritis     knees    Atrial fibrillation (HCC)     Bleeding from varicose veins of right lower extremity 2017    CAD (coronary artery disease)     Cerebrovascular accident (CVA) due to embolic occlusion of left middle cerebral artery (HCC) 10/2015    Confirmed by neurology    Cerebrovascular accident (CVA) due to embolic occlusion of left middle cerebral artery (HCC)     Left parietal confirmed by neurology    Cerebrovascular disease 2013    CVA.no weakness/deficits    CHF (congestive heart failure) (HCC)     Critical limb ischemia of both lower extremities (HCC) 2025    Diabetes mellitus (HCC)     Hearing deficit     wears hearing aide left ear only    Heart disease     Hyperlipidemia     Hypertension     Iron deficiency anemia 10/23/2022    Migraine headache with aura     Mitral regurgitation 10/01/2015    mild    Moderate aortic stenosis by prior echocardiogram     NCGS (non-celiac gluten sensitivity)     Severe aortic stenosis 2020    By 2D echo    TIA (transient ischemic attack)     Unspecified cerebral artery occlusion with cerebral infarction     Varicose veins of left leg with edema 2017    Venous stasis ulcer of right calf with fat layer exposed with varicose veins (HCC) 2019    Warfarin-induced coagulopathy 2015     Procedure/Surgery:   L2-L5 laminectomy and posterolateral fusion   Precautions:  Falls, spinal, LSO, hemovac, alarms, contact isolation, [place], cochlear implant, TSM  Equipment Needs:  TBD    SUBJECTIVE:    Pt lives  with  in a 2 story home with ramped entry.  First floor setup.      Pt ambulated with WW and required assistance for ADLs PTA.    OBJECTIVE:   Initial Evaluation  Date: 7/8/25 Treatment  Date: 7/10/25 Short Term/ Long Term   Goals   AM-PAC 6 Clicks 10/24 10/24    Was pt agreeable to Eval/treatment? Yes yes    Does pt have pain? Some surgical pain at rest; 10/10 surgical pain with mobility Minimal back and R LE pain     Bed Mobility  Rolling: ModA  Supine to sit: MaxA  Sit to supine: NT  Scooting: MaxA Rolling: mod A  Supine to sit: max A   Scooting: max A Brian   Transfers Sit to stand: MaxA  Stand to sit: MaxA  Stand pivot: MaxA with WW Sit to stand: mod/max A   Stand to sit: mod/max A   Stand pivot: mod/max A with ww Brian with WW   Ambulation   A few steps to chair with MaxA with WW 10 feet x 2 with ww with mod A  >10 feet with Brian with WW   Stair negotiation: ascended and descended NT NT    ROM BUE:  WFL  BLE:  WFL     Strength BUE:  WFL  BLE:  2/5 grossly with MMT but able to stand  Increase by 1/3 MMT grade   Balance Sitting EOB:  Brian  Dynamic Standing:  MaxA with WW  Sitting EOB:  Independent  Dynamic Standing:  Brian with WW     Vitals   Room air with all activity and O2 levels were 96-99 % and -130s bpm  ( nursing notified)      Therapeutic Exercises:    Supine exercises to R LE ( AAROM R LE  in all available planes ) to improve strength and functional mobility.      Patient education  Pt educated on safety with mobility .    Patient response to education:   Pt verbalized understanding Pt demonstrated skill Pt requires further education in this area   Partially due to cognitions  Partially with verbal instruction  X      ASSESSMENT:    Comments:  Nursing cleared pt for physical therapy. Pt in bed with daughter present  upon arrival and agreed to participate in therapy. Pt completed functional mobility as noted above.  Heavy assistance with trunk and LE management with bed mobility. Pt sat on

## 2025-07-10 NOTE — PLAN OF CARE
Problem: Pain  Goal: Verbalizes/displays adequate comfort level or baseline comfort level  7/10/2025 0454 by Renu Andrew RN  Outcome: Progressing  7/10/2025 0453 by Renu Andrew RN  Outcome: Progressing     Problem: Safety - Adult  Goal: Free from fall injury  7/10/2025 0454 by Renu Andrew, RN  Outcome: Progressing  7/10/2025 0453 by Renu Andrew RN  Outcome: Progressing

## 2025-07-10 NOTE — CARE COORDINATION
Case reviewed with team in IDR.  Patient OK to transition on therapeutic Lovenox instead of Heparin for transition of care.  Per Mary with Neurosurgery, OK to resume Eliquis and ASA on Monday.  Authorization obtained for transition of care to rehab today, good through 7/14.  Met with the patient and her daughter Elizabeth at the bedside and reviewed above.  Both are in agreement with transition of care.  Dr Akers will discharge.  [facility]  Charge nurse Mann and bedside nurse Key Notified of above.  Envelope and ambulette completed and in the soft chart.  PASRR, ALO, and destination completed.             Nel Nash RN.  P:  [phone]

## 2025-07-11 LAB
EKG ATRIAL RATE: 68 BPM
EKG Q-T INTERVAL: 406 MS
EKG QRS DURATION: 92 MS
EKG QTC CALCULATION (BAZETT): 412 MS
EKG R AXIS: 47 DEGREES
EKG T AXIS: 46 DEGREES
EKG VENTRICULAR RATE: 62 BPM

## 2025-07-14 ENCOUNTER — TELEPHONE (OUTPATIENT)
Age: 80
End: 2025-07-14

## 2025-07-14 ENCOUNTER — FOLLOWUP TELEPHONE ENCOUNTER (OUTPATIENT)
Dept: AUDIOLOGY | Age: 80
End: 2025-07-14

## 2025-07-14 NOTE — TELEPHONE ENCOUNTER
Daughter, Brielle, left message for Dayana with questions regarding CI. Sent message with phone number to Dayana.     Electronically signed by Aggie Milner on 7/14/2025 at 1:04 PM

## 2025-07-14 NOTE — TELEPHONE ENCOUNTER
Susan - Nurse from [facility] called said patient came there on Lovonox but was prev on Eliquis before the accident.     She wanted to know when it would be okay for patient to start eliquis again        (Note for when I call back - make sure to ask for Susan - nurse - [phone])

## 2025-07-15 DIAGNOSIS — M48.062 LUMBAR STENOSIS WITH NEUROGENIC CLAUDICATION: Primary | ICD-10-CM

## 2025-07-15 NOTE — TELEPHONE ENCOUNTER
Unfortunately, there is no way to order it thru [facility], I did write a letter and will sign it

## 2025-07-15 NOTE — TELEPHONE ENCOUNTER
Called Nahum back and spoke to Denia, informed her Lovenox was to be d/c yesterday and to restart Eliquis & ASA today per d/c note.She then asked for an order to restart. I asked her if a letter was okay and she said it has to \"look like an order\"     Is there a way for us to put in an order for them to restart it so I can fax it?

## 2025-07-16 ENCOUNTER — TELEPHONE (OUTPATIENT)
Dept: AUDIOLOGY | Age: 80
End: 2025-07-16

## 2025-07-16 NOTE — TELEPHONE ENCOUNTER
Patient's daughter returned phone call and stated that patient lost her CI after her back surgery at the Rehab center. Patient's CI was found and daughter just wanted to know if it was under a warranty or insured in case it is lost again. Reassured daughter that patient is under warranty for device for 5 years.     Electronically signed by Aggie Fonseca on 7/16/2025 at 9:08 AM

## 2025-07-16 NOTE — TELEPHONE ENCOUNTER
Patient's daughter called to speak with CI audiologist about a problem with patient's CI. Called back and LVM with daughter to give me a call back today about what's going on.     Electronically signed by Aggie Fonesca on 7/16/2025 at 8:53 AM

## 2025-08-04 ENCOUNTER — HOSPITAL ENCOUNTER (OUTPATIENT)
Age: 80
Discharge: HOME | End: 2025-08-06
Payer: MEDICARE

## 2025-08-04 ENCOUNTER — OFFICE VISIT (OUTPATIENT)
Age: 80
End: 2025-08-04
Payer: MEDICARE

## 2025-08-04 ENCOUNTER — HOSPITAL ENCOUNTER (OUTPATIENT)
Dept: GENERAL RADIOLOGY | Age: 80
Discharge: HOME | End: 2025-08-06
Payer: MEDICARE

## 2025-08-04 VITALS
WEIGHT: 146 LBS | RESPIRATION RATE: 17 BRPM | TEMPERATURE: 98.3 F | HEART RATE: 78 BPM | OXYGEN SATURATION: 97 % | BODY MASS INDEX: 25.87 KG/M2 | SYSTOLIC BLOOD PRESSURE: 118 MMHG | DIASTOLIC BLOOD PRESSURE: 58 MMHG | HEIGHT: 63 IN

## 2025-08-04 DIAGNOSIS — Z98.1 S/P LUMBAR FUSION: Primary | ICD-10-CM

## 2025-08-04 DIAGNOSIS — M48.062 LUMBAR STENOSIS WITH NEUROGENIC CLAUDICATION: ICD-10-CM

## 2025-08-04 PROCEDURE — 72100 X-RAY EXAM L-S SPINE 2/3 VWS: CPT

## 2025-08-04 PROCEDURE — 99024 POSTOP FOLLOW-UP VISIT: CPT | Performed by: STUDENT IN AN ORGANIZED HEALTH CARE EDUCATION/TRAINING PROGRAM

## 2025-08-18 ENCOUNTER — TELEPHONE (OUTPATIENT)
Dept: CARDIOLOGY CLINIC | Age: 80
End: 2025-08-18

## (undated) DEVICE — SYRINGE MED 1ML POLYCARB LUERLOCK HUB

## (undated) DEVICE — PACK PROCEDURE SURG GEN CUST

## (undated) DEVICE — CATHETER IV 18 GAX1.25 IN WNG INTROCAN SAFETY

## (undated) DEVICE — BLADE ES L6IN ELASTOMERIC COAT EXT DURABLE BEND UPTO 90DEG

## (undated) DEVICE — SOLUTION IV 500ML 0.9% SOD CHL PH 5 INJ USP VIAFLX PLAS

## (undated) DEVICE — NEEDLE FLTR 18GA L1.5IN MEM THK5UM BLNT DISP

## (undated) DEVICE — JACKSON TABLE POSITIONER KIT: Brand: MEDLINE INDUSTRIES, INC.

## (undated) DEVICE — SYRINGE WITH HYPODERMIC SAFETY NEEDLE: Brand: MAGELLAN

## (undated) DEVICE — 3.0MM FINE DIAMOND ROUND EXTENDED

## (undated) DEVICE — CATH IV AUTOGRD BC PNK 20GA 25 -- INSYTE

## (undated) DEVICE — SOLUTION IV IRRIG POUR BRL 0.9% SODIUM CHL 2F7124

## (undated) DEVICE — 6.0MM COARSE DIAMOND ROUND

## (undated) DEVICE — LUMBAR LAMINECTOMY: Brand: MEDLINE INDUSTRIES, INC.

## (undated) DEVICE — 1.5MM FINE DIAMOND ROUND EXTENDED

## (undated) DEVICE — 3M™ IOBAN™ 2 ANTIMICROBIAL INCISE DRAPE 6650EZ: Brand: IOBAN™ 2

## (undated) DEVICE — DISPOSABLE REFLECTIVE SPHERES ATTACHED TO REFERENCE FRAMES AND SURGICAL INSTRUMENTS FOR USE DURING SURGICAL NAVIGATION IN IMAGE GUIDED SURGERY. ONE RETAIL CARTON IS MADE UP OF 5 SPHERES PER TRAY IN A POUCH. THERE ARE 12 TRAY-IN-POUCHES FOR A TOTAL OF 60 SPHERES.: Brand: NDI PASSIVE SPHERE

## (undated) DEVICE — KIT SURG PREP POVIDONE IOD PRESATURATED PAINT WET FOR UNIV

## (undated) DEVICE — ELECTRODE 8227410 PAIRED 2 CH SET ROHS

## (undated) DEVICE — 18 GA N.G. KIT, 10 PACK: Brand: SITE-RITE

## (undated) DEVICE — 1 ML INSULIN SYRINGE LUER-LOCK WITH TIP CAP: Brand: MONOJECT

## (undated) DEVICE — 1200 GUARD II KIT W/5MM TUBE W/O VAC TUBE: Brand: GUARDIAN

## (undated) DEVICE — NEPTUNE E-SEP 125MM SUCTION SLEEVE: Brand: NEPTUNE E-SEP

## (undated) DEVICE — GLOVE SURG SZ 65 THK91MIL LTX FREE SYN POLYISOPRENE

## (undated) DEVICE — PROBE 8225101 5PK STD PRASS FL TIP ROHS

## (undated) DEVICE — STANDARD HYPODERMIC NEEDLE,ALUMINUM HUB: Brand: MONOJECT

## (undated) DEVICE — POUCH FLD COLL W/ DRNGE PRT N LINTING TEAR RESIST MOLD STRP

## (undated) DEVICE — 1000 S-DRAPE TOWEL DRAPE 10/BX: Brand: STERI-DRAPE™

## (undated) DEVICE — Device

## (undated) DEVICE — FOGARTY ARTERIAL EMBOLECTOMY CATHETER 4F 80CM: Brand: FOGARTY

## (undated) DEVICE — SYRINGE MED 10ML LUERLOCK TIP W/O SFTY DISP

## (undated) DEVICE — 2.0MM FINE DIAMOND ROUND EXTENDED

## (undated) DEVICE — 4.0MM COARSE DIAMOND ROUND

## (undated) DEVICE — SOLIDIFIER FLD 2OZ 1500CC N DISINF IN BTL DISP SAFESORB

## (undated) DEVICE — SYR 10ML LUER LOK 1/5ML GRAD --

## (undated) DEVICE — DISPOSABLE REFLECTIVE SPHERES ATTACHED TO REFERENCE FRAMES AND SURGICAL INSTRUMENTS FOR USE DURING SURGICAL NAVIGATION IN IMAGE GUIDED SURGERY.ONE RETAIL CARTON IS MADE UP OF 1 SPHERE PER TRAY IN A POUCH. THERE ARE 12 TRAY-IN-POUCHES FOR A TOTAL OF 12 SPHERES.: Brand: NDI PASSIVE SPHERE

## (undated) DEVICE — COVER HNDL LT DISP

## (undated) DEVICE — INTENDED FOR TISSUE SEPARATION, AND OTHER PROCEDURES THAT REQUIRE A SHARP SURGICAL BLADE TO PUNCTURE OR CUT.: Brand: BARD-PARKER ® STAINLESS STEEL BLADES

## (undated) DEVICE — SYRINGE MED 5ML STD CLR PLAS LUERLOCK TIP N CTRL DISP

## (undated) DEVICE — SYRINGE MEDICAL 3ML CLEAR PLASTIC STANDARD NON CONTROL LUERLOCK TIP DISPOSABLE

## (undated) DEVICE — HYPODERMIC SAFETY NEEDLE: Brand: MAGELLAN

## (undated) DEVICE — GOWN,SIRUS,FABRNF,L,20/CS: Brand: MEDLINE

## (undated) DEVICE — SURGICAL PROCEDURE PACK EENT CUST

## (undated) DEVICE — SKIN PREP TRAY 4 COMPARTM TRAY: Brand: MEDLINE INDUSTRIES, INC.

## (undated) DEVICE — SOLUTION SURG PREP 26 CC PURPREP

## (undated) DEVICE — TOWEL,OR,DSP,ST,BLUE,STD,6/PK,12PK/CS: Brand: MEDLINE

## (undated) DEVICE — ELECTRODE PT RET AD L9FT HI MOIST COND ADH HYDRGEL CORDED

## (undated) DEVICE — ELECTRODE,RADIOTRANSLUCENT,FOAM,5PK: Brand: MEDLINE

## (undated) DEVICE — DISPOSABLE IRRIGATION CASSETTE: Brand: CORE

## (undated) DEVICE — SYRINGE MED 20ML STD CLR PLAS LUERLOCK TIP N CTRL DISP

## (undated) DEVICE — TOTAL TRAY, DB, 100% SILI FOLEY, 16FR 10: Brand: MEDLINE

## (undated) DEVICE — SET ADMIN 16ML TBNG L100IN 2 Y INJ SITE IV PIGGY BK DISP

## (undated) DEVICE — 4-PORT MANIFOLD: Brand: NEPTUNE 2

## (undated) DEVICE — SYR 3ML LL TIP 1/10ML GRAD --

## (undated) DEVICE — BLADE OPHTH GRN ROUNDED TIP 1 SIDE SHRP GRINDLESS MINI-BLDE

## (undated) DEVICE — NEEDLE SPNL L3.5IN PNK HUB S STL REG WALL FIT STYL W/ QNCKE

## (undated) DEVICE — MARKER,SKIN,WI/RULER AND LABELS: Brand: MEDLINE

## (undated) DEVICE — TUBING SUCT 12FR MAL ALUM SHFT FN CAP VENT UNIV CONN W/ OBT

## (undated) DEVICE — 1.0MM FINE DIAMOND ROUND EXTENDED

## (undated) DEVICE — Z DISCONTINUED PER MEDLINE LINE GAS SAMPLING O2/CO2 LNG AD 13 FT NSL W/ TBNG FILTERLINE

## (undated) DEVICE — KIT EVAC 400CC DIA1/8IN H PAT 12.5IN 3 SPR RND SHP PVC DRN

## (undated) DEVICE — KIT PROC 2 TWO NUCLS 7 SND PROC KANSO CP1000 CP950

## (undated) DEVICE — GLOVE SURG SZ 55 CRM LTX FREE POLYISOPRENE POLYMER BEAD ANTI

## (undated) DEVICE — DRESSING EAR AD 5.5IN GLSCOCK

## (undated) DEVICE — SYRINGE, LUER LOCK, 10ML: Brand: MEDLINE

## (undated) DEVICE — SPECIMEN CUP W/LID: Brand: DEROYAL

## (undated) DEVICE — SYRINGE MED 3ML CLR PLAS STD N CTRL LUERLOCK TIP DISP

## (undated) DEVICE — KIT SURG W7XL11IN 2 PKT UNTREATED NA

## (undated) DEVICE — DRESSING EAR PED 3.5IN PLAS SHELL 2 MOLD PDDED ADJ VELC

## (undated) DEVICE — 7CM ELITE IRRIGATION SLEEVE

## (undated) DEVICE — SHEET,DRAPE,40X58,STERILE: Brand: MEDLINE

## (undated) DEVICE — DRAPE MICROSCOPE PRESCOTT

## (undated) DEVICE — KIT,ANTI FOG,W/SPONGE & FLUID,SOFT PACK: Brand: MEDLINE

## (undated) DEVICE — GLOVE ORANGE PI 8   MSG9080

## (undated) DEVICE — NEEDLE HYPO 18GA L1.5IN PNK S STL HUB POLYPR SHLD REG BVL

## (undated) DEVICE — NEONATAL-ADULT SPO2 SENSOR: Brand: NELLCOR

## (undated) DEVICE — 3M™ IOBAN™ 2 ANTIMICROBIAL INCISE DRAPE 6640EZ: Brand: IOBAN™ 2

## (undated) DEVICE — SOLUTION IV 1000ML 0.9% SOD CHL PH 5 INJ USP VIAFLX PLAS

## (undated) DEVICE — BASIN EMSIS 16OZ GRAPHITE PLAS KID SHP MOLD GRAD FOR ORAL

## (undated) DEVICE — TOWEL 4 PLY TISS 19X30 SUE WHT

## (undated) DEVICE — MASTISOL ADHESIVE LIQ 2/3ML

## (undated) DEVICE — SYSTEM CATH 20GA L1IN OD1.0668-1.143MM ID0.7874-0.8636MM 383516

## (undated) DEVICE — BAG TRNSF AUTOLGS SUCT AND ANTICOAG LN AUTOLOG

## (undated) DEVICE — BOWL ASSY BM210 DUAL BLADE DISPOSABLE: Brand: MIDAS REX™

## (undated) DEVICE — GLOVE SURG 8.5 PF POLYMER WHT STRL SIGN LTX ESSENTIAL LTX

## (undated) DEVICE — MAJOR VASCULAR: Brand: MEDLINE INDUSTRIES, INC.

## (undated) DEVICE — DRAPE,REIN 53X77,STERILE: Brand: MEDLINE

## (undated) DEVICE — CORD,CAUTERY,BIPOLAR,STERILE: Brand: MEDLINE

## (undated) DEVICE — DOUBLE BASIN SET: Brand: MEDLINE INDUSTRIES, INC.

## (undated) DEVICE — DRESSING ADH N ADH 8X35 IN 6X175 IN SFT CLTH MEDIPORE +

## (undated) DEVICE — BLUNTFILL: Brand: MONOJECT

## (undated) DEVICE — 5.0MM PRECISION ROUND

## (undated) DEVICE — COVER,LIGHT HANDLE,FLX,2/PK: Brand: MEDLINE INDUSTRIES, INC.

## (undated) DEVICE — FOGARTY ARTERIAL EMBOLECTOMY CATHETER 3F 80CM: Brand: FOGARTY

## (undated) DEVICE — BLANKET WRM W35.4XL86.6IN FULL UNDERBODY + FORC AIR